# Patient Record
Sex: MALE | Race: WHITE | NOT HISPANIC OR LATINO | Employment: OTHER | URBAN - METROPOLITAN AREA
[De-identification: names, ages, dates, MRNs, and addresses within clinical notes are randomized per-mention and may not be internally consistent; named-entity substitution may affect disease eponyms.]

---

## 2020-08-20 ENCOUNTER — LAB VISIT (OUTPATIENT)
Dept: LAB | Facility: HOSPITAL | Age: 75
End: 2020-08-20
Attending: FAMILY MEDICINE
Payer: MEDICARE

## 2020-08-20 DIAGNOSIS — Z12.5 SCREENING FOR PROSTATE CANCER: ICD-10-CM

## 2020-08-20 DIAGNOSIS — Z79.899 ENCOUNTER FOR LONG-TERM (CURRENT) USE OF OTHER MEDICATIONS: ICD-10-CM

## 2020-08-20 DIAGNOSIS — I10 HYPERTENSION, ESSENTIAL: ICD-10-CM

## 2020-08-20 DIAGNOSIS — Z72.89 OTHER PROBLEMS RELATED TO LIFESTYLE: ICD-10-CM

## 2020-08-20 DIAGNOSIS — E11.9 DIABETES MELLITUS WITHOUT COMPLICATION: ICD-10-CM

## 2020-08-20 LAB
ALBUMIN SERPL BCP-MCNC: 4.1 G/DL (ref 3.5–5.2)
ALP SERPL-CCNC: 33 U/L (ref 55–135)
ALT SERPL W/O P-5'-P-CCNC: 18 U/L (ref 10–44)
ANION GAP SERPL CALC-SCNC: 11 MMOL/L (ref 8–16)
AST SERPL-CCNC: 18 U/L (ref 10–40)
BILIRUB SERPL-MCNC: 0.6 MG/DL (ref 0.1–1)
BUN SERPL-MCNC: 24 MG/DL (ref 8–23)
CALCIUM SERPL-MCNC: 9.6 MG/DL (ref 8.7–10.5)
CHLORIDE SERPL-SCNC: 102 MMOL/L (ref 95–110)
CHOLEST SERPL-MCNC: 127 MG/DL (ref 120–199)
CHOLEST/HDLC SERPL: 3.4 {RATIO} (ref 2–5)
CO2 SERPL-SCNC: 28 MMOL/L (ref 23–29)
COMPLEXED PSA SERPL-MCNC: 0.91 NG/ML (ref 0–4)
CREAT SERPL-MCNC: 0.7 MG/DL (ref 0.5–1.4)
EST. GFR  (AFRICAN AMERICAN): >60 ML/MIN/1.73 M^2
EST. GFR  (NON AFRICAN AMERICAN): >60 ML/MIN/1.73 M^2
GLUCOSE SERPL-MCNC: 161 MG/DL (ref 70–110)
HDLC SERPL-MCNC: 37 MG/DL (ref 40–75)
HDLC SERPL: 29.1 % (ref 20–50)
LDLC SERPL CALC-MCNC: 64.4 MG/DL (ref 63–159)
NONHDLC SERPL-MCNC: 90 MG/DL
POTASSIUM SERPL-SCNC: 3.9 MMOL/L (ref 3.5–5.1)
PROT SERPL-MCNC: 7 G/DL (ref 6–8.4)
SODIUM SERPL-SCNC: 141 MMOL/L (ref 136–145)
TRIGL SERPL-MCNC: 128 MG/DL (ref 30–150)

## 2020-08-20 PROCEDURE — 80061 LIPID PANEL: CPT

## 2020-08-20 PROCEDURE — 36415 COLL VENOUS BLD VENIPUNCTURE: CPT

## 2020-08-20 PROCEDURE — 86803 HEPATITIS C AB TEST: CPT

## 2020-08-20 PROCEDURE — 84153 ASSAY OF PSA TOTAL: CPT

## 2020-08-20 PROCEDURE — 83036 HEMOGLOBIN GLYCOSYLATED A1C: CPT

## 2020-08-20 PROCEDURE — 80053 COMPREHEN METABOLIC PANEL: CPT

## 2020-08-21 LAB
ESTIMATED AVG GLUCOSE: 134 MG/DL (ref 68–131)
HBA1C MFR BLD HPLC: 6.3 % (ref 4.5–6.2)

## 2020-08-22 LAB — HCV AB S/CO SERPL IA: 0.1 S/CO RATIO (ref 0–0.9)

## 2020-08-31 PROBLEM — E78.5 HYPERLIPIDEMIA: Status: ACTIVE | Noted: 2020-08-31

## 2020-08-31 PROBLEM — I48.91 ATRIAL FIBRILLATION: Status: ACTIVE | Noted: 2020-08-31

## 2020-08-31 PROBLEM — E66.01 MORBID OBESITY: Status: ACTIVE | Noted: 2020-08-31

## 2020-08-31 PROBLEM — Z79.82 ENCOUNTER FOR LONG-TERM (CURRENT) USE OF ASPIRIN: Status: ACTIVE | Noted: 2020-08-31

## 2020-08-31 PROBLEM — I10 HYPERTENSION, ESSENTIAL: Status: ACTIVE | Noted: 2020-08-31

## 2020-08-31 PROBLEM — Z79.01 ANTICOAGULANT LONG-TERM USE: Status: ACTIVE | Noted: 2020-08-31

## 2020-08-31 PROBLEM — E11.21 TYPE 2 DIABETES MELLITUS WITH DIABETIC NEPHROPATHY, WITHOUT LONG-TERM CURRENT USE OF INSULIN: Status: ACTIVE | Noted: 2020-08-31

## 2020-11-30 ENCOUNTER — TELEPHONE (OUTPATIENT)
Dept: FAMILY MEDICINE | Facility: CLINIC | Age: 75
End: 2020-11-30

## 2020-11-30 NOTE — TELEPHONE ENCOUNTER
----- Message from Rafaela Schultz, Patient Care Assistant sent at 11/30/2020  1:14 PM CST -----  Regarding: refill  Contact: pt  Type:  RX Refill Request    Who Called:  pt   Refill or New Rx:  refill   RX Name and Strength:  metoprolol tartrate (LOPRESSOR) 25 MG tablet  How is the patient currently taking it? 2Xday   Is this a 30 day or 90 day RX:  90  Preferred Pharmacy with phone number:    [facility]/pharmacy #[id number] - [place]  [address]  [address] [address]  Phone: [phone] Fax: [phone]  Local or Mail Order:  local   Ordering Provider:  Dr. Kina Mann Call Back Number:  [phone] (home)   Additional Information:  please call pt to advise. Thanks!        #60 BID NEED APT

## 2020-12-16 ENCOUNTER — TELEPHONE (OUTPATIENT)
Dept: FAMILY MEDICINE | Facility: CLINIC | Age: 75
End: 2020-12-16

## 2020-12-16 NOTE — TELEPHONE ENCOUNTER
----- Message from Matias Nathan sent at 12/16/2020  9:07 AM CST -----  Contact: Self  Type: Needs Medical Advice  Who Called:  Patient  Best Call Back Number: [phone]      Additional Information: Patient states pharmacy cannot fill rosuvastatin (CRESTOR) 20 MG tablet due to no dosage instructions on the prescription. States he would like pharmacy called and issue corrected. Patient states he has two days left.         [facility] #[id number] - [place] - [address]  [address] [place] [address]  Phone: [phone] Fax: [phone]    QD DONE

## 2021-01-06 ENCOUNTER — LAB VISIT (OUTPATIENT)
Dept: LAB | Facility: HOSPITAL | Age: 76
End: 2021-01-06
Attending: FAMILY MEDICINE
Payer: MEDICARE

## 2021-01-06 ENCOUNTER — OFFICE VISIT (OUTPATIENT)
Dept: FAMILY MEDICINE | Facility: CLINIC | Age: 76
End: 2021-01-06
Payer: MEDICARE

## 2021-01-06 VITALS
HEIGHT: 70 IN | BODY MASS INDEX: 45.1 KG/M2 | DIASTOLIC BLOOD PRESSURE: 86 MMHG | HEART RATE: 70 BPM | OXYGEN SATURATION: 95 % | WEIGHT: 315 LBS | TEMPERATURE: 97 F | SYSTOLIC BLOOD PRESSURE: 138 MMHG

## 2021-01-06 DIAGNOSIS — E78.5 HYPERLIPIDEMIA, UNSPECIFIED HYPERLIPIDEMIA TYPE: ICD-10-CM

## 2021-01-06 DIAGNOSIS — I48.91 ATRIAL FIBRILLATION, UNSPECIFIED TYPE: ICD-10-CM

## 2021-01-06 DIAGNOSIS — I10 HYPERTENSION, ESSENTIAL: ICD-10-CM

## 2021-01-06 DIAGNOSIS — E11.42 TYPE 2 DIABETES MELLITUS WITH DIABETIC POLYNEUROPATHY, WITHOUT LONG-TERM CURRENT USE OF INSULIN: ICD-10-CM

## 2021-01-06 DIAGNOSIS — E66.01 MORBID OBESITY: ICD-10-CM

## 2021-01-06 DIAGNOSIS — E11.21 TYPE 2 DIABETES MELLITUS WITH DIABETIC NEPHROPATHY, WITHOUT LONG-TERM CURRENT USE OF INSULIN: ICD-10-CM

## 2021-01-06 DIAGNOSIS — I10 HYPERTENSION, ESSENTIAL: Primary | ICD-10-CM

## 2021-01-06 DIAGNOSIS — Z79.01 ANTICOAGULANT LONG-TERM USE: ICD-10-CM

## 2021-01-06 DIAGNOSIS — G25.0 BENIGN FAMILIAL TREMOR: ICD-10-CM

## 2021-01-06 LAB
ALBUMIN SERPL BCP-MCNC: 4 G/DL (ref 3.5–5.2)
ALP SERPL-CCNC: 45 U/L (ref 55–135)
ALT SERPL W/O P-5'-P-CCNC: 19 U/L (ref 10–44)
ANION GAP SERPL CALC-SCNC: 13 MMOL/L (ref 8–16)
AST SERPL-CCNC: 21 U/L (ref 10–40)
BILIRUB SERPL-MCNC: 0.8 MG/DL (ref 0.1–1)
BUN SERPL-MCNC: 19 MG/DL (ref 8–23)
CALCIUM SERPL-MCNC: 9.3 MG/DL (ref 8.7–10.5)
CHLORIDE SERPL-SCNC: 96 MMOL/L (ref 95–110)
CHOLEST SERPL-MCNC: 111 MG/DL (ref 120–199)
CHOLEST/HDLC SERPL: 3 {RATIO} (ref 2–5)
CO2 SERPL-SCNC: 29 MMOL/L (ref 23–29)
CREAT SERPL-MCNC: 0.7 MG/DL (ref 0.5–1.4)
EST. GFR  (AFRICAN AMERICAN): >60 ML/MIN/1.73 M^2
EST. GFR  (NON AFRICAN AMERICAN): >60 ML/MIN/1.73 M^2
ESTIMATED AVG GLUCOSE: 206 MG/DL (ref 68–131)
GLUCOSE SERPL-MCNC: 184 MG/DL (ref 70–110)
HBA1C MFR BLD HPLC: 8.8 % (ref 4.5–6.2)
HDLC SERPL-MCNC: 37 MG/DL (ref 40–75)
HDLC SERPL: 33.3 % (ref 20–50)
LDLC SERPL CALC-MCNC: 48.4 MG/DL (ref 63–159)
NONHDLC SERPL-MCNC: 74 MG/DL
POTASSIUM SERPL-SCNC: 3.8 MMOL/L (ref 3.5–5.1)
PROT SERPL-MCNC: 7.4 G/DL (ref 6–8.4)
SODIUM SERPL-SCNC: 138 MMOL/L (ref 136–145)
TRIGL SERPL-MCNC: 128 MG/DL (ref 30–150)

## 2021-01-06 PROCEDURE — 80053 COMPREHEN METABOLIC PANEL: CPT

## 2021-01-06 PROCEDURE — 36415 COLL VENOUS BLD VENIPUNCTURE: CPT

## 2021-01-06 PROCEDURE — 99214 PR OFFICE/OUTPT VISIT, EST, LEVL IV, 30-39 MIN: ICD-10-PCS | Mod: S$GLB,,, | Performed by: FAMILY MEDICINE

## 2021-01-06 PROCEDURE — 80061 LIPID PANEL: CPT

## 2021-01-06 PROCEDURE — 83036 HEMOGLOBIN GLYCOSYLATED A1C: CPT

## 2021-01-06 PROCEDURE — 99214 OFFICE O/P EST MOD 30 MIN: CPT | Mod: S$GLB,,, | Performed by: FAMILY MEDICINE

## 2021-01-06 RX ORDER — MAGNESIUM 250 MG
250 TABLET ORAL 2 TIMES DAILY
COMMUNITY
End: 2023-03-16

## 2021-01-06 RX ORDER — OXAPROZIN 600 MG/1
600 TABLET, FILM COATED ORAL 2 TIMES DAILY PRN
Qty: 60 TABLET | Refills: 2 | Status: SHIPPED | OUTPATIENT
Start: 2021-01-06 | End: 2021-10-12 | Stop reason: SDUPTHER

## 2021-01-06 RX ORDER — LOSARTAN POTASSIUM 50 MG/1
50 TABLET ORAL DAILY
COMMUNITY
End: 2021-01-06 | Stop reason: SDUPTHER

## 2021-01-06 RX ORDER — CYCLOBENZAPRINE HCL 5 MG
TABLET ORAL
Qty: 60 TABLET | Refills: 2 | Status: SHIPPED | OUTPATIENT
Start: 2021-01-06 | End: 2021-10-12 | Stop reason: SDUPTHER

## 2021-01-06 RX ORDER — PROPRANOLOL HYDROCHLORIDE 40 MG/1
40 TABLET ORAL 2 TIMES DAILY
Qty: 180 TABLET | Refills: 1 | Status: SHIPPED | OUTPATIENT
Start: 2021-01-06 | End: 2021-04-07

## 2021-01-06 RX ORDER — CHOLECALCIFEROL (VITAMIN D3) 50 MCG
2 TABLET ORAL DAILY
COMMUNITY

## 2021-01-06 RX ORDER — METFORMIN HYDROCHLORIDE 500 MG/1
500 TABLET, EXTENDED RELEASE ORAL DAILY
Qty: 90 TABLET | Refills: 1 | Status: SHIPPED | OUTPATIENT
Start: 2021-01-06 | End: 2021-08-26

## 2021-01-06 RX ORDER — LISINOPRIL AND HYDROCHLOROTHIAZIDE 12.5; 2 MG/1; MG/1
1 TABLET ORAL DAILY
Qty: 90 TABLET | Refills: 1 | Status: SHIPPED | OUTPATIENT
Start: 2021-01-06 | End: 2021-03-04

## 2021-01-06 RX ORDER — MULTIVITAMIN
1 TABLET ORAL DAILY
COMMUNITY
End: 2022-10-11

## 2021-01-06 RX ORDER — TOPIRAMATE 50 MG/1
TABLET, FILM COATED ORAL
COMMUNITY
Start: 2021-01-04 | End: 2021-10-12 | Stop reason: SDUPTHER

## 2021-01-06 RX ORDER — MAGNESIUM 200 MG
1000 TABLET ORAL 2 TIMES DAILY
COMMUNITY

## 2021-01-06 RX ORDER — ROSUVASTATIN CALCIUM 20 MG/1
TABLET, COATED ORAL
Qty: 90 TABLET | Refills: 1 | Status: SHIPPED | OUTPATIENT
Start: 2021-01-06 | End: 2021-09-07

## 2021-01-06 RX ORDER — METOPROLOL TARTRATE 25 MG/1
25 TABLET, FILM COATED ORAL 2 TIMES DAILY
Qty: 180 TABLET | Refills: 1 | Status: SHIPPED | OUTPATIENT
Start: 2021-01-06 | End: 2021-03-04

## 2021-01-06 RX ORDER — LOSARTAN POTASSIUM 50 MG/1
50 TABLET ORAL DAILY
Qty: 90 TABLET | Refills: 1 | Status: SHIPPED | OUTPATIENT
Start: 2021-01-06 | End: 2021-03-04

## 2021-01-06 RX ORDER — TOLTERODINE 4 MG/1
4 CAPSULE, EXTENDED RELEASE ORAL DAILY
Qty: 90 CAPSULE | Refills: 1 | Status: SHIPPED | OUTPATIENT
Start: 2021-01-06 | End: 2021-07-25

## 2021-01-08 ENCOUNTER — PATIENT OUTREACH (OUTPATIENT)
Dept: ADMINISTRATIVE | Facility: HOSPITAL | Age: 76
End: 2021-01-08

## 2021-01-08 LAB
LEFT EYE DM RETINOPATHY: NEGATIVE
RIGHT EYE DM RETINOPATHY: NEGATIVE

## 2021-01-13 ENCOUNTER — PATIENT OUTREACH (OUTPATIENT)
Dept: ADMINISTRATIVE | Facility: HOSPITAL | Age: 76
End: 2021-01-13

## 2021-01-14 ENCOUNTER — PATIENT OUTREACH (OUTPATIENT)
Dept: ADMINISTRATIVE | Facility: HOSPITAL | Age: 76
End: 2021-01-14

## 2021-01-21 DIAGNOSIS — Z79.01 ANTICOAGULANT LONG-TERM USE: ICD-10-CM

## 2021-01-21 DIAGNOSIS — R80.9 MICROALBUMINURIA: ICD-10-CM

## 2021-01-21 DIAGNOSIS — I48.91 ATRIAL FIBRILLATION, UNSPECIFIED TYPE: ICD-10-CM

## 2021-01-21 DIAGNOSIS — E78.5 HYPERLIPIDEMIA, UNSPECIFIED HYPERLIPIDEMIA TYPE: ICD-10-CM

## 2021-01-21 DIAGNOSIS — Z79.82 ASPIRIN LONG-TERM USE: ICD-10-CM

## 2021-01-21 DIAGNOSIS — I10 HYPERTENSION, ESSENTIAL: Primary | ICD-10-CM

## 2021-01-21 DIAGNOSIS — E11.9 DIABETES MELLITUS WITHOUT COMPLICATION: ICD-10-CM

## 2021-02-09 ENCOUNTER — IMMUNIZATION (OUTPATIENT)
Dept: PRIMARY CARE CLINIC | Facility: CLINIC | Age: 76
End: 2021-02-09
Payer: MEDICARE

## 2021-02-09 DIAGNOSIS — Z23 NEED FOR VACCINATION: Primary | ICD-10-CM

## 2021-02-09 PROCEDURE — 0001A COVID-19, MRNA, LNP-S, PF, 30 MCG/0.3 ML DOSE VACCINE: CPT | Mod: S$GLB,,, | Performed by: FAMILY MEDICINE

## 2021-02-09 PROCEDURE — 91300 COVID-19, MRNA, LNP-S, PF, 30 MCG/0.3 ML DOSE VACCINE: CPT | Mod: S$GLB,,, | Performed by: FAMILY MEDICINE

## 2021-02-09 PROCEDURE — 0001A COVID-19, MRNA, LNP-S, PF, 30 MCG/0.3 ML DOSE VACCINE: ICD-10-PCS | Mod: S$GLB,,, | Performed by: FAMILY MEDICINE

## 2021-02-09 PROCEDURE — 91300 COVID-19, MRNA, LNP-S, PF, 30 MCG/0.3 ML DOSE VACCINE: ICD-10-PCS | Mod: S$GLB,,, | Performed by: FAMILY MEDICINE

## 2021-03-03 ENCOUNTER — IMMUNIZATION (OUTPATIENT)
Dept: PRIMARY CARE CLINIC | Facility: CLINIC | Age: 76
End: 2021-03-03
Payer: MEDICARE

## 2021-03-03 DIAGNOSIS — Z23 NEED FOR VACCINATION: Primary | ICD-10-CM

## 2021-03-03 PROCEDURE — 0002A COVID-19, MRNA, LNP-S, PF, 30 MCG/0.3 ML DOSE VACCINE: ICD-10-PCS | Mod: CV19,S$GLB,, | Performed by: FAMILY MEDICINE

## 2021-03-03 PROCEDURE — 0002A COVID-19, MRNA, LNP-S, PF, 30 MCG/0.3 ML DOSE VACCINE: CPT | Mod: CV19,S$GLB,, | Performed by: FAMILY MEDICINE

## 2021-03-03 PROCEDURE — 91300 COVID-19, MRNA, LNP-S, PF, 30 MCG/0.3 ML DOSE VACCINE: ICD-10-PCS | Mod: S$GLB,,, | Performed by: FAMILY MEDICINE

## 2021-03-03 PROCEDURE — 91300 COVID-19, MRNA, LNP-S, PF, 30 MCG/0.3 ML DOSE VACCINE: CPT | Mod: S$GLB,,, | Performed by: FAMILY MEDICINE

## 2021-03-04 ENCOUNTER — OFFICE VISIT (OUTPATIENT)
Dept: CARDIOLOGY | Facility: CLINIC | Age: 76
End: 2021-03-04
Payer: MEDICARE

## 2021-03-04 VITALS
HEART RATE: 95 BPM | RESPIRATION RATE: 16 BRPM | BODY MASS INDEX: 44.38 KG/M2 | SYSTOLIC BLOOD PRESSURE: 140 MMHG | HEIGHT: 70 IN | DIASTOLIC BLOOD PRESSURE: 74 MMHG | WEIGHT: 310 LBS | OXYGEN SATURATION: 92 %

## 2021-03-04 DIAGNOSIS — I10 HYPERTENSION, UNSPECIFIED TYPE: ICD-10-CM

## 2021-03-04 DIAGNOSIS — R00.0 TACHYCARDIA: ICD-10-CM

## 2021-03-04 DIAGNOSIS — R06.01 ORTHOPNEA: Primary | ICD-10-CM

## 2021-03-04 DIAGNOSIS — E87.70 HYPERVOLEMIA, UNSPECIFIED HYPERVOLEMIA TYPE: ICD-10-CM

## 2021-03-04 DIAGNOSIS — Z79.899 POLYPHARMACY: ICD-10-CM

## 2021-03-04 DIAGNOSIS — I48.19 PERSISTENT ATRIAL FIBRILLATION: ICD-10-CM

## 2021-03-04 DIAGNOSIS — R40.0 DAYTIME SLEEPINESS: ICD-10-CM

## 2021-03-04 DIAGNOSIS — I51.89 DIASTOLIC DYSFUNCTION: ICD-10-CM

## 2021-03-04 DIAGNOSIS — R94.39 ABNORMAL NUCLEAR STRESS TEST: ICD-10-CM

## 2021-03-04 DIAGNOSIS — E66.01 MORBID OBESITY: ICD-10-CM

## 2021-03-04 PROCEDURE — 99214 PR OFFICE/OUTPT VISIT, EST, LEVL IV, 30-39 MIN: ICD-10-PCS | Mod: S$GLB,,, | Performed by: NURSE PRACTITIONER

## 2021-03-04 PROCEDURE — 99214 OFFICE O/P EST MOD 30 MIN: CPT | Mod: S$GLB,,, | Performed by: NURSE PRACTITIONER

## 2021-03-04 RX ORDER — HYDROCHLOROTHIAZIDE 25 MG/1
25 TABLET ORAL DAILY
Qty: 90 TABLET | Refills: 3
Start: 2021-03-04 | End: 2021-10-12 | Stop reason: SDUPTHER

## 2021-03-04 RX ORDER — LISINOPRIL 20 MG/1
20 TABLET ORAL 2 TIMES DAILY
Qty: 60 TABLET | Refills: 11 | Status: SHIPPED | OUTPATIENT
Start: 2021-03-04 | End: 2021-10-12 | Stop reason: SDUPTHER

## 2021-03-11 ENCOUNTER — HOSPITAL ENCOUNTER (OUTPATIENT)
Dept: CARDIOLOGY | Facility: CLINIC | Age: 76
Discharge: HOME | End: 2021-03-11
Attending: NURSE PRACTITIONER
Payer: MEDICARE

## 2021-03-11 DIAGNOSIS — R00.0 TACHYCARDIA: ICD-10-CM

## 2021-03-11 PROCEDURE — 93224 XTRNL ECG REC UP TO 48 HRS: CPT | Mod: S$GLB,,, | Performed by: INTERNAL MEDICINE

## 2021-03-11 PROCEDURE — 93224 HOLTER MONITOR - 24 HOUR (CUPID ONLY): ICD-10-PCS | Mod: S$GLB,,, | Performed by: INTERNAL MEDICINE

## 2021-03-24 ENCOUNTER — TELEPHONE (OUTPATIENT)
Dept: FAMILY MEDICINE | Facility: CLINIC | Age: 76
End: 2021-03-24

## 2021-03-24 ENCOUNTER — PATIENT MESSAGE (OUTPATIENT)
Dept: CARDIOLOGY | Facility: CLINIC | Age: 76
End: 2021-03-24

## 2021-04-01 ENCOUNTER — LAB VISIT (OUTPATIENT)
Dept: LAB | Facility: HOSPITAL | Age: 76
End: 2021-04-01
Attending: FAMILY MEDICINE
Payer: MEDICARE

## 2021-04-01 ENCOUNTER — PATIENT MESSAGE (OUTPATIENT)
Dept: FAMILY MEDICINE | Facility: CLINIC | Age: 76
End: 2021-04-01

## 2021-04-01 DIAGNOSIS — Z79.01 ANTICOAGULANT LONG-TERM USE: ICD-10-CM

## 2021-04-01 DIAGNOSIS — I10 HYPERTENSION, ESSENTIAL: ICD-10-CM

## 2021-04-01 DIAGNOSIS — R80.9 PROTEINURIA: Primary | ICD-10-CM

## 2021-04-01 DIAGNOSIS — Z79.82 ASPIRIN LONG-TERM USE: ICD-10-CM

## 2021-04-01 DIAGNOSIS — E11.9 DIABETES MELLITUS WITHOUT COMPLICATION: ICD-10-CM

## 2021-04-01 LAB
ALBUMIN SERPL BCP-MCNC: 3.9 G/DL (ref 3.5–5.2)
ALP SERPL-CCNC: 46 U/L (ref 55–135)
ALT SERPL W/O P-5'-P-CCNC: 16 U/L (ref 10–44)
ANION GAP SERPL CALC-SCNC: 11 MMOL/L (ref 8–16)
AST SERPL-CCNC: 17 U/L (ref 10–40)
BASOPHILS # BLD AUTO: 0.03 K/UL (ref 0–0.2)
BASOPHILS NFR BLD: 0.4 % (ref 0–1.9)
BILIRUB SERPL-MCNC: 0.7 MG/DL (ref 0.1–1)
BUN SERPL-MCNC: 13 MG/DL (ref 8–23)
CALCIUM SERPL-MCNC: 9 MG/DL (ref 8.7–10.5)
CHLORIDE SERPL-SCNC: 100 MMOL/L (ref 95–110)
CO2 SERPL-SCNC: 32 MMOL/L (ref 23–29)
CREAT SERPL-MCNC: 0.6 MG/DL (ref 0.5–1.4)
DIFFERENTIAL METHOD: ABNORMAL
EOSINOPHIL # BLD AUTO: 0.1 K/UL (ref 0–0.5)
EOSINOPHIL NFR BLD: 0.8 % (ref 0–8)
ERYTHROCYTE [DISTWIDTH] IN BLOOD BY AUTOMATED COUNT: 13.3 % (ref 11.5–14.5)
EST. GFR  (AFRICAN AMERICAN): >60 ML/MIN/1.73 M^2
EST. GFR  (NON AFRICAN AMERICAN): >60 ML/MIN/1.73 M^2
GLUCOSE SERPL-MCNC: 224 MG/DL (ref 70–110)
HCT VFR BLD AUTO: 42.9 % (ref 40–54)
HGB BLD-MCNC: 13.1 G/DL (ref 14–18)
IMM GRANULOCYTES # BLD AUTO: 0.02 K/UL (ref 0–0.04)
IMM GRANULOCYTES NFR BLD AUTO: 0.3 % (ref 0–0.5)
LYMPHOCYTES # BLD AUTO: 2 K/UL (ref 1–4.8)
LYMPHOCYTES NFR BLD: 26.9 % (ref 18–48)
MCH RBC QN AUTO: 29 PG (ref 27–31)
MCHC RBC AUTO-ENTMCNC: 30.5 G/DL (ref 32–36)
MCV RBC AUTO: 95 FL (ref 82–98)
MONOCYTES # BLD AUTO: 0.7 K/UL (ref 0.3–1)
MONOCYTES NFR BLD: 9.1 % (ref 4–15)
NEUTROPHILS # BLD AUTO: 4.6 K/UL (ref 1.8–7.7)
NEUTROPHILS NFR BLD: 62.5 % (ref 38–73)
NRBC BLD-RTO: 0 /100 WBC
PLATELET # BLD AUTO: 241 K/UL (ref 150–450)
PMV BLD AUTO: 10.7 FL (ref 9.2–12.9)
POTASSIUM SERPL-SCNC: 4.1 MMOL/L (ref 3.5–5.1)
PROT SERPL-MCNC: 7.1 G/DL (ref 6–8.4)
RBC # BLD AUTO: 4.52 M/UL (ref 4.6–6.2)
SODIUM SERPL-SCNC: 143 MMOL/L (ref 136–145)
WBC # BLD AUTO: 7.29 K/UL (ref 3.9–12.7)

## 2021-04-01 PROCEDURE — 80053 COMPREHEN METABOLIC PANEL: CPT | Performed by: FAMILY MEDICINE

## 2021-04-01 PROCEDURE — 83036 HEMOGLOBIN GLYCOSYLATED A1C: CPT | Performed by: FAMILY MEDICINE

## 2021-04-01 PROCEDURE — 85025 COMPLETE CBC W/AUTO DIFF WBC: CPT | Performed by: FAMILY MEDICINE

## 2021-04-01 PROCEDURE — 36415 COLL VENOUS BLD VENIPUNCTURE: CPT | Performed by: FAMILY MEDICINE

## 2021-04-02 ENCOUNTER — LAB VISIT (OUTPATIENT)
Dept: LAB | Facility: HOSPITAL | Age: 76
End: 2021-04-02
Attending: FAMILY MEDICINE
Payer: MEDICARE

## 2021-04-02 DIAGNOSIS — R80.9 PROTEINURIA: Primary | ICD-10-CM

## 2021-04-02 LAB
ALBUMIN/CREAT UR: 322.2 UG/MG (ref 0–30)
CREAT UR-MCNC: 82 MG/DL (ref 23–375)
ESTIMATED AVG GLUCOSE: 197 MG/DL (ref 68–131)
HBA1C MFR BLD: 8.5 % (ref 4.5–6.2)
MICROALBUMIN UR DL<=1MG/L-MCNC: 264.2 UG/ML

## 2021-04-02 PROCEDURE — 82043 UR ALBUMIN QUANTITATIVE: CPT | Performed by: FAMILY MEDICINE

## 2021-04-02 PROCEDURE — 82570 ASSAY OF URINE CREATININE: CPT | Performed by: FAMILY MEDICINE

## 2021-04-08 ENCOUNTER — LAB VISIT (OUTPATIENT)
Dept: LAB | Facility: HOSPITAL | Age: 76
End: 2021-04-08
Attending: FAMILY MEDICINE
Payer: MEDICARE

## 2021-04-08 ENCOUNTER — OFFICE VISIT (OUTPATIENT)
Dept: FAMILY MEDICINE | Facility: CLINIC | Age: 76
End: 2021-04-08
Payer: MEDICARE

## 2021-04-08 VITALS
HEIGHT: 70 IN | BODY MASS INDEX: 45.1 KG/M2 | HEART RATE: 84 BPM | OXYGEN SATURATION: 95 % | WEIGHT: 315 LBS | SYSTOLIC BLOOD PRESSURE: 132 MMHG | TEMPERATURE: 98 F | DIASTOLIC BLOOD PRESSURE: 88 MMHG

## 2021-04-08 DIAGNOSIS — E66.01 MORBID OBESITY: ICD-10-CM

## 2021-04-08 DIAGNOSIS — I48.91 ATRIAL FIBRILLATION, UNSPECIFIED TYPE: ICD-10-CM

## 2021-04-08 DIAGNOSIS — R80.9 POSITIVE FOR MACROALBUMINURIA: ICD-10-CM

## 2021-04-08 DIAGNOSIS — G25.0 BENIGN FAMILIAL TREMOR: ICD-10-CM

## 2021-04-08 DIAGNOSIS — R60.9 EDEMA, UNSPECIFIED TYPE: ICD-10-CM

## 2021-04-08 DIAGNOSIS — E11.42 TYPE 2 DIABETES MELLITUS WITH DIABETIC POLYNEUROPATHY, WITHOUT LONG-TERM CURRENT USE OF INSULIN: ICD-10-CM

## 2021-04-08 DIAGNOSIS — E11.21 TYPE 2 DIABETES MELLITUS WITH DIABETIC NEPHROPATHY, WITHOUT LONG-TERM CURRENT USE OF INSULIN: Primary | ICD-10-CM

## 2021-04-08 DIAGNOSIS — Z79.01 ANTICOAGULANT LONG-TERM USE: ICD-10-CM

## 2021-04-08 DIAGNOSIS — E78.5 HYPERLIPIDEMIA, UNSPECIFIED HYPERLIPIDEMIA TYPE: ICD-10-CM

## 2021-04-08 LAB — BNP SERPL-MCNC: 152 PG/ML (ref 0–99)

## 2021-04-08 PROCEDURE — 83880 ASSAY OF NATRIURETIC PEPTIDE: CPT | Performed by: FAMILY MEDICINE

## 2021-04-08 PROCEDURE — 99214 PR OFFICE/OUTPT VISIT, EST, LEVL IV, 30-39 MIN: ICD-10-PCS | Mod: S$GLB,,, | Performed by: FAMILY MEDICINE

## 2021-04-08 PROCEDURE — 36415 COLL VENOUS BLD VENIPUNCTURE: CPT | Performed by: FAMILY MEDICINE

## 2021-04-08 PROCEDURE — 99214 OFFICE O/P EST MOD 30 MIN: CPT | Mod: S$GLB,,, | Performed by: FAMILY MEDICINE

## 2021-04-09 ENCOUNTER — TELEPHONE (OUTPATIENT)
Dept: CARDIOLOGY | Facility: CLINIC | Age: 76
End: 2021-04-09

## 2021-04-10 RX ORDER — SEMAGLUTIDE 1.34 MG/ML
INJECTION, SOLUTION SUBCUTANEOUS
Qty: 1 SYRINGE | Refills: 0
Start: 2021-04-10 | End: 2021-07-12 | Stop reason: SDUPTHER

## 2021-04-18 ENCOUNTER — PATIENT MESSAGE (OUTPATIENT)
Dept: FAMILY MEDICINE | Facility: CLINIC | Age: 76
End: 2021-04-18

## 2021-04-19 ENCOUNTER — TELEPHONE (OUTPATIENT)
Dept: FAMILY MEDICINE | Facility: CLINIC | Age: 76
End: 2021-04-19

## 2021-04-21 ENCOUNTER — OFFICE VISIT (OUTPATIENT)
Dept: CARDIOLOGY | Facility: CLINIC | Age: 76
End: 2021-04-21
Payer: MEDICARE

## 2021-04-21 VITALS
SYSTOLIC BLOOD PRESSURE: 126 MMHG | DIASTOLIC BLOOD PRESSURE: 68 MMHG | RESPIRATION RATE: 16 BRPM | WEIGHT: 303 LBS | BODY MASS INDEX: 43.38 KG/M2 | OXYGEN SATURATION: 93 % | HEART RATE: 97 BPM | HEIGHT: 70 IN

## 2021-04-21 DIAGNOSIS — I48.91 ATRIAL FIBRILLATION, UNSPECIFIED TYPE: Primary | ICD-10-CM

## 2021-04-21 DIAGNOSIS — E78.5 HYPERLIPIDEMIA, UNSPECIFIED HYPERLIPIDEMIA TYPE: ICD-10-CM

## 2021-04-21 DIAGNOSIS — I50.32 CHRONIC HEART FAILURE WITH PRESERVED EJECTION FRACTION: ICD-10-CM

## 2021-04-21 DIAGNOSIS — Z79.01 ANTICOAGULANT LONG-TERM USE: ICD-10-CM

## 2021-04-21 DIAGNOSIS — I10 HYPERTENSION, ESSENTIAL: ICD-10-CM

## 2021-04-21 DIAGNOSIS — E11.21 TYPE 2 DIABETES MELLITUS WITH DIABETIC NEPHROPATHY, WITHOUT LONG-TERM CURRENT USE OF INSULIN: ICD-10-CM

## 2021-04-21 DIAGNOSIS — E66.01 MORBID OBESITY: ICD-10-CM

## 2021-04-21 PROCEDURE — 99214 PR OFFICE/OUTPT VISIT, EST, LEVL IV, 30-39 MIN: ICD-10-PCS | Mod: S$GLB,,, | Performed by: INTERNAL MEDICINE

## 2021-04-21 PROCEDURE — 99214 OFFICE O/P EST MOD 30 MIN: CPT | Mod: S$GLB,,, | Performed by: INTERNAL MEDICINE

## 2021-05-10 ENCOUNTER — OFFICE VISIT (OUTPATIENT)
Dept: FAMILY MEDICINE | Facility: CLINIC | Age: 76
End: 2021-05-10
Payer: MEDICARE

## 2021-05-10 VITALS
SYSTOLIC BLOOD PRESSURE: 132 MMHG | BODY MASS INDEX: 42.37 KG/M2 | OXYGEN SATURATION: 95 % | TEMPERATURE: 98 F | DIASTOLIC BLOOD PRESSURE: 79 MMHG | HEART RATE: 82 BPM | HEIGHT: 70 IN | WEIGHT: 296 LBS

## 2021-05-10 DIAGNOSIS — E11.42 TYPE 2 DIABETES MELLITUS WITH DIABETIC POLYNEUROPATHY, WITHOUT LONG-TERM CURRENT USE OF INSULIN: ICD-10-CM

## 2021-05-10 DIAGNOSIS — E11.21 TYPE 2 DIABETES MELLITUS WITH DIABETIC NEPHROPATHY, WITHOUT LONG-TERM CURRENT USE OF INSULIN: Primary | ICD-10-CM

## 2021-05-10 DIAGNOSIS — E66.01 MORBID OBESITY: ICD-10-CM

## 2021-05-10 PROCEDURE — 99213 PR OFFICE/OUTPT VISIT, EST, LEVL III, 20-29 MIN: ICD-10-PCS | Mod: S$GLB,,, | Performed by: FAMILY MEDICINE

## 2021-05-10 PROCEDURE — 99213 OFFICE O/P EST LOW 20 MIN: CPT | Mod: S$GLB,,, | Performed by: FAMILY MEDICINE

## 2021-05-11 LAB
OHS CV EVENT MONITOR DAY: 1
OHS CV HOLTER LENGTH DECIMAL HOURS: 48
OHS CV HOLTER LENGTH HOURS: 24
OHS CV HOLTER LENGTH MINUTES: 0

## 2021-05-18 ENCOUNTER — TELEPHONE (OUTPATIENT)
Dept: CARDIOLOGY | Facility: CLINIC | Age: 76
End: 2021-05-18

## 2021-05-20 ENCOUNTER — TELEPHONE (OUTPATIENT)
Dept: FAMILY MEDICINE | Facility: CLINIC | Age: 76
End: 2021-05-20

## 2021-07-01 ENCOUNTER — PATIENT MESSAGE (OUTPATIENT)
Dept: FAMILY MEDICINE | Facility: CLINIC | Age: 76
End: 2021-07-01

## 2021-07-06 ENCOUNTER — LAB VISIT (OUTPATIENT)
Dept: LAB | Facility: HOSPITAL | Age: 76
End: 2021-07-06
Attending: FAMILY MEDICINE
Payer: MEDICARE

## 2021-07-06 DIAGNOSIS — E66.01 MORBID OBESITY: ICD-10-CM

## 2021-07-06 DIAGNOSIS — E11.21 TYPE 2 DIABETES MELLITUS WITH DIABETIC NEPHROPATHY, WITHOUT LONG-TERM CURRENT USE OF INSULIN: ICD-10-CM

## 2021-07-06 DIAGNOSIS — E11.42 TYPE 2 DIABETES MELLITUS WITH DIABETIC POLYNEUROPATHY, WITHOUT LONG-TERM CURRENT USE OF INSULIN: ICD-10-CM

## 2021-07-06 LAB
ALBUMIN SERPL BCP-MCNC: 4 G/DL (ref 3.5–5.2)
ALP SERPL-CCNC: 44 U/L (ref 55–135)
ALT SERPL W/O P-5'-P-CCNC: 12 U/L (ref 10–44)
ANION GAP SERPL CALC-SCNC: 15 MMOL/L (ref 8–16)
AST SERPL-CCNC: 14 U/L (ref 10–40)
BILIRUB SERPL-MCNC: 1 MG/DL (ref 0.1–1)
BUN SERPL-MCNC: 14 MG/DL (ref 8–23)
CALCIUM SERPL-MCNC: 9.2 MG/DL (ref 8.7–10.5)
CHLORIDE SERPL-SCNC: 98 MMOL/L (ref 95–110)
CHOLEST SERPL-MCNC: 123 MG/DL (ref 120–199)
CHOLEST/HDLC SERPL: 3.8 {RATIO} (ref 2–5)
CO2 SERPL-SCNC: 28 MMOL/L (ref 23–29)
CREAT SERPL-MCNC: 0.7 MG/DL (ref 0.5–1.4)
EST. GFR  (AFRICAN AMERICAN): >60 ML/MIN/1.73 M^2
EST. GFR  (NON AFRICAN AMERICAN): >60 ML/MIN/1.73 M^2
GLUCOSE SERPL-MCNC: 190 MG/DL (ref 70–110)
HDLC SERPL-MCNC: 32 MG/DL (ref 40–75)
HDLC SERPL: 26 % (ref 20–50)
LDLC SERPL CALC-MCNC: 60.6 MG/DL (ref 63–159)
NONHDLC SERPL-MCNC: 91 MG/DL
POTASSIUM SERPL-SCNC: 4.1 MMOL/L (ref 3.5–5.1)
PROT SERPL-MCNC: 7.6 G/DL (ref 6–8.4)
SODIUM SERPL-SCNC: 141 MMOL/L (ref 136–145)
TRIGL SERPL-MCNC: 152 MG/DL (ref 30–150)

## 2021-07-06 PROCEDURE — 83036 HEMOGLOBIN GLYCOSYLATED A1C: CPT | Performed by: FAMILY MEDICINE

## 2021-07-06 PROCEDURE — 80061 LIPID PANEL: CPT | Performed by: FAMILY MEDICINE

## 2021-07-06 PROCEDURE — 80053 COMPREHEN METABOLIC PANEL: CPT | Performed by: FAMILY MEDICINE

## 2021-07-06 PROCEDURE — 36415 COLL VENOUS BLD VENIPUNCTURE: CPT | Performed by: FAMILY MEDICINE

## 2021-07-07 LAB
ESTIMATED AVG GLUCOSE: 189 MG/DL (ref 68–131)
HBA1C MFR BLD: 8.2 % (ref 4.5–6.2)

## 2021-07-12 ENCOUNTER — OFFICE VISIT (OUTPATIENT)
Dept: FAMILY MEDICINE | Facility: CLINIC | Age: 76
End: 2021-07-12
Payer: MEDICARE

## 2021-07-12 VITALS
HEART RATE: 74 BPM | WEIGHT: 299 LBS | BODY MASS INDEX: 42.8 KG/M2 | HEIGHT: 70 IN | OXYGEN SATURATION: 95 % | TEMPERATURE: 97 F

## 2021-07-12 DIAGNOSIS — E11.42 TYPE 2 DIABETES MELLITUS WITH DIABETIC POLYNEUROPATHY, WITHOUT LONG-TERM CURRENT USE OF INSULIN: ICD-10-CM

## 2021-07-12 DIAGNOSIS — Z79.01 ANTICOAGULANT LONG-TERM USE: ICD-10-CM

## 2021-07-12 DIAGNOSIS — E78.5 HYPERLIPIDEMIA, UNSPECIFIED HYPERLIPIDEMIA TYPE: ICD-10-CM

## 2021-07-12 DIAGNOSIS — G47.00 INSOMNIA, UNSPECIFIED TYPE: ICD-10-CM

## 2021-07-12 DIAGNOSIS — E11.21 TYPE 2 DIABETES MELLITUS WITH DIABETIC NEPHROPATHY, WITHOUT LONG-TERM CURRENT USE OF INSULIN: Primary | ICD-10-CM

## 2021-07-12 DIAGNOSIS — Z79.82 ASPIRIN LONG-TERM USE: ICD-10-CM

## 2021-07-12 DIAGNOSIS — I10 HYPERTENSION, ESSENTIAL: ICD-10-CM

## 2021-07-12 DIAGNOSIS — I48.91 ATRIAL FIBRILLATION, UNSPECIFIED TYPE: ICD-10-CM

## 2021-07-12 DIAGNOSIS — I50.30 HEART FAILURE WITH PRESERVED EJECTION FRACTION, UNSPECIFIED HF CHRONICITY: ICD-10-CM

## 2021-07-12 DIAGNOSIS — E66.01 MORBID OBESITY: ICD-10-CM

## 2021-07-12 DIAGNOSIS — G25.0 BENIGN ESSENTIAL TREMOR: ICD-10-CM

## 2021-07-12 DIAGNOSIS — H91.90 HEARING LOSS, UNSPECIFIED HEARING LOSS TYPE, UNSPECIFIED LATERALITY: ICD-10-CM

## 2021-07-12 PROCEDURE — 99214 PR OFFICE/OUTPT VISIT, EST, LEVL IV, 30-39 MIN: ICD-10-PCS | Mod: S$GLB,,, | Performed by: FAMILY MEDICINE

## 2021-07-12 PROCEDURE — 99214 OFFICE O/P EST MOD 30 MIN: CPT | Mod: S$GLB,,, | Performed by: FAMILY MEDICINE

## 2021-07-12 RX ORDER — SEMAGLUTIDE 1.34 MG/ML
INJECTION, SOLUTION SUBCUTANEOUS
Qty: 1 PEN | Refills: 5 | Status: SHIPPED | OUTPATIENT
Start: 2021-07-12 | End: 2021-10-12 | Stop reason: DRUGHIGH

## 2021-07-12 RX ORDER — LISINOPRIL AND HYDROCHLOROTHIAZIDE 12.5; 2 MG/1; MG/1
1 TABLET ORAL DAILY
COMMUNITY
Start: 2021-06-06 | End: 2021-07-12

## 2021-07-12 RX ORDER — TRAZODONE HYDROCHLORIDE 50 MG/1
50 TABLET ORAL NIGHTLY
Qty: 30 TABLET | Refills: 2 | Status: SHIPPED | OUTPATIENT
Start: 2021-07-12 | End: 2021-10-12 | Stop reason: SDUPTHER

## 2021-07-12 RX ORDER — SEMAGLUTIDE 1.34 MG/ML
INJECTION, SOLUTION SUBCUTANEOUS
Qty: 1 PEN | Refills: 5 | Status: SHIPPED | OUTPATIENT
Start: 2021-07-12 | End: 2021-07-12 | Stop reason: SDUPTHER

## 2021-07-12 RX ORDER — HYDROCODONE BITARTRATE AND ACETAMINOPHEN 10; 325 MG/1; MG/1
TABLET ORAL
COMMUNITY
End: 2021-07-12

## 2021-07-12 RX ORDER — DULOXETIN HYDROCHLORIDE 30 MG/1
30 CAPSULE, DELAYED RELEASE ORAL DAILY
COMMUNITY
Start: 2021-05-13 | End: 2021-10-12 | Stop reason: SDUPTHER

## 2021-07-13 ENCOUNTER — PATIENT MESSAGE (OUTPATIENT)
Dept: FAMILY MEDICINE | Facility: CLINIC | Age: 76
End: 2021-07-13

## 2021-07-18 PROBLEM — G47.00 INSOMNIA: Status: ACTIVE | Noted: 2021-07-18

## 2021-07-18 PROBLEM — H91.90 HEARING LOSS: Status: ACTIVE | Noted: 2021-07-18

## 2021-10-06 ENCOUNTER — LAB VISIT (OUTPATIENT)
Dept: LAB | Facility: HOSPITAL | Age: 76
End: 2021-10-06
Attending: FAMILY MEDICINE
Payer: MEDICARE

## 2021-10-06 DIAGNOSIS — I10 HYPERTENSION, ESSENTIAL: ICD-10-CM

## 2021-10-06 DIAGNOSIS — E78.5 HYPERLIPIDEMIA, UNSPECIFIED HYPERLIPIDEMIA TYPE: ICD-10-CM

## 2021-10-06 DIAGNOSIS — E11.21 TYPE 2 DIABETES MELLITUS WITH DIABETIC NEPHROPATHY, WITHOUT LONG-TERM CURRENT USE OF INSULIN: ICD-10-CM

## 2021-10-06 LAB
ALBUMIN SERPL BCP-MCNC: 3.7 G/DL (ref 3.5–5.2)
ALP SERPL-CCNC: 45 U/L (ref 55–135)
ALT SERPL W/O P-5'-P-CCNC: 19 U/L (ref 10–44)
ANION GAP SERPL CALC-SCNC: 10 MMOL/L (ref 8–16)
AST SERPL-CCNC: 16 U/L (ref 10–40)
BASOPHILS # BLD AUTO: 0.03 K/UL (ref 0–0.2)
BASOPHILS NFR BLD: 0.3 % (ref 0–1.9)
BILIRUB SERPL-MCNC: 0.9 MG/DL (ref 0.1–1)
BUN SERPL-MCNC: 15 MG/DL (ref 8–23)
CALCIUM SERPL-MCNC: 8.7 MG/DL (ref 8.7–10.5)
CHLORIDE SERPL-SCNC: 99 MMOL/L (ref 95–110)
CHOLEST SERPL-MCNC: 106 MG/DL (ref 120–199)
CHOLEST/HDLC SERPL: 3.3 {RATIO} (ref 2–5)
CO2 SERPL-SCNC: 28 MMOL/L (ref 23–29)
CREAT SERPL-MCNC: 0.7 MG/DL (ref 0.5–1.4)
DIFFERENTIAL METHOD: NORMAL
EOSINOPHIL # BLD AUTO: 0.2 K/UL (ref 0–0.5)
EOSINOPHIL NFR BLD: 1.8 % (ref 0–8)
ERYTHROCYTE [DISTWIDTH] IN BLOOD BY AUTOMATED COUNT: 12.7 % (ref 11.5–14.5)
EST. GFR  (AFRICAN AMERICAN): >60 ML/MIN/1.73 M^2
EST. GFR  (NON AFRICAN AMERICAN): >60 ML/MIN/1.73 M^2
ESTIMATED AVG GLUCOSE: 177 MG/DL (ref 68–131)
GLUCOSE SERPL-MCNC: 149 MG/DL (ref 70–110)
HBA1C MFR BLD: 7.8 % (ref 4.5–6.2)
HCT VFR BLD AUTO: 45.5 % (ref 40–54)
HDLC SERPL-MCNC: 32 MG/DL (ref 40–75)
HDLC SERPL: 30.2 % (ref 20–50)
HGB BLD-MCNC: 14.6 G/DL (ref 14–18)
IMM GRANULOCYTES # BLD AUTO: 0.03 K/UL (ref 0–0.04)
IMM GRANULOCYTES NFR BLD AUTO: 0.3 % (ref 0–0.5)
LDLC SERPL CALC-MCNC: 47 MG/DL (ref 63–159)
LYMPHOCYTES # BLD AUTO: 2.1 K/UL (ref 1–4.8)
LYMPHOCYTES NFR BLD: 21.2 % (ref 18–48)
MCH RBC QN AUTO: 30.7 PG (ref 27–31)
MCHC RBC AUTO-ENTMCNC: 32.1 G/DL (ref 32–36)
MCV RBC AUTO: 96 FL (ref 82–98)
MONOCYTES # BLD AUTO: 0.8 K/UL (ref 0.3–1)
MONOCYTES NFR BLD: 7.8 % (ref 4–15)
NEUTROPHILS # BLD AUTO: 6.7 K/UL (ref 1.8–7.7)
NEUTROPHILS NFR BLD: 68.6 % (ref 38–73)
NONHDLC SERPL-MCNC: 74 MG/DL
NRBC BLD-RTO: 0 /100 WBC
PLATELET # BLD AUTO: 287 K/UL (ref 150–450)
PMV BLD AUTO: 9.6 FL (ref 9.2–12.9)
POTASSIUM SERPL-SCNC: 4.1 MMOL/L (ref 3.5–5.1)
PROT SERPL-MCNC: 7.2 G/DL (ref 6–8.4)
RBC # BLD AUTO: 4.75 M/UL (ref 4.6–6.2)
SODIUM SERPL-SCNC: 137 MMOL/L (ref 136–145)
TRIGL SERPL-MCNC: 135 MG/DL (ref 30–150)
WBC # BLD AUTO: 9.73 K/UL (ref 3.9–12.7)

## 2021-10-06 PROCEDURE — 83036 HEMOGLOBIN GLYCOSYLATED A1C: CPT | Performed by: FAMILY MEDICINE

## 2021-10-06 PROCEDURE — 80061 LIPID PANEL: CPT | Performed by: FAMILY MEDICINE

## 2021-10-06 PROCEDURE — 80053 COMPREHEN METABOLIC PANEL: CPT | Performed by: FAMILY MEDICINE

## 2021-10-06 PROCEDURE — 85025 COMPLETE CBC W/AUTO DIFF WBC: CPT | Performed by: FAMILY MEDICINE

## 2021-10-06 PROCEDURE — 36415 COLL VENOUS BLD VENIPUNCTURE: CPT | Performed by: FAMILY MEDICINE

## 2021-10-12 ENCOUNTER — OFFICE VISIT (OUTPATIENT)
Dept: FAMILY MEDICINE | Facility: CLINIC | Age: 76
End: 2021-10-12
Payer: MEDICARE

## 2021-10-12 VITALS
BODY MASS INDEX: 41.66 KG/M2 | TEMPERATURE: 98 F | WEIGHT: 291 LBS | HEIGHT: 70 IN | SYSTOLIC BLOOD PRESSURE: 130 MMHG | HEART RATE: 81 BPM | OXYGEN SATURATION: 97 % | DIASTOLIC BLOOD PRESSURE: 76 MMHG

## 2021-10-12 DIAGNOSIS — I48.91 ATRIAL FIBRILLATION, UNSPECIFIED TYPE: ICD-10-CM

## 2021-10-12 DIAGNOSIS — Z12.5 SCREENING PSA (PROSTATE SPECIFIC ANTIGEN): ICD-10-CM

## 2021-10-12 DIAGNOSIS — E11.42 TYPE 2 DIABETES MELLITUS WITH DIABETIC POLYNEUROPATHY, WITHOUT LONG-TERM CURRENT USE OF INSULIN: ICD-10-CM

## 2021-10-12 DIAGNOSIS — I50.32 CHRONIC HEART FAILURE WITH PRESERVED EJECTION FRACTION: ICD-10-CM

## 2021-10-12 DIAGNOSIS — G47.00 INSOMNIA, UNSPECIFIED TYPE: ICD-10-CM

## 2021-10-12 DIAGNOSIS — Z79.82 ENCOUNTER FOR LONG-TERM (CURRENT) USE OF ASPIRIN: ICD-10-CM

## 2021-10-12 DIAGNOSIS — E66.01 MORBID OBESITY: ICD-10-CM

## 2021-10-12 DIAGNOSIS — E11.21 TYPE 2 DIABETES MELLITUS WITH DIABETIC NEPHROPATHY, WITHOUT LONG-TERM CURRENT USE OF INSULIN: Primary | ICD-10-CM

## 2021-10-12 DIAGNOSIS — Z79.01 ANTICOAGULANT LONG-TERM USE: ICD-10-CM

## 2021-10-12 DIAGNOSIS — I10 HYPERTENSION, ESSENTIAL: ICD-10-CM

## 2021-10-12 DIAGNOSIS — E78.5 HYPERLIPIDEMIA, UNSPECIFIED HYPERLIPIDEMIA TYPE: ICD-10-CM

## 2021-10-12 DIAGNOSIS — G25.0 BENIGN ESSENTIAL TREMOR: ICD-10-CM

## 2021-10-12 PROCEDURE — 99214 OFFICE O/P EST MOD 30 MIN: CPT | Mod: S$GLB,,, | Performed by: FAMILY MEDICINE

## 2021-10-12 PROCEDURE — 99214 PR OFFICE/OUTPT VISIT, EST, LEVL IV, 30-39 MIN: ICD-10-PCS | Mod: S$GLB,,, | Performed by: FAMILY MEDICINE

## 2021-10-12 RX ORDER — OXAPROZIN 600 MG/1
600 TABLET, FILM COATED ORAL 2 TIMES DAILY
Qty: 60 TABLET | Refills: 2 | Status: SHIPPED | OUTPATIENT
Start: 2021-10-12 | End: 2021-12-12

## 2021-10-12 RX ORDER — CYCLOBENZAPRINE HCL 5 MG
TABLET ORAL
Qty: 60 TABLET | Refills: 2 | Status: SHIPPED | OUTPATIENT
Start: 2021-10-12 | End: 2022-01-12 | Stop reason: SDUPTHER

## 2021-10-12 RX ORDER — SEMAGLUTIDE 1.34 MG/ML
INJECTION, SOLUTION SUBCUTANEOUS
Qty: 1 PEN | Refills: 5 | Status: CANCELLED | OUTPATIENT
Start: 2021-10-12

## 2021-10-12 RX ORDER — ROSUVASTATIN CALCIUM 20 MG/1
TABLET, COATED ORAL
Qty: 90 TABLET | Refills: 1 | Status: SHIPPED | OUTPATIENT
Start: 2021-10-12 | End: 2022-01-12 | Stop reason: SDUPTHER

## 2021-10-12 RX ORDER — LOSARTAN POTASSIUM 50 MG/1
50 TABLET ORAL DAILY
Qty: 90 TABLET | Refills: 1 | Status: SHIPPED | OUTPATIENT
Start: 2021-10-12 | End: 2022-01-12 | Stop reason: SDUPTHER

## 2021-10-12 RX ORDER — METFORMIN HYDROCHLORIDE 500 MG/1
500 TABLET, EXTENDED RELEASE ORAL DAILY
Qty: 90 TABLET | Refills: 1 | Status: SHIPPED | OUTPATIENT
Start: 2021-10-12 | End: 2022-04-12

## 2021-10-12 RX ORDER — TRAZODONE HYDROCHLORIDE 50 MG/1
50 TABLET ORAL NIGHTLY
Qty: 90 TABLET | Refills: 1 | Status: SHIPPED | OUTPATIENT
Start: 2021-10-12 | End: 2022-01-12 | Stop reason: SDUPTHER

## 2021-10-12 RX ORDER — TOPIRAMATE 50 MG/1
50 TABLET, FILM COATED ORAL DAILY
Qty: 90 TABLET | Refills: 1 | Status: SHIPPED | OUTPATIENT
Start: 2021-10-12 | End: 2022-01-12 | Stop reason: SDUPTHER

## 2021-10-12 RX ORDER — DULOXETIN HYDROCHLORIDE 30 MG/1
30 CAPSULE, DELAYED RELEASE ORAL DAILY
Qty: 90 CAPSULE | Refills: 1 | Status: SHIPPED | OUTPATIENT
Start: 2021-10-12 | End: 2022-01-12 | Stop reason: SDUPTHER

## 2021-10-12 RX ORDER — METOPROLOL TARTRATE 25 MG/1
25 TABLET, FILM COATED ORAL 2 TIMES DAILY
Qty: 180 TABLET | Refills: 1 | Status: SHIPPED | OUTPATIENT
Start: 2021-10-12 | End: 2022-01-12 | Stop reason: SDUPTHER

## 2021-10-12 RX ORDER — LISINOPRIL 20 MG/1
20 TABLET ORAL 2 TIMES DAILY
Qty: 90 TABLET | Refills: 1 | Status: SHIPPED | OUTPATIENT
Start: 2021-10-12 | End: 2022-01-12 | Stop reason: SDUPTHER

## 2021-10-12 RX ORDER — PROPRANOLOL HYDROCHLORIDE 40 MG/1
40 TABLET ORAL 2 TIMES DAILY
Qty: 180 TABLET | Refills: 1 | Status: SHIPPED | OUTPATIENT
Start: 2021-10-12 | End: 2022-04-12 | Stop reason: SDUPTHER

## 2021-10-12 RX ORDER — HYDROCHLOROTHIAZIDE 25 MG/1
25 TABLET ORAL DAILY
Qty: 90 TABLET | Refills: 1 | Status: SHIPPED | OUTPATIENT
Start: 2021-10-12 | End: 2022-01-12 | Stop reason: SDUPTHER

## 2021-10-12 RX ORDER — TOLTERODINE 4 MG/1
4 CAPSULE, EXTENDED RELEASE ORAL DAILY
Qty: 90 CAPSULE | Refills: 1 | Status: SHIPPED | OUTPATIENT
Start: 2021-10-12 | End: 2022-01-12 | Stop reason: SDUPTHER

## 2021-10-26 ENCOUNTER — LAB VISIT (OUTPATIENT)
Dept: LAB | Facility: HOSPITAL | Age: 76
End: 2021-10-26
Attending: FAMILY MEDICINE
Payer: MEDICARE

## 2021-10-26 DIAGNOSIS — Z12.5 SCREENING PSA (PROSTATE SPECIFIC ANTIGEN): ICD-10-CM

## 2021-10-26 LAB — COMPLEXED PSA SERPL-MCNC: 0.85 NG/ML (ref 0–4)

## 2021-10-26 PROCEDURE — 84153 ASSAY OF PSA TOTAL: CPT | Performed by: FAMILY MEDICINE

## 2021-10-26 PROCEDURE — 36415 COLL VENOUS BLD VENIPUNCTURE: CPT | Performed by: FAMILY MEDICINE

## 2021-11-19 ENCOUNTER — PATIENT MESSAGE (OUTPATIENT)
Dept: FAMILY MEDICINE | Facility: CLINIC | Age: 76
End: 2021-11-19
Payer: MEDICARE

## 2021-11-22 ENCOUNTER — TELEPHONE (OUTPATIENT)
Dept: FAMILY MEDICINE | Facility: CLINIC | Age: 76
End: 2021-11-22
Payer: MEDICARE

## 2021-11-22 RX ORDER — SEMAGLUTIDE 1.34 MG/ML
INJECTION, SOLUTION SUBCUTANEOUS
Qty: 1 PEN | Refills: 5 | Status: SHIPPED | OUTPATIENT
Start: 2021-11-22 | End: 2021-11-22 | Stop reason: DRUGHIGH

## 2021-12-29 DIAGNOSIS — H90.A32 MIXED CONDUCTIVE AND SENSORINEURAL HEARING LOSS OF LEFT EAR WITH RESTRICTED HEARING OF RIGHT EAR: Primary | ICD-10-CM

## 2022-01-06 ENCOUNTER — PATIENT MESSAGE (OUTPATIENT)
Dept: FAMILY MEDICINE | Facility: CLINIC | Age: 77
End: 2022-01-06
Payer: MEDICARE

## 2022-01-06 ENCOUNTER — LAB VISIT (OUTPATIENT)
Dept: LAB | Facility: HOSPITAL | Age: 77
End: 2022-01-06
Attending: FAMILY MEDICINE
Payer: MEDICARE

## 2022-01-06 DIAGNOSIS — I10 HYPERTENSION, ESSENTIAL: ICD-10-CM

## 2022-01-06 DIAGNOSIS — E78.5 HYPERLIPIDEMIA, UNSPECIFIED HYPERLIPIDEMIA TYPE: ICD-10-CM

## 2022-01-06 DIAGNOSIS — E11.42 TYPE 2 DIABETES MELLITUS WITH DIABETIC POLYNEUROPATHY, WITHOUT LONG-TERM CURRENT USE OF INSULIN: ICD-10-CM

## 2022-01-06 DIAGNOSIS — Z79.82 ENCOUNTER FOR LONG-TERM (CURRENT) USE OF ASPIRIN: ICD-10-CM

## 2022-01-06 DIAGNOSIS — I10 HYPERTENSION, ESSENTIAL: Primary | ICD-10-CM

## 2022-01-06 LAB
ALBUMIN SERPL BCP-MCNC: 4.2 G/DL (ref 3.5–5.2)
ALP SERPL-CCNC: 46 U/L (ref 55–135)
ALT SERPL W/O P-5'-P-CCNC: 22 U/L (ref 10–44)
ANION GAP SERPL CALC-SCNC: 12 MMOL/L (ref 8–16)
AST SERPL-CCNC: 19 U/L (ref 10–40)
BASOPHILS # BLD AUTO: 0.03 K/UL (ref 0–0.2)
BASOPHILS NFR BLD: 0.3 % (ref 0–1.9)
BILIRUB SERPL-MCNC: 1 MG/DL (ref 0.1–1)
BUN SERPL-MCNC: 22 MG/DL (ref 8–23)
CALCIUM SERPL-MCNC: 9.2 MG/DL (ref 8.7–10.5)
CHLORIDE SERPL-SCNC: 97 MMOL/L (ref 95–110)
CHOLEST SERPL-MCNC: 121 MG/DL (ref 120–199)
CHOLEST/HDLC SERPL: 3.8 {RATIO} (ref 2–5)
CO2 SERPL-SCNC: 32 MMOL/L (ref 23–29)
CREAT SERPL-MCNC: 0.8 MG/DL (ref 0.5–1.4)
DIFFERENTIAL METHOD: NORMAL
EOSINOPHIL # BLD AUTO: 0.1 K/UL (ref 0–0.5)
EOSINOPHIL NFR BLD: 1.2 % (ref 0–8)
ERYTHROCYTE [DISTWIDTH] IN BLOOD BY AUTOMATED COUNT: 13.2 % (ref 11.5–14.5)
EST. GFR  (AFRICAN AMERICAN): >60 ML/MIN/1.73 M^2
EST. GFR  (NON AFRICAN AMERICAN): >60 ML/MIN/1.73 M^2
GLUCOSE SERPL-MCNC: 165 MG/DL (ref 70–110)
HCT VFR BLD AUTO: 48.4 % (ref 40–54)
HDLC SERPL-MCNC: 32 MG/DL (ref 40–75)
HDLC SERPL: 26.4 % (ref 20–50)
HGB BLD-MCNC: 15.9 G/DL (ref 14–18)
IMM GRANULOCYTES # BLD AUTO: 0.02 K/UL (ref 0–0.04)
IMM GRANULOCYTES NFR BLD AUTO: 0.2 % (ref 0–0.5)
LDLC SERPL CALC-MCNC: 61.8 MG/DL (ref 63–159)
LYMPHOCYTES # BLD AUTO: 2.6 K/UL (ref 1–4.8)
LYMPHOCYTES NFR BLD: 28.9 % (ref 18–48)
MCH RBC QN AUTO: 29.7 PG (ref 27–31)
MCHC RBC AUTO-ENTMCNC: 32.9 G/DL (ref 32–36)
MCV RBC AUTO: 91 FL (ref 82–98)
MONOCYTES # BLD AUTO: 0.6 K/UL (ref 0.3–1)
MONOCYTES NFR BLD: 6.2 % (ref 4–15)
NEUTROPHILS # BLD AUTO: 5.7 K/UL (ref 1.8–7.7)
NEUTROPHILS NFR BLD: 63.2 % (ref 38–73)
NONHDLC SERPL-MCNC: 89 MG/DL
NRBC BLD-RTO: 0 /100 WBC
PLATELET # BLD AUTO: 331 K/UL (ref 150–450)
PMV BLD AUTO: 9.3 FL (ref 9.2–12.9)
POTASSIUM SERPL-SCNC: 3.8 MMOL/L (ref 3.5–5.1)
PROT SERPL-MCNC: 7.9 G/DL (ref 6–8.4)
RBC # BLD AUTO: 5.35 M/UL (ref 4.6–6.2)
SODIUM SERPL-SCNC: 141 MMOL/L (ref 136–145)
TRIGL SERPL-MCNC: 136 MG/DL (ref 30–150)
WBC # BLD AUTO: 9.09 K/UL (ref 3.9–12.7)

## 2022-01-06 PROCEDURE — 85025 COMPLETE CBC W/AUTO DIFF WBC: CPT | Performed by: FAMILY MEDICINE

## 2022-01-06 PROCEDURE — 80053 COMPREHEN METABOLIC PANEL: CPT | Performed by: FAMILY MEDICINE

## 2022-01-06 PROCEDURE — 83036 HEMOGLOBIN GLYCOSYLATED A1C: CPT | Performed by: FAMILY MEDICINE

## 2022-01-06 PROCEDURE — 36415 COLL VENOUS BLD VENIPUNCTURE: CPT | Performed by: FAMILY MEDICINE

## 2022-01-06 PROCEDURE — 80061 LIPID PANEL: CPT | Performed by: FAMILY MEDICINE

## 2022-01-07 LAB
ESTIMATED AVG GLUCOSE: 166 MG/DL (ref 68–131)
HBA1C MFR BLD: 7.4 % (ref 4.5–6.2)

## 2022-01-12 ENCOUNTER — OFFICE VISIT (OUTPATIENT)
Dept: FAMILY MEDICINE | Facility: CLINIC | Age: 77
End: 2022-01-12
Payer: MEDICARE

## 2022-01-12 VITALS
WEIGHT: 270 LBS | HEART RATE: 80 BPM | DIASTOLIC BLOOD PRESSURE: 72 MMHG | HEIGHT: 70 IN | OXYGEN SATURATION: 98 % | TEMPERATURE: 98 F | SYSTOLIC BLOOD PRESSURE: 136 MMHG | BODY MASS INDEX: 38.65 KG/M2

## 2022-01-12 DIAGNOSIS — I10 HYPERTENSION, ESSENTIAL: ICD-10-CM

## 2022-01-12 DIAGNOSIS — E11.21 TYPE 2 DIABETES MELLITUS WITH DIABETIC NEPHROPATHY, WITHOUT LONG-TERM CURRENT USE OF INSULIN: ICD-10-CM

## 2022-01-12 DIAGNOSIS — I48.91 ATRIAL FIBRILLATION, UNSPECIFIED TYPE: Primary | ICD-10-CM

## 2022-01-12 DIAGNOSIS — Z79.82 ASPIRIN LONG-TERM USE: ICD-10-CM

## 2022-01-12 DIAGNOSIS — E78.5 HYPERLIPIDEMIA, UNSPECIFIED HYPERLIPIDEMIA TYPE: ICD-10-CM

## 2022-01-12 DIAGNOSIS — E11.42 TYPE 2 DIABETES MELLITUS WITH DIABETIC POLYNEUROPATHY, WITHOUT LONG-TERM CURRENT USE OF INSULIN: ICD-10-CM

## 2022-01-12 DIAGNOSIS — I50.32 CHRONIC HEART FAILURE WITH PRESERVED EJECTION FRACTION: ICD-10-CM

## 2022-01-12 DIAGNOSIS — G25.0 BENIGN ESSENTIAL TREMOR: ICD-10-CM

## 2022-01-12 DIAGNOSIS — Z79.01 ANTICOAGULANT LONG-TERM USE: ICD-10-CM

## 2022-01-12 PROCEDURE — 3051F PR MOST RECENT HEMOGLOBIN A1C LEVEL 7.0 - < 8.0%: ICD-10-PCS | Mod: CPTII,S$GLB,, | Performed by: FAMILY MEDICINE

## 2022-01-12 PROCEDURE — 1126F PR PAIN SEVERITY QUANTIFIED, NO PAIN PRESENT: ICD-10-PCS | Mod: CPTII,S$GLB,, | Performed by: FAMILY MEDICINE

## 2022-01-12 PROCEDURE — 1159F MED LIST DOCD IN RCRD: CPT | Mod: CPTII,S$GLB,, | Performed by: FAMILY MEDICINE

## 2022-01-12 PROCEDURE — 99214 PR OFFICE/OUTPT VISIT, EST, LEVL IV, 30-39 MIN: ICD-10-PCS | Mod: S$GLB,,, | Performed by: FAMILY MEDICINE

## 2022-01-12 PROCEDURE — 3051F HG A1C>EQUAL 7.0%<8.0%: CPT | Mod: CPTII,S$GLB,, | Performed by: FAMILY MEDICINE

## 2022-01-12 PROCEDURE — 3288F FALL RISK ASSESSMENT DOCD: CPT | Mod: CPTII,S$GLB,, | Performed by: FAMILY MEDICINE

## 2022-01-12 PROCEDURE — 1126F AMNT PAIN NOTED NONE PRSNT: CPT | Mod: CPTII,S$GLB,, | Performed by: FAMILY MEDICINE

## 2022-01-12 PROCEDURE — 3075F SYST BP GE 130 - 139MM HG: CPT | Mod: CPTII,S$GLB,, | Performed by: FAMILY MEDICINE

## 2022-01-12 PROCEDURE — 1101F PT FALLS ASSESS-DOCD LE1/YR: CPT | Mod: CPTII,S$GLB,, | Performed by: FAMILY MEDICINE

## 2022-01-12 PROCEDURE — 99214 OFFICE O/P EST MOD 30 MIN: CPT | Mod: S$GLB,,, | Performed by: FAMILY MEDICINE

## 2022-01-12 PROCEDURE — 3078F PR MOST RECENT DIASTOLIC BLOOD PRESSURE < 80 MM HG: ICD-10-PCS | Mod: CPTII,S$GLB,, | Performed by: FAMILY MEDICINE

## 2022-01-12 PROCEDURE — 3078F DIAST BP <80 MM HG: CPT | Mod: CPTII,S$GLB,, | Performed by: FAMILY MEDICINE

## 2022-01-12 PROCEDURE — 1159F PR MEDICATION LIST DOCUMENTED IN MEDICAL RECORD: ICD-10-PCS | Mod: CPTII,S$GLB,, | Performed by: FAMILY MEDICINE

## 2022-01-12 PROCEDURE — 3288F PR FALLS RISK ASSESSMENT DOCUMENTED: ICD-10-PCS | Mod: CPTII,S$GLB,, | Performed by: FAMILY MEDICINE

## 2022-01-12 PROCEDURE — 1101F PR PT FALLS ASSESS DOC 0-1 FALLS W/OUT INJ PAST YR: ICD-10-PCS | Mod: CPTII,S$GLB,, | Performed by: FAMILY MEDICINE

## 2022-01-12 PROCEDURE — 3075F PR MOST RECENT SYSTOLIC BLOOD PRESS GE 130-139MM HG: ICD-10-PCS | Mod: CPTII,S$GLB,, | Performed by: FAMILY MEDICINE

## 2022-01-12 RX ORDER — OMEGA-3-ACID ETHYL ESTERS 1 G/1
2 CAPSULE, LIQUID FILLED ORAL 2 TIMES DAILY
Qty: 360 CAPSULE | Refills: 1 | Status: SHIPPED | OUTPATIENT
Start: 2022-01-12 | End: 2022-04-12 | Stop reason: SDUPTHER

## 2022-01-12 RX ORDER — ROSUVASTATIN CALCIUM 20 MG/1
TABLET, COATED ORAL
Qty: 90 TABLET | Refills: 1 | Status: SHIPPED | OUTPATIENT
Start: 2022-01-12 | End: 2022-04-12 | Stop reason: SDUPTHER

## 2022-01-12 RX ORDER — LISINOPRIL 20 MG/1
20 TABLET ORAL 2 TIMES DAILY
Qty: 90 TABLET | Refills: 1 | Status: SHIPPED | OUTPATIENT
Start: 2022-01-12 | End: 2022-04-12 | Stop reason: SDUPTHER

## 2022-01-12 RX ORDER — TRAZODONE HYDROCHLORIDE 50 MG/1
50 TABLET ORAL NIGHTLY
Qty: 90 TABLET | Refills: 1 | Status: SHIPPED | OUTPATIENT
Start: 2022-01-12 | End: 2022-04-12 | Stop reason: SDUPTHER

## 2022-01-12 RX ORDER — METOPROLOL TARTRATE 25 MG/1
25 TABLET, FILM COATED ORAL 2 TIMES DAILY
Qty: 180 TABLET | Refills: 1 | Status: SHIPPED | OUTPATIENT
Start: 2022-01-12 | End: 2022-04-12 | Stop reason: SDUPTHER

## 2022-01-12 RX ORDER — CYCLOBENZAPRINE HCL 5 MG
TABLET ORAL
Qty: 60 TABLET | Refills: 2 | Status: SHIPPED | OUTPATIENT
Start: 2022-01-12 | End: 2022-04-06

## 2022-01-12 RX ORDER — TOPIRAMATE 50 MG/1
50 TABLET, FILM COATED ORAL DAILY
Qty: 90 TABLET | Refills: 1 | Status: SHIPPED | OUTPATIENT
Start: 2022-01-12 | End: 2022-04-12 | Stop reason: SDUPTHER

## 2022-01-12 RX ORDER — METFORMIN HYDROCHLORIDE 500 MG/1
500 TABLET, EXTENDED RELEASE ORAL DAILY
Qty: 90 TABLET | Refills: 1 | Status: SHIPPED | OUTPATIENT
Start: 2022-01-12 | End: 2022-04-12 | Stop reason: SDUPTHER

## 2022-01-12 RX ORDER — DULOXETIN HYDROCHLORIDE 30 MG/1
30 CAPSULE, DELAYED RELEASE ORAL DAILY
Qty: 90 CAPSULE | Refills: 1 | Status: SHIPPED | OUTPATIENT
Start: 2022-01-12 | End: 2022-04-12 | Stop reason: SDUPTHER

## 2022-01-12 RX ORDER — PROPRANOLOL HYDROCHLORIDE 40 MG/1
40 TABLET ORAL 2 TIMES DAILY
Qty: 180 TABLET | Refills: 1 | Status: SHIPPED | OUTPATIENT
Start: 2022-01-12 | End: 2022-04-12

## 2022-01-12 RX ORDER — HYDROCHLOROTHIAZIDE 25 MG/1
25 TABLET ORAL DAILY
Qty: 90 TABLET | Refills: 1 | Status: SHIPPED | OUTPATIENT
Start: 2022-01-12 | End: 2022-04-12 | Stop reason: SDUPTHER

## 2022-01-12 RX ORDER — TOLTERODINE 4 MG/1
4 CAPSULE, EXTENDED RELEASE ORAL DAILY
Qty: 90 CAPSULE | Refills: 1 | Status: SHIPPED | OUTPATIENT
Start: 2022-01-12 | End: 2022-04-12 | Stop reason: SDUPTHER

## 2022-01-12 RX ORDER — LOSARTAN POTASSIUM 50 MG/1
50 TABLET ORAL DAILY
Qty: 90 TABLET | Refills: 1 | Status: SHIPPED | OUTPATIENT
Start: 2022-01-12 | End: 2022-04-12 | Stop reason: SDUPTHER

## 2022-01-12 RX ORDER — OXAPROZIN 600 MG/1
600 TABLET, FILM COATED ORAL 2 TIMES DAILY
Qty: 180 TABLET | Refills: 1 | Status: SHIPPED | OUTPATIENT
Start: 2022-01-12 | End: 2022-04-12 | Stop reason: SDUPTHER

## 2022-01-13 ENCOUNTER — HOSPITAL ENCOUNTER (OUTPATIENT)
Dept: RADIOLOGY | Facility: HOSPITAL | Age: 77
Discharge: HOME | End: 2022-01-13
Attending: OTOLARYNGOLOGY
Payer: MEDICARE

## 2022-01-13 ENCOUNTER — PATIENT MESSAGE (OUTPATIENT)
Dept: FAMILY MEDICINE | Facility: CLINIC | Age: 77
End: 2022-01-13
Payer: MEDICARE

## 2022-01-13 DIAGNOSIS — H90.A32 MIXED CONDUCTIVE AND SENSORINEURAL HEARING LOSS OF LEFT EAR WITH RESTRICTED HEARING OF RIGHT EAR: ICD-10-CM

## 2022-01-13 PROCEDURE — 25500020 PHARM REV CODE 255: Mod: PO | Performed by: OTOLARYNGOLOGY

## 2022-01-13 PROCEDURE — A9585 GADOBUTROL INJECTION: HCPCS | Mod: PO | Performed by: OTOLARYNGOLOGY

## 2022-01-13 PROCEDURE — 70553 MRI BRAIN STEM W/O & W/DYE: CPT | Mod: TC,PO

## 2022-01-13 RX ORDER — GADOBUTROL 604.72 MG/ML
12.5 INJECTION INTRAVENOUS
Status: COMPLETED | OUTPATIENT
Start: 2022-01-13 | End: 2022-01-13

## 2022-01-13 RX ADMIN — GADOBUTROL 12.5 ML: 604.72 INJECTION INTRAVENOUS at 10:01

## 2022-01-14 NOTE — PROGRESS NOTES
Follow-up of atrial fibrillation.  Doing well.  Hypertension controlled.  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  He is anticoagulated.  Also using aspirin.  Chronic heart failure preserved ejection fraction no PND orthopnea.  Benign tremor is doing well.  BMI is at 39.  He has now lost 51 lb total.  Cardiovascular no chest pain or palpitations.  Had 3 doses of COVID vaccine.  A1c is now 7.4 fasting sugar 165 CBC normal cholesterol 121 HDL 32 and LDL of 62 all improved.    Physical examination vital signs noted.  Neck without bruit.  Chest clear.  Heart irregularly irregular rhythm.  Abdomen bowel sounds are positive soft nontender.  Extremities positive pedal pulses.  No edema.  Vibratory and position sense intact.  Light touch decreased present only 3 of 5 spots tested each foot.  No calluses skin breakdown or ulcerations.    Impression.  Atrial fibrillation.  Anticoagulation.  Hypertension controlled.  Diabetes mellitus type 2 with polyneuropathy.  Diabetes mellitus type 2 with nephropathy.  Aspirin use.  Chronic heart failure preserved ejection fraction.  Benign tremor stable.  BMI of 39.    Plan continue diet weight reduction.  Refill all of his medications 90 day supplies with a refill.  Needs to go for diabetic eye exam.  Had his pneumococcal vaccines in [place] when he not have records of these.  Follow-up here in 3 months.

## 2022-03-07 ENCOUNTER — TELEPHONE (OUTPATIENT)
Dept: FAMILY MEDICINE | Facility: CLINIC | Age: 77
End: 2022-03-07
Payer: MEDICARE

## 2022-03-07 ENCOUNTER — PATIENT MESSAGE (OUTPATIENT)
Dept: FAMILY MEDICINE | Facility: CLINIC | Age: 77
End: 2022-03-07
Payer: MEDICARE

## 2022-03-13 ENCOUNTER — PATIENT MESSAGE (OUTPATIENT)
Dept: FAMILY MEDICINE | Facility: CLINIC | Age: 77
End: 2022-03-13
Payer: MEDICARE

## 2022-04-04 ENCOUNTER — PATIENT MESSAGE (OUTPATIENT)
Dept: FAMILY MEDICINE | Facility: CLINIC | Age: 77
End: 2022-04-04
Payer: MEDICARE

## 2022-04-05 ENCOUNTER — IMMUNIZATION (OUTPATIENT)
Dept: PRIMARY CARE CLINIC | Facility: CLINIC | Age: 77
End: 2022-04-05
Payer: MEDICARE

## 2022-04-05 DIAGNOSIS — E11.42 TYPE 2 DIABETES MELLITUS WITH DIABETIC POLYNEUROPATHY, WITHOUT LONG-TERM CURRENT USE OF INSULIN: Primary | ICD-10-CM

## 2022-04-05 DIAGNOSIS — Z23 NEED FOR VACCINATION: Primary | ICD-10-CM

## 2022-04-05 PROCEDURE — 0054A COVID-19, MRNA, LNP-S, PF, 30 MCG/0.3 ML DOSE VACCINE (PFIZER): ICD-10-PCS | Mod: S$GLB,,, | Performed by: FAMILY MEDICINE

## 2022-04-05 PROCEDURE — 91305 COVID-19, MRNA, LNP-S, PF, 30 MCG/0.3 ML DOSE VACCINE (PFIZER): CPT | Mod: S$GLB,,, | Performed by: FAMILY MEDICINE

## 2022-04-05 PROCEDURE — 91305 COVID-19, MRNA, LNP-S, PF, 30 MCG/0.3 ML DOSE VACCINE (PFIZER): ICD-10-PCS | Mod: S$GLB,,, | Performed by: FAMILY MEDICINE

## 2022-04-05 PROCEDURE — 0054A COVID-19, MRNA, LNP-S, PF, 30 MCG/0.3 ML DOSE VACCINE (PFIZER): CPT | Mod: S$GLB,,, | Performed by: FAMILY MEDICINE

## 2022-04-06 RX ORDER — CYCLOBENZAPRINE HCL 5 MG
TABLET ORAL
Qty: 60 TABLET | Refills: 2 | Status: SHIPPED | OUTPATIENT
Start: 2022-04-06 | End: 2022-07-03

## 2022-04-08 ENCOUNTER — LAB VISIT (OUTPATIENT)
Dept: LAB | Facility: HOSPITAL | Age: 77
End: 2022-04-08
Attending: FAMILY MEDICINE
Payer: MEDICARE

## 2022-04-08 DIAGNOSIS — E78.5 HYPERLIPIDEMIA, UNSPECIFIED HYPERLIPIDEMIA TYPE: ICD-10-CM

## 2022-04-08 DIAGNOSIS — E11.21 TYPE 2 DIABETES MELLITUS WITH DIABETIC NEPHROPATHY, WITHOUT LONG-TERM CURRENT USE OF INSULIN: ICD-10-CM

## 2022-04-08 DIAGNOSIS — E11.42 TYPE 2 DIABETES MELLITUS WITH DIABETIC POLYNEUROPATHY, WITHOUT LONG-TERM CURRENT USE OF INSULIN: ICD-10-CM

## 2022-04-08 DIAGNOSIS — Z79.82 ASPIRIN LONG-TERM USE: ICD-10-CM

## 2022-04-08 DIAGNOSIS — I10 HYPERTENSION, ESSENTIAL: ICD-10-CM

## 2022-04-08 LAB
ALBUMIN SERPL BCP-MCNC: 3.5 G/DL (ref 3.5–5.2)
ALBUMIN/CREAT UR: 26.6 UG/MG (ref 0–30)
ALP SERPL-CCNC: 41 U/L (ref 55–135)
ALT SERPL W/O P-5'-P-CCNC: 19 U/L (ref 10–44)
ANION GAP SERPL CALC-SCNC: 14 MMOL/L (ref 8–16)
AST SERPL-CCNC: 19 U/L (ref 10–40)
BASOPHILS # BLD AUTO: 0.03 K/UL (ref 0–0.2)
BASOPHILS NFR BLD: 0.3 % (ref 0–1.9)
BILIRUB SERPL-MCNC: 0.5 MG/DL (ref 0.1–1)
BUN SERPL-MCNC: 17 MG/DL (ref 8–23)
CALCIUM SERPL-MCNC: 8.7 MG/DL (ref 8.7–10.5)
CHLORIDE SERPL-SCNC: 95 MMOL/L (ref 95–110)
CHOLEST SERPL-MCNC: 115 MG/DL (ref 120–199)
CHOLEST/HDLC SERPL: 3 {RATIO} (ref 2–5)
CO2 SERPL-SCNC: 29 MMOL/L (ref 23–29)
CREAT SERPL-MCNC: 0.8 MG/DL (ref 0.5–1.4)
CREAT UR-MCNC: 146 MG/DL (ref 23–375)
DIFFERENTIAL METHOD: NORMAL
EOSINOPHIL # BLD AUTO: 0.1 K/UL (ref 0–0.5)
EOSINOPHIL NFR BLD: 1.3 % (ref 0–8)
ERYTHROCYTE [DISTWIDTH] IN BLOOD BY AUTOMATED COUNT: 12.9 % (ref 11.5–14.5)
EST. GFR  (AFRICAN AMERICAN): >60 ML/MIN/1.73 M^2
EST. GFR  (NON AFRICAN AMERICAN): >60 ML/MIN/1.73 M^2
ESTIMATED AVG GLUCOSE: 151 MG/DL (ref 68–131)
GLUCOSE SERPL-MCNC: 179 MG/DL (ref 70–110)
HBA1C MFR BLD: 6.9 % (ref 4.5–6.2)
HCT VFR BLD AUTO: 41.4 % (ref 40–54)
HDLC SERPL-MCNC: 38 MG/DL (ref 40–75)
HDLC SERPL: 33 % (ref 20–50)
HGB BLD-MCNC: 14.1 G/DL (ref 14–18)
IMM GRANULOCYTES # BLD AUTO: 0.04 K/UL (ref 0–0.04)
IMM GRANULOCYTES NFR BLD AUTO: 0.4 % (ref 0–0.5)
LDLC SERPL CALC-MCNC: 53.4 MG/DL (ref 63–159)
LYMPHOCYTES # BLD AUTO: 2 K/UL (ref 1–4.8)
LYMPHOCYTES NFR BLD: 21 % (ref 18–48)
MCH RBC QN AUTO: 30.6 PG (ref 27–31)
MCHC RBC AUTO-ENTMCNC: 34.1 G/DL (ref 32–36)
MCV RBC AUTO: 90 FL (ref 82–98)
MICROALBUMIN UR DL<=1MG/L-MCNC: 38.9 UG/ML
MONOCYTES # BLD AUTO: 0.7 K/UL (ref 0.3–1)
MONOCYTES NFR BLD: 7.2 % (ref 4–15)
NEUTROPHILS # BLD AUTO: 6.7 K/UL (ref 1.8–7.7)
NEUTROPHILS NFR BLD: 69.8 % (ref 38–73)
NONHDLC SERPL-MCNC: 77 MG/DL
NRBC BLD-RTO: 0 /100 WBC
PLATELET # BLD AUTO: 249 K/UL (ref 150–450)
PMV BLD AUTO: 9.3 FL (ref 9.2–12.9)
POTASSIUM SERPL-SCNC: 3.7 MMOL/L (ref 3.5–5.1)
PROT SERPL-MCNC: 6.6 G/DL (ref 6–8.4)
RBC # BLD AUTO: 4.61 M/UL (ref 4.6–6.2)
SODIUM SERPL-SCNC: 138 MMOL/L (ref 136–145)
TRIGL SERPL-MCNC: 118 MG/DL (ref 30–150)
WBC # BLD AUTO: 9.66 K/UL (ref 3.9–12.7)

## 2022-04-08 PROCEDURE — 85025 COMPLETE CBC W/AUTO DIFF WBC: CPT | Performed by: FAMILY MEDICINE

## 2022-04-08 PROCEDURE — 36415 COLL VENOUS BLD VENIPUNCTURE: CPT | Performed by: FAMILY MEDICINE

## 2022-04-08 PROCEDURE — 82043 UR ALBUMIN QUANTITATIVE: CPT | Performed by: FAMILY MEDICINE

## 2022-04-08 PROCEDURE — 80053 COMPREHEN METABOLIC PANEL: CPT | Performed by: FAMILY MEDICINE

## 2022-04-08 PROCEDURE — 83036 HEMOGLOBIN GLYCOSYLATED A1C: CPT | Performed by: FAMILY MEDICINE

## 2022-04-08 PROCEDURE — 80061 LIPID PANEL: CPT | Performed by: FAMILY MEDICINE

## 2022-04-08 PROCEDURE — 82570 ASSAY OF URINE CREATININE: CPT | Performed by: FAMILY MEDICINE

## 2022-04-12 ENCOUNTER — OFFICE VISIT (OUTPATIENT)
Dept: FAMILY MEDICINE | Facility: CLINIC | Age: 77
End: 2022-04-12
Payer: MEDICARE

## 2022-04-12 ENCOUNTER — TELEPHONE (OUTPATIENT)
Dept: DERMATOLOGY | Facility: CLINIC | Age: 77
End: 2022-04-12
Payer: MEDICARE

## 2022-04-12 VITALS
BODY MASS INDEX: 40.8 KG/M2 | HEIGHT: 70 IN | DIASTOLIC BLOOD PRESSURE: 88 MMHG | TEMPERATURE: 99 F | WEIGHT: 285 LBS | SYSTOLIC BLOOD PRESSURE: 138 MMHG | OXYGEN SATURATION: 96 % | HEART RATE: 66 BPM

## 2022-04-12 DIAGNOSIS — H93.8X1 EAR MASS, RIGHT: ICD-10-CM

## 2022-04-12 DIAGNOSIS — I48.91 ATRIAL FIBRILLATION, UNSPECIFIED TYPE: Primary | ICD-10-CM

## 2022-04-12 DIAGNOSIS — Z79.82 ASPIRIN LONG-TERM USE: ICD-10-CM

## 2022-04-12 DIAGNOSIS — E78.5 HYPERLIPIDEMIA, UNSPECIFIED HYPERLIPIDEMIA TYPE: ICD-10-CM

## 2022-04-12 DIAGNOSIS — E11.42 TYPE 2 DIABETES MELLITUS WITH DIABETIC POLYNEUROPATHY, WITHOUT LONG-TERM CURRENT USE OF INSULIN: ICD-10-CM

## 2022-04-12 DIAGNOSIS — I50.32 CHRONIC HEART FAILURE WITH PRESERVED EJECTION FRACTION: ICD-10-CM

## 2022-04-12 DIAGNOSIS — E11.21 TYPE 2 DIABETES MELLITUS WITH DIABETIC NEPHROPATHY, WITHOUT LONG-TERM CURRENT USE OF INSULIN: ICD-10-CM

## 2022-04-12 DIAGNOSIS — I10 HYPERTENSION, ESSENTIAL: ICD-10-CM

## 2022-04-12 DIAGNOSIS — N32.81 OAB (OVERACTIVE BLADDER): ICD-10-CM

## 2022-04-12 DIAGNOSIS — Z79.01 ANTICOAGULANT LONG-TERM USE: ICD-10-CM

## 2022-04-12 PROCEDURE — 99214 OFFICE O/P EST MOD 30 MIN: CPT | Mod: S$GLB,,, | Performed by: FAMILY MEDICINE

## 2022-04-12 PROCEDURE — 3079F PR MOST RECENT DIASTOLIC BLOOD PRESSURE 80-89 MM HG: ICD-10-PCS | Mod: CPTII,S$GLB,, | Performed by: FAMILY MEDICINE

## 2022-04-12 PROCEDURE — 1160F RVW MEDS BY RX/DR IN RCRD: CPT | Mod: CPTII,S$GLB,, | Performed by: FAMILY MEDICINE

## 2022-04-12 PROCEDURE — 99214 PR OFFICE/OUTPT VISIT, EST, LEVL IV, 30-39 MIN: ICD-10-PCS | Mod: S$GLB,,, | Performed by: FAMILY MEDICINE

## 2022-04-12 PROCEDURE — 3075F SYST BP GE 130 - 139MM HG: CPT | Mod: CPTII,S$GLB,, | Performed by: FAMILY MEDICINE

## 2022-04-12 PROCEDURE — 1159F MED LIST DOCD IN RCRD: CPT | Mod: CPTII,S$GLB,, | Performed by: FAMILY MEDICINE

## 2022-04-12 PROCEDURE — 3079F DIAST BP 80-89 MM HG: CPT | Mod: CPTII,S$GLB,, | Performed by: FAMILY MEDICINE

## 2022-04-12 PROCEDURE — 1101F PT FALLS ASSESS-DOCD LE1/YR: CPT | Mod: CPTII,S$GLB,, | Performed by: FAMILY MEDICINE

## 2022-04-12 PROCEDURE — 1126F AMNT PAIN NOTED NONE PRSNT: CPT | Mod: CPTII,S$GLB,, | Performed by: FAMILY MEDICINE

## 2022-04-12 PROCEDURE — 3288F FALL RISK ASSESSMENT DOCD: CPT | Mod: CPTII,S$GLB,, | Performed by: FAMILY MEDICINE

## 2022-04-12 PROCEDURE — 1101F PR PT FALLS ASSESS DOC 0-1 FALLS W/OUT INJ PAST YR: ICD-10-PCS | Mod: CPTII,S$GLB,, | Performed by: FAMILY MEDICINE

## 2022-04-12 PROCEDURE — 1126F PR PAIN SEVERITY QUANTIFIED, NO PAIN PRESENT: ICD-10-PCS | Mod: CPTII,S$GLB,, | Performed by: FAMILY MEDICINE

## 2022-04-12 PROCEDURE — 1159F PR MEDICATION LIST DOCUMENTED IN MEDICAL RECORD: ICD-10-PCS | Mod: CPTII,S$GLB,, | Performed by: FAMILY MEDICINE

## 2022-04-12 PROCEDURE — 3288F PR FALLS RISK ASSESSMENT DOCUMENTED: ICD-10-PCS | Mod: CPTII,S$GLB,, | Performed by: FAMILY MEDICINE

## 2022-04-12 PROCEDURE — 3075F PR MOST RECENT SYSTOLIC BLOOD PRESS GE 130-139MM HG: ICD-10-PCS | Mod: CPTII,S$GLB,, | Performed by: FAMILY MEDICINE

## 2022-04-12 PROCEDURE — 1160F PR REVIEW ALL MEDS BY PRESCRIBER/CLIN PHARMACIST DOCUMENTED: ICD-10-PCS | Mod: CPTII,S$GLB,, | Performed by: FAMILY MEDICINE

## 2022-04-12 RX ORDER — TOLTERODINE 4 MG/1
4 CAPSULE, EXTENDED RELEASE ORAL DAILY
Qty: 90 CAPSULE | Refills: 1 | Status: ON HOLD | OUTPATIENT
Start: 2022-04-12 | End: 2023-03-18 | Stop reason: HOSPADM

## 2022-04-12 RX ORDER — OMEGA-3-ACID ETHYL ESTERS 1 G/1
2 CAPSULE, LIQUID FILLED ORAL 2 TIMES DAILY
Qty: 360 CAPSULE | Refills: 1 | Status: ON HOLD | OUTPATIENT
Start: 2022-04-12 | End: 2023-01-05

## 2022-04-12 RX ORDER — METFORMIN HYDROCHLORIDE 500 MG/1
500 TABLET, EXTENDED RELEASE ORAL DAILY
Qty: 90 TABLET | Refills: 1 | Status: SHIPPED | OUTPATIENT
Start: 2022-04-12 | End: 2022-11-27

## 2022-04-12 RX ORDER — OXAPROZIN 600 MG/1
600 TABLET, FILM COATED ORAL 2 TIMES DAILY
Qty: 180 TABLET | Refills: 1 | Status: ON HOLD | OUTPATIENT
Start: 2022-04-12 | End: 2022-11-18 | Stop reason: HOSPADM

## 2022-04-12 RX ORDER — LISINOPRIL 20 MG/1
20 TABLET ORAL 2 TIMES DAILY
Qty: 90 TABLET | Refills: 1 | Status: SHIPPED | OUTPATIENT
Start: 2022-04-12 | End: 2022-09-27

## 2022-04-12 RX ORDER — PROPRANOLOL HYDROCHLORIDE 40 MG/1
40 TABLET ORAL 2 TIMES DAILY
Qty: 180 TABLET | Refills: 1 | Status: ON HOLD | OUTPATIENT
Start: 2022-04-12 | End: 2022-11-18 | Stop reason: SDUPTHER

## 2022-04-12 RX ORDER — HYDROCHLOROTHIAZIDE 25 MG/1
25 TABLET ORAL DAILY
Qty: 90 TABLET | Refills: 1 | Status: ON HOLD | OUTPATIENT
Start: 2022-04-12 | End: 2022-11-18 | Stop reason: HOSPADM

## 2022-04-12 RX ORDER — METOPROLOL TARTRATE 25 MG/1
25 TABLET, FILM COATED ORAL 2 TIMES DAILY
Qty: 180 TABLET | Refills: 1 | Status: SHIPPED | OUTPATIENT
Start: 2022-04-12 | End: 2023-06-02 | Stop reason: SDUPTHER

## 2022-04-12 RX ORDER — TOPIRAMATE 50 MG/1
50 TABLET, FILM COATED ORAL DAILY
Qty: 90 TABLET | Refills: 1 | Status: SHIPPED | OUTPATIENT
Start: 2022-04-12 | End: 2022-12-29

## 2022-04-12 RX ORDER — ROSUVASTATIN CALCIUM 20 MG/1
TABLET, COATED ORAL
Qty: 90 TABLET | Refills: 1 | Status: SHIPPED | OUTPATIENT
Start: 2022-04-12 | End: 2022-11-27

## 2022-04-12 RX ORDER — LOSARTAN POTASSIUM 50 MG/1
50 TABLET ORAL DAILY
Qty: 90 TABLET | Refills: 1 | Status: ON HOLD | OUTPATIENT
Start: 2022-04-12 | End: 2023-01-09 | Stop reason: HOSPADM

## 2022-04-12 RX ORDER — DULOXETIN HYDROCHLORIDE 30 MG/1
30 CAPSULE, DELAYED RELEASE ORAL DAILY
Qty: 90 CAPSULE | Refills: 1 | Status: ON HOLD | OUTPATIENT
Start: 2022-04-12 | End: 2023-01-09 | Stop reason: SDUPTHER

## 2022-04-12 RX ORDER — TRAZODONE HYDROCHLORIDE 50 MG/1
50 TABLET ORAL NIGHTLY
Qty: 90 TABLET | Refills: 1 | Status: SHIPPED | OUTPATIENT
Start: 2022-04-12 | End: 2022-12-15

## 2022-04-13 ENCOUNTER — PATIENT MESSAGE (OUTPATIENT)
Dept: FAMILY MEDICINE | Facility: CLINIC | Age: 77
End: 2022-04-13
Payer: MEDICARE

## 2022-04-15 NOTE — PROGRESS NOTES
Subjective:       Patient ID: Hiro Rodríguez is a 77 y.o. male.    Chief Complaint: Annual Exam    BMI is at 40.9 now.  Has gained 15 lb.  Has been eating ice cream every night.  Atrial fibrillation doing okay.  He is anticoagulated.  Hypertension is controlled.  Diabetes mellitus type 2 with polyneuropathy.  Also nephropathy.  Using his aspirin.  Has chronic heart failure preserved ejection fraction no PND orthopnea.  Overactive bladder needs refill of medication for this also.  Some right ear pain and a growth.  Has come up over about 4 weeks.  Also has hyperlipidemia cholesterol 115 HDL 38 and LDL of 53 down from 62. A1c is 6.9 was 7.4 fasting sugar 179 CMP otherwise negative.  CBC is normal.  In microalbumin is negative.    Physical examination vital signs noted.  Right ear that is a 1 cm mass at the top of the pinna.  Ulceration in the center of it.  It is slightly tender.  It is not fluctuant does not appear to have any purulent material within it.  Appears to be solid.  Neck without bruit.  Chest clear.  Heart regular rate rhythm.  Abdomen bowel sounds are positive soft and nontender.  Extremities without edema positive pulses.    Review of Systems    Objective:      Physical Exam    Assessment:       1. Atrial fibrillation, unspecified type    2. BMI 40.0-44.9, adult    3. Anticoagulant long-term use    4. Hypertension, essential    5. Type 2 diabetes mellitus with diabetic polyneuropathy, without long-term current use of insulin    6. Type 2 diabetes mellitus with diabetic nephropathy, without long-term current use of insulin    7. Aspirin long-term use    8. Chronic heart failure with preserved ejection fraction    9. OAB (overactive bladder)    10. Ear mass, right    11. Hyperlipidemia, unspecified hyperlipidemia type        Plan:       Atrial fibrillation, unspecified type    BMI 40.0-44.9, adult    Anticoagulant long-term use    Hypertension, essential  -     CBC Auto Differential; Future; Expected  date: 04/12/2022  -     Comprehensive Metabolic Panel; Future; Expected date: 04/12/2022    Type 2 diabetes mellitus with diabetic polyneuropathy, without long-term current use of insulin    Type 2 diabetes mellitus with diabetic nephropathy, without long-term current use of insulin  -     Hemoglobin A1C; Future; Expected date: 04/12/2022    Aspirin long-term use    Chronic heart failure with preserved ejection fraction    OAB (overactive bladder)    Ear mass, right  -     Ambulatory referral/consult to Dermatology; Future; Expected date: 04/19/2022    Hyperlipidemia, unspecified hyperlipidemia type  -     Lipid Panel; Future; Expected date: 04/12/2022    Other orders  -     DULoxetine (CYMBALTA) 30 MG capsule; Take 1 capsule (30 mg total) by mouth once daily.  Dispense: 90 capsule; Refill: 1  -     hydroCHLOROthiazide (HYDRODIURIL) 25 MG tablet; Take 1 tablet (25 mg total) by mouth once daily.  Dispense: 90 tablet; Refill: 1  -     lisinopriL (PRINIVIL,ZESTRIL) 20 MG tablet; Take 1 tablet (20 mg total) by mouth 2 (two) times a day.  Dispense: 90 tablet; Refill: 1  -     losartan (COZAAR) 50 MG tablet; Take 1 tablet (50 mg total) by mouth once daily.  Dispense: 90 tablet; Refill: 1  -     metFORMIN (GLUCOPHAGE-XR) 500 MG ER 24hr tablet; Take 1 tablet (500 mg total) by mouth once daily.  Dispense: 90 tablet; Refill: 1  -     metoprolol tartrate (LOPRESSOR) 25 MG tablet; Take 1 tablet (25 mg total) by mouth 2 (two) times daily.  Dispense: 180 tablet; Refill: 1  -     propranoloL (INDERAL) 40 MG tablet; Take 1 tablet (40 mg total) by mouth 2 (two) times daily.  Dispense: 180 tablet; Refill: 1  -     rosuvastatin (CRESTOR) 20 MG tablet; TAKE 1 TABLET BY MOUTH EVERY DAY  Dispense: 90 tablet; Refill: 1  -     semaglutide (OZEMPIC) 1 mg/dose (4 mg/3 mL); Inject 1 mg into the skin every 7 days.  Dispense: 4 pen; Refill: 5  -     tolterodine (DETROL LA) 4 MG 24 hr capsule; Take 1 capsule (4 mg total) by mouth once daily.   Dispense: 90 capsule; Refill: 1  -     traZODone (DESYREL) 50 MG tablet; Take 1 tablet (50 mg total) by mouth every evening.  Dispense: 90 tablet; Refill: 1  -     topiramate (TOPAMAX) 50 MG tablet; Take 1 tablet (50 mg total) by mouth once daily.  Dispense: 90 tablet; Refill: 1  -     oxaprozin (DAYPRO) 600 mg tablet; Take 1 tablet (600 mg total) by mouth 2 (two) times daily.  Dispense: 180 tablet; Refill: 1  -     omega-3 acid ethyl esters (LOVAZA) 1 gram capsule; Take 2 capsules (2 g total) by mouth 2 (two) times daily.  Dispense: 360 capsule; Refill: 1  -     apixaban (ELIQUIS) 5 mg Tab; Take 1 tablet (5 mg total) by mouth 2 (two) times daily.  Dispense: 180 tablet; Refill: 1      Go for diabetic eye exam.  Shingles vaccine recommended.  Refilled all of his medications.  Diet weight reduction.  Says stop eating me ice cream every day.  To dermatology as soon as possible regarding the right ear mass.  Follow-up with me 3 months with labs.

## 2022-06-02 DIAGNOSIS — C44.222 SQUAMOUS CELL CARCINOMA OF SKIN OF RIGHT EAR AND EXTERNAL AURICULAR CANAL: Primary | ICD-10-CM

## 2022-07-06 ENCOUNTER — PATIENT MESSAGE (OUTPATIENT)
Dept: FAMILY MEDICINE | Facility: CLINIC | Age: 77
End: 2022-07-06
Payer: MEDICARE

## 2022-07-07 ENCOUNTER — LAB VISIT (OUTPATIENT)
Dept: LAB | Facility: HOSPITAL | Age: 77
End: 2022-07-07
Attending: FAMILY MEDICINE
Payer: MEDICARE

## 2022-07-07 DIAGNOSIS — E11.21 TYPE 2 DIABETES MELLITUS WITH DIABETIC NEPHROPATHY, WITHOUT LONG-TERM CURRENT USE OF INSULIN: ICD-10-CM

## 2022-07-07 DIAGNOSIS — I10 HYPERTENSION, ESSENTIAL: ICD-10-CM

## 2022-07-07 DIAGNOSIS — E78.5 HYPERLIPIDEMIA, UNSPECIFIED HYPERLIPIDEMIA TYPE: ICD-10-CM

## 2022-07-07 LAB
ALBUMIN SERPL BCP-MCNC: 3.8 G/DL (ref 3.5–5.2)
ALP SERPL-CCNC: 37 U/L (ref 55–135)
ALT SERPL W/O P-5'-P-CCNC: 17 U/L (ref 10–44)
ANION GAP SERPL CALC-SCNC: 8 MMOL/L (ref 8–16)
AST SERPL-CCNC: 18 U/L (ref 10–40)
BASOPHILS # BLD AUTO: 0.04 K/UL (ref 0–0.2)
BASOPHILS NFR BLD: 0.4 % (ref 0–1.9)
BILIRUB SERPL-MCNC: 1 MG/DL (ref 0.1–1)
BUN SERPL-MCNC: 29 MG/DL (ref 8–23)
CALCIUM SERPL-MCNC: 9 MG/DL (ref 8.7–10.5)
CHLORIDE SERPL-SCNC: 100 MMOL/L (ref 95–110)
CHOLEST SERPL-MCNC: 176 MG/DL (ref 120–199)
CHOLEST/HDLC SERPL: 4.9 {RATIO} (ref 2–5)
CO2 SERPL-SCNC: 30 MMOL/L (ref 23–29)
CREAT SERPL-MCNC: 0.9 MG/DL (ref 0.5–1.4)
DIFFERENTIAL METHOD: NORMAL
EOSINOPHIL # BLD AUTO: 0.1 K/UL (ref 0–0.5)
EOSINOPHIL NFR BLD: 0.9 % (ref 0–8)
ERYTHROCYTE [DISTWIDTH] IN BLOOD BY AUTOMATED COUNT: 12.6 % (ref 11.5–14.5)
EST. GFR  (AFRICAN AMERICAN): >60 ML/MIN/1.73 M^2
EST. GFR  (NON AFRICAN AMERICAN): >60 ML/MIN/1.73 M^2
ESTIMATED AVG GLUCOSE: 137 MG/DL (ref 68–131)
GLUCOSE SERPL-MCNC: 164 MG/DL (ref 70–110)
HBA1C MFR BLD: 6.4 % (ref 4.5–6.2)
HCT VFR BLD AUTO: 46.8 % (ref 40–54)
HDLC SERPL-MCNC: 36 MG/DL (ref 40–75)
HDLC SERPL: 20.5 % (ref 20–50)
HGB BLD-MCNC: 15.3 G/DL (ref 14–18)
IMM GRANULOCYTES # BLD AUTO: 0.04 K/UL (ref 0–0.04)
IMM GRANULOCYTES NFR BLD AUTO: 0.4 % (ref 0–0.5)
LDLC SERPL CALC-MCNC: 100 MG/DL (ref 63–159)
LYMPHOCYTES # BLD AUTO: 2.8 K/UL (ref 1–4.8)
LYMPHOCYTES NFR BLD: 24.8 % (ref 18–48)
MCH RBC QN AUTO: 30.6 PG (ref 27–31)
MCHC RBC AUTO-ENTMCNC: 32.7 G/DL (ref 32–36)
MCV RBC AUTO: 94 FL (ref 82–98)
MONOCYTES # BLD AUTO: 0.7 K/UL (ref 0.3–1)
MONOCYTES NFR BLD: 5.9 % (ref 4–15)
NEUTROPHILS # BLD AUTO: 7.5 K/UL (ref 1.8–7.7)
NEUTROPHILS NFR BLD: 67.6 % (ref 38–73)
NONHDLC SERPL-MCNC: 140 MG/DL
NRBC BLD-RTO: 0 /100 WBC
PLATELET # BLD AUTO: 320 K/UL (ref 150–450)
PMV BLD AUTO: 9.5 FL (ref 9.2–12.9)
POTASSIUM SERPL-SCNC: 4.2 MMOL/L (ref 3.5–5.1)
PROT SERPL-MCNC: 7.4 G/DL (ref 6–8.4)
RBC # BLD AUTO: 5 M/UL (ref 4.6–6.2)
SODIUM SERPL-SCNC: 138 MMOL/L (ref 136–145)
TRIGL SERPL-MCNC: 200 MG/DL (ref 30–150)
WBC # BLD AUTO: 11.12 K/UL (ref 3.9–12.7)

## 2022-07-07 PROCEDURE — 85025 COMPLETE CBC W/AUTO DIFF WBC: CPT | Performed by: FAMILY MEDICINE

## 2022-07-07 PROCEDURE — 80061 LIPID PANEL: CPT | Performed by: FAMILY MEDICINE

## 2022-07-07 PROCEDURE — 83036 HEMOGLOBIN GLYCOSYLATED A1C: CPT | Performed by: FAMILY MEDICINE

## 2022-07-07 PROCEDURE — 36415 COLL VENOUS BLD VENIPUNCTURE: CPT | Performed by: FAMILY MEDICINE

## 2022-07-07 PROCEDURE — 80053 COMPREHEN METABOLIC PANEL: CPT | Performed by: FAMILY MEDICINE

## 2022-07-11 ENCOUNTER — HOSPITAL ENCOUNTER (OUTPATIENT)
Dept: RADIOLOGY | Facility: HOSPITAL | Age: 77
Discharge: HOME | End: 2022-07-11
Payer: MEDICARE

## 2022-07-11 DIAGNOSIS — C44.222 SQUAMOUS CELL CARCINOMA OF SKIN OF RIGHT EAR AND EXTERNAL AURICULAR CANAL: ICD-10-CM

## 2022-07-11 PROCEDURE — 70492 CT SFT TSUE NCK W/O & W/DYE: CPT | Mod: TC,PO

## 2022-07-11 PROCEDURE — 71260 CT THORAX DX C+: CPT | Mod: TC,PO

## 2022-07-11 PROCEDURE — 70470 CT HEAD/BRAIN W/O & W/DYE: CPT | Mod: TC,PO

## 2022-07-11 PROCEDURE — 25500020 PHARM REV CODE 255: Mod: PO

## 2022-07-11 RX ADMIN — IOHEXOL 100 ML: 350 INJECTION, SOLUTION INTRAVENOUS at 02:07

## 2022-07-12 ENCOUNTER — OFFICE VISIT (OUTPATIENT)
Dept: FAMILY MEDICINE | Facility: CLINIC | Age: 77
End: 2022-07-12
Payer: MEDICARE

## 2022-07-12 VITALS
SYSTOLIC BLOOD PRESSURE: 120 MMHG | WEIGHT: 268.19 LBS | BODY MASS INDEX: 38.39 KG/M2 | OXYGEN SATURATION: 96 % | HEIGHT: 70 IN | HEART RATE: 91 BPM | TEMPERATURE: 98 F | DIASTOLIC BLOOD PRESSURE: 80 MMHG

## 2022-07-12 DIAGNOSIS — Z79.01 ANTICOAGULANT LONG-TERM USE: ICD-10-CM

## 2022-07-12 DIAGNOSIS — E11.42 TYPE 2 DIABETES MELLITUS WITH DIABETIC POLYNEUROPATHY, WITHOUT LONG-TERM CURRENT USE OF INSULIN: ICD-10-CM

## 2022-07-12 DIAGNOSIS — I10 HYPERTENSION, ESSENTIAL: ICD-10-CM

## 2022-07-12 DIAGNOSIS — G47.00 INSOMNIA, UNSPECIFIED TYPE: ICD-10-CM

## 2022-07-12 DIAGNOSIS — E78.5 HYPERLIPIDEMIA, UNSPECIFIED HYPERLIPIDEMIA TYPE: Primary | ICD-10-CM

## 2022-07-12 DIAGNOSIS — E11.21 TYPE 2 DIABETES MELLITUS WITH DIABETIC NEPHROPATHY, WITHOUT LONG-TERM CURRENT USE OF INSULIN: ICD-10-CM

## 2022-07-12 DIAGNOSIS — I50.32 CHRONIC HEART FAILURE WITH PRESERVED EJECTION FRACTION: ICD-10-CM

## 2022-07-12 DIAGNOSIS — I48.91 ATRIAL FIBRILLATION, UNSPECIFIED TYPE: ICD-10-CM

## 2022-07-12 DIAGNOSIS — C44.90 SKIN CANCER: ICD-10-CM

## 2022-07-12 DIAGNOSIS — Z96.659 STATUS POST KNEE REPLACEMENT, UNSPECIFIED LATERALITY: ICD-10-CM

## 2022-07-12 PROCEDURE — 1160F PR REVIEW ALL MEDS BY PRESCRIBER/CLIN PHARMACIST DOCUMENTED: ICD-10-PCS | Mod: CPTII,S$GLB,, | Performed by: FAMILY MEDICINE

## 2022-07-12 PROCEDURE — G0009 ADMIN PNEUMOCOCCAL VACCINE: HCPCS | Mod: S$GLB,,, | Performed by: FAMILY MEDICINE

## 2022-07-12 PROCEDURE — 3074F SYST BP LT 130 MM HG: CPT | Mod: CPTII,S$GLB,, | Performed by: FAMILY MEDICINE

## 2022-07-12 PROCEDURE — 1159F PR MEDICATION LIST DOCUMENTED IN MEDICAL RECORD: ICD-10-PCS | Mod: CPTII,S$GLB,, | Performed by: FAMILY MEDICINE

## 2022-07-12 PROCEDURE — 1126F PR PAIN SEVERITY QUANTIFIED, NO PAIN PRESENT: ICD-10-PCS | Mod: CPTII,S$GLB,, | Performed by: FAMILY MEDICINE

## 2022-07-12 PROCEDURE — 90677 PNEUMOCOCCAL CONJUGATE VACCINE 20-VALENT: ICD-10-PCS | Mod: S$GLB,,, | Performed by: FAMILY MEDICINE

## 2022-07-12 PROCEDURE — 3288F PR FALLS RISK ASSESSMENT DOCUMENTED: ICD-10-PCS | Mod: CPTII,S$GLB,, | Performed by: FAMILY MEDICINE

## 2022-07-12 PROCEDURE — 3079F PR MOST RECENT DIASTOLIC BLOOD PRESSURE 80-89 MM HG: ICD-10-PCS | Mod: CPTII,S$GLB,, | Performed by: FAMILY MEDICINE

## 2022-07-12 PROCEDURE — 99214 OFFICE O/P EST MOD 30 MIN: CPT | Mod: S$GLB,,, | Performed by: FAMILY MEDICINE

## 2022-07-12 PROCEDURE — 1126F AMNT PAIN NOTED NONE PRSNT: CPT | Mod: CPTII,S$GLB,, | Performed by: FAMILY MEDICINE

## 2022-07-12 PROCEDURE — 99214 PR OFFICE/OUTPT VISIT, EST, LEVL IV, 30-39 MIN: ICD-10-PCS | Mod: S$GLB,,, | Performed by: FAMILY MEDICINE

## 2022-07-12 PROCEDURE — 3288F FALL RISK ASSESSMENT DOCD: CPT | Mod: CPTII,S$GLB,, | Performed by: FAMILY MEDICINE

## 2022-07-12 PROCEDURE — 90677 PCV20 VACCINE IM: CPT | Mod: S$GLB,,, | Performed by: FAMILY MEDICINE

## 2022-07-12 PROCEDURE — 1101F PT FALLS ASSESS-DOCD LE1/YR: CPT | Mod: CPTII,S$GLB,, | Performed by: FAMILY MEDICINE

## 2022-07-12 PROCEDURE — 1101F PR PT FALLS ASSESS DOC 0-1 FALLS W/OUT INJ PAST YR: ICD-10-PCS | Mod: CPTII,S$GLB,, | Performed by: FAMILY MEDICINE

## 2022-07-12 PROCEDURE — 3074F PR MOST RECENT SYSTOLIC BLOOD PRESSURE < 130 MM HG: ICD-10-PCS | Mod: CPTII,S$GLB,, | Performed by: FAMILY MEDICINE

## 2022-07-12 PROCEDURE — 1160F RVW MEDS BY RX/DR IN RCRD: CPT | Mod: CPTII,S$GLB,, | Performed by: FAMILY MEDICINE

## 2022-07-12 PROCEDURE — 3079F DIAST BP 80-89 MM HG: CPT | Mod: CPTII,S$GLB,, | Performed by: FAMILY MEDICINE

## 2022-07-12 PROCEDURE — G0009 PNEUMOCOCCAL CONJUGATE VACCINE 20-VALENT: ICD-10-PCS | Mod: S$GLB,,, | Performed by: FAMILY MEDICINE

## 2022-07-12 PROCEDURE — 1159F MED LIST DOCD IN RCRD: CPT | Mod: CPTII,S$GLB,, | Performed by: FAMILY MEDICINE

## 2022-07-12 RX ORDER — CEPHALEXIN 500 MG/1
500 TABLET ORAL 3 TIMES DAILY
Status: ON HOLD | COMMUNITY
Start: 2022-05-25 | End: 2022-11-18 | Stop reason: HOSPADM

## 2022-07-12 RX ORDER — AMITRIPTYLINE HYDROCHLORIDE 10 MG/1
TABLET, FILM COATED ORAL
COMMUNITY
Start: 2022-05-25 | End: 2022-07-12

## 2022-07-17 ENCOUNTER — PATIENT MESSAGE (OUTPATIENT)
Dept: FAMILY MEDICINE | Facility: CLINIC | Age: 77
End: 2022-07-17
Payer: MEDICARE

## 2022-07-17 NOTE — PROGRESS NOTES
Weight down 17 lb.  Had started at 320 lb total.  Now down to 268. BMI of 38.5.  Feeling well in general.  Fasting sugar 164 A1c 6.4 down from 6.9.  Chronic heart failure preserved ejection fraction.  Doing well no PND orthopnea pedal edema.  Hyperlipidemia cholesterol 176 triglycerides 200 HDL 36 LDL is 100. Has atrial fibrillation.  He is anticoagulated.  CBC is normal.  Diabetes mellitus type 2 with nephropathy.  Microalbumin is negative.  Also has peripheral neuropathy.  Insomnia using over-the-counter medicine working for him.  Status post knee replacements doing better.    Physical examination vital signs noted.  No acute distress.  Neck without bruit.  Chest clear.  Heart regular rate rhythm.  Abdomen bowel sounds are positive soft and nontender no guarding no rebound.  Extremities without edema.  Subjective:       Patient ID: Hiro Rodríguez is a 77 y.o. male.    Chief Complaint: Follow-up    HPI    Objective:        Assessment:       1. Hyperlipidemia, unspecified hyperlipidemia type    2. Hypertension, essential    3. Chronic heart failure with preserved ejection fraction    4. BMI 38.0-38.9,adult    5. Skin cancer    6. Atrial fibrillation, unspecified type    7. Anticoagulant long-term use    8. Type 2 diabetes mellitus with diabetic polyneuropathy, without long-term current use of insulin    9. Type 2 diabetes mellitus with diabetic nephropathy, without long-term current use of insulin    10. Insomnia, unspecified type    11. Status post knee replacement, unspecified laterality        Plan:       Hyperlipidemia, unspecified hyperlipidemia type    Hypertension, essential    Chronic heart failure with preserved ejection fraction    BMI 38.0-38.9,adult    Skin cancer    Atrial fibrillation, unspecified type    Anticoagulant long-term use    Type 2 diabetes mellitus with diabetic polyneuropathy, without long-term current use of insulin    Type 2 diabetes mellitus with diabetic nephropathy, without  long-term current use of insulin    Insomnia, unspecified type    Status post knee replacement, unspecified laterality    Other orders  -     Pneumococcal Conjugate Vaccine (20 Valent) (IM)        Prevnar 20 administered.  Second shingles vaccine recommended.  Needs to go for diabetic eye exam also.  Follow-up in 3 months.  Continue diet and weight reduction.  Continue current medications.  Including the G LP 1 injection.

## 2022-10-04 ENCOUNTER — PATIENT MESSAGE (OUTPATIENT)
Dept: FAMILY MEDICINE | Facility: CLINIC | Age: 77
End: 2022-10-04
Payer: MEDICARE

## 2022-10-05 DIAGNOSIS — E11.42 TYPE 2 DIABETES MELLITUS WITH DIABETIC POLYNEUROPATHY, WITHOUT LONG-TERM CURRENT USE OF INSULIN: ICD-10-CM

## 2022-10-05 DIAGNOSIS — I10 HYPERTENSION, ESSENTIAL: Primary | ICD-10-CM

## 2022-10-05 DIAGNOSIS — E78.5 HYPERLIPIDEMIA, UNSPECIFIED HYPERLIPIDEMIA TYPE: Primary | ICD-10-CM

## 2022-10-05 DIAGNOSIS — I10 HYPERTENSION, ESSENTIAL: ICD-10-CM

## 2022-10-06 ENCOUNTER — LAB VISIT (OUTPATIENT)
Dept: LAB | Facility: HOSPITAL | Age: 77
End: 2022-10-06
Attending: FAMILY MEDICINE
Payer: MEDICARE

## 2022-10-06 DIAGNOSIS — I10 HYPERTENSION, ESSENTIAL: ICD-10-CM

## 2022-10-06 DIAGNOSIS — E11.42 TYPE 2 DIABETES MELLITUS WITH DIABETIC POLYNEUROPATHY, WITHOUT LONG-TERM CURRENT USE OF INSULIN: ICD-10-CM

## 2022-10-06 LAB
ALBUMIN SERPL BCP-MCNC: 4 G/DL (ref 3.5–5.2)
ALP SERPL-CCNC: 41 U/L (ref 55–135)
ALT SERPL W/O P-5'-P-CCNC: 18 U/L (ref 10–44)
ANION GAP SERPL CALC-SCNC: 9 MMOL/L (ref 8–16)
AST SERPL-CCNC: 18 U/L (ref 10–40)
BASOPHILS # BLD AUTO: 0.05 K/UL (ref 0–0.2)
BASOPHILS NFR BLD: 0.5 % (ref 0–1.9)
BILIRUB SERPL-MCNC: 1 MG/DL (ref 0.1–1)
BUN SERPL-MCNC: 28 MG/DL (ref 8–23)
CALCIUM SERPL-MCNC: 9.2 MG/DL (ref 8.7–10.5)
CHLORIDE SERPL-SCNC: 99 MMOL/L (ref 95–110)
CO2 SERPL-SCNC: 30 MMOL/L (ref 23–29)
CREAT SERPL-MCNC: 0.8 MG/DL (ref 0.5–1.4)
DIFFERENTIAL METHOD: NORMAL
EOSINOPHIL # BLD AUTO: 0.2 K/UL (ref 0–0.5)
EOSINOPHIL NFR BLD: 1.7 % (ref 0–8)
ERYTHROCYTE [DISTWIDTH] IN BLOOD BY AUTOMATED COUNT: 12.3 % (ref 11.5–14.5)
EST. GFR  (NO RACE VARIABLE): >60 ML/MIN/1.73 M^2
ESTIMATED AVG GLUCOSE: 146 MG/DL (ref 68–131)
GLUCOSE SERPL-MCNC: 132 MG/DL (ref 70–110)
HBA1C MFR BLD: 6.7 % (ref 4.5–6.2)
HCT VFR BLD AUTO: 45.9 % (ref 40–54)
HGB BLD-MCNC: 15.1 G/DL (ref 14–18)
IMM GRANULOCYTES # BLD AUTO: 0.02 K/UL (ref 0–0.04)
IMM GRANULOCYTES NFR BLD AUTO: 0.2 % (ref 0–0.5)
LYMPHOCYTES # BLD AUTO: 3.5 K/UL (ref 1–4.8)
LYMPHOCYTES NFR BLD: 35.6 % (ref 18–48)
MCH RBC QN AUTO: 30.9 PG (ref 27–31)
MCHC RBC AUTO-ENTMCNC: 32.9 G/DL (ref 32–36)
MCV RBC AUTO: 94 FL (ref 82–98)
MONOCYTES # BLD AUTO: 0.7 K/UL (ref 0.3–1)
MONOCYTES NFR BLD: 7.6 % (ref 4–15)
NEUTROPHILS # BLD AUTO: 5.3 K/UL (ref 1.8–7.7)
NEUTROPHILS NFR BLD: 54.4 % (ref 38–73)
NRBC BLD-RTO: 0 /100 WBC
PLATELET # BLD AUTO: 283 K/UL (ref 150–450)
PMV BLD AUTO: 9.5 FL (ref 9.2–12.9)
POTASSIUM SERPL-SCNC: 3.8 MMOL/L (ref 3.5–5.1)
PROT SERPL-MCNC: 7.3 G/DL (ref 6–8.4)
RBC # BLD AUTO: 4.88 M/UL (ref 4.6–6.2)
SODIUM SERPL-SCNC: 138 MMOL/L (ref 136–145)
WBC # BLD AUTO: 9.73 K/UL (ref 3.9–12.7)

## 2022-10-06 PROCEDURE — 83036 HEMOGLOBIN GLYCOSYLATED A1C: CPT | Performed by: FAMILY MEDICINE

## 2022-10-06 PROCEDURE — 80053 COMPREHEN METABOLIC PANEL: CPT | Performed by: FAMILY MEDICINE

## 2022-10-06 PROCEDURE — 36415 COLL VENOUS BLD VENIPUNCTURE: CPT | Performed by: FAMILY MEDICINE

## 2022-10-06 PROCEDURE — 85025 COMPLETE CBC W/AUTO DIFF WBC: CPT | Performed by: FAMILY MEDICINE

## 2022-10-11 ENCOUNTER — OFFICE VISIT (OUTPATIENT)
Dept: FAMILY MEDICINE | Facility: CLINIC | Age: 77
End: 2022-10-11
Payer: MEDICARE

## 2022-10-11 VITALS
HEART RATE: 60 BPM | WEIGHT: 264 LBS | SYSTOLIC BLOOD PRESSURE: 120 MMHG | HEIGHT: 70 IN | DIASTOLIC BLOOD PRESSURE: 76 MMHG | OXYGEN SATURATION: 97 % | BODY MASS INDEX: 37.8 KG/M2

## 2022-10-11 DIAGNOSIS — E11.42 TYPE 2 DIABETES MELLITUS WITH DIABETIC POLYNEUROPATHY, WITHOUT LONG-TERM CURRENT USE OF INSULIN: ICD-10-CM

## 2022-10-11 DIAGNOSIS — I48.91 ATRIAL FIBRILLATION, UNSPECIFIED TYPE: ICD-10-CM

## 2022-10-11 DIAGNOSIS — G25.0 BENIGN ESSENTIAL TREMOR: ICD-10-CM

## 2022-10-11 DIAGNOSIS — Z79.82 ASPIRIN LONG-TERM USE: ICD-10-CM

## 2022-10-11 DIAGNOSIS — Z79.01 ANTICOAGULANT LONG-TERM USE: ICD-10-CM

## 2022-10-11 DIAGNOSIS — Z12.5 PROSTATE CANCER SCREENING: ICD-10-CM

## 2022-10-11 DIAGNOSIS — G47.00 INSOMNIA, UNSPECIFIED TYPE: ICD-10-CM

## 2022-10-11 DIAGNOSIS — E78.5 HYPERLIPIDEMIA, UNSPECIFIED HYPERLIPIDEMIA TYPE: ICD-10-CM

## 2022-10-11 DIAGNOSIS — I10 HYPERTENSION, ESSENTIAL: Primary | ICD-10-CM

## 2022-10-11 DIAGNOSIS — E11.21 TYPE 2 DIABETES MELLITUS WITH DIABETIC NEPHROPATHY, WITHOUT LONG-TERM CURRENT USE OF INSULIN: ICD-10-CM

## 2022-10-11 PROCEDURE — 3288F FALL RISK ASSESSMENT DOCD: CPT | Mod: CPTII,S$GLB,, | Performed by: FAMILY MEDICINE

## 2022-10-11 PROCEDURE — 1101F PT FALLS ASSESS-DOCD LE1/YR: CPT | Mod: CPTII,S$GLB,, | Performed by: FAMILY MEDICINE

## 2022-10-11 PROCEDURE — 3078F PR MOST RECENT DIASTOLIC BLOOD PRESSURE < 80 MM HG: ICD-10-PCS | Mod: CPTII,S$GLB,, | Performed by: FAMILY MEDICINE

## 2022-10-11 PROCEDURE — 1126F PR PAIN SEVERITY QUANTIFIED, NO PAIN PRESENT: ICD-10-PCS | Mod: CPTII,S$GLB,, | Performed by: FAMILY MEDICINE

## 2022-10-11 PROCEDURE — 1159F PR MEDICATION LIST DOCUMENTED IN MEDICAL RECORD: ICD-10-PCS | Mod: CPTII,S$GLB,, | Performed by: FAMILY MEDICINE

## 2022-10-11 PROCEDURE — 99214 PR OFFICE/OUTPT VISIT, EST, LEVL IV, 30-39 MIN: ICD-10-PCS | Mod: 25,S$GLB,, | Performed by: FAMILY MEDICINE

## 2022-10-11 PROCEDURE — 3074F SYST BP LT 130 MM HG: CPT | Mod: CPTII,S$GLB,, | Performed by: FAMILY MEDICINE

## 2022-10-11 PROCEDURE — 90694 VACC AIIV4 NO PRSRV 0.5ML IM: CPT | Mod: S$GLB,,, | Performed by: FAMILY MEDICINE

## 2022-10-11 PROCEDURE — 1101F PR PT FALLS ASSESS DOC 0-1 FALLS W/OUT INJ PAST YR: ICD-10-PCS | Mod: CPTII,S$GLB,, | Performed by: FAMILY MEDICINE

## 2022-10-11 PROCEDURE — 3078F DIAST BP <80 MM HG: CPT | Mod: CPTII,S$GLB,, | Performed by: FAMILY MEDICINE

## 2022-10-11 PROCEDURE — 1126F AMNT PAIN NOTED NONE PRSNT: CPT | Mod: CPTII,S$GLB,, | Performed by: FAMILY MEDICINE

## 2022-10-11 PROCEDURE — 1160F PR REVIEW ALL MEDS BY PRESCRIBER/CLIN PHARMACIST DOCUMENTED: ICD-10-PCS | Mod: CPTII,S$GLB,, | Performed by: FAMILY MEDICINE

## 2022-10-11 PROCEDURE — 3288F PR FALLS RISK ASSESSMENT DOCUMENTED: ICD-10-PCS | Mod: CPTII,S$GLB,, | Performed by: FAMILY MEDICINE

## 2022-10-11 PROCEDURE — G0008 FLU VACCINE - QUADRIVALENT - ADJUVANTED: ICD-10-PCS | Mod: S$GLB,,, | Performed by: FAMILY MEDICINE

## 2022-10-11 PROCEDURE — 3074F PR MOST RECENT SYSTOLIC BLOOD PRESSURE < 130 MM HG: ICD-10-PCS | Mod: CPTII,S$GLB,, | Performed by: FAMILY MEDICINE

## 2022-10-11 PROCEDURE — 1160F RVW MEDS BY RX/DR IN RCRD: CPT | Mod: CPTII,S$GLB,, | Performed by: FAMILY MEDICINE

## 2022-10-11 PROCEDURE — 90694 FLU VACCINE - QUADRIVALENT - ADJUVANTED: ICD-10-PCS | Mod: S$GLB,,, | Performed by: FAMILY MEDICINE

## 2022-10-11 PROCEDURE — 1159F MED LIST DOCD IN RCRD: CPT | Mod: CPTII,S$GLB,, | Performed by: FAMILY MEDICINE

## 2022-10-11 PROCEDURE — 99214 OFFICE O/P EST MOD 30 MIN: CPT | Mod: 25,S$GLB,, | Performed by: FAMILY MEDICINE

## 2022-10-11 PROCEDURE — G0008 ADMIN INFLUENZA VIRUS VAC: HCPCS | Mod: S$GLB,,, | Performed by: FAMILY MEDICINE

## 2022-10-11 RX ORDER — AMITRIPTYLINE HYDROCHLORIDE 10 MG/1
10-20 TABLET, FILM COATED ORAL NIGHTLY
Status: ON HOLD | COMMUNITY
Start: 2022-08-12 | End: 2022-11-18 | Stop reason: HOSPADM

## 2022-10-11 NOTE — PROGRESS NOTES
Subjective:       Patient ID: Hiro Rodríguez is a 77 y.o. male.    Chief Complaint: Follow-up    Patient presents to clinic for follow up. Hypertension stable. A fib non bothersome. Anticoagulated on Eliquis. HFpEF. No PND. Breathing ok. Cardiovascular ok. Denies chest pain or palpitations. Aspirin use. Hyperlipidemia. Previous . Type 2 DM with complications. A1C increased to 6.7 . Diet has been poor. Discussed in detail low fat low calorie diet. Weight down 4 lbs. BMI 37. Due for eye exam. Appointment is scheduled but he cannot remember what office. CMP, CBC normal. No anemia. Tremor is stable. Due for shingles, Covid booster, and influenza vaccination. Recommended Bivalent vaccination in place of booster.   Review of Systems   Respiratory: Negative.     Cardiovascular: Negative.  Negative for chest pain and palpitations.   Hematological: Negative.    Psychiatric/Behavioral: Negative.         Objective:      Physical Exam  Constitutional:       Appearance: Normal appearance.   Neck:      Vascular: No carotid bruit.   Cardiovascular:      Rate and Rhythm: Normal rate and regular rhythm.      Pulses: Normal pulses.      Heart sounds: Normal heart sounds.   Pulmonary:      Effort: Pulmonary effort is normal.      Breath sounds: Normal breath sounds.   Lymphadenopathy:      Cervical: No cervical adenopathy.   Skin:     General: Skin is warm and dry.   Neurological:      Mental Status: He is alert.   Psychiatric:         Mood and Affect: Mood normal.         Behavior: Behavior normal.       Assessment/Plan:     Hypertension, essential    Atrial fibrillation, unspecified type    Anticoagulant long-term use    Type 2 diabetes mellitus with diabetic polyneuropathy, without long-term current use of insulin    Type 2 diabetes mellitus with diabetic nephropathy, without long-term current use of insulin    Benign essential tremor    Aspirin long-term use    BMI 37.0-37.9, adult    Insomnia, unspecified  type    Prostate cancer screening  -     PSA, Screening; Future; Expected date: 11/11/2022    Hyperlipidemia, unspecified hyperlipidemia type    Other orders  -     Influenza - Quadrivalent (Adjuvanted)     Reinforce diet.  Flu shot high-dose.  PSA in November.  Get the new COVID vaccine.  Needs to go for his eye exam as scheduled.  Follow-up in 3 months.  Continue his same medications.  Continue same medication for tremor also.  Continue his aspirin.

## 2022-10-24 ENCOUNTER — IMMUNIZATION (OUTPATIENT)
Dept: PRIMARY CARE CLINIC | Facility: CLINIC | Age: 77
End: 2022-10-24
Payer: MEDICARE

## 2022-10-24 DIAGNOSIS — Z23 NEED FOR VACCINATION: Primary | ICD-10-CM

## 2022-10-24 PROCEDURE — 91312 COVID-19, MRNA, LNP-S, BIVALENT BOOSTER, PF, 30 MCG/0.3 ML DOSE: CPT | Mod: S$GLB,,, | Performed by: FAMILY MEDICINE

## 2022-10-24 PROCEDURE — 0124A COVID-19, MRNA, LNP-S, BIVALENT BOOSTER, PF, 30 MCG/0.3 ML DOSE: CPT | Mod: S$GLB,,, | Performed by: FAMILY MEDICINE

## 2022-10-24 PROCEDURE — 0124A COVID-19, MRNA, LNP-S, BIVALENT BOOSTER, PF, 30 MCG/0.3 ML DOSE: ICD-10-PCS | Mod: S$GLB,,, | Performed by: FAMILY MEDICINE

## 2022-10-24 PROCEDURE — 91312 COVID-19, MRNA, LNP-S, BIVALENT BOOSTER, PF, 30 MCG/0.3 ML DOSE: ICD-10-PCS | Mod: S$GLB,,, | Performed by: FAMILY MEDICINE

## 2022-10-31 ENCOUNTER — PES CALL (OUTPATIENT)
Dept: ADMINISTRATIVE | Facility: CLINIC | Age: 77
End: 2022-10-31
Payer: MEDICARE

## 2022-11-08 ENCOUNTER — HOSPITAL ENCOUNTER (INPATIENT)
Facility: HOSPITAL | Age: 77
LOS: 9 days | Discharge: NURSING, REHAB OR LONG-TERM CARE | DRG: 956 | End: 2022-11-18
Attending: EMERGENCY MEDICINE | Admitting: INTERNAL MEDICINE
Payer: MEDICARE

## 2022-11-08 DIAGNOSIS — R07.9 CHEST PAIN: ICD-10-CM

## 2022-11-08 DIAGNOSIS — S06.5XAA: Primary | ICD-10-CM

## 2022-11-08 DIAGNOSIS — K92.2 ACUTE UPPER GASTROINTESTINAL BLEEDING: ICD-10-CM
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
## 2022-11-08 DIAGNOSIS — S72.142A CLOSED DISPLACED INTERTROCHANTERIC FRACTURE OF LEFT FEMUR, INITIAL ENCOUNTER: ICD-10-CM

## 2022-11-08 DIAGNOSIS — I48.91 ATRIAL FIBRILLATION WITH RVR: ICD-10-CM

## 2022-11-08 DIAGNOSIS — S06.5XAA: ICD-10-CM

## 2022-11-08 DIAGNOSIS — S06.5X0A: ICD-10-CM

## 2022-11-08 LAB
ALBUMIN SERPL BCP-MCNC: 3.8 G/DL (ref 3.5–5.2)
ALP SERPL-CCNC: 50 U/L (ref 55–135)
ALT SERPL W/O P-5'-P-CCNC: 21 U/L (ref 10–44)
ANION GAP SERPL CALC-SCNC: 11 MMOL/L (ref 8–16)
AST SERPL-CCNC: 19 U/L (ref 10–40)
BASOPHILS # BLD AUTO: 0.06 K/UL (ref 0–0.2)
BASOPHILS NFR BLD: 0.3 % (ref 0–1.9)
BILIRUB SERPL-MCNC: 1.3 MG/DL (ref 0.1–1)
BNP SERPL-MCNC: 65 PG/ML (ref 0–99)
BUN SERPL-MCNC: 26 MG/DL (ref 8–23)
CALCIUM SERPL-MCNC: 8.8 MG/DL (ref 8.7–10.5)
CHLORIDE SERPL-SCNC: 96 MMOL/L (ref 95–110)
CO2 SERPL-SCNC: 27 MMOL/L (ref 23–29)
CREAT SERPL-MCNC: 1.2 MG/DL (ref 0.5–1.4)
DIFFERENTIAL METHOD: ABNORMAL
EOSINOPHIL # BLD AUTO: 0.1 K/UL (ref 0–0.5)
EOSINOPHIL NFR BLD: 0.3 % (ref 0–8)
ERYTHROCYTE [DISTWIDTH] IN BLOOD BY AUTOMATED COUNT: 12.1 % (ref 11.5–14.5)
EST. GFR  (NO RACE VARIABLE): >60 ML/MIN/1.73 M^2
GLUCOSE SERPL-MCNC: 214 MG/DL (ref 70–110)
HCT VFR BLD AUTO: 41.1 % (ref 40–54)
HGB BLD-MCNC: 13.7 G/DL (ref 14–18)
IMM GRANULOCYTES # BLD AUTO: 0.19 K/UL (ref 0–0.04)
IMM GRANULOCYTES NFR BLD AUTO: 0.8 % (ref 0–0.5)
INR PPP: 1.2
LYMPHOCYTES # BLD AUTO: 2.3 K/UL (ref 1–4.8)
LYMPHOCYTES NFR BLD: 10.1 % (ref 18–48)
MCH RBC QN AUTO: 31.1 PG (ref 27–31)
MCHC RBC AUTO-ENTMCNC: 33.3 G/DL (ref 32–36)
MCV RBC AUTO: 93 FL (ref 82–98)
MONOCYTES # BLD AUTO: 1.7 K/UL (ref 0.3–1)
MONOCYTES NFR BLD: 7.2 % (ref 4–15)
NEUTROPHILS # BLD AUTO: 18.6 K/UL (ref 1.8–7.7)
NEUTROPHILS NFR BLD: 81.3 % (ref 38–73)
NRBC BLD-RTO: 0 /100 WBC
PLATELET # BLD AUTO: 373 K/UL (ref 150–450)
PMV BLD AUTO: 10 FL (ref 9.2–12.9)
POTASSIUM SERPL-SCNC: 3.5 MMOL/L (ref 3.5–5.1)
PROT SERPL-MCNC: 7.2 G/DL (ref 6–8.4)
PROTHROMBIN TIME: 14.2 SEC (ref 11.4–13.7)
RBC # BLD AUTO: 4.41 M/UL (ref 4.6–6.2)
SODIUM SERPL-SCNC: 134 MMOL/L (ref 136–145)
TROPONIN I SERPL DL<=0.01 NG/ML-MCNC: <0.03 NG/ML
WBC # BLD AUTO: 22.85 K/UL (ref 3.9–12.7)

## 2022-11-08 PROCEDURE — 96375 TX/PRO/DX INJ NEW DRUG ADDON: CPT

## 2022-11-08 PROCEDURE — 99285 EMERGENCY DEPT VISIT HI MDM: CPT | Mod: 25

## 2022-11-08 PROCEDURE — 85025 COMPLETE CBC W/AUTO DIFF WBC: CPT | Performed by: EMERGENCY MEDICINE

## 2022-11-08 PROCEDURE — 93010 ELECTROCARDIOGRAM REPORT: CPT | Mod: ,,, | Performed by: INTERNAL MEDICINE

## 2022-11-08 PROCEDURE — 80053 COMPREHEN METABOLIC PANEL: CPT | Performed by: EMERGENCY MEDICINE

## 2022-11-08 PROCEDURE — 85610 PROTHROMBIN TIME: CPT | Performed by: EMERGENCY MEDICINE

## 2022-11-08 PROCEDURE — 93010 EKG 12-LEAD: ICD-10-PCS | Mod: ,,, | Performed by: INTERNAL MEDICINE

## 2022-11-08 PROCEDURE — 83880 ASSAY OF NATRIURETIC PEPTIDE: CPT | Performed by: EMERGENCY MEDICINE

## 2022-11-08 PROCEDURE — 93005 ELECTROCARDIOGRAM TRACING: CPT | Performed by: INTERNAL MEDICINE

## 2022-11-08 PROCEDURE — 63600175 PHARM REV CODE 636 W HCPCS: Performed by: EMERGENCY MEDICINE

## 2022-11-08 PROCEDURE — 96361 HYDRATE IV INFUSION ADD-ON: CPT

## 2022-11-08 PROCEDURE — 84484 ASSAY OF TROPONIN QUANT: CPT | Performed by: EMERGENCY MEDICINE

## 2022-11-08 PROCEDURE — 96376 TX/PRO/DX INJ SAME DRUG ADON: CPT

## 2022-11-08 RX ORDER — MORPHINE SULFATE 4 MG/ML
4 INJECTION, SOLUTION INTRAMUSCULAR; INTRAVENOUS
Status: COMPLETED | OUTPATIENT
Start: 2022-11-08 | End: 2022-11-08

## 2022-11-08 RX ORDER — ONDANSETRON 2 MG/ML
4 INJECTION INTRAMUSCULAR; INTRAVENOUS
Status: COMPLETED | OUTPATIENT
Start: 2022-11-08 | End: 2022-11-08

## 2022-11-08 RX ADMIN — MORPHINE SULFATE 4 MG: 4 INJECTION, SOLUTION INTRAMUSCULAR; INTRAVENOUS at 11:11

## 2022-11-08 RX ADMIN — ONDANSETRON 4 MG: 2 INJECTION INTRAMUSCULAR; INTRAVENOUS at 10:11

## 2022-11-08 RX ADMIN — MORPHINE SULFATE 4 MG: 4 INJECTION, SOLUTION INTRAMUSCULAR; INTRAVENOUS at 10:11

## 2022-11-08 NOTE — Clinical Note
Diagnosis: Subdural hemorrhage following injury without open intracranial wound [137892]   Admitting Provider:: BEAU MUNROE [[id number]]   Future Attending Provider: BEAU MUNROE [[id number]]   Reason for IP Medical Treatment  (Clinical interventions that can only be accomplished in the IP setting? ) :: iv therapy   Estimated Length of Stay:: 3-4 midnights   I certify that Inpatient services for greater than or equal to 2 midnights are medically necessary:: Yes   Plans for Post-Acute care--if anticipated (pick the single best option):: F. IP Rehabilitation Unit Placement   Special Needs:: No Special Needs [1]

## 2022-11-09 PROBLEM — S06.5X0A: Status: ACTIVE | Noted: 2022-11-09

## 2022-11-09 PROBLEM — S72.001A CLOSED FRACTURE OF RIGHT HIP: Status: ACTIVE | Noted: 2022-11-09

## 2022-11-09 PROBLEM — I60.9 SUBARACHNOID BLEED: Status: ACTIVE | Noted: 2022-11-09

## 2022-11-09 PROBLEM — S72.142A CLOSED DISPLACED INTERTROCHANTERIC FRACTURE OF LEFT FEMUR: Status: ACTIVE | Noted: 2022-11-09

## 2022-11-09 PROBLEM — S06.5XAA SUBDURAL HEMATOMA CAUSED BY CONCUSSION: Status: ACTIVE | Noted: 2022-11-09

## 2022-11-09 PROBLEM — R79.89 PRERENAL AZOTEMIA: Status: ACTIVE | Noted: 2022-11-09

## 2022-11-09 LAB
ABO + RH BLD: NORMAL
ALBUMIN SERPL BCP-MCNC: 3.6 G/DL (ref 3.5–5.2)
ALP SERPL-CCNC: 48 U/L (ref 55–135)
ALT SERPL W/O P-5'-P-CCNC: 19 U/L (ref 10–44)
ANION GAP SERPL CALC-SCNC: 11 MMOL/L (ref 8–16)
AST SERPL-CCNC: 16 U/L (ref 10–40)
BASOPHILS # BLD AUTO: 0.04 K/UL (ref 0–0.2)
BASOPHILS NFR BLD: 0.2 % (ref 0–1.9)
BILIRUB SERPL-MCNC: 1.3 MG/DL (ref 0.1–1)
BLD GP AB SCN CELLS X3 SERPL QL: NORMAL
BUN SERPL-MCNC: 28 MG/DL (ref 8–23)
CALCIUM SERPL-MCNC: 8.5 MG/DL (ref 8.7–10.5)
CHLORIDE SERPL-SCNC: 98 MMOL/L (ref 95–110)
CO2 SERPL-SCNC: 27 MMOL/L (ref 23–29)
CREAT SERPL-MCNC: 1.1 MG/DL (ref 0.5–1.4)
DIFFERENTIAL METHOD: ABNORMAL
EOSINOPHIL # BLD AUTO: 0.1 K/UL (ref 0–0.5)
EOSINOPHIL NFR BLD: 0.3 % (ref 0–8)
ERYTHROCYTE [DISTWIDTH] IN BLOOD BY AUTOMATED COUNT: 12.3 % (ref 11.5–14.5)
EST. GFR  (NO RACE VARIABLE): >60 ML/MIN/1.73 M^2
ESTIMATED AVG GLUCOSE: 154 MG/DL (ref 68–131)
GLUCOSE SERPL-MCNC: 148 MG/DL (ref 70–110)
GLUCOSE SERPL-MCNC: 151 MG/DL (ref 70–110)
GLUCOSE SERPL-MCNC: 152 MG/DL (ref 70–110)
GLUCOSE SERPL-MCNC: 194 MG/DL (ref 70–110)
HBA1C MFR BLD: 7 % (ref 4.5–6.2)
HCT VFR BLD AUTO: 37.5 % (ref 40–54)
HGB BLD-MCNC: 12.8 G/DL (ref 14–18)
IMM GRANULOCYTES # BLD AUTO: 0.12 K/UL (ref 0–0.04)
IMM GRANULOCYTES NFR BLD AUTO: 0.7 % (ref 0–0.5)
INR PPP: 1.1
LACTATE SERPL-SCNC: 1.7 MMOL/L (ref 0.5–1.9)
LYMPHOCYTES # BLD AUTO: 2.9 K/UL (ref 1–4.8)
LYMPHOCYTES NFR BLD: 16.9 % (ref 18–48)
MAGNESIUM SERPL-MCNC: 2.3 MG/DL (ref 1.6–2.6)
MCH RBC QN AUTO: 31.1 PG (ref 27–31)
MCHC RBC AUTO-ENTMCNC: 34.1 G/DL (ref 32–36)
MCV RBC AUTO: 91 FL (ref 82–98)
MONOCYTES # BLD AUTO: 1.6 K/UL (ref 0.3–1)
MONOCYTES NFR BLD: 9.2 % (ref 4–15)
NEUTROPHILS # BLD AUTO: 12.6 K/UL (ref 1.8–7.7)
NEUTROPHILS NFR BLD: 72.7 % (ref 38–73)
NRBC BLD-RTO: 0 /100 WBC
PHOSPHATE SERPL-MCNC: 3.7 MG/DL (ref 2.7–4.5)
PLATELET # BLD AUTO: 341 K/UL (ref 150–450)
PMV BLD AUTO: 9.9 FL (ref 9.2–12.9)
POTASSIUM SERPL-SCNC: 3.1 MMOL/L (ref 3.5–5.1)
POTASSIUM SERPL-SCNC: 3.7 MMOL/L (ref 3.5–5.1)
POTASSIUM SERPL-SCNC: 4.1 MMOL/L (ref 3.5–5.1)
PROT SERPL-MCNC: 6.7 G/DL (ref 6–8.4)
PROTHROMBIN TIME: 13.2 SEC (ref 11.4–13.7)
RBC # BLD AUTO: 4.11 M/UL (ref 4.6–6.2)
SODIUM SERPL-SCNC: 136 MMOL/L (ref 136–145)
WBC # BLD AUTO: 17.31 K/UL (ref 3.9–12.7)

## 2022-11-09 PROCEDURE — 20000000 HC ICU ROOM

## 2022-11-09 PROCEDURE — 85025 COMPLETE CBC W/AUTO DIFF WBC: CPT | Performed by: INTERNAL MEDICINE

## 2022-11-09 PROCEDURE — 84100 ASSAY OF PHOSPHORUS: CPT | Performed by: INTERNAL MEDICINE

## 2022-11-09 PROCEDURE — 63600175 PHARM REV CODE 636 W HCPCS: Performed by: INTERNAL MEDICINE

## 2022-11-09 PROCEDURE — 84132 ASSAY OF SERUM POTASSIUM: CPT | Mod: 91 | Performed by: INTERNAL MEDICINE

## 2022-11-09 PROCEDURE — 99223 1ST HOSP IP/OBS HIGH 75: CPT | Mod: 57,ICN,, | Performed by: ORTHOPAEDIC SURGERY

## 2022-11-09 PROCEDURE — 94799 UNLISTED PULMONARY SVC/PX: CPT

## 2022-11-09 PROCEDURE — 96365 THER/PROPH/DIAG IV INF INIT: CPT

## 2022-11-09 PROCEDURE — 84132 ASSAY OF SERUM POTASSIUM: CPT | Performed by: INTERNAL MEDICINE

## 2022-11-09 PROCEDURE — 86850 RBC ANTIBODY SCREEN: CPT | Performed by: INTERNAL MEDICINE

## 2022-11-09 PROCEDURE — 82962 GLUCOSE BLOOD TEST: CPT

## 2022-11-09 PROCEDURE — 99233 PR SUBSEQUENT HOSPITAL CARE,LEVL III: ICD-10-PCS | Mod: ,,, | Performed by: NEUROLOGICAL SURGERY

## 2022-11-09 PROCEDURE — 51702 INSERT TEMP BLADDER CATH: CPT

## 2022-11-09 PROCEDURE — 99223 PR INITIAL HOSPITAL CARE,LEVL III: ICD-10-PCS | Mod: 57,ICN,, | Performed by: ORTHOPAEDIC SURGERY

## 2022-11-09 PROCEDURE — 36415 COLL VENOUS BLD VENIPUNCTURE: CPT | Performed by: INTERNAL MEDICINE

## 2022-11-09 PROCEDURE — 25000003 PHARM REV CODE 250: Performed by: INTERNAL MEDICINE

## 2022-11-09 PROCEDURE — 83735 ASSAY OF MAGNESIUM: CPT | Performed by: INTERNAL MEDICINE

## 2022-11-09 PROCEDURE — 99900035 HC TECH TIME PER 15 MIN (STAT)

## 2022-11-09 PROCEDURE — 94761 N-INVAS EAR/PLS OXIMETRY MLT: CPT

## 2022-11-09 PROCEDURE — 80053 COMPREHEN METABOLIC PANEL: CPT | Performed by: INTERNAL MEDICINE

## 2022-11-09 PROCEDURE — 83036 HEMOGLOBIN GLYCOSYLATED A1C: CPT | Performed by: INTERNAL MEDICINE

## 2022-11-09 PROCEDURE — 99233 SBSQ HOSP IP/OBS HIGH 50: CPT | Mod: ,,, | Performed by: NEUROLOGICAL SURGERY

## 2022-11-09 PROCEDURE — 83605 ASSAY OF LACTIC ACID: CPT | Performed by: INTERNAL MEDICINE

## 2022-11-09 PROCEDURE — 85610 PROTHROMBIN TIME: CPT | Performed by: INTERNAL MEDICINE

## 2022-11-09 PROCEDURE — 63600175 PHARM REV CODE 636 W HCPCS: Performed by: EMERGENCY MEDICINE

## 2022-11-09 PROCEDURE — 99900031 HC PATIENT EDUCATION (STAT)

## 2022-11-09 PROCEDURE — 25000003 PHARM REV CODE 250

## 2022-11-09 PROCEDURE — 96367 TX/PROPH/DG ADDL SEQ IV INF: CPT

## 2022-11-09 RX ORDER — IBUPROFEN 200 MG
24 TABLET ORAL
Status: DISCONTINUED | OUTPATIENT
Start: 2022-11-09 | End: 2022-11-18 | Stop reason: HOSPADM

## 2022-11-09 RX ORDER — HYDROCODONE BITARTRATE AND ACETAMINOPHEN 10; 325 MG/1; MG/1
1 TABLET ORAL EVERY 4 HOURS PRN
Status: DISCONTINUED | OUTPATIENT
Start: 2022-11-09 | End: 2022-11-10

## 2022-11-09 RX ORDER — DULOXETIN HYDROCHLORIDE 30 MG/1
30 CAPSULE, DELAYED RELEASE ORAL DAILY
Status: DISCONTINUED | OUTPATIENT
Start: 2022-11-09 | End: 2022-11-18 | Stop reason: HOSPADM

## 2022-11-09 RX ORDER — TRAZODONE HYDROCHLORIDE 50 MG/1
50 TABLET ORAL NIGHTLY
Status: DISCONTINUED | OUTPATIENT
Start: 2022-11-09 | End: 2022-11-09

## 2022-11-09 RX ORDER — SODIUM,POTASSIUM PHOSPHATES 280-250MG
2 POWDER IN PACKET (EA) ORAL
Status: DISCONTINUED | OUTPATIENT
Start: 2022-11-09 | End: 2022-11-18 | Stop reason: HOSPADM

## 2022-11-09 RX ORDER — TALC
6 POWDER (GRAM) TOPICAL NIGHTLY PRN
Status: DISCONTINUED | OUTPATIENT
Start: 2022-11-09 | End: 2022-11-18 | Stop reason: HOSPADM

## 2022-11-09 RX ORDER — ACETAMINOPHEN 325 MG/1
650 TABLET ORAL EVERY 8 HOURS PRN
Status: DISCONTINUED | OUTPATIENT
Start: 2022-11-09 | End: 2022-11-18 | Stop reason: HOSPADM

## 2022-11-09 RX ORDER — OXYBUTYNIN CHLORIDE 10 MG/1
10 TABLET, EXTENDED RELEASE ORAL DAILY
Status: DISCONTINUED | OUTPATIENT
Start: 2022-11-09 | End: 2022-11-18 | Stop reason: HOSPADM

## 2022-11-09 RX ORDER — HYDROMORPHONE HYDROCHLORIDE 1 MG/ML
0.5 INJECTION, SOLUTION INTRAMUSCULAR; INTRAVENOUS; SUBCUTANEOUS EVERY 4 HOURS PRN
Status: DISCONTINUED | OUTPATIENT
Start: 2022-11-09 | End: 2022-11-09

## 2022-11-09 RX ORDER — TOPIRAMATE 25 MG/1
50 TABLET ORAL DAILY
Status: DISCONTINUED | OUTPATIENT
Start: 2022-11-09 | End: 2022-11-18 | Stop reason: HOSPADM

## 2022-11-09 RX ORDER — CHLORHEXIDINE GLUCONATE ORAL RINSE 1.2 MG/ML
15 SOLUTION DENTAL 2 TIMES DAILY
Status: DISPENSED | OUTPATIENT
Start: 2022-11-09 | End: 2022-11-14

## 2022-11-09 RX ORDER — SODIUM CHLORIDE, SODIUM LACTATE, POTASSIUM CHLORIDE, CALCIUM CHLORIDE 600; 310; 30; 20 MG/100ML; MG/100ML; MG/100ML; MG/100ML
INJECTION, SOLUTION INTRAVENOUS CONTINUOUS
Status: DISCONTINUED | OUTPATIENT
Start: 2022-11-09 | End: 2022-11-10

## 2022-11-09 RX ORDER — LANOLIN ALCOHOL/MO/W.PET/CERES
800 CREAM (GRAM) TOPICAL
Status: DISCONTINUED | OUTPATIENT
Start: 2022-11-09 | End: 2022-11-18 | Stop reason: HOSPADM

## 2022-11-09 RX ORDER — MUPIROCIN 20 MG/G
OINTMENT TOPICAL 2 TIMES DAILY
Status: DISPENSED | OUTPATIENT
Start: 2022-11-09 | End: 2022-11-14

## 2022-11-09 RX ORDER — HYDROCHLOROTHIAZIDE 25 MG/1
25 TABLET ORAL DAILY
Status: DISCONTINUED | OUTPATIENT
Start: 2022-11-09 | End: 2022-11-09

## 2022-11-09 RX ORDER — LEVETIRACETAM 10 MG/ML
1000 INJECTION INTRAVASCULAR
Status: COMPLETED | OUTPATIENT
Start: 2022-11-09 | End: 2022-11-09

## 2022-11-09 RX ORDER — HYDROMORPHONE HYDROCHLORIDE 1 MG/ML
1 INJECTION, SOLUTION INTRAMUSCULAR; INTRAVENOUS; SUBCUTANEOUS EVERY 4 HOURS PRN
Status: DISCONTINUED | OUTPATIENT
Start: 2022-11-09 | End: 2022-11-10

## 2022-11-09 RX ORDER — HYDROCODONE BITARTRATE AND ACETAMINOPHEN 10; 325 MG/1; MG/1
1 TABLET ORAL EVERY 6 HOURS PRN
Status: DISCONTINUED | OUTPATIENT
Start: 2022-11-09 | End: 2022-11-09

## 2022-11-09 RX ORDER — LANOLIN ALCOHOL/MO/W.PET/CERES
2000 CREAM (GRAM) TOPICAL DAILY
Status: DISCONTINUED | OUTPATIENT
Start: 2022-11-09 | End: 2022-11-18 | Stop reason: HOSPADM

## 2022-11-09 RX ORDER — IBUPROFEN 200 MG
16 TABLET ORAL
Status: DISCONTINUED | OUTPATIENT
Start: 2022-11-09 | End: 2022-11-18 | Stop reason: HOSPADM

## 2022-11-09 RX ORDER — AMITRIPTYLINE HYDROCHLORIDE 10 MG/1
10 TABLET, FILM COATED ORAL NIGHTLY
Status: DISCONTINUED | OUTPATIENT
Start: 2022-11-09 | End: 2022-11-09

## 2022-11-09 RX ORDER — ATORVASTATIN CALCIUM 40 MG/1
40 TABLET, FILM COATED ORAL DAILY
Status: DISCONTINUED | OUTPATIENT
Start: 2022-11-09 | End: 2022-11-18 | Stop reason: HOSPADM

## 2022-11-09 RX ORDER — CYCLOBENZAPRINE HCL 10 MG
10 TABLET ORAL NIGHTLY
Status: DISCONTINUED | OUTPATIENT
Start: 2022-11-09 | End: 2022-11-09

## 2022-11-09 RX ORDER — LOSARTAN POTASSIUM 50 MG/1
50 TABLET ORAL DAILY
Status: DISCONTINUED | OUTPATIENT
Start: 2022-11-09 | End: 2022-11-09

## 2022-11-09 RX ORDER — INSULIN ASPART 100 [IU]/ML
0-5 INJECTION, SOLUTION INTRAVENOUS; SUBCUTANEOUS
Status: DISCONTINUED | OUTPATIENT
Start: 2022-11-09 | End: 2022-11-18 | Stop reason: HOSPADM

## 2022-11-09 RX ORDER — ONDANSETRON 2 MG/ML
4 INJECTION INTRAMUSCULAR; INTRAVENOUS EVERY 8 HOURS PRN
Status: DISCONTINUED | OUTPATIENT
Start: 2022-11-09 | End: 2022-11-10

## 2022-11-09 RX ORDER — GLUCAGON 1 MG
1 KIT INJECTION
Status: DISCONTINUED | OUTPATIENT
Start: 2022-11-09 | End: 2022-11-18 | Stop reason: HOSPADM

## 2022-11-09 RX ORDER — LEVETIRACETAM 500 MG/1
500 TABLET ORAL 2 TIMES DAILY
Status: DISCONTINUED | OUTPATIENT
Start: 2022-11-09 | End: 2022-11-18 | Stop reason: HOSPADM

## 2022-11-09 RX ORDER — METOPROLOL TARTRATE 25 MG/1
25 TABLET, FILM COATED ORAL 2 TIMES DAILY
Status: DISCONTINUED | OUTPATIENT
Start: 2022-11-09 | End: 2022-11-09

## 2022-11-09 RX ORDER — HYDROMORPHONE HYDROCHLORIDE 1 MG/ML
1 INJECTION, SOLUTION INTRAMUSCULAR; INTRAVENOUS; SUBCUTANEOUS EVERY 4 HOURS PRN
Status: DISCONTINUED | OUTPATIENT
Start: 2022-11-09 | End: 2022-11-09

## 2022-11-09 RX ADMIN — LEVETIRACETAM 1000 MG: 10 INJECTION INTRAVENOUS at 12:11

## 2022-11-09 RX ADMIN — TOPIRAMATE 50 MG: 25 TABLET, FILM COATED ORAL at 09:11

## 2022-11-09 RX ADMIN — HYDROMORPHONE HYDROCHLORIDE 1 MG: 1 INJECTION, SOLUTION INTRAMUSCULAR; INTRAVENOUS; SUBCUTANEOUS at 10:11

## 2022-11-09 RX ADMIN — ATORVASTATIN CALCIUM 40 MG: 40 TABLET, FILM COATED ORAL at 09:11

## 2022-11-09 RX ADMIN — OXYBUTYNIN CHLORIDE 10 MG: 5 TABLET, EXTENDED RELEASE ORAL at 09:11

## 2022-11-09 RX ADMIN — DULOXETINE HYDROCHLORIDE 30 MG: 30 CAPSULE, DELAYED RELEASE ORAL at 09:11

## 2022-11-09 RX ADMIN — CHLORHEXIDINE GLUCONATE 15 ML: 1.2 RINSE ORAL at 09:11

## 2022-11-09 RX ADMIN — HYDROCODONE BITARTRATE AND ACETAMINOPHEN 1 TABLET: 10; 325 TABLET ORAL at 02:11

## 2022-11-09 RX ADMIN — HYDROMORPHONE HYDROCHLORIDE 0.5 MG: 1 INJECTION, SOLUTION INTRAMUSCULAR; INTRAVENOUS; SUBCUTANEOUS at 06:11

## 2022-11-09 RX ADMIN — POTASSIUM BICARBONATE 50 MEQ: 977.5 TABLET, EFFERVESCENT ORAL at 02:11

## 2022-11-09 RX ADMIN — MUPIROCIN 1 G: 20 OINTMENT TOPICAL at 09:11

## 2022-11-09 RX ADMIN — HYDROMORPHONE HYDROCHLORIDE 1 MG: 1 INJECTION, SOLUTION INTRAMUSCULAR; INTRAVENOUS; SUBCUTANEOUS at 02:11

## 2022-11-09 RX ADMIN — CYANOCOBALAMIN TAB 1000 MCG 2000 MCG: 1000 TAB at 09:11

## 2022-11-09 RX ADMIN — HYDROCODONE BITARTRATE AND ACETAMINOPHEN 1 TABLET: 10; 325 TABLET ORAL at 09:11

## 2022-11-09 RX ADMIN — LEVETIRACETAM 500 MG: 500 TABLET, FILM COATED ORAL at 09:11

## 2022-11-09 RX ADMIN — SODIUM CHLORIDE, SODIUM LACTATE, POTASSIUM CHLORIDE, AND CALCIUM CHLORIDE: .6; .31; .03; .02 INJECTION, SOLUTION INTRAVENOUS at 02:11

## 2022-11-09 RX ADMIN — PROTHROMBIN, COAGULATION FACTOR VII HUMAN, COAGULATION FACTOR IX HUMAN, COAGULATION FACTOR X HUMAN, PROTEIN C, PROTEIN S HUMAN, AND WATER 5000 UNITS: KIT at 12:11

## 2022-11-09 RX ADMIN — POTASSIUM BICARBONATE 60 MEQ: 391 TABLET, EFFERVESCENT ORAL at 05:11

## 2022-11-09 RX ADMIN — SODIUM CHLORIDE, SODIUM LACTATE, POTASSIUM CHLORIDE, AND CALCIUM CHLORIDE 1000 ML: .6; .31; .03; .02 INJECTION, SOLUTION INTRAVENOUS at 09:11

## 2022-11-09 RX ADMIN — HYDROMORPHONE HYDROCHLORIDE 1 MG: 1 INJECTION, SOLUTION INTRAMUSCULAR; INTRAVENOUS; SUBCUTANEOUS at 04:11

## 2022-11-09 RX ADMIN — Medication 6 MG: at 10:11

## 2022-11-09 NOTE — HPI
"77 year old male with history of PAF, HTN, DM 2, Morbid Obesity presented to ED complaining of right leg pain after falling and striking his right hip and head. During the week to ten days leading up to the above fall, the patient had experienced gastroenteritis symptoms with loose stool and N/V. Stool: brown, watery, no blood or black. Vomitus: foodstuffs turning bilious; no blood or coffee grounds both with generalized abdominal discomfort. The actual GI symptoms lasted 3-4 days, after which they resolved and the patient just felt weak and tired. "I got dizzy when I stood--on 3 or 4 occasions". Today, he felt well enough to run some errands  and was bending over to put his socks on, when he became "Real dizzy" and toppled over. He remembered going down and striking first his "Belly" on the way down, then striking his right hip and finally the floor with his head. He did not lose consciousness. He had hip pain almost immediately, followed by a head ache. He was on ASA (81 mg) and apixaban for PAF. He is also on lisinopril and losartan as well as both propanolol and metoprolol. He takes all four of these medications together. In ED his systolic BP: 100's.    In ED: CT Head revealed: sub dural and subarachnoid hemorrhages with 3 mm shift. His hip is fractured. Labs reviewed and noted below: leukocytosis with normal hematocrit; trivial hyponatremia with normal renal function and prerenal azotemia; mild non-fasting hyperglycemia; minimally elevated bilirubin with normal transaminases; cardiac markers are normal; CXR reviewed: NAPD. EKG reviewed: rate controlled a fib without acute segments.    Discussed with ED MD: Inpatient admission to ICU for q4h Neuro checks, he discussed with Neurosurgery: hold ASA and apixaban, prothrombin complex already initiated and levetiracetam bolused in ED with levetiracetam 500 mg bid ordered starting in AM; repeat CT Head in 6 hours; Ortho consulted with [facility] ordered as patient may " need to wait for ORIF femur. Discussed that patient may be overmedicated and will hold lisinopril and propanolol. Continue to monitor BP wit current regimen. NPO currently,just in case with sliding insulin and electrolyte scales; LR for hydration

## 2022-11-09 NOTE — ASSESSMENT & PLAN NOTE
Inpatient admission to ICU for q4h Neuro checks, he discussed with Neurosurgery: hold ASA and apixaban, prothrombin complex already initiated and levetiracetam bolused in ED with levetiracetam 500 mg bid ordered starting in AM; repeat CT Head in 6 hours; Ortho consulted with [facility] ordered as patient may need to wait for ORIF femur. Discussed that patient may be overmedicated and will hold lisinopril and propanolol. Continue to monitor BP wit current regimen. NPO currently,just in case with sliding insulin and electrolyte scales; LR for hydration

## 2022-11-09 NOTE — CONSULTS
[facility]  Consult Note  11/9/2022      History reviewed. No pertinent past medical history.    History reviewed. No pertinent surgical history.      Current Facility-Administered Medications:     acetaminophen tablet 650 mg, 650 mg, Oral, Q8H PRN, Shyam Belle MD    atorvastatin tablet 40 mg, 40 mg, Oral, Daily, Shyam Belle MD    chlorhexidine 0.12 % solution 15 mL, 15 mL, Mouth/Throat, BID, Florina Sánchez, PharmD, 15 mL at 11/09/22 0916    cyanocobalamin tablet 2,000 mcg, 2,000 mcg, Oral, Daily, Shyam Belle MD, 2,000 mcg at 11/09/22 0916    dextrose 10% bolus 125 mL, 12.5 g, Intravenous, PRN, Shyam Belle MD    dextrose 10% bolus 250 mL, 25 g, Intravenous, PRN, Shyam Belle MD    DULoxetine DR capsule 30 mg, 30 mg, Oral, Daily, Shyam Belle MD, 30 mg at 11/09/22 0916    glucagon (human recombinant) injection 1 mg, 1 mg, Intramuscular, PRN, Shyam Belle MD    glucose chewable tablet 16 g, 16 g, Oral, PRN, Shyam Belle MD    glucose chewable tablet 24 g, 24 g, Oral, PRN, Shyam Belle MD    HYDROcodone-acetaminophen  mg per tablet 1 tablet, 1 tablet, Oral, Q4H PRN, Orlando Gonzalez MD    HYDROmorphone injection 1 mg, 1 mg, Intravenous, Q4H PRN, Orlando Gonzalez MD, 1 mg at 11/09/22 1030    insulin aspart U-100 pen 0-5 Units, 0-5 Units, Subcutaneous, QID (AC + HS) PRN, Shyam Belle MD    lactated ringers infusion, , Intravenous, Continuous, Richard Phoenix MD, Last Rate: 75 mL/hr at 11/09/22 0245, New Bag at 11/09/22 0245    levETIRAcetam tablet 500 mg, 500 mg, Oral, BID, Shyam Belle MD, 500 mg at 11/09/22 0916    magnesium oxide tablet 800 mg, 800 mg, Oral, PRN, Shyam Belle MD    magnesium oxide tablet 800 mg, 800 mg, Oral, PRN, Shyam Belle MD    melatonin tablet 6 mg, 6 mg, Oral, Nightly PRN, Shyam Belle MD    mupirocin 2 % ointment, , Nasal, BID, Florina Sánchez, PharmD, 1 g at 11/09/22 0916    ondansetron  injection 4 mg, 4 mg, Intravenous, Q8H PRN, Shyam Belle MD    oxybutynin 24 hr tablet 10 mg, 10 mg, Oral, Daily, Shyam Belle MD, 10 mg at 11/09/22 0916    potassium bicarbonate disintegrating tablet 35 mEq, 35 mEq, Oral, PRN, Shyam Belle MD    potassium bicarbonate disintegrating tablet 50 mEq, 50 mEq, Oral, PRN, Shyam Belle MD    potassium bicarbonate disintegrating tablet 60 mEq, 60 mEq, Oral, PRN, Shyam Belle MD, 60 mEq at 11/09/22 0558    potassium, sodium phosphates 280-160-250 mg packet 2 packet, 2 packet, Oral, PRN, Shyam Belle MD    potassium, sodium phosphates 280-160-250 mg packet 2 packet, 2 packet, Oral, PRN, Shyam Belle MD    potassium, sodium phosphates 280-160-250 mg packet 2 packet, 2 packet, Oral, PRN, Shyam Belle MD    topiramate tablet 50 mg, 50 mg, Oral, Daily, Shyam Belle MD, 50 mg at 11/09/22 0916    Allergies as of 11/08/2022    (No Known Allergies)       History reviewed. No pertinent family history.    Social History     Socioeconomic History    Marital status:    Occupational History    Occupation: RETIRED   Tobacco Use    Smoking status: Never    Smokeless tobacco: Never   Substance and Sexual Activity    Alcohol use: Yes    Drug use: Not Currently    Sexual activity: Yes     Partners: Female         HPI:  Patient is a 77-year-old male has a history of hypertension patient apparently yesterday was feeling dizzy and fell at home injuring his left hip patient was seen emergency room with complaint of left hip pain      Review of Systems   Constitution: Negative for chills and fever.   HENT: Negative for headaches and blurry vision.   Cardiovascular: Negative for chest pain, irregular heartbeat, leg swelling and palpitations.   Respiratory: Negative for cough and shortness of breath.   Endocrine: Negative for polyuria.   Hematologic/Lymphatic: Negative for bleeding problem. Does not bruise/bleed easily.   Skin: Negative for poor  wound healing and rash.   Musculoskeletal: Negative for joint pain. Negative for arthritis, joint swelling, muscle weakness and myalgias.   Gastrointestinal: Negative for abdominal pain, heartburn, melena, nausea and vomiting.   Genitourinary: Negative for bladder incontinence and dysuria.   Neurological: Negative for numbness.   Psychiatric/Behavioral: Negative for depression. The patient is not nervous/anxious.     PE:  Temp:  [97 °F (36.1 °C)-97.7 °F (36.5 °C)] 97.6 °F (36.4 °C)  Pulse:  [] 105  Resp:  [11-31] 11  SpO2:  [90 %-100 %] 91 %  BP: ()/(50-93) 93/51    A&Ox3, NAD  Neck-supple neurovascularly intact in the upper extremities  RUE-no swelling or deformity LUE-no swelling or deformity  Pelvis-patient complains of left hip no pelvic pain  Back-pain no pelvic pain no back pain  RLE-no swelling deformity  LLE-the left leg is in Jason's traction he is neurovascularly intact complains of pain on attempted range of motion of the left hip    Xrays:  X-ray of the left hip shows a comminuted left intertrochanteric hip fracture  Imaging Results              X-Ray Femur Ap/Lat Left (Final result)  Result time 11/09/22 08:26:37      Final result by Viet Cardenas Jr., MD (11/09/22 08:26:37)                   Narrative:    CLINICAL HISTORY:  77 years (1945) Male Leg Pain (Pt leaned over and fell this AM @ approx 1100. Unable to bare weight on LLE since. Pain to lt thigh. Hit head but - LOC. +blood thinners.); Dizziness    TECHNIQUE:  XR FEMUR 2 VIEWS. 2 images obtained.    COMPARISON:  No previous comparison study    FINDINGS: Appropriate osseous mineralization. AP inspection demonstrates evidence of a linear fracture line traversing the intertrochanteric region without evidence of any significant separation about the fracture site. The shaft of the the femur appears well-maintained. Patient has undergone previous left knee arthroplasty with evidence of metallic device showing evidence of optimal  incorporation within the distal femur and proximal tibia.    IMPRESSION:  1. Intertrochanteric fracture of the left femur without evidence of any significant separation.  2. Postoperative changes of previous left knee arthroplasty.    Electronically signed by:  Viet Cardenas MD  11/9/2022 8:26 AM CST Workstation: [id number]                                     X-Ray Hip 2 or 3 views Left (with Pelvis when performed) (Final result)  Result time 11/09/22 08:21:19      Final result by Viet Cardenas Jr., MD (11/09/22 08:21:19)                   Narrative:    HISTORY: hip pain    COMPARISON:None available.    FINDINGS:Evaluation of the lower lumbar spine demonstrates evidence of chronic degenerative spondylosis changes involving the lower lumbar spine in particular the L4-5 and L5-S1 disc space level with evidence of midline laminectomy gradient at the L5 level. Bony pelvic ring appears intact without evidence of bony destructive changes nor significant diastasis about the bilateral SI joints. Femoral head appears well-seated within the articular cartilage space. This evidence of an intertrochanteric fracture on the AP inspection one in a vertical direction. No significant separation of the fracture site.    IMPRESSION: Intra-articular fracture involving the left hip with evidence of the primary fracture plane running in a vertical direction.    Electronically signed by:  Viet Cardenas MD  11/9/2022 8:21 AM CST Workstation: [id number]                                     X-Ray Chest AP Portable (Final result)  Result time 11/09/22 08:17:40      Final result by Viet Cardenas Jr., MD (11/09/22 08:17:40)                   Narrative:    HISTORY: dizziness    COMPARISON:No previous comparison study    FINDINGS:Single AP view the chest stenosis evidence of gross distortion of the overall chest and abdomen as the patient slightly obliquity towards the left causing some degree of anatomical distortion.    The heart is  prominent in size magnified by the poor inspiratory effort. Tortuosity of the thoracic aorta becoming midline at diaphragmatic hiatus. Minimal arch atherosclerosis. Tracheal lumen is without shift. Chronic degenerative changes about the thoracic spine are noted. Mild left-sided glenohumeral joint osteoarthritis.    IMPRESSION:  1. No evidence of acute cardiopulmonary findings.  2. Mild distortion of the chest given the left obliquity on the AP inspection.    Electronically signed by:  Viet Cardenas MD  11/9/2022 8:17 AM CST Workstation: [id number]                                     CT Cervical Spine Without Contrast (Final result)  Result time 11/08/22 23:25:21      Final result by Laura Galvin MD (11/08/22 23:25:21)                   Narrative:    EXAM:  CT Cervical Spine Without Intravenous Contrast    CLINICAL HISTORY:  fall    TECHNIQUE:  Axial computed tomography images of the cervical spine without intravenous contrast.  Sagittal and coronal reformatted images were created and reviewed.  This CT exam was performed using one or more of the following dose reduction techniques:  automated exposure control, adjustment of the mA and/or kV according to patient size, and/or use of iterative reconstruction technique.    COMPARISON:  No relevant prior studies available.    FINDINGS:  Vertebrae:  No acute fracture.  Normal alignment.  Discs/spinal canal/neural foramina:  No acute findings.  No significant spinal canal stenosis.  Soft tissues:  Unremarkable.    IMPRESSION: No acute findings.    Electronically signed by:  Laura Kearney MD  11/8/2022 11:25 PM CST Workstation: [id number]                                     CT Head Without Contrast (Edited Result - FINAL)  Result time 11/08/22 23:34:59      Edited Result - FINAL by Ridge Agustin MD (11/08/22 23:34:59)                   Narrative:         ADDENDUM #1       Findings were discussed with Dr. Jessica Villalobos @ 11:30 PM CST 11/8/2022.    Electronically signed  by:  Ridge Agustin  11/8/2022 11:34 PM CST Workstation: [id number]     ORIGINAL REPORT       CT HEAD WITHOUT IV CONTRAST    INDICATION: 77 years Male; Head trauma, coagulopathy (Age 19-64y)    TECHNIQUE:  CT scan of the head was performed without IV contrast.  This exam was performed according to our departmental dose-optimization program, which includes automated exposure control, adjustment of the mA and/or kV according to patient size and/or use of iterative reconstruction technique.    Comparison: 7/11/2022.    FINDINGS:  Brain: Acute on chronic subdural hematoma the right frontal and parietal and temporal convexity with a chronic component measuring 9 mm in maximum width in the acute component measuring 3 mm in width. Intraparenchymal hematoma in the right temporal lobe with surrounding vasogenic edema and surrounding subarachnoid hemorrhage measuring 2.2 x 1.7 cm. No infarct. No mass. Right to left midline shift measures 3 mm. No hydrocephalus. Generalized atrophy. Low-density in the bilateral periventricular white matter is nonspecific but probably represents chronic small vessel ischemic disease.  Sinuses: Unremarkable.  Mastoids: Right mastoid effusion.  Orbits: Unremarkable.  Bones: Unremarkable.  Soft tissues: Left posterior scalp swelling/hematoma.  Incidental findings: None.    IMPRESSION:  1.  Acute on chronic subdural hematoma the right frontal and parietal and temporal convexity with a chronic component measuring 9 mm in maximum width in the acute component measuring 3 mm in width. Right to left midline shift measures 3 mm.  2.  Intraparenchymal hematoma in the right temporal lobe with surrounding vasogenic edema and surrounding subarachnoid hemorrhage measuring 2.2 x 1.7 cm.  3.  Left posterior scalp swelling/hematoma.    Electronically signed by:  Ridge Agustin  11/8/2022 11:28 PM CST Workstation: [id number]                      Final result by Ridge Agustin MD (11/08/22 23:28:50)                   Narrative:     CT HEAD WITHOUT IV CONTRAST    INDICATION: 77 years Male; Head trauma, coagulopathy (Age 19-64y)    TECHNIQUE:  CT scan of the head was performed without IV contrast.  This exam was performed according to our departmental dose-optimization program, which includes automated exposure control, adjustment of the mA and/or kV according to patient size and/or use of iterative reconstruction technique.    Comparison: 7/11/2022.    FINDINGS:  Brain: Acute on chronic subdural hematoma the right frontal and parietal and temporal convexity with a chronic component measuring 9 mm in maximum width in the acute component measuring 3 mm in width. Intraparenchymal hematoma in the right temporal lobe with surrounding vasogenic edema and surrounding subarachnoid hemorrhage measuring 2.2 x 1.7 cm. No infarct. No mass. Right to left midline shift measures 3 mm. No hydrocephalus. Generalized atrophy. Low-density in the bilateral periventricular white matter is nonspecific but probably represents chronic small vessel ischemic disease.  Sinuses: Unremarkable.  Mastoids: Right mastoid effusion.  Orbits: Unremarkable.  Bones: Unremarkable.  Soft tissues: Left posterior scalp swelling/hematoma.  Incidental findings: None.    IMPRESSION:  1.  Acute on chronic subdural hematoma the right frontal and parietal and temporal convexity with a chronic component measuring 9 mm in maximum width in the acute component measuring 3 mm in width. Right to left midline shift measures 3 mm.  2.  Intraparenchymal hematoma in the right temporal lobe with surrounding vasogenic edema and surrounding subarachnoid hemorrhage measuring 2.2 x 1.7 cm.  3.  Left posterior scalp swelling/hematoma.    Electronically signed by:  Ridge Agustin  11/8/2022 11:28 PM CST Workstation: [id number]                                    Labs:    Recent Results (from the past 24 hour(s))   CBC auto differential    Collection Time: 11/08/22 10:07 PM   Result Value Ref Range    WBC 22.85  (H) 3.90 - 12.70 K/uL    RBC 4.41 (L) 4.60 - 6.20 M/uL    Hemoglobin 13.7 (L) 14.0 - 18.0 g/dL    Hematocrit 41.1 40.0 - 54.0 %    MCV 93 82 - 98 fL    MCH 31.1 (H) 27.0 - 31.0 pg    MCHC 33.3 32.0 - 36.0 g/dL    RDW 12.1 11.5 - 14.5 %    Platelets 373 150 - 450 K/uL    MPV 10.0 9.2 - 12.9 fL    Immature Granulocytes 0.8 (H) 0.0 - 0.5 %    Gran # (ANC) 18.6 (H) 1.8 - 7.7 K/uL    Immature Grans (Abs) 0.19 (H) 0.00 - 0.04 K/uL    Lymph # 2.3 1.0 - 4.8 K/uL    Mono # 1.7 (H) 0.3 - 1.0 K/uL    Eos # 0.1 0.0 - 0.5 K/uL    Baso # 0.06 0.00 - 0.20 K/uL    nRBC 0 0 /100 WBC    Gran % 81.3 (H) 38.0 - 73.0 %    Lymph % 10.1 (L) 18.0 - 48.0 %    Mono % 7.2 4.0 - 15.0 %    Eosinophil % 0.3 0.0 - 8.0 %    Basophil % 0.3 0.0 - 1.9 %    Differential Method Automated    Comprehensive metabolic panel    Collection Time: 11/08/22 10:07 PM   Result Value Ref Range    Sodium 134 (L) 136 - 145 mmol/L    Potassium 3.5 3.5 - 5.1 mmol/L    Chloride 96 95 - 110 mmol/L    CO2 27 23 - 29 mmol/L    Glucose 214 (H) 70 - 110 mg/dL    BUN 26 (H) 8 - 23 mg/dL    Creatinine 1.2 0.5 - 1.4 mg/dL    Calcium 8.8 8.7 - 10.5 mg/dL    Total Protein 7.2 6.0 - 8.4 g/dL    Albumin 3.8 3.5 - 5.2 g/dL    Total Bilirubin 1.3 (H) 0.1 - 1.0 mg/dL    Alkaline Phosphatase 50 (L) 55 - 135 U/L    AST 19 10 - 40 U/L    ALT 21 10 - 44 U/L    Anion Gap 11 8 - 16 mmol/L    eGFR >60.0 >60 mL/min/1.73 m^2   Brain natriuretic peptide    Collection Time: 11/08/22 10:07 PM   Result Value Ref Range    BNP 65 0 - 99 pg/mL   Troponin I    Collection Time: 11/08/22 10:07 PM   Result Value Ref Range    Troponin I <0.030 <=0.040 ng/mL   Protime-INR    Collection Time: 11/08/22 10:07 PM   Result Value Ref Range    PT 14.2 (H) 11.4 - 13.7 sec    INR 1.2    POCT glucose    Collection Time: 11/09/22  2:50 AM   Result Value Ref Range    POC Glucose 148 (H) 70 - 110   Hemoglobin A1c    Collection Time: 11/09/22  3:13 AM   Result Value Ref Range    Hemoglobin A1C 7.0 (H) 4.5 - 6.2 %     Estimated Avg Glucose 154 (H) 68 - 131 mg/dL   Comprehensive metabolic panel    Collection Time: 11/09/22  3:13 AM   Result Value Ref Range    Sodium 136 136 - 145 mmol/L    Potassium 3.1 (L) 3.5 - 5.1 mmol/L    Chloride 98 95 - 110 mmol/L    CO2 27 23 - 29 mmol/L    Glucose 152 (H) 70 - 110 mg/dL    BUN 28 (H) 8 - 23 mg/dL    Creatinine 1.1 0.5 - 1.4 mg/dL    Calcium 8.5 (L) 8.7 - 10.5 mg/dL    Total Protein 6.7 6.0 - 8.4 g/dL    Albumin 3.6 3.5 - 5.2 g/dL    Total Bilirubin 1.3 (H) 0.1 - 1.0 mg/dL    Alkaline Phosphatase 48 (L) 55 - 135 U/L    AST 16 10 - 40 U/L    ALT 19 10 - 44 U/L    Anion Gap 11 8 - 16 mmol/L    eGFR >60.0 >60 mL/min/1.73 m^2   CBC auto differential    Collection Time: 11/09/22  3:13 AM   Result Value Ref Range    WBC 17.31 (H) 3.90 - 12.70 K/uL    RBC 4.11 (L) 4.60 - 6.20 M/uL    Hemoglobin 12.8 (L) 14.0 - 18.0 g/dL    Hematocrit 37.5 (L) 40.0 - 54.0 %    MCV 91 82 - 98 fL    MCH 31.1 (H) 27.0 - 31.0 pg    MCHC 34.1 32.0 - 36.0 g/dL    RDW 12.3 11.5 - 14.5 %    Platelets 341 150 - 450 K/uL    MPV 9.9 9.2 - 12.9 fL    Immature Granulocytes 0.7 (H) 0.0 - 0.5 %    Gran # (ANC) 12.6 (H) 1.8 - 7.7 K/uL    Immature Grans (Abs) 0.12 (H) 0.00 - 0.04 K/uL    Lymph # 2.9 1.0 - 4.8 K/uL    Mono # 1.6 (H) 0.3 - 1.0 K/uL    Eos # 0.1 0.0 - 0.5 K/uL    Baso # 0.04 0.00 - 0.20 K/uL    nRBC 0 0 /100 WBC    Gran % 72.7 38.0 - 73.0 %    Lymph % 16.9 (L) 18.0 - 48.0 %    Mono % 9.2 4.0 - 15.0 %    Eosinophil % 0.3 0.0 - 8.0 %    Basophil % 0.2 0.0 - 1.9 %    Differential Method Automated    Protime-INR    Collection Time: 11/09/22  3:13 AM   Result Value Ref Range    PT 13.2 11.4 - 13.7 sec    INR 1.1    Magnesium    Collection Time: 11/09/22  3:13 AM   Result Value Ref Range    Magnesium 2.3 1.6 - 2.6 mg/dL   Phosphorus    Collection Time: 11/09/22  3:13 AM   Result Value Ref Range    Phosphorus 3.7 2.7 - 4.5 mg/dL   Type & Screen    Collection Time: 11/09/22  3:14 AM   Result Value Ref Range    Group & Rh A  POS     Indirect Immanuel NEG    Potassium    Collection Time: 11/09/22 11:33 AM   Result Value Ref Range    Potassium 3.7 3.5 - 5.1 mmol/L   POCT glucose    Collection Time: 11/09/22 12:09 PM   Result Value Ref Range    POC Glucose 194 (H) 70 - 110       Assessment/Plan:  Left comminuted intertrochanteric hip fracture.  Recommend IM nailing and rodding of the left intertroch fracture.  Patient has been off of his Eliquis now for about 24 hours will most likely have to wait until Friday while he is off of his Eliquis before proceeding with surgery.  We will wait of medicine evaluation and clearance

## 2022-11-09 NOTE — NURSING
Pt unable to void since arrival to the ED. Order placed for indwelling catheter. First attempt was unsuccessful. Coude catheter was placed successfully by Eron MARTIN on second attempt. 550 cc returned.

## 2022-11-09 NOTE — PROGRESS NOTES
Social work intern went bedside to speak to pt however he was asleep. Emergency contact Galina Rodríguez [phone] was unavailable- number not in service.       11:47 am  Social work intern made 2 attempts to complete assessment. Attempts unsuccessful. Attempt made to contact relative Galina Rodríguez and the contact number was not in service.

## 2022-11-09 NOTE — ASSESSMENT & PLAN NOTE
Patient with Persistent (7 days or more) atrial fibrillation which is controlled currently with Beta Blocker. Patient is currently in atrial fibrillation.[id number] Score: 3. HASBLED Score: . Anticoagulation not indicated due to intracranial bleed     Inpatient admission to ICU for q4h Neuro checks, he discussed with Neurosurgery: hold ASA and apixaban, prothrombin complex already initiated and levetiracetam bolused in ED with levetiracetam 500 mg bid ordered starting in AM; repeat CT Head in 6 hours; Ortho consulted with Jason's ordered as patient may need to wait for ORIF femur. Discussed that patient may be overmedicated and will hold lisinopril and propanolol. Continue to monitor BP wit current regimen. NPO currently,just in case with sliding insulin and electrolyte scales; LR for hydration.

## 2022-11-09 NOTE — NURSING
5 lb traction applied to pt's left leg. Pt still very uncomfortable despite repositioning and pain medication. Dr. Gonzalez is aware, currently at bedside. Adjustments made to medications. Will continue to monitor.

## 2022-11-09 NOTE — CARE UPDATE
11/09/22 1152   Patient Assessment/Suction   Level of Consciousness (AVPU) alert   Respiratory Effort Normal;Unlabored   Expansion/Accessory Muscles/Retractions no use of accessory muscles   Rhythm/Pattern, Respiratory unlabored   PRE-TX-O2   O2 Device (Oxygen Therapy) room air   SpO2 (!) 91 %   Pulse Oximetry Type Continuous   $ Pulse Oximetry - Multiple Charge Pulse Oximetry - Multiple   Pulse 105   Resp 11   Education   $ Education Other (see comment);15 min  (sats)   Respiratory Evaluation   $ Care Plan Tech Time 15 min   $ Eval/Re-eval Charges Re-evaluation

## 2022-11-09 NOTE — H&P
"[facility] - Emergency Dept  Hospital Medicine  History & Physical    Patient Name: Hiro Rodríguez  MRN: [id number]  Patient Class: IP- Inpatient  Admission Date: 11/8/2022  Attending Physician: Shyam Belle MD  Primary Care Provider: Gautam Castanon III, MD         Patient information was obtained from past medical records, ER records and ED MD.     Subjective:     Principal Problem:Subdural hematoma caused by concussion    Chief Complaint:   Chief Complaint   Patient presents with    Leg Pain     Pt leaned over and fell this AM @ approx 1100. Unable to bare weight on LLE since. Pain to lt thigh. Hit head but - LOC. +blood thinners.    Dizziness        HPI: 77 year old male with history of PAF, HTN, DM 2, Morbid Obesity presented to ED complaining of right leg pain after falling and striking his right hip and head. During the week to ten days leading up to the above fall, the patient had experienced gastroenteritis symptoms with loose stool and N/V. Stool: brown, watery, no blood or black. Vomitus: foodstuffs turning bilious; no blood or coffee grounds both with generalized abdominal discomfort. The actual GI symptoms lasted 3-4 days, after which they resolved and the patient just felt weak and tired. "I got dizzy when I stood--on 3 or 4 occasions". Today, he felt well enough to run some errands  and was bending over to put his socks on, when he became "Real dizzy" and toppled over. He remembered going down and striking first his "Belly" on the way down, then striking his right hip and finally the floor with his head. He did not lose consciousness. He had hip pain almost immediately, followed by a head ache. He was on ASA (81 mg) and apixaban for PAF. He is also on lisinopril and losartan as well as both propanolol and metoprolol. He takes all four of these medications together. In ED his systolic BP: 100's.    In ED: CT Head revealed: sub dural and subarachnoid hemorrhages with 3 mm shift. " His hip is fractured. Labs reviewed and noted below: leukocytosis with normal hematocrit; trivial hyponatremia with normal renal function and prerenal azotemia; mild non-fasting hyperglycemia; minimally elevated bilirubin with normal transaminases; cardiac markers are normal; CXR reviewed: NAPD. EKG reviewed: rate controlled a fib without acute segments.    Discussed with ED MD: Inpatient admission to ICU for q4h Neuro checks, he discussed with Neurosurgery: hold ASA and apixaban, prothrombin complex already initiated and levetiracetam bolused in ED with levetiracetam 500 mg bid ordered starting in AM; repeat CT Head in 6 hours; Ortho consulted with [facility] ordered as patient may need to wait for ORIF femur. Discussed that patient may be overmedicated and will hold lisinopril and propanolol. Continue to monitor BP wit current regimen. NPO currently,just in case with sliding insulin and electrolyte scales; LR for hydration      History reviewed. No pertinent past medical history.    History reviewed. No pertinent surgical history.    Review of patient's allergies indicates:  No Known Allergies    No current facility-administered medications on file prior to encounter.     Current Outpatient Medications on File Prior to Encounter   Medication Sig    amitriptyline (ELAVIL) 10 MG tablet Take 10-20 mg by mouth every evening.    apixaban (ELIQUIS) 5 mg Tab Take 1 tablet (5 mg total) by mouth 2 (two) times daily.    aspirin (ECOTRIN) 81 MG EC tablet Take 81 mg by mouth once daily.    calcium polycarbophil (FIBER-CAPS, CA POLYCARBOPHIL, ORAL) Take by mouth.    cephalexin (KEFTAB) 500 mg tablet Take 500 mg by mouth 3 (three) times daily.    cholecalciferol, vitamin D3, (VITAMIN D3) 50 mcg (2,000 unit) Tab Take by mouth once daily.    cyanocobalamin, vitamin B-12, 1,000 mcg Subl Place 1,000 mcg under the tongue 2 (two) times a day.    cyclobenzaprine (FLEXERIL) 5 MG tablet TAKE 1 TO 2 TABLETS BY MOUTH AT BEDTIME AS  NEEDED FOR MUSCLE SPASMS    DULoxetine (CYMBALTA) 30 MG capsule Take 1 capsule (30 mg total) by mouth once daily.    ferrous sulfate 27 mg iron Tab Take by mouth.    hydroCHLOROthiazide (HYDRODIURIL) 25 MG tablet Take 1 tablet (25 mg total) by mouth once daily.    lisinopriL (PRINIVIL,ZESTRIL) 20 MG tablet Take 1 tablet (20 mg total) by mouth 2 (two) times daily.    losartan (COZAAR) 50 MG tablet Take 1 tablet (50 mg total) by mouth once daily.    magnesium 250 mg Tab Take 250 mg by mouth 2 (two) times a day.    metFORMIN (GLUCOPHAGE-XR) 500 MG ER 24hr tablet Take 1 tablet (500 mg total) by mouth once daily.    metoprolol tartrate (LOPRESSOR) 25 MG tablet Take 1 tablet (25 mg total) by mouth 2 (two) times daily.    omega-3 acid ethyl esters (LOVAZA) 1 gram capsule Take 2 capsules (2 g total) by mouth 2 (two) times daily.    oxaprozin (DAYPRO) 600 mg tablet Take 1 tablet (600 mg total) by mouth 2 (two) times daily.    propranoloL (INDERAL) 40 MG tablet Take 1 tablet (40 mg total) by mouth 2 (two) times daily.    rosuvastatin (CRESTOR) 20 MG tablet TAKE 1 TABLET BY MOUTH EVERY DAY    semaglutide (OZEMPIC) 1 mg/dose (4 mg/3 mL) Inject 1 mg into the skin every 7 days.    tolterodine (DETROL LA) 4 MG 24 hr capsule Take 1 capsule (4 mg total) by mouth once daily.    topiramate (TOPAMAX) 50 MG tablet Take 1 tablet (50 mg total) by mouth once daily.    traZODone (DESYREL) 50 MG tablet Take 1 tablet (50 mg total) by mouth every evening.     Family History    None       Tobacco Use    Smoking status: Never    Smokeless tobacco: Never   Substance and Sexual Activity    Alcohol use: Yes    Drug use: Not Currently    Sexual activity: Yes     Partners: Female     Review of Systems   Constitutional:  Positive for fatigue.   HENT: Negative.     Eyes: Negative.    Respiratory: Negative.     Cardiovascular: Negative.    Gastrointestinal: Negative.    Endocrine: Negative.    Genitourinary: Negative.     Musculoskeletal: Negative.         Right hip pain   Skin: Negative.    Allergic/Immunologic: Negative.    Neurological:  Positive for dizziness and light-headedness.   Hematological: Negative.    All other systems reviewed and are negative.  Objective:     Vital Signs (Most Recent):  Temp: 97.4 °F (36.3 °C) (11/08/22 2142)  Pulse: (!) 112 (11/09/22 0030)  Resp: 20 (11/09/22 0030)  BP: 137/79 (11/09/22 0030)  SpO2: (!) 93 % (11/09/22 0030)   Vital Signs (24h Range):  Temp:  [97.4 °F (36.3 °C)] 97.4 °F (36.3 °C)  Pulse:  [] 112  Resp:  [17-20] 20  SpO2:  [93 %-98 %] 93 %  BP: (116-137)/(56-93) 137/79     Weight: 115.7 kg (255 lb)  Body mass index is 36.59 kg/m².    Physical Exam  Vitals and nursing note reviewed.   Constitutional:       Appearance: He is well-developed.   HENT:      Head: Normocephalic and atraumatic.      Right Ear: External ear normal.      Left Ear: External ear normal.      Nose: Nose normal.   Eyes:      Conjunctiva/sclera: Conjunctivae normal.      Pupils: Pupils are equal, round, and reactive to light.   Cardiovascular:      Rate and Rhythm: Normal rate and regular rhythm.      Heart sounds: Normal heart sounds.   Pulmonary:      Effort: Pulmonary effort is normal.      Breath sounds: Normal breath sounds.   Abdominal:      General: Bowel sounds are normal.      Palpations: Abdomen is soft.   Musculoskeletal:         General: Deformity present. Normal range of motion.      Cervical back: Normal range of motion and neck supple.      Comments: Right leg canted laterally   Skin:     General: Skin is warm and dry.      Capillary Refill: Capillary refill takes less than 2 seconds.   Neurological:      Mental Status: He is alert and oriented to person, place, and time.   Psychiatric:         Behavior: Behavior normal.         Thought Content: Thought content normal.         Judgment: Judgment normal.         CRANIAL NERVES     CN III, IV, VI   Pupils are equal, round, and reactive to light.      Significant Labs: All pertinent labs within the past 24 hours have been reviewed.  CBC:   Recent Labs   Lab 11/08/22 2207   WBC 22.85*   HGB 13.7*   HCT 41.1        CMP:   Recent Labs   Lab 11/08/22 2207   *   K 3.5   CL 96   CO2 27   *   BUN 26*   CREATININE 1.2   CALCIUM 8.8   PROT 7.2   ALBUMIN 3.8   BILITOT 1.3*   ALKPHOS 50*   AST 19   ALT 21   ANIONGAP 11     Lactic Acid: No results for input(s): LACTATE in the last 48 hours.  Troponin:   Recent Labs   Lab 11/08/22 2207   TROPONINI <0.030       Significant Imaging: I have reviewed all pertinent imaging results/findings within the past 24 hours.    Assessment/Plan:     * Subdural hematoma caused by concussion  Inpatient admission to ICU for q4h Neuro checks, he discussed with Neurosurgery: hold ASA and apixaban, prothrombin complex already initiated and levetiracetam bolused in ED with levetiracetam 500 mg bid ordered starting in AM; repeat CT Head in 6 hours; Ortho consulted with [facility] ordered as patient may need to wait for ORIF femur. Discussed that patient may be overmedicated and will hold lisinopril and propanolol. Continue to monitor BP wit current regimen. NPO currently,just in case with sliding insulin and electrolyte scales; LR for hydration      Prerenal azotemia  Inpatient admission to ICU for q4h Neuro checks, he discussed with Neurosurgery: hold ASA and apixaban, prothrombin complex already initiated and levetiracetam bolused in ED with levetiracetam 500 mg bid ordered starting in AM; repeat CT Head in 6 hours; Ortho consulted with [facility] ordered as patient may need to wait for ORIF femur. Discussed that patient may be overmedicated and will hold lisinopril and propanolol. Continue to monitor BP wit current regimen. NPO currently,just in case with sliding insulin and electrolyte scales; LR for hydration      Closed fracture of right hip  Inpatient admission to ICU for q4h Neuro checks, he discussed with Neurosurgery: hold  ASA and apixaban, prothrombin complex already initiated and levetiracetam bolused in ED with levetiracetam 500 mg bid ordered starting in AM; repeat CT Head in 6 hours; Ortho consulted with [facility] ordered as patient may need to wait for ORIF femur. Discussed that patient may be overmedicated and will hold lisinopril and propanolol. Continue to monitor BP wit current regimen. NPO currently,just in case with sliding insulin and electrolyte scales; LR for hydration    Subarachnoid bleed  Inpatient admission to ICU for q4h Neuro checks, he discussed with Neurosurgery: hold ASA and apixaban, prothrombin complex already initiated and levetiracetam bolused in ED with levetiracetam 500 mg bid ordered starting in AM; repeat CT Head in 6 hours; Ortho consulted with [facility] ordered as patient may need to wait for ORIF femur. Discussed that patient may be overmedicated and will hold lisinopril and propanolol. Continue to monitor BP wit current regimen. NPO currently,just in case with sliding insulin and electrolyte scales; LR for hydration      Aspirin long-term use  Inpatient admission to ICU for q4h Neuro checks, he discussed with Neurosurgery: hold ASA and apixaban, prothrombin complex already initiated and levetiracetam bolused in ED with levetiracetam 500 mg bid ordered starting in AM; repeat CT Head in 6 hours; Ortho consulted with [facility] ordered as patient may need to wait for ORIF femur. Discussed that patient may be overmedicated and will hold lisinopril and propanolol. Continue to monitor BP wit current regimen. NPO currently,just in case with sliding insulin and electrolyte scales; LR for hydration      Atrial fibrillation  Patient with Persistent (7 days or more) atrial fibrillation which is controlled currently with Beta Blocker. Patient is currently in atrial fibrillation.[id number] Score: 3. HASBLED Score: . Anticoagulation not indicated due to intracranial bleed     Inpatient admission to ICU for q4h Neuro  checks, he discussed with Neurosurgery: hold ASA and apixaban, prothrombin complex already initiated and levetiracetam bolused in ED with levetiracetam 500 mg bid ordered starting in AM; repeat CT Head in 6 hours; Ortho consulted with [facility] ordered as patient may need to wait for ORIF femur. Discussed that patient may be overmedicated and will hold lisinopril and propanolol. Continue to monitor BP wit current regimen. NPO currently,just in case with sliding insulin and electrolyte scales; LR for hydration.        Anticoagulant long-term use  Inpatient admission to ICU for q4h Neuro checks, he discussed with Neurosurgery: hold ASA and apixaban, prothrombin complex already initiated and levetiracetam bolused in ED with levetiracetam 500 mg bid ordered starting in AM; repeat CT Head in 6 hours; Ortho consulted with [facility] ordered as patient may need to wait for ORIF femur. Discussed that patient may be overmedicated and will hold lisinopril and propanolol. Continue to monitor BP wit current regimen. NPO currently,just in case with sliding insulin and electrolyte scales; LR for hydration        VTE Risk Mitigation (From admission, onward)    None             Shyam Belle MD  Department of Hospital Medicine   [facility] - Emergency Dept

## 2022-11-09 NOTE — CONSULTS
[facility]  Neurosurgery  Consult Note    Inpatient consult to Neurosurgery  Consult performed by: Jess Diaz MD  Consult ordered by: Shyam Belle MD  Reason for consult: Right SDH and R temporal contusion      Inpatient consult to Neurosurgery  Consult performed by: Jess Diaz MD  Consult ordered by: Shyam Belle MD      Subjective:     Chief Complaint/Reason for Admission: R SDH and R temporal contusion.    History of Present Illness: Mr. Rodríguez is a 77-year-old male with past medical history significant for atrial fibrillation on Eliquis and aspirin, hypertension, and hyperlipidemia. He sustained a fall at home at approximately 11am yesterday with head trauma. He presents to the ED with left hip pain and a left hip fracture. Due to the head trauma, a CT head was done showing a chronic right SDH and acute right temporal contusion. Neurosurgery was consulted for management. Currently, the patient denies headache, nausea, vomiting, or weakness.     PTA Medications   Medication Sig    amitriptyline (ELAVIL) 10 MG tablet Take 10-20 mg by mouth every evening.    apixaban (ELIQUIS) 5 mg Tab Take 1 tablet (5 mg total) by mouth 2 (two) times daily.    aspirin (ECOTRIN) 81 MG EC tablet Take 81 mg by mouth once daily.    calcium polycarbophil (FIBER-CAPS, CA POLYCARBOPHIL, ORAL) Take by mouth.    cephalexin (KEFTAB) 500 mg tablet Take 500 mg by mouth 3 (three) times daily.    cholecalciferol, vitamin D3, (VITAMIN D3) 50 mcg (2,000 unit) Tab Take by mouth once daily.    cyanocobalamin, vitamin B-12, 1,000 mcg Subl Place 1,000 mcg under the tongue 2 (two) times a day.    cyclobenzaprine (FLEXERIL) 5 MG tablet TAKE 1 TO 2 TABLETS BY MOUTH AT BEDTIME AS NEEDED FOR MUSCLE SPASMS    DULoxetine (CYMBALTA) 30 MG capsule Take 1 capsule (30 mg total) by mouth once daily.    ferrous sulfate 27 mg iron Tab Take by mouth.    hydroCHLOROthiazide (HYDRODIURIL) 25 MG tablet Take 1 tablet (25 mg total) by  mouth once daily.    lisinopriL (PRINIVIL,ZESTRIL) 20 MG tablet Take 1 tablet (20 mg total) by mouth 2 (two) times daily.    losartan (COZAAR) 50 MG tablet Take 1 tablet (50 mg total) by mouth once daily.    magnesium 250 mg Tab Take 250 mg by mouth 2 (two) times a day.    metFORMIN (GLUCOPHAGE-XR) 500 MG ER 24hr tablet Take 1 tablet (500 mg total) by mouth once daily.    metoprolol tartrate (LOPRESSOR) 25 MG tablet Take 1 tablet (25 mg total) by mouth 2 (two) times daily.    omega-3 acid ethyl esters (LOVAZA) 1 gram capsule Take 2 capsules (2 g total) by mouth 2 (two) times daily.    oxaprozin (DAYPRO) 600 mg tablet Take 1 tablet (600 mg total) by mouth 2 (two) times daily.    propranoloL (INDERAL) 40 MG tablet Take 1 tablet (40 mg total) by mouth 2 (two) times daily.    rosuvastatin (CRESTOR) 20 MG tablet TAKE 1 TABLET BY MOUTH EVERY DAY    semaglutide (OZEMPIC) 1 mg/dose (4 mg/3 mL) Inject 1 mg into the skin every 7 days.    tolterodine (DETROL LA) 4 MG 24 hr capsule Take 1 capsule (4 mg total) by mouth once daily.    topiramate (TOPAMAX) 50 MG tablet Take 1 tablet (50 mg total) by mouth once daily.    traZODone (DESYREL) 50 MG tablet Take 1 tablet (50 mg total) by mouth every evening.       Review of patient's allergies indicates:  No Known Allergies    History reviewed. No pertinent past medical history.  History reviewed. No pertinent surgical history.  Family History    None       Tobacco Use    Smoking status: Never    Smokeless tobacco: Never   Substance and Sexual Activity    Alcohol use: Yes    Drug use: Not Currently    Sexual activity: Yes     Partners: Female     Review of Systems   Constitutional:  Negative for activity change, diaphoresis, fatigue, fever and unexpected weight change.   HENT:  Negative for nosebleeds, rhinorrhea, tinnitus and trouble swallowing.    Eyes:  Negative for visual disturbance.   Respiratory:  Negative for cough, shortness of breath and wheezing.    Cardiovascular:  Negative  for chest pain and palpitations.   Gastrointestinal:  Negative for abdominal distention, abdominal pain, blood in stool, constipation, diarrhea, nausea and vomiting.   Endocrine: Negative for cold intolerance and heat intolerance.   Genitourinary:  Negative for difficulty urinating, enuresis, frequency and urgency.   Musculoskeletal:  Positive for gait problem. Negative for back pain, joint swelling, myalgias, neck pain and neck stiffness.   Skin:  Negative for color change, rash and wound.   Allergic/Immunologic: Negative for environmental allergies and food allergies.   Neurological:  Negative for dizziness, seizures, facial asymmetry, speech difficulty, weakness, light-headedness, numbness and headaches.   Hematological:  Does not bruise/bleed easily.   Psychiatric/Behavioral:  Negative for agitation, behavioral problems, dysphoric mood and hallucinations. The patient is not nervous/anxious.    Objective:     Weight: 114.7 kg (252 lb 13.9 oz)  Body mass index is 36.28 kg/m².  Vital Signs (Most Recent):  Temp: 97.6 °F (36.4 °C) (11/09/22 1115)  Pulse: 105 (11/09/22 1152)  Resp: 11 (11/09/22 1152)  BP: (!) 93/51 (11/09/22 1130)  SpO2: (!) 91 % (11/09/22 1152)   Vital Signs (24h Range):  Temp:  [97 °F (36.1 °C)-97.7 °F (36.5 °C)] 97.6 °F (36.4 °C)  Pulse:  [] 105  Resp:  [11-31] 11  SpO2:  [90 %-100 %] 91 %  BP: ()/(50-93) 93/51                          Urethral Catheter 11/09/22 1226 Coude;Latex 16 Fr. (Active)       Physical Exam:  Vitals reviewed.    Constitutional: He appears well-developed and well-nourished. No distress.     Eyes: Pupils are equal, round, and reactive to light. Conjunctivae and EOM are normal.     Cardiovascular: Normal rate, regular rhythm, normal pulses and no edema.     Abdominal: Soft. Bowel sounds are normal.     Skin: Skin displays no rash on trunk and no rash on extremities. Skin displays no lesions on trunk and no lesions on extremities.     Psych/Behavior: He is alert. He  is oriented to person, place, and time. He has a normal mood and affect.     Musculoskeletal: Gait is normal.        Neck: Range of motion is full. There is no tenderness. Muscle strength is 5/5. Tone is normal.        Back: Range of motion is full. There is no tenderness. Muscle strength is 5/5. Tone is normal.        Right Upper Extremities: Range of motion is full. There is no tenderness. Muscle strength is 5/5. Tone is normal.        Left Upper Extremities: Range of motion is full. There is no tenderness. Muscle strength is 5/5. Tone is normal.       Right Lower Extremities: Range of motion is full. There is no tenderness. Muscle strength is 5/5. Tone is normal.        Left Lower Extremities: Range of motion is full. There is no tenderness. Muscle strength is 5/5. Tone is normal.     Neurological:        Coordination: He has a normal Romberg Test, normal finger to nose coordination and normal tandem walking coordination.        Sensory: There is no sensory deficit in the trunk. There is no sensory deficit in the extremities.        DTRs: DTRs are DTRS NORMAL AND SYMMETRICnormal and symmetric. He displays no Babinski's sign on the right side. He displays no Babinski's sign on the left side.        Cranial nerves: Cranial nerve(s) II, III, IV, V, VI, VII, VIII, IX, X, XI and XII are intact.     Left lower extremity examination limited by external immobilization of left leg.    Significant Labs:  Recent Labs   Lab 11/08/22 2207 11/09/22 0313 11/09/22  1133   * 152*  --    * 136  --    K 3.5 3.1* 3.7   CL 96 98  --    CO2 27 27  --    BUN 26* 28*  --    CREATININE 1.2 1.1  --    CALCIUM 8.8 8.5*  --    MG  --  2.3  --      Recent Labs   Lab 11/08/22 2207 11/09/22 0313   WBC 22.85* 17.31*   HGB 13.7* 12.8*   HCT 41.1 37.5*    341     Recent Labs   Lab 11/08/22 2207 11/09/22 0313   LABPT 14.2* 13.2   INR 1.2 1.1     Microbiology Results (last 7 days)       ** No results found for the last 168  hours. **          All pertinent labs from the last 24 hours have been reviewed.    Significant Diagnostics:  He has a CT head available for review which I personally reviewed. This shows a chronic right SDH without significant mass effect or midline shift. There is also a right acute temporal contusion. He has a repeat CT head which I personally reviewed; when compared with prior, there has been no detrimental change in either the SDH or contusions.    Assessment/Plan:     Active Diagnoses:    Diagnosis Date Noted POA    PRINCIPAL PROBLEM:  Subdural hematoma caused by concussion [S06.5XAA] 11/09/2022 Yes    Subarachnoid bleed [I60.9] 11/09/2022 Yes    Closed displaced intertrochanteric fracture of left femur [S72.142A] 11/09/2022 Yes    Prerenal azotemia [R79.89] 11/09/2022 Yes    Atrial fibrillation [I48.91] 08/31/2020 Yes    Aspirin long-term use [Z79.82] 08/31/2020 Not Applicable    Anticoagulant long-term use [Z79.01] 08/31/2020 Not Applicable      Problems Resolved During this Admission:     Mr. Rodríguez is as 77-year-old male s/p fall with left hip fracture. He also has a chronic SDH and acute temporal contusion as described above. He received K centra in the ED for Eliquis. Repeat CT head is stable. Patient also received 1g keppra. He should continue 500 BID x1week total. He is cleared from a neurosurgery standpoint for orthopedic fixation of left hip fracture. Please repeat CT head post-op.    Thank you for your consult. I will follow-up with patient. Please contact us if you have any additional questions.    Jess Diaz MD  Neurosurgery  [facility]  [phone]

## 2022-11-09 NOTE — ED PROVIDER NOTES
Encounter Date: 11/8/2022       History     Chief Complaint   Patient presents with    Leg Pain     Pt leaned over and fell this AM @ approx 1100. Unable to bare weight on LLE since. Pain to lt thigh. Hit head but - LOC. +blood thinners.    Dizziness     Chief complaint is left hip fracture.  This nice patient fell at 11 this morning.  Decided at that time not to come to the hospital.  He is on blood thinners atrial fibrillation and finally came to the hospital tonight and has an obvious left hip fracture clinically speaking his left leg is shortened and externally rotated.  Has pain to the left hip area.  He has no complaints otherwise.  He did fall today because of dizziness and has fallen twice in the last week hitting his head as well.  Today he did not hit his head or have loss of consciousness.      Review of patient's allergies indicates:  No Known Allergies  History reviewed. No pertinent past medical history.  History reviewed. No pertinent surgical history.  History reviewed. No pertinent family history.  Social History     Tobacco Use    Smoking status: Never    Smokeless tobacco: Never   Substance Use Topics    Alcohol use: Yes    Drug use: Not Currently     Review of Systems   Constitutional:  Negative for chills and fever.   HENT:  Negative for ear pain, rhinorrhea and sore throat.    Eyes:  Negative for pain and visual disturbance.   Respiratory:  Negative for cough and shortness of breath.    Cardiovascular:  Negative for chest pain and palpitations.   Gastrointestinal:  Negative for abdominal pain, constipation, diarrhea, nausea and vomiting.   Genitourinary:  Negative for dysuria, frequency, hematuria and urgency.   Musculoskeletal:  Negative for back pain, joint swelling and myalgias.        Left hip pain   Skin:  Negative for rash.   Neurological:  Negative for dizziness, seizures, weakness and headaches.   Psychiatric/Behavioral:  Negative for dysphoric mood. The patient is not nervous/anxious.       Physical Exam     Initial Vitals [11/08/22 2142]   BP Pulse Resp Temp SpO2   (!) 116/56 100 17 97.4 °F (36.3 °C) 98 %      MAP       --         Physical Exam    Nursing note and vitals reviewed.  Constitutional: He appears well-developed and well-nourished.   HENT:   Head: Normocephalic and atraumatic.   Eyes: Conjunctivae, EOM and lids are normal. Pupils are equal, round, and reactive to light.   Neck: Trachea normal. Neck supple. No thyroid mass present.   Cardiovascular:  Normal rate, regular rhythm and normal heart sounds.           Pulmonary/Chest: Breath sounds normal. No respiratory distress.   Abdominal: Abdomen is soft. Bowel sounds are normal. There is no abdominal tenderness.   Musculoskeletal:         General: Normal range of motion.      Cervical back: Neck supple.      Comments: The patient has pain to the left hip.  On palpation the patient has pain to the left hip the left leg is shortened externally rotated.  Left-sided scalp hematoma.  No cervical spine tenderness     Neurological: He is alert and oriented to person, place, and time. He has normal strength and normal reflexes. No cranial nerve deficit or sensory deficit.   Skin: Skin is warm and dry.   Psychiatric: He has a normal mood and affect. His speech is normal and behavior is normal. Judgment and thought content normal.       ED Course   Procedures  Labs Reviewed   CBC W/ AUTO DIFFERENTIAL - Abnormal; Notable for the following components:       Result Value    WBC 22.85 (*)     RBC 4.41 (*)     Hemoglobin 13.7 (*)     MCH 31.1 (*)     Immature Granulocytes 0.8 (*)     Gran # (ANC) 18.6 (*)     Immature Grans (Abs) 0.19 (*)     Mono # 1.7 (*)     Gran % 81.3 (*)     Lymph % 10.1 (*)     All other components within normal limits   COMPREHENSIVE METABOLIC PANEL - Abnormal; Notable for the following components:    Sodium 134 (*)     Glucose 214 (*)     BUN 26 (*)     Total Bilirubin 1.3 (*)     Alkaline Phosphatase 50 (*)     All other  components within normal limits   PROTIME-INR - Abnormal; Notable for the following components:    PT 14.2 (*)     All other components within normal limits   B-TYPE NATRIURETIC PEPTIDE   TROPONIN I     EKG Readings: (Independently Interpreted)   EKG reveals atrial fibrillation heart rate 92 no ST elevation     Imaging Results              X-Ray Femur Ap/Lat Left (In process)                      X-Ray Hip 2 or 3 views Left (with Pelvis when performed) (In process)                      X-Ray Chest AP Portable (In process)                      CT Cervical Spine Without Contrast (Final result)  Result time 11/08/22 23:25:21      Final result by Laura Galvin MD (11/08/22 23:25:21)                   Narrative:    EXAM:  CT Cervical Spine Without Intravenous Contrast    CLINICAL HISTORY:  fall    TECHNIQUE:  Axial computed tomography images of the cervical spine without intravenous contrast.  Sagittal and coronal reformatted images were created and reviewed.  This CT exam was performed using one or more of the following dose reduction techniques:  automated exposure control, adjustment of the mA and/or kV according to patient size, and/or use of iterative reconstruction technique.    COMPARISON:  No relevant prior studies available.    FINDINGS:  Vertebrae:  No acute fracture.  Normal alignment.  Discs/spinal canal/neural foramina:  No acute findings.  No significant spinal canal stenosis.  Soft tissues:  Unremarkable.    IMPRESSION: No acute findings.    Electronically signed by:  Laura Kearney MD  11/8/2022 11:25 PM [facility] Workstation: [id number]                                     CT Head Without Contrast (Edited Result - FINAL)  Result time 11/08/22 23:34:59      Edited Result - FINAL by Ridge Agustin MD (11/08/22 23:34:59)                   Narrative:         ADDENDUM #1       Findings were discussed with Dr. Jessica Villalobos @ 11:30 PM CST 11/8/2022.    Electronically signed by:  Ridge Agustin  11/8/2022 11:34 PM CST  Workstation: [id number]     ORIGINAL REPORT       CT HEAD WITHOUT IV CONTRAST    INDICATION: 77 years Male; Head trauma, coagulopathy (Age 19-64y)    TECHNIQUE:  CT scan of the head was performed without IV contrast.  This exam was performed according to our departmental dose-optimization program, which includes automated exposure control, adjustment of the mA and/or kV according to patient size and/or use of iterative reconstruction technique.    Comparison: 7/11/2022.    FINDINGS:  Brain: Acute on chronic subdural hematoma the right frontal and parietal and temporal convexity with a chronic component measuring 9 mm in maximum width in the acute component measuring 3 mm in width. Intraparenchymal hematoma in the right temporal lobe with surrounding vasogenic edema and surrounding subarachnoid hemorrhage measuring 2.2 x 1.7 cm. No infarct. No mass. Right to left midline shift measures 3 mm. No hydrocephalus. Generalized atrophy. Low-density in the bilateral periventricular white matter is nonspecific but probably represents chronic small vessel ischemic disease.  Sinuses: Unremarkable.  Mastoids: Right mastoid effusion.  Orbits: Unremarkable.  Bones: Unremarkable.  Soft tissues: Left posterior scalp swelling/hematoma.  Incidental findings: None.    IMPRESSION:  1.  Acute on chronic subdural hematoma the right frontal and parietal and temporal convexity with a chronic component measuring 9 mm in maximum width in the acute component measuring 3 mm in width. Right to left midline shift measures 3 mm.  2.  Intraparenchymal hematoma in the right temporal lobe with surrounding vasogenic edema and surrounding subarachnoid hemorrhage measuring 2.2 x 1.7 cm.  3.  Left posterior scalp swelling/hematoma.    Electronically signed by:  Ridge Agustin  11/8/2022 11:28 PM CST Workstation: [id number]                      Final result by Ridge Agustin MD (11/08/22 23:28:50)                   Narrative:    CT HEAD WITHOUT IV  CONTRAST    INDICATION: 77 years Male; Head trauma, coagulopathy (Age 19-64y)    TECHNIQUE:  CT scan of the head was performed without IV contrast.  This exam was performed according to our departmental dose-optimization program, which includes automated exposure control, adjustment of the mA and/or kV according to patient size and/or use of iterative reconstruction technique.    Comparison: 7/11/2022.    FINDINGS:  Brain: Acute on chronic subdural hematoma the right frontal and parietal and temporal convexity with a chronic component measuring 9 mm in maximum width in the acute component measuring 3 mm in width. Intraparenchymal hematoma in the right temporal lobe with surrounding vasogenic edema and surrounding subarachnoid hemorrhage measuring 2.2 x 1.7 cm. No infarct. No mass. Right to left midline shift measures 3 mm. No hydrocephalus. Generalized atrophy. Low-density in the bilateral periventricular white matter is nonspecific but probably represents chronic small vessel ischemic disease.  Sinuses: Unremarkable.  Mastoids: Right mastoid effusion.  Orbits: Unremarkable.  Bones: Unremarkable.  Soft tissues: Left posterior scalp swelling/hematoma.  Incidental findings: None.    IMPRESSION:  1.  Acute on chronic subdural hematoma the right frontal and parietal and temporal convexity with a chronic component measuring 9 mm in maximum width in the acute component measuring 3 mm in width. Right to left midline shift measures 3 mm.  2.  Intraparenchymal hematoma in the right temporal lobe with surrounding vasogenic edema and surrounding subarachnoid hemorrhage measuring 2.2 x 1.7 cm.  3.  Left posterior scalp swelling/hematoma.    Electronically signed by:  Ridge Agustin  11/8/2022 11:28 PM [facility] Workstation: [id number]                                     Medications   morphine injection 4 mg (4 mg Intravenous Given 11/8/22 2210)   ondansetron injection 4 mg (4 mg Intravenous Given 11/8/22 2210)   morphine injection 4 mg (4  mg Intravenous Given 11/8/22 4124)   human prothrombin complex (PCC) (KCENTRA) 5,000 Units IVPB (5,000 Units Intravenous New Bag 11/9/22 0050)   levETIRAcetam in NaCl (iso-os) IVPB 1,000 mg (0 mg Intravenous Stopped 11/9/22 0053)     Medical Decision Making:   ED Management:  CT report as per the Radiology read shows acute on chronic right subdural in the frontoparietal region as well as a right temporal lobe hemorrhage with edema with a 3 mm midline shift.  I called the neurosurgeon .  We discussed the case.  She recommends giving Kcentra which we will do in the ER.  I just got off the phone with pharmacy and they are mixing it right now.Jessica Villalobos MD  11:50 PM 11/08/2022    Case discussed with the hospitalist and the patient was admitted.                               Clinical Impression:   Final diagnoses:  [R07.9] Chest pain  [W19.XXXA] Fall  [S06.5XAA] Subdural hemorrhage following injury without open intracranial wound      ED Disposition Condition    Admit                 Jessica Villalobos MD  11/09/22 0205

## 2022-11-09 NOTE — ED NOTES
PT VOICED HAVING DIZZINESS SINCE Wednesday AND HAS FALLEN 3X. TWO OF THE THREE TIMES HE HIT HIS HEAD. PT DENIES ANY OTHER NEURO SX THAN HEADACHE AND DIZZINESS. L LEG PAIN AND LATERALLY ROTATED, SHORTENED.

## 2022-11-09 NOTE — PROGRESS NOTES
77-year-old  male with multiple medical comorbidities including but not limited to paroxysmal atrial fibrillation, essential hypertension and type 2 diabetes mellitus admitted after presenting with fall complicated by intracranial bleed (acute on chronic right SDH and acute right temporal lobe contusion) and left hip fracture.    Admitted earlier today by overnight provider.  Admission H&P reviewed.  Also reviewed recommendations from Orthopedic surgery and Neurosurgery.  Repeat CT head from this morning with no significant change from last night.  Continue neuro checks per unit protocol.  Cleared for orthopedic intervention which is currently planned for 11/11.  Pain control.    Orlando Gonzalez MD  Internal Medicine

## 2022-11-09 NOTE — SUBJECTIVE & OBJECTIVE
History reviewed. No pertinent past medical history.    History reviewed. No pertinent surgical history.    Review of patient's allergies indicates:  No Known Allergies    No current facility-administered medications on file prior to encounter.     Current Outpatient Medications on File Prior to Encounter   Medication Sig    amitriptyline (ELAVIL) 10 MG tablet Take 10-20 mg by mouth every evening.    apixaban (ELIQUIS) 5 mg Tab Take 1 tablet (5 mg total) by mouth 2 (two) times daily.    aspirin (ECOTRIN) 81 MG EC tablet Take 81 mg by mouth once daily.    calcium polycarbophil (FIBER-CAPS, CA POLYCARBOPHIL, ORAL) Take by mouth.    cephalexin (KEFTAB) 500 mg tablet Take 500 mg by mouth 3 (three) times daily.    cholecalciferol, vitamin D3, (VITAMIN D3) 50 mcg (2,000 unit) Tab Take by mouth once daily.    cyanocobalamin, vitamin B-12, 1,000 mcg Subl Place 1,000 mcg under the tongue 2 (two) times a day.    cyclobenzaprine (FLEXERIL) 5 MG tablet TAKE 1 TO 2 TABLETS BY MOUTH AT BEDTIME AS NEEDED FOR MUSCLE SPASMS    DULoxetine (CYMBALTA) 30 MG capsule Take 1 capsule (30 mg total) by mouth once daily.    ferrous sulfate 27 mg iron Tab Take by mouth.    hydroCHLOROthiazide (HYDRODIURIL) 25 MG tablet Take 1 tablet (25 mg total) by mouth once daily.    lisinopriL (PRINIVIL,ZESTRIL) 20 MG tablet Take 1 tablet (20 mg total) by mouth 2 (two) times daily.    losartan (COZAAR) 50 MG tablet Take 1 tablet (50 mg total) by mouth once daily.    magnesium 250 mg Tab Take 250 mg by mouth 2 (two) times a day.    metFORMIN (GLUCOPHAGE-XR) 500 MG ER 24hr tablet Take 1 tablet (500 mg total) by mouth once daily.    metoprolol tartrate (LOPRESSOR) 25 MG tablet Take 1 tablet (25 mg total) by mouth 2 (two) times daily.    omega-3 acid ethyl esters (LOVAZA) 1 gram capsule Take 2 capsules (2 g total) by mouth 2 (two) times daily.    oxaprozin (DAYPRO) 600 mg tablet Take 1 tablet (600 mg total) by mouth 2 (two) times daily.    propranoloL  (INDERAL) 40 MG tablet Take 1 tablet (40 mg total) by mouth 2 (two) times daily.    rosuvastatin (CRESTOR) 20 MG tablet TAKE 1 TABLET BY MOUTH EVERY DAY    semaglutide (OZEMPIC) 1 mg/dose (4 mg/3 mL) Inject 1 mg into the skin every 7 days.    tolterodine (DETROL LA) 4 MG 24 hr capsule Take 1 capsule (4 mg total) by mouth once daily.    topiramate (TOPAMAX) 50 MG tablet Take 1 tablet (50 mg total) by mouth once daily.    traZODone (DESYREL) 50 MG tablet Take 1 tablet (50 mg total) by mouth every evening.     Family History    None       Tobacco Use    Smoking status: Never    Smokeless tobacco: Never   Substance and Sexual Activity    Alcohol use: Yes    Drug use: Not Currently    Sexual activity: Yes     Partners: Female     Review of Systems   Constitutional:  Positive for fatigue.   HENT: Negative.     Eyes: Negative.    Respiratory: Negative.     Cardiovascular: Negative.    Gastrointestinal: Negative.    Endocrine: Negative.    Genitourinary: Negative.    Musculoskeletal: Negative.         Right hip pain   Skin: Negative.    Allergic/Immunologic: Negative.    Neurological:  Positive for dizziness and light-headedness.   Hematological: Negative.    All other systems reviewed and are negative.  Objective:     Vital Signs (Most Recent):  Temp: 97.4 °F (36.3 °C) (11/08/22 2142)  Pulse: (!) 112 (11/09/22 0030)  Resp: 20 (11/09/22 0030)  BP: 137/79 (11/09/22 0030)  SpO2: (!) 93 % (11/09/22 0030)   Vital Signs (24h Range):  Temp:  [97.4 °F (36.3 °C)] 97.4 °F (36.3 °C)  Pulse:  [] 112  Resp:  [17-20] 20  SpO2:  [93 %-98 %] 93 %  BP: (116-137)/(56-93) 137/79     Weight: 115.7 kg (255 lb)  Body mass index is 36.59 kg/m².    Physical Exam  Vitals and nursing note reviewed.   Constitutional:       Appearance: He is well-developed.   HENT:      Head: Normocephalic and atraumatic.      Right Ear: External ear normal.      Left Ear: External ear normal.      Nose: Nose normal.   Eyes:      Conjunctiva/sclera:  Conjunctivae normal.      Pupils: Pupils are equal, round, and reactive to light.   Cardiovascular:      Rate and Rhythm: Normal rate and regular rhythm.      Heart sounds: Normal heart sounds.   Pulmonary:      Effort: Pulmonary effort is normal.      Breath sounds: Normal breath sounds.   Abdominal:      General: Bowel sounds are normal.      Palpations: Abdomen is soft.   Musculoskeletal:         General: Deformity present. Normal range of motion.      Cervical back: Normal range of motion and neck supple.      Comments: Right leg canted laterally   Skin:     General: Skin is warm and dry.      Capillary Refill: Capillary refill takes less than 2 seconds.   Neurological:      Mental Status: He is alert and oriented to person, place, and time.   Psychiatric:         Behavior: Behavior normal.         Thought Content: Thought content normal.         Judgment: Judgment normal.         CRANIAL NERVES     CN III, IV, VI   Pupils are equal, round, and reactive to light.     Significant Labs: All pertinent labs within the past 24 hours have been reviewed.  CBC:   Recent Labs   Lab 11/08/22 2207   WBC 22.85*   HGB 13.7*   HCT 41.1        CMP:   Recent Labs   Lab 11/08/22 2207   *   K 3.5   CL 96   CO2 27   *   BUN 26*   CREATININE 1.2   CALCIUM 8.8   PROT 7.2   ALBUMIN 3.8   BILITOT 1.3*   ALKPHOS 50*   AST 19   ALT 21   ANIONGAP 11     Lactic Acid: No results for input(s): LACTATE in the last 48 hours.  Troponin:   Recent Labs   Lab 11/08/22 2207   TROPONINI <0.030       Significant Imaging: I have reviewed all pertinent imaging results/findings within the past 24 hours.

## 2022-11-09 NOTE — PLAN OF CARE
Met with patient at bedside to complete initial assessment.    [facility]  Initial Discharge Assessment       Primary Care Provider: Gautam Castanon III, MD    Admission Diagnosis: Subdural hemorrhage following injury without open intracranial wound [S06.5XAA]    Admission Date: 11/8/2022  Expected Discharge Date: 11/13/2022    Discharge Barriers Identified: (P) None    Payor: [employer] [facility] MCARE / Plan: [employer] [facility] MEDICARE ADVANTAGE / Product Type: Medicare Advantage /     Extended Emergency Contact Information  Primary Emergency Contact: Galina Rodríguez  Mobile Phone: [phone]  Relation: Relative  Preferred language: English   needed? No    Discharge Plan A: (P) Rehab  Discharge Plan B: (P) Skilled Nursing Facility      [id number] - [place] - [address]  [address]  [facility] [address]  Phone: [phone] Fax: [phone]    [facility] - [address]  [address] [address]  Phone: [phone] Fax: [phone]    [facility]  [address]  [facility] [address]  Phone: [phone] Fax: [phone]      Initial Assessment (most recent)       Adult Discharge Assessment - 11/09/22 1151          Discharge Assessment    Assessment Type Discharge Planning Assessment (P)      Confirmed/corrected address, phone number and insurance Yes (P)      Confirmed Demographics Correct on Facesheet (P)      Source of Information patient (P)      Communicated BRENNAN with patient/caregiver No (P)      Reason For Admission subdural hematomoa, left hip fracture (P)      Lives With spouse (P)      Facility Arrived From: home (P)      Do you expect to return to your current living situation? Yes (P)      Do you have help at home or someone to help you manage your care at home? Yes (P)      Who are your caregiver(s) and their phone number(s)?  Liz Rodrígueznda (Relative)   [phone] (Mobile) (P)      Prior to hospitilization cognitive status: Unable to Assess (P)      Current cognitive status: Alert/Oriented (P)      Walking or Climbing Stairs Difficulty none (P)      Dressing/Bathing Difficulty none (P)      Equipment Currently Used at Home none (P)      Readmission within 30 days? No (P)      Patient currently being followed by outpatient case management? No (P)      Do you currently have service(s) that help you manage your care at home? No (P)      Do you take prescription medications? Yes (P)      Do you have prescription coverage? Yes (P)      Coverage [employer] managed medicare (P)      Do you have any problems affording any of your prescribed medications? No (P)      Who is going to help you get home at discharge? TonamekenyonLizGalina (Relative)   [phone] (Mobile) (P)      How do you get to doctors appointments? family or friend will provide;car, drives self (P)      Are you on dialysis? No (P)      Do you take coumadin? No (P)      Discharge Plan A Rehab (P)      Discharge Plan B Skilled Nursing Facility (P)      DME Needed Upon Discharge  none (P)      Discharge Plan discussed with: Patient (P)      Discharge Barriers Identified None (P)         Relationship/Environment    Name(s) of Who Lives With Patient Galina Rodríguez (Relative)   [phone] (Mobile) (P)

## 2022-11-10 ENCOUNTER — ANESTHESIA EVENT (OUTPATIENT)
Dept: SURGERY | Facility: HOSPITAL | Age: 77
DRG: 956 | End: 2022-11-10
Payer: MEDICARE

## 2022-11-10 ENCOUNTER — ANESTHESIA (OUTPATIENT)
Dept: SURGERY | Facility: HOSPITAL | Age: 77
DRG: 956 | End: 2022-11-10
Payer: MEDICARE

## 2022-11-10 PROBLEM — N17.9 AKI (ACUTE KIDNEY INJURY): Status: ACTIVE | Noted: 2022-11-10

## 2022-11-10 LAB
ALBUMIN SERPL BCP-MCNC: 2.9 G/DL (ref 3.5–5.2)
ALBUMIN SERPL BCP-MCNC: 2.9 G/DL (ref 3.5–5.2)
ALP SERPL-CCNC: 42 U/L (ref 55–135)
ALP SERPL-CCNC: 44 U/L (ref 55–135)
ALT SERPL W/O P-5'-P-CCNC: 13 U/L (ref 10–44)
ALT SERPL W/O P-5'-P-CCNC: 14 U/L (ref 10–44)
ANION GAP SERPL CALC-SCNC: 4 MMOL/L (ref 8–16)
ANION GAP SERPL CALC-SCNC: 9 MMOL/L (ref 8–16)
AST SERPL-CCNC: 15 U/L (ref 10–40)
AST SERPL-CCNC: 18 U/L (ref 10–40)
BASOPHILS # BLD AUTO: 0.04 K/UL (ref 0–0.2)
BASOPHILS NFR BLD: 0.2 % (ref 0–1.9)
BILIRUB SERPL-MCNC: 0.9 MG/DL (ref 0.1–1)
BILIRUB SERPL-MCNC: 1.2 MG/DL (ref 0.1–1)
BUN SERPL-MCNC: 38 MG/DL (ref 8–23)
BUN SERPL-MCNC: 38 MG/DL (ref 8–23)
CALCIUM SERPL-MCNC: 7.4 MG/DL (ref 8.7–10.5)
CALCIUM SERPL-MCNC: 7.9 MG/DL (ref 8.7–10.5)
CHLORIDE SERPL-SCNC: 100 MMOL/L (ref 95–110)
CHLORIDE SERPL-SCNC: 98 MMOL/L (ref 95–110)
CO2 SERPL-SCNC: 24 MMOL/L (ref 23–29)
CO2 SERPL-SCNC: 28 MMOL/L (ref 23–29)
CREAT SERPL-MCNC: 1.4 MG/DL (ref 0.5–1.4)
CREAT SERPL-MCNC: 1.9 MG/DL (ref 0.5–1.4)
DIFFERENTIAL METHOD: ABNORMAL
EOSINOPHIL # BLD AUTO: 0.1 K/UL (ref 0–0.5)
EOSINOPHIL NFR BLD: 0.7 % (ref 0–8)
ERYTHROCYTE [DISTWIDTH] IN BLOOD BY AUTOMATED COUNT: 12.3 % (ref 11.5–14.5)
ERYTHROCYTE [DISTWIDTH] IN BLOOD BY AUTOMATED COUNT: 12.4 % (ref 11.5–14.5)
EST. GFR  (NO RACE VARIABLE): 35.9 ML/MIN/1.73 M^2
EST. GFR  (NO RACE VARIABLE): 51.8 ML/MIN/1.73 M^2
GLUCOSE SERPL-MCNC: 148 MG/DL (ref 70–110)
GLUCOSE SERPL-MCNC: 154 MG/DL (ref 70–110)
GLUCOSE SERPL-MCNC: 159 MG/DL (ref 70–110)
HCT VFR BLD AUTO: 31.5 % (ref 40–54)
HCT VFR BLD AUTO: 32 % (ref 40–54)
HGB BLD-MCNC: 10.6 G/DL (ref 14–18)
HGB BLD-MCNC: 10.6 G/DL (ref 14–18)
IMM GRANULOCYTES # BLD AUTO: 0.12 K/UL (ref 0–0.04)
IMM GRANULOCYTES NFR BLD AUTO: 0.7 % (ref 0–0.5)
INR PPP: 1.2
LYMPHOCYTES # BLD AUTO: 2.2 K/UL (ref 1–4.8)
LYMPHOCYTES NFR BLD: 13 % (ref 18–48)
MAGNESIUM SERPL-MCNC: 2.4 MG/DL (ref 1.6–2.6)
MCH RBC QN AUTO: 31.5 PG (ref 27–31)
MCH RBC QN AUTO: 31.5 PG (ref 27–31)
MCHC RBC AUTO-ENTMCNC: 33.1 G/DL (ref 32–36)
MCHC RBC AUTO-ENTMCNC: 33.7 G/DL (ref 32–36)
MCV RBC AUTO: 94 FL (ref 82–98)
MCV RBC AUTO: 95 FL (ref 82–98)
MONOCYTES # BLD AUTO: 1.3 K/UL (ref 0.3–1)
MONOCYTES NFR BLD: 7.7 % (ref 4–15)
NEUTROPHILS # BLD AUTO: 13 K/UL (ref 1.8–7.7)
NEUTROPHILS NFR BLD: 77.7 % (ref 38–73)
NRBC BLD-RTO: 0 /100 WBC
PHOSPHATE SERPL-MCNC: 4.7 MG/DL (ref 2.7–4.5)
PLATELET # BLD AUTO: 231 K/UL (ref 150–450)
PLATELET # BLD AUTO: 248 K/UL (ref 150–450)
PMV BLD AUTO: 10 FL (ref 9.2–12.9)
PMV BLD AUTO: 9.9 FL (ref 9.2–12.9)
POTASSIUM SERPL-SCNC: 3.7 MMOL/L (ref 3.5–5.1)
POTASSIUM SERPL-SCNC: 4 MMOL/L (ref 3.5–5.1)
PROT SERPL-MCNC: 5.7 G/DL (ref 6–8.4)
PROT SERPL-MCNC: 5.8 G/DL (ref 6–8.4)
PROTHROMBIN TIME: 14.2 SEC (ref 11.4–13.7)
RBC # BLD AUTO: 3.36 M/UL (ref 4.6–6.2)
RBC # BLD AUTO: 3.37 M/UL (ref 4.6–6.2)
SODIUM SERPL-SCNC: 130 MMOL/L (ref 136–145)
SODIUM SERPL-SCNC: 133 MMOL/L (ref 136–145)
WBC # BLD AUTO: 15.94 K/UL (ref 3.9–12.7)
WBC # BLD AUTO: 16.73 K/UL (ref 3.9–12.7)

## 2022-11-10 PROCEDURE — 25000003 PHARM REV CODE 250: Performed by: ORTHOPAEDIC SURGERY

## 2022-11-10 PROCEDURE — 63600175 PHARM REV CODE 636 W HCPCS: Performed by: INTERNAL MEDICINE

## 2022-11-10 PROCEDURE — A4216 STERILE WATER/SALINE, 10 ML: HCPCS | Performed by: ORTHOPAEDIC SURGERY

## 2022-11-10 PROCEDURE — C1713 ANCHOR/SCREW BN/BN,TIS/BN: HCPCS | Performed by: ORTHOPAEDIC SURGERY

## 2022-11-10 PROCEDURE — 83735 ASSAY OF MAGNESIUM: CPT | Performed by: INTERNAL MEDICINE

## 2022-11-10 PROCEDURE — 27201423 OPTIME MED/SURG SUP & DEVICES STERILE SUPPLY: Performed by: ORTHOPAEDIC SURGERY

## 2022-11-10 PROCEDURE — 85025 COMPLETE CBC W/AUTO DIFF WBC: CPT | Performed by: INTERNAL MEDICINE

## 2022-11-10 PROCEDURE — 36000711: Performed by: ORTHOPAEDIC SURGERY

## 2022-11-10 PROCEDURE — 80053 COMPREHEN METABOLIC PANEL: CPT | Mod: 91 | Performed by: ORTHOPAEDIC SURGERY

## 2022-11-10 PROCEDURE — 94761 N-INVAS EAR/PLS OXIMETRY MLT: CPT

## 2022-11-10 PROCEDURE — 25000003 PHARM REV CODE 250: Performed by: INTERNAL MEDICINE

## 2022-11-10 PROCEDURE — 25000003 PHARM REV CODE 250: Performed by: NURSE ANESTHETIST, CERTIFIED REGISTERED

## 2022-11-10 PROCEDURE — 36410 VNPNXR 3YR/> PHY/QHP DX/THER: CPT

## 2022-11-10 PROCEDURE — C1769 GUIDE WIRE: HCPCS | Performed by: ORTHOPAEDIC SURGERY

## 2022-11-10 PROCEDURE — 37000008 HC ANESTHESIA 1ST 15 MINUTES: Performed by: ORTHOPAEDIC SURGERY

## 2022-11-10 PROCEDURE — 85610 PROTHROMBIN TIME: CPT | Performed by: INTERNAL MEDICINE

## 2022-11-10 PROCEDURE — 87040 BLOOD CULTURE FOR BACTERIA: CPT | Performed by: INTERNAL MEDICINE

## 2022-11-10 PROCEDURE — 94799 UNLISTED PULMONARY SVC/PX: CPT

## 2022-11-10 PROCEDURE — 80053 COMPREHEN METABOLIC PANEL: CPT | Performed by: INTERNAL MEDICINE

## 2022-11-10 PROCEDURE — 63600175 PHARM REV CODE 636 W HCPCS: Performed by: NURSE ANESTHETIST, CERTIFIED REGISTERED

## 2022-11-10 PROCEDURE — 63600175 PHARM REV CODE 636 W HCPCS: Performed by: ORTHOPAEDIC SURGERY

## 2022-11-10 PROCEDURE — 85027 COMPLETE CBC AUTOMATED: CPT | Performed by: INTERNAL MEDICINE

## 2022-11-10 PROCEDURE — 36000710: Performed by: ORTHOPAEDIC SURGERY

## 2022-11-10 PROCEDURE — 27245 PR OPEN FIX INTER/SUBTROCH FX,IMPLNT: ICD-10-PCS | Mod: LT,,, | Performed by: ORTHOPAEDIC SURGERY

## 2022-11-10 PROCEDURE — 20000000 HC ICU ROOM

## 2022-11-10 PROCEDURE — 99900035 HC TECH TIME PER 15 MIN (STAT)

## 2022-11-10 PROCEDURE — 27245 TREAT THIGH FRACTURE: CPT | Mod: LT,,, | Performed by: ORTHOPAEDIC SURGERY

## 2022-11-10 PROCEDURE — 37000009 HC ANESTHESIA EA ADD 15 MINS: Performed by: ORTHOPAEDIC SURGERY

## 2022-11-10 PROCEDURE — C1751 CATH, INF, PER/CENT/MIDLINE: HCPCS

## 2022-11-10 PROCEDURE — 84100 ASSAY OF PHOSPHORUS: CPT | Performed by: INTERNAL MEDICINE

## 2022-11-10 PROCEDURE — 99900031 HC PATIENT EDUCATION (STAT)

## 2022-11-10 RX ORDER — ESMOLOL HYDROCHLORIDE 10 MG/ML
INJECTION INTRAVENOUS
Status: DISCONTINUED | OUTPATIENT
Start: 2022-11-10 | End: 2022-11-10

## 2022-11-10 RX ORDER — HYDROMORPHONE HYDROCHLORIDE 1 MG/ML
0.5 INJECTION, SOLUTION INTRAMUSCULAR; INTRAVENOUS; SUBCUTANEOUS EVERY 4 HOURS PRN
Status: DISCONTINUED | OUTPATIENT
Start: 2022-11-10 | End: 2022-11-11

## 2022-11-10 RX ORDER — ROCURONIUM BROMIDE 10 MG/ML
INJECTION, SOLUTION INTRAVENOUS
Status: DISCONTINUED | OUTPATIENT
Start: 2022-11-10 | End: 2022-11-10

## 2022-11-10 RX ORDER — CEFAZOLIN SODIUM 1 G/3ML
INJECTION, POWDER, FOR SOLUTION INTRAMUSCULAR; INTRAVENOUS
Status: DISCONTINUED | OUTPATIENT
Start: 2022-11-10 | End: 2022-11-10

## 2022-11-10 RX ORDER — ETOMIDATE 2 MG/ML
INJECTION INTRAVENOUS
Status: DISCONTINUED | OUTPATIENT
Start: 2022-11-10 | End: 2022-11-10

## 2022-11-10 RX ORDER — CEFAZOLIN SODIUM 2 G/50ML
2 SOLUTION INTRAVENOUS
Status: COMPLETED | OUTPATIENT
Start: 2022-11-10 | End: 2022-11-11

## 2022-11-10 RX ORDER — FENTANYL CITRATE 50 UG/ML
INJECTION, SOLUTION INTRAMUSCULAR; INTRAVENOUS
Status: DISCONTINUED | OUTPATIENT
Start: 2022-11-10 | End: 2022-11-10

## 2022-11-10 RX ORDER — NEOSTIGMINE METHYLSULFATE 1 MG/ML
INJECTION, SOLUTION INTRAVENOUS
Status: DISCONTINUED | OUTPATIENT
Start: 2022-11-10 | End: 2022-11-10

## 2022-11-10 RX ORDER — LIDOCAINE HYDROCHLORIDE 20 MG/ML
INJECTION, SOLUTION EPIDURAL; INFILTRATION; INTRACAUDAL; PERINEURAL
Status: DISCONTINUED | OUTPATIENT
Start: 2022-11-10 | End: 2022-11-10

## 2022-11-10 RX ORDER — DIGOXIN 0.25 MG/ML
125 INJECTION INTRAMUSCULAR; INTRAVENOUS ONCE
Status: COMPLETED | OUTPATIENT
Start: 2022-11-10 | End: 2022-11-10

## 2022-11-10 RX ORDER — KETAMINE HYDROCHLORIDE 50 MG/ML
INJECTION, SOLUTION INTRAMUSCULAR; INTRAVENOUS
Status: DISCONTINUED | OUTPATIENT
Start: 2022-11-10 | End: 2022-11-10

## 2022-11-10 RX ORDER — TRAZODONE HYDROCHLORIDE 50 MG/1
50 TABLET ORAL NIGHTLY PRN
Status: DISCONTINUED | OUTPATIENT
Start: 2022-11-10 | End: 2022-11-11

## 2022-11-10 RX ORDER — SUCCINYLCHOLINE CHLORIDE 20 MG/ML
INJECTION INTRAMUSCULAR; INTRAVENOUS
Status: DISCONTINUED | OUTPATIENT
Start: 2022-11-10 | End: 2022-11-10

## 2022-11-10 RX ORDER — METOPROLOL TARTRATE 1 MG/ML
5 INJECTION, SOLUTION INTRAVENOUS ONCE
Status: COMPLETED | OUTPATIENT
Start: 2022-11-10 | End: 2022-11-10

## 2022-11-10 RX ORDER — SODIUM CHLORIDE 0.9 % (FLUSH) 0.9 %
2 SYRINGE (ML) INJECTION EVERY 6 HOURS
Status: DISCONTINUED | OUTPATIENT
Start: 2022-11-10 | End: 2022-11-13

## 2022-11-10 RX ORDER — LOPERAMIDE HYDROCHLORIDE 2 MG/1
2 CAPSULE ORAL CONTINUOUS PRN
Status: DISCONTINUED | OUTPATIENT
Start: 2022-11-10 | End: 2022-11-18 | Stop reason: HOSPADM

## 2022-11-10 RX ORDER — HYDROCODONE BITARTRATE AND ACETAMINOPHEN 5; 325 MG/1; MG/1
1 TABLET ORAL EVERY 4 HOURS PRN
Status: DISCONTINUED | OUTPATIENT
Start: 2022-11-10 | End: 2022-11-10

## 2022-11-10 RX ORDER — AMOXICILLIN 250 MG
1 CAPSULE ORAL 2 TIMES DAILY
Status: DISCONTINUED | OUTPATIENT
Start: 2022-11-10 | End: 2022-11-18 | Stop reason: HOSPADM

## 2022-11-10 RX ORDER — PHENYLEPHRINE HYDROCHLORIDE 10 MG/ML
INJECTION INTRAVENOUS
Status: DISCONTINUED | OUTPATIENT
Start: 2022-11-10 | End: 2022-11-10

## 2022-11-10 RX ORDER — ONDANSETRON 2 MG/ML
4 INJECTION INTRAMUSCULAR; INTRAVENOUS EVERY 12 HOURS PRN
Status: DISCONTINUED | OUTPATIENT
Start: 2022-11-10 | End: 2022-11-18 | Stop reason: HOSPADM

## 2022-11-10 RX ORDER — HYDROCODONE BITARTRATE AND ACETAMINOPHEN 5; 325 MG/1; MG/1
2 TABLET ORAL EVERY 4 HOURS PRN
Status: DISCONTINUED | OUTPATIENT
Start: 2022-11-10 | End: 2022-11-11

## 2022-11-10 RX ORDER — SODIUM CHLORIDE 0.9 G/100ML
IRRIGANT IRRIGATION
Status: DISCONTINUED | OUTPATIENT
Start: 2022-11-10 | End: 2022-11-10 | Stop reason: HOSPADM

## 2022-11-10 RX ORDER — FAMOTIDINE 10 MG/ML
INJECTION INTRAVENOUS
Status: DISCONTINUED | OUTPATIENT
Start: 2022-11-10 | End: 2022-11-10

## 2022-11-10 RX ADMIN — KETAMINE HYDROCHLORIDE 25 MG: 50 INJECTION INTRAMUSCULAR; INTRAVENOUS at 09:11

## 2022-11-10 RX ADMIN — HYDROMORPHONE HYDROCHLORIDE 1 MG: 1 INJECTION, SOLUTION INTRAMUSCULAR; INTRAVENOUS; SUBCUTANEOUS at 11:11

## 2022-11-10 RX ADMIN — NEOSTIGMINE 4 MG: 1 INJECTION INTRAVENOUS at 10:11

## 2022-11-10 RX ADMIN — HYDROCODONE BITARTRATE AND ACETAMINOPHEN 2 TABLET: 5; 325 TABLET ORAL at 05:11

## 2022-11-10 RX ADMIN — GLYCOPYRROLATE 0.6 MG: 0.2 INJECTION, SOLUTION INTRAMUSCULAR; INTRAVITREAL at 10:11

## 2022-11-10 RX ADMIN — DIGOXIN 125 MCG: 0.25 INJECTION INTRAMUSCULAR; INTRAVENOUS at 02:11

## 2022-11-10 RX ADMIN — DEXTROSE 2 G: 50 INJECTION, SOLUTION INTRAVENOUS at 04:11

## 2022-11-10 RX ADMIN — HYDROMORPHONE HYDROCHLORIDE 0.5 MG: 1 INJECTION, SOLUTION INTRAMUSCULAR; INTRAVENOUS; SUBCUTANEOUS at 10:11

## 2022-11-10 RX ADMIN — HYDROCODONE BITARTRATE AND ACETAMINOPHEN 1 TABLET: 5; 325 TABLET ORAL at 02:11

## 2022-11-10 RX ADMIN — SODIUM CHLORIDE, PRESERVATIVE FREE 2 ML: 5 INJECTION INTRAVENOUS at 12:11

## 2022-11-10 RX ADMIN — TOPIRAMATE 50 MG: 25 TABLET, FILM COATED ORAL at 12:11

## 2022-11-10 RX ADMIN — LIDOCAINE HYDROCHLORIDE 60 MG: 20 INJECTION, SOLUTION EPIDURAL; INFILTRATION; INTRACAUDAL; PERINEURAL at 09:11

## 2022-11-10 RX ADMIN — SODIUM CHLORIDE, PRESERVATIVE FREE 2 ML: 5 INJECTION INTRAVENOUS at 06:11

## 2022-11-10 RX ADMIN — LEVETIRACETAM 500 MG: 500 TABLET, FILM COATED ORAL at 09:11

## 2022-11-10 RX ADMIN — FENTANYL CITRATE 25 MCG: 0.05 INJECTION, SOLUTION INTRAMUSCULAR; INTRAVENOUS at 09:11

## 2022-11-10 RX ADMIN — POTASSIUM BICARBONATE 35 MEQ: 391 TABLET, EFFERVESCENT ORAL at 03:11

## 2022-11-10 RX ADMIN — TRAZODONE HYDROCHLORIDE 50 MG: 50 TABLET ORAL at 09:11

## 2022-11-10 RX ADMIN — PHENYLEPHRINE HYDROCHLORIDE 50 MCG: 10 INJECTION INTRAVENOUS at 09:11

## 2022-11-10 RX ADMIN — OXYBUTYNIN CHLORIDE 10 MG: 5 TABLET, EXTENDED RELEASE ORAL at 12:11

## 2022-11-10 RX ADMIN — HYDROMORPHONE HYDROCHLORIDE 1 MG: 1 INJECTION, SOLUTION INTRAMUSCULAR; INTRAVENOUS; SUBCUTANEOUS at 02:11

## 2022-11-10 RX ADMIN — Medication 160 MG: at 09:11

## 2022-11-10 RX ADMIN — PIPERACILLIN SODIUM AND TAZOBACTAM SODIUM 3.38 G: 3; .375 INJECTION, POWDER, LYOPHILIZED, FOR SOLUTION INTRAVENOUS at 11:11

## 2022-11-10 RX ADMIN — CEFAZOLIN 2 G: 330 INJECTION, POWDER, FOR SOLUTION INTRAMUSCULAR; INTRAVENOUS at 09:11

## 2022-11-10 RX ADMIN — ATORVASTATIN CALCIUM 40 MG: 40 TABLET, FILM COATED ORAL at 09:11

## 2022-11-10 RX ADMIN — PIPERACILLIN SODIUM AND TAZOBACTAM SODIUM 3.38 G: 3; .375 INJECTION, POWDER, LYOPHILIZED, FOR SOLUTION INTRAVENOUS at 08:11

## 2022-11-10 RX ADMIN — HYDROCODONE BITARTRATE AND ACETAMINOPHEN 1 TABLET: 10; 325 TABLET ORAL at 07:11

## 2022-11-10 RX ADMIN — PIPERACILLIN SODIUM AND TAZOBACTAM SODIUM 3.38 G: 3; .375 INJECTION, POWDER, LYOPHILIZED, FOR SOLUTION INTRAVENOUS at 03:11

## 2022-11-10 RX ADMIN — MUPIROCIN 1 G: 20 OINTMENT TOPICAL at 09:11

## 2022-11-10 RX ADMIN — AMIODARONE HYDROCHLORIDE 0.5 MG/MIN: 1.8 INJECTION, SOLUTION INTRAVENOUS at 10:11

## 2022-11-10 RX ADMIN — AMIODARONE HYDROCHLORIDE 1 MG/MIN: 1.8 INJECTION, SOLUTION INTRAVENOUS at 09:11

## 2022-11-10 RX ADMIN — FAMOTIDINE 20 MG: 10 INJECTION, SOLUTION INTRAVENOUS at 09:11

## 2022-11-10 RX ADMIN — ROCURONIUM BROMIDE 5 MG: 10 INJECTION, SOLUTION INTRAVENOUS at 09:11

## 2022-11-10 RX ADMIN — SODIUM CHLORIDE, SODIUM LACTATE, POTASSIUM CHLORIDE, AND CALCIUM CHLORIDE: .6; .31; .03; .02 INJECTION, SOLUTION INTRAVENOUS at 09:11

## 2022-11-10 RX ADMIN — SENNOSIDES AND DOCUSATE SODIUM 1 TABLET: 50; 8.6 TABLET ORAL at 09:11

## 2022-11-10 RX ADMIN — ACETAMINOPHEN 650 MG: 325 TABLET ORAL at 07:11

## 2022-11-10 RX ADMIN — SENNOSIDES AND DOCUSATE SODIUM 1 TABLET: 50; 8.6 TABLET ORAL at 12:11

## 2022-11-10 RX ADMIN — PHENYLEPHRINE HYDROCHLORIDE 50 MCG: 10 INJECTION INTRAVENOUS at 10:11

## 2022-11-10 RX ADMIN — HYDROCODONE BITARTRATE AND ACETAMINOPHEN 1 TABLET: 10; 325 TABLET ORAL at 03:11

## 2022-11-10 RX ADMIN — FENTANYL CITRATE 25 MCG: 0.05 INJECTION, SOLUTION INTRAMUSCULAR; INTRAVENOUS at 10:11

## 2022-11-10 RX ADMIN — CHLORHEXIDINE GLUCONATE 15 ML: 1.2 RINSE ORAL at 09:11

## 2022-11-10 RX ADMIN — LEVETIRACETAM 500 MG: 500 TABLET, FILM COATED ORAL at 12:11

## 2022-11-10 RX ADMIN — HYDROMORPHONE HYDROCHLORIDE 1 MG: 1 INJECTION, SOLUTION INTRAMUSCULAR; INTRAVENOUS; SUBCUTANEOUS at 06:11

## 2022-11-10 RX ADMIN — SODIUM CHLORIDE, SODIUM LACTATE, POTASSIUM CHLORIDE, AND CALCIUM CHLORIDE 1000 ML: .6; .31; .03; .02 INJECTION, SOLUTION INTRAVENOUS at 12:11

## 2022-11-10 RX ADMIN — ETOMIDATE 20 MG: 2 INJECTION, SOLUTION INTRAVENOUS at 09:11

## 2022-11-10 RX ADMIN — DULOXETINE HYDROCHLORIDE 30 MG: 30 CAPSULE, DELAYED RELEASE ORAL at 12:11

## 2022-11-10 RX ADMIN — METOPROLOL TARTRATE 5 MG: 1 INJECTION, SOLUTION INTRAVENOUS at 11:11

## 2022-11-10 RX ADMIN — Medication 6 MG: at 10:11

## 2022-11-10 RX ADMIN — HYDROCODONE BITARTRATE AND ACETAMINOPHEN 2 TABLET: 5; 325 TABLET ORAL at 09:11

## 2022-11-10 RX ADMIN — AMIODARONE HYDROCHLORIDE 1 MG/MIN: 1.8 INJECTION, SOLUTION INTRAVENOUS at 04:11

## 2022-11-10 RX ADMIN — ROCURONIUM BROMIDE 30 MG: 10 INJECTION, SOLUTION INTRAVENOUS at 09:11

## 2022-11-10 RX ADMIN — AMIODARONE HYDROCHLORIDE 150 MG: 1.5 INJECTION, SOLUTION INTRAVENOUS at 04:11

## 2022-11-10 RX ADMIN — ESMOLOL HYDROCHLORIDE 30 MG: 10 INJECTION, SOLUTION INTRAVENOUS at 10:11

## 2022-11-10 RX ADMIN — CYANOCOBALAMIN TAB 1000 MCG 2000 MCG: 1000 TAB at 12:11

## 2022-11-10 NOTE — ANESTHESIA PROCEDURE NOTES
Arterial line    Diagnosis: Hip fracture, pulmonary edema    Patient location during procedure: done in OR  Procedure start time: 11/10/2022 9:05 AM  Timeout: 11/10/2022 9:05 AM  Procedure end time: 11/10/2022 9:10 AM    Staffing  Authorizing Provider: Pepe Mckeon MD  Performing Provider: Pepe Mckeon MD    Anesthesiologist was present at the time of the procedure.    Preanesthetic Checklist  Completed: patient identified, IV checked, site marked, risks and benefits discussed, surgical consent, monitors and equipment checked, pre-op evaluation, timeout performed and anesthesia consent givenArterial line  Skin Prep: chlorhexidine gluconate  Local Infiltration: lidocaine  Orientation: right  Location: radial    Catheter Size: 20 G  Catheter placement by Ultrasound guidance. Heme positive aspiration all ports.   Vessel Caliber: medium, patent, compressibility normal  Vascular Doppler:  not done  Needle advanced into vessel with real time Ultrasound guidance.  Sterile sheath used.Insertion Attempts: 1  Assessment  Dressing: sutured in place and taped and tegaderm  Patient: Tolerated well

## 2022-11-10 NOTE — NURSING
"0749:  Pt's given PRN tylenol and PRN Norco per MAR for a pain rating of 7/10.  This was done d/t the pt receiving PRN Dilaudid (per MAR) at 0600 being too soon to give again per MAR restrictions and his pain still remaining quite high at a 7/10.  Also his BP has been trending low consistently in the 90's/sys, despite fluid challenges by nightshift.  His continued elevated pain scales have been addressed as well with Dr Lewis over night and d/t his BP limitations have not been altered.  This was reviewed with pt as well on the previous shift.  Pt was noticeable upset but very understanding when I assessed this am at 0701, stated "I understand why, but it still hurts."        Addendum:  0849:  Pt now denying any c/o pain.  Pt now appears calmer, he is more easily reassured and expresses gratitude to staff for the multiple efforts and different attempts at addressing his pain.  "

## 2022-11-10 NOTE — OP NOTE
[facility]  Orthopedic Surgery   Operative Note    SUMMARY     Date of Procedure: 11/10/2022     Procedure: Procedure(s) (LRB):  TFN nailing of left hip with Synthes (Left)       Surgeon(s) and Role:     * Richard Phoenix MD - Primary    Assistant:  Na Marcus    Pre-Operative Diagnosis: Closed displaced intertrochanteric fracture of left femur, initial encounter [S72.142A]    Post-Operative Diagnosis: Post-Op Diagnosis Codes:     * Closed displaced intertrochanteric fracture of left femur, initial encounter [S72.142A]    Anesthesia: General      Estimated Blood Loss (EBL): 1 mL           Implants:   Implant Name Type Inv. Item Serial No.  Lot No. LRB No. Used Action   LOG [id number] - SYNTHES TROCHANTERIC NAIL TFN - 1           LOG [id number] - SYNTHES TROCHANTERIC NAIL TFN - 2           LOG [id number] - SYNTHES TIBIAL SCREW RACK - 1           TFN HELICAL BLADE 115MM    SYNTHES [id number] Left 1 Implanted   TFN NAIL 11MM 170MM     [id number] Left 1 Implanted   SCREW, 5.0X40MM      Left 1 Implanted       Specimens:   Specimen (24h ago, onward)      None             Complications:  None           Description of the Procedure:  The patient was given a general anesthetic and placed in a supine position on a well-padded fracture table the left leg was placed in distal traction right leg was placed in a well-padded stirrup distal traction with internal rotation some adduction was performed and a near anatomical reduction was performed of the left hip.  The left hip was then prepped with Hibiclens and ChloraPrep and draped in a sterile manner a lateral incision was made just proximal to the greater trochanter dissection was made down through the gluteal fascia a guide pin was placed on the tip of the greater trochanter into the medullary canal of the femur the area was reamed and then a 11. TFN nail was placed into the medullary canal an incision was made laterally and a guide pin placed into the center  of the head and neck the area was reamed and measured to a 115 nail length a nail was then placed into the TFNrod and then the nail placed into the center of the head and neck it was all it was then compressed the nail was locked proximally and an incision was made laterally and a locking screw placed into the distal part of the kelsey.  X-rays under fluoroscopy on the AP and lateral plane showed anatomical reduction of the fracture and good placement of the hardware.  The wounds were irrigated with normal saline solution they were closed with interrupted 0 and 2-0 Vicryl stitch and a 3-0 Monocryl stitch with Dermabond was placed in the skin.  A soft dressing was applied and the patient was stable to recovery

## 2022-11-10 NOTE — PLAN OF CARE
Problem: Skin Injury Risk Increased  Goal: Skin Health and Integrity  Intervention: Promote and Optimize Oral Intake  Flowsheets (Taken 11/10/2022 1610)  Oral Nutrition Promotion:   calorie-dense foods provided   calorie-dense liquids provided   other (see comments)     Problem: Oral Intake Inadequate  Goal: Improved Oral Intake  Outcome: Ongoing, Progressing  Intervention: Promote and Optimize Oral Intake  Flowsheets (Taken 11/10/2022 1610)  Oral Nutrition Promotion:   calorie-dense foods provided   calorie-dense liquids provided   other (see comments)     Recommendations  1.) Will adjust diet to diabetic to control BG levels.   2.) Will add Glucerna BID.   3.) Suggest MVI for general health.   4.) Suggest lactobacillus acidophilus while receiving antibiotics.     Goals:   1.) Pt to consume/tolerate >75% of meals and ONS.   2.) BG levels to normalize.  Nutrition Goal Status: new

## 2022-11-10 NOTE — NURSING
Pt's hearing aid and top dentures secured in labeled denture cups.  These along with his cell phone are handed over to his wife- who arrives at bedside as OR team is prepping to leave with pt.

## 2022-11-10 NOTE — ANESTHESIA PROCEDURE NOTES
Intubation    Date/Time: 11/10/2022 9:18 AM  Performed by: Kane Ross CRNA  Authorized by: Pepe Mckeon MD     Intubation:     Induction:  Intravenous    Intubated:  Postinduction    Mask Ventilation:  Easy mask    Attempts:  1    Attempted By:  CRNA    Method of Intubation:  Video laryngoscopy    Blade:  Dwyer 3    Laryngeal View Grade: Grade I - full view of cords      Difficult Airway Encountered?: No      Complications:  None    Airway Device:  Oral endotracheal tube    Airway Device Size:  8.0    Style/Cuff Inflation:  Cuffed    Inflation Amount (mL):  6    Placement Verified By:  Capnometry    Complicating Factors:  None    Findings Post-Intubation:  BS equal bilateral

## 2022-11-10 NOTE — ANESTHESIA PREPROCEDURE EVALUATION
11/10/2022  Hiro Rodríguez is a 77 y.o., male.      Pre-op Assessment    I have reviewed the Patient Summary Reports.     I have reviewed the Nursing Notes. I have reviewed the NPO Status.   I have reviewed the Medications.     Review of Systems  Anesthesia Hx:  Denies Family Hx of Anesthesia complications.   Denies Personal Hx of Anesthesia complications.   Social:  Non-Smoker, No Alcohol Use    Hematology/Oncology:        Hematology Comments: Eliquis was reversed with Kcentra --  Cancer in past history (Skin):    EENT/Dental:   Full upper dentures   Cardiovascular:   Exercise tolerance: good Hypertension Dysrhythmias atrial fibrillation CHF (Patient denies any history of CHF and goes to the gym. Chest x-ray this morning shows mild pulmonary edema. )  Denies Orthopnea. hyperlipidemia  Denies QUACH.    Pulmonary:  Pulmonary Normal  Denies Shortness of breath.    Renal/:   Chronic Renal Disease ( current prerenal azotemia), CKD    Hepatic/GI:  Hepatic/GI Normal    Musculoskeletal:  Musculoskeletal Normal    Neurological:  Neurology Normal Right temporal lobe contusion status post fall, with acute on chronic subdural hematoma and minimal adjacent subarachnoid hemorrhage.  Patient is asymptomatic and cleared by Neurosurgery for Orthopedic surgery.   Endocrine:   Diabetes, type 2  Obesity / BMI > 30  Psych:  Psychiatric Normal           Physical Exam  General: Well nourished, Cooperative, Alert and Oriented    Airway:  Mallampati: III   Mouth Opening: Normal  TM Distance: > 6 cm  Tongue: Normal  Neck ROM: Normal ROM    Dental:  Intact    Chest/Lungs:  Clear to auscultation, Normal Respiratory Rate    Heart:  Rate: Normal, Tachycardia  Rhythm: Irregularly Irregular  Sounds: Normal        Anesthesia Plan  Type of Anesthesia, risks & benefits discussed:    Anesthesia Type: Gen ETT  Intra-op Monitoring Plan:  Standard ASA Monitors and Art Line  Post Op Pain Control Plan: multimodal analgesia  Induction:  IV  Airway Plan: , Post-Induction  Informed Consent: Informed consent signed with the Patient and all parties understand the risks and agree with anesthesia plan.  All questions answered.   ASA Score: 4  Anesthesia Plan Notes: GETA, arterial line before induction, etomidate  Limit IV fluids (patient has mild pulmonary edema currently)  No Versed, minimal fentanyl  No acetaminophen (patient already received some this morning).  Multimodal analgesia:  Ketamine 25 mg   Antiemetics: Zofran, Pepcid    Ready For Surgery From Anesthesia Perspective.     .

## 2022-11-10 NOTE — CARE UPDATE
11/10/22 0810   Patient Assessment/Suction   Level of Consciousness (AVPU) alert   Respiratory Effort Normal;Unlabored   Expansion/Accessory Muscles/Retractions no use of accessory muscles   Rhythm/Pattern, Respiratory unlabored   PRE-TX-O2   O2 Device (Oxygen Therapy) room air   SpO2 95 %   Pulse Oximetry Type Continuous   $ Pulse Oximetry - Multiple Charge Pulse Oximetry - Multiple   Pulse (!) 117   Resp 18   Education   $ Education Other (see comment);15 min  (sats)   Respiratory Evaluation   $ Care Plan Tech Time 15 min   $ Eval/Re-eval Charges Re-evaluation

## 2022-11-10 NOTE — PT/OT/SLP PROGRESS
Physical Therapy      Patient Name:  Hiro Rodríguez   MRN:  [id number]    Patient not seen today secondary to  (POD#0). Will follow-up 11/11/2022.

## 2022-11-10 NOTE — PROGRESS NOTES
"[facility]  Adult Nutrition   Progress Note (Initial Assessment)     SUMMARY     Recommendations  1.) Will adjust diet to diabetic to control BG levels.   2.) Will add Glucerna BID.   3.) Suggest MVI for general health.   4.) Suggest lactobacillus acidophilus while receiving antibiotics.    Goals:   1.) Pt to consume/tolerate >75% of meals and ONS.   2.) BG levels to normalize.  Nutrition Goal Status: new    Dietitian Rounds Brief   Pt presents to [facility] complaining of right leg pain after falling and striking his right hip and head. S/p TFN nailing of left hip with Synthes today. Diet advanced after surgery, however pt still drowsy from surgery. PTA, fair appetite per family. No chew/swallowing issues. No GI distress. LBM 11/8. Skin: surgical incision. Wt stable. NFPE completed with no s/s of wasting. No malnutrition.    Diet order:   Current Diet Order: regular      Evaluation of Received Nutrient/Fluid Intake  Energy Calories Required: not meeting needs  Protein Required: not meeting needs  Fluid Required: meeting needs  Tolerance: tolerating     % Intake of Estimated Energy Needs: 0 - 25 %  % Meal Intake: 0 - 25 %      Intake/Output Summary (Last 24 hours) at 11/10/2022 1609  Last data filed at 11/10/2022 1448  Gross per 24 hour   Intake 4860 ml   Output 701 ml   Net 4159 ml        Anthropometrics  Temp: 97.6 °F (36.4 °C)  Height Method: Stated  Height: 5' 10" (177.8 cm)  Height (inches): 70 in  Weight Method: Bed Scale  Weight: 114.7 kg (252 lb 13.9 oz)  Weight (lb): 252.87 lb  Ideal Body Weight (IBW), Male: 166 lb  % Ideal Body Weight, Male (lb): 152.33 %  BMI (Calculated): 36.3  BMI Grade: 35 - 39.9 - obesity - grade II       Estimated/Assessed Needs  Weight Used For Calorie Calculations: 114.7 kg (252 lb 13.9 oz)  Energy Calorie Requirements (kcal): 0883-7576  Energy Need Method: Kcal/kg (20-25)  Protein Requirements: 113-150 (1.5-2.0)  Weight Used For Protein Calculations: 75 kg (165 lb 5.5 oz) " (IBW)  Fluid Requirements (mL): 2210-1003     RDA Method (mL): 2294       Reason for Assessment  Reason For Assessment: identified at risk by screening criteria (ICU status)  Diagnosis: trauma  Relevant Medical History: PAF, HTN, DM 2, Morbid Obesity    Nutrition/Diet History  Spiritual, Cultural Beliefs, Voodoo Practices, Values that Affect Care: no  Food Allergies: NKFA  Factors Affecting Nutritional Intake: None identified at this time    Nutrition Risk Screen  Nutrition Risk Screen: no indicators present     MST Score: 0  Have you recently lost weight without trying?: No  Weight loss score: 0  Have you been eating poorly because of a decreased appetite?: No  Appetite score: 0       Weight History:  Wt Readings from Last 5 Encounters:   11/10/22 114.7 kg (252 lb 13.9 oz)   10/11/22 119.7 kg (264 lb)   07/12/22 121.7 kg (268 lb 3.2 oz)   04/12/22 129.3 kg (285 lb)   01/12/22 122.5 kg (270 lb)        Lab/Procedures/Meds: Pertinent Labs/Meds Reviewed    Medications:Pertinent Medications Reviewed  Scheduled Meds:   amiodarone in dextrose  150 mg Intravenous Once    atorvastatin  40 mg Oral Daily    ceFAZolin (ANCEF) IVPB  2 g Intravenous Q8H    chlorhexidine  15 mL Mouth/Throat BID    cyanocobalamin  2,000 mcg Oral Daily    DULoxetine  30 mg Oral Daily    levETIRAcetam  500 mg Oral BID    mupirocin   Nasal BID    oxybutynin  10 mg Oral Daily    piperacillin-tazobactam (ZOSYN) IVPB  3.375 g Intravenous Q8H    senna-docusate 8.6-50 mg  1 tablet Oral BID    sodium chloride 0.9%  2 mL Intravenous Q6H    topiramate  50 mg Oral Daily     Continuous Infusions:   amiodarone in dextrose 5%      amiodarone in dextrose 5%      loperamide       PRN Meds:.acetaminophen, dextrose 10%, dextrose 10%, glucagon (human recombinant), glucose, glucose, HYDROcodone-acetaminophen, HYDROmorphone, insulin aspart U-100, loperamide, magnesium oxide, magnesium oxide, melatonin, ondansetron, potassium bicarbonate, potassium bicarbonate,  potassium bicarbonate, potassium, sodium phosphates, potassium, sodium phosphates, potassium, sodium phosphates    Labs: Pertinent Labs Reviewed  Clinical Chemistry:  Recent Labs   Lab 11/09/22  0313 11/09/22  1133 11/10/22  0226 11/10/22  1122     --  130* 133*   K 3.1*   < > 3.7 4.0   CL 98  --  98 100   CO2 27  --  28 24   *  --  148* 159*   BUN 28*  --  38* 38*   CREATININE 1.1  --  1.9* 1.4   CALCIUM 8.5*  --  7.4* 7.9*   PROT 6.7  --  5.7* 5.8*   ALBUMIN 3.6  --  2.9* 2.9*   BILITOT 1.3*  --  0.9 1.2*   ALKPHOS 48*  --  42* 44*   AST 16  --  15 18   ALT 19  --  14 13   ANIONGAP 11  --  4* 9   MG 2.3  --  2.4  --    PHOS 3.7  --  4.7*  --     < > = values in this interval not displayed.     CBC:   Recent Labs   Lab 11/10/22  1122   WBC 15.94*   RBC 3.36*   HGB 10.6*   HCT 31.5*      MCV 94   MCH 31.5*   MCHC 33.7     Cardiac Profile:  Recent Labs   Lab 11/08/22  2207   BNP 65   TROPONINI <0.030     Diabetes:  Recent Labs   Lab 11/09/22 0313   HGBA1C 7.0*       Monitor and Evaluation  Food and Nutrient Intake: energy intake, food and beverage intake  Food and Nutrient Adminstration: diet order  Knowledge/Beliefs/Attitudes: food and nutrition knowledge/skill  Physical Activity and Function: nutrition-related ADLs and IADLs  Anthropometric Measurements: weight, weight change  Biochemical Data, Medical Tests and Procedures: electrolyte and renal panel, gastrointestinal profile, glucose/endocrine profile  Nutrition-Focused Physical Findings: overall appearance     Nutrition Risk  Level of Risk/Frequency of Follow-up: high     Nutrition Follow-Up  RD Follow-up?: Yes      Ijeoma Anna, CASI 11/10/2022 4:09 PM

## 2022-11-10 NOTE — ANESTHESIA POSTPROCEDURE EVALUATION
Anesthesia Post Evaluation    Patient: Hiro Rodríguez    Procedure(s) Performed: Procedure(s) (LRB):  INSERTION, ITRAMEDULLARY SANTI, FEMUR, TROCHANTER (Left)    Final Anesthesia Type: general      Patient location during evaluation: ICU  Patient participation: Yes- Able to Participate  Level of consciousness: awake and alert  Post-procedure vital signs: reviewed and stable  Pain management: adequate  Airway patency: patent    PONV status at discharge: No PONV  Anesthetic complications: no      Cardiovascular status: stable  Respiratory status: unassisted and spontaneous ventilation  Hydration status: euvolemic  Follow-up not needed.          Vitals Value Taken Time   /55 11/10/22 1445   Temp 36.4 °C (97.6 °F) 11/10/22 1136   Pulse 119 11/10/22 1451   Resp 20 11/10/22 1451   SpO2 93 % 11/10/22 1451   Vitals shown include unvalidated device data.      No case tracking events are documented in the log.      Pain/Hossein Score: Pain Rating Prior to Med Admin: 6 (11/10/2022  2:19 PM)  Pain Rating Post Med Admin: 0 (11/10/2022  8:49 AM)

## 2022-11-10 NOTE — TRANSFER OF CARE
"Anesthesia Transfer of Care Note    Patient: Hiro Rodríguez    Procedure(s) Performed: Procedure(s) (LRB):  TFN nailing of left hip with Synthes (Left)    Patient location: ICU    Transport from OR: Transported from OR on 2-3 L/min O2 by NC with adequate spontaneous ventilation. Continuous ECG monitoring in transport. Continuous SpO2 monitoring in transport    Post pain: adequate analgesia    Post assessment: no apparent anesthetic complications    Post vital signs: stable    Nausea/Vomiting: no nausea/vomiting    Complications: none    Transfer of care protocol was followedComments: Report to RN, questions answered      Last vitals:   Visit Vitals  BP (!) 110/51 (Patient Position: Lying)   Pulse (!) 118   Temp 37.1 °C (98.8 °F) (Axillary)   Resp (!) 21   Ht 5' 10" (1.778 m)   Wt 114.7 kg (252 lb 13.9 oz)   SpO2 95%   BMI 36.28 kg/m²     "

## 2022-11-10 NOTE — PLAN OF CARE
Pt resting comfortably. Pain managed with PRN medications. Pt made aware surgery will most likely be tomorrow afternoon. Pt is to remain NPO after midnight. POC reviewed with pt and spouse. Verbalized understanding. VSS. Will continue to monitor.     Problem: Adult Inpatient Plan of Care  Goal: Plan of Care Review  11/9/2022 1845 by Chace Philippe RN  Outcome: Ongoing, Progressing  11/9/2022 1819 by Chace Philippe RN  Outcome: Ongoing, Progressing  Goal: Patient-Specific Goal (Individualized)  11/9/2022 1845 by Chace Philippe RN  Outcome: Ongoing, Progressing  11/9/2022 1819 by Chace Philippe RN  Outcome: Ongoing, Progressing  Goal: Absence of Hospital-Acquired Illness or Injury  11/9/2022 1845 by Chace Philippe RN  Outcome: Ongoing, Progressing  11/9/2022 1819 by Chace Philippe RN  Outcome: Ongoing, Progressing  Goal: Optimal Comfort and Wellbeing  11/9/2022 1845 by Chace Philippe RN  Outcome: Ongoing, Progressing  11/9/2022 1819 by Chace Philippe RN  Outcome: Ongoing, Progressing  Goal: Readiness for Transition of Care  11/9/2022 1845 by Chace Philippe RN  Outcome: Ongoing, Progressing  11/9/2022 1819 by Chace Philippe RN  Outcome: Ongoing, Progressing

## 2022-11-11 ENCOUNTER — CLINICAL SUPPORT (OUTPATIENT)
Dept: CARDIOLOGY | Facility: HOSPITAL | Age: 77
DRG: 956 | End: 2022-11-11
Attending: INTERNAL MEDICINE
Payer: MEDICARE

## 2022-11-11 VITALS — BODY MASS INDEX: 36.08 KG/M2 | WEIGHT: 252 LBS | HEIGHT: 70 IN

## 2022-11-11 LAB
ANION GAP SERPL CALC-SCNC: 6 MMOL/L (ref 8–16)
AV INDEX (PROSTH): 0.84
AV MEAN GRADIENT: 4 MMHG
AV PEAK GRADIENT: 6 MMHG
AV VALVE AREA: 4.68 CM2
AV VELOCITY RATIO: 0.79
BASOPHILS # BLD AUTO: 0.02 K/UL (ref 0–0.2)
BASOPHILS # BLD AUTO: 0.02 K/UL (ref 0–0.2)
BASOPHILS NFR BLD: 0.1 % (ref 0–1.9)
BASOPHILS NFR BLD: 0.1 % (ref 0–1.9)
BSA FOR ECHO PROCEDURE: 2.38 M2
BUN SERPL-MCNC: 30 MG/DL (ref 8–23)
CALCIUM SERPL-MCNC: 7.5 MG/DL (ref 8.7–10.5)
CHLORIDE SERPL-SCNC: 100 MMOL/L (ref 95–110)
CO2 SERPL-SCNC: 25 MMOL/L (ref 23–29)
CREAT SERPL-MCNC: 1.1 MG/DL (ref 0.5–1.4)
CV ECHO LV RWT: 0.64 CM
DIFFERENTIAL METHOD: ABNORMAL
DIFFERENTIAL METHOD: ABNORMAL
DOP CALC AO PEAK VEL: 1.24 M/S
DOP CALC AO VTI: 17.7 CM
DOP CALC LVOT AREA: 5.6 CM2
DOP CALC LVOT DIAMETER: 2.67 CM
DOP CALC LVOT PEAK VEL: 0.98 M/S
DOP CALC LVOT STROKE VOLUME: 82.82 CM3
DOP CALCLVOT PEAK VEL VTI: 14.8 CM
ECHO LV POSTERIOR WALL: 1.24 CM (ref 0.6–1.1)
EJECTION FRACTION: 70 %
EOSINOPHIL # BLD AUTO: 0.1 K/UL (ref 0–0.5)
EOSINOPHIL # BLD AUTO: 0.1 K/UL (ref 0–0.5)
EOSINOPHIL NFR BLD: 0.5 % (ref 0–8)
EOSINOPHIL NFR BLD: 0.5 % (ref 0–8)
ERYTHROCYTE [DISTWIDTH] IN BLOOD BY AUTOMATED COUNT: 12.5 % (ref 11.5–14.5)
ERYTHROCYTE [DISTWIDTH] IN BLOOD BY AUTOMATED COUNT: 12.5 % (ref 11.5–14.5)
EST. GFR  (NO RACE VARIABLE): >60 ML/MIN/1.73 M^2
FRACTIONAL SHORTENING: 50 % (ref 28–44)
GLUCOSE SERPL-MCNC: 145 MG/DL (ref 70–110)
GLUCOSE SERPL-MCNC: 167 MG/DL (ref 70–110)
GLUCOSE SERPL-MCNC: 168 MG/DL (ref 70–110)
GLUCOSE SERPL-MCNC: 236 MG/DL (ref 70–110)
HCT VFR BLD AUTO: 28.5 % (ref 40–54)
HCT VFR BLD AUTO: 28.5 % (ref 40–54)
HGB BLD-MCNC: 9.8 G/DL (ref 14–18)
HGB BLD-MCNC: 9.8 G/DL (ref 14–18)
IMM GRANULOCYTES # BLD AUTO: 0.09 K/UL (ref 0–0.04)
IMM GRANULOCYTES # BLD AUTO: 0.09 K/UL (ref 0–0.04)
IMM GRANULOCYTES NFR BLD AUTO: 0.5 % (ref 0–0.5)
IMM GRANULOCYTES NFR BLD AUTO: 0.5 % (ref 0–0.5)
INTERVENTRICULAR SEPTUM: 1.61 CM (ref 0.6–1.1)
LEFT ATRIUM SIZE: 3.47 CM
LEFT ATRIUM VOLUME INDEX MOD: 32.6 ML/M2
LEFT ATRIUM VOLUME MOD: 74.94 CM3
LEFT INTERNAL DIMENSION IN SYSTOLE: 1.94 CM (ref 2.1–4)
LEFT VENTRICLE DIASTOLIC VOLUME INDEX: 28.92 ML/M2
LEFT VENTRICLE DIASTOLIC VOLUME: 66.52 ML
LEFT VENTRICLE MASS INDEX: 90 G/M2
LEFT VENTRICLE SYSTOLIC VOLUME INDEX: 8.8 ML/M2
LEFT VENTRICLE SYSTOLIC VOLUME: 20.27 ML
LEFT VENTRICULAR INTERNAL DIMENSION IN DIASTOLE: 3.9 CM (ref 3.5–6)
LEFT VENTRICULAR MASS: 207.13 G
LVOT MG: 1.99 MMHG
LVOT MV: 0.65 CM/S
LYMPHOCYTES # BLD AUTO: 1.9 K/UL (ref 1–4.8)
LYMPHOCYTES # BLD AUTO: 1.9 K/UL (ref 1–4.8)
LYMPHOCYTES NFR BLD: 11.6 % (ref 18–48)
LYMPHOCYTES NFR BLD: 11.6 % (ref 18–48)
MAGNESIUM SERPL-MCNC: 2.2 MG/DL (ref 1.6–2.6)
MCH RBC QN AUTO: 31.8 PG (ref 27–31)
MCH RBC QN AUTO: 31.8 PG (ref 27–31)
MCHC RBC AUTO-ENTMCNC: 34.4 G/DL (ref 32–36)
MCHC RBC AUTO-ENTMCNC: 34.4 G/DL (ref 32–36)
MCV RBC AUTO: 93 FL (ref 82–98)
MCV RBC AUTO: 93 FL (ref 82–98)
MONOCYTES # BLD AUTO: 1.1 K/UL (ref 0.3–1)
MONOCYTES # BLD AUTO: 1.1 K/UL (ref 0.3–1)
MONOCYTES NFR BLD: 6.8 % (ref 4–15)
MONOCYTES NFR BLD: 6.8 % (ref 4–15)
NEUTROPHILS # BLD AUTO: 13.5 K/UL (ref 1.8–7.7)
NEUTROPHILS # BLD AUTO: 13.5 K/UL (ref 1.8–7.7)
NEUTROPHILS NFR BLD: 80.5 % (ref 38–73)
NEUTROPHILS NFR BLD: 80.5 % (ref 38–73)
NRBC BLD-RTO: 0 /100 WBC
NRBC BLD-RTO: 0 /100 WBC
PISA TR MAX VEL: 3.8 M/S
PLATELET # BLD AUTO: 247 K/UL (ref 150–450)
PLATELET # BLD AUTO: 247 K/UL (ref 150–450)
PMV BLD AUTO: 10.3 FL (ref 9.2–12.9)
PMV BLD AUTO: 10.3 FL (ref 9.2–12.9)
POTASSIUM SERPL-SCNC: 4.2 MMOL/L (ref 3.5–5.1)
PROX AORTA: 4.22 CM
PV MV: 0.61 M/S
PV PEAK VELOCITY: 0.93 CM/S
RA PRESSURE: 3 MMHG
RA VOL SYS: 60.28 ML
RBC # BLD AUTO: 3.08 M/UL (ref 4.6–6.2)
RBC # BLD AUTO: 3.08 M/UL (ref 4.6–6.2)
RV TISSUE DOPPLER FREE WALL SYSTOLIC VELOCITY 1 (APICAL 4 CHAMBER VIEW): 0.02 CM/S
SINUS: 4.28 CM
SODIUM SERPL-SCNC: 131 MMOL/L (ref 136–145)
TR MAX PG: 58 MMHG
TRICUSPID ANNULAR PLANE SYSTOLIC EXCURSION: 1.78 CM
TV REST PULMONARY ARTERY PRESSURE: 61 MMHG
WBC # BLD AUTO: 16.77 K/UL (ref 3.9–12.7)
WBC # BLD AUTO: 16.77 K/UL (ref 3.9–12.7)

## 2022-11-11 PROCEDURE — 25000003 PHARM REV CODE 250: Performed by: INTERNAL MEDICINE

## 2022-11-11 PROCEDURE — 85025 COMPLETE CBC W/AUTO DIFF WBC: CPT | Performed by: ORTHOPAEDIC SURGERY

## 2022-11-11 PROCEDURE — 63600175 PHARM REV CODE 636 W HCPCS: Performed by: ORTHOPAEDIC SURGERY

## 2022-11-11 PROCEDURE — 25000003 PHARM REV CODE 250: Performed by: ORTHOPAEDIC SURGERY

## 2022-11-11 PROCEDURE — 97530 THERAPEUTIC ACTIVITIES: CPT

## 2022-11-11 PROCEDURE — 93306 TTE W/DOPPLER COMPLETE: CPT | Mod: 26,,, | Performed by: INTERNAL MEDICINE

## 2022-11-11 PROCEDURE — 93306 TTE W/DOPPLER COMPLETE: CPT

## 2022-11-11 PROCEDURE — 97162 PT EVAL MOD COMPLEX 30 MIN: CPT

## 2022-11-11 PROCEDURE — 94761 N-INVAS EAR/PLS OXIMETRY MLT: CPT

## 2022-11-11 PROCEDURE — 80048 BASIC METABOLIC PNL TOTAL CA: CPT | Performed by: ORTHOPAEDIC SURGERY

## 2022-11-11 PROCEDURE — 63600175 PHARM REV CODE 636 W HCPCS: Performed by: INTERNAL MEDICINE

## 2022-11-11 PROCEDURE — 93306 ECHO (CUPID ONLY): ICD-10-PCS | Mod: 26,,, | Performed by: INTERNAL MEDICINE

## 2022-11-11 PROCEDURE — 99900031 HC PATIENT EDUCATION (STAT)

## 2022-11-11 PROCEDURE — 94799 UNLISTED PULMONARY SVC/PX: CPT

## 2022-11-11 PROCEDURE — A4216 STERILE WATER/SALINE, 10 ML: HCPCS | Performed by: ORTHOPAEDIC SURGERY

## 2022-11-11 PROCEDURE — 97166 OT EVAL MOD COMPLEX 45 MIN: CPT

## 2022-11-11 PROCEDURE — 83735 ASSAY OF MAGNESIUM: CPT | Performed by: INTERNAL MEDICINE

## 2022-11-11 PROCEDURE — 20000000 HC ICU ROOM

## 2022-11-11 PROCEDURE — 99900035 HC TECH TIME PER 15 MIN (STAT)

## 2022-11-11 RX ORDER — MORPHINE SULFATE 2 MG/ML
2 INJECTION, SOLUTION INTRAMUSCULAR; INTRAVENOUS EVERY 4 HOURS PRN
Status: DISCONTINUED | OUTPATIENT
Start: 2022-11-11 | End: 2022-11-18 | Stop reason: HOSPADM

## 2022-11-11 RX ORDER — TRAZODONE HYDROCHLORIDE 50 MG/1
50 TABLET ORAL ONCE
Status: COMPLETED | OUTPATIENT
Start: 2022-11-11 | End: 2022-11-11

## 2022-11-11 RX ORDER — PROPRANOLOL HYDROCHLORIDE 20 MG/1
20 TABLET ORAL 2 TIMES DAILY
Status: DISCONTINUED | OUTPATIENT
Start: 2022-11-11 | End: 2022-11-18 | Stop reason: HOSPADM

## 2022-11-11 RX ORDER — AMIODARONE HYDROCHLORIDE 200 MG/1
200 TABLET ORAL 3 TIMES DAILY
Status: DISCONTINUED | OUTPATIENT
Start: 2022-11-11 | End: 2022-11-18 | Stop reason: HOSPADM

## 2022-11-11 RX ORDER — TRAZODONE HYDROCHLORIDE 50 MG/1
100 TABLET ORAL NIGHTLY PRN
Status: DISCONTINUED | OUTPATIENT
Start: 2022-11-11 | End: 2022-11-18 | Stop reason: HOSPADM

## 2022-11-11 RX ORDER — OXYCODONE AND ACETAMINOPHEN 10; 325 MG/1; MG/1
1 TABLET ORAL EVERY 4 HOURS PRN
Status: DISCONTINUED | OUTPATIENT
Start: 2022-11-11 | End: 2022-11-18 | Stop reason: HOSPADM

## 2022-11-11 RX ADMIN — CHLORHEXIDINE GLUCONATE 15 ML: 1.2 RINSE ORAL at 09:11

## 2022-11-11 RX ADMIN — TRAZODONE HYDROCHLORIDE 50 MG: 50 TABLET ORAL at 02:11

## 2022-11-11 RX ADMIN — OXYCODONE HYDROCHLORIDE AND ACETAMINOPHEN 1 TABLET: 10; 325 TABLET ORAL at 11:11

## 2022-11-11 RX ADMIN — INSULIN ASPART 2 UNITS: 100 INJECTION, SOLUTION INTRAVENOUS; SUBCUTANEOUS at 12:11

## 2022-11-11 RX ADMIN — HYDROCODONE BITARTRATE AND ACETAMINOPHEN 2 TABLET: 5; 325 TABLET ORAL at 09:11

## 2022-11-11 RX ADMIN — MUPIROCIN 1 G: 20 OINTMENT TOPICAL at 09:11

## 2022-11-11 RX ADMIN — HYDROCODONE BITARTRATE AND ACETAMINOPHEN 2 TABLET: 5; 325 TABLET ORAL at 12:11

## 2022-11-11 RX ADMIN — CYANOCOBALAMIN TAB 1000 MCG 2000 MCG: 1000 TAB at 09:11

## 2022-11-11 RX ADMIN — HYDROCODONE BITARTRATE AND ACETAMINOPHEN 2 TABLET: 5; 325 TABLET ORAL at 04:11

## 2022-11-11 RX ADMIN — OXYBUTYNIN CHLORIDE 10 MG: 5 TABLET, EXTENDED RELEASE ORAL at 09:11

## 2022-11-11 RX ADMIN — SODIUM CHLORIDE, PRESERVATIVE FREE 2 ML: 5 INJECTION INTRAVENOUS at 12:11

## 2022-11-11 RX ADMIN — SENNOSIDES AND DOCUSATE SODIUM 1 TABLET: 50; 8.6 TABLET ORAL at 09:11

## 2022-11-11 RX ADMIN — OXYCODONE HYDROCHLORIDE AND ACETAMINOPHEN 1 TABLET: 10; 325 TABLET ORAL at 03:11

## 2022-11-11 RX ADMIN — PIPERACILLIN SODIUM AND TAZOBACTAM SODIUM 3.38 G: 3; .375 INJECTION, POWDER, LYOPHILIZED, FOR SOLUTION INTRAVENOUS at 03:11

## 2022-11-11 RX ADMIN — LEVETIRACETAM 500 MG: 500 TABLET, FILM COATED ORAL at 09:11

## 2022-11-11 RX ADMIN — PROPRANOLOL HYDROCHLORIDE 20 MG: 20 TABLET ORAL at 03:11

## 2022-11-11 RX ADMIN — DEXTROSE 2 G: 50 INJECTION, SOLUTION INTRAVENOUS at 12:11

## 2022-11-11 RX ADMIN — AMIODARONE HYDROCHLORIDE 200 MG: 200 TABLET ORAL at 09:11

## 2022-11-11 RX ADMIN — TOPIRAMATE 50 MG: 25 TABLET, FILM COATED ORAL at 09:11

## 2022-11-11 RX ADMIN — DULOXETINE HYDROCHLORIDE 30 MG: 30 CAPSULE, DELAYED RELEASE ORAL at 09:11

## 2022-11-11 RX ADMIN — ATORVASTATIN CALCIUM 40 MG: 40 TABLET, FILM COATED ORAL at 09:11

## 2022-11-11 RX ADMIN — OXYCODONE HYDROCHLORIDE AND ACETAMINOPHEN 1 TABLET: 10; 325 TABLET ORAL at 07:11

## 2022-11-11 RX ADMIN — PROPRANOLOL HYDROCHLORIDE 20 MG: 20 TABLET ORAL at 09:11

## 2022-11-11 RX ADMIN — AMIODARONE HYDROCHLORIDE 0.5 MG/MIN: 1.8 INJECTION, SOLUTION INTRAVENOUS at 11:11

## 2022-11-11 RX ADMIN — CEFTRIAXONE SODIUM 1 G: 1 INJECTION, POWDER, FOR SOLUTION INTRAMUSCULAR; INTRAVENOUS at 11:11

## 2022-11-11 NOTE — PROGRESS NOTES
[facility]  Progress Note    Patient name: Hiro Rodríguez  MRN: [id number]  Admit Date: 11/8/2022   LOS: 1 day     SUBJECTIVE:     Principal problem: Subdural hematoma caused by concussion    Interval History:  Overnight patient developed RVR in the setting of known paroxysmal atrial fibrillation.  Patient continues to experience moderate to severe intensity left hip pain which is worse with any sort movement.  Current pain regimen is helping with his symptoms.  Denies headache, nausea/vomiting or acute vision problems.    Scheduled Meds:   atorvastatin  40 mg Oral Daily    ceFAZolin (ANCEF) IVPB  2 g Intravenous Q8H    chlorhexidine  15 mL Mouth/Throat BID    cyanocobalamin  2,000 mcg Oral Daily    DULoxetine  30 mg Oral Daily    levETIRAcetam  500 mg Oral BID    mupirocin   Nasal BID    oxybutynin  10 mg Oral Daily    piperacillin-tazobactam (ZOSYN) IVPB  3.375 g Intravenous Q8H    senna-docusate 8.6-50 mg  1 tablet Oral BID    sodium chloride 0.9%  2 mL Intravenous Q6H    topiramate  50 mg Oral Daily     Continuous Infusions:   amiodarone in dextrose 5% 1 mg/min (11/10/22 1626)    amiodarone in dextrose 5%      loperamide       PRN Meds:acetaminophen, dextrose 10%, dextrose 10%, glucagon (human recombinant), glucose, glucose, HYDROcodone-acetaminophen, HYDROmorphone, insulin aspart U-100, loperamide, magnesium oxide, magnesium oxide, melatonin, ondansetron, potassium bicarbonate, potassium bicarbonate, potassium bicarbonate, potassium, sodium phosphates, potassium, sodium phosphates, potassium, sodium phosphates    Review of patient's allergies indicates:  No Known Allergies    Review of Systems: As per interval history    OBJECTIVE:     Vital Signs (Most Recent)  Temp: 97.6 °F (36.4 °C) (11/10/22 1136)  Pulse: (!) 113 (11/10/22 1701)  Resp: 20 (11/10/22 1715)  BP: (!) 100/56 (11/10/22 1701)  SpO2: (!) 92 % (11/10/22 1701)    Vital Signs Range (Last 24H):  Temp:  [97.6 °F (36.4  °C)-99.3 °F (37.4 °C)]   Pulse:  []   Resp:  [10-40]   BP: ()/(42-72)   SpO2:  [89 %-100 %]   Arterial Line BP: (168)/(48)     I & O (Last 24H):  Intake/Output Summary (Last 24 hours) at 11/10/2022 1825  Last data filed at 11/10/2022 1822  Gross per 24 hour   Intake 5993.38 ml   Output 876 ml   Net 5117.38 ml       Physical Exam:  General: Patient resting comfortably in no acute distress. Appears as stated age. Calm  Eyes: No conjunctival injection. No scleral icterus.  ENT: Hearing grossly intact. No discharge from ears. No nasal discharge.   Neck: Supple, trachea midline. No JVD  CVS:  Irregularly irregular.  Tachycardic.  No LE edema BL  Lungs:  No tachypnea or accessory muscle use.  Clear to auscultation bilaterally  Abdomen:  Soft, nontender and nondistended.  No organomegaly  Neuro: AOx3. Moves all extremities. Follows commands. Responds appropriately   Skin:  No rash or erythema noted  MSK:  Left lower extremity in traction   : Sotelo catheter with clear yellow urine    Laboratory:  I have reviewed all pertinent lab results within the past 24 hours.  CBC:   Recent Labs   Lab 11/10/22  1122   WBC 15.94*   RBC 3.36*   HGB 10.6*   HCT 31.5*      MCV 94   MCH 31.5*   MCHC 33.7     CMP:   Recent Labs   Lab 11/10/22  1122   *   CALCIUM 7.9*   ALBUMIN 2.9*   PROT 5.8*   *   K 4.0   CO2 24      BUN 38*   CREATININE 1.4   ALKPHOS 44*   ALT 13   AST 18   BILITOT 1.2*       Diagnostic Results:  Labs: Reviewed    ASSESSMENT/PLAN:         Active Hospital Problems    Diagnosis  POA    *Subdural hematoma caused by concussion [S06.5XAA]  Yes    KAM (acute kidney injury) [N17.9]  Yes    Subarachnoid bleed [I60.9]  Yes    Closed displaced intertrochanteric fracture of left femur [S72.142A]  Yes    Prerenal azotemia [R79.89]  Yes    Subdural hemorrhage following injury without open intracranial wound and with no loss of consciousness [S06.5X0A]  Yes    Type 2 diabetes mellitus with  diabetic polyneuropathy, without long-term current use of insulin [E11.42]  Yes    Atrial fibrillation [I48.91]  Yes    Aspirin long-term use [Z79.82]  Not Applicable    Anticoagulant long-term use [Z79.01]  Not Applicable      Resolved Hospital Problems   No resolved problems to display.       Plan:   Acute on chronic right-sided subdural hematoma and acute right temporal lobe contusion after suffering a mechanical fall   Status post prothrombin concentrate complex on admission  Seen by Neurosurgery.  Non surgical management   Continue neuro checks per protocol   Hold antiplatelet and antithrombotic agents   Repeat CT head subsequent day of admission S stable   Will repeat CT head post orthopedic surgery as per recommendations from neurosurgery  Continue levetiracetam for seizure prophylaxis for 1 week     Closed displaced intertrochanteric fracture of the left femur   Status post orthopedic intervention by Dr. Phoenix  Pain control with parenteral agents   Judicious analgesia    Atrial fibrillation with RVR   Complicated by soft blood pressure   Limited response to intravenous digoxin   Started on amiodarone per protocol   Can not anticoagulate given recent intracranial bleed  Obtain echocardiogram    Leukocytosis of unclear etiology   Continue empiric antibiotics however suspicion for infectious etiology is low   Most likely reactive   Monitor with daily labs    KAM noted which could be from multifactorial etiology   Had urinary retention yesterday necessitating Sotelo catheter placement   Subsequently had improvement in creatinine  Monitor with daily labs  Avoid nephrotoxic agents    Essential hypertension   Holding all antihypertensive to soft blood pressure    VTE Risk Mitigation (From admission, onward)           Ordered     IP VTE HIGH RISK PATIENT  Once         11/09/22 0237     Place sequential compression device  Until discontinued         11/09/22 0237     Reason for No Pharmacological VTE Prophylaxis   Once        Question:  Reasons:  Answer:  Active Bleeding    11/09/22 0237                        Department Hospital Medicine  [facility]  Orlando Gonzalez MD  Date of service: 11/10/2022

## 2022-11-11 NOTE — PT/OT/SLP EVAL
Physical Therapy Evaluation    Patient Name:  Hiro Rodríguez   MRN:  [id number]    Recommendations:     Discharge Recommendations:  rehabilitation facility   Discharge Equipment Recommendations:  (TBD)   Barriers to discharge: Increased caregiver burden of care    Assessment:     Hiro Rodríguez is a 77 y.o. male admitted with a medical diagnosis of Subdural hematoma caused by concussion.  He presents with the following impairments/functional limitations:  weakness, impaired endurance, impaired self care skills, impaired functional mobility, gait instability, impaired balance, decreased lower extremity function, decreased safety awareness, pain, decreased ROM, impaired cardiopulmonary response to activity, orthopedic precautions.    Pt presented in supine and required encouragement  for t/f OOB as pt reported fear of increased pain.  Pt required frequent instructions  from therapist for pt to engage. He was pre-medicated prior to PT eval. Pt required Max A x2 for bed mobility  and t/f sit to stand requiring  verbal and tactile cueing for TTWB on L LE. Pt took a few steps with RW and Mod A x2 to t/f to W/C.    Rehab Prognosis: Good; patient would benefit from acute skilled PT services to address these deficits and reach maximum level of function.    Recent Surgery: Procedure(s) (LRB):  INSERTION, ITRAMEDULLARY SANTI, FEMUR, TROCHANTER (Left) 1 Day Post-Op    Plan:     During this hospitalization, patient to be seen daily to address the identified rehab impairments via gait training, therapeutic activities, therapeutic exercises and progress toward the following goals:    Plan of Care Expires:  12/11/22    Subjective     Chief Complaint: pain of L hip   Patient/Family Comments/goals: PLOF. Pt reported going to the gym 5 days/week  Pain/Comfort:  Pain Rating 1: 7/10  Location - Side 1: Left  Location 1: hip  Pain Addressed 1: Pre-medicate for activity    Patients cultural, spiritual, Adventism conflicts given the  current situation:      Living Environment:  Pt lives  with his wife in a [facility] w/o entry steps  Prior to admission, patients level of function was independent .  Equipment used at home: none.  DME owned (not currently used): none.  Upon discharge, patient will have assistance from facility/wife.    Objective:     Communicated with nurse prior to session.  Patient found supine with bed alarm, blood pressure cuff, peripheral IV, messina catheter  upon PT entry to room.    General Precautions: Standard, fall (impaired hearing)   Orthopedic Precautions:LLE toe touch weight bearing   Braces:    Respiratory Status: Nasal cannula, flow 2 L/min    Exams:  Cognitive Exam:  Patient is oriented to Person, Place, Time, and Situation  RLE ROM: WFL  RLE Strength: WFL  LLE ROM: WFL except hip ROM  LLE Strength: WFL except L hip weakness s/p ORIF    Functional Mobility:  Bed Mobility:     Rolling Left:  maximal assistance and of 2 persons  Rolling Right: maximal assistance and of 2 persons  Scooting: maximal assistance  Supine to Sit: maximal assistance and of 2 persons  Transfers:     Sit to Stand:  maximal assistance and of 2 persons with rolling walker  T/f to chair using RW   step turns Mod A x2  Balance: unsupported sitting balance, max A for standing balance      AM-PAC 6 CLICK MOBILITY  Total Score:11       Treatment & Education:  Pt and his wife were educated on the role of PT  for assessment, treatment with emphasis on safety and increased mobility and D/C  recommendations.     Patient left up in chair with all lines intact, call button in reach, and his wife  present.    GOALS:   Multidisciplinary Problems       Physical Therapy Goals          Problem: Physical Therapy    Goal Priority Disciplines Outcome Goal Variances Interventions   Physical Therapy Goal     PT, PT/OT      Description: Goals to be met by: 2022    Patient will increase functional independence with mobility by performin. Supine to sit with  Moderate Assistance  2. Sit to stand transfer with Moderate Assistance  3. Bed to chair transfer with Moderate Assistance using Slideboard  4. Gait  x 10  feet with Moderate Assistance and maintaining weight-bearing precaution(s) using Rolling Walker.                          History:     History reviewed. No pertinent past medical history.    Past Surgical History:   Procedure Laterality Date    INTRAMEDULLARY RODDING OF TROCHANTER OF FEMUR Left 11/10/2022    Procedure: INSERTION, ITRAMEDULLARY SANTI, FEMUR, TROCHANTER;  Surgeon: Richard Phoenix MD;  Location: [facility];  Service: Orthopedics;  Laterality: Left;       Time Tracking:     PT Received On: 11/11/22  PT Start Time: 0935     PT Stop Time: 1012  PT Total Time (min): 37 min     Billable Minutes: Evaluation 10 minutes  and Therapeutic Activity 27 minutes      11/11/2022   79.2

## 2022-11-11 NOTE — PLAN OF CARE
Problem: Oral Intake Inadequate  Goal: Improved Oral Intake  Outcome: Ongoing, Not Progressing  Intervention: Promote and Optimize Oral Intake  Flowsheets (Taken 11/11/2022 0930)  Oral Nutrition Promotion: calorie-dense liquids provided--Continue Glucerna BID     Problem: Skin Injury Risk Increased  Goal: Skin Health and Integrity  Intervention: Promote and Optimize Oral Intake  Flowsheets (Taken 11/11/2022 0930)  Oral Nutrition Promotion: calorie-dense liquids provided

## 2022-11-11 NOTE — PLAN OF CARE
Postop repeat CT head reviewed.    Both the chronic right subdural hematoma as well as acute right temporal contusions are stable in appearance when compared with prior.    Would recommend holding anticoagulation for 1 week total from a neurosurgery standpoint.    We will see patient into 4 weeks as an outpatient with repeat CT head at that time.    We will sign off at this time.  Please call with any further questions or concerns.    Jess Diaz MD  [phone]

## 2022-11-11 NOTE — PROGRESS NOTES
[facility] Medicine  Progress Note    Patient name: Hiro Rodríguez  MRN: [id number]  Admit Date: 11/8/2022   LOS: 2 days     SUBJECTIVE:     Principal problem: Subdural hematoma caused by concussion    Interval History:  No acute overnight events reported.  Endorses ongoing moderate to severe intensity left hip pain which is not adequately controlled with current pain regimen.  Also endorses insomnia.  RVR with improvement after starting amiodarone.  KAM is improving.  He denies headache or nausea/vomiting.      Scheduled Meds:   atorvastatin  40 mg Oral Daily    cefTRIAXone (ROCEPHIN) IVPB  1 g Intravenous Q24H    chlorhexidine  15 mL Mouth/Throat BID    cyanocobalamin  2,000 mcg Oral Daily    DULoxetine  30 mg Oral Daily    levETIRAcetam  500 mg Oral BID    mupirocin   Nasal BID    oxybutynin  10 mg Oral Daily    senna-docusate 8.6-50 mg  1 tablet Oral BID    sodium chloride 0.9%  2 mL Intravenous Q6H    topiramate  50 mg Oral Daily     Continuous Infusions:   amiodarone in dextrose 5% 0.5 mg/min (11/10/22 2230)    loperamide       PRN Meds:acetaminophen, dextrose 10%, dextrose 10%, glucagon (human recombinant), glucose, glucose, insulin aspart U-100, loperamide, magnesium oxide, magnesium oxide, melatonin, morphine, ondansetron, oxyCODONE-acetaminophen, potassium bicarbonate, potassium bicarbonate, potassium bicarbonate, potassium, sodium phosphates, potassium, sodium phosphates, potassium, sodium phosphates, traZODone    Review of patient's allergies indicates:  No Known Allergies    Review of Systems: As per interval history    OBJECTIVE:     Vital Signs (Most Recent)  Temp: 98.5 °F (36.9 °C) (11/11/22 0701)  Pulse: (!) 117 (11/11/22 0900)  Resp: 18 (11/11/22 0931)  BP: (!) 140/50 (11/11/22 0400)  SpO2: (!) 90 % (11/11/22 0900)    Vital Signs Range (Last 24H):  Temp:  [97.6 °F (36.4 °C)-98.7 °F (37.1 °C)]   Pulse:  []   Resp:  [0-112]   BP: ()/(48-72)   SpO2:  [87 %-98 %]    Arterial Line BP: ()/(44-59)     I & O (Last 24H):  Intake/Output Summary (Last 24 hours) at 11/11/2022 1006  Last data filed at 11/10/2022 1822  Gross per 24 hour   Intake 2851.38 ml   Output 625 ml   Net 2226.38 ml         Physical Exam:  General: Patient resting comfortably in no acute distress. Appears as stated age. Calm  Eyes: No conjunctival injection. No scleral icterus.  ENT: Hearing grossly intact. No discharge from ears. No nasal discharge.   Neck: Supple, trachea midline. No JVD  CVS:  Irregularly irregular.  Tachycardic.  No LE edema BL  Lungs:  No tachypnea or accessory muscle use.  Clear to auscultation bilaterally  Abdomen:  Soft, nontender and nondistended.  No organomegaly  Neuro: AOx3. Moves all extremities. Follows commands. Responds appropriately   Skin:  No rash or erythema noted  MSK:  Left thigh swelling noted.  : Sotelo catheter with clear yellow urine    Laboratory:  I have reviewed all pertinent lab results within the past 24 hours.  CBC:   Recent Labs   Lab 11/11/22  0321   WBC 16.77*  16.77*   RBC 3.08*  3.08*   HGB 9.8*  9.8*   HCT 28.5*  28.5*     247   MCV 93  93   MCH 31.8*  31.8*   MCHC 34.4  34.4       CMP:   Recent Labs   Lab 11/10/22  1122 11/11/22  0321   * 145*   CALCIUM 7.9* 7.5*   ALBUMIN 2.9*  --    PROT 5.8*  --    * 131*   K 4.0 4.2   CO2 24 25    100   BUN 38* 30*   CREATININE 1.4 1.1   ALKPHOS 44*  --    ALT 13  --    AST 18  --    BILITOT 1.2*  --          Diagnostic Results:  Labs: Reviewed    ASSESSMENT/PLAN:         Active Hospital Problems    Diagnosis  POA    *Subdural hematoma caused by concussion [S06.5XAA]  Yes    KAM (acute kidney injury) [N17.9]  Yes    Subarachnoid bleed [I60.9]  Yes    Closed displaced intertrochanteric fracture of left femur [S72.142A]  Yes    Prerenal azotemia [R79.89]  Yes    Subdural hemorrhage following injury without open intracranial wound and with no loss of consciousness [S06.5X0A]  Yes     Type 2 diabetes mellitus with diabetic polyneuropathy, without long-term current use of insulin [E11.42]  Yes    Atrial fibrillation [I48.91]  Yes    Aspirin long-term use [Z79.82]  Not Applicable    Anticoagulant long-term use [Z79.01]  Not Applicable      Resolved Hospital Problems   No resolved problems to display.       Plan:   Acute on chronic right-sided subdural hematoma and acute right temporal lobe contusion after suffering a mechanical fall   Status post prothrombin concentrate complex on admission  Seen by Neurosurgery.  Non surgical management   Continue neuro checks per protocol   Hold antiplatelet and antithrombotic agents   Repeat CT head post surgery with improvement   Continue levetiracetam for seizure prophylaxis for 1 week - stop date 11/16     Closed displaced intertrochanteric fracture of the left femur   Status post orthopedic intervention by Dr. Phoenix on 11/10  Pain control with parenteral agents   Judicious analgesia    Atrial fibrillation with RVR   Complicated by soft blood pressure   Limited response to intravenous digoxin   Continue amiodarone per protocol   Can not anticoagulate given recent intracranial bleed  Obtain echocardiogram    Leukocytosis of unclear etiology   Continue empiric antibiotics however suspicion for infectious etiology is low   Most likely reactive   Switched to ceftriaxone from pip-tazo  Monitor with daily labs    KAM noted which could be from multifactorial etiology - improving   Had urinary retention yesterday necessitating Sotelo catheter placement   Subsequently had improvement in creatinine  Monitor with daily labs  Avoid nephrotoxic agents    Essential hypertension   Holding all antihypertensive to soft blood pressure  Restart as able     VTE Risk Mitigation (From admission, onward)           Ordered     IP VTE HIGH RISK PATIENT  Once         11/09/22 0237     Place sequential compression device  Until discontinued         11/09/22 0237     Reason for No  Pharmacological VTE Prophylaxis  Once        Question:  Reasons:  Answer:  Active Bleeding    11/09/22 0237                        Department Hospital Medicine  [facility]  Orlando Gonzalez MD  Date of service: 11/11/2022

## 2022-11-11 NOTE — CARE UPDATE
Noted recommendation from therapy for inpatient rehab. Referral sent to [facility] via [facility] per request of Swati at [facility].

## 2022-11-11 NOTE — PT/OT/SLP PROGRESS
Physical Therapy Treatment    Patient Name:  Hiro Rodríguez   MRN:  [id number]    Recommendations:     Discharge Recommendations:  rehabilitation facility   Discharge Equipment Recommendations:  (TBD)   Barriers to discharge:  Increased caregiver burden of ca    Assessment:     Hiro Rodríguez is a 77 y.o. male admitted with a medical diagnosis of Subdural hematoma caused by concussion.  He presents with the following impairments/functional limitations:  weakness, impaired endurance, impaired self care skills, impaired functional mobility, gait instability, impaired balance, decreased lower extremity function, decreased safety awareness, pain, decreased ROM, impaired cardiopulmonary response to activity, orthopedic precautions     Pt presented in W/C requesting to return to bed. Pt required Max A x4 and use of slide board to return to bed as pt was fearful and did not follow instructions. Earlier this morning pt t/fed to W/C using RW and Mod A x2 as pt took a few teresa steps.    Rehab Prognosis: Good; patient would benefit from acute skilled PT services to address these deficits and reach maximum level of function.    Recent Surgery: Procedure(s) (LRB):  INSERTION, ITRAMEDULLARY SANTI, FEMUR, TROCHANTER (Left) 1 Day Post-Op    Plan:     During this hospitalization, patient to be seen daily to address the identified rehab impairments via gait training, therapeutic activities, therapeutic exercises and progress toward the following goals:    Plan of Care Expires:  12/11/22    Subjective     Chief Complaint: pain  Patient/Family Comments/goals: PLO  Pain/Comfort:  Pain Rating 1: 7/10  Location - Side 1: Left  Location 1: hip  Pain Addressed 1: Pre-medicate for activity      Objective:     Communicated with nurse prior to session.  Patient found up in chair with bed alarm, blood pressure cuff, peripheral IV, messina catheter upon PT entry to room.     General Precautions: Standard, fall (impaired hearing)   Orthopedic  Precautions:LLE toe touch weight bearing   Braces:    Respiratory Status: Nasal cannula, flow 2 L/min     Functional Mobility:  Transfers:     W/C to bed  with  maximal assistance and of 4 persons with  slide board        AM-PAC 6 CLICK MOBILITY  Turning over in bed (including adjusting bedclothes, sheets and blankets)?: 2  Sitting down on and standing up from a chair with arms (e.g., wheelchair, bedside commode, etc.): 2  Moving from lying on back to sitting on the side of the bed?: 2  Moving to and from a bed to a chair (including a wheelchair)?: 2  Need to walk in hospital room?: 2  Climbing 3-5 steps with a railing?: 1  Basic Mobility Total Score: 11       Treatment & Education:  Educated on  safety, POC , goals of pain management    Patient left supine with all lines intact, call button in reach, bed alarm on, and his wife present..    GOALS:   Multidisciplinary Problems       Physical Therapy Goals          Problem: Physical Therapy    Goal Priority Disciplines Outcome Goal Variances Interventions   Physical Therapy Goal     PT, PT/OT      Description: Goals to be met by: 2022    Patient will increase functional independence with mobility by performin. Supine to sit with Moderate Assistance  2. Sit to stand transfer with Moderate Assistance  3. Bed to chair transfer with Moderate Assistance using Slideboard  4. Gait  x 10  feet with Moderate Assistance and maintaining weight-bearing precaution(s) using Rolling Walker.                          Time Tracking:     PT Received On: 22  PT Start Time: 1055     PT Stop Time: 1122  PT Total Time (min): 27 min     Billable Minutes: Therapeutic Activity 27 minutes    Treatment Type: Evaluation  PT/PTA: PT           2022

## 2022-11-11 NOTE — PLAN OF CARE
11/10/22 2100   Patient Assessment/Suction   Level of Consciousness (AVPU) alert   Respiratory Effort Unlabored   Expansion/Accessory Muscles/Retractions expansion symmetric   All Lung Fields Breath Sounds clear   PRE-TX-O2   O2 Device (Oxygen Therapy) room air   SpO2 95 %   Pulse Oximetry Type Continuous   $ Pulse Oximetry - Multiple Charge Pulse Oximetry - Multiple   Education   $ Education DME Oxygen;15 min

## 2022-11-11 NOTE — PT/OT/SLP EVAL
Occupational Therapy   Evaluation    Name: Hiro Rodríguez  MRN: [id number]  Admitting Diagnosis:  Subdural hematoma caused by concussion  Recent Surgery: Procedure(s) (LRB):  INSERTION, ITRAMEDULLARY SANTI, FEMUR, TROCHANTER (Left) 1 Day Post-Op    Recommendations:     Discharge Recommendations: rehabilitation facility  Discharge Equipment Recommendations:  other (see comments) (TBD)  Barriers to discharge:  None    Assessment:     Hiro Rodríguez is a 77 y.o. male with a medical diagnosis of Subdural hematoma caused by concussion.  Performance deficits affecting function: weakness, impaired endurance, impaired self care skills, impaired functional mobility, gait instability, impaired balance, impaired cardiopulmonary response to activity, decreased lower extremity function, decreased safety awareness, pain, orthopedic precautions.  Patient was frustrated physical therapy got him out of bed and it is unlikely he will stay up in the chair for lunch.    Rehab Prognosis: Good; patient would benefit from acute skilled OT services to address these deficits and reach maximum level of function.       Plan:     Patient to be seen 6 x/week to address the above listed problems via self-care/home management, therapeutic activities, therapeutic exercises  Plan of Care Expires: 12/11/22  Plan of Care Reviewed with: patient    Subjective     Chief Complaint: patient reports left hip pain  Patient/Family Comments/goals: wants to go home    Occupational Profile:  Living Environment: lives with spouse in single story home without entry steps  Previous level of function: independent with self care  Equipment Used at Home:  none  Assistance upon Discharge: spouse will be available for assistance    Pain/Comfort:  Pain Rating 1: 7/10  Location - Side 1: Left  Location 1: hip  Pain Addressed 1: Pre-medicate for activity  Pain Rating Post-Intervention 1: 7/10    Patients cultural, spiritual, Orthodox conflicts given the current  situation: no    Objective:     Communicated with: nurse prior to session.  Patient found up in chair with messina catheter, peripheral IV upon OT entry to room.    General Precautions: Standard, fall   Orthopedic Precautions:LLE toe touch weight bearing   Braces: N/A  Respiratory Status: Room air    Occupational Performance:    Activities of Daily Living:  Feeding:  stand by assistance/set-up for eating fruit cup while seated in chair  Grooming: stand by assistance/set-up for washing face and hands    Cognitive/Visual Perceptual:  Cognitive/Psychosocial Skills:     -       Oriented to: Person, Place, Time, and Situation   -       Follows Commands/attention:Follows multistep  commands  -       Communication: clear/fluent  -       Memory: No Deficits noted  -       Safety awareness/insight to disability: impaired   -       Mood/Affect/Coping skills/emotional control: Appropriate to situation and Agitated    Physical Exam:  Upper Extremity Range of Motion:     -       Right Upper Extremity: WNL  -       Left Upper Extremity: WNL  Upper Extremity Strength:    -       Right Upper Extremity: WNL  -       Left Upper Extremity: WNL   Strength:    -       Right Upper Extremity: WNL  -       Left Upper Extremity: WNL  Fine Motor Coordination:    -       Intact    AMPAC 6 Click ADL:  AMPAC Total Score: 16     Treatment & Education:  Patient was educated on role of OT/POC, importance of getting out of bed and mobility, reviewed WB precautions and safety during transfers, patient demonstrated use of call bell.     Patient left up in chair with all lines intact and call button in reach    GOALS:     Goals to be met by: 12/11/2022     Patient will increase functional independence with ADLs by performing:    UE Dressing with Supervision.  LE Dressing with Minimal Assistance.  Grooming while seated with Supervision.  Toileting from toilet with Minimal Assistance for hygiene and clothing management.   Toilet transfer to toilet  with Minimal Assistance.      History:     History reviewed. No pertinent past medical history.      Past Surgical History:   Procedure Laterality Date    INTRAMEDULLARY RODDING OF TROCHANTER OF FEMUR Left 11/10/2022    Procedure: INSERTION, ITRAMEDULLARY SANTI, FEMUR, TROCHANTER;  Surgeon: Richard Phoenix MD;  Location: [facility];  Service: Orthopedics;  Laterality: Left;       Time Tracking:     OT Date of Treatment: 11/11/22  OT Start Time: 1013  OT Stop Time: 1025  OT Total Time (min): 12 min    Billable Minutes:Evaluation 12    11/11/2022

## 2022-11-12 LAB — GLUCOSE SERPL-MCNC: 129 MG/DL (ref 70–110)

## 2022-11-12 PROCEDURE — 94761 N-INVAS EAR/PLS OXIMETRY MLT: CPT

## 2022-11-12 PROCEDURE — 20000000 HC ICU ROOM

## 2022-11-12 PROCEDURE — 25000003 PHARM REV CODE 250: Performed by: INTERNAL MEDICINE

## 2022-11-12 PROCEDURE — 94799 UNLISTED PULMONARY SVC/PX: CPT

## 2022-11-12 PROCEDURE — 99900031 HC PATIENT EDUCATION (STAT)

## 2022-11-12 PROCEDURE — 63600175 PHARM REV CODE 636 W HCPCS: Performed by: INTERNAL MEDICINE

## 2022-11-12 PROCEDURE — A4216 STERILE WATER/SALINE, 10 ML: HCPCS | Performed by: ORTHOPAEDIC SURGERY

## 2022-11-12 PROCEDURE — 25000003 PHARM REV CODE 250: Performed by: ORTHOPAEDIC SURGERY

## 2022-11-12 PROCEDURE — 99900035 HC TECH TIME PER 15 MIN (STAT)

## 2022-11-12 RX ORDER — METOPROLOL TARTRATE 25 MG/1
25 TABLET, FILM COATED ORAL 2 TIMES DAILY
Status: DISCONTINUED | OUTPATIENT
Start: 2022-11-12 | End: 2022-11-18 | Stop reason: HOSPADM

## 2022-11-12 RX ORDER — LORAZEPAM 0.5 MG/1
0.5 TABLET ORAL EVERY 6 HOURS PRN
Status: DISCONTINUED | OUTPATIENT
Start: 2022-11-12 | End: 2022-11-18 | Stop reason: HOSPADM

## 2022-11-12 RX ADMIN — LEVETIRACETAM 500 MG: 500 TABLET, FILM COATED ORAL at 10:11

## 2022-11-12 RX ADMIN — DULOXETINE HYDROCHLORIDE 30 MG: 30 CAPSULE, DELAYED RELEASE ORAL at 08:11

## 2022-11-12 RX ADMIN — OXYCODONE HYDROCHLORIDE AND ACETAMINOPHEN 1 TABLET: 10; 325 TABLET ORAL at 12:11

## 2022-11-12 RX ADMIN — CHLORHEXIDINE GLUCONATE 15 ML: 1.2 RINSE ORAL at 08:11

## 2022-11-12 RX ADMIN — MUPIROCIN 1 G: 20 OINTMENT TOPICAL at 10:11

## 2022-11-12 RX ADMIN — AMIODARONE HYDROCHLORIDE 200 MG: 200 TABLET ORAL at 08:11

## 2022-11-12 RX ADMIN — SODIUM CHLORIDE, PRESERVATIVE FREE 2 ML: 5 INJECTION INTRAVENOUS at 06:11

## 2022-11-12 RX ADMIN — OXYCODONE HYDROCHLORIDE AND ACETAMINOPHEN 1 TABLET: 10; 325 TABLET ORAL at 04:11

## 2022-11-12 RX ADMIN — APIXABAN 5 MG: 5 TABLET, FILM COATED ORAL at 10:11

## 2022-11-12 RX ADMIN — AMIODARONE HYDROCHLORIDE 200 MG: 200 TABLET ORAL at 10:11

## 2022-11-12 RX ADMIN — METOPROLOL TARTRATE 25 MG: 25 TABLET, FILM COATED ORAL at 10:11

## 2022-11-12 RX ADMIN — Medication 6 MG: at 10:11

## 2022-11-12 RX ADMIN — CHLORHEXIDINE GLUCONATE 15 ML: 1.2 RINSE ORAL at 10:11

## 2022-11-12 RX ADMIN — LORAZEPAM 0.5 MG: 0.5 TABLET ORAL at 09:11

## 2022-11-12 RX ADMIN — SODIUM CHLORIDE, PRESERVATIVE FREE 2 ML: 5 INJECTION INTRAVENOUS at 12:11

## 2022-11-12 RX ADMIN — TRAZODONE HYDROCHLORIDE 100 MG: 50 TABLET ORAL at 12:11

## 2022-11-12 RX ADMIN — CYANOCOBALAMIN TAB 1000 MCG 2000 MCG: 1000 TAB at 08:11

## 2022-11-12 RX ADMIN — MUPIROCIN 1 G: 20 OINTMENT TOPICAL at 08:11

## 2022-11-12 RX ADMIN — OXYCODONE HYDROCHLORIDE AND ACETAMINOPHEN 1 TABLET: 10; 325 TABLET ORAL at 08:11

## 2022-11-12 RX ADMIN — SENNOSIDES AND DOCUSATE SODIUM 1 TABLET: 50; 8.6 TABLET ORAL at 10:11

## 2022-11-12 RX ADMIN — SENNOSIDES AND DOCUSATE SODIUM 1 TABLET: 50; 8.6 TABLET ORAL at 08:11

## 2022-11-12 RX ADMIN — PROPRANOLOL HYDROCHLORIDE 20 MG: 20 TABLET ORAL at 08:11

## 2022-11-12 RX ADMIN — TOPIRAMATE 50 MG: 25 TABLET, FILM COATED ORAL at 08:11

## 2022-11-12 RX ADMIN — PROPRANOLOL HYDROCHLORIDE 20 MG: 20 TABLET ORAL at 10:11

## 2022-11-12 RX ADMIN — OXYCODONE HYDROCHLORIDE AND ACETAMINOPHEN 1 TABLET: 10; 325 TABLET ORAL at 10:11

## 2022-11-12 RX ADMIN — LEVETIRACETAM 500 MG: 500 TABLET, FILM COATED ORAL at 08:11

## 2022-11-12 RX ADMIN — CEFTRIAXONE SODIUM 1 G: 1 INJECTION, POWDER, FOR SOLUTION INTRAMUSCULAR; INTRAVENOUS at 11:11

## 2022-11-12 RX ADMIN — ATORVASTATIN CALCIUM 40 MG: 40 TABLET, FILM COATED ORAL at 10:11

## 2022-11-12 RX ADMIN — OXYBUTYNIN CHLORIDE 10 MG: 5 TABLET, EXTENDED RELEASE ORAL at 08:11

## 2022-11-12 RX ADMIN — AMIODARONE HYDROCHLORIDE 200 MG: 200 TABLET ORAL at 03:11

## 2022-11-12 NOTE — PROGRESS NOTES
[facility] Medicine  Progress Note    Patient name: Hiro Rodríguez  MRN: [id number]  Admit Date: 11/8/2022   LOS: 3 days     SUBJECTIVE:     Principal problem: Subdural hematoma caused by concussion    Interval History:  No acute overnight events reported.  Blood pressure trending high since last night.  Left hip pain is worse today and seeks a break from PT today. Heart rate is improved.       Scheduled Meds:   amiodarone  200 mg Oral TID    atorvastatin  40 mg Oral Daily    cefTRIAXone (ROCEPHIN) IVPB  1 g Intravenous Q24H    chlorhexidine  15 mL Mouth/Throat BID    cyanocobalamin  2,000 mcg Oral Daily    DULoxetine  30 mg Oral Daily    levETIRAcetam  500 mg Oral BID    mupirocin   Nasal BID    oxybutynin  10 mg Oral Daily    propranoloL  20 mg Oral BID    senna-docusate 8.6-50 mg  1 tablet Oral BID    sodium chloride 0.9%  2 mL Intravenous Q6H    topiramate  50 mg Oral Daily     Continuous Infusions:   loperamide       PRN Meds:acetaminophen, dextrose 10%, dextrose 10%, glucagon (human recombinant), glucose, glucose, insulin aspart U-100, loperamide, LORazepam, magnesium oxide, magnesium oxide, melatonin, morphine, ondansetron, oxyCODONE-acetaminophen, potassium bicarbonate, potassium bicarbonate, potassium bicarbonate, potassium, sodium phosphates, potassium, sodium phosphates, potassium, sodium phosphates, traZODone    Review of patient's allergies indicates:  No Known Allergies    Review of Systems: As per interval history    OBJECTIVE:     Vital Signs (Most Recent)  Temp: 98.5 °F (36.9 °C) (11/12/22 0730)  Pulse: 90 (11/12/22 0730)  Resp: 20 (11/12/22 0840)  BP: 120/66 (11/12/22 0101)  SpO2: 95 % (11/12/22 0730)    Vital Signs Range (Last 24H):  Temp:  [97.9 °F (36.6 °C)-98.5 °F (36.9 °C)]   Pulse:  []   Resp:  [16-24]   BP: (119-192)/()   SpO2:  [87 %-97 %]     I & O (Last 24H):  Intake/Output Summary (Last 24 hours) at 11/12/2022 1005  Last data filed at 11/12/2022  0629  Gross per 24 hour   Intake --   Output 500 ml   Net -500 ml         Physical Exam:  General: Patient resting comfortably in no acute distress. Appears as stated age. Calm  Eyes: No conjunctival injection. No scleral icterus.  ENT: Hearing grossly intact. No discharge from ears. No nasal discharge.   CVS:  RRR. No LE edema BL  Lungs:  No tachypnea or accessory muscle use.  Clear to auscultation bilaterally  Abdomen:  Soft, nontender and nondistended.  No organomegaly  Neuro: AOx3. Moves all extremities. Follows commands. Responds appropriately   Skin:  No rash or erythema noted  MSK:  Left thigh swelling noted.  : Sotelo catheter with clear yellow urine    Laboratory:  I have reviewed all pertinent lab results within the past 24 hours.  CBC:   Recent Labs   Lab 11/11/22  0321   WBC 16.77*  16.77*   RBC 3.08*  3.08*   HGB 9.8*  9.8*   HCT 28.5*  28.5*     247   MCV 93  93   MCH 31.8*  31.8*   MCHC 34.4  34.4       CMP:   Recent Labs   Lab 11/10/22  1122 11/11/22  0321   * 145*   CALCIUM 7.9* 7.5*   ALBUMIN 2.9*  --    PROT 5.8*  --    * 131*   K 4.0 4.2   CO2 24 25    100   BUN 38* 30*   CREATININE 1.4 1.1   ALKPHOS 44*  --    ALT 13  --    AST 18  --    BILITOT 1.2*  --          Diagnostic Results:  Labs: Reviewed    ASSESSMENT/PLAN:         Active Hospital Problems    Diagnosis  POA    *Subdural hematoma caused by concussion [S06.5XAA]  Yes    KAM (acute kidney injury) [N17.9]  Yes    Subarachnoid bleed [I60.9]  Yes    Closed displaced intertrochanteric fracture of left femur [S72.142A]  Yes    Prerenal azotemia [R79.89]  Yes    Subdural hemorrhage following injury without open intracranial wound and with no loss of consciousness [S06.5X0A]  Yes    Type 2 diabetes mellitus with diabetic polyneuropathy, without long-term current use of insulin [E11.42]  Yes    Atrial fibrillation [I48.91]  Yes    Aspirin long-term use [Z79.82]  Not Applicable    Anticoagulant long-term use  [Z79.01]  Not Applicable      Resolved Hospital Problems   No resolved problems to display.       Plan:   Acute on chronic right-sided subdural hematoma and acute right temporal lobe contusion after suffering a mechanical fall   Status post prothrombin concentrate complex on admission  Seen by Neurosurgery.  Non surgical management   Continue neuro checks per protocol   Hold antiplatelet and antithrombotic agents   Repeat CT head post surgery with improvement   Continue levetiracetam for seizure prophylaxis for 1 week - stop date 11/16     Closed displaced intertrochanteric fracture of the left femur   Status post orthopedic intervention by Dr. Phoenix on 11/10  Pain control with parenteral agents   Judicious analgesia    Atrial fibrillation with RVR   Complicated by soft blood pressure   Limited response to intravenous digoxin   Continue amiodarone - change to p.o.  Can not anticoagulate given recent intracranial bleed  Echo with EF of 70%    Leukocytosis of unclear etiology   Continue empiric antibiotics however suspicion for infectious etiology is low   Most likely reactive   Switched to ceftriaxone from pip-tazo  Monitor with daily labs    KAM noted which could be from multifactorial etiology - improving   Had urinary retention on admission necessitating Sotelo catheter placement   Subsequently had improvement in creatinine  Monitor with daily labs  Avoid nephrotoxic agents    Essential hypertension   Restart metoprolol tartrate   Hold ACE inhibitor due to KAM    VTE Risk Mitigation (From admission, onward)           Ordered     IP VTE HIGH RISK PATIENT  Once         11/09/22 0237     Place sequential compression device  Until discontinued         11/09/22 0237     Reason for No Pharmacological VTE Prophylaxis  Once        Question:  Reasons:  Answer:  Active Bleeding    11/09/22 0237                        Department Hospital Medicine  Count includes the [facility]  Orlando Gonzalez MD  Date of service: 11/12/2022

## 2022-11-12 NOTE — PT/OT/SLP PROGRESS
Physical Therapy      Patient Name:  Hiro Rodríguez   MRN:  [id number]    Patient not seen today secondary to Nurse/ JODY hold. Will follow-up 11/13/22.

## 2022-11-12 NOTE — PLAN OF CARE
11/11/22 6894   Patient Assessment/Suction   Level of Consciousness (AVPU) alert   Respiratory Effort Unlabored   Expansion/Accessory Muscles/Retractions expansion symmetric   All Lung Fields Breath Sounds clear   PRE-TX-O2   O2 Device (Oxygen Therapy) room air   SpO2 96 %   Pulse Oximetry Type Continuous   $ Pulse Oximetry - Multiple Charge Pulse Oximetry - Multiple   Education   $ Education DME Oxygen;15 min

## 2022-11-12 NOTE — PLAN OF CARE
"    ICU care plan note    Dx: Subdural hematoma caused by concussion    Shift Events: No acute distress noted. Repeated requests for pain medication and ice packs. Unable to draw or flush midline this am to collect morning labs, phlebotomy to come draw labs.     While attempting to obtain labs, pt endorses HA and requested ice pack for his head.     Goals of Care: pain control; PT    Neuro: AAO x4    Vital Signs: /66   Pulse 86   Temp 97.9 °F (36.6 °C)   Resp 19   Ht 5' 10" (1.778 m)   Wt 114.7 kg (252 lb 13.9 oz)   SpO2 96%   BMI 36.28 kg/m²     Respiratory: Room Air    Diet: Diabetic Diet    Gtts:     Urine Output: Urinary Catheter 600  cc/shift    Drains:      Labs/Accuchecks: pending collection.    Skin: drsg to L hip site C/D/I. No new skin breakdown noted.   Pt refused to remove icepacks from left hip to left knee.         Plan of care reviewed with patient, Hiro Rodríguez , verbalized understanding. Patient progressing toward goals of care. NAEON. Safety measures in place and maintained throughout shift.    "

## 2022-11-12 NOTE — PT/OT/SLP PROGRESS
Occupational Therapy      Patient Name:  Hiro Rodríguez   MRN:  [id number]    Patient not seen today secondary to Nurse/ JODY hold.     11/12/2022

## 2022-11-13 LAB
ALBUMIN SERPL BCP-MCNC: 2.6 G/DL (ref 3.5–5.2)
ALP SERPL-CCNC: 59 U/L (ref 55–135)
ALT SERPL W/O P-5'-P-CCNC: 13 U/L (ref 10–44)
ANION GAP SERPL CALC-SCNC: 7 MMOL/L (ref 8–16)
AST SERPL-CCNC: 15 U/L (ref 10–40)
BASOPHILS # BLD AUTO: 0.01 K/UL (ref 0–0.2)
BASOPHILS # BLD AUTO: 0.05 K/UL (ref 0–0.2)
BASOPHILS NFR BLD: 0.1 % (ref 0–1.9)
BASOPHILS NFR BLD: 0.3 % (ref 0–1.9)
BILIRUB SERPL-MCNC: 1 MG/DL (ref 0.1–1)
BUN SERPL-MCNC: 22 MG/DL (ref 8–23)
CALCIUM SERPL-MCNC: 8.1 MG/DL (ref 8.7–10.5)
CHLORIDE SERPL-SCNC: 97 MMOL/L (ref 95–110)
CO2 SERPL-SCNC: 26 MMOL/L (ref 23–29)
CREAT SERPL-MCNC: 0.7 MG/DL (ref 0.5–1.4)
DIFFERENTIAL METHOD: ABNORMAL
DIFFERENTIAL METHOD: ABNORMAL
EOSINOPHIL # BLD AUTO: 0 K/UL (ref 0–0.5)
EOSINOPHIL # BLD AUTO: 0.3 K/UL (ref 0–0.5)
EOSINOPHIL NFR BLD: 0.1 % (ref 0–8)
EOSINOPHIL NFR BLD: 1.9 % (ref 0–8)
ERYTHROCYTE [DISTWIDTH] IN BLOOD BY AUTOMATED COUNT: 12.6 % (ref 11.5–14.5)
ERYTHROCYTE [DISTWIDTH] IN BLOOD BY AUTOMATED COUNT: 12.7 % (ref 11.5–14.5)
EST. GFR  (NO RACE VARIABLE): >60 ML/MIN/1.73 M^2
GLUCOSE SERPL-MCNC: 124 MG/DL (ref 70–110)
GLUCOSE SERPL-MCNC: 153 MG/DL (ref 70–110)
GLUCOSE SERPL-MCNC: 167 MG/DL (ref 70–110)
HCT VFR BLD AUTO: 31.3 % (ref 40–54)
HCT VFR BLD AUTO: 34.4 % (ref 40–54)
HGB BLD-MCNC: 10.1 G/DL (ref 14–18)
HGB BLD-MCNC: 11.3 G/DL (ref 14–18)
IMM GRANULOCYTES # BLD AUTO: 0.13 K/UL (ref 0–0.04)
IMM GRANULOCYTES # BLD AUTO: 0.14 K/UL (ref 0–0.04)
IMM GRANULOCYTES NFR BLD AUTO: 0.9 % (ref 0–0.5)
IMM GRANULOCYTES NFR BLD AUTO: 0.9 % (ref 0–0.5)
LYMPHOCYTES # BLD AUTO: 1.3 K/UL (ref 1–4.8)
LYMPHOCYTES # BLD AUTO: 2 K/UL (ref 1–4.8)
LYMPHOCYTES NFR BLD: 13.5 % (ref 18–48)
LYMPHOCYTES NFR BLD: 8.2 % (ref 18–48)
MCH RBC QN AUTO: 31 PG (ref 27–31)
MCH RBC QN AUTO: 31.2 PG (ref 27–31)
MCHC RBC AUTO-ENTMCNC: 32.3 G/DL (ref 32–36)
MCHC RBC AUTO-ENTMCNC: 32.8 G/DL (ref 32–36)
MCV RBC AUTO: 94 FL (ref 82–98)
MCV RBC AUTO: 97 FL (ref 82–98)
MONOCYTES # BLD AUTO: 0.8 K/UL (ref 0.3–1)
MONOCYTES # BLD AUTO: 1.2 K/UL (ref 0.3–1)
MONOCYTES NFR BLD: 4.7 % (ref 4–15)
MONOCYTES NFR BLD: 8.6 % (ref 4–15)
NEUTROPHILS # BLD AUTO: 10.8 K/UL (ref 1.8–7.7)
NEUTROPHILS # BLD AUTO: 13.7 K/UL (ref 1.8–7.7)
NEUTROPHILS NFR BLD: 74.8 % (ref 38–73)
NEUTROPHILS NFR BLD: 86 % (ref 38–73)
NRBC BLD-RTO: 0 /100 WBC
NRBC BLD-RTO: 0 /100 WBC
PLATELET # BLD AUTO: 259 K/UL (ref 150–450)
PLATELET # BLD AUTO: 328 K/UL (ref 150–450)
PMV BLD AUTO: 10.3 FL (ref 9.2–12.9)
PMV BLD AUTO: 9.8 FL (ref 9.2–12.9)
POTASSIUM SERPL-SCNC: 4 MMOL/L (ref 3.5–5.1)
PROT SERPL-MCNC: 5.6 G/DL (ref 6–8.4)
RBC # BLD AUTO: 3.24 M/UL (ref 4.6–6.2)
RBC # BLD AUTO: 3.65 M/UL (ref 4.6–6.2)
SODIUM SERPL-SCNC: 130 MMOL/L (ref 136–145)
WBC # BLD AUTO: 14.44 K/UL (ref 3.9–12.7)
WBC # BLD AUTO: 15.87 K/UL (ref 3.9–12.7)

## 2022-11-13 PROCEDURE — 36415 COLL VENOUS BLD VENIPUNCTURE: CPT | Performed by: INTERNAL MEDICINE

## 2022-11-13 PROCEDURE — 25000003 PHARM REV CODE 250: Performed by: ORTHOPAEDIC SURGERY

## 2022-11-13 PROCEDURE — A4216 STERILE WATER/SALINE, 10 ML: HCPCS | Performed by: ORTHOPAEDIC SURGERY

## 2022-11-13 PROCEDURE — 85025 COMPLETE CBC W/AUTO DIFF WBC: CPT | Performed by: ORTHOPAEDIC SURGERY

## 2022-11-13 PROCEDURE — C9113 INJ PANTOPRAZOLE SODIUM, VIA: HCPCS | Performed by: INTERNAL MEDICINE

## 2022-11-13 PROCEDURE — 25000003 PHARM REV CODE 250: Performed by: INTERNAL MEDICINE

## 2022-11-13 PROCEDURE — 63600175 PHARM REV CODE 636 W HCPCS: Performed by: ORTHOPAEDIC SURGERY

## 2022-11-13 PROCEDURE — 85025 COMPLETE CBC W/AUTO DIFF WBC: CPT | Mod: 91 | Performed by: INTERNAL MEDICINE

## 2022-11-13 PROCEDURE — 80053 COMPREHEN METABOLIC PANEL: CPT | Performed by: INTERNAL MEDICINE

## 2022-11-13 PROCEDURE — 94761 N-INVAS EAR/PLS OXIMETRY MLT: CPT

## 2022-11-13 PROCEDURE — 36415 COLL VENOUS BLD VENIPUNCTURE: CPT | Performed by: ORTHOPAEDIC SURGERY

## 2022-11-13 PROCEDURE — 21400001 HC TELEMETRY ROOM

## 2022-11-13 PROCEDURE — 63600175 PHARM REV CODE 636 W HCPCS: Performed by: INTERNAL MEDICINE

## 2022-11-13 RX ORDER — PANTOPRAZOLE SODIUM 40 MG/10ML
40 INJECTION, POWDER, LYOPHILIZED, FOR SOLUTION INTRAVENOUS DAILY
Status: DISCONTINUED | OUTPATIENT
Start: 2022-11-13 | End: 2022-11-13

## 2022-11-13 RX ORDER — PANTOPRAZOLE SODIUM 40 MG/10ML
40 INJECTION, POWDER, LYOPHILIZED, FOR SOLUTION INTRAVENOUS 2 TIMES DAILY
Status: DISCONTINUED | OUTPATIENT
Start: 2022-11-14 | End: 2022-11-15

## 2022-11-13 RX ADMIN — SODIUM CHLORIDE, PRESERVATIVE FREE 2 ML: 5 INJECTION INTRAVENOUS at 12:11

## 2022-11-13 RX ADMIN — OXYCODONE HYDROCHLORIDE AND ACETAMINOPHEN 1 TABLET: 10; 325 TABLET ORAL at 03:11

## 2022-11-13 RX ADMIN — OXYCODONE HYDROCHLORIDE AND ACETAMINOPHEN 1 TABLET: 10; 325 TABLET ORAL at 01:11

## 2022-11-13 RX ADMIN — DULOXETINE HYDROCHLORIDE 30 MG: 30 CAPSULE, DELAYED RELEASE ORAL at 07:11

## 2022-11-13 RX ADMIN — LEVETIRACETAM 500 MG: 500 TABLET, FILM COATED ORAL at 07:11

## 2022-11-13 RX ADMIN — APIXABAN 5 MG: 5 TABLET, FILM COATED ORAL at 07:11

## 2022-11-13 RX ADMIN — CHLORHEXIDINE GLUCONATE 15 ML: 1.2 RINSE ORAL at 07:11

## 2022-11-13 RX ADMIN — MUPIROCIN 1 G: 20 OINTMENT TOPICAL at 07:11

## 2022-11-13 RX ADMIN — OXYCODONE HYDROCHLORIDE AND ACETAMINOPHEN 1 TABLET: 10; 325 TABLET ORAL at 07:11

## 2022-11-13 RX ADMIN — TOPIRAMATE 50 MG: 25 TABLET, FILM COATED ORAL at 07:11

## 2022-11-13 RX ADMIN — OXYBUTYNIN CHLORIDE 10 MG: 5 TABLET, EXTENDED RELEASE ORAL at 07:11

## 2022-11-13 RX ADMIN — PANTOPRAZOLE SODIUM 40 MG: 40 INJECTION, POWDER, FOR SOLUTION INTRAVENOUS at 06:11

## 2022-11-13 RX ADMIN — MORPHINE SULFATE 2 MG: 2 INJECTION, SOLUTION INTRAMUSCULAR; INTRAVENOUS at 11:11

## 2022-11-13 RX ADMIN — ONDANSETRON 4 MG: 2 INJECTION INTRAMUSCULAR; INTRAVENOUS at 11:11

## 2022-11-13 RX ADMIN — METOPROLOL TARTRATE 25 MG: 25 TABLET, FILM COATED ORAL at 07:11

## 2022-11-13 RX ADMIN — CEFTRIAXONE SODIUM 1 G: 1 INJECTION, POWDER, FOR SOLUTION INTRAMUSCULAR; INTRAVENOUS at 11:11

## 2022-11-13 RX ADMIN — LORAZEPAM 0.5 MG: 0.5 TABLET ORAL at 07:11

## 2022-11-13 RX ADMIN — PROPRANOLOL HYDROCHLORIDE 20 MG: 20 TABLET ORAL at 07:11

## 2022-11-13 RX ADMIN — AMIODARONE HYDROCHLORIDE 200 MG: 200 TABLET ORAL at 05:11

## 2022-11-13 RX ADMIN — OXYCODONE HYDROCHLORIDE AND ACETAMINOPHEN 1 TABLET: 10; 325 TABLET ORAL at 05:11

## 2022-11-13 RX ADMIN — SENNOSIDES AND DOCUSATE SODIUM 1 TABLET: 50; 8.6 TABLET ORAL at 07:11

## 2022-11-13 RX ADMIN — CYANOCOBALAMIN TAB 1000 MCG 2000 MCG: 1000 TAB at 07:11

## 2022-11-13 RX ADMIN — AMIODARONE HYDROCHLORIDE 200 MG: 200 TABLET ORAL at 07:11

## 2022-11-13 NOTE — NURSING
Spoke with Dr. Phoenix, updated on pt status. Informed that pt is  seeming confused this evening, VSS. Refusing to take ice packs off of skin, education provided from different nursing saff including charge nurse. New orders to start back home dose of Eliquis 5 mg BID PO, to start tonight.

## 2022-11-13 NOTE — NURSING
Pt is refusing to have labs drawn this am. X2 phlebotomist and nurse tried. After first stick, pt became angry and would not allow anyone to draw blood from him this am.

## 2022-11-13 NOTE — PROGRESS NOTES
[facility] Medicine  Progress Note    Patient name: Hiro Rodríguez  MRN: [id number]  Admit Date: 11/8/2022   LOS: 4 days     SUBJECTIVE:     Principal problem: Subdural hematoma caused by concussion    Interval History:  No acute overnight events reported.  No further RVR.  Blood pressure is now within normal range.  Endorsing ongoing left hip pain.  Refused working with PT yesterday and today citing pain.    Hospital course:   77-year-old  male with paroxysmal atrial fibrillation, essential hypertension, type 2 diabetes mellitus admitted after presenting with a mechanical fall and dizziness.  He was found to have chronic right-sided subdural hematoma with acute contusion involving the right temporal lobe.  He received prothrombin concentrate complex.  Subsequent CT of the head stable.  He also received levetiracetam for seizure prophylaxis.  Upon admission he was also found to fracture involving the left hip; seen by Orthopedic surgery underwent internal fixation.  CT head post surgery demonstrates improvement in hemorrhage.  Hospital stay complicated by AFib with RVR necessitating initiation of amiodarone.    Fall likely secondary to polypharmacy.  Found to be hypotensive on admission and is on multiple antihypertensives including lisinopril and losartan as well as propranolol and metoprolol.  Also has chronic back pain is on pain management.  Other notable medications include amitriptyline, cyclobenzaprine and trazodone.    Scheduled Meds:   amiodarone  200 mg Oral TID    atorvastatin  40 mg Oral Daily    cefTRIAXone (ROCEPHIN) IVPB  1 g Intravenous Q24H    chlorhexidine  15 mL Mouth/Throat BID    cyanocobalamin  2,000 mcg Oral Daily    DULoxetine  30 mg Oral Daily    levETIRAcetam  500 mg Oral BID    metoprolol tartrate  25 mg Oral BID    mupirocin   Nasal BID    oxybutynin  10 mg Oral Daily    propranoloL  20 mg Oral BID    senna-docusate 8.6-50 mg  1 tablet Oral BID     topiramate  50 mg Oral Daily     Continuous Infusions:   loperamide       PRN Meds:acetaminophen, dextrose 10%, dextrose 10%, glucagon (human recombinant), glucose, glucose, insulin aspart U-100, loperamide, LORazepam, magnesium oxide, magnesium oxide, melatonin, morphine, ondansetron, oxyCODONE-acetaminophen, potassium bicarbonate, potassium bicarbonate, potassium bicarbonate, potassium, sodium phosphates, potassium, sodium phosphates, potassium, sodium phosphates, traZODone    Review of patient's allergies indicates:  No Known Allergies    Review of Systems: As per interval history    OBJECTIVE:     Vital Signs (Most Recent)  Temp: 98.1 °F (36.7 °C) (11/13/22 0801)  Pulse: 91 (11/13/22 0931)  Resp: 15 (11/13/22 0747)  BP: (!) 156/86 (11/13/22 0901)  SpO2: 95 % (11/13/22 0931)    Vital Signs Range (Last 24H):  Temp:  [98.1 °F (36.7 °C)-98.3 °F (36.8 °C)]   Pulse:  [70-93]   Resp:  [15-20]   BP: ()/(53-87)   SpO2:  [74 %-99 %]     I & O (Last 24H):  Intake/Output Summary (Last 24 hours) at 11/13/2022 1128  Last data filed at 11/13/2022 0801  Gross per 24 hour   Intake 1600 ml   Output 1050 ml   Net 550 ml         Physical Exam:  General: Patient resting comfortably in no acute distress. Appears as stated age. Calm  Eyes: No conjunctival injection. No scleral icterus.  ENT: Hearing grossly intact. No discharge from ears. No nasal discharge.   CVS:  RRR. No LE edema BL  Lungs:  No tachypnea or accessory muscle use.  Clear to auscultation bilaterally  Abdomen:  Soft, nontender and nondistended.  No organomegaly  Neuro: AOx3. Moves all extremities. Follows commands. Responds appropriately   Skin:  No rash or erythema noted  MSK:  Left thigh swelling noted.  : Sotelo catheter with clear yellow urine    Laboratory:  I have reviewed all pertinent lab results within the past 24 hours.  CBC:   Recent Labs   Lab 11/13/22  0646   WBC 14.44*   RBC 3.24*   HGB 10.1*   HCT 31.3*      MCV 97   MCH 31.2*   MCHC 32.3        CMP:   Recent Labs   Lab 11/13/22  0644   *   CALCIUM 8.1*   ALBUMIN 2.6*   PROT 5.6*   *   K 4.0   CO2 26   CL 97   BUN 22   CREATININE 0.7   ALKPHOS 59   ALT 13   AST 15   BILITOT 1.0         Diagnostic Results:  Labs: Reviewed    ASSESSMENT/PLAN:         Active Hospital Problems    Diagnosis  POA    *Subdural hematoma caused by concussion [S06.5XAA]  Yes    KAM (acute kidney injury) [N17.9]  Yes    Subarachnoid bleed [I60.9]  Yes    Closed displaced intertrochanteric fracture of left femur [S72.142A]  Yes    Prerenal azotemia [R79.89]  Yes    Subdural hemorrhage following injury without open intracranial wound and with no loss of consciousness [S06.5X0A]  Yes    Type 2 diabetes mellitus with diabetic polyneuropathy, without long-term current use of insulin [E11.42]  Yes    Atrial fibrillation [I48.91]  Yes    Aspirin long-term use [Z79.82]  Not Applicable    Anticoagulant long-term use [Z79.01]  Not Applicable      Resolved Hospital Problems   No resolved problems to display.       Plan:   Acute on chronic right-sided subdural hematoma and acute right temporal lobe contusion after suffering a mechanical fall   Status post prothrombin concentrate complex on admission  Seen by Neurosurgery.  Non surgical management   Continue neuro checks per protocol   Hold antiplatelet and antithrombotic agents - at least 1 week since admission  Repeat CT head post surgery with improvement   Continue levetiracetam for seizure prophylaxis for 1 week - stop date 11/16     Closed displaced intertrochanteric fracture of the left femur   Status post orthopedic intervention by Dr. Phoenix on 11/10  Pain control with parenteral agents   Judicious analgesia    Atrial fibrillation with RVR   Limited response to intravenous digoxin   Continue amiodarone - change to p.o.  Can not anticoagulate given recent intracranial bleed  Echo with EF of 70%    Leukocytosis of unclear etiology - improving  Continue empiric antibiotics  however suspicion for infectious etiology is low   Most likely reactive   Switched to ceftriaxone from pip-tazo  Monitor with daily labs    KAM noted which could be from multifactorial etiology - resolved  Had urinary retention on admission necessitating Sotelo catheter placement   Subsequently had improvement in creatinine  Monitor with daily labs  Avoid nephrotoxic agents    Essential hypertension   Restart metoprolol tartrate   Hold ACE inhibitor.  Restart when able    Fall and delirium precautions   Stable for transfer out of ICU   Discussed plan with spouse yesterday    VTE Risk Mitigation (From admission, onward)           Ordered     IP VTE HIGH RISK PATIENT  Once         11/09/22 0237     Place sequential compression device  Until discontinued         11/09/22 0237     Reason for No Pharmacological VTE Prophylaxis  Once        Question:  Reasons:  Answer:  Active Bleeding    11/09/22 0237                        Department Hospital Medicine  [facility]  Orlando Gonzalez MD  Date of service: 11/13/2022

## 2022-11-13 NOTE — NURSING
During medication administration patient was noted to have brown colored emesis in basin and gown. /98 HR 84. Dr. Gonzalez notified and verbal orders with read back to get STAT CBC and start IV Protonix 40mg daily.

## 2022-11-13 NOTE — PLAN OF CARE
"    ICU care plan note    Dx: Subdural hematoma caused by concussion    Shift Events: Pt is argumentative and manipulative at times. Bribed this writer with a chocolate covered strawberry for "extra" pain meds. Seemed confused at beginning of shift (I came in at 1700) but was able to sleep most of the night and was clearer at later times throughout the shift. This morning, pt is wanting to argue over having his blood drawn. I explained that a dayshift phleb would come to draw his labs at a later time but pt found this insufficient and that as far as an ICU, we should be able to accomodate his demands. I educated pt that he was not the only pt on this unit nor hospital and we are unable to meet his precise demands (especially when pt says "do it now") at his whim. PT stated that he "makes the rules".     Goals of Care: PT. Pain control.     Neuro: AAO x4, Follows Commands, and Moves All Extremities    Vital Signs: BP (!) 102/59   Pulse 73   Temp 98.3 °F (36.8 °C) (Oral)   Resp 19   Ht 5' 10" (1.778 m)   Wt 114.7 kg (252 lb 13.9 oz)   SpO2 98%   BMI 36.28 kg/m²     Respiratory: Room Air    Diet: Diabetic Diet    Gtts:     Urine Output: Urinary Catheter 550 cc/shift    Drains:      Labs/Accuchecks: pending lab collection.    Skin: L hip drsg C/D/I. No new skin breakdown noted.         Plan of care reviewed with patient, Hiro Rodríguez , verbalized understanding. Patient progressing toward goals of care. NAEON. Safety measures in place and maintained throughout shift.     "

## 2022-11-13 NOTE — PT/OT/SLP PROGRESS
Physical Therapy      Patient Name:  Hiro Rodríguez   MRN:  [id number]    Patient not seen today secondary to Patient unwilling to participate (Refused therapy this weekend. Wants to start tomorrow.). Will follow-up 11/14/22..

## 2022-11-13 NOTE — PROGRESS NOTES
The patient is stable his wound is dry hematocrit is 30.  Patient can resume his anticoagulant medications.  Patient is continue toe-touch weight-bearing and transfers.  Awaiting rehab placement or skilled facility placement.

## 2022-11-13 NOTE — CARE UPDATE
11/12/22 2001   Patient Assessment/Suction   Level of Consciousness (AVPU) alert   Respiratory Effort Normal;Unlabored   Expansion/Accessory Muscles/Retractions no use of accessory muscles   All Lung Fields Breath Sounds clear   Rhythm/Pattern, Respiratory unlabored;pattern regular   PRE-TX-O2   O2 Device (Oxygen Therapy) room air   SpO2 95 %   Pulse Oximetry Type Continuous   $ Pulse Oximetry - Multiple Charge Pulse Oximetry - Multiple   Pulse 83   /73   Education   $ Education DME Oxygen;15 min   Respiratory Evaluation   $ Care Plan Tech Time 15 min   $ Eval/Re-eval Charges Evaluation

## 2022-11-13 NOTE — NURSING
Patient transferred to Cardio B via bed by 2 Rns. Portable telebox applied to patient. All belongings gathered and taken with patient. Report given to Allie on Cardio B.    Number Of Stages: 2

## 2022-11-14 PROBLEM — K92.2 ACUTE UPPER GASTROINTESTINAL BLEEDING: Status: ACTIVE | Noted: 2022-11-14

## 2022-11-14 LAB
ALBUMIN SERPL BCP-MCNC: 2.6 G/DL (ref 3.5–5.2)
ALP SERPL-CCNC: 68 U/L (ref 55–135)
ALT SERPL W/O P-5'-P-CCNC: 17 U/L (ref 10–44)
ANION GAP SERPL CALC-SCNC: 11 MMOL/L (ref 8–16)
AST SERPL-CCNC: 16 U/L (ref 10–40)
BASOPHILS # BLD AUTO: 0.04 K/UL (ref 0–0.2)
BASOPHILS NFR BLD: 0.2 % (ref 0–1.9)
BILIRUB SERPL-MCNC: 0.9 MG/DL (ref 0.1–1)
BUN SERPL-MCNC: 28 MG/DL (ref 8–23)
CALCIUM SERPL-MCNC: 8.4 MG/DL (ref 8.7–10.5)
CHLORIDE SERPL-SCNC: 99 MMOL/L (ref 95–110)
CO2 SERPL-SCNC: 25 MMOL/L (ref 23–29)
CREAT SERPL-MCNC: 0.5 MG/DL (ref 0.5–1.4)
DIFFERENTIAL METHOD: ABNORMAL
EOSINOPHIL # BLD AUTO: 0 K/UL (ref 0–0.5)
EOSINOPHIL NFR BLD: 0 % (ref 0–8)
ERYTHROCYTE [DISTWIDTH] IN BLOOD BY AUTOMATED COUNT: 12.6 % (ref 11.5–14.5)
EST. GFR  (NO RACE VARIABLE): >60 ML/MIN/1.73 M^2
GLUCOSE SERPL-MCNC: 133 MG/DL (ref 70–110)
GLUCOSE SERPL-MCNC: 144 MG/DL (ref 70–110)
GLUCOSE SERPL-MCNC: 156 MG/DL (ref 70–110)
GLUCOSE SERPL-MCNC: 159 MG/DL (ref 70–110)
HCT VFR BLD AUTO: 35 % (ref 40–54)
HGB BLD-MCNC: 11.1 G/DL (ref 14–18)
IMM GRANULOCYTES # BLD AUTO: 0.29 K/UL (ref 0–0.04)
IMM GRANULOCYTES NFR BLD AUTO: 1.4 % (ref 0–0.5)
LYMPHOCYTES # BLD AUTO: 2.1 K/UL (ref 1–4.8)
LYMPHOCYTES NFR BLD: 10.5 % (ref 18–48)
MCH RBC QN AUTO: 31 PG (ref 27–31)
MCHC RBC AUTO-ENTMCNC: 31.7 G/DL (ref 32–36)
MCV RBC AUTO: 98 FL (ref 82–98)
MONOCYTES # BLD AUTO: 1.2 K/UL (ref 0.3–1)
MONOCYTES NFR BLD: 6.1 % (ref 4–15)
NEUTROPHILS # BLD AUTO: 16.5 K/UL (ref 1.8–7.7)
NEUTROPHILS NFR BLD: 81.8 % (ref 38–73)
NRBC BLD-RTO: 0 /100 WBC
PLATELET # BLD AUTO: 354 K/UL (ref 150–450)
PMV BLD AUTO: 10 FL (ref 9.2–12.9)
POTASSIUM SERPL-SCNC: 4.1 MMOL/L (ref 3.5–5.1)
PROT SERPL-MCNC: 5.9 G/DL (ref 6–8.4)
RBC # BLD AUTO: 3.58 M/UL (ref 4.6–6.2)
SODIUM SERPL-SCNC: 135 MMOL/L (ref 136–145)
WBC # BLD AUTO: 20.17 K/UL (ref 3.9–12.7)

## 2022-11-14 PROCEDURE — 85025 COMPLETE CBC W/AUTO DIFF WBC: CPT | Performed by: ORTHOPAEDIC SURGERY

## 2022-11-14 PROCEDURE — 25000003 PHARM REV CODE 250: Performed by: ORTHOPAEDIC SURGERY

## 2022-11-14 PROCEDURE — 63600175 PHARM REV CODE 636 W HCPCS: Performed by: ORTHOPAEDIC SURGERY

## 2022-11-14 PROCEDURE — C9113 INJ PANTOPRAZOLE SODIUM, VIA: HCPCS | Performed by: INTERNAL MEDICINE

## 2022-11-14 PROCEDURE — 97110 THERAPEUTIC EXERCISES: CPT

## 2022-11-14 PROCEDURE — 25000003 PHARM REV CODE 250: Performed by: INTERNAL MEDICINE

## 2022-11-14 PROCEDURE — 36415 COLL VENOUS BLD VENIPUNCTURE: CPT | Performed by: INTERNAL MEDICINE

## 2022-11-14 PROCEDURE — 21400001 HC TELEMETRY ROOM

## 2022-11-14 PROCEDURE — 63600175 PHARM REV CODE 636 W HCPCS: Performed by: INTERNAL MEDICINE

## 2022-11-14 PROCEDURE — 97530 THERAPEUTIC ACTIVITIES: CPT

## 2022-11-14 PROCEDURE — 80053 COMPREHEN METABOLIC PANEL: CPT | Performed by: INTERNAL MEDICINE

## 2022-11-14 RX ORDER — SODIUM CHLORIDE, SODIUM LACTATE, POTASSIUM CHLORIDE, CALCIUM CHLORIDE 600; 310; 30; 20 MG/100ML; MG/100ML; MG/100ML; MG/100ML
INJECTION, SOLUTION INTRAVENOUS CONTINUOUS
Status: DISCONTINUED | OUTPATIENT
Start: 2022-11-14 | End: 2022-11-18 | Stop reason: HOSPADM

## 2022-11-14 RX ADMIN — AMIODARONE HYDROCHLORIDE 200 MG: 200 TABLET ORAL at 08:11

## 2022-11-14 RX ADMIN — METOPROLOL TARTRATE 25 MG: 25 TABLET, FILM COATED ORAL at 08:11

## 2022-11-14 RX ADMIN — OXYBUTYNIN CHLORIDE 10 MG: 5 TABLET, EXTENDED RELEASE ORAL at 08:11

## 2022-11-14 RX ADMIN — LEVETIRACETAM 500 MG: 500 TABLET, FILM COATED ORAL at 08:11

## 2022-11-14 RX ADMIN — LORAZEPAM 0.5 MG: 0.5 TABLET ORAL at 08:11

## 2022-11-14 RX ADMIN — PROPRANOLOL HYDROCHLORIDE 20 MG: 20 TABLET ORAL at 08:11

## 2022-11-14 RX ADMIN — MORPHINE SULFATE 2 MG: 2 INJECTION, SOLUTION INTRAMUSCULAR; INTRAVENOUS at 07:11

## 2022-11-14 RX ADMIN — DULOXETINE HYDROCHLORIDE 30 MG: 30 CAPSULE, DELAYED RELEASE ORAL at 08:11

## 2022-11-14 RX ADMIN — LEVETIRACETAM 500 MG: 500 TABLET, FILM COATED ORAL at 09:11

## 2022-11-14 RX ADMIN — CEFTRIAXONE SODIUM 1 G: 1 INJECTION, POWDER, FOR SOLUTION INTRAMUSCULAR; INTRAVENOUS at 12:11

## 2022-11-14 RX ADMIN — OXYCODONE HYDROCHLORIDE AND ACETAMINOPHEN 1 TABLET: 10; 325 TABLET ORAL at 02:11

## 2022-11-14 RX ADMIN — PANTOPRAZOLE SODIUM 40 MG: 40 INJECTION, POWDER, FOR SOLUTION INTRAVENOUS at 08:11

## 2022-11-14 RX ADMIN — ATORVASTATIN CALCIUM 40 MG: 40 TABLET, FILM COATED ORAL at 08:11

## 2022-11-14 RX ADMIN — OXYCODONE HYDROCHLORIDE AND ACETAMINOPHEN 1 TABLET: 10; 325 TABLET ORAL at 08:11

## 2022-11-14 RX ADMIN — ONDANSETRON 4 MG: 2 INJECTION INTRAMUSCULAR; INTRAVENOUS at 07:11

## 2022-11-14 RX ADMIN — AMIODARONE HYDROCHLORIDE 200 MG: 200 TABLET ORAL at 02:11

## 2022-11-14 RX ADMIN — MORPHINE SULFATE 2 MG: 2 INJECTION, SOLUTION INTRAMUSCULAR; INTRAVENOUS at 04:11

## 2022-11-14 RX ADMIN — SODIUM CHLORIDE, SODIUM LACTATE, POTASSIUM CHLORIDE, AND CALCIUM CHLORIDE: .6; .31; .03; .02 INJECTION, SOLUTION INTRAVENOUS at 09:11

## 2022-11-14 RX ADMIN — TOPIRAMATE 50 MG: 25 TABLET, FILM COATED ORAL at 08:11

## 2022-11-14 NOTE — PT/OT/SLP PROGRESS
Occupational Therapy      Patient Name:  Hiro Rodríguze   MRN:  [id number]    Patient not seen today secondary to Other (Comment) (Refused due to fatigue and pain; stated that he already did enough activity today). Will follow-up next service date.    11/14/2022

## 2022-11-14 NOTE — PLAN OF CARE
Problem: Adult Inpatient Plan of Care  Goal: Plan of Care Review  11/14/2022 1745 by Allie Monaco RN  Outcome: Ongoing, Progressing  11/14/2022 1745 by Allie Monaco RN  Outcome: Ongoing, Progressing  Goal: Patient-Specific Goal (Individualized)  11/14/2022 1745 by Allie Monaco RN  Outcome: Ongoing, Progressing  11/14/2022 1745 by Allie Monaco RN  Outcome: Ongoing, Progressing  Goal: Absence of Hospital-Acquired Illness or Injury  11/14/2022 1745 by Allie Monaco RN  Outcome: Ongoing, Progressing  11/14/2022 1745 by Allie Monaco RN  Outcome: Ongoing, Progressing  Goal: Optimal Comfort and Wellbeing  11/14/2022 1745 by Allie Monaco RN  Outcome: Ongoing, Progressing  11/14/2022 1745 by Allie Monaco RN  Outcome: Ongoing, Progressing  Goal: Readiness for Transition of Care  11/14/2022 1745 by Allie Monaco RN  Outcome: Ongoing, Progressing  11/14/2022 1745 by Allie Monaco RN  Outcome: Ongoing, Progressing     Problem: Diabetes Comorbidity  Goal: Blood Glucose Level Within Targeted Range  11/14/2022 1745 by Allie Monaco RN  Outcome: Ongoing, Progressing  11/14/2022 1745 by Allie Monaco RN  Outcome: Ongoing, Progressing     Problem: Fall Injury Risk  Goal: Absence of Fall and Fall-Related Injury  11/14/2022 1745 by Allie Monaco RN  Outcome: Ongoing, Progressing  11/14/2022 1745 by Allie Monaco RN  Outcome: Ongoing, Progressing     Problem: Skin Injury Risk Increased  Goal: Skin Health and Integrity  11/14/2022 1745 by Allie Monaco RN  Outcome: Ongoing, Progressing  11/14/2022 1745 by Allie Monaco RN  Outcome: Ongoing, Progressing     Problem: Infection  Goal: Absence of Infection Signs and Symptoms  11/14/2022 1745 by Allie Monaco RN  Outcome: Ongoing, Progressing  11/14/2022 1745 by Allie Monaco RN  Outcome: Ongoing, Progressing     Problem: Oral Intake Inadequate  Goal: Improved Oral Intake  11/14/2022 1745 by Allie Monaco RN  Outcome: Ongoing,  Progressing  11/14/2022 1745 by Allie Monaco RN  Outcome: Ongoing, Progressing     Problem: Fluid and Electrolyte Imbalance (Acute Kidney Injury/Impairment)  Goal: Fluid and Electrolyte Balance  11/14/2022 1745 by Allie Monaco RN  Outcome: Ongoing, Progressing  11/14/2022 1745 by Allie Monaco RN  Outcome: Ongoing, Progressing     Problem: Oral Intake Inadequate (Acute Kidney Injury/Impairment)  Goal: Optimal Nutrition Intake  11/14/2022 1745 by Allie Monaco RN  Outcome: Ongoing, Progressing  11/14/2022 1745 by Allie Monaco RN  Outcome: Ongoing, Progressing     Problem: Renal Function Impairment (Acute Kidney Injury/Impairment)  Goal: Effective Renal Function  11/14/2022 1745 by Allie Monaco RN  Outcome: Ongoing, Progressing  11/14/2022 1745 by Allie Monaco RN  Outcome: Ongoing, Progressing

## 2022-11-14 NOTE — PLAN OF CARE
AAOx4, understands plan of care, flat affect but VSS .   Patient somewhat nauseated. Zofran avail as PRN . No emesis tonight as of 2030 .   Monitor for GI bleed.   H&H stable.

## 2022-11-14 NOTE — PT/OT/SLP PROGRESS
"Physical Therapy Treatment    Patient Name:  Hiro Rodríguez   MRN:  [id number]    Recommendations:     Discharge Recommendations:  rehabilitation facility   Discharge Equipment Recommendations:  (TBD)   Barriers to discharge:  increase assist with mobility    Assessment:     Hiro Rodríguez is a 77 y.o. male admitted with a medical diagnosis of Subdural hematoma caused by concussion.  He presents with the following impairments/functional limitations:  weakness, impaired endurance, impaired self care skills, impaired functional mobility, gait instability, impaired balance, decreased lower extremity function, decreased safety awareness, pain, orthopedic precautions.    Pt found supine in bed. Declined first attempt of PT due to report of increased pain and fatigue following earlier bedding change. With max encouragement, pt agreeable to working with PT once pain was more tolerable. With today's session, the pt performed supine to sit with mod A x 2 to assist with LLE movement. The pt was then led through seated TE while at EOB. An attempt to perform sit to stand transfer was made but unable to achieve with max A x 2. Therefore, pt performed scooting along the EOB with mod A x 2. The pt tolerated today's session well.    Rehab Prognosis: Fair; patient would benefit from acute skilled PT services to address these deficits and reach maximum level of function.    Recent Surgery: Procedure(s) (LRB):  INSERTION, ITRAMEDULLARY SANTI, FEMUR, TROCHANTER (Left) 4 Days Post-Op    Plan:     During this hospitalization, patient to be seen daily to address the identified rehab impairments via gait training, therapeutic activities, therapeutic exercises, neuromuscular re-education and progress toward the following goals:    Plan of Care Expires:  12/11/22    Subjective     Chief Complaint: "want morphine"  Patient/Family Comments/goals: pain management  Pain/Comfort:  Pain Rating 1: 7/10  Location - Side 1: Left  Location 1: " hip  Pain Addressed 1: Pre-medicate for activity, Reposition, Distraction  Pain Rating Post-Intervention 1: 7/10      Objective:     Communicated with RN prior to session.  Patient found supine with telemetry upon PT entry to room.     General Precautions: Standard, fall   Orthopedic Precautions:LLE toe touch weight bearing   Braces: N/A  Respiratory Status: Room air     Functional Mobility:  Bed Mobility:     Scooting: moderate assistance and of 2 persons  Supine to Sit: moderate assistance and of 2 persons  Sit to Supine: moderate assistance and of 2 persons  Sit to stand transfer attempt: unable to achieve with max A x 2 & RW      AM-PAC 6 CLICK MOBILITY          Treatment & Education:  Pt tolerated seated TE including hip flexion, knee ext, and ankle df 1 set of 10 each with min vi for proper form and pacing. Active assist with L knee extension due to L quad weakness.  Pt educated on POC, discharge recommendation, importance of participation with PT, weight bearing precautions, need for assist with mobility, use of call bell to seek assistance as needed and fall prevention.    Patient left HOB elevated with all lines intact, call button in reach, and bed alarm on..    GOALS:   Multidisciplinary Problems       Physical Therapy Goals          Problem: Physical Therapy    Goal Priority Disciplines Outcome Goal Variances Interventions   Physical Therapy Goal     PT, PT/OT      Description: Goals to be met by: 2022    Patient will increase functional independence with mobility by performin. Supine to sit with Moderate Assistance  2. Sit to stand transfer with Moderate Assistance  3. Bed to chair transfer with Moderate Assistance using Slideboard  4. Gait  x 10  feet with Moderate Assistance and maintaining weight-bearing precaution(s) using Rolling Walker.                          Time Tracking:     PT Received On: 22  PT Start Time: 1000     PT Stop Time: 1031  PT Total Time (min): 31 min      Billable Minutes: Therapeutic Activity 17 and Therapeutic Exercise 14    Treatment Type: Treatment  PT/PTA: PT     PTA Visit Number: 0     11/14/2022

## 2022-11-14 NOTE — PROGRESS NOTES
[facility]  Progress Note    Patient name: Hiro Rodríguez  MRN: [id number]  Admit Date: 11/8/2022   LOS: 5 days     SUBJECTIVE:     Principal problem: Subdural hematoma caused by concussion    Interval History:  Patient found to have coffee-ground emesis and melanotic stool last night and another episode this morning.  Hemoglobin however remains stable.  He denies nausea/vomiting or abdominal pain.  Denies similar symptoms in the past.  Left hip pain is improving.      Hospital course:   77-year-old  male with paroxysmal atrial fibrillation, essential hypertension, type 2 diabetes mellitus admitted after presenting with a mechanical fall and dizziness.  He was found to have chronic right-sided subdural hematoma with acute contusion involving the right temporal lobe.  He received prothrombin concentrate complex.  Subsequent CT of the head stable.  He also received levetiracetam for seizure prophylaxis.  Upon admission he was also found to fracture involving the left hip; seen by Orthopedic surgery underwent internal fixation.  CT head post surgery demonstrates improvement in hemorrhage.  Hospital stay complicated by AFib with RVR necessitating initiation of amiodarone.    Fall likely secondary to polypharmacy.  Found to be hypotensive on admission and is on multiple antihypertensives including lisinopril and losartan as well as propranolol and metoprolol.  Also has chronic back pain is on pain management.  Other notable medications include amitriptyline, cyclobenzaprine and trazodone.    Hospital stay complicated by acute upper GI bleed.  GI consulted for the same.    Scheduled Meds:   amiodarone  200 mg Oral TID    atorvastatin  40 mg Oral Daily    cefTRIAXone (ROCEPHIN) IVPB  1 g Intravenous Q24H    cyanocobalamin  2,000 mcg Oral Daily    DULoxetine  30 mg Oral Daily    levETIRAcetam  500 mg Oral BID    metoprolol tartrate  25 mg Oral BID    oxybutynin  10 mg Oral Daily     pantoprazole  40 mg Intravenous BID    propranoloL  20 mg Oral BID    senna-docusate 8.6-50 mg  1 tablet Oral BID    topiramate  50 mg Oral Daily     Continuous Infusions:   loperamide       PRN Meds:acetaminophen, dextrose 10%, dextrose 10%, glucagon (human recombinant), glucose, glucose, insulin aspart U-100, loperamide, LORazepam, magnesium oxide, magnesium oxide, melatonin, morphine, ondansetron, oxyCODONE-acetaminophen, potassium bicarbonate, potassium bicarbonate, potassium bicarbonate, potassium, sodium phosphates, potassium, sodium phosphates, potassium, sodium phosphates, traZODone    Review of patient's allergies indicates:  No Known Allergies    Review of Systems: As per interval history    OBJECTIVE:     Vital Signs (Most Recent)  Temp: 98 °F (36.7 °C) (11/14/22 0729)  Pulse: 83 (11/14/22 0729)  Resp: 19 (11/14/22 0832)  BP: (!) 153/87 (11/14/22 0729)  SpO2: (!) 94 % (11/14/22 0729)    Vital Signs Range (Last 24H):  Temp:  [97.4 °F (36.3 °C)-98 °F (36.7 °C)]   Pulse:  [80-95]   Resp:  [18-20]   BP: (119-167)/(73-98)   SpO2:  [94 %-96 %]     I & O (Last 24H):  Intake/Output Summary (Last 24 hours) at 11/14/2022 1136  Last data filed at 11/14/2022 0841  Gross per 24 hour   Intake 0 ml   Output 450 ml   Net -450 ml         Physical Exam:  General: Patient resting comfortably in no acute distress. Appears as stated age. Calm  Eyes: No conjunctival injection. No scleral icterus.  ENT: Hearing grossly intact. No discharge from ears. No nasal discharge.   CVS:  RRR. No LE edema BL  Lungs:  No tachypnea or accessory muscle use.  Clear to auscultation bilaterally  Abdomen:  Soft, nontender and nondistended.  No organomegaly  Neuro: AOx3. Moves all extremities. Follows commands. Responds appropriately   Skin:  No rash or erythema noted  MSK:  Left thigh swelling noted.  : Sotelo catheter with clear yellow urine    Laboratory:  I have reviewed all pertinent lab results within the past 24 hours.  CBC:   Recent  Labs   Lab 11/14/22  0349   WBC 20.17*   RBC 3.58*   HGB 11.1*   HCT 35.0*      MCV 98   MCH 31.0   MCHC 31.7*       CMP:   Recent Labs   Lab 11/14/22  0349   *   CALCIUM 8.4*   ALBUMIN 2.6*   PROT 5.9*   *   K 4.1   CO2 25   CL 99   BUN 28*   CREATININE 0.5   ALKPHOS 68   ALT 17   AST 16   BILITOT 0.9         Diagnostic Results:  Labs: Reviewed    ASSESSMENT/PLAN:         Active Hospital Problems    Diagnosis  POA    *Subdural hematoma caused by concussion [S06.5XAA]  Yes    KAM (acute kidney injury) [N17.9]  Yes    Subarachnoid bleed [I60.9]  Yes    Closed displaced intertrochanteric fracture of left femur [S72.142A]  Yes    Prerenal azotemia [R79.89]  Yes    Subdural hemorrhage following injury without open intracranial wound and with no loss of consciousness [S06.5X0A]  Yes    Type 2 diabetes mellitus with diabetic polyneuropathy, without long-term current use of insulin [E11.42]  Yes    Atrial fibrillation [I48.91]  Yes    Aspirin long-term use [Z79.82]  Not Applicable    Anticoagulant long-term use [Z79.01]  Not Applicable      Resolved Hospital Problems   No resolved problems to display.       Plan:   Acute on chronic right-sided subdural hematoma and acute right temporal lobe contusion after suffering a mechanical fall   Status post prothrombin concentrate complex on admission  Seen by Neurosurgery.  Non surgical management   Continue neuro checks per protocol   Hold antiplatelet and antithrombotic agents - at least 1 week since admission  Repeat CT head post surgery with improvement   Continue levetiracetam for seizure prophylaxis for 1 week - stop date 11/16     Closed displaced intertrochanteric fracture of the left femur   Status post orthopedic intervention by Dr. Phoenix on 11/10  Pain control with parenteral agents   Judicious analgesia    Acute upper GI bleed   Hemoglobin stable   Isolated azotemia noted on labs  NPO  IV pantoprazole b.i.d.  Gastroenterology consultation  Holding  antiplatelets and antithrombotics as above    Atrial fibrillation with RVR   Limited response to intravenous digoxin   Continue amiodarone - change to p.o.  Can not anticoagulate given recent intracranial bleed  Echo with EF of 70%    Leukocytosis of unclear etiology  Continue empiric antibiotics however suspicion for infectious etiology is low   Most likely reactive   Switched to ceftriaxone from pip-tazo  Monitor with daily labs    KAM noted which could be from multifactorial etiology - resolved    Essential hypertension   Restart metoprolol tartrate   Hold ACE inhibitor.  Restart when able    Fall and delirium precautions     VTE Risk Mitigation (From admission, onward)           Ordered     IP VTE HIGH RISK PATIENT  Once         11/09/22 0237     Place sequential compression device  Until discontinued         11/09/22 0237     Reason for No Pharmacological VTE Prophylaxis  Once        Question:  Reasons:  Answer:  Active Bleeding    11/09/22 0237                        Department Hospital Medicine  [facility]  Orlando Gonzalez MD  Date of service: 11/14/2022

## 2022-11-15 LAB
ALBUMIN SERPL BCP-MCNC: 2.5 G/DL (ref 3.5–5.2)
ALP SERPL-CCNC: 68 U/L (ref 55–135)
ALT SERPL W/O P-5'-P-CCNC: 13 U/L (ref 10–44)
ANION GAP SERPL CALC-SCNC: 11 MMOL/L (ref 8–16)
AST SERPL-CCNC: 21 U/L (ref 10–40)
BACTERIA BLD CULT: NORMAL
BASOPHILS # BLD AUTO: 0.04 K/UL (ref 0–0.2)
BASOPHILS NFR BLD: 0.3 % (ref 0–1.9)
BILIRUB SERPL-MCNC: 1 MG/DL (ref 0.1–1)
BUN SERPL-MCNC: 32 MG/DL (ref 8–23)
CALCIUM SERPL-MCNC: 7.7 MG/DL (ref 8.7–10.5)
CHLORIDE SERPL-SCNC: 97 MMOL/L (ref 95–110)
CO2 SERPL-SCNC: 24 MMOL/L (ref 23–29)
CREAT SERPL-MCNC: 0.7 MG/DL (ref 0.5–1.4)
DIFFERENTIAL METHOD: ABNORMAL
EOSINOPHIL # BLD AUTO: 0.1 K/UL (ref 0–0.5)
EOSINOPHIL NFR BLD: 0.7 % (ref 0–8)
ERYTHROCYTE [DISTWIDTH] IN BLOOD BY AUTOMATED COUNT: 12.9 % (ref 11.5–14.5)
EST. GFR  (NO RACE VARIABLE): >60 ML/MIN/1.73 M^2
GLUCOSE SERPL-MCNC: 101 MG/DL (ref 70–110)
GLUCOSE SERPL-MCNC: 105 MG/DL (ref 70–110)
GLUCOSE SERPL-MCNC: 107 MG/DL (ref 70–110)
GLUCOSE SERPL-MCNC: 109 MG/DL (ref 70–110)
GLUCOSE SERPL-MCNC: 119 MG/DL (ref 70–110)
HCT VFR BLD AUTO: 34.1 % (ref 40–54)
HGB BLD-MCNC: 10.5 G/DL (ref 14–18)
IMM GRANULOCYTES # BLD AUTO: 0.15 K/UL (ref 0–0.04)
IMM GRANULOCYTES NFR BLD AUTO: 1 % (ref 0–0.5)
LYMPHOCYTES # BLD AUTO: 2.1 K/UL (ref 1–4.8)
LYMPHOCYTES NFR BLD: 13.9 % (ref 18–48)
MCH RBC QN AUTO: 30.8 PG (ref 27–31)
MCHC RBC AUTO-ENTMCNC: 30.8 G/DL (ref 32–36)
MCV RBC AUTO: 100 FL (ref 82–98)
MONOCYTES # BLD AUTO: 1.4 K/UL (ref 0.3–1)
MONOCYTES NFR BLD: 9 % (ref 4–15)
NEUTROPHILS # BLD AUTO: 11.4 K/UL (ref 1.8–7.7)
NEUTROPHILS NFR BLD: 75.1 % (ref 38–73)
NRBC BLD-RTO: 0 /100 WBC
PLATELET # BLD AUTO: 287 K/UL (ref 150–450)
PMV BLD AUTO: 9.9 FL (ref 9.2–12.9)
POTASSIUM SERPL-SCNC: 4 MMOL/L (ref 3.5–5.1)
PROT SERPL-MCNC: 5.7 G/DL (ref 6–8.4)
RBC # BLD AUTO: 3.41 M/UL (ref 4.6–6.2)
SODIUM SERPL-SCNC: 132 MMOL/L (ref 136–145)
WBC # BLD AUTO: 15.19 K/UL (ref 3.9–12.7)

## 2022-11-15 PROCEDURE — 63600175 PHARM REV CODE 636 W HCPCS: Performed by: INTERNAL MEDICINE

## 2022-11-15 PROCEDURE — 25000003 PHARM REV CODE 250: Performed by: ORTHOPAEDIC SURGERY

## 2022-11-15 PROCEDURE — 85025 COMPLETE CBC W/AUTO DIFF WBC: CPT | Performed by: ORTHOPAEDIC SURGERY

## 2022-11-15 PROCEDURE — 97530 THERAPEUTIC ACTIVITIES: CPT

## 2022-11-15 PROCEDURE — 36415 COLL VENOUS BLD VENIPUNCTURE: CPT | Performed by: ORTHOPAEDIC SURGERY

## 2022-11-15 PROCEDURE — C9113 INJ PANTOPRAZOLE SODIUM, VIA: HCPCS | Performed by: INTERNAL MEDICINE

## 2022-11-15 PROCEDURE — 80053 COMPREHEN METABOLIC PANEL: CPT | Performed by: INTERNAL MEDICINE

## 2022-11-15 PROCEDURE — 21400001 HC TELEMETRY ROOM

## 2022-11-15 PROCEDURE — 25000003 PHARM REV CODE 250: Performed by: INTERNAL MEDICINE

## 2022-11-15 RX ORDER — PANTOPRAZOLE SODIUM 40 MG/1
40 TABLET, DELAYED RELEASE ORAL 2 TIMES DAILY
Status: DISCONTINUED | OUTPATIENT
Start: 2022-11-15 | End: 2022-11-18 | Stop reason: HOSPADM

## 2022-11-15 RX ADMIN — OXYCODONE HYDROCHLORIDE AND ACETAMINOPHEN 1 TABLET: 10; 325 TABLET ORAL at 09:11

## 2022-11-15 RX ADMIN — METOPROLOL TARTRATE 25 MG: 25 TABLET, FILM COATED ORAL at 08:11

## 2022-11-15 RX ADMIN — PANTOPRAZOLE SODIUM 40 MG: 40 TABLET, DELAYED RELEASE ORAL at 05:11

## 2022-11-15 RX ADMIN — TOPIRAMATE 50 MG: 25 TABLET, FILM COATED ORAL at 09:11

## 2022-11-15 RX ADMIN — DULOXETINE HYDROCHLORIDE 30 MG: 30 CAPSULE, DELAYED RELEASE ORAL at 09:11

## 2022-11-15 RX ADMIN — LEVETIRACETAM 500 MG: 500 TABLET, FILM COATED ORAL at 09:11

## 2022-11-15 RX ADMIN — AMIODARONE HYDROCHLORIDE 200 MG: 200 TABLET ORAL at 08:11

## 2022-11-15 RX ADMIN — PROPRANOLOL HYDROCHLORIDE 20 MG: 20 TABLET ORAL at 09:11

## 2022-11-15 RX ADMIN — TRAZODONE HYDROCHLORIDE 100 MG: 50 TABLET ORAL at 09:11

## 2022-11-15 RX ADMIN — OXYCODONE HYDROCHLORIDE AND ACETAMINOPHEN 1 TABLET: 10; 325 TABLET ORAL at 03:11

## 2022-11-15 RX ADMIN — CYANOCOBALAMIN TAB 1000 MCG 2000 MCG: 1000 TAB at 09:11

## 2022-11-15 RX ADMIN — OXYCODONE HYDROCHLORIDE AND ACETAMINOPHEN 1 TABLET: 10; 325 TABLET ORAL at 08:11

## 2022-11-15 RX ADMIN — PANTOPRAZOLE SODIUM 40 MG: 40 INJECTION, POWDER, FOR SOLUTION INTRAVENOUS at 09:11

## 2022-11-15 RX ADMIN — OXYBUTYNIN CHLORIDE 10 MG: 5 TABLET, EXTENDED RELEASE ORAL at 09:11

## 2022-11-15 RX ADMIN — AMIODARONE HYDROCHLORIDE 200 MG: 200 TABLET ORAL at 09:11

## 2022-11-15 RX ADMIN — PROPRANOLOL HYDROCHLORIDE 20 MG: 20 TABLET ORAL at 08:11

## 2022-11-15 RX ADMIN — LEVETIRACETAM 500 MG: 500 TABLET, FILM COATED ORAL at 08:11

## 2022-11-15 RX ADMIN — MORPHINE SULFATE 2 MG: 2 INJECTION, SOLUTION INTRAMUSCULAR; INTRAVENOUS at 12:11

## 2022-11-15 RX ADMIN — ATORVASTATIN CALCIUM 40 MG: 40 TABLET, FILM COATED ORAL at 08:11

## 2022-11-15 RX ADMIN — SODIUM CHLORIDE, SODIUM LACTATE, POTASSIUM CHLORIDE, AND CALCIUM CHLORIDE: .6; .31; .03; .02 INJECTION, SOLUTION INTRAVENOUS at 08:11

## 2022-11-15 RX ADMIN — OXYCODONE HYDROCHLORIDE AND ACETAMINOPHEN 1 TABLET: 10; 325 TABLET ORAL at 04:11

## 2022-11-15 RX ADMIN — SENNOSIDES AND DOCUSATE SODIUM 1 TABLET: 50; 8.6 TABLET ORAL at 08:11

## 2022-11-15 RX ADMIN — SENNOSIDES AND DOCUSATE SODIUM 1 TABLET: 50; 8.6 TABLET ORAL at 09:11

## 2022-11-15 RX ADMIN — AMIODARONE HYDROCHLORIDE 200 MG: 200 TABLET ORAL at 03:11

## 2022-11-15 RX ADMIN — CEFTRIAXONE SODIUM 1 G: 1 INJECTION, POWDER, FOR SOLUTION INTRAMUSCULAR; INTRAVENOUS at 11:11

## 2022-11-15 NOTE — CARE UPDATE
KRISTA called in to [facility], spoke with Hoang. NICOLE faxed to [facility] via [employer] to [phone].  Awaiting 142    SNF referrals sent, as back up in case inpatient rehab is denied by insurance,  to [facility], [facility], [facility], and General acute hospital via [employer] per patient's choice. Signed choice form scanned into media manager.

## 2022-11-15 NOTE — NURSING
Patient AAOx4, clean .Instructed to please call for assistance .  Per  orders fluids started for hydration.  Pain meds administered, nausea meds. Plan of care explained to patient and wife.Concerns addressed.   Will continue to monitor for safety. Patient sitting up at 45degrees to avoid aspiration if he vomits .   Ativan given for anxiety, patient seems relieved. Will continue to monitor for safety.     VERONICA BLOCK

## 2022-11-15 NOTE — PLAN OF CARE
Problem: Adult Inpatient Plan of Care  Goal: Plan of Care Review  Outcome: Ongoing, Not Progressing  Goal: Patient-Specific Goal (Individualized)  Outcome: Ongoing, Not Progressing  Goal: Absence of Hospital-Acquired Illness or Injury  Outcome: Ongoing, Not Progressing  Goal: Optimal Comfort and Wellbeing  Outcome: Ongoing, Not Progressing  Goal: Readiness for Transition of Care  Outcome: Ongoing, Not Progressing     Problem: Diabetes Comorbidity  Goal: Blood Glucose Level Within Targeted Range  Outcome: Ongoing, Not Progressing     Problem: Fall Injury Risk  Goal: Absence of Fall and Fall-Related Injury  Outcome: Ongoing, Not Progressing     Problem: Skin Injury Risk Increased  Goal: Skin Health and Integrity  Outcome: Ongoing, Not Progressing     Problem: Infection  Goal: Absence of Infection Signs and Symptoms  Outcome: Ongoing, Not Progressing     Problem: Oral Intake Inadequate  Goal: Improved Oral Intake  Outcome: Ongoing, Not Progressing     Problem: Fluid and Electrolyte Imbalance (Acute Kidney Injury/Impairment)  Goal: Fluid and Electrolyte Balance  Outcome: Ongoing, Not Progressing     Problem: Oral Intake Inadequate (Acute Kidney Injury/Impairment)  Goal: Optimal Nutrition Intake  Outcome: Ongoing, Not Progressing     Problem: Renal Function Impairment (Acute Kidney Injury/Impairment)  Goal: Effective Renal Function  Outcome: Ongoing, Not Progressing

## 2022-11-15 NOTE — PLAN OF CARE
Problem: Physical Therapy  Goal: Physical Therapy Goal  Description: Goals to be met by: 2022    Patient will increase functional independence with mobility by performin. Supine to sit with Moderate Assistance  2. Sit to stand transfer with Moderate Assistance  3. Bed to chair transfer with Moderate Assistance using Slideboard  4. Gait  x 10  feet with Moderate Assistance and maintaining weight-bearing precaution(s) using Rolling Walker.     Outcome: Ongoing, Progressing

## 2022-11-15 NOTE — PT/OT/SLP PROGRESS
Occupational Therapy      Patient Name:  Hiro Rodríguez   MRN:  [id number]    Patient not seen today secondary to Other (Comment) (Unable to see x 3 attempts (RN care, B/B accident, RN placing IV).). Will follow-up next service date.    11/15/2022

## 2022-11-15 NOTE — CARE UPDATE
Met with patient and wife at bedside to discuss disposition. Both are in agreement to pursue rehab. Expressed to patient the great necessity for him to continue to participate in therapy sessions. He expressed understanding of above.     Message sent to Swati at [facility] requesting update on authorization for IP rehab. Awaiting information about authorization.

## 2022-11-15 NOTE — NURSING
"On rounds patient reported pain. PRN oxycodone administered. Output has not been charted so I asked patient if he has urinated . He stated " a little bit" I asked him if he has had an accident or using urinal. Also asked if he needs to be cleaned up. He stated that he has had an accident and needs to be changed.  I reminded him to please call when he needs assistance voiding or vomits.   Extender notified for assistance in providing patient hygiene and changing linens.  No nausea at this time. Cardiac monitor readjusted.     Will continue to monitor. Patient no longer wants ice packs .Refuses to be turned to side with pillows for support. RooM  temperature adjusted to warmer per patient request. Extra blanket provided. Will continue to monitor.     VERONICA BLOCK    "

## 2022-11-15 NOTE — PROGRESS NOTES
[facility]  Adult Nutrition   Progress Note (Follow-Up)    SUMMARY      Recommendations:   1. Continue current cardiac diet as tolerated and encourage intake.  2.  to obtain meal preferences daily.     Goals:   Goals: 1.) Pt to consume/tolerate >75% of meals and ONS. 2.) BG levels to normalize.    Dietitian Rounds Brief  F/U Nutrition Note: Pts diet has just been reinstated starting for lunch today. Will follow up to see how he does. Pts LBM was 11/13/2022, but has been NPO since 11/13/2022 at 1813.     Diet order: Cardiac    % Intake of Estimated Energy Needs: Unknown  % Meal Intake: Other: Unknown    Estimated/Assessed Needs  Weight Used For Calorie Calculations: 114.7 kg (252 lb 13.9 oz)  Energy Calorie Requirements (kcal): 3318-5351  Energy Need Method: Kcal/kg (20-25)  Protein Requirements: 113-150 (1.5-2.0)  Weight Used For Protein Calculations: 75 kg (165 lb 5.5 oz) (IBW)  Fluid Requirements (mL): 9532-7738     RDA Method (mL): 2294       Weight History:  Wt Readings from Last 5 Encounters:   11/15/22 119.2 kg (262 lb 12.6 oz)   11/11/22 114.3 kg (252 lb)   10/11/22 119.7 kg (264 lb)   07/12/22 121.7 kg (268 lb 3.2 oz)   04/12/22 129.3 kg (285 lb)        Reason for Assessment  Reason For Assessment: identified at risk by screening criteria (ICU status)  Diagnosis: trauma  Relevant Medical History: PAF, HTN, DM 2, Morbid Obesity  General Information Comments: Pt presents to [facility] complaining of right leg pain after falling and striking his right hip and head. S/p TFN nailing of left hip with Synthes today. Diet advanced after surgery, however pt still drowsy from surgery. PTA, fair appetite per family. No chew/swallowing issues. No GI distress. LBM 11/8. Skin: surgical incision. Wt stable. NFPE completed with no s/s of wasting. No malnutrition.    Medications:Pertinent Medications Reviewed  Scheduled Meds:   amiodarone  200 mg Oral TID    atorvastatin  40 mg Oral Daily    cefTRIAXone  (ROCEPHIN) IVPB  1 g Intravenous Q24H    cyanocobalamin  2,000 mcg Oral Daily    DULoxetine  30 mg Oral Daily    levETIRAcetam  500 mg Oral BID    metoprolol tartrate  25 mg Oral BID    oxybutynin  10 mg Oral Daily    pantoprazole  40 mg Oral BID    propranoloL  20 mg Oral BID    senna-docusate 8.6-50 mg  1 tablet Oral BID    topiramate  50 mg Oral Daily     Continuous Infusions:   lactated ringers 50 mL/hr at 11/14/22 2146    loperamide       PRN Meds:.acetaminophen, dextrose 10%, dextrose 10%, glucagon (human recombinant), glucose, glucose, insulin aspart U-100, loperamide, LORazepam, magnesium oxide, magnesium oxide, melatonin, morphine, ondansetron, oxyCODONE-acetaminophen, potassium bicarbonate, potassium bicarbonate, potassium bicarbonate, potassium, sodium phosphates, potassium, sodium phosphates, potassium, sodium phosphates, traZODone    Labs: Pertinent Labs Reviewed  Clinical Chemistry:  Recent Labs   Lab 11/09/22  0313 11/09/22  1133 11/10/22  0226 11/10/22  1122 11/11/22  0321 11/13/22  0644 11/15/22  0404     --  130*   < > 131*   < > 132*   K 3.1*   < > 3.7   < > 4.2   < > 4.0   CL 98  --  98   < > 100   < > 97   CO2 27  --  28   < > 25   < > 24   *  --  148*   < > 145*   < > 107   BUN 28*  --  38*   < > 30*   < > 32*   CREATININE 1.1  --  1.9*   < > 1.1   < > 0.7   CALCIUM 8.5*  --  7.4*   < > 7.5*   < > 7.7*   PROT 6.7  --  5.7*   < >  --    < > 5.7*   ALBUMIN 3.6  --  2.9*   < >  --    < > 2.5*   BILITOT 1.3*  --  0.9   < >  --    < > 1.0   ALKPHOS 48*  --  42*   < >  --    < > 68   AST 16  --  15   < >  --    < > 21   ALT 19  --  14   < >  --    < > 13   ANIONGAP 11  --  4*   < > 6*   < > 11   MG 2.3  --  2.4  --  2.2  --   --    PHOS 3.7  --  4.7*  --   --   --   --     < > = values in this interval not displayed.     CBC:   Recent Labs   Lab 11/15/22  0405   WBC 15.19*   RBC 3.41*   HGB 10.5*   HCT 34.1*      *   MCH 30.8   MCHC 30.8*     Cardiac Profile:  Recent Labs    Lab 11/08/22  2207   BNP 65   TROPONINI <0.030     Diabetes:  Recent Labs   Lab 11/09/22  0313   HGBA1C 7.0*     Monitor and Evaluation  Food and Nutrient Intake: energy intake, food and beverage intake  Food and Nutrient Adminstration: diet order  Knowledge/Beliefs/Attitudes: food and nutrition knowledge/skill  Physical Activity and Function: nutrition-related ADLs and IADLs  Anthropometric Measurements: weight, weight change  Biochemical Data, Medical Tests and Procedures: electrolyte and renal panel, gastrointestinal profile, glucose/endocrine profile  Nutrition-Focused Physical Findings: overall appearance     Nutrition Risk  Level of Risk/Frequency of Follow-up: high     Nutrition Follow-Up  RD Follow-up?: Yes    Rhianna Marlow RD 11/15/2022 11:15 AM

## 2022-11-15 NOTE — CONSULTS
GASTROENTEROLOGY INPATIENT CONSULT NOTE  Patient Name: Hiro Rodríguez  Patient MRN: [id number]  Patient : 1945    Admit Date: 2022  Service date: 11/15/2022    Reason for Consult: coffee ground emesis    PCP: Gautam Castanon III, MD    Chief Complaint   Patient presents with    Leg Pain     Pt leaned over and fell this AM @ approx 1100. Unable to bare weight on LLE since. Pain to lt thigh. Hit head but - LOC. +blood thinners.    Dizziness       HPI: Patient is a 77 y.o. male with PMHx  nausea,vomiting and diarrhea followed by fall and subdural hematoma.  Pt reports possible coffee ground emesis and dark stool.  Hgb stable at 10.  Had santi placement 5 days ago.  NO heavy nsaids.  No h/o peptic ulcer disease.     Past Medical History:  History reviewed. No pertinent past medical history.     Past Surgical History:  Past Surgical History:   Procedure Laterality Date    INTRAMEDULLARY RODDING OF TROCHANTER OF FEMUR Left 11/10/2022    Procedure: INSERTION, ITRAMEDULLARY SANTI, FEMUR, TROCHANTER;  Surgeon: Richard Phoenix MD;  Location: [facility];  Service: Orthopedics;  Laterality: Left;        Home Medications:  Medications Prior to Admission   Medication Sig Dispense Refill Last Dose    amitriptyline (ELAVIL) 10 MG tablet Take 10-20 mg by mouth every evening.       apixaban (ELIQUIS) 5 mg Tab Take 1 tablet (5 mg total) by mouth 2 (two) times daily. 180 tablet 1     calcium polycarbophil (FIBER-CAPS, CA POLYCARBOPHIL, ORAL) Take by mouth.       cephalexin (KEFTAB) 500 mg tablet Take 500 mg by mouth 3 (three) times daily.       cholecalciferol, vitamin D3, (VITAMIN D3) 50 mcg (2,000 unit) Tab Take by mouth once daily.       cyanocobalamin, vitamin B-12, 1,000 mcg Subl Place 1,000 mcg under the tongue 2 (two) times a day.       cyclobenzaprine (FLEXERIL) 5 MG tablet TAKE 1 TO 2 TABLETS BY MOUTH AT BEDTIME AS NEEDED FOR MUSCLE SPASMS 60 tablet 2     DULoxetine (CYMBALTA) 30 MG capsule Take 1 capsule (30 mg  total) by mouth once daily. 90 capsule 1     ferrous sulfate 27 mg iron Tab Take by mouth.       hydroCHLOROthiazide (HYDRODIURIL) 25 MG tablet Take 1 tablet (25 mg total) by mouth once daily. 90 tablet 1     lisinopriL (PRINIVIL,ZESTRIL) 20 MG tablet Take 1 tablet (20 mg total) by mouth 2 (two) times daily. 180 tablet 3     losartan (COZAAR) 50 MG tablet Take 1 tablet (50 mg total) by mouth once daily. 90 tablet 1     magnesium 250 mg Tab Take 250 mg by mouth 2 (two) times a day.       metFORMIN (GLUCOPHAGE-XR) 500 MG ER 24hr tablet Take 1 tablet (500 mg total) by mouth once daily. 90 tablet 1     metoprolol tartrate (LOPRESSOR) 25 MG tablet Take 1 tablet (25 mg total) by mouth 2 (two) times daily. 180 tablet 1     omega-3 acid ethyl esters (LOVAZA) 1 gram capsule Take 2 capsules (2 g total) by mouth 2 (two) times daily. 360 capsule 1     oxaprozin (DAYPRO) 600 mg tablet Take 1 tablet (600 mg total) by mouth 2 (two) times daily. 180 tablet 1     propranoloL (INDERAL) 40 MG tablet Take 1 tablet (40 mg total) by mouth 2 (two) times daily. 180 tablet 1     rosuvastatin (CRESTOR) 20 MG tablet TAKE 1 TABLET BY MOUTH EVERY DAY 90 tablet 1     semaglutide (OZEMPIC) 1 mg/dose (4 mg/3 mL) Inject 1 mg into the skin every 7 days. 4 pen 5     tolterodine (DETROL LA) 4 MG 24 hr capsule Take 1 capsule (4 mg total) by mouth once daily. 90 capsule 1     topiramate (TOPAMAX) 50 MG tablet Take 1 tablet (50 mg total) by mouth once daily. 90 tablet 1     traZODone (DESYREL) 50 MG tablet Take 1 tablet (50 mg total) by mouth every evening. 90 tablet 1     [DISCONTINUED] aspirin (ECOTRIN) 81 MG EC tablet Take 81 mg by mouth once daily.          Inpatient Medications:   amiodarone  200 mg Oral TID    atorvastatin  40 mg Oral Daily    cefTRIAXone (ROCEPHIN) IVPB  1 g Intravenous Q24H    cyanocobalamin  2,000 mcg Oral Daily    DULoxetine  30 mg Oral Daily    levETIRAcetam  500 mg Oral BID    metoprolol tartrate  25 mg Oral BID    oxybutynin  " 10 mg Oral Daily    pantoprazole  40 mg Intravenous BID    propranoloL  20 mg Oral BID    senna-docusate 8.6-50 mg  1 tablet Oral BID    topiramate  50 mg Oral Daily     acetaminophen, dextrose 10%, dextrose 10%, glucagon (human recombinant), glucose, glucose, insulin aspart U-100, loperamide, LORazepam, magnesium oxide, magnesium oxide, melatonin, morphine, ondansetron, oxyCODONE-acetaminophen, potassium bicarbonate, potassium bicarbonate, potassium bicarbonate, potassium, sodium phosphates, potassium, sodium phosphates, potassium, sodium phosphates, traZODone    Review of patient's allergies indicates:  No Known Allergies    Social History:   Social History     Occupational History    Occupation: RETIRED   Tobacco Use    Smoking status: Never    Smokeless tobacco: Never   Substance and Sexual Activity    Alcohol use: Yes    Drug use: Not Currently    Sexual activity: Yes     Partners: Female       Family History:   History reviewed. No pertinent family history.    Review of Systems:  A 10 point review of systems was performed and was normal, except as mentioned in the HPI, including constitutional, HEENT, heme, lymph, cardiovascular, respiratory, gastrointestinal, genitourinary, neurologic, endocrine, psychiatric and musculoskeletal.      OBJECTIVE:    Physical Exam:  24 Hour Vital Sign Ranges: Temp:  [97.4 °F (36.3 °C)-98 °F (36.7 °C)] 97.7 °F (36.5 °C)  Pulse:  [77-91] 89  Resp:  [18-20] 18  SpO2:  [92 %-99 %] 97 %  BP: (108-126)/(59-75) 114/75  Most recent vitals: /75 (BP Location: Right arm, Patient Position: Lying)   Pulse 89   Temp 97.7 °F (36.5 °C) (Oral)   Resp 18   Ht 5' 10" (1.778 m)   Wt 119.2 kg (262 lb 12.6 oz)   SpO2 97%   BMI 37.71 kg/m²    GEN: well-developed, well-nourished, awake and alert, non-toxic appearing adult  HEENT: PERRL, sclera anicteric, oral mucosa pink and moist without lesion  NECK: trachea midline; Good ROM  CV: regular rate and rhythm, no murmurs or gallops  RESP: " clear to auscultation bilaterally, no wheezes, rhonci or rales  ABD: soft, non-tender, non-distended, normal bowel sounds  EXT: s/p left hip surgery, 2+ pulses distally  SKIN: no rashes or jaundice  PSYCH: normal affect    Labs:   Recent Labs     11/13/22  1754 11/14/22  0349 11/15/22  0405   WBC 15.87* 20.17* 15.19*   MCV 94 98 100*    354 287     Recent Labs     11/13/22  0644 11/14/22  0349 11/15/22  0404   * 135* 132*   K 4.0 4.1 4.0   CL 97 99 97   CO2 26 25 24   BUN 22 28* 32*   * 156* 107     No results for input(s): ALB in the last 72 hours.    Invalid input(s): ALKP, SGOT, SGPT, TBIL, DBIL, TPRO  No results for input(s): PT, INR, PTT in the last 72 hours.      Radiology Review:  CT Head Without Contrast   Final Result      FL Flouro Usage   Final Result      X-Ray Chest AP Portable   Final Result      CT Head Without Contrast   Final Result      X-Ray Femur Ap/Lat Left   Final Result      X-Ray Hip 2 or 3 views Left (with Pelvis when performed)   Final Result      X-Ray Chest AP Portable   Final Result      CT Cervical Spine Without Contrast   Final Result      CT Head Without Contrast   Final Result            IMPRESSION / RECOMMENDATIONS:  77 year old male with fresh subdural hematoma and trace intraventricular hemorrhage, hip fracture and repair with question of coffee ground emesis  -recommend ppi therapy bid x 6 weeks   -defer endoscopy given intracranial hemorrhage unless actively bleeding and dropping hgb  -notify our service for dropping hgb or overt bleeding  -recommend outpatient monitoring of hgb and monitoring for s/s of gi blood loss    Thank you for this consult.    Alber Bravo  11/15/2022  7:58 AM

## 2022-11-15 NOTE — NURSING
Patient checked, clean.Repositioned bed per patient request.   Patient denies pain, was asleep but easily woken up.   Denies nausea. Vitals stable, fluids infusing.   No emesis episodes as of now.     Continuous cardiac monitoring in place. Will continue to monitor for safety. Understands plan of care.     VERONICA BLOCK

## 2022-11-15 NOTE — PROGRESS NOTES
[facility]  Progress Note    Patient name: Hiro Rodríguez  MRN: [id number]  Admit Date: 11/8/2022   LOS: 6 days     SUBJECTIVE:     Principal problem: Subdural hematoma caused by concussion    Interval History:  No acute overnight events reported.  No further  hematemesis or melena.  Hemoglobin stable.  Denies abdominal pain, nausea or vomiting.      Hospital course:   77-year-old  male with paroxysmal atrial fibrillation, essential hypertension, type 2 diabetes mellitus admitted after presenting with a mechanical fall and dizziness.  He was found to have chronic right-sided subdural hematoma with acute contusion involving the right temporal lobe.  He received prothrombin concentrate complex.  Subsequent CT of the head stable.  He also received levetiracetam for seizure prophylaxis.  Upon admission he was also found to fracture involving the left hip; seen by Orthopedic surgery underwent internal fixation.  CT head post surgery demonstrates improvement in hemorrhage.  Hospital stay complicated by AFib with RVR necessitating initiation of amiodarone.    Fall likely secondary to polypharmacy.  Found to be hypotensive on admission and is on multiple antihypertensives including lisinopril and losartan as well as propranolol and metoprolol.  Also has chronic back pain is on pain management.  Other notable medications include amitriptyline, cyclobenzaprine and trazodone.    Hospital stay complicated by acute upper GI bleed.  GI consulted for the same.  Symptoms improved spontaneously.  Conservative management with PPI therapy and outpatient endoscopy recommended.    Scheduled Meds:   amiodarone  200 mg Oral TID    atorvastatin  40 mg Oral Daily    cefTRIAXone (ROCEPHIN) IVPB  1 g Intravenous Q24H    cyanocobalamin  2,000 mcg Oral Daily    DULoxetine  30 mg Oral Daily    levETIRAcetam  500 mg Oral BID    metoprolol tartrate  25 mg Oral BID    oxybutynin  10 mg Oral Daily     pantoprazole  40 mg Oral BID    propranoloL  20 mg Oral BID    senna-docusate 8.6-50 mg  1 tablet Oral BID    topiramate  50 mg Oral Daily     Continuous Infusions:   lactated ringers 50 mL/hr at 11/14/22 2146    loperamide       PRN Meds:acetaminophen, dextrose 10%, dextrose 10%, glucagon (human recombinant), glucose, glucose, insulin aspart U-100, loperamide, LORazepam, magnesium oxide, magnesium oxide, melatonin, morphine, ondansetron, oxyCODONE-acetaminophen, potassium bicarbonate, potassium bicarbonate, potassium bicarbonate, potassium, sodium phosphates, potassium, sodium phosphates, potassium, sodium phosphates, traZODone    Review of patient's allergies indicates:  No Known Allergies    Review of Systems: As per interval history    OBJECTIVE:     Vital Signs (Most Recent)  Temp: 98.8 °F (37.1 °C) (11/15/22 1135)  Pulse: 93 (11/15/22 1135)  Resp: 20 (11/15/22 1213)  BP: 116/71 (11/15/22 1135)  SpO2: 95 % (11/15/22 1135)    Vital Signs Range (Last 24H):  Temp:  [97.4 °F (36.3 °C)-98.8 °F (37.1 °C)]   Pulse:  [77-93]   Resp:  [18-20]   BP: (108-126)/(59-75)   SpO2:  [95 %-99 %]     I & O (Last 24H):  Intake/Output Summary (Last 24 hours) at 11/15/2022 1435  Last data filed at 11/15/2022 1146  Gross per 24 hour   Intake 150 ml   Output --   Net 150 ml         Physical Exam:  General: Patient resting comfortably in no acute distress. Appears as stated age. Calm  Eyes: No conjunctival injection. No scleral icterus.  ENT: Hearing grossly intact. No discharge from ears. No nasal discharge.   CVS:  RRR. No LE edema BL  Lungs:  No tachypnea or accessory muscle use.  Clear to auscultation bilaterally  Abdomen:  Soft, nontender and nondistended.  No organomegaly  Neuro: AOx3. Moves all extremities. Follows commands. Responds appropriately   Skin:  No rash or erythema noted  MSK:  Left thigh swelling noted.  : Sotelo catheter with clear yellow urine    Laboratory:  I have reviewed all pertinent lab results within the  past 24 hours.  CBC:   Recent Labs   Lab 11/15/22  0405   WBC 15.19*   RBC 3.41*   HGB 10.5*   HCT 34.1*      *   MCH 30.8   MCHC 30.8*       CMP:   Recent Labs   Lab 11/15/22  0404      CALCIUM 7.7*   ALBUMIN 2.5*   PROT 5.7*   *   K 4.0   CO2 24   CL 97   BUN 32*   CREATININE 0.7   ALKPHOS 68   ALT 13   AST 21   BILITOT 1.0         Diagnostic Results:  Labs: Reviewed    ASSESSMENT/PLAN:         Active Hospital Problems    Diagnosis  POA    *Subdural hematoma caused by concussion [S06.5XAA]  Yes    Acute upper gastrointestinal bleeding [K92.2]  No    KAM (acute kidney injury) [N17.9]  Yes    Subarachnoid bleed [I60.9]  Yes    Closed displaced intertrochanteric fracture of left femur [S72.142A]  Yes    Prerenal azotemia [R79.89]  Yes    Subdural hemorrhage following injury without open intracranial wound and with no loss of consciousness [S06.5X0A]  Yes    Type 2 diabetes mellitus with diabetic polyneuropathy, without long-term current use of insulin [E11.42]  Yes    Atrial fibrillation [I48.91]  Yes    Aspirin long-term use [Z79.82]  Not Applicable    Anticoagulant long-term use [Z79.01]  Not Applicable      Resolved Hospital Problems   No resolved problems to display.       Plan:   Acute on chronic right-sided subdural hematoma and acute right temporal lobe contusion after suffering a mechanical fall   Status post prothrombin concentrate complex on admission  Seen by Neurosurgery.  Non surgical management   Continue neuro checks per protocol   Hold antiplatelet and antithrombotic agents - at least 1 week since admission - will restart on 11/16  Repeat CT head post surgery with improvement   Continue levetiracetam for seizure prophylaxis for 1 week - stop date 11/16     Closed displaced intertrochanteric fracture of the left femur   Status post orthopedic intervention by Dr. Phoenix on 11/10  Pain control with parenteral agents   Judicious analgesia    Acute upper GI bleed   Hemoglobin  stable   Isolated azotemia noted on labs  Appreciate GI input   IV pantoprazole b.i.d.  Gastroenterology consultation - recommend conservative management  Holding antiplatelets and antithrombotics as above    Atrial fibrillation with RVR   Limited response to intravenous digoxin   Continue amiodarone - change to p.o.  Can not anticoagulate given recent intracranial bleed  Echo with EF of 70%    Leukocytosis of unclear etiology  Continue empiric antibiotics however suspicion for infectious etiology is low   Most likely reactive   Switched to ceftriaxone from pip-tazo  Monitor with daily labs    KAM noted which could be from multifactorial etiology - resolved    Essential hypertension   Restart metoprolol tartrate   Hold ACE inhibitor.  Restart when able    Fall and delirium precautions     VTE Risk Mitigation (From admission, onward)           Ordered     IP VTE HIGH RISK PATIENT  Once         11/09/22 0237     Place sequential compression device  Until discontinued         11/09/22 0237     Reason for No Pharmacological VTE Prophylaxis  Once        Question:  Reasons:  Answer:  Active Bleeding    11/09/22 0237                        Department Hospital Medicine  [facility]  Orlando Gonzalez MD  Date of service: 11/15/2022

## 2022-11-15 NOTE — PT/OT/SLP PROGRESS
Physical Therapy Treatment    Patient Name:  Hiro Rodríguez   MRN:  [id number]    Recommendations:     Discharge Recommendations:  rehabilitation facility   Discharge Equipment Recommendations:  (TBD)   Barriers to discharge:  increase assist with mobility    Assessment:     Hiro Rodríguez is a 77 y.o. male admitted with a medical diagnosis of Subdural hematoma caused by concussion.  He presents with the following impairments/functional limitations:  weakness, impaired endurance, impaired self care skills, impaired functional mobility, gait instability, impaired balance, decreased lower extremity function, decreased safety awareness, pain, orthopedic precautions.    Pt found supine in bed with extender in the process of washing the pt. PT stepped in to assist with rolling, which the pt performed with mod A. After extender finished cleaning pt and applying condom catheter, the pt was agreeable to PT session. Pt performed supine to sit with mod A x 2 to assist with upper & lower body movement. Once sitting up, pt led through seated TE. Pt able to recall knee extension and ankle dorsiflexion exercises from yesterday's session independently, but required min verbal cuing for hip flexion. Pt then scooted along the EOB with mod A x 2, and laid back down with mod A x 2. Pt tolerated today's session well.     Rehab Prognosis: Fair; patient would benefit from acute skilled PT services to address these deficits and reach maximum level of function.    Recent Surgery: Procedure(s) (LRB):  INSERTION, ITRAMEDULLARY SANTI, FEMUR, TROCHANTER (Left) 5 Days Post-Op    Plan:     During this hospitalization, patient to be seen daily to address the identified rehab impairments via gait training, therapeutic activities, therapeutic exercises, neuromuscular re-education and progress toward the following goals:    Plan of Care Expires:  12/11/22    Subjective     Chief Complaint: L hip pain  Patient/Family Comments/goals: pain  management  Pain/Comfort:  Pain Rating 1: 7/10  Location - Side 1: Left  Location 1: hip  Pain Addressed 1: Pre-medicate for activity, Reposition, Distraction  Pain Rating Post-Intervention 1: 5/10      Objective:     Communicated with RN prior to session.  Patient found supine with telemetry upon PT entry to room.     General Precautions: Standard, fall   Orthopedic Precautions:LLE toe touch weight bearing   Braces: N/A  Respiratory Status: Room air     Functional Mobility:  Bed Mobility:     Rolling Left:  moderate assistance  Rolling Right: moderate assistance  Scooting: moderate assistance and of 2 persons  Supine to Sit: moderate assistance and of 2 persons  Sit to Supine: moderate assistance and of 2 persons      AM-PAC 6 CLICK MOBILITY          Treatment & Education:  Pt tolerated seated TE including hip flexion, knee ext, ankle df, hip add, and hip abd 1 set of 10 each with min vi for proper form and pacing. Pt exhibited improved range with knee extension today.  Pt educated on POC, discharge recommendation, proper form for bed mobility, need for assist with mobility, use of call bell to seek assistance as needed and fall prevention.    Patient left HOB elevated with all lines intact, call button in reach, and RN present..    GOALS:   Multidisciplinary Problems       Physical Therapy Goals          Problem: Physical Therapy    Goal Priority Disciplines Outcome Goal Variances Interventions   Physical Therapy Goal     PT, PT/OT      Description: Goals to be met by: 2022    Patient will increase functional independence with mobility by performin. Supine to sit with Moderate Assistance  2. Sit to stand transfer with Moderate Assistance  3. Bed to chair transfer with Moderate Assistance using Slideboard  4. Gait  x 10  feet with Moderate Assistance and maintaining weight-bearing precaution(s) using Rolling Walker.                          Time Tracking:     PT Received On: 11/15/22  PT Start Time:  1100     PT Stop Time: 1120  PT Total Time (min): 20 min     Billable Minutes: Therapeutic Activity 20    Treatment Type: Treatment  PT/PTA: PT     PTA Visit Number: 0     11/15/2022

## 2022-11-16 LAB
ALBUMIN SERPL BCP-MCNC: 2.3 G/DL (ref 3.5–5.2)
ALP SERPL-CCNC: 63 U/L (ref 55–135)
ALT SERPL W/O P-5'-P-CCNC: 22 U/L (ref 10–44)
ANION GAP SERPL CALC-SCNC: 7 MMOL/L (ref 8–16)
AST SERPL-CCNC: 30 U/L (ref 10–40)
BASOPHILS # BLD AUTO: 0.03 K/UL (ref 0–0.2)
BASOPHILS NFR BLD: 0.2 % (ref 0–1.9)
BILIRUB SERPL-MCNC: 1 MG/DL (ref 0.1–1)
BUN SERPL-MCNC: 23 MG/DL (ref 8–23)
CALCIUM SERPL-MCNC: 7.5 MG/DL (ref 8.7–10.5)
CHLORIDE SERPL-SCNC: 99 MMOL/L (ref 95–110)
CO2 SERPL-SCNC: 25 MMOL/L (ref 23–29)
CREAT SERPL-MCNC: 0.7 MG/DL (ref 0.5–1.4)
DIFFERENTIAL METHOD: ABNORMAL
EOSINOPHIL # BLD AUTO: 0.1 K/UL (ref 0–0.5)
EOSINOPHIL NFR BLD: 1.1 % (ref 0–8)
ERYTHROCYTE [DISTWIDTH] IN BLOOD BY AUTOMATED COUNT: 12.6 % (ref 11.5–14.5)
EST. GFR  (NO RACE VARIABLE): >60 ML/MIN/1.73 M^2
GLUCOSE SERPL-MCNC: 102 MG/DL (ref 70–110)
GLUCOSE SERPL-MCNC: 104 MG/DL (ref 70–110)
GLUCOSE SERPL-MCNC: 107 MG/DL (ref 70–110)
GLUCOSE SERPL-MCNC: 107 MG/DL (ref 70–110)
GLUCOSE SERPL-MCNC: 113 MG/DL (ref 70–110)
HCT VFR BLD AUTO: 30.6 % (ref 40–54)
HGB BLD-MCNC: 9.6 G/DL (ref 14–18)
IMM GRANULOCYTES # BLD AUTO: 0.21 K/UL (ref 0–0.04)
IMM GRANULOCYTES NFR BLD AUTO: 1.7 % (ref 0–0.5)
LYMPHOCYTES # BLD AUTO: 2.2 K/UL (ref 1–4.8)
LYMPHOCYTES NFR BLD: 17.6 % (ref 18–48)
MCH RBC QN AUTO: 30.4 PG (ref 27–31)
MCHC RBC AUTO-ENTMCNC: 31.4 G/DL (ref 32–36)
MCV RBC AUTO: 97 FL (ref 82–98)
MONOCYTES # BLD AUTO: 1.2 K/UL (ref 0.3–1)
MONOCYTES NFR BLD: 9.5 % (ref 4–15)
NEUTROPHILS # BLD AUTO: 8.8 K/UL (ref 1.8–7.7)
NEUTROPHILS NFR BLD: 69.9 % (ref 38–73)
NRBC BLD-RTO: 0 /100 WBC
PLATELET # BLD AUTO: 302 K/UL (ref 150–450)
PMV BLD AUTO: 9.8 FL (ref 9.2–12.9)
POTASSIUM SERPL-SCNC: 4 MMOL/L (ref 3.5–5.1)
PROT SERPL-MCNC: 5.3 G/DL (ref 6–8.4)
RBC # BLD AUTO: 3.16 M/UL (ref 4.6–6.2)
SODIUM SERPL-SCNC: 131 MMOL/L (ref 136–145)
WBC # BLD AUTO: 12.53 K/UL (ref 3.9–12.7)

## 2022-11-16 PROCEDURE — 80053 COMPREHEN METABOLIC PANEL: CPT | Performed by: INTERNAL MEDICINE

## 2022-11-16 PROCEDURE — 21400001 HC TELEMETRY ROOM

## 2022-11-16 PROCEDURE — 63600175 PHARM REV CODE 636 W HCPCS: Performed by: INTERNAL MEDICINE

## 2022-11-16 PROCEDURE — 97530 THERAPEUTIC ACTIVITIES: CPT

## 2022-11-16 PROCEDURE — 97530 THERAPEUTIC ACTIVITIES: CPT | Mod: CO

## 2022-11-16 PROCEDURE — 25000003 PHARM REV CODE 250: Performed by: ORTHOPAEDIC SURGERY

## 2022-11-16 PROCEDURE — 85025 COMPLETE CBC W/AUTO DIFF WBC: CPT | Performed by: ORTHOPAEDIC SURGERY

## 2022-11-16 PROCEDURE — 36415 COLL VENOUS BLD VENIPUNCTURE: CPT | Performed by: INTERNAL MEDICINE

## 2022-11-16 PROCEDURE — 25000003 PHARM REV CODE 250: Performed by: INTERNAL MEDICINE

## 2022-11-16 RX ORDER — BACLOFEN 5 MG/1
5 TABLET ORAL 3 TIMES DAILY PRN
Status: DISCONTINUED | OUTPATIENT
Start: 2022-11-16 | End: 2022-11-18 | Stop reason: HOSPADM

## 2022-11-16 RX ORDER — SIMETHICONE 80 MG
1 TABLET,CHEWABLE ORAL 3 TIMES DAILY PRN
Status: DISCONTINUED | OUTPATIENT
Start: 2022-11-16 | End: 2022-11-18 | Stop reason: HOSPADM

## 2022-11-16 RX ORDER — ENOXAPARIN SODIUM 100 MG/ML
40 INJECTION SUBCUTANEOUS
Status: DISCONTINUED | OUTPATIENT
Start: 2022-11-16 | End: 2022-11-18

## 2022-11-16 RX ADMIN — LEVETIRACETAM 500 MG: 500 TABLET, FILM COATED ORAL at 09:11

## 2022-11-16 RX ADMIN — MORPHINE SULFATE 2 MG: 2 INJECTION, SOLUTION INTRAMUSCULAR; INTRAVENOUS at 04:11

## 2022-11-16 RX ADMIN — CYANOCOBALAMIN TAB 1000 MCG 2000 MCG: 1000 TAB at 09:11

## 2022-11-16 RX ADMIN — TRAZODONE HYDROCHLORIDE 100 MG: 50 TABLET ORAL at 08:11

## 2022-11-16 RX ADMIN — METOPROLOL TARTRATE 25 MG: 25 TABLET, FILM COATED ORAL at 09:11

## 2022-11-16 RX ADMIN — PANTOPRAZOLE SODIUM 40 MG: 40 TABLET, DELAYED RELEASE ORAL at 06:11

## 2022-11-16 RX ADMIN — AMIODARONE HYDROCHLORIDE 200 MG: 200 TABLET ORAL at 02:11

## 2022-11-16 RX ADMIN — OXYCODONE HYDROCHLORIDE AND ACETAMINOPHEN 1 TABLET: 10; 325 TABLET ORAL at 08:11

## 2022-11-16 RX ADMIN — AMIODARONE HYDROCHLORIDE 200 MG: 200 TABLET ORAL at 09:11

## 2022-11-16 RX ADMIN — SENNOSIDES AND DOCUSATE SODIUM 1 TABLET: 50; 8.6 TABLET ORAL at 08:11

## 2022-11-16 RX ADMIN — LEVETIRACETAM 500 MG: 500 TABLET, FILM COATED ORAL at 08:11

## 2022-11-16 RX ADMIN — CEFTRIAXONE SODIUM 1 G: 1 INJECTION, POWDER, FOR SOLUTION INTRAMUSCULAR; INTRAVENOUS at 12:11

## 2022-11-16 RX ADMIN — PANTOPRAZOLE SODIUM 40 MG: 40 TABLET, DELAYED RELEASE ORAL at 05:11

## 2022-11-16 RX ADMIN — BACLOFEN 5 MG: 5 TABLET ORAL at 03:11

## 2022-11-16 RX ADMIN — METOPROLOL TARTRATE 25 MG: 25 TABLET, FILM COATED ORAL at 08:11

## 2022-11-16 RX ADMIN — OXYBUTYNIN CHLORIDE 10 MG: 5 TABLET, EXTENDED RELEASE ORAL at 09:11

## 2022-11-16 RX ADMIN — PROPRANOLOL HYDROCHLORIDE 20 MG: 20 TABLET ORAL at 08:11

## 2022-11-16 RX ADMIN — AMIODARONE HYDROCHLORIDE 200 MG: 200 TABLET ORAL at 08:11

## 2022-11-16 RX ADMIN — TOPIRAMATE 50 MG: 25 TABLET, FILM COATED ORAL at 09:11

## 2022-11-16 RX ADMIN — DULOXETINE HYDROCHLORIDE 30 MG: 30 CAPSULE, DELAYED RELEASE ORAL at 09:11

## 2022-11-16 RX ADMIN — OXYCODONE HYDROCHLORIDE AND ACETAMINOPHEN 1 TABLET: 10; 325 TABLET ORAL at 09:11

## 2022-11-16 RX ADMIN — OXYCODONE HYDROCHLORIDE AND ACETAMINOPHEN 1 TABLET: 10; 325 TABLET ORAL at 03:11

## 2022-11-16 RX ADMIN — MORPHINE SULFATE 2 MG: 2 INJECTION, SOLUTION INTRAMUSCULAR; INTRAVENOUS at 11:11

## 2022-11-16 RX ADMIN — ENOXAPARIN SODIUM 40 MG: 100 INJECTION SUBCUTANEOUS at 02:11

## 2022-11-16 RX ADMIN — SENNOSIDES AND DOCUSATE SODIUM 1 TABLET: 50; 8.6 TABLET ORAL at 09:11

## 2022-11-16 RX ADMIN — ATORVASTATIN CALCIUM 40 MG: 40 TABLET, FILM COATED ORAL at 08:11

## 2022-11-16 RX ADMIN — SIMETHICONE 80 MG: 80 TABLET, CHEWABLE ORAL at 12:11

## 2022-11-16 NOTE — PLAN OF CARE
Per Swati with [facility] she has submitted to [employer] medicare for auth to Inpatient rehab and she will keep me updated.   Per Tracey at [facility]- the pt is also accepted at their facility for SNF as a backup plan. Please let her know if the pt does not go to [facility]. CM following.    11/16/22 0815   Post-Acute Status   Post-Acute Authorization Placement   Post-Acute Placement Status Pending payor review/awaiting authorization (if required)

## 2022-11-16 NOTE — PT/OT/SLP PROGRESS
Physical Therapy Treatment    Patient Name:  Hiro Rodríguez   MRN:  [id number]    Recommendations:     Discharge Recommendations:  rehabilitation facility   Discharge Equipment Recommendations:  (TBD)   Barriers to discharge:  increase assist with mobility    Assessment:     Hiro Rodríguez is a 77 y.o. male admitted with a medical diagnosis of Subdural hematoma caused by concussion.  He presents with the following impairments/functional limitations:  weakness, impaired endurance, impaired self care skills, gait instability, impaired functional mobility, impaired balance, decreased lower extremity function, decreased safety awareness, pain, orthopedic precautions.    Pt found supine in bed. Agreeable to PT session this AM. Performed bed mobility with mod A x 2 for upper and lower body movement. Once sitting at EOB pt able to recall knee extension & hip abduction exercises, but required cuing to recall seated marches, ankle pumps, and hip adduction. The pt then performed 2 sit <> stands with min A x 2 with RW. Pt with mod compliance to LLE TTWB, requiring mod verbal & tactile cuing for proper adherence. Pt then performed side scoots along EOB with min A x 2.     Rehab Prognosis: Fair; patient would benefit from acute skilled PT services to address these deficits and reach maximum level of function.    Recent Surgery: Procedure(s) (LRB):  INSERTION, ITRAMEDULLARY SANTI, FEMUR, TROCHANTER (Left) 6 Days Post-Op    Plan:     During this hospitalization, patient to be seen daily to address the identified rehab impairments via gait training, therapeutic activities, therapeutic exercises, neuromuscular re-education and progress toward the following goals:    Plan of Care Expires:  12/11/22    Subjective     Chief Complaint: L hip pain  Patient/Family Comments/goals: none reported  Pain/Comfort:  Pain Rating 1: 4/10  Location - Side 1: Left  Location 1: hip  Pain Addressed 1: Reposition, Pre-medicate for activity,  Distraction  Pain Rating Post-Intervention 1: 6/10      Objective:     Communicated with RN prior to session.  Patient found supine with telemetry upon PT entry to room.     General Precautions: Standard, fall   Orthopedic Precautions:LLE toe touch weight bearing   Braces: N/A  Respiratory Status: Room air     Functional Mobility:  Bed Mobility:     Scooting: minimum assistance and of 2 persons  Supine to Sit: moderate assistance and of 2 persons  Sit to Supine: moderate assistance and of 2 persons  Transfers:     Sit to Stand:  minimum assistance and of 2 persons with rolling walker      AM-PAC 6 CLICK MOBILITY          Treatment & Education:  Pt tolerated seated TE including hip flexion, knee ext, hip add, ankle df and hip abd 1 set of 10 each with min vi for proper form and pacing.  Pt educated on POC, discharge recommendation, WB precautions, safe form for transfers, need for assist with mobility, use of call bell to seek assistance as needed and fall prevention.     Patient left HOB elevated with all lines intact, call button in reach, bed alarm on, and RN notified..    GOALS:   Multidisciplinary Problems       Physical Therapy Goals          Problem: Physical Therapy    Goal Priority Disciplines Outcome Goal Variances Interventions   Physical Therapy Goal     PT, PT/OT Ongoing, Progressing     Description: Goals to be met by: 2022    Patient will increase functional independence with mobility by performin. Supine to sit with Moderate Assistance  2. Sit to stand transfer with Moderate Assistance  3. Bed to chair transfer with Moderate Assistance using Slideboard  4. Gait  x 10  feet with Moderate Assistance and maintaining weight-bearing precaution(s) using Rolling Walker.                          Time Tracking:     PT Received On: 22  PT Start Time: 1030     PT Stop Time: 1050  PT Total Time (min): 20 min     Billable Minutes: Therapeutic Activity 20    Treatment Type: Treatment  PT/PTA: PT      PTA Visit Number: 0     11/16/2022

## 2022-11-16 NOTE — PROGRESS NOTES
"[facility]  Adult Nutrition   Progress Note (Follow-Up)    SUMMARY     Recommendations  1.) Continue cardiac diet.   2.) Continue Glucerna BID.   3.) Monitor BG levels daily.    Goals:   1.) Pt to consume/tolerate >75% of meals and ONS.   2.) BG levels to normalize.  Nutrition Goal Status: progressing towards goal    Dietitian Rounds Brief  Follow up: diet advanced to cardiac 11/15. Pt with 50% intake of meals and consuming ~1 Glucerna daily. He denies chew/swallowing issues. Despite DM2, BG <120. Will not add DM restrictions at this time. LBM 11/14.    Diet order:   Current Diet Order: cardiac   Oral Nutrition Supplement: Glucerna BID      Evaluation of Received Nutrient/Fluid Intake  Energy Calories Required: not meeting needs  Protein Required: not meeting needs  Fluid Required: meeting needs  Tolerance: tolerating     % Intake of Estimated Energy Needs: 25 - 50 %  % Meal Intake: 25 - 50 %      Intake/Output Summary (Last 24 hours) at 11/16/2022 1635  Last data filed at 11/16/2022 1544  Gross per 24 hour   Intake 660 ml   Output 1100 ml   Net -440 ml        Anthropometrics  Temp: 98.3 °F (36.8 °C)  Height Method: Stated  Height: 5' 10" (177.8 cm)  Height (inches): 70 in  Weight Method: Bed Scale  Weight: 122.6 kg (270 lb 4.8 oz)  Weight (lb): 270.3 lb  Ideal Body Weight (IBW), Male: 166 lb  % Ideal Body Weight, Male (lb): 152.33 %  BMI (Calculated): 38.8  BMI Grade: 35 - 39.9 - obesity - grade II       Estimated/Assessed Needs  Weight Used For Calorie Calculations: 114.7 kg (252 lb 13.9 oz)  Energy Calorie Requirements (kcal): 9716-6147  Energy Need Method: Kcal/kg (20-25)  Protein Requirements: 113-150 (1.5-2.0)  Weight Used For Protein Calculations: 75 kg (165 lb 5.5 oz) (IBW)  Fluid Requirements (mL): 4008-6188     RDA Method (mL): 2294       Reason for Assessment  Reason For Assessment: identified at risk by screening criteria (ICU status)  Diagnosis: trauma  Relevant Medical History: PAF, HTN, " DM 2, Morbid Obesity    Nutrition/Diet History  Spiritual, Cultural Beliefs, Hindu Practices, Values that Affect Care: no  Food Allergies: NKFA  Factors Affecting Nutritional Intake: None identified at this time    Nutrition Risk Screen  Nutrition Risk Screen: no indicators present     MST Score: 0  Have you recently lost weight without trying?: No  Weight loss score: 0  Have you been eating poorly because of a decreased appetite?: No  Appetite score: 0       Weight History:  Wt Readings from Last 5 Encounters:   11/16/22 122.6 kg (270 lb 4.8 oz)   11/11/22 114.3 kg (252 lb)   10/11/22 119.7 kg (264 lb)   07/12/22 121.7 kg (268 lb 3.2 oz)   04/12/22 129.3 kg (285 lb)        Lab/Procedures/Meds: Pertinent Labs/Meds Reviewed    Medications:Pertinent Medications Reviewed  Scheduled Meds:   amiodarone  200 mg Oral TID    atorvastatin  40 mg Oral Daily    cefTRIAXone (ROCEPHIN) IVPB  1 g Intravenous Q24H    cyanocobalamin  2,000 mcg Oral Daily    DULoxetine  30 mg Oral Daily    enoxparin  40 mg Subcutaneous Q24H    levETIRAcetam  500 mg Oral BID    metoprolol tartrate  25 mg Oral BID    oxybutynin  10 mg Oral Daily    pantoprazole  40 mg Oral BID    propranoloL  20 mg Oral BID    senna-docusate 8.6-50 mg  1 tablet Oral BID    topiramate  50 mg Oral Daily     Continuous Infusions:   lactated ringers Stopped (11/16/22 0003)    loperamide       PRN Meds:.acetaminophen, baclofen, dextrose 10%, dextrose 10%, glucagon (human recombinant), glucose, glucose, insulin aspart U-100, loperamide, LORazepam, magnesium oxide, magnesium oxide, melatonin, morphine, ondansetron, oxyCODONE-acetaminophen, potassium bicarbonate, potassium bicarbonate, potassium bicarbonate, potassium, sodium phosphates, potassium, sodium phosphates, potassium, sodium phosphates, simethicone, traZODone    Labs: Pertinent Labs Reviewed  Clinical Chemistry:  Recent Labs   Lab 11/10/22  0226 11/10/22  1122 11/11/22  0321 11/13/22  0644 11/16/22  0349   NA  130*   < > 131*   < > 131*   K 3.7   < > 4.2   < > 4.0   CL 98   < > 100   < > 99   CO2 28   < > 25   < > 25   *   < > 145*   < > 104   BUN 38*   < > 30*   < > 23   CREATININE 1.9*   < > 1.1   < > 0.7   CALCIUM 7.4*   < > 7.5*   < > 7.5*   PROT 5.7*   < >  --    < > 5.3*   ALBUMIN 2.9*   < >  --    < > 2.3*   BILITOT 0.9   < >  --    < > 1.0   ALKPHOS 42*   < >  --    < > 63   AST 15   < >  --    < > 30   ALT 14   < >  --    < > 22   ANIONGAP 4*   < > 6*   < > 7*   MG 2.4  --  2.2  --   --    PHOS 4.7*  --   --   --   --     < > = values in this interval not displayed.     CBC:   Recent Labs   Lab 11/16/22  0349   WBC 12.53   RBC 3.16*   HGB 9.6*   HCT 30.6*      MCV 97   MCH 30.4   MCHC 31.4*       Monitor and Evaluation  Food and Nutrient Intake: energy intake, food and beverage intake  Food and Nutrient Adminstration: diet order  Knowledge/Beliefs/Attitudes: food and nutrition knowledge/skill  Physical Activity and Function: nutrition-related ADLs and IADLs  Anthropometric Measurements: weight, weight change  Biochemical Data, Medical Tests and Procedures: electrolyte and renal panel, gastrointestinal profile, glucose/endocrine profile  Nutrition-Focused Physical Findings: overall appearance     Nutrition Risk  Level of Risk/Frequency of Follow-up: moderate     Nutrition Follow-Up  RD Follow-up?: Yes      Ijeoma Anna, CASI 11/16/2022 4:35 PM

## 2022-11-16 NOTE — PROGRESS NOTES
[facility] Medicine  Progress Note    Patient name: Hiro Rodríguez  MRN: [id number]  Admit Date: 11/8/2022   LOS: 7 days     SUBJECTIVE:     Principal problem: Subdural hematoma caused by concussion    Interval History:  No acute overnight events reported.  No further  hematemesis or melena.  Hemoglobin stable.  Denies abdominal pain, nausea or vomiting.  Endorsing flatulence and indigestion.      Hospital course:   77-year-old  male with paroxysmal atrial fibrillation, essential hypertension, type 2 diabetes mellitus admitted after presenting with a mechanical fall and dizziness.  He was found to have chronic right-sided subdural hematoma with acute contusion involving the right temporal lobe.  He received prothrombin concentrate complex.  Subsequent CT of the head stable.  He also received levetiracetam for seizure prophylaxis.  Upon admission he was also found to fracture involving the left hip; seen by Orthopedic surgery underwent internal fixation.  CT head post surgery demonstrates improvement in hemorrhage.  Hospital stay complicated by AFib with RVR necessitating initiation of amiodarone.    Fall likely secondary to polypharmacy.  Found to be hypotensive on admission and is on multiple antihypertensives including lisinopril and losartan as well as propranolol and metoprolol.  Also has chronic back pain is on pain management.  Other notable medications include amitriptyline, cyclobenzaprine and trazodone.    Hospital stay complicated by acute upper GI bleed.  GI consulted for the same.  Symptoms improved spontaneously.  Conservative management with PPI therapy and outpatient endoscopy recommended.    Scheduled Meds:   amiodarone  200 mg Oral TID    atorvastatin  40 mg Oral Daily    cefTRIAXone (ROCEPHIN) IVPB  1 g Intravenous Q24H    cyanocobalamin  2,000 mcg Oral Daily    DULoxetine  30 mg Oral Daily    enoxparin  40 mg Subcutaneous Q24H    levETIRAcetam  500 mg Oral BID     metoprolol tartrate  25 mg Oral BID    oxybutynin  10 mg Oral Daily    pantoprazole  40 mg Oral BID    propranoloL  20 mg Oral BID    senna-docusate 8.6-50 mg  1 tablet Oral BID    topiramate  50 mg Oral Daily     Continuous Infusions:   lactated ringers Stopped (11/16/22 0003)    loperamide       PRN Meds:acetaminophen, baclofen, dextrose 10%, dextrose 10%, glucagon (human recombinant), glucose, glucose, insulin aspart U-100, loperamide, LORazepam, magnesium oxide, magnesium oxide, melatonin, morphine, ondansetron, oxyCODONE-acetaminophen, potassium bicarbonate, potassium bicarbonate, potassium bicarbonate, potassium, sodium phosphates, potassium, sodium phosphates, potassium, sodium phosphates, simethicone, traZODone    Review of patient's allergies indicates:  No Known Allergies    Review of Systems: As per interval history    OBJECTIVE:     Vital Signs (Most Recent)  Temp: 98.3 °F (36.8 °C) (11/16/22 1153)  Pulse: 80 (11/16/22 1153)  Resp: 20 (11/16/22 1153)  BP: 125/60 (11/16/22 1153)  SpO2: (!) 93 % (11/16/22 1153)    Vital Signs Range (Last 24H):  Temp:  [98.1 °F (36.7 °C)-98.6 °F (37 °C)]   Pulse:  [80-95]   Resp:  [16-20]   BP: (117-149)/(60-81)   SpO2:  [92 %-97 %]     I & O (Last 24H):  Intake/Output Summary (Last 24 hours) at 11/16/2022 1519  Last data filed at 11/16/2022 1218  Gross per 24 hour   Intake 420 ml   Output 1100 ml   Net -680 ml         Physical Exam:  General: Patient resting comfortably in no acute distress. Appears as stated age. Calm  Eyes: No conjunctival injection. No scleral icterus.  ENT: Hearing grossly intact. No discharge from ears. No nasal discharge.   CVS:  RRR. No LE edema BL  Lungs:  No tachypnea or accessory muscle use.  Clear to auscultation bilaterally  Abdomen:  Soft, nontender and nondistended.  No organomegaly  Neuro: AOx3. Moves all extremities. Follows commands. Responds appropriately   Skin:  No rash or erythema noted  MSK:  Left thigh swelling noted.  : Sotelo  catheter with clear yellow urine    Laboratory:  I have reviewed all pertinent lab results within the past 24 hours.  CBC:   Recent Labs   Lab 11/16/22  0349   WBC 12.53   RBC 3.16*   HGB 9.6*   HCT 30.6*      MCV 97   MCH 30.4   MCHC 31.4*       CMP:   Recent Labs   Lab 11/16/22  0349      CALCIUM 7.5*   ALBUMIN 2.3*   PROT 5.3*   *   K 4.0   CO2 25   CL 99   BUN 23   CREATININE 0.7   ALKPHOS 63   ALT 22   AST 30   BILITOT 1.0         Diagnostic Results:  Labs: Reviewed    ASSESSMENT/PLAN:         Active Hospital Problems    Diagnosis  POA    *Subdural hematoma caused by concussion [S06.5XAA]  Yes    Acute upper gastrointestinal bleeding [K92.2]  No    KAM (acute kidney injury) [N17.9]  Yes    Subarachnoid bleed [I60.9]  Yes    Closed displaced intertrochanteric fracture of left femur [S72.142A]  Yes    Prerenal azotemia [R79.89]  Yes    Subdural hemorrhage following injury without open intracranial wound and with no loss of consciousness [S06.5X0A]  Yes    Type 2 diabetes mellitus with diabetic polyneuropathy, without long-term current use of insulin [E11.42]  Yes    Atrial fibrillation [I48.91]  Yes    Aspirin long-term use [Z79.82]  Not Applicable    Anticoagulant long-term use [Z79.01]  Not Applicable      Resolved Hospital Problems   No resolved problems to display.       Plan:   Acute on chronic right-sided subdural hematoma and acute right temporal lobe contusion after suffering a mechanical fall   Status post prothrombin concentrate complex on admission  Seen by Neurosurgery.  Non surgical management   Continue neuro checks per protocol   Hold antiplatelet agents.  Started on enoxaparin for VTE prophylaxis for recent hip surgery - benefits outweigh risks  Repeat CT head post surgery with improvement   Continue levetiracetam for seizure prophylaxis for 1 week - stop date 11/16     Closed displaced intertrochanteric fracture of the left femur   Status post orthopedic intervention by   Lizett on 11/10  Pain control with parenteral agents   Judicious analgesia  Started on enoxaparin for VTE prophylaxis    Acute upper GI bleed   Hemoglobin stable   Isolated azotemia noted on labs  Continue pantoprazole b.i.d.  Gastroenterology consultation - recommend conservative management    Atrial fibrillation with RVR   Limited response to intravenous digoxin   Continue amiodarone - change to p.o.  Can not anticoagulate given recent intracranial bleed  Echo with EF of 70%    Leukocytosis of unclear etiology  Continue empiric antibiotics however suspicion for infectious etiology is low   Most likely reactive   Switched to ceftriaxone from pip-tazo  Monitor with daily labs    KAM noted which could be from multifactorial etiology - resolved    Essential hypertension   Restart metoprolol tartrate   Hold ACE inhibitor.  Restart when able    Fall and delirium precautions     VTE Risk Mitigation (From admission, onward)           Ordered     enoxaparin injection 40 mg  Every 24 hours (non-standard times)         11/16/22 1159     IP VTE HIGH RISK PATIENT  Once         11/09/22 0237     Place sequential compression device  Until discontinued         11/09/22 0237     Reason for No Pharmacological VTE Prophylaxis  Once        Question:  Reasons:  Answer:  Active Bleeding    11/09/22 0237                        Department Hospital Medicine  [facility]  Orlando Gonzalez MD  Date of service: 11/16/2022

## 2022-11-16 NOTE — PLAN OF CARE
Problem: Adult Inpatient Plan of Care  Goal: Plan of Care Review  Outcome: Ongoing, Progressing  Goal: Patient-Specific Goal (Individualized)  Outcome: Ongoing, Progressing  Goal: Absence of Hospital-Acquired Illness or Injury  Outcome: Ongoing, Progressing  Goal: Optimal Comfort and Wellbeing  Outcome: Ongoing, Progressing  Goal: Readiness for Transition of Care  Outcome: Ongoing, Progressing     Problem: Diabetes Comorbidity  Goal: Blood Glucose Level Within Targeted Range  Outcome: Ongoing, Progressing     Problem: Fall Injury Risk  Goal: Absence of Fall and Fall-Related Injury  Outcome: Ongoing, Progressing     Problem: Skin Injury Risk Increased  Goal: Skin Health and Integrity  Outcome: Ongoing, Progressing     Problem: Infection  Goal: Absence of Infection Signs and Symptoms  Outcome: Ongoing, Progressing     Problem: Oral Intake Inadequate  Goal: Improved Oral Intake  Outcome: Ongoing, Progressing     Problem: Fluid and Electrolyte Imbalance (Acute Kidney Injury/Impairment)  Goal: Fluid and Electrolyte Balance  Outcome: Ongoing, Progressing     Problem: Oral Intake Inadequate (Acute Kidney Injury/Impairment)  Goal: Optimal Nutrition Intake  Outcome: Ongoing, Progressing     Problem: Renal Function Impairment (Acute Kidney Injury/Impairment)  Goal: Effective Renal Function  Outcome: Ongoing, Progressing

## 2022-11-16 NOTE — PT/OT/SLP PROGRESS
Occupational Therapy   Treatment    Name: Hiro Rodríguez  MRN: [id number]  Admitting Diagnosis:  Subdural hematoma caused by concussion  6 Days Post-Op    Recommendations:     Discharge Recommendations: rehabilitation facility  Discharge Equipment Recommendations:  other (see comments) (TBD)  Barriers to discharge:  None    Assessment:     Hiro Rodríguez is a 77 y.o. male with a medical diagnosis of Subdural hematoma caused by concussion.  He presents with improved participation evidenced by the ability to participate and tolerate 2 therapy tx this date, give max effort at both, and recall techniques used from previous sessions. Performance deficits affecting function are weakness, impaired endurance, impaired self care skills, impaired functional mobility, gait instability, impaired balance, decreased lower extremity function, pain, decreased safety awareness, decreased ROM, impaired skin, edema, orthopedic precautions.     Rehab Prognosis:  Good; patient would benefit from acute skilled OT services to address these deficits and reach maximum level of function.       Plan:     Patient to be seen 6 x/week to address the above listed problems via self-care/home management, therapeutic activities, therapeutic exercises  Plan of Care Expires: 12/11/22  Plan of Care Reviewed with: patient    Subjective     Pain/Comfort:  Pain Rating 1: other (see comments) (no c/o or observed during tx)    Objective:     Communicated with: RNMarcus prior to session.  Patient found HOB elevated with telemetry, peripheral IV, messina catheter upon OT entry to room.    General Precautions: Standard, fall   Orthopedic Precautions:LLE toe touch weight bearing   Braces:    Respiratory Status: Room air     Occupational Performance:     Bed Mobility:    Patient completed Rolling/Turning to Left with  moderate assistance  Patient completed Rolling/Turning to Right with moderate assistance and with side rail  Patient completed  Scooting/Bridging with moderate assistance, 2 persons, and with side rail  Patient completed Supine to Sit with minimum assistance of one person stabilizing for pt to use own strength to pull forward; BOURNE attempting to educate and instruct pt on use of hand rail to do this but he insisted he only needed someone's hand and he could do it.   Patient completed Sit to Supine with moderate assistance and LLE lift; second person for CGA support at trunk, but pt has progressed from need physical assist at the trunk.     Functional Mobility/Transfers:  Not attempted; refer to PT notes  Functional Mobility: POOR    Activities of Daily Living:  Toileting: dependence with messina in place and pt stating that he is not continent but does know as soon as he has started to void.    [facility] 6 Click ADL: 18    Treatment & Education:  -While at EOB pt completed 5 x arc abduction, 5 x OH press, and 5 x scapular retraction to prepare his BUE for increased WB and mobility for EOB scooting until fatigue.  -pt completed 12 scoots at EOB in sets of 2 or 3 to each side (depending on how much disatnce was covered by each scoot), utilizing BOURNE demo, a count to three, forward rocking, and initial ModA x 2 increasing to Total A (x 2) at fatigue.     Patient left HOB elevated with all lines intact, call button in reach, and telemetry tech present    GOALS:   Multidisciplinary Problems       Occupational Therapy Goals          Problem: Occupational Therapy    Goal Priority Disciplines Outcome Interventions   Occupational Therapy Goal     OT, PT/OT     Description: Goals to be met by: 12/11/2022     Patient will increase functional independence with ADLs by performing:    UE Dressing with Supervision.  LE Dressing with Minimal Assistance.  Grooming while seated with Supervision.  Toileting from toilet with Minimal Assistance for hygiene and clothing management.   Toilet transfer to toilet with Minimal Assistance.                         Time  Tracking:     OT Date of Treatment: 11/16/22  OT Start Time: 1444  OT Stop Time: 1511  OT Total Time (min): 27 min    Billable Minutes:Therapeutic Activity 27 min    OT/PILO: PILO     PILO Visit Number: 1    11/16/2022

## 2022-11-17 LAB
GLUCOSE SERPL-MCNC: 134 MG/DL (ref 70–110)
GLUCOSE SERPL-MCNC: 138 MG/DL (ref 70–110)
GLUCOSE SERPL-MCNC: 153 MG/DL (ref 70–110)

## 2022-11-17 PROCEDURE — 97530 THERAPEUTIC ACTIVITIES: CPT

## 2022-11-17 PROCEDURE — 63600175 PHARM REV CODE 636 W HCPCS: Performed by: INTERNAL MEDICINE

## 2022-11-17 PROCEDURE — 97110 THERAPEUTIC EXERCISES: CPT

## 2022-11-17 PROCEDURE — 25000003 PHARM REV CODE 250: Performed by: INTERNAL MEDICINE

## 2022-11-17 PROCEDURE — 25000003 PHARM REV CODE 250: Performed by: ORTHOPAEDIC SURGERY

## 2022-11-17 PROCEDURE — 21400001 HC TELEMETRY ROOM

## 2022-11-17 RX ADMIN — METOPROLOL TARTRATE 25 MG: 25 TABLET, FILM COATED ORAL at 08:11

## 2022-11-17 RX ADMIN — OXYCODONE HYDROCHLORIDE AND ACETAMINOPHEN 1 TABLET: 10; 325 TABLET ORAL at 08:11

## 2022-11-17 RX ADMIN — METOPROLOL TARTRATE 25 MG: 25 TABLET, FILM COATED ORAL at 09:11

## 2022-11-17 RX ADMIN — PANTOPRAZOLE SODIUM 40 MG: 40 TABLET, DELAYED RELEASE ORAL at 06:11

## 2022-11-17 RX ADMIN — Medication 6 MG: at 12:11

## 2022-11-17 RX ADMIN — AMIODARONE HYDROCHLORIDE 200 MG: 200 TABLET ORAL at 09:11

## 2022-11-17 RX ADMIN — OXYBUTYNIN CHLORIDE 10 MG: 5 TABLET, EXTENDED RELEASE ORAL at 08:11

## 2022-11-17 RX ADMIN — Medication 6 MG: at 09:11

## 2022-11-17 RX ADMIN — CYANOCOBALAMIN TAB 1000 MCG 2000 MCG: 1000 TAB at 08:11

## 2022-11-17 RX ADMIN — OXYCODONE HYDROCHLORIDE AND ACETAMINOPHEN 1 TABLET: 10; 325 TABLET ORAL at 04:11

## 2022-11-17 RX ADMIN — LEVETIRACETAM 500 MG: 500 TABLET, FILM COATED ORAL at 08:11

## 2022-11-17 RX ADMIN — OXYCODONE HYDROCHLORIDE AND ACETAMINOPHEN 1 TABLET: 10; 325 TABLET ORAL at 12:11

## 2022-11-17 RX ADMIN — AMIODARONE HYDROCHLORIDE 200 MG: 200 TABLET ORAL at 04:11

## 2022-11-17 RX ADMIN — PROPRANOLOL HYDROCHLORIDE 20 MG: 20 TABLET ORAL at 08:11

## 2022-11-17 RX ADMIN — CEFTRIAXONE SODIUM 1 G: 1 INJECTION, POWDER, FOR SOLUTION INTRAMUSCULAR; INTRAVENOUS at 04:11

## 2022-11-17 RX ADMIN — SENNOSIDES AND DOCUSATE SODIUM 1 TABLET: 50; 8.6 TABLET ORAL at 08:11

## 2022-11-17 RX ADMIN — ATORVASTATIN CALCIUM 40 MG: 40 TABLET, FILM COATED ORAL at 09:11

## 2022-11-17 RX ADMIN — LORAZEPAM 0.5 MG: 0.5 TABLET ORAL at 09:11

## 2022-11-17 RX ADMIN — DULOXETINE HYDROCHLORIDE 30 MG: 30 CAPSULE, DELAYED RELEASE ORAL at 08:11

## 2022-11-17 RX ADMIN — TOPIRAMATE 50 MG: 25 TABLET, FILM COATED ORAL at 08:11

## 2022-11-17 RX ADMIN — AMIODARONE HYDROCHLORIDE 200 MG: 200 TABLET ORAL at 08:11

## 2022-11-17 RX ADMIN — OXYCODONE HYDROCHLORIDE AND ACETAMINOPHEN 1 TABLET: 10; 325 TABLET ORAL at 09:11

## 2022-11-17 RX ADMIN — PROPRANOLOL HYDROCHLORIDE 20 MG: 20 TABLET ORAL at 09:11

## 2022-11-17 RX ADMIN — SENNOSIDES AND DOCUSATE SODIUM 1 TABLET: 50; 8.6 TABLET ORAL at 09:11

## 2022-11-17 RX ADMIN — LEVETIRACETAM 500 MG: 500 TABLET, FILM COATED ORAL at 09:11

## 2022-11-17 RX ADMIN — ENOXAPARIN SODIUM 40 MG: 100 INJECTION SUBCUTANEOUS at 04:11

## 2022-11-17 NOTE — CARE UPDATE
142 received and scanned into .    Message sent to Swati at [facility] requesting update on [facility] authorization. Awaiting return message with update.    Received message from Desi at [facility] stating the authorization is still in review, per [facility], and she will update us when she gets definite information on approval.    Spoke with Mariella at [facility] who states they are willing to accept the patient for SNF services as a back up if Inpatient rehab is denied by [facility]. Mariella has spoken with patient's spouse and she accepts [facility] as a source for SNF services. Mariella will meet with the patient at bedside to complete paperwork for SNF admission and will submit to [facility] for SNF authorization.

## 2022-11-17 NOTE — PT/OT/SLP PROGRESS
Physical Therapy Treatment    Patient Name:  Hiro Rodríguez   MRN:  [id number]    Recommendations:     Discharge Recommendations:  rehabilitation facility   Discharge Equipment Recommendations:  (TBD)   Barriers to discharge:  increase assist with mobility    Assessment:     Hiro Rodríguez is a 77 y.o. male admitted with a medical diagnosis of Subdural hematoma caused by concussion.  He presents with the following impairments/functional limitations:  weakness, impaired endurance, impaired self care skills, impaired functional mobility, gait instability, impaired balance, decreased lower extremity function, decreased safety awareness, pain, orthopedic precautions.    Pt found supine in bed. Agreeable to PT session today. Required mod A x 2 for bed mobility. Performed two sit <> stands successfully with mod A x 2 & RW with mod compliance to TTWB LLE precautions. Continues to require mod verbal & tactile cuing for weightbearing precautions. Bed height was also raised to further assist with sit <> stand transfers.      Rehab Prognosis: Fair; patient would benefit from acute skilled PT services to address these deficits and reach maximum level of function.    Recent Surgery: Procedure(s) (LRB):  INSERTION, ITRAMEDULLARY SANTI, FEMUR, TROCHANTER (Left) 7 Days Post-Op    Plan:     During this hospitalization, patient to be seen daily to address the identified rehab impairments via gait training, therapeutic activities, therapeutic exercises, neuromuscular re-education and progress toward the following goals:    Plan of Care Expires:  12/11/22    Subjective     Chief Complaint: wants lotion put on his back  Patient/Family Comments/goals: go to [facility]  Pain/Comfort:  Pain Rating 1: 8/10  Location - Side 1: Left  Location 1: hip  Pain Addressed 1: Distraction, Reposition, Pre-medicate for activity      Objective:     Communicated with RN prior to session.  Patient found supine with telemetry, messina catheter, bed alarm upon  PT entry to room.     General Precautions: Standard, fall   Orthopedic Precautions:LLE toe touch weight bearing   Braces: N/A  Respiratory Status: Room air     Functional Mobility:  Bed Mobility:     Supine to Sit: moderate assistance and of 2 persons  Sit to Supine: moderate assistance and of 2 persons  Transfers:     Sit to Stand:  moderate assistance and of 2 persons with rolling walker      AM-PAC 6 CLICK MOBILITY          Treatment & Education:  Pt tolerated seated TE including hip flexion, knee ext, ankle df, hip add and hip abd 1 set of 10 each with min vi for proper form and pacing. Able to recall knee extension exercise, but required min cuing for recall of other exercises.   Pt educated on POC, discharge recommendation, weightbearing precautions, safe form for transfers, need for assist with mobility, use of call bell to seek assistance as needed and fall prevention.     Patient left supine with all lines intact, call button in reach, bed alarm on, and RN notified..    GOALS:   Multidisciplinary Problems       Physical Therapy Goals          Problem: Physical Therapy    Goal Priority Disciplines Outcome Goal Variances Interventions   Physical Therapy Goal     PT, PT/OT Ongoing, Progressing     Description: Goals to be met by: 2022    Patient will increase functional independence with mobility by performin. Supine to sit with Moderate Assistance  2. Sit to stand transfer with Moderate Assistance  3. Bed to chair transfer with Moderate Assistance using Slideboard  4. Gait  x 10  feet with Moderate Assistance and maintaining weight-bearing precaution(s) using Rolling Walker.                          Time Tracking:     PT Received On: 22  PT Start Time: 928     PT Stop Time: 945  PT Total Time (min): 17 min     Billable Minutes: Therapeutic Activity 17    Treatment Type: Treatment  PT/PTA: PT     PTA Visit Number: 0     2022

## 2022-11-17 NOTE — PT/OT/SLP PROGRESS
Occupational Therapy   Treatment    Name: Hiro Rodríguez  MRN: [id number]  Admitting Diagnosis:  Subdural hematoma caused by concussion  7 Days Post-Op    Recommendations:     Discharge Recommendations: rehabilitation facility  Discharge Equipment Recommendations:  other (see comments) (TBD)  Barriers to discharge:       Assessment:     Hiro Rodríguez is a 77 y.o. male with a medical diagnosis of Subdural hematoma caused by concussion.  Pt agreeable to OT therapy session this AM after max encouragement. Performance deficits affecting function are weakness, impaired endurance, impaired self care skills, impaired functional mobility, gait instability, impaired balance, decreased coordination, decreased upper extremity function, decreased lower extremity function, decreased safety awareness, pain, impaired cardiopulmonary response to activity, orthopedic precautions.     Rehab Prognosis:  Fair; patient would benefit from acute skilled OT services to address these deficits and reach maximum level of function.       Plan:     Patient to be seen 6 x/week to address the above listed problems via self-care/home management, therapeutic exercises, therapeutic activities  Plan of Care Expires: 12/11/22  Plan of Care Reviewed with: patient    Subjective     Pain/Comfort:  Pain Rating 1:  (not rated)  Location - Side 1: Left  Location 1: hip  Pain Addressed 1: Reposition, Distraction, Cessation of Activity    Objective:     Communicated with: rubina prior to session.  Patient found HOB elevated with telemetry, peripheral IV, messina catheter, bed alarm upon OT entry to room.    General Precautions: Standard, fall   Orthopedic Precautions:LLE toe touch weight bearing   Braces: N/A  Respiratory Status: Room air     Occupational Performance:     Bed Mobility:    Patient completed Supine to Sit with moderate assistance, maximal assistance, and 2 persons  Patient completed Sit to Supine with moderate assistance and 2 persons      Activities of Daily Living:  Declined ADLs    Therapeutic Exercise:  UE exercises seated EOB with tband 2 x 10 reps in all major planes with minimum assistance for correct technique and posture; pt tolerated well    Treatment & Education:  Pt educated on role of OT/POC, importance of OOB/EOB activity, use of call bell, UE exercises, and safety during ADLs, transfers, and functional mobility.    Patient left HOB elevated with all lines intact, call button in reach, bed alarm on, and RN notified    GOALS:   Multidisciplinary Problems       Occupational Therapy Goals          Problem: Occupational Therapy    Goal Priority Disciplines Outcome Interventions   Occupational Therapy Goal     OT, PT/OT Ongoing, Progressing    Description: Goals to be met by: 12/11/2022     Patient will increase functional independence with ADLs by performing:    UE Dressing with Supervision.  LE Dressing with Minimal Assistance.  Grooming while seated with Supervision.  Toileting from toilet with Minimal Assistance for hygiene and clothing management.   Toilet transfer to toilet with Minimal Assistance.                         Time Tracking:     OT Date of Treatment: 11/17/22  OT Start Time: 1038  OT Stop Time: 1053  OT Total Time (min): 15 min    Billable Minutes:Therapeutic Exercise 15    OT/PILO: OT       11/17/2022

## 2022-11-17 NOTE — PROGRESS NOTES
Critical access hospital Medicine  Progress Note    Patient name: Hiro Rodríguez  MRN: [id number]  Admit Date: 11/8/2022   LOS: 8 days     SUBJECTIVE:     Principal problem: Subdural hematoma caused by concussion    Interval History:  No acute overnight events reported.  Tolerating PT/OT better. Pain of left hip is gradually improving. No further hematemesis or melena.       Hospital course:   77-year-old  male with paroxysmal atrial fibrillation, essential hypertension, type 2 diabetes mellitus admitted after presenting with a mechanical fall and dizziness.  He was found to have chronic right-sided subdural hematoma with acute contusion involving the right temporal lobe.  He received prothrombin concentrate complex.  Subsequent CT of the head stable.  He also received levetiracetam for seizure prophylaxis.  Upon admission he was also found to fracture involving the left hip; seen by Orthopedic surgery underwent internal fixation.  CT head post surgery demonstrates improvement in hemorrhage.  Hospital stay complicated by AFib with RVR necessitating initiation of amiodarone.    Fall likely secondary to polypharmacy.  Found to be hypotensive on admission and is on multiple antihypertensives including lisinopril and losartan as well as propranolol and metoprolol.  Also has chronic back pain is on pain management.  Other notable medications include amitriptyline, cyclobenzaprine and trazodone.    Hospital stay complicated by acute upper GI bleed.  GI consulted for the same.  Symptoms improved spontaneously.  Conservative management with PPI therapy and outpatient endoscopy recommended.    Scheduled Meds:   amiodarone  200 mg Oral TID    atorvastatin  40 mg Oral Daily    cefTRIAXone (ROCEPHIN) IVPB  1 g Intravenous Q24H    cyanocobalamin  2,000 mcg Oral Daily    DULoxetine  30 mg Oral Daily    enoxparin  40 mg Subcutaneous Q24H    levETIRAcetam  500 mg Oral BID    metoprolol tartrate  25 mg Oral BID     oxybutynin  10 mg Oral Daily    pantoprazole  40 mg Oral BID    propranoloL  20 mg Oral BID    senna-docusate 8.6-50 mg  1 tablet Oral BID    topiramate  50 mg Oral Daily     Continuous Infusions:   lactated ringers Stopped (11/16/22 0003)    loperamide       PRN Meds:acetaminophen, baclofen, dextrose 10%, dextrose 10%, glucagon (human recombinant), glucose, glucose, insulin aspart U-100, loperamide, LORazepam, magnesium oxide, magnesium oxide, melatonin, morphine, ondansetron, oxyCODONE-acetaminophen, potassium bicarbonate, potassium bicarbonate, potassium bicarbonate, potassium, sodium phosphates, potassium, sodium phosphates, potassium, sodium phosphates, simethicone, traZODone    Review of patient's allergies indicates:  No Known Allergies    Review of Systems: As per interval history    OBJECTIVE:     Vital Signs (Most Recent)  Temp: 97.9 °F (36.6 °C) (11/17/22 1146)  Pulse: 78 (11/17/22 1146)  Resp: 18 (11/17/22 1224)  BP: 123/71 (11/17/22 1146)  SpO2: 95 % (11/17/22 1146)    Vital Signs Range (Last 24H):  Temp:  [97.8 °F (36.6 °C)-98.3 °F (36.8 °C)]   Pulse:  [77-85]   Resp:  [17-20]   BP: (110-135)/(56-71)   SpO2:  [92 %-96 %]     I & O (Last 24H):  Intake/Output Summary (Last 24 hours) at 11/17/2022 1523  Last data filed at 11/17/2022 0401  Gross per 24 hour   Intake 360 ml   Output 675 ml   Net -315 ml         Physical Exam:  General: Patient resting comfortably in no acute distress. Appears as stated age. Calm  Eyes: No conjunctival injection. No scleral icterus.  ENT: Hearing grossly intact. No discharge from ears. No nasal discharge.   CVS:  RRR. No LE edema BL  Lungs:  No tachypnea or accessory muscle use.  Clear to auscultation bilaterally  Abdomen:  Soft, nontender and nondistended.  No organomegaly  Neuro: AOx3. Moves all extremities. Follows commands. Responds appropriately   Skin:  No rash or erythema noted  MSK:  Left thigh swelling noted.  : Sotelo catheter with clear yellow  urine    Laboratory:  I have reviewed all pertinent lab results within the past 24 hours.  CBC:   Recent Labs   Lab 11/16/22  0349   WBC 12.53   RBC 3.16*   HGB 9.6*   HCT 30.6*      MCV 97   MCH 30.4   MCHC 31.4*       CMP:   Recent Labs   Lab 11/16/22  0349      CALCIUM 7.5*   ALBUMIN 2.3*   PROT 5.3*   *   K 4.0   CO2 25   CL 99   BUN 23   CREATININE 0.7   ALKPHOS 63   ALT 22   AST 30   BILITOT 1.0         Diagnostic Results:  Labs: Reviewed    ASSESSMENT/PLAN:         Active Hospital Problems    Diagnosis  POA    *Subdural hematoma caused by concussion [S06.5XAA]  Yes    Acute upper gastrointestinal bleeding [K92.2]  No    KAM (acute kidney injury) [N17.9]  Yes    Subarachnoid bleed [I60.9]  Yes    Closed displaced intertrochanteric fracture of left femur [S72.142A]  Yes    Prerenal azotemia [R79.89]  Yes    Subdural hemorrhage following injury without open intracranial wound and with no loss of consciousness [S06.5X0A]  Yes    Type 2 diabetes mellitus with diabetic polyneuropathy, without long-term current use of insulin [E11.42]  Yes    Atrial fibrillation [I48.91]  Yes    Aspirin long-term use [Z79.82]  Not Applicable    Anticoagulant long-term use [Z79.01]  Not Applicable      Resolved Hospital Problems   No resolved problems to display.       Plan:   Acute on chronic right-sided subdural hematoma and acute right temporal lobe contusion after suffering a mechanical fall   Status post prothrombin concentrate complex on admission  Seen by Neurosurgery.  Non surgical management   Continue neuro checks per protocol   Hold antiplatelet agents.  Started on enoxaparin for VTE prophylaxis for recent hip surgery - benefits outweigh risks  Repeat CT head post surgery with improvement   Continue levetiracetam for seizure prophylaxis for 1 week - stop date 11/16     Closed displaced intertrochanteric fracture of the left femur   Status post orthopedic intervention by Dr. Phoenix on 11/10  Pain control  with parenteral agents   Judicious analgesia  Started on enoxaparin for VTE prophylaxis    Acute upper GI bleed   Hemoglobin stable   Isolated azotemia noted on labs  Continue pantoprazole b.i.d.  Gastroenterology consultation - recommend conservative management    Atrial fibrillation with RVR   Limited response to intravenous digoxin   Continue amiodarone - change to p.o.  Can not anticoagulate given recent intracranial bleed  Echo with EF of 70%    Leukocytosis of unclear etiology  Continue empiric antibiotics however suspicion for infectious etiology is low   Most likely reactive   Switched to ceftriaxone from pip-tazo  Monitor with daily labs    KAM noted which could be from multifactorial etiology - resolved    Essential hypertension   Restart metoprolol tartrate   Hold ACE inhibitor.  Restart when able    Fall and delirium precautions     VTE Risk Mitigation (From admission, onward)           Ordered     enoxaparin injection 40 mg  Every 24 hours (non-standard times)         11/16/22 1159     IP VTE HIGH RISK PATIENT  Once         11/09/22 0237     Place sequential compression device  Until discontinued         11/09/22 0237     Reason for No Pharmacological VTE Prophylaxis  Once        Question:  Reasons:  Answer:  Active Bleeding    11/09/22 0237                        Department Hospital Medicine  [facility]  Orlando Gonzalez MD  Date of service: 11/17/2022

## 2022-11-17 NOTE — PLAN OF CARE
Problem: Occupational Therapy  Goal: Occupational Therapy Goal  Description: Goals to be met by: 12/11/2022     Patient will increase functional independence with ADLs by performing:    UE Dressing with Supervision.  LE Dressing with Minimal Assistance.  Grooming while seated with Supervision.  Toileting from toilet with Minimal Assistance for hygiene and clothing management.   Toilet transfer to toilet with Minimal Assistance.    Outcome: Ongoing, Progressing

## 2022-11-18 VITALS
TEMPERATURE: 98 F | RESPIRATION RATE: 20 BRPM | DIASTOLIC BLOOD PRESSURE: 60 MMHG | OXYGEN SATURATION: 94 % | BODY MASS INDEX: 39.77 KG/M2 | WEIGHT: 277.81 LBS | HEART RATE: 76 BPM | SYSTOLIC BLOOD PRESSURE: 119 MMHG | HEIGHT: 70 IN

## 2022-11-18 PROBLEM — N17.9 AKI (ACUTE KIDNEY INJURY): Status: RESOLVED | Noted: 2022-11-10 | Resolved: 2022-11-18

## 2022-11-18 LAB
GLUCOSE SERPL-MCNC: 107 MG/DL (ref 70–110)
GLUCOSE SERPL-MCNC: 120 MG/DL (ref 70–110)
GLUCOSE SERPL-MCNC: 124 MG/DL (ref 70–110)

## 2022-11-18 PROCEDURE — 97535 SELF CARE MNGMENT TRAINING: CPT

## 2022-11-18 PROCEDURE — 86580 TB INTRADERMAL TEST: CPT | Performed by: INTERNAL MEDICINE

## 2022-11-18 PROCEDURE — 63600175 PHARM REV CODE 636 W HCPCS: Performed by: INTERNAL MEDICINE

## 2022-11-18 PROCEDURE — 30200315 PPD INTRADERMAL TEST REV CODE 302: Performed by: INTERNAL MEDICINE

## 2022-11-18 PROCEDURE — 25000003 PHARM REV CODE 250: Performed by: ORTHOPAEDIC SURGERY

## 2022-11-18 PROCEDURE — 25000003 PHARM REV CODE 250: Performed by: INTERNAL MEDICINE

## 2022-11-18 PROCEDURE — 97530 THERAPEUTIC ACTIVITIES: CPT

## 2022-11-18 RX ORDER — AMIODARONE HYDROCHLORIDE 200 MG/1
TABLET ORAL
Qty: 73 TABLET | Refills: 0 | Status: SHIPPED | OUTPATIENT
Start: 2022-11-18 | End: 2022-12-29

## 2022-11-18 RX ORDER — PANTOPRAZOLE SODIUM 40 MG/1
40 TABLET, DELAYED RELEASE ORAL 2 TIMES DAILY
Qty: 60 TABLET | Refills: 11 | Status: SHIPPED | OUTPATIENT
Start: 2022-11-18 | End: 2022-12-29

## 2022-11-18 RX ORDER — PROPRANOLOL HYDROCHLORIDE 20 MG/1
20 TABLET ORAL 2 TIMES DAILY
Status: ON HOLD
Start: 2022-11-18 | End: 2023-01-09 | Stop reason: HOSPADM

## 2022-11-18 RX ADMIN — OXYCODONE HYDROCHLORIDE AND ACETAMINOPHEN 1 TABLET: 10; 325 TABLET ORAL at 10:11

## 2022-11-18 RX ADMIN — AMIODARONE HYDROCHLORIDE 200 MG: 200 TABLET ORAL at 04:11

## 2022-11-18 RX ADMIN — OXYCODONE HYDROCHLORIDE AND ACETAMINOPHEN 1 TABLET: 10; 325 TABLET ORAL at 06:11

## 2022-11-18 RX ADMIN — TUBERCULIN PURIFIED PROTEIN DERIVATIVE 5 UNITS: 5 INJECTION, SOLUTION INTRADERMAL at 04:11

## 2022-11-18 RX ADMIN — DULOXETINE HYDROCHLORIDE 30 MG: 30 CAPSULE, DELAYED RELEASE ORAL at 08:11

## 2022-11-18 RX ADMIN — PANTOPRAZOLE SODIUM 40 MG: 40 TABLET, DELAYED RELEASE ORAL at 05:11

## 2022-11-18 RX ADMIN — METOPROLOL TARTRATE 25 MG: 25 TABLET, FILM COATED ORAL at 08:11

## 2022-11-18 RX ADMIN — AMIODARONE HYDROCHLORIDE 200 MG: 200 TABLET ORAL at 08:11

## 2022-11-18 RX ADMIN — OXYCODONE HYDROCHLORIDE AND ACETAMINOPHEN 1 TABLET: 10; 325 TABLET ORAL at 02:11

## 2022-11-18 RX ADMIN — OXYCODONE HYDROCHLORIDE AND ACETAMINOPHEN 1 TABLET: 10; 325 TABLET ORAL at 04:11

## 2022-11-18 RX ADMIN — PANTOPRAZOLE SODIUM 40 MG: 40 TABLET, DELAYED RELEASE ORAL at 06:11

## 2022-11-18 RX ADMIN — LEVETIRACETAM 500 MG: 500 TABLET, FILM COATED ORAL at 08:11

## 2022-11-18 RX ADMIN — PROPRANOLOL HYDROCHLORIDE 20 MG: 20 TABLET ORAL at 08:11

## 2022-11-18 RX ADMIN — CYANOCOBALAMIN TAB 1000 MCG 2000 MCG: 1000 TAB at 08:11

## 2022-11-18 RX ADMIN — OXYBUTYNIN CHLORIDE 10 MG: 5 TABLET, EXTENDED RELEASE ORAL at 08:11

## 2022-11-18 RX ADMIN — TOPIRAMATE 50 MG: 25 TABLET, FILM COATED ORAL at 08:11

## 2022-11-18 RX ADMIN — SENNOSIDES AND DOCUSATE SODIUM 1 TABLET: 50; 8.6 TABLET ORAL at 08:11

## 2022-11-18 NOTE — DISCHARGE SUMMARY
[facility] Medicine  Discharge Summary      Patient Name: Hiro Rodríguez  MRN: [id number]  RADHA: [id number]  Patient Class: IP- Inpatient  Admission Date: 11/8/2022  Hospital Length of Stay: 9 days  Discharge Date and Time:  11/18/2022 5:21 PM  Attending Physician: Orlando Gonzalez MD   Discharging Provider: Orlando Gonzalez MD  Primary Care Provider: Gautam Castanon III, MD    Primary Care Team: Networked reference to record PCT       Procedure(s) (LRB):  INSERTION, ITRAMEDULLARY SANTI, FEMUR, TROCHANTER (Left)      Hospital Course:   77-year-old  male with paroxysmal atrial fibrillation, essential hypertension, type 2 diabetes mellitus admitted after presenting with a mechanical fall and dizziness.  He was found to have chronic right-sided subdural hematoma with acute contusion involving the right temporal lobe.  He received prothrombin concentrate complex.  Subsequent CT of the head stable.  He also received levetiracetam for seizure prophylaxis.  Upon admission he was also found to fracture involving the left hip; seen by Orthopedic surgery underwent internal fixation.  CT head post surgery demonstrates improvement in hemorrhage.  Hospital stay complicated by AFib with RVR necessitating initiation of amiodarone.     Fall likely secondary to polypharmacy.  Found to be hypotensive on admission and is on multiple antihypertensives including lisinopril and losartan as well as propranolol and metoprolol.  Also has chronic back pain is on pain management.  Other notable medications include amitriptyline, cyclobenzaprine and trazodone.     Hospital stay complicated by acute upper GI bleed.  GI consulted for the same.  Symptoms improved spontaneously.  Conservative management with PPI therapy and outpatient endoscopy recommended.  CT head on day of discharge revealed improving right temporal contusion along with stable chronic right subdural hemorrhage.  Restarted on apixaban as per  recommendation from Neurosurgery.  Discussed to follow-up with neurosurgery and Orthopedic surgery.  Return precautions discussed.    I have seen the patient on the day of discharge and reviewed the discharge instructions as outlined below. Patient verbalized understanding and is aware to contact primary care physician or return to ED if new or worsening symptoms.         Goals of Care Treatment Preferences:  Code Status: Full Code      Consults:   Consults (From admission, onward)        Status Ordering Provider     Inpatient consult to   Once        Provider:  (Not yet assigned)    Completed NGUYỄN SAENZ     Inpatient consult to Gastroenterology  Once        Provider:  Alber Bravo MD    Completed NGUYỄN SAENZ     Inpatient consult to   Once        Provider:  (Not yet assigned)    Acknowledged NGUYỄN SAENZ     IP consult case management/social work  Once        Provider:  (Not yet assigned)    Completed ROLANDO DOMINGO     Inpatient consult to Midline team  Once        Provider:  (Not yet assigned)    Acknowledged ROLANDO DOMINGO     Inpatient consult to Orthopedic Surgery  Once        Provider:  Vince Beaulieu MD    Acknowledged ROLANDO DOMINGO     Inpatient consult to Neurosurgery  Once        Provider:  Jess Diaz MD    Completed BEAU BELLE     Inpatient consult to Hospitalist  Once        Provider:  Beau Belle MD    Acknowledged ROLANDO DOMINGO     Inpatient consult to Neurosurgery  Once        Provider:  Jess Diaz MD    Completed BEAU BELLE          No new Assessment & Plan notes have been filed under this hospital service since the last note was generated.  Service: Hospital Medicine    Final Active Diagnoses:    Diagnosis Date Noted POA    PRINCIPAL PROBLEM:  Subdural hematoma caused by concussion [S06.5XAA] 11/09/2022 Yes    Acute upper gastrointestinal bleeding [K92.2] 11/14/2022 No    Subarachnoid  bleed [I60.9] 11/09/2022 Yes    Closed displaced intertrochanteric fracture of left femur [S72.142A] 11/09/2022 Yes    Prerenal azotemia [R79.89] 11/09/2022 Yes    Subdural hemorrhage following injury without open intracranial wound and with no loss of consciousness [S06.5X0A] 11/09/2022 Yes    Type 2 diabetes mellitus with diabetic polyneuropathy, without long-term current use of insulin [E11.42] 01/06/2021 Yes    Atrial fibrillation [I48.91] 08/31/2020 Yes    Aspirin long-term use [Z79.82] 08/31/2020 Not Applicable    Anticoagulant long-term use [Z79.01] 08/31/2020 Not Applicable      Problems Resolved During this Admission:    Diagnosis Date Noted Date Resolved POA    KAM (acute kidney injury) [N17.9] 11/10/2022 11/18/2022 Yes       Discharged Condition: stable    Disposition: Rehab Facility    Follow Up:   Follow-up Information     Gautam Castanon III, MD Follow up in 2 week(s).    Specialty: Family Medicine  Why: Hospital discharge follow-up  Contact information:  [address] [facility]  [address]  [place] [address]  [phone]             Richard Phoenix MD Follow up in 2 week(s).    Specialties: Orthopedic Surgery, Surgery  Why: Hip surgery follow-up  Contact information:  [address] [place] [address] [address] [place] [address]  [phone]             Jess Diaz MD Follow up in 2 week(s).    Specialty: Neurosurgery  Why: Subdural hemorrhage follow-up. Needs CT head 2 weeks  Contact information:  [address] [facility] [address]  [phone]                       Patient Instructions:      Diet Cardiac     Notify your health care provider if you experience any of the following:  persistent nausea and vomiting or diarrhea     Notify your health care provider if you experience any of the following:  severe uncontrolled pain     Notify your health care provider if you experience any of the following:  difficulty breathing or increased cough     Notify your health care provider if you experience any of the  following:  persistent dizziness, light-headedness, or visual disturbances     Notify your health care provider if you experience any of the following:  increased confusion or weakness     Notify your health care provider if you experience any of the following:   Order Comments: Worsening or recurrent symptoms     Activity as tolerated         Medications:  Reconciled Home Medications:      Medication List      START taking these medications    amiodarone 200 MG Tab  Commonly known as: PACERONE  Take 1 tablet (200 mg total) by mouth 2 (two) times daily for 14 days, THEN 0.5 tablets (100 mg total) once daily.  Start taking on: November 18, 2022     pantoprazole 40 MG tablet  Commonly known as: PROTONIX  Take 1 tablet (40 mg total) by mouth 2 (two) times daily.        CHANGE how you take these medications    propranoloL 20 MG tablet  Commonly known as: INDERAL  Take 1 tablet (20 mg total) by mouth 2 (two) times daily.  What changed:   · medication strength  · how much to take        CONTINUE taking these medications    apixaban 5 mg Tab  Commonly known as: ELIQUIS  Take 1 tablet (5 mg total) by mouth 2 (two) times daily.     cholecalciferol (vitamin D3) 50 mcg (2,000 unit) Tab  Commonly known as: VITAMIN D3  Take by mouth once daily.     cyanocobalamin (vitamin B-12) 1,000 mcg Subl  Place 1,000 mcg under the tongue 2 (two) times a day.     DULoxetine 30 MG capsule  Commonly known as: CYMBALTA  Take 1 capsule (30 mg total) by mouth once daily.     ferrous sulfate 27 mg iron Tab  Take by mouth.     FIBER-CAPS (CA POLYCARBOPHIL) ORAL  Take by mouth.     losartan 50 MG tablet  Commonly known as: COZAAR  Take 1 tablet (50 mg total) by mouth once daily.     magnesium 250 mg Tab  Take 250 mg by mouth 2 (two) times a day.     metFORMIN 500 MG ER 24hr tablet  Commonly known as: GLUCOPHAGE-XR  Take 1 tablet (500 mg total) by mouth once daily.     metoprolol tartrate 25 MG tablet  Commonly known as: LOPRESSOR  Take 1 tablet (25  mg total) by mouth 2 (two) times daily.     omega-3 acid ethyl esters 1 gram capsule  Commonly known as: LOVAZA  Take 2 capsules (2 g total) by mouth 2 (two) times daily.     OZEMPIC 1 mg/dose (4 mg/3 mL)  Generic drug: semaglutide  Inject 1 mg into the skin every 7 days.     rosuvastatin 20 MG tablet  Commonly known as: CRESTOR  TAKE 1 TABLET BY MOUTH EVERY DAY     tolterodine 4 MG 24 hr capsule  Commonly known as: DETROL LA  Take 1 capsule (4 mg total) by mouth once daily.     topiramate 50 MG tablet  Commonly known as: TOPAMAX  Take 1 tablet (50 mg total) by mouth once daily.     traZODone 50 MG tablet  Commonly known as: DESYREL  Take 1 tablet (50 mg total) by mouth every evening.        STOP taking these medications    amitriptyline 10 MG tablet  Commonly known as: ELAVIL     cephalexin 500 mg tablet  Commonly known as: KEFTAB     cyclobenzaprine 5 MG tablet  Commonly known as: FLEXERIL     hydroCHLOROthiazide 25 MG tablet  Commonly known as: HYDRODIURIL     lisinopriL 20 MG tablet  Commonly known as: PRINIVIL,ZESTRIL     oxaprozin 600 mg tablet  Commonly known as: DAYPRO              Time spent on the discharge of patient: 40 minutes         Orlando Gonzalez MD  Department of Hospital Medicine  Formerly [facility], [facility]–1983

## 2022-11-18 NOTE — CARE UPDATE
Received message from Mariella at [facility] that they have obtained authorization from [facility] for approval for SNF services for this patient and they are able to accept the patient this afternoon.  Above information relayed to Dr. Gonzalez via secure chat.

## 2022-11-18 NOTE — HOSPITAL COURSE
77-year-old  male with paroxysmal atrial fibrillation, essential hypertension, type 2 diabetes mellitus admitted after presenting with a mechanical fall and dizziness.  He was found to have chronic right-sided subdural hematoma with acute contusion involving the right temporal lobe.  He received prothrombin concentrate complex.  Subsequent CT of the head stable.  He also received levetiracetam for seizure prophylaxis.  Upon admission he was also found to fracture involving the left hip; seen by Orthopedic surgery underwent internal fixation.  CT head post surgery demonstrates improvement in hemorrhage.  Hospital stay complicated by AFib with RVR necessitating initiation of amiodarone.     Fall likely secondary to polypharmacy.  Found to be hypotensive on admission and is on multiple antihypertensives including lisinopril and losartan as well as propranolol and metoprolol.  Also has chronic back pain is on pain management.  Other notable medications include amitriptyline, cyclobenzaprine and trazodone.     Hospital stay complicated by acute upper GI bleed.  GI consulted for the same.  Symptoms improved spontaneously.  Conservative management with PPI therapy and outpatient endoscopy recommended.  CT head on day of discharge revealed improving right temporal contusion along with stable chronic right subdural hemorrhage.  Restarted on apixaban as per recommendation from Neurosurgery.  Discussed to follow-up with neurosurgery and Orthopedic surgery.  Return precautions discussed.    I have seen the patient on the day of discharge and reviewed the discharge instructions as outlined below. Patient verbalized understanding and is aware to contact primary care physician or return to ED if new or worsening symptoms.

## 2022-11-18 NOTE — PT/OT/SLP PROGRESS
Physical Therapy Treatment    Patient Name:  Hiro Rodríguez   MRN:  [id number]    Recommendations:     Discharge Recommendations:  nursing facility, skilled   Discharge Equipment Recommendations:  (TBD)   Barriers to discharge:  increase assist with mobility    Assessment:     Hiro Rodríguez is a 77 y.o. male admitted with a medical diagnosis of Subdural hematoma caused by concussion.  He presents with the following impairments/functional limitations:  weakness, impaired endurance, impaired self care skills, impaired functional mobility, gait instability, impaired balance, decreased lower extremity function, decreased safety awareness, pain, orthopedic precautions.    Pt found supine in bed and agreeable to treatment session today. Continues to require mod A x 2 for safe mobilization, but demonstrated slight improvement with compliance to weightbearing precautions  today with verbal cuing. Therefore, side steps attempted along EOB. Side steps were performed with mod A x 2 & RW and required ~3 seated rest breaks while progressing towards the head of the bed.     Rehab Prognosis: Fair; patient would benefit from acute skilled PT services to address these deficits and reach maximum level of function.    Recent Surgery: Procedure(s) (LRB):  INSERTION, ITRAMEDULLARY SANTI, FEMUR, TROCHANTER (Left) 8 Days Post-Op    Plan:     During this hospitalization, patient to be seen daily to address the identified rehab impairments via gait training, therapeutic activities, therapeutic exercises, neuromuscular re-education and progress toward the following goals:    Plan of Care Expires:  12/11/22    Subjective     Chief Complaint: L hip pain  Patient/Family Comments/goals: get out of the hospital  Pain/Comfort:  Pain Rating 1: 6/10  Location - Side 1: Left  Location 1: hip  Pain Addressed 1: Pre-medicate for activity, Reposition, Distraction      Objective:     Communicated with RN prior to session.  Patient found supine with  telemetry, messina catheter, bed alarm upon PT entry to room.     General Precautions: Standard, fall   Orthopedic Precautions:LLE toe touch weight bearing   Braces: N/A  Respiratory Status: Room air     Functional Mobility:  Bed Mobility:     Supine to Sit: moderate assistance and of 2 persons  Sit to Supine: moderate assistance and of 2 persons  Transfers:     Sit to Stand:  moderate assistance and of 2 persons with rolling walker  Gait: side steps along EOB with RW & mod A x 2. Required 3 seated rest breaks      AM-PAC 6 CLICK MOBILITY          Treatment & Education:  Pt tolerated seated TE including hip flexion, knee ext, ankle df, hip add, and hip abd 1 set of 10 each with min vi for proper form and pacing.  Pt educated on POC, discharge recommendation, safe form for transfers, WB precautions, need for assist with mobility, use of call bell to seek assistance as needed and fall prevention.    Patient left HOB elevated with all lines intact, call button in reach, and bed alarm on..    GOALS:   Multidisciplinary Problems       Physical Therapy Goals          Problem: Physical Therapy    Goal Priority Disciplines Outcome Goal Variances Interventions   Physical Therapy Goal     PT, PT/OT Ongoing, Progressing     Description: Goals to be met by: 2022    Patient will increase functional independence with mobility by performin. Supine to sit with Moderate Assistance  2. Sit to stand transfer with Moderate Assistance  3. Bed to chair transfer with Moderate Assistance using Slideboard  4. Gait  x 10  feet with Moderate Assistance and maintaining weight-bearing precaution(s) using Rolling Walker.                          Time Tracking:     PT Received On: 22  PT Start Time: 1031     PT Stop Time: 1050  PT Total Time (min): 19 min     Billable Minutes: Therapeutic Activity 19    Treatment Type: Treatment  PT/PTA: PT     PTA Visit Number: 0     2022

## 2022-11-18 NOTE — PT/OT/SLP PROGRESS
Occupational Therapy   Treatment    Name: Hiro Rodríguez  MRN: [id number]  Admitting Diagnosis:  Subdural hematoma caused by concussion  8 Days Post-Op    Recommendations:     Discharge Recommendations: nursing facility, skilled  Discharge Equipment Recommendations:  other (see comments) (TBD)  Barriers to discharge:       Assessment:     Hiro Rodríguez is a 77 y.o. male with a medical diagnosis of Subdural hematoma caused by concussion.  Pt agreeable to OT therapy session this AM. Performance deficits affecting function are weakness, impaired endurance, impaired self care skills, impaired functional mobility, gait instability, impaired balance, decreased lower extremity function, decreased safety awareness, impaired cardiopulmonary response to activity, orthopedic precautions, pain.     Rehab Prognosis:  Fair; patient would benefit from acute skilled OT services to address these deficits and reach maximum level of function.       Plan:     Patient to be seen 6 x/week to address the above listed problems via self-care/home management, therapeutic activities, therapeutic exercises  Plan of Care Expires: 12/11/22  Plan of Care Reviewed with: patient    Subjective     Pain/Comfort:  Pain Rating 1:  (not rated)  Location - Side 1: Left  Location 1: hip  Pain Addressed 1: Reposition, Distraction, Cessation of Activity    Objective:     Communicated with: nursing prior to session.  Patient found HOB elevated with telemetry, bed alarm, Condom Catheter upon OT entry to room.    General Precautions: Standard, fall   Orthopedic Precautions:LLE toe touch weight bearing   Braces: N/A  Respiratory Status: Room air     Occupational Performance:     Bed Mobility:    Patient completed Rolling/Turning to Left with  minimum assistance and with side rail  Patient completed Rolling/Turning to Right with moderate assistance and with side rail  Patient completed Supine to Sit with moderate assistance, maximal assistance, and 2  persons  Patient completed Sit to Supine with moderate assistance and 2 persons     Functional Mobility/Transfers:  Patient completed Sit <> Stand Transfer with moderate assistance and of 2 persons  with  rolling walker x 3 times to change bedding/hygiene. Pt required maximum verbal cues for LLE TTWB compliance with standing    Activities of Daily Living:  Grooming: stand by assistance seated EOB to wash hands  Upper Body Dressing: minimum assistance to doff soiled gown and don clean gown  Toileting: maximal assistance for clothing management; Maximum assistance in standing to wash perianal area while providing moderate assist x 2 for standing balance; SBA seated EOB for patient to wash periarea     Treatment & Education:  Pt educated on role of OT/POC, importance of OOB/EOB activity, use of call bell, and safety during ADLs, transfers, and functional mobility.     Patient left HOB elevated with all lines intact, call button in reach, bed alarm on, and RN notified of pt needing new condom catheter placed (Condom catheter was found off of patient upon entry).    GOALS:   Multidisciplinary Problems       Occupational Therapy Goals          Problem: Occupational Therapy    Goal Priority Disciplines Outcome Interventions   Occupational Therapy Goal     OT, PT/OT Ongoing, Progressing    Description: Goals to be met by: 12/11/2022     Patient will increase functional independence with ADLs by performing:    UE Dressing with Supervision.  LE Dressing with Minimal Assistance.  Grooming while seated with Supervision.  Toileting from toilet with Minimal Assistance for hygiene and clothing management.   Toilet transfer to toilet with Minimal Assistance.                         Time Tracking:     OT Date of Treatment: 11/18/22  OT Start Time: 0853  OT Stop Time: 0927  OT Total Time (min): 34 min    Billable Minutes:Self Care/Home Management 34    OT/PILO: OT       11/18/2022

## 2022-11-18 NOTE — PLAN OF CARE
Spoke with patient at bedside and spouse on speaker phone from bedside to discuss disposition of patient to inpatient rehab at [facility] and they both are in agreement with this plan. Patient to transfer to [facility]. Transportation via wheelchair van, ADT30 order placed for transport.     Discharge orders and chart reviewed with no further post-acute discharge needs identified at this time.  At this time, patient is cleared for discharge from Case Management standpoint.        11/18/22 1619   Final Note   Assessment Type Final Discharge Note   Anticipated Discharge Disposition Rehab   Post-Acute Status   Post-Acute Authorization Placement   Post-Acute Placement Status Set-up Complete/Auth obtained   Coverage [employer]   Discharge Delays None known at this time

## 2022-11-18 NOTE — CARE UPDATE
Notified by Swati at [facility] that the case for [facility] authorization for inpatient rehab has been sent to peer to peer review and not approved at this time. Will follow and update as appropriate.    Spoke with Mariella at [facility] [phone], informed her of inpatient rehab denial. She states they have requested authorization for SNF from [facility] and she is meeting with patient's wife today to get paperwork signed for his possible admission there.    @ [phone] - spoke with Mariella at [facility] who states paperwork is completed for patient's admission to them for SNF and still waiting on authorization from [facility].   PPD ordered. 142, CXR, and PASRR Rightfaxed to [facility] at [phone] per Mariella's request. PPD placement and d/c order to be faxed to them when available.

## 2022-11-29 NOTE — PT/OT/SLP DISCHARGE
Occupational Therapy Discharge Summary    Hiro Rodríguez  MRN: [id number]   Principal Problem: Subdural hematoma caused by concussion      Patient Discharged from acute Occupational Therapy on 11/18/22.  Please refer to prior OT note dated 11/18/22 for functional status.    Assessment:      Patient appropriate for care in another setting.    Objective:     GOALS:   Multidisciplinary Problems       Occupational Therapy Goals          Problem: Occupational Therapy    Goal Priority Disciplines Outcome Interventions   Occupational Therapy Goal     OT, PT/OT Ongoing, Progressing    Description: Goals to be met by: 12/11/2022     Patient will increase functional independence with ADLs by performing:    UE Dressing with Supervision.  LE Dressing with Minimal Assistance.  Grooming while seated with Supervision.  Toileting from toilet with Minimal Assistance for hygiene and clothing management.   Toilet transfer to toilet with Minimal Assistance.                         Reasons for Discontinuation of Therapy Services  Transfer to alternate level of care.      Plan:     Patient Discharged to: Inpatient Rehab    11/29/2022

## 2022-12-02 ENCOUNTER — TELEPHONE (OUTPATIENT)
Dept: NEUROSURGERY | Facility: CLINIC | Age: 77
End: 2022-12-02
Payer: MEDICARE

## 2022-12-02 ENCOUNTER — HOSPITAL ENCOUNTER (OUTPATIENT)
Dept: RADIOLOGY | Facility: HOSPITAL | Age: 77
Discharge: HOME | End: 2022-12-02
Attending: PHYSICAL MEDICINE & REHABILITATION
Payer: MEDICARE

## 2022-12-02 DIAGNOSIS — S06.5X9D SUBDURAL HEMATOMA DUE TO CONCUSSION, WITH LOSS OF CONSCIOUSNESS, SUBSEQUENT ENCOUNTER: Primary | ICD-10-CM

## 2022-12-02 PROCEDURE — 93971 EXTREMITY STUDY: CPT | Mod: 26,LT,, | Performed by: RADIOLOGY

## 2022-12-02 PROCEDURE — 93971 US LOWER EXTREMITY VEINS LEFT: ICD-10-PCS | Mod: 26,LT,, | Performed by: RADIOLOGY

## 2022-12-02 NOTE — TELEPHONE ENCOUNTER
Left message on vm of number provided stating that an appointment for pt was scheduled for 12/19, 2:15pm for his CTH [facility], followed by a 3:30pm appt with ANSON Bangura.

## 2022-12-02 NOTE — TELEPHONE ENCOUNTER
----- Message from Kelly Ward sent at 12/2/2022 11:50 AM CST -----  Regarding: appt: [facility]  Contact: John (female) @ [phone]  CallerJohn (female) with [facility] is calling to speak to someone to schedule pt for an appt. Caller states that pt needs to have a repeat Head CT done. Caller is asking if pt can be schedule, or does pt need to have CT done first. Please call to advise. Thanks.

## 2022-12-09 ENCOUNTER — TELEPHONE (OUTPATIENT)
Dept: ORTHOPEDICS | Facility: CLINIC | Age: 77
End: 2022-12-09
Payer: MEDICARE

## 2022-12-09 PROCEDURE — G0180 PR HOME HEALTH MD CERTIFICATION: ICD-10-PCS | Mod: ,,, | Performed by: FAMILY MEDICINE

## 2022-12-09 PROCEDURE — G0180 MD CERTIFICATION HHA PATIENT: HCPCS | Mod: ,,, | Performed by: FAMILY MEDICINE

## 2022-12-13 ENCOUNTER — TELEPHONE (OUTPATIENT)
Dept: ORTHOPEDICS | Facility: CLINIC | Age: 77
End: 2022-12-13
Payer: MEDICARE

## 2022-12-15 ENCOUNTER — OFFICE VISIT (OUTPATIENT)
Dept: FAMILY MEDICINE | Facility: CLINIC | Age: 77
End: 2022-12-15
Payer: MEDICARE

## 2022-12-15 VITALS
TEMPERATURE: 97 F | BODY MASS INDEX: 36.51 KG/M2 | RESPIRATION RATE: 17 BRPM | HEART RATE: 86 BPM | WEIGHT: 255 LBS | SYSTOLIC BLOOD PRESSURE: 128 MMHG | HEIGHT: 70 IN | DIASTOLIC BLOOD PRESSURE: 78 MMHG | OXYGEN SATURATION: 98 %

## 2022-12-15 DIAGNOSIS — Z79.01 ANTICOAGULATED: ICD-10-CM

## 2022-12-15 DIAGNOSIS — N40.0 BENIGN PROSTATIC HYPERPLASIA, UNSPECIFIED WHETHER LOWER URINARY TRACT SYMPTOMS PRESENT: ICD-10-CM

## 2022-12-15 DIAGNOSIS — K52.9 GASTROENTERITIS: ICD-10-CM

## 2022-12-15 DIAGNOSIS — I10 HYPERTENSION, ESSENTIAL: ICD-10-CM

## 2022-12-15 DIAGNOSIS — G25.0 BENIGN ESSENTIAL TREMOR: ICD-10-CM

## 2022-12-15 DIAGNOSIS — S72.002A CLOSED FRACTURE OF LEFT HIP, INITIAL ENCOUNTER: ICD-10-CM

## 2022-12-15 DIAGNOSIS — Z99.89 USES WALKER: ICD-10-CM

## 2022-12-15 DIAGNOSIS — I48.91 ATRIAL FIBRILLATION, UNSPECIFIED TYPE: ICD-10-CM

## 2022-12-15 DIAGNOSIS — S06.5X0S: Primary | ICD-10-CM

## 2022-12-15 DIAGNOSIS — S06.5X0S: ICD-10-CM

## 2022-12-15 DIAGNOSIS — Z12.5 SCREENING PSA (PROSTATE SPECIFIC ANTIGEN): ICD-10-CM

## 2022-12-15 DIAGNOSIS — E11.42 TYPE 2 DIABETES MELLITUS WITH DIABETIC POLYNEUROPATHY, WITHOUT LONG-TERM CURRENT USE OF INSULIN: ICD-10-CM

## 2022-12-15 PROCEDURE — 3288F PR FALLS RISK ASSESSMENT DOCUMENTED: ICD-10-PCS | Mod: CPTII,S$GLB,, | Performed by: FAMILY MEDICINE

## 2022-12-15 PROCEDURE — 1100F PR PT FALLS ASSESS DOC 2+ FALLS/FALL W/INJURY/YR: ICD-10-PCS | Mod: CPTII,S$GLB,, | Performed by: FAMILY MEDICINE

## 2022-12-15 PROCEDURE — 99214 OFFICE O/P EST MOD 30 MIN: CPT | Mod: S$GLB,,, | Performed by: FAMILY MEDICINE

## 2022-12-15 PROCEDURE — 99214 PR OFFICE/OUTPT VISIT, EST, LEVL IV, 30-39 MIN: ICD-10-PCS | Mod: S$GLB,,, | Performed by: FAMILY MEDICINE

## 2022-12-15 PROCEDURE — 3074F PR MOST RECENT SYSTOLIC BLOOD PRESSURE < 130 MM HG: ICD-10-PCS | Mod: CPTII,S$GLB,, | Performed by: FAMILY MEDICINE

## 2022-12-15 PROCEDURE — 3078F PR MOST RECENT DIASTOLIC BLOOD PRESSURE < 80 MM HG: ICD-10-PCS | Mod: CPTII,S$GLB,, | Performed by: FAMILY MEDICINE

## 2022-12-15 PROCEDURE — 3078F DIAST BP <80 MM HG: CPT | Mod: CPTII,S$GLB,, | Performed by: FAMILY MEDICINE

## 2022-12-15 PROCEDURE — 1125F AMNT PAIN NOTED PAIN PRSNT: CPT | Mod: CPTII,S$GLB,, | Performed by: FAMILY MEDICINE

## 2022-12-15 PROCEDURE — 3074F SYST BP LT 130 MM HG: CPT | Mod: CPTII,S$GLB,, | Performed by: FAMILY MEDICINE

## 2022-12-15 PROCEDURE — 1125F PR PAIN SEVERITY QUANTIFIED, PAIN PRESENT: ICD-10-PCS | Mod: CPTII,S$GLB,, | Performed by: FAMILY MEDICINE

## 2022-12-15 PROCEDURE — 3288F FALL RISK ASSESSMENT DOCD: CPT | Mod: CPTII,S$GLB,, | Performed by: FAMILY MEDICINE

## 2022-12-15 PROCEDURE — 1100F PTFALLS ASSESS-DOCD GE2>/YR: CPT | Mod: CPTII,S$GLB,, | Performed by: FAMILY MEDICINE

## 2022-12-15 RX ORDER — ACETAMINOPHEN 325 MG/1
650 TABLET ORAL EVERY 6 HOURS PRN
COMMUNITY
Start: 2022-12-07 | End: 2023-03-16

## 2022-12-15 RX ORDER — POLYETHYLENE GLYCOL 3350 17 G/17G
17 POWDER, FOR SOLUTION ORAL DAILY PRN
COMMUNITY
Start: 2022-12-07

## 2022-12-15 RX ORDER — CYCLOBENZAPRINE HCL 5 MG
5 TABLET ORAL 3 TIMES DAILY PRN
COMMUNITY
Start: 2022-12-07 | End: 2022-12-29

## 2022-12-15 RX ORDER — OXYCODONE AND ACETAMINOPHEN 10; 325 MG/1; MG/1
1 TABLET ORAL EVERY 4 HOURS PRN
COMMUNITY
Start: 2022-12-07 | End: 2022-12-21

## 2022-12-15 RX ORDER — OXYCODONE AND ACETAMINOPHEN 10; 325 MG/1; MG/1
TABLET ORAL
Status: ON HOLD | COMMUNITY
Start: 2022-12-07 | End: 2023-01-09 | Stop reason: HOSPADM

## 2022-12-15 RX ORDER — AMIODARONE HYDROCHLORIDE 100 MG/1
100 TABLET ORAL EVERY MORNING
COMMUNITY
Start: 2022-12-07 | End: 2023-05-25 | Stop reason: SDUPTHER

## 2022-12-15 RX ORDER — HYDROCODONE BITARTRATE AND ACETAMINOPHEN 10; 325 MG/1; MG/1
1 TABLET ORAL EVERY 6 HOURS PRN
Status: ON HOLD | COMMUNITY
Start: 2022-10-13 | End: 2023-01-25 | Stop reason: HOSPADM

## 2022-12-15 RX ORDER — CYCLOBENZAPRINE HCL 5 MG
5 TABLET ORAL NIGHTLY
Status: ON HOLD | COMMUNITY
Start: 2022-12-07 | End: 2023-01-25 | Stop reason: HOSPADM

## 2022-12-15 NOTE — PROGRESS NOTES
Subjective:       Patient ID: Hiro Rodríguez is a 77 y.o. male.    Chief Complaint: Hospital Follow Up    HPI [facility]- 11/8-11/18- [facility]  Patient presents to clinic for hospital follow up. Right subdural hematoma and hemorrhage. Fell and hit his head on the ground trying to put his socks on. Left femur fracture. Was discharged from [facility] to rehab facility. Released on Friday. Head feels ok. No headaches of significance. Walker use. Hip pain with certain movements. Recovering, but feels weak. Poor balance. Has home health for 3 weeks then will start outpatient therapy. Hypertension stable. A-fib non bothersome. Amiodarone 200 mg. Cardiovascular ok. Denies chest pain or palpitations. Anticoagulated. Tremor stable on Propanolol. Hyperlipidemia. BPH. Urinary incontinence and frequency. PSA is due. Anemia. Taking iron supplement. Type 2 DM. Checking glucose regularly. Due for eye exam. Due for second shingles vaccination.   Review of Systems   Respiratory: Negative.     Cardiovascular: Negative.  Negative for chest pain and palpitations.   Genitourinary:  Positive for bladder incontinence and frequency.   Neurological:  Positive for weakness, coordination difficulties and coordination difficulties. Negative for headaches.   Psychiatric/Behavioral: Negative.         Objective:      Physical Exam  Constitutional:       Appearance: Normal appearance.   Neck:      Vascular: No carotid bruit.   Cardiovascular:      Rate and Rhythm: Normal rate and regular rhythm.      Pulses: Normal pulses.      Heart sounds: Normal heart sounds.   Pulmonary:      Effort: Pulmonary effort is normal.      Breath sounds: Normal breath sounds.   Abdominal:      General: Abdomen is flat. Bowel sounds are normal.      Palpations: Abdomen is soft.   Lymphadenopathy:      Cervical: No cervical adenopathy.   Skin:     General: Skin is warm and dry.   Neurological:      General: No focal deficit present.      Mental Status: He is alert.    Psychiatric:         Mood and Affect: Mood normal.         Behavior: Behavior normal.       Assessment/Plan:     Traumatic subdural hemorrhage without open intracranial wound and without loss of consciousness, sequela    Subdural hematoma due to concussion, without loss of consciousness, sequela    Type 2 diabetes mellitus with diabetic polyneuropathy, without long-term current use of insulin    Closed fracture of left hip, initial encounter    Uses walker    BMI 36.0-36.9,adult    Atrial fibrillation, unspecified type    Anticoagulated    Benign essential tremor    Hypertension, essential  -     CBC Auto Differential; Future; Expected date: 12/15/2022  -     Basic Metabolic Panel; Future; Expected date: 12/29/2022    Benign prostatic hyperplasia, unspecified whether lower urinary tract symptoms present  -     US Bladder; Future; Expected date: 12/15/2022    Screening PSA (prostate specific antigen)  -     PSA, Screening; Future; Expected date: 12/29/2022    Gastroenteritis    Other orders  -     semaglutide (OZEMPIC) 1 mg/dose (4 mg/3 mL); Inject 1 mg into the skin every 7 days.  Dispense: 4 pen; Refill: 5     Urinalysis and urine culture and sensitivity.  Ultrasound for residual urine.  PSA ordered.  Continue daily iron.  CBC BMP by home health.  Go for diabetic eye exam when he can.  Follow-up here in 1 month.

## 2022-12-16 ENCOUNTER — HOSPITAL ENCOUNTER (OUTPATIENT)
Dept: RADIOLOGY | Facility: HOSPITAL | Age: 77
Discharge: HOME | End: 2022-12-16
Attending: ORTHOPAEDIC SURGERY
Payer: MEDICARE

## 2022-12-16 ENCOUNTER — OFFICE VISIT (OUTPATIENT)
Dept: ORTHOPEDICS | Facility: CLINIC | Age: 77
End: 2022-12-16
Payer: MEDICARE

## 2022-12-16 VITALS — WEIGHT: 255 LBS | HEIGHT: 70 IN | BODY MASS INDEX: 36.51 KG/M2

## 2022-12-16 DIAGNOSIS — S72.8X2A OTHER FRACTURE OF LEFT FEMUR, INITIAL ENCOUNTER FOR CLOSED FRACTURE: Primary | ICD-10-CM

## 2022-12-16 DIAGNOSIS — S72.142D CLOSED 2-PART INTERTROCHANTERIC FRACTURE OF LEFT FEMUR WITH ROUTINE HEALING, SUBSEQUENT ENCOUNTER: Primary | ICD-10-CM

## 2022-12-16 DIAGNOSIS — S72.8X2A OTHER FRACTURE OF LEFT FEMUR, INITIAL ENCOUNTER FOR CLOSED FRACTURE: ICD-10-CM

## 2022-12-16 PROCEDURE — 1125F AMNT PAIN NOTED PAIN PRSNT: CPT | Mod: CPTII,S$GLB,, | Performed by: ORTHOPAEDIC SURGERY

## 2022-12-16 PROCEDURE — 1159F PR MEDICATION LIST DOCUMENTED IN MEDICAL RECORD: ICD-10-PCS | Mod: CPTII,S$GLB,, | Performed by: ORTHOPAEDIC SURGERY

## 2022-12-16 PROCEDURE — 73552 XR FEMUR 2 VIEW LEFT: ICD-10-PCS | Mod: 26,LT,, | Performed by: RADIOLOGY

## 2022-12-16 PROCEDURE — 1101F PT FALLS ASSESS-DOCD LE1/YR: CPT | Mod: CPTII,S$GLB,, | Performed by: ORTHOPAEDIC SURGERY

## 2022-12-16 PROCEDURE — 3288F FALL RISK ASSESSMENT DOCD: CPT | Mod: CPTII,S$GLB,, | Performed by: ORTHOPAEDIC SURGERY

## 2022-12-16 PROCEDURE — 1101F PR PT FALLS ASSESS DOC 0-1 FALLS W/OUT INJ PAST YR: ICD-10-PCS | Mod: CPTII,S$GLB,, | Performed by: ORTHOPAEDIC SURGERY

## 2022-12-16 PROCEDURE — 1159F MED LIST DOCD IN RCRD: CPT | Mod: CPTII,S$GLB,, | Performed by: ORTHOPAEDIC SURGERY

## 2022-12-16 PROCEDURE — 99999 PR PBB SHADOW E&M-EST. PATIENT-LVL IV: CPT | Mod: PBBFAC,,, | Performed by: ORTHOPAEDIC SURGERY

## 2022-12-16 PROCEDURE — 99024 PR POST-OP FOLLOW-UP VISIT: ICD-10-PCS | Mod: S$GLB,,, | Performed by: ORTHOPAEDIC SURGERY

## 2022-12-16 PROCEDURE — 73552 X-RAY EXAM OF FEMUR 2/>: CPT | Mod: 26,LT,, | Performed by: RADIOLOGY

## 2022-12-16 PROCEDURE — 3288F PR FALLS RISK ASSESSMENT DOCUMENTED: ICD-10-PCS | Mod: CPTII,S$GLB,, | Performed by: ORTHOPAEDIC SURGERY

## 2022-12-16 PROCEDURE — 73552 X-RAY EXAM OF FEMUR 2/>: CPT | Mod: TC,PN,LT

## 2022-12-16 PROCEDURE — 99024 POSTOP FOLLOW-UP VISIT: CPT | Mod: S$GLB,,, | Performed by: ORTHOPAEDIC SURGERY

## 2022-12-16 PROCEDURE — 99999 PR PBB SHADOW E&M-EST. PATIENT-LVL IV: ICD-10-PCS | Mod: PBBFAC,,, | Performed by: ORTHOPAEDIC SURGERY

## 2022-12-16 PROCEDURE — 1125F PR PAIN SEVERITY QUANTIFIED, PAIN PRESENT: ICD-10-PCS | Mod: CPTII,S$GLB,, | Performed by: ORTHOPAEDIC SURGERY

## 2022-12-16 NOTE — PROGRESS NOTES
12/16/2022    History reviewed. No pertinent past medical history.    Past Surgical History:   Procedure Laterality Date    INTRAMEDULLARY RODDING OF TROCHANTER OF FEMUR Left 11/10/2022    Procedure: INSERTION, ITRAMEDULLARY SANTI, FEMUR, TROCHANTER;  Surgeon: Richard Phoenix MD;  Location: [facility];  Service: Orthopedics;  Laterality: Left;       Current Outpatient Medications   Medication Sig    acetaminophen (TYLENOL) 325 MG tablet Take 650 mg by mouth every 6 (six) hours as needed.    amiodarone (PACERONE) 100 MG Tab Take 100 mg by mouth every morning.    amiodarone (PACERONE) 200 MG Tab Take 1 tablet (200 mg total) by mouth 2 (two) times daily for 14 days, THEN 0.5 tablets (100 mg total) once daily.    apixaban (ELIQUIS) 5 mg Tab Take 1 tablet (5 mg total) by mouth 2 (two) times daily.    calcium polycarbophil (FIBER-CAPS, CA POLYCARBOPHIL, ORAL) Take by mouth.    cholecalciferol, vitamin D3, (VITAMIN D3) 50 mcg (2,000 unit) Tab Take by mouth once daily.    cyanocobalamin, vitamin B-12, 1,000 mcg Subl Place 1,000 mcg under the tongue 2 (two) times a day.    cyclobenzaprine (FLEXERIL) 5 MG tablet Take 5 mg by mouth.    cyclobenzaprine (FLEXERIL) 5 MG tablet Take 5 mg by mouth 3 (three) times daily as needed.    DULoxetine (CYMBALTA) 30 MG capsule Take 1 capsule (30 mg total) by mouth once daily.    ferrous sulfate 27 mg iron Tab Take by mouth.    HYDROcodone-acetaminophen (NORCO)  mg per tablet Take 1 tablet by mouth every 6 (six) hours as needed.    losartan (COZAAR) 50 MG tablet Take 1 tablet (50 mg total) by mouth once daily.    magnesium 250 mg Tab Take 250 mg by mouth 2 (two) times a day.    metFORMIN (GLUCOPHAGE-XR) 500 MG ER 24hr tablet TAKE 1 TABLET BY MOUTH EVERY DAY    metoprolol tartrate (LOPRESSOR) 25 MG tablet Take 1 tablet (25 mg total) by mouth 2 (two) times daily.    omega-3 acid ethyl esters (LOVAZA) 1 gram capsule Take 2 capsules (2 g total) by mouth 2 (two) times daily.     oxyCODONE-acetaminophen (PERCOCET)  mg per tablet PLEASE SEE ATTACHED FOR DETAILED DIRECTIONS    oxyCODONE-acetaminophen (PERCOCET)  mg per tablet Take 1 tablet by mouth every 4 (four) hours as needed.    pantoprazole (PROTONIX) 40 MG tablet Take 1 tablet (40 mg total) by mouth 2 (two) times daily.    polyethylene glycol (GLYCOLAX) 17 gram/dose powder Take 17 g by mouth daily as needed.    propranoloL (INDERAL) 20 MG tablet Take 1 tablet (20 mg total) by mouth 2 (two) times daily.    rosuvastatin (CRESTOR) 20 MG tablet TAKE 1 TABLET BY MOUTH EVERY DAY    semaglutide (OZEMPIC) 1 mg/dose (4 mg/3 mL) Inject 1 mg into the skin every 7 days.    tolterodine (DETROL LA) 4 MG 24 hr capsule Take 1 capsule (4 mg total) by mouth once daily.    topiramate (TOPAMAX) 50 MG tablet Take 1 tablet (50 mg total) by mouth once daily.    traZODone (DESYREL) 50 MG tablet TAKE 1 TABLET BY MOUTH EVERY EVENING.     No current facility-administered medications for this visit.       Review of patient's allergies indicates:  No Known Allergies    History reviewed. No pertinent family history.    Social History     Socioeconomic History    Marital status:    Occupational History    Occupation: RETIRED   Tobacco Use    Smoking status: Never    Smokeless tobacco: Never   Substance and Sexual Activity    Alcohol use: Yes    Drug use: Not Currently    Sexual activity: Yes     Partners: Female       Chief Complaint:   Chief Complaint   Patient presents with    Left Hip - Post-op Evaluation     DOS: 11/10/22 - 5w, 1d s/p IM Cristian to L Femur. Pt has been in Rehab post operatively d/c 12/8. Today is his first post op visit. Ambulating w walker. Reports constant 5-6/10 pain. States he takes Percocet q.4hrs for pain relief, which helps initially.     Hip Pain     Pt reports recently slipping out of his bed at home and falling this week, but states this was a smooth transition to the floor and he did not injure himself.          History  "of present illness:    This is a 77 y.o. year old male who complains of patient is now about 5 weeks status post IM rodding of a proximal intertrochanteric hip fracture patient is going to rehab ambulating with a walker complains of 5/10 pain    Review of Systems:    Constitution: Denies chills, fever, and sweats.  HENT: Denies headaches or blurry vision.  Cardiovascular: Denies chest pain or irregular heart beat.  Respiratory: Denies cough or shortness of breath.  Gastrointestinal: Denies abdominal pain, nausea, or vomiting.  Musculoskeletal:  Denies muscle cramps.  Neurological: Denies dizziness or focal weakness.  Psychiatric/Behavioral: Normal mental status.  Hematologic/Lymphatic: Denies bleeding problem or easy bruising/bleeding.  Skin: Denies rash or suspicious lesions.    Examination:    Vital Signs:    Vitals:    12/16/22 1011   Weight: 115.7 kg (255 lb)   Height: 5' 10" (1.778 m)   PainSc:   5   PainLoc: Hip       Body mass index is 36.59 kg/m².    This a well-developed, well nourished patient in no acute distress.    Alert and oriented x 3 and cooperative to examination.       Physical Exam:  Left hip-his incisions healing well patient is ambulating with a walker    Imaging:  X-ray of the left hip shows the fracture to be healing in good position and hardware in good position       Assessment: Closed 2-part intertrochanteric fracture of left femur with routine healing, subsequent encounter        Plan:  Patient can start full weight-bearing as tolerated on the left hip starting in 3 days.  We will see him back in 5 weeks and get an x-ray      DISCLAIMER: This note may have been dictated using voice recognition software and may contain grammatical errors.     NOTE: Consult report sent to referring provider via [employer] EMR.    "

## 2022-12-19 ENCOUNTER — OFFICE VISIT (OUTPATIENT)
Dept: NEUROSURGERY | Facility: CLINIC | Age: 77
End: 2022-12-19
Payer: MEDICARE

## 2022-12-19 ENCOUNTER — HOSPITAL ENCOUNTER (OUTPATIENT)
Dept: RADIOLOGY | Facility: HOSPITAL | Age: 77
Discharge: HOME | End: 2022-12-19
Attending: PHYSICIAN ASSISTANT
Payer: MEDICARE

## 2022-12-19 VITALS — HEART RATE: 74 BPM | TEMPERATURE: 97 F | DIASTOLIC BLOOD PRESSURE: 99 MMHG | SYSTOLIC BLOOD PRESSURE: 151 MMHG

## 2022-12-19 DIAGNOSIS — S06.5X9D SUBDURAL HEMATOMA DUE TO CONCUSSION, WITH LOSS OF CONSCIOUSNESS, SUBSEQUENT ENCOUNTER: Primary | ICD-10-CM

## 2022-12-19 DIAGNOSIS — S06.5X9D SUBDURAL HEMATOMA DUE TO CONCUSSION, WITH LOSS OF CONSCIOUSNESS, SUBSEQUENT ENCOUNTER: ICD-10-CM

## 2022-12-19 PROCEDURE — 1159F MED LIST DOCD IN RCRD: CPT | Mod: CPTII,S$GLB,,

## 2022-12-19 PROCEDURE — 70450 CT HEAD/BRAIN W/O DYE: CPT | Mod: 26,,, | Performed by: RADIOLOGY

## 2022-12-19 PROCEDURE — 99215 PR OFFICE/OUTPT VISIT, EST, LEVL V, 40-54 MIN: ICD-10-PCS | Mod: S$GLB,,,

## 2022-12-19 PROCEDURE — 1159F PR MEDICATION LIST DOCUMENTED IN MEDICAL RECORD: ICD-10-PCS | Mod: CPTII,S$GLB,,

## 2022-12-19 PROCEDURE — 3080F PR MOST RECENT DIASTOLIC BLOOD PRESSURE >= 90 MM HG: ICD-10-PCS | Mod: CPTII,S$GLB,,

## 2022-12-19 PROCEDURE — 1100F PTFALLS ASSESS-DOCD GE2>/YR: CPT | Mod: CPTII,S$GLB,,

## 2022-12-19 PROCEDURE — 1125F PR PAIN SEVERITY QUANTIFIED, PAIN PRESENT: ICD-10-PCS | Mod: CPTII,S$GLB,,

## 2022-12-19 PROCEDURE — 99215 OFFICE O/P EST HI 40 MIN: CPT | Mod: S$GLB,,,

## 2022-12-19 PROCEDURE — 1125F AMNT PAIN NOTED PAIN PRSNT: CPT | Mod: CPTII,S$GLB,,

## 2022-12-19 PROCEDURE — 70450 CT HEAD/BRAIN W/O DYE: CPT | Mod: TC

## 2022-12-19 PROCEDURE — 99999 PR PBB SHADOW E&M-EST. PATIENT-LVL II: CPT | Mod: PBBFAC,,,

## 2022-12-19 PROCEDURE — 1100F PR PT FALLS ASSESS DOC 2+ FALLS/FALL W/INJURY/YR: ICD-10-PCS | Mod: CPTII,S$GLB,,

## 2022-12-19 PROCEDURE — 3080F DIAST BP >= 90 MM HG: CPT | Mod: CPTII,S$GLB,,

## 2022-12-19 PROCEDURE — 3288F PR FALLS RISK ASSESSMENT DOCUMENTED: ICD-10-PCS | Mod: CPTII,S$GLB,,

## 2022-12-19 PROCEDURE — 3077F PR MOST RECENT SYSTOLIC BLOOD PRESSURE >= 140 MM HG: ICD-10-PCS | Mod: CPTII,S$GLB,,

## 2022-12-19 PROCEDURE — 3077F SYST BP >= 140 MM HG: CPT | Mod: CPTII,S$GLB,,

## 2022-12-19 PROCEDURE — 99999 PR PBB SHADOW E&M-EST. PATIENT-LVL II: ICD-10-PCS | Mod: PBBFAC,,,

## 2022-12-19 PROCEDURE — 3288F FALL RISK ASSESSMENT DOCD: CPT | Mod: CPTII,S$GLB,,

## 2022-12-19 PROCEDURE — 70450 CT HEAD WITHOUT CONTRAST: ICD-10-PCS | Mod: 26,,, | Performed by: RADIOLOGY

## 2022-12-28 NOTE — PROGRESS NOTES
[facility]  Neurosurgery    SUBJECTIVE:   History of Present Illness:  Hiro Rodríguez is a 77 y.o. male with PMHx of Afbi on Eliquis and Aspirin, HTN and HLD. He presents in follow up after an ED visit on 11/9/22 after a fall with head trauma. CT head in the ED showed a chronic right SDH and acute right temporal contusion. Of note, he also sustained a left intertrochanteric hip fracture s/p fixation on 11/10/22. He denies further head trauma or falls, headaches, vision changes, N/V, confusion, lethargy or seizure like activity. He held his AC/AP medications for 1 week per neurosurgical recommendations and has resumed them since. He also completed his 1 week course of Keppra 500 MG BID. He presents with new CT head.      Review of patient's allergies indicates:  No Known Allergies    Current Outpatient Medications:     acetaminophen (TYLENOL) 325 MG tablet, Take 650 mg by mouth every 6 (six) hours as needed., Disp: , Rfl:     amiodarone (PACERONE) 100 MG Tab, Take 100 mg by mouth every morning., Disp: , Rfl:     apixaban (ELIQUIS) 5 mg Tab, Take 1 tablet (5 mg total) by mouth 2 (two) times daily., Disp: 180 tablet, Rfl: 1    calcium polycarbophil (FIBER-CAPS, CA POLYCARBOPHIL, ORAL), Take by mouth., Disp: , Rfl:     cholecalciferol, vitamin D3, (VITAMIN D3) 50 mcg (2,000 unit) Tab, Take by mouth once daily., Disp: , Rfl:     cyanocobalamin, vitamin B-12, 1,000 mcg Subl, Place 1,000 mcg under the tongue 2 (two) times a day., Disp: , Rfl:     cyclobenzaprine (FLEXERIL) 5 MG tablet, Take 5 mg by mouth 3 (three) times daily as needed., Disp: , Rfl:     DULoxetine (CYMBALTA) 30 MG capsule, Take 1 capsule (30 mg total) by mouth once daily., Disp: 90 capsule, Rfl: 1    ferrous sulfate 27 mg iron Tab, Take by mouth., Disp: , Rfl:     HYDROcodone-acetaminophen (NORCO)  mg per tablet, Take 1 tablet by mouth every 6 (six) hours as needed., Disp: , Rfl:     losartan (COZAAR) 50 MG tablet, Take 1 tablet (50  mg total) by mouth once daily., Disp: 90 tablet, Rfl: 1    magnesium 250 mg Tab, Take 250 mg by mouth 2 (two) times a day., Disp: , Rfl:     metFORMIN (GLUCOPHAGE-XR) 500 MG ER 24hr tablet, TAKE 1 TABLET BY MOUTH EVERY DAY, Disp: 90 tablet, Rfl: 1    metoprolol tartrate (LOPRESSOR) 25 MG tablet, Take 1 tablet (25 mg total) by mouth 2 (two) times daily., Disp: 180 tablet, Rfl: 1    omega-3 acid ethyl esters (LOVAZA) 1 gram capsule, Take 2 capsules (2 g total) by mouth 2 (two) times daily., Disp: 360 capsule, Rfl: 1    propranoloL (INDERAL) 20 MG tablet, Take 1 tablet (20 mg total) by mouth 2 (two) times daily., Disp: , Rfl:     rosuvastatin (CRESTOR) 20 MG tablet, TAKE 1 TABLET BY MOUTH EVERY DAY, Disp: 90 tablet, Rfl: 1    semaglutide (OZEMPIC) 1 mg/dose (4 mg/3 mL), Inject 1 mg into the skin every 7 days., Disp: 4 pen, Rfl: 5    semaglutide (OZEMPIC) 1 mg/dose (4 mg/3 mL), Inject 1 mg into the skin every 7 days., Disp: 4 pen, Rfl: 5    tolterodine (DETROL LA) 4 MG 24 hr capsule, Take 1 capsule (4 mg total) by mouth once daily., Disp: 90 capsule, Rfl: 1    traZODone (DESYREL) 50 MG tablet, TAKE 1 TABLET BY MOUTH EVERY EVENING., Disp: 90 tablet, Rfl: 1    amiodarone (PACERONE) 200 MG Tab, Take 1 tablet (200 mg total) by mouth 2 (two) times daily for 14 days, THEN 0.5 tablets (100 mg total) once daily. (Patient not taking: Reported on 12/19/2022), Disp: 73 tablet, Rfl: 0    cyclobenzaprine (FLEXERIL) 5 MG tablet, Take 5 mg by mouth., Disp: , Rfl:     oxyCODONE-acetaminophen (PERCOCET)  mg per tablet, PLEASE SEE ATTACHED FOR DETAILED DIRECTIONS, Disp: , Rfl:     pantoprazole (PROTONIX) 40 MG tablet, Take 1 tablet (40 mg total) by mouth 2 (two) times daily. (Patient not taking: Reported on 12/19/2022), Disp: 60 tablet, Rfl: 11    polyethylene glycol (GLYCOLAX) 17 gram/dose powder, Take 17 g by mouth daily as needed., Disp: , Rfl:     topiramate (TOPAMAX) 50 MG tablet, Take 1 tablet (50 mg total) by mouth once  daily. (Patient not taking: Reported on 12/19/2022), Disp: 90 tablet, Rfl: 1     No past medical history on file.  Past Surgical History:   Procedure Laterality Date    INTRAMEDULLARY RODDING OF TROCHANTER OF FEMUR Left 11/10/2022    Procedure: INSERTION, ITRAMEDULLARY SANTI, FEMUR, TROCHANTER;  Surgeon: Richard Phoenix MD;  Location: [facility];  Service: Orthopedics;  Laterality: Left;     No family history on file.  Social History     Tobacco Use    Smoking status: Never    Smokeless tobacco: Never   Substance Use Topics    Alcohol use: Yes    Drug use: Not Currently       Review of Systems:  Constitutional: No fever or chills. No fatigue.  Eyes: No visual changes.  ENT: No nasal congestion or sore throat. No trouble swallowing.  Respiratory: No cough or shortness of breath.  Cardiovascular: No chest pain or palpitations.  Gastrointestinal: No nausea or vomiting, tolerating diet.  Genitourinary: No hematuria or dysuria.  Skin: No rash or pruritis.  Hematologic/Lymphatic: No easy bruising or lymphadenopathy.  Musculoskeletal: No arthralgias, myalgias or weakness.  Neurological: No seizures or tremors. No focal weakness. No dizziness.  Behavioral/Psych: No auditory or visual hallucinations. No SI/HI.  Endocrine: No heat or cold intolerance.    OBJECTIVE:     Vital Signs (Most Recent):  Temp: 96.9 °F (36.1 °C) (12/19/22 1511)  Pulse: 74 (12/19/22 1511)  BP: (!) 151/99 (12/19/22 1511)    Physical Exam:  General: Well developed, well nourished, no distress. In wheelchair.  Head: Normocephalic, atraumatic.  Neurologic: Alert and oriented. Thought content appropriate  GCS: Motor: 6/Verbal: 5/Eyes: 4 GCS Total: 15  Mental Status: Awake, Alert, Oriented x 4.  Language: No aphasia.  Speech: No dysarthria.  Cranial nerves: Face symmetric, tongue midline, CN II-XII grossly intact.   Eyes: Pupils equal, round, reactive to light with accommodation, EOMI.   Pulmonary: Normal respirations, not labored, no accessory muscles  used.  Abdomen: Soft, non-distended, not tender to palpation.  Sensory: Intact to light touch throughout.  Motor Strength: Moves all extremities spontaneously with good tone. Full strength upper and lower extremities. No abnormal movements seen.     Strength  Deltoids Triceps Biceps Wrist Extension Wrist Flexion Hand    Upper: R 5/5 5/5 5/5 5/5 5/5 5/5    L 5/5 5/5 5/5 5/5 5/5 5/5     Iliopsoas Quadriceps Knee  Flexion Tibialis  anterior Gastro- cnemius EHL   Lower: R 5/5 5/5 5/5 5/5 5/5 5/5    L 3/5 5/5 5/5 5/5 5/5 5/5   Left iliopsoas at least antigravity, strength resistance not tested due to recent surgery.    Pronator Drift: No drift noted.  Finger-to-nose: Intact bilaterally  Vascular: No LE edema or skin changes.  Skin: Warm, dry and intact, no rashes.    Laboratory:  I have reviewed all pertinent lab results within the past 24 hours.    Diagnostic Results:  CTH 12/19/22: resolution of right chronic subdural collection and right temporal contusion.    ASSESSMENT/PLAN:     Hiro Rodríguez is a 77 y.o. male who presents in follow up after right chronic subdural collection and right temporal contusion following a fall with head trauma. Patient is neurologically stable. Interval CT imaging shows resolution of both the right convexity subdural collection and right temporal contusion. Patient completed 1 week of Keppra. Patient has resumed his Eliquis and Aspirin. RTC as needed. Return to activities as tolerated and per ortho recs. Fall precautions.    All symptoms and signs that require emergent or urgent treatment were reviewed. The plan was discussed with the patient. All of the the patient's questions and concerns were answered and he voiced understanding. Please feel free to call with any further questions.     Time spent on this encounter: 30 minutes. This includes face-to-face time and non-face to face time preparing to see the patient (eg, review of tests), obtaining and/or reviewing separately  obtained history, documenting clinical information in the electronic or other health record, independently interpreting results and communicating results to the patient/family/caregiver, or care coordinator.    Tamera Flores PA-C  Neurosurgery [facility]

## 2022-12-29 ENCOUNTER — TELEPHONE (OUTPATIENT)
Dept: FAMILY MEDICINE | Facility: CLINIC | Age: 77
End: 2022-12-29
Payer: MEDICARE

## 2022-12-29 ENCOUNTER — OFFICE VISIT (OUTPATIENT)
Dept: URGENT CARE | Facility: CLINIC | Age: 77
End: 2022-12-29
Attending: NURSE PRACTITIONER
Payer: MEDICARE

## 2022-12-29 VITALS
HEART RATE: 63 BPM | DIASTOLIC BLOOD PRESSURE: 57 MMHG | OXYGEN SATURATION: 100 % | RESPIRATION RATE: 16 BRPM | HEIGHT: 70 IN | WEIGHT: 255 LBS | BODY MASS INDEX: 36.51 KG/M2 | SYSTOLIC BLOOD PRESSURE: 94 MMHG

## 2022-12-29 DIAGNOSIS — R30.0 DYSURIA: Primary | ICD-10-CM

## 2022-12-29 LAB
BILIRUB UR QL STRIP: NEGATIVE
GLUCOSE UR QL STRIP: NEGATIVE
KETONES UR QL STRIP: NEGATIVE
LEUKOCYTE ESTERASE UR QL STRIP: NEGATIVE
PH, POC UA: 5.5
POC BLOOD, URINE: POSITIVE
POC NITRATES, URINE: NEGATIVE
PROT UR QL STRIP: NEGATIVE
SP GR UR STRIP: 1.02 (ref 1–1.03)
UROBILINOGEN UR STRIP-ACNC: NORMAL (ref 0.3–2.2)

## 2022-12-29 PROCEDURE — 3074F SYST BP LT 130 MM HG: CPT | Mod: CPTII,S$GLB,, | Performed by: NURSE PRACTITIONER

## 2022-12-29 PROCEDURE — 1159F PR MEDICATION LIST DOCUMENTED IN MEDICAL RECORD: ICD-10-PCS | Mod: CPTII,S$GLB,, | Performed by: NURSE PRACTITIONER

## 2022-12-29 PROCEDURE — 1160F RVW MEDS BY RX/DR IN RCRD: CPT | Mod: CPTII,S$GLB,, | Performed by: NURSE PRACTITIONER

## 2022-12-29 PROCEDURE — 3078F PR MOST RECENT DIASTOLIC BLOOD PRESSURE < 80 MM HG: ICD-10-PCS | Mod: CPTII,S$GLB,, | Performed by: NURSE PRACTITIONER

## 2022-12-29 PROCEDURE — 81003 POCT URINALYSIS, DIPSTICK, AUTOMATED, W/O SCOPE: ICD-10-PCS | Mod: QW,S$GLB,, | Performed by: NURSE PRACTITIONER

## 2022-12-29 PROCEDURE — 1159F MED LIST DOCD IN RCRD: CPT | Mod: CPTII,S$GLB,, | Performed by: NURSE PRACTITIONER

## 2022-12-29 PROCEDURE — 1160F PR REVIEW ALL MEDS BY PRESCRIBER/CLIN PHARMACIST DOCUMENTED: ICD-10-PCS | Mod: CPTII,S$GLB,, | Performed by: NURSE PRACTITIONER

## 2022-12-29 PROCEDURE — 3074F PR MOST RECENT SYSTOLIC BLOOD PRESSURE < 130 MM HG: ICD-10-PCS | Mod: CPTII,S$GLB,, | Performed by: NURSE PRACTITIONER

## 2022-12-29 PROCEDURE — 81003 URINALYSIS AUTO W/O SCOPE: CPT | Mod: QW,S$GLB,, | Performed by: NURSE PRACTITIONER

## 2022-12-29 PROCEDURE — 3078F DIAST BP <80 MM HG: CPT | Mod: CPTII,S$GLB,, | Performed by: NURSE PRACTITIONER

## 2022-12-29 PROCEDURE — 99214 OFFICE O/P EST MOD 30 MIN: CPT | Mod: S$GLB,,, | Performed by: NURSE PRACTITIONER

## 2022-12-29 PROCEDURE — 99214 PR OFFICE/OUTPT VISIT, EST, LEVL IV, 30-39 MIN: ICD-10-PCS | Mod: S$GLB,,, | Performed by: NURSE PRACTITIONER

## 2022-12-29 RX ORDER — CIPROFLOXACIN 500 MG/1
500 TABLET ORAL 2 TIMES DAILY
Qty: 20 TABLET | Refills: 0 | Status: SHIPPED | OUTPATIENT
Start: 2022-12-29 | End: 2022-12-29 | Stop reason: ALTCHOICE

## 2022-12-29 RX ORDER — SULFAMETHOXAZOLE AND TRIMETHOPRIM 800; 160 MG/1; MG/1
1 TABLET ORAL 2 TIMES DAILY
Qty: 20 TABLET | Refills: 0 | Status: ON HOLD | OUTPATIENT
Start: 2022-12-29 | End: 2023-01-09 | Stop reason: HOSPADM

## 2022-12-29 RX ORDER — PHENAZOPYRIDINE HYDROCHLORIDE 100 MG/1
100 TABLET, FILM COATED ORAL 3 TIMES DAILY PRN
Qty: 12 TABLET | Refills: 0 | Status: SHIPPED | OUTPATIENT
Start: 2022-12-29 | End: 2023-03-16

## 2022-12-29 NOTE — TELEPHONE ENCOUNTER
Spoke with pt and explained we were over booked today and fri so rec go to [facility] to be seen today possible uti. Pt states he will do so and has fu dr rincon 1/11/23.

## 2022-12-30 ENCOUNTER — HOSPITAL ENCOUNTER (INPATIENT)
Facility: HOSPITAL | Age: 77
LOS: 9 days | Discharge: NURSING, REHAB OR LONG-TERM CARE | DRG: 683 | End: 2023-01-09
Attending: EMERGENCY MEDICINE | Admitting: FAMILY MEDICINE
Payer: MEDICARE

## 2022-12-30 DIAGNOSIS — E86.0 DEHYDRATION: ICD-10-CM

## 2022-12-30 DIAGNOSIS — N17.9 AKI (ACUTE KIDNEY INJURY): ICD-10-CM

## 2022-12-30 DIAGNOSIS — R07.9 CHEST PAIN: ICD-10-CM

## 2022-12-30 DIAGNOSIS — A41.9 SEPSIS, DUE TO UNSPECIFIED ORGANISM, UNSPECIFIED WHETHER ACUTE ORGAN DYSFUNCTION PRESENT: Primary | ICD-10-CM

## 2022-12-30 DIAGNOSIS — S00.83XA FACIAL CONTUSION, INITIAL ENCOUNTER: ICD-10-CM

## 2022-12-30 DIAGNOSIS — I48.91 ATRIAL FIBRILLATION, UNSPECIFIED TYPE: ICD-10-CM

## 2022-12-30 DIAGNOSIS — R42 DIZZINESS: ICD-10-CM
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
## 2022-12-30 DIAGNOSIS — N30.01 ACUTE CYSTITIS WITH HEMATURIA: ICD-10-CM

## 2022-12-30 DIAGNOSIS — S09.90XA CLOSED HEAD INJURY, INITIAL ENCOUNTER: ICD-10-CM

## 2022-12-30 DIAGNOSIS — E87.6 HYPOKALEMIA: ICD-10-CM

## 2022-12-30 PROCEDURE — 99291 CRITICAL CARE FIRST HOUR: CPT

## 2022-12-30 NOTE — PATIENT INSTRUCTIONS
Increase water intake  Avoid caffeine  Cipro 500mg twice daily with food x 10 days  Pyridum 100mg 3 times daily as needed for bladder spasms  A urine culture has been collected and sent to the lab. We will call you with the results  If your symptoms do not improve or worsen go to the ED for evaluation

## 2022-12-30 NOTE — PROGRESS NOTES
"Subjective:       Patient ID: Hiro Rodríguez is a 77 y.o. male.    Vitals:  height is 5' 10" (1.778 m) and weight is 115.7 kg (255 lb). His blood pressure is 94/57 (abnormal) and his pulse is 63. His respiration is 16 and oxygen saturation is 100%.     Chief Complaint: Urinary Tract Infection    Urinary Tract Infection   This is a new problem. The current episode started yesterday. The quality of the pain is described as burning. Associated symptoms include frequency and urgency.     Genitourinary:  Positive for frequency and urgency.     Objective:      Physical Exam   Constitutional: He is oriented to person, place, and time.   HENT:   Head: Normocephalic and atraumatic.   Nose: Nose normal.   Mouth/Throat: Mucous membranes are moist.   Eyes: Conjunctivae are normal. Extraocular movement intact   Neck: Neck supple.   Cardiovascular: Normal rate, regular rhythm, normal heart sounds and normal pulses.   Pulmonary/Chest: Effort normal and breath sounds normal.   Abdominal: Normal appearance. Soft.   Musculoskeletal: Normal range of motion.         General: Normal range of motion.   Neurological: He is alert and oriented to person, place, and time.   Skin: Skin is warm and dry. Capillary refill takes 2 to 3 seconds.   Psychiatric: His behavior is normal. Mood normal.   Nursing note and vitals reviewed.      Assessment:       1. Dysuria        UA: 50 RBC's  Urine culture: Pending  Plan:       Patient with c/o burning and frequency of urine x 1 day. VSS, afebrile, UA with 50 RBC's, culture collected. Will start Cipro and pyridium. Strict ED precautions reviewed with patient and wife. They verbalized understanding.  Dysuria  -     POCT Urinalysis, Dipstick, Automated, W/O Scope  -     CULTURE, URINE  -     Discontinue: ciprofloxacin HCl (CIPRO) 500 MG tablet; Take 1 tablet (500 mg total) by mouth 2 (two) times daily. for 10 days  Dispense: 20 tablet; Refill: 0  -     phenazopyridine (PYRIDIUM) 100 MG tablet; Take 1 " tablet (100 mg total) by mouth 3 (three) times daily as needed for Pain.  Dispense: 12 tablet; Refill: 0  -     sulfamethoxazole-trimethoprim 800-160mg (BACTRIM DS) 800-160 mg Tab; Take 1 tablet by mouth 2 (two) times daily. for 10 days  Dispense: 20 tablet; Refill: 0    Increase water intake  Avoid caffeine  Cipro 500mg twice daily with food x 10 days  Pyridum 100mg 3 times daily as needed for bladder spasms  A urine culture has been collected and sent to the lab. We will call you with the results  If your symptoms do not improve or worsen go to the ED for evaluation     Patient is taking amiodarone and there is a potential risk of prolonged QT interval. Antibiotic changed to Bactrim DS twice daily x 10 days.

## 2022-12-31 PROBLEM — A41.9 SEPSIS: Status: ACTIVE | Noted: 2022-12-31

## 2022-12-31 PROBLEM — R65.20 SEVERE SEPSIS: Status: ACTIVE | Noted: 2022-12-31

## 2022-12-31 LAB
ADENOVIRUS: NOT DETECTED
ALBUMIN SERPL BCP-MCNC: 2.6 G/DL (ref 3.5–5.2)
ALBUMIN SERPL BCP-MCNC: 2.6 G/DL (ref 3.5–5.2)
ALBUMIN SERPL BCP-MCNC: 2.9 G/DL (ref 3.5–5.2)
ALP SERPL-CCNC: 79 U/L (ref 55–135)
ALP SERPL-CCNC: 83 U/L (ref 55–135)
ALP SERPL-CCNC: 92 U/L (ref 55–135)
ALT SERPL W/O P-5'-P-CCNC: 11 U/L (ref 10–44)
ALT SERPL W/O P-5'-P-CCNC: 12 U/L (ref 10–44)
ALT SERPL W/O P-5'-P-CCNC: 12 U/L (ref 10–44)
AMMONIA PLAS-SCNC: 25 UMOL/L (ref 10–50)
AMPHET+METHAMPHET UR QL: NEGATIVE
ANION GAP SERPL CALC-SCNC: 10 MMOL/L (ref 8–16)
ANION GAP SERPL CALC-SCNC: 13 MMOL/L (ref 8–16)
ANION GAP SERPL CALC-SCNC: 7 MMOL/L (ref 8–16)
APTT BLDCRRT: 26.5 SEC (ref 21–32)
AST SERPL-CCNC: 12 U/L (ref 10–40)
AST SERPL-CCNC: 14 U/L (ref 10–40)
AST SERPL-CCNC: 17 U/L (ref 10–40)
BACTERIA #/AREA URNS HPF: NEGATIVE /HPF
BARBITURATES UR QL SCN>200 NG/ML: NEGATIVE
BASOPHILS # BLD AUTO: 0.06 K/UL (ref 0–0.2)
BASOPHILS NFR BLD: 0.3 % (ref 0–1.9)
BENZODIAZ UR QL SCN>200 NG/ML: ABNORMAL
BILIRUB SERPL-MCNC: 0.9 MG/DL (ref 0.1–1)
BILIRUB SERPL-MCNC: 0.9 MG/DL (ref 0.1–1)
BILIRUB SERPL-MCNC: 1 MG/DL (ref 0.1–1)
BILIRUB UR QL STRIP: NEGATIVE
BNP SERPL-MCNC: 52 PG/ML (ref 0–99)
BORDETELLA PARAPERTUSSIS (IS1001): NOT DETECTED
BORDETELLA PERTUSSIS (PTXP): NOT DETECTED
BUN SERPL-MCNC: 29 MG/DL (ref 8–23)
BUN SERPL-MCNC: 30 MG/DL (ref 8–23)
BUN SERPL-MCNC: 30 MG/DL (ref 8–23)
BZE UR QL SCN: NEGATIVE
CALCIUM SERPL-MCNC: 7.3 MG/DL (ref 8.7–10.5)
CALCIUM SERPL-MCNC: 7.3 MG/DL (ref 8.7–10.5)
CALCIUM SERPL-MCNC: 7.7 MG/DL (ref 8.7–10.5)
CANNABINOIDS UR QL SCN: NEGATIVE
CHLAMYDIA PNEUMONIAE: NOT DETECTED
CHLORIDE SERPL-SCNC: 96 MMOL/L (ref 95–110)
CHLORIDE SERPL-SCNC: 98 MMOL/L (ref 95–110)
CHLORIDE SERPL-SCNC: 98 MMOL/L (ref 95–110)
CLARITY UR: ABNORMAL
CO2 SERPL-SCNC: 25 MMOL/L (ref 23–29)
CO2 SERPL-SCNC: 25 MMOL/L (ref 23–29)
CO2 SERPL-SCNC: 29 MMOL/L (ref 23–29)
COLOR UR: YELLOW
CORONAVIRUS 229E, COMMON COLD VIRUS: NOT DETECTED
CORONAVIRUS HKU1, COMMON COLD VIRUS: NOT DETECTED
CORONAVIRUS NL63, COMMON COLD VIRUS: NOT DETECTED
CORONAVIRUS OC43, COMMON COLD VIRUS: NOT DETECTED
CORTIS SERPL-MCNC: 14 UG/DL
CREAT SERPL-MCNC: 1.7 MG/DL (ref 0.5–1.4)
CREAT SERPL-MCNC: 1.7 MG/DL (ref 0.5–1.4)
CREAT SERPL-MCNC: 2 MG/DL (ref 0.5–1.4)
CREAT SERPL-MCNC: 2.3 MG/DL (ref 0.5–1.4)
CREAT UR-MCNC: 130 MG/DL (ref 23–375)
CRP SERPL-MCNC: 1.31 MG/DL
DIFFERENTIAL METHOD: ABNORMAL
EOSINOPHIL # BLD AUTO: 0.1 K/UL (ref 0–0.5)
EOSINOPHIL NFR BLD: 0.4 % (ref 0–8)
ERYTHROCYTE [DISTWIDTH] IN BLOOD BY AUTOMATED COUNT: 14.1 % (ref 11.5–14.5)
ERYTHROCYTE [SEDIMENTATION RATE] IN BLOOD BY WESTERGREN METHOD: 13 MM/HR (ref 0–10)
EST. GFR  (NO RACE VARIABLE): 33.7 ML/MIN/1.73 M^2
EST. GFR  (NO RACE VARIABLE): 41 ML/MIN/1.73 M^2
EST. GFR  (NO RACE VARIABLE): 41 ML/MIN/1.73 M^2
FLUBV RNA NPH QL NAA+NON-PROBE: NOT DETECTED
GLUCOSE SERPL-MCNC: 102 MG/DL (ref 70–110)
GLUCOSE SERPL-MCNC: 105 MG/DL (ref 70–110)
GLUCOSE SERPL-MCNC: 110 MG/DL (ref 70–110)
GLUCOSE SERPL-MCNC: 117 MG/DL (ref 70–110)
GLUCOSE SERPL-MCNC: 123 MG/DL (ref 70–110)
GLUCOSE SERPL-MCNC: 85 MG/DL (ref 70–110)
GLUCOSE SERPL-MCNC: 91 MG/DL (ref 70–110)
GLUCOSE UR QL STRIP: NEGATIVE
HCT VFR BLD AUTO: 46.4 % (ref 40–54)
HGB BLD-MCNC: 15.2 G/DL (ref 14–18)
HGB UR QL STRIP: ABNORMAL
HPIV1 RNA NPH QL NAA+NON-PROBE: NOT DETECTED
HPIV2 RNA NPH QL NAA+NON-PROBE: NOT DETECTED
HPIV3 RNA NPH QL NAA+NON-PROBE: NOT DETECTED
HPIV4 RNA NPH QL NAA+NON-PROBE: NOT DETECTED
HUMAN METAPNEUMOVIRUS: NOT DETECTED
HYALINE CASTS #/AREA URNS LPF: 33 /LPF
IMM GRANULOCYTES # BLD AUTO: 0.1 K/UL (ref 0–0.04)
IMM GRANULOCYTES NFR BLD AUTO: 0.6 % (ref 0–0.5)
INFLUENZA A (SUBTYPES H1,H1-2009,H3): NOT DETECTED
INFLUENZA A, MOLECULAR: NEGATIVE
INFLUENZA B, MOLECULAR: NEGATIVE
INR PPP: 1.2 (ref 0.8–1.2)
KETONES UR QL STRIP: NEGATIVE
LACTATE SERPL-SCNC: 1.2 MMOL/L (ref 0.5–1.9)
LACTATE SERPL-SCNC: 2 MMOL/L (ref 0.5–1.9)
LEUKOCYTE ESTERASE UR QL STRIP: NEGATIVE
LYMPHOCYTES # BLD AUTO: 3.1 K/UL (ref 1–4.8)
LYMPHOCYTES NFR BLD: 17.4 % (ref 18–48)
MAGNESIUM SERPL-MCNC: 1.8 MG/DL (ref 1.6–2.6)
MAGNESIUM SERPL-MCNC: 1.8 MG/DL (ref 1.6–2.6)
MCH RBC QN AUTO: 30.5 PG (ref 27–31)
MCHC RBC AUTO-ENTMCNC: 32.8 G/DL (ref 32–36)
MCV RBC AUTO: 93 FL (ref 82–98)
MICROSCOPIC COMMENT: ABNORMAL
MONOCYTES # BLD AUTO: 1.2 K/UL (ref 0.3–1)
MONOCYTES NFR BLD: 6.5 % (ref 4–15)
MRSA SCREEN BY PCR: NEGATIVE
MYCOPLASMA PNEUMONIAE: NOT DETECTED
NEUTROPHILS # BLD AUTO: 13.6 K/UL (ref 1.8–7.7)
NEUTROPHILS NFR BLD: 74.8 % (ref 38–73)
NITRITE UR QL STRIP: NEGATIVE
NRBC BLD-RTO: 0 /100 WBC
OPIATES UR QL SCN: ABNORMAL
PCP UR QL SCN>25 NG/ML: NEGATIVE
PH UR STRIP: 6 [PH] (ref 5–8)
PLATELET # BLD AUTO: 420 K/UL (ref 150–450)
PMV BLD AUTO: 10 FL (ref 9.2–12.9)
POTASSIUM SERPL-SCNC: 2.8 MMOL/L (ref 3.5–5.1)
POTASSIUM SERPL-SCNC: 2.9 MMOL/L (ref 3.5–5.1)
POTASSIUM SERPL-SCNC: 3 MMOL/L (ref 3.5–5.1)
PROCALCITONIN SERPL IA-MCNC: 0.09 NG/ML (ref 0–0.5)
PROT SERPL-MCNC: 5 G/DL (ref 6–8.4)
PROT SERPL-MCNC: 5.3 G/DL (ref 6–8.4)
PROT SERPL-MCNC: 5.7 G/DL (ref 6–8.4)
PROT UR QL STRIP: ABNORMAL
PROTHROMBIN TIME: 12.1 SEC (ref 9–12.5)
RBC # BLD AUTO: 4.99 M/UL (ref 4.6–6.2)
RBC #/AREA URNS HPF: >100 /HPF (ref 0–4)
RESPIRATORY INFECTION PANEL SOURCE: NORMAL
RSV RNA NPH QL NAA+NON-PROBE: NOT DETECTED
RV+EV RNA NPH QL NAA+NON-PROBE: NOT DETECTED
SAMPLE: ABNORMAL
SARS-COV-2 RDRP RESP QL NAA+PROBE: NEGATIVE
SARS-COV-2 RNA RESP QL NAA+PROBE: NOT DETECTED
SODIUM SERPL-SCNC: 133 MMOL/L (ref 136–145)
SODIUM SERPL-SCNC: 134 MMOL/L (ref 136–145)
SODIUM SERPL-SCNC: 134 MMOL/L (ref 136–145)
SP GR UR STRIP: 1.02 (ref 1–1.03)
SPECIMEN SOURCE: NORMAL
SQUAMOUS #/AREA URNS HPF: 4 /HPF
TOXICOLOGY INFORMATION: ABNORMAL
TSH SERPL DL<=0.005 MIU/L-ACNC: 1.87 UIU/ML (ref 0.34–5.6)
URN SPEC COLLECT METH UR: ABNORMAL
UROBILINOGEN UR STRIP-ACNC: NEGATIVE EU/DL
WBC # BLD AUTO: 18.09 K/UL (ref 3.9–12.7)
WBC #/AREA URNS HPF: 11 /HPF (ref 0–5)

## 2022-12-31 PROCEDURE — 25000003 PHARM REV CODE 250

## 2022-12-31 PROCEDURE — 83605 ASSAY OF LACTIC ACID: CPT | Mod: 91 | Performed by: FAMILY MEDICINE

## 2022-12-31 PROCEDURE — 87502 INFLUENZA DNA AMP PROBE: CPT | Performed by: FAMILY MEDICINE

## 2022-12-31 PROCEDURE — 81001 URINALYSIS AUTO W/SCOPE: CPT | Performed by: EMERGENCY MEDICINE

## 2022-12-31 PROCEDURE — 99900031 HC PATIENT EDUCATION (STAT)

## 2022-12-31 PROCEDURE — 96366 THER/PROPH/DIAG IV INF ADDON: CPT

## 2022-12-31 PROCEDURE — 85651 RBC SED RATE NONAUTOMATED: CPT | Performed by: FAMILY MEDICINE

## 2022-12-31 PROCEDURE — 12000002 HC ACUTE/MED SURGE SEMI-PRIVATE ROOM

## 2022-12-31 PROCEDURE — 85730 THROMBOPLASTIN TIME PARTIAL: CPT | Performed by: EMERGENCY MEDICINE

## 2022-12-31 PROCEDURE — C1751 CATH, INF, PER/CENT/MIDLINE: HCPCS

## 2022-12-31 PROCEDURE — 94761 N-INVAS EAR/PLS OXIMETRY MLT: CPT

## 2022-12-31 PROCEDURE — 85025 COMPLETE CBC W/AUTO DIFF WBC: CPT | Performed by: EMERGENCY MEDICINE

## 2022-12-31 PROCEDURE — 96361 HYDRATE IV INFUSION ADD-ON: CPT

## 2022-12-31 PROCEDURE — 83605 ASSAY OF LACTIC ACID: CPT | Performed by: EMERGENCY MEDICINE

## 2022-12-31 PROCEDURE — 63600175 PHARM REV CODE 636 W HCPCS: Performed by: EMERGENCY MEDICINE

## 2022-12-31 PROCEDURE — 63600175 PHARM REV CODE 636 W HCPCS: Performed by: FAMILY MEDICINE

## 2022-12-31 PROCEDURE — 82140 ASSAY OF AMMONIA: CPT | Performed by: INTERNAL MEDICINE

## 2022-12-31 PROCEDURE — 63600175 PHARM REV CODE 636 W HCPCS: Performed by: INTERNAL MEDICINE

## 2022-12-31 PROCEDURE — 87040 BLOOD CULTURE FOR BACTERIA: CPT | Performed by: EMERGENCY MEDICINE

## 2022-12-31 PROCEDURE — 93005 ELECTROCARDIOGRAM TRACING: CPT | Performed by: SPECIALIST

## 2022-12-31 PROCEDURE — 84443 ASSAY THYROID STIM HORMONE: CPT | Performed by: EMERGENCY MEDICINE

## 2022-12-31 PROCEDURE — 80053 COMPREHEN METABOLIC PANEL: CPT | Performed by: EMERGENCY MEDICINE

## 2022-12-31 PROCEDURE — 99900035 HC TECH TIME PER 15 MIN (STAT)

## 2022-12-31 PROCEDURE — 20000000 HC ICU ROOM

## 2022-12-31 PROCEDURE — 36410 VNPNXR 3YR/> PHY/QHP DX/THER: CPT

## 2022-12-31 PROCEDURE — 25000003 PHARM REV CODE 250: Performed by: FAMILY MEDICINE

## 2022-12-31 PROCEDURE — 96367 TX/PROPH/DG ADDL SEQ IV INF: CPT

## 2022-12-31 PROCEDURE — 96375 TX/PRO/DX INJ NEW DRUG ADDON: CPT

## 2022-12-31 PROCEDURE — 80307 DRUG TEST PRSMV CHEM ANLYZR: CPT | Performed by: EMERGENCY MEDICINE

## 2022-12-31 PROCEDURE — U0002 COVID-19 LAB TEST NON-CDC: HCPCS | Performed by: FAMILY MEDICINE

## 2022-12-31 PROCEDURE — 83036 HEMOGLOBIN GLYCOSYLATED A1C: CPT | Performed by: FAMILY MEDICINE

## 2022-12-31 PROCEDURE — 96376 TX/PRO/DX INJ SAME DRUG ADON: CPT

## 2022-12-31 PROCEDURE — 82533 TOTAL CORTISOL: CPT | Performed by: INTERNAL MEDICINE

## 2022-12-31 PROCEDURE — 25000003 PHARM REV CODE 250: Performed by: INTERNAL MEDICINE

## 2022-12-31 PROCEDURE — 87641 MR-STAPH DNA AMP PROBE: CPT | Performed by: FAMILY MEDICINE

## 2022-12-31 PROCEDURE — 93010 EKG 12-LEAD: ICD-10-PCS | Mod: ,,, | Performed by: SPECIALIST

## 2022-12-31 PROCEDURE — 84145 PROCALCITONIN (PCT): CPT | Performed by: FAMILY MEDICINE

## 2022-12-31 PROCEDURE — 80053 COMPREHEN METABOLIC PANEL: CPT | Mod: 91 | Performed by: INTERNAL MEDICINE

## 2022-12-31 PROCEDURE — 25000003 PHARM REV CODE 250: Performed by: EMERGENCY MEDICINE

## 2022-12-31 PROCEDURE — 93010 ELECTROCARDIOGRAM REPORT: CPT | Mod: ,,, | Performed by: SPECIALIST

## 2022-12-31 PROCEDURE — 80053 COMPREHEN METABOLIC PANEL: CPT | Mod: 91 | Performed by: FAMILY MEDICINE

## 2022-12-31 PROCEDURE — 83735 ASSAY OF MAGNESIUM: CPT | Mod: 91 | Performed by: EMERGENCY MEDICINE

## 2022-12-31 PROCEDURE — 83880 ASSAY OF NATRIURETIC PEPTIDE: CPT | Performed by: EMERGENCY MEDICINE

## 2022-12-31 PROCEDURE — 86140 C-REACTIVE PROTEIN: CPT | Performed by: FAMILY MEDICINE

## 2022-12-31 PROCEDURE — 87086 URINE CULTURE/COLONY COUNT: CPT | Performed by: EMERGENCY MEDICINE

## 2022-12-31 PROCEDURE — 85610 PROTHROMBIN TIME: CPT | Performed by: EMERGENCY MEDICINE

## 2022-12-31 PROCEDURE — 36415 COLL VENOUS BLD VENIPUNCTURE: CPT | Performed by: INTERNAL MEDICINE

## 2022-12-31 PROCEDURE — 96365 THER/PROPH/DIAG IV INF INIT: CPT

## 2022-12-31 PROCEDURE — 87798 DETECT AGENT NOS DNA AMP: CPT | Performed by: FAMILY MEDICINE

## 2022-12-31 PROCEDURE — 83735 ASSAY OF MAGNESIUM: CPT | Performed by: EMERGENCY MEDICINE

## 2022-12-31 PROCEDURE — 76937 US GUIDE VASCULAR ACCESS: CPT

## 2022-12-31 RX ORDER — MUPIROCIN 20 MG/G
OINTMENT TOPICAL 2 TIMES DAILY
Status: COMPLETED | OUTPATIENT
Start: 2022-12-31 | End: 2023-01-04

## 2022-12-31 RX ORDER — ATORVASTATIN CALCIUM 40 MG/1
80 TABLET, FILM COATED ORAL DAILY
Status: DISCONTINUED | OUTPATIENT
Start: 2022-12-31 | End: 2023-01-09 | Stop reason: HOSPADM

## 2022-12-31 RX ORDER — IBUPROFEN 200 MG
24 TABLET ORAL
Status: DISCONTINUED | OUTPATIENT
Start: 2022-12-31 | End: 2023-01-09 | Stop reason: HOSPADM

## 2022-12-31 RX ORDER — POTASSIUM CHLORIDE 7.45 MG/ML
20 INJECTION INTRAVENOUS ONCE
Status: COMPLETED | OUTPATIENT
Start: 2022-12-31 | End: 2022-12-31

## 2022-12-31 RX ORDER — DULOXETIN HYDROCHLORIDE 30 MG/1
30 CAPSULE, DELAYED RELEASE ORAL DAILY
Status: DISCONTINUED | OUTPATIENT
Start: 2022-12-31 | End: 2023-01-03

## 2022-12-31 RX ORDER — TALC
6 POWDER (GRAM) TOPICAL NIGHTLY PRN
Status: DISCONTINUED | OUTPATIENT
Start: 2022-12-31 | End: 2022-12-31

## 2022-12-31 RX ORDER — MORPHINE SULFATE 4 MG/ML
4 INJECTION, SOLUTION INTRAMUSCULAR; INTRAVENOUS EVERY 4 HOURS PRN
Status: DISCONTINUED | OUTPATIENT
Start: 2022-12-31 | End: 2022-12-31

## 2022-12-31 RX ORDER — IBUPROFEN 200 MG
16 TABLET ORAL
Status: DISCONTINUED | OUTPATIENT
Start: 2022-12-31 | End: 2023-01-09 | Stop reason: HOSPADM

## 2022-12-31 RX ORDER — IPRATROPIUM BROMIDE AND ALBUTEROL SULFATE 2.5; .5 MG/3ML; MG/3ML
3 SOLUTION RESPIRATORY (INHALATION) EVERY 4 HOURS PRN
Status: DISCONTINUED | OUTPATIENT
Start: 2022-12-31 | End: 2023-01-09 | Stop reason: HOSPADM

## 2022-12-31 RX ORDER — SODIUM CHLORIDE 9 MG/ML
INJECTION, SOLUTION INTRAVENOUS CONTINUOUS
Status: DISCONTINUED | OUTPATIENT
Start: 2022-12-31 | End: 2023-01-07

## 2022-12-31 RX ORDER — ACETAMINOPHEN 10 MG/ML
1000 INJECTION, SOLUTION INTRAVENOUS ONCE
Status: COMPLETED | OUTPATIENT
Start: 2023-01-01 | End: 2023-01-01

## 2022-12-31 RX ORDER — ONDANSETRON 2 MG/ML
4 INJECTION INTRAMUSCULAR; INTRAVENOUS
Status: COMPLETED | OUTPATIENT
Start: 2022-12-31 | End: 2022-12-31

## 2022-12-31 RX ORDER — NOREPINEPHRINE BITARTRATE/D5W 4MG/250ML
0-3 PLASTIC BAG, INJECTION (ML) INTRAVENOUS CONTINUOUS
Status: DISCONTINUED | OUTPATIENT
Start: 2022-12-31 | End: 2023-01-01

## 2022-12-31 RX ORDER — GLUCAGON 1 MG
1 KIT INJECTION
Status: DISCONTINUED | OUTPATIENT
Start: 2022-12-31 | End: 2023-01-09 | Stop reason: HOSPADM

## 2022-12-31 RX ORDER — CHLORHEXIDINE GLUCONATE ORAL RINSE 1.2 MG/ML
15 SOLUTION DENTAL 2 TIMES DAILY
Status: COMPLETED | OUTPATIENT
Start: 2022-12-31 | End: 2023-01-04

## 2022-12-31 RX ORDER — SIMETHICONE 80 MG
1 TABLET,CHEWABLE ORAL 4 TIMES DAILY PRN
Status: DISCONTINUED | OUTPATIENT
Start: 2022-12-31 | End: 2023-01-09 | Stop reason: HOSPADM

## 2022-12-31 RX ORDER — POLYETHYLENE GLYCOL 3350 17 G/17G
17 POWDER, FOR SOLUTION ORAL 2 TIMES DAILY PRN
Status: DISCONTINUED | OUTPATIENT
Start: 2022-12-31 | End: 2023-01-09 | Stop reason: HOSPADM

## 2022-12-31 RX ORDER — NALOXONE HCL 0.4 MG/ML
0.02 VIAL (ML) INJECTION
Status: DISCONTINUED | OUTPATIENT
Start: 2022-12-31 | End: 2023-01-09 | Stop reason: HOSPADM

## 2022-12-31 RX ORDER — AMOXICILLIN 250 MG
1 CAPSULE ORAL 2 TIMES DAILY PRN
Status: DISCONTINUED | OUTPATIENT
Start: 2022-12-31 | End: 2023-01-09 | Stop reason: HOSPADM

## 2022-12-31 RX ORDER — CALCIUM GLUCONATE 20 MG/ML
3 INJECTION, SOLUTION INTRAVENOUS
Status: DISCONTINUED | OUTPATIENT
Start: 2022-12-31 | End: 2023-01-09 | Stop reason: HOSPADM

## 2022-12-31 RX ORDER — MORPHINE SULFATE 4 MG/ML
4 INJECTION, SOLUTION INTRAMUSCULAR; INTRAVENOUS
Status: COMPLETED | OUTPATIENT
Start: 2022-12-31 | End: 2022-12-31

## 2022-12-31 RX ORDER — POTASSIUM CHLORIDE 7.45 MG/ML
60 INJECTION INTRAVENOUS
Status: DISCONTINUED | OUTPATIENT
Start: 2022-12-31 | End: 2023-01-09 | Stop reason: HOSPADM

## 2022-12-31 RX ORDER — FLUMAZENIL 0.1 MG/ML
0.2 INJECTION INTRAVENOUS ONCE
Status: DISCONTINUED | OUTPATIENT
Start: 2022-12-31 | End: 2023-01-01

## 2022-12-31 RX ORDER — POTASSIUM CHLORIDE 7.45 MG/ML
40 INJECTION INTRAVENOUS
Status: DISCONTINUED | OUTPATIENT
Start: 2022-12-31 | End: 2023-01-09 | Stop reason: HOSPADM

## 2022-12-31 RX ORDER — POTASSIUM CHLORIDE 7.45 MG/ML
10 INJECTION INTRAVENOUS ONCE
Status: COMPLETED | OUTPATIENT
Start: 2022-12-31 | End: 2022-12-31

## 2022-12-31 RX ORDER — SODIUM CHLORIDE 0.9 % (FLUSH) 0.9 %
10 SYRINGE (ML) INJECTION
Status: DISCONTINUED | OUTPATIENT
Start: 2022-12-31 | End: 2023-01-09 | Stop reason: HOSPADM

## 2022-12-31 RX ORDER — POTASSIUM CHLORIDE 20 MEQ/1
20 TABLET, EXTENDED RELEASE ORAL ONCE
Status: DISCONTINUED | OUTPATIENT
Start: 2022-12-31 | End: 2022-12-31

## 2022-12-31 RX ORDER — NALOXONE HCL 0.4 MG/ML
0.4 VIAL (ML) INJECTION ONCE
Status: DISCONTINUED | OUTPATIENT
Start: 2022-12-31 | End: 2023-01-01

## 2022-12-31 RX ORDER — SODIUM CHLORIDE 9 MG/ML
INJECTION, SOLUTION INTRAVENOUS ONCE
Status: DISCONTINUED | OUTPATIENT
Start: 2022-12-31 | End: 2022-12-31

## 2022-12-31 RX ORDER — HYDROCODONE BITARTRATE AND ACETAMINOPHEN 5; 325 MG/1; MG/1
1 TABLET ORAL EVERY 4 HOURS PRN
Status: DISCONTINUED | OUTPATIENT
Start: 2022-12-31 | End: 2022-12-31

## 2022-12-31 RX ORDER — POTASSIUM CHLORIDE 7.45 MG/ML
80 INJECTION INTRAVENOUS
Status: DISCONTINUED | OUTPATIENT
Start: 2022-12-31 | End: 2023-01-09 | Stop reason: HOSPADM

## 2022-12-31 RX ORDER — TRAZODONE HYDROCHLORIDE 50 MG/1
50 TABLET ORAL NIGHTLY
Status: DISCONTINUED | OUTPATIENT
Start: 2022-12-31 | End: 2022-12-31

## 2022-12-31 RX ORDER — MAGNESIUM SULFATE HEPTAHYDRATE 40 MG/ML
4 INJECTION, SOLUTION INTRAVENOUS
Status: DISCONTINUED | OUTPATIENT
Start: 2022-12-31 | End: 2023-01-09 | Stop reason: HOSPADM

## 2022-12-31 RX ORDER — AMIODARONE HYDROCHLORIDE 100 MG/1
100 TABLET ORAL EVERY MORNING
Status: DISCONTINUED | OUTPATIENT
Start: 2022-12-31 | End: 2023-01-09 | Stop reason: HOSPADM

## 2022-12-31 RX ORDER — ACETAMINOPHEN 325 MG/1
650 TABLET ORAL EVERY 8 HOURS PRN
Status: DISCONTINUED | OUTPATIENT
Start: 2022-12-31 | End: 2023-01-09 | Stop reason: HOSPADM

## 2022-12-31 RX ORDER — OXYBUTYNIN CHLORIDE 10 MG/1
10 TABLET, EXTENDED RELEASE ORAL DAILY
Status: DISCONTINUED | OUTPATIENT
Start: 2022-12-31 | End: 2023-01-05

## 2022-12-31 RX ORDER — ONDANSETRON 2 MG/ML
4 INJECTION INTRAMUSCULAR; INTRAVENOUS EVERY 6 HOURS PRN
Status: DISCONTINUED | OUTPATIENT
Start: 2022-12-31 | End: 2023-01-01

## 2022-12-31 RX ORDER — INSULIN ASPART 100 [IU]/ML
1-10 INJECTION, SOLUTION INTRAVENOUS; SUBCUTANEOUS
Status: DISCONTINUED | OUTPATIENT
Start: 2022-12-31 | End: 2023-01-09 | Stop reason: HOSPADM

## 2022-12-31 RX ORDER — VANCOMYCIN HCL IN 5 % DEXTROSE 1G/250ML
1000 PLASTIC BAG, INJECTION (ML) INTRAVENOUS ONCE
Status: COMPLETED | OUTPATIENT
Start: 2022-12-31 | End: 2022-12-31

## 2022-12-31 RX ORDER — MAGNESIUM SULFATE HEPTAHYDRATE 40 MG/ML
2 INJECTION, SOLUTION INTRAVENOUS
Status: DISCONTINUED | OUTPATIENT
Start: 2022-12-31 | End: 2023-01-09 | Stop reason: HOSPADM

## 2022-12-31 RX ORDER — CALCIUM GLUCONATE 20 MG/ML
1 INJECTION, SOLUTION INTRAVENOUS
Status: DISCONTINUED | OUTPATIENT
Start: 2022-12-31 | End: 2023-01-09 | Stop reason: HOSPADM

## 2022-12-31 RX ORDER — CALCIUM GLUCONATE 20 MG/ML
2 INJECTION, SOLUTION INTRAVENOUS
Status: DISCONTINUED | OUTPATIENT
Start: 2022-12-31 | End: 2023-01-09 | Stop reason: HOSPADM

## 2022-12-31 RX ADMIN — ONDANSETRON 4 MG: 2 INJECTION INTRAMUSCULAR; INTRAVENOUS at 01:12

## 2022-12-31 RX ADMIN — MUPIROCIN 1 G: 20 OINTMENT TOPICAL at 08:12

## 2022-12-31 RX ADMIN — CHLORHEXIDINE GLUCONATE 15 ML: 1.2 RINSE ORAL at 08:12

## 2022-12-31 RX ADMIN — SENNOSIDES AND DOCUSATE SODIUM 1 TABLET: 50; 8.6 TABLET ORAL at 11:12

## 2022-12-31 RX ADMIN — DULOXETINE 30 MG: 30 CAPSULE, DELAYED RELEASE ORAL at 08:12

## 2022-12-31 RX ADMIN — NOREPINEPHRINE BITARTRATE 0.03 MCG/KG/MIN: 4 INJECTION, SOLUTION INTRAVENOUS at 02:12

## 2022-12-31 RX ADMIN — POTASSIUM CHLORIDE 80 MEQ: 7.46 INJECTION, SOLUTION INTRAVENOUS at 05:12

## 2022-12-31 RX ADMIN — PIPERACILLIN SODIUM AND TAZOBACTAM SODIUM 3.38 G: 3; .375 INJECTION, POWDER, LYOPHILIZED, FOR SOLUTION INTRAVENOUS at 08:12

## 2022-12-31 RX ADMIN — VANCOMYCIN HYDROCHLORIDE 1000 MG: 1 INJECTION, POWDER, LYOPHILIZED, FOR SOLUTION INTRAVENOUS at 05:12

## 2022-12-31 RX ADMIN — VANCOMYCIN HYDROCHLORIDE 500 MG: 500 INJECTION, POWDER, LYOPHILIZED, FOR SOLUTION INTRAVENOUS at 08:12

## 2022-12-31 RX ADMIN — PIPERACILLIN AND TAZOBACTAM 4.5 G: 4; .5 INJECTION, POWDER, LYOPHILIZED, FOR SOLUTION INTRAVENOUS; PARENTERAL at 04:12

## 2022-12-31 RX ADMIN — AMIODARONE HYDROCHLORIDE 100 MG: 100 TABLET ORAL at 07:12

## 2022-12-31 RX ADMIN — POTASSIUM BICARBONATE 50 MEQ: 977.5 TABLET, EFFERVESCENT ORAL at 04:12

## 2022-12-31 RX ADMIN — SODIUM CHLORIDE 1000 ML: 900 INJECTION, SOLUTION INTRAVENOUS at 03:12

## 2022-12-31 RX ADMIN — SODIUM CHLORIDE 1000 ML: 0.9 INJECTION, SOLUTION INTRAVENOUS at 01:12

## 2022-12-31 RX ADMIN — SODIUM CHLORIDE 500 ML: 0.9 INJECTION, SOLUTION INTRAVENOUS at 03:12

## 2022-12-31 RX ADMIN — HYDROCODONE BITARTRATE AND ACETAMINOPHEN 1 TABLET: 5; 325 TABLET ORAL at 07:12

## 2022-12-31 RX ADMIN — VANCOMYCIN HYDROCHLORIDE 500 MG: 500 INJECTION, POWDER, LYOPHILIZED, FOR SOLUTION INTRAVENOUS at 05:12

## 2022-12-31 RX ADMIN — POTASSIUM CHLORIDE 20 MEQ: 7.46 INJECTION, SOLUTION INTRAVENOUS at 11:12

## 2022-12-31 RX ADMIN — MORPHINE SULFATE 4 MG: 4 INJECTION, SOLUTION INTRAMUSCULAR; INTRAVENOUS at 01:12

## 2022-12-31 RX ADMIN — SODIUM CHLORIDE: 0.9 INJECTION, SOLUTION INTRAVENOUS at 05:12

## 2022-12-31 RX ADMIN — ATORVASTATIN CALCIUM 80 MG: 40 TABLET, FILM COATED ORAL at 08:12

## 2022-12-31 RX ADMIN — POTASSIUM CHLORIDE 10 MEQ: 7.46 INJECTION, SOLUTION INTRAVENOUS at 06:12

## 2022-12-31 RX ADMIN — PIPERACILLIN SODIUM AND TAZOBACTAM SODIUM 3.38 G: 3; .375 INJECTION, POWDER, LYOPHILIZED, FOR SOLUTION INTRAVENOUS at 11:12

## 2022-12-31 RX ADMIN — OXYBUTYNIN CHLORIDE 10 MG: 5 TABLET, EXTENDED RELEASE ORAL at 08:12

## 2022-12-31 RX ADMIN — SODIUM CHLORIDE: 0.9 INJECTION, SOLUTION INTRAVENOUS at 03:12

## 2022-12-31 NOTE — PROGRESS NOTES
VANCOMYCIN PHARMACOKINETIC NOTE:  Vancomycin Day # 1    Objective/Assessment:    Diagnosis/Indication for Vancomycin:Sepsis and Urinary Tract Infection      77 y.o., male; Actual Body Weight = 107.8 kg (237 lb 10.5 oz).    The patient has the following labs:  12/31/2022 Estimated Creatinine Clearance: 38 mL/min (A) (based on SCr of 2 mg/dL (H)). Lab Results   Component Value Date    BUN 29 (H) 12/31/2022     Lab Results   Component Value Date    WBC 18.09 (H) 12/31/2022          Plan:  Adjust vancomycin dose and/or frequency based on the patient's actual weight and renal function:  Initiate Vancomycin 1750 mg IV every 24 hours following 2000 mg loading dose.  Orders have been entered into patient's chart.        Vancomycin trough level has been ordered for 1/1 at 07:00.    Pharmacy will manage vancomycin therapy, monitor serum vancomycin levels, monitor renal function and adjust regimen as necessary.    Thank you for allowing us to participate in this patient's care.     Kim Hobbs 12/31/2022   Department of Pharmacy  Ext [phone]

## 2022-12-31 NOTE — NURSING
Rec'd from the Er via stretcher with nurse in attendance and on cardiac monitor. Patient offered some assistance getting from stretcher  to the  bed. IV sites by 2. Reports being continent of bowel and bladder. Skin intact.

## 2022-12-31 NOTE — ED PROVIDER NOTES
Encounter Date: 12/30/2022       History     Chief Complaint   Patient presents with    Fall     Pt arrived by ems after a pt fell after having a dizziness episode. Denies loc. Pt hit the side of his head on a marble table tonight. Pt denies any pain.  Pt was seen two weeks ago for a fall secondary to dizziness, pt was dx with a head bleed. Pt restarted his blood thinners once home.      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  He was discharged home on 11/18 and since then has been diagnosed with a UTI in the past week and is on Cipro and Pyridium.  Wife reports overall patient has been deconditioned been very weak and has not been getting up and ambulating much even with a walker.  She reports he is also not been eating and drinking well.  Tonight he tried to get out of bed felt dizzy the room spun around and he fell striking his right lateral head on a marble in table.  Wife also reports that he is on chronic opiates and several medications that can contribute to dizziness but she also feels he may be developing dehydration.       Review of patient's allergies indicates:  No Known Allergies  History reviewed. No pertinent past medical history.  Past Surgical History:   Procedure Laterality Date    INTRAMEDULLARY RODDING OF TROCHANTER OF FEMUR Left  11/10/2022    Procedure: INSERTION, ITRAMEDULLARY SANTI, FEMUR, TROCHANTER;  Surgeon: Richard Phoenix MD;  Location: [facility];  Service: Orthopedics;  Laterality: Left;     History reviewed. No pertinent family history.  Social History     Tobacco Use    Smoking status: Never    Smokeless tobacco: Never   Substance Use Topics    Alcohol use: Yes    Drug use: Not Currently     Review of Systems   Constitutional:  Positive for fatigue. Negative for activity change, appetite change, chills, diaphoresis and fever.   HENT:  Negative for congestion, postnasal drip, rhinorrhea and sore throat.    Respiratory:  Positive for cough. Negative for chest tightness, shortness of breath and wheezing.    Cardiovascular:  Negative for chest pain and palpitations.   Gastrointestinal:  Positive for abdominal pain. Negative for constipation, diarrhea, nausea and vomiting.   Genitourinary:  Positive for difficulty urinating. Negative for dysuria, frequency and urgency.   Musculoskeletal:  Positive for myalgias. Negative for neck pain and neck stiffness.   Skin:  Negative for color change, pallor, rash and wound.   Neurological:  Positive for dizziness, weakness, light-headedness and headaches. Negative for seizures, syncope, speech difficulty and numbness.   Psychiatric/Behavioral:  Positive for confusion.    All other systems reviewed and are negative.    Physical Exam     Initial Vitals [12/30/22 2322]   BP Pulse Resp Temp SpO2   118/85 80 18 97.7 °F (36.5 °C) 96 %      MAP       --         Physical Exam    Nursing note and vitals reviewed.  Constitutional: He appears well-developed and well-nourished. He is not diaphoretic. No distress.   Ill-appearing   HENT:   Head: Normocephalic. Head is without raccoon's eyes, without Redd's sign, without abrasion, without contusion and without laceration. Hair is normal.       Right Ear: External ear normal.   Left Ear: External ear normal.   Nose: Nose normal.   Dry mucous  membranes    Patient is hard of hearing.   Eyes: Conjunctivae and EOM are normal. Pupils are equal, round, and reactive to light.   Neck: Trachea normal and phonation normal. Neck supple. No tracheal deviation present.       Normal range of motion.  Cardiovascular:  Normal rate, regular rhythm, normal heart sounds and intact distal pulses.     Exam reveals no gallop and no friction rub.       No murmur heard.  SBP 98    Heart rate 102   Pulmonary/Chest: Breath sounds normal. No stridor. No respiratory distress. He has no wheezes. He has no rhonchi. He has no rales. He exhibits no tenderness.   Respirations 18 sats 96% on room air   Abdominal: Abdomen is soft. Bowel sounds are normal. He exhibits no distension and no mass. There is abdominal tenderness.   Tenderness in bilateral lower quadrants and epigastrium no rebound or guarding There is no rebound and no guarding.   Musculoskeletal:         General: No edema. Normal range of motion.      Cervical back: Normal range of motion and neck supple. No edema, erythema or rigidity. Muscular tenderness present. No spinous process tenderness. Normal range of motion.     Neurological: He is alert and oriented to person, place, and time. He has normal strength. No cranial nerve deficit or sensory deficit.   Mild confusion mildly slow to answer questions   Skin: Skin is warm and dry. No rash noted. No erythema. No pallor.   Poor skin turgor   Psychiatric: He has a normal mood and affect. His behavior is normal. Judgment and thought content normal.       ED Course   Procedures  Labs Reviewed   CBC W/ AUTO DIFFERENTIAL - Abnormal; Notable for the following components:       Result Value    WBC 18.09 (*)     Immature Granulocytes 0.6 (*)     Gran # (ANC) 13.6 (*)     Immature Grans (Abs) 0.10 (*)     Mono # 1.2 (*)     Gran % 74.8 (*)     Lymph % 17.4 (*)     All other components within normal limits   COMPREHENSIVE METABOLIC PANEL - Abnormal; Notable for the following  components:    Sodium 134 (*)     Potassium 2.9 (*)     BUN 29 (*)     Creatinine 2.0 (*)     Calcium 7.7 (*)     Total Protein 5.7 (*)     Albumin 2.9 (*)     eGFR 33.7 (*)     All other components within normal limits    Narrative:      k critical result(s) repeated. Called and verbal readback obtained   from yang garces rn er  by [facility] 12/31/2022 02:53  Recoll. [id number] by [facility] at 12/31/2022 01:46, reason: hemolyzed   DRUG SCREEN PANEL, URINE EMERGENCY - Abnormal; Notable for the following components:    Benzodiazepines Presumptive Positive (*)     Opiate Scrn, Ur Presumptive Positive (*)     All other components within normal limits    Narrative:     Specimen Source->Urine   URINALYSIS, REFLEX TO URINE CULTURE - Abnormal; Notable for the following components:    Appearance, UA Hazy (*)     Protein, UA 1+ (*)     Occult Blood UA 3+ (*)     All other components within normal limits    Narrative:     Specimen Source->Urine   LACTIC ACID, PLASMA - Abnormal; Notable for the following components:    Lactate (Lactic Acid) 2.0 (*)     All other components within normal limits    Narrative:      lactic acid critical result(s) repeated. Called and verbal readback   obtained from yang garces rn er  by [facility] 12/31/2022 02:02   URINALYSIS MICROSCOPIC - Abnormal; Notable for the following components:    RBC, UA >100 (*)     WBC, UA 11 (*)     Hyaline Casts, UA 33 (*)     All other components within normal limits    Narrative:     Specimen Source->Urine   POCT GLUCOSE - Abnormal; Notable for the following components:    POC Glucose 123 (*)     All other components within normal limits   ISTAT CREATININE - Abnormal; Notable for the following components:    POC Creatinine 2.3 (*)     All other components within normal limits   RESPIRATORY INFECTION PANEL (PCR), NASOPHARYNGEAL    Narrative:     Specimen Source->Nasopharyngeal Swab   CULTURE, BLOOD   CULTURE, BLOOD   CULTURE, URINE   MRSA SCREEN BY PCR   MAGNESIUM    Narrative:      Recoll. [id number] by [facility] at 12/31/2022 01:46, reason: hemolyzed   PROTIME-INR   APTT   B-TYPE NATRIURETIC PEPTIDE   TSH    Narrative:     Recoll. [id number] by [facility] at 12/31/2022 01:46, reason: hemolyzed   INFLUENZA A AND B ANTIGEN    Narrative:     Specimen Source->Nasopharyngeal Swab   SARS-COV-2 RNA AMPLIFICATION, QUAL   MAGNESIUM   PROCALCITONIN   SEDIMENTATION RATE   C-REACTIVE PROTEIN   LACTIC ACID, PLASMA   HEMOGLOBIN A1C   POCT GLUCOSE, HAND-HELD DEVICE   POCT GLUCOSE MONITORING CONTINUOUS   POCT CREATININE     EKG Readings: (Independently Interpreted)   Initial Reading: No STEMI. Rhythm: Atrial Fibrillation. Heart Rate: 81. Ectopy: PVCs. Conduction: Normal. Other Findings: Prolonged QT Interval.   Inverted T-waves in lead 2 and 3 flattened T-waves throughout     Imaging Results              CT Abdomen Pelvis  Without Contrast (Final result)  Result time 12/31/22 02:35:33      Final result by Brandt Carl DO (12/31/22 02:35:33)                   Narrative:    EXAM DESCRIPTION:  CT ABDOMEN PELVIS WITHOUT CONTRAST  RadLex: CT ABDOMEN PELVIS WITHOUT IV CONTRAST    CLINICAL HISTORY:  Abdominal abscess/infection suspected.    TECHNIQUE:  CT of the abdomen and pelvis without contrast.  All CT scans at this facility use dose modulation, iterative reconstruction, and/or weight based dosing when appropriate to reduce radiation dose to as low as reasonably achievable.    COMPARISON: None.    FINDINGS:    The visualized lower thoracic structures demonstrate coronary artery calcifications.    Unenhanced images of the liver, spleen, pancreas and adrenal glands appear grossly unremarkable. The gallbladder appears slightly distended. There are multiple bilateral renal calculi measuring up to 1 cm on the right. There is no hydronephrosis bilaterally. No retroperitoneal or mesenteric lymphadenopathy is identified. No abdominal aortic aneurysm is seen. No bowel obstruction is seen. There is no free intraperitoneal air. The  appendix appears unremarkable. There is colonic diverticulosis without definite CT evidence for acute diverticulitis. There is mild fecal loading throughout the colon. No free fluid is identified. Urinary bladder is decompressed. Small prostatic calcifications are present. Prostate gland measures approximately 5.8 cm TR by 3.5 cm AP by 4 cm CC. There are small bilateral inguinal hernias containing fat. There are postsurgical changes at the proximal left femur. No acute fracture is seen.    IMPRESSION:  1.  Slight gallbladder distention.  2.  Colonic diverticulosis without definite CT evidence for acute diverticulitis.  3.  Nonobstructive bilateral renal calculi.    Electronically signed by:  Brandt Carl DO  12/31/2022 2:35 AM CST Workstation: [id number]                                     X-Ray Chest AP Portable (In process)                      CT Cervical Spine Without Contrast (Final result)  Result time 12/31/22 00:52:56      Final result by Sharon Brownlee MD (12/31/22 00:52:56)                   Narrative:    EXAM DESCRIPTION:  CT CERVICAL SPINE WITHOUT IV CONTRAST    CLINICAL HISTORY:  Neck trauma (Age >= 65y). Patient fell after having a dizziness episode. Patient hit the side of his head on a marble table tonight. Patient was seen 2 weeks ago for a fall secondary to dizziness. Patient was diagnosed with a head bleed.    TECHNIQUE:  Multiple axial images were obtained through the cervical spine without intravenous contrast. Coronal and sagittal images were reconstructed.  All CT scans at this facility use dose modulation, iterative reconstruction, and/or weight based dosing when appropriate to reduce radiation dose to as low as reasonably achievable.    COMPARISON: Previous cervical spine CT dated 11/8/2022.    FINDINGS:  There is straightening of the normal cervical lordosis, possibly positional or secondary to muscle spasm, and in retrospect unchanged. The vertebral heights are preserved. Scattered disc  space narrowing, greatest at C5-C6, grossly unchanged. There is no acute fracture or subluxation.  No epidural hematoma is visible. The prevertebral soft tissues are grossly unremarkable. The visualized lung apices are clear. Vascular calcifications.  The visualized paranasal sinuses are clear. The mastoid air cells are clear.  Degenerative changes along the cervical spine. Secondary to broad-based posterior disc osteophyte complex, there is mild to moderate central canal stenosis at C5-C6 6. Multilevel neural foraminal narrowing secondary to hypertrophic changes, with moderate left neuroforaminal narrowing at C3-C4 and C4-C5 and moderate to severe on the right at C5-C6, and mild at other levels.    IMPRESSION:  1.  No evidence of acute fracture or subluxation of the cervical spine.  2.  Multilevel degenerative changes of the cervical spine, as above.    Electronically signed by:  Sharon Brownlee MD  12/31/2022 12:52 AM CST Workstation: [id number]                                     CT Head Without Contrast (Final result)  Result time 12/31/22 00:45:15      Final result by Sharon Brownlee MD (12/31/22 00:45:15)                   Narrative:    EXAM DESCRIPTION:  CT HEAD WITHOUT IV CONTRAST    CLINICAL HISTORY:  Head trauma, minor (Age >= 65y); head bleed 2 weeks ago and back on eliquis. Patient fell after having a dizziness episode. Patient hit the side of his head on a marble table tonight. Patient was seen 2 weeks ago for a fall secondary to dizziness. Patient was diagnosed with a head bleed.    TECHNIQUE:  Multiple axial images were obtained through the brain without intravenous contrast. Coronal and sagittal images were reconstructed.  All CT scans at this facility use dose modulation, iterative reconstruction, and/or weight based dosing when appropriate to reduce radiation dose to as low as reasonably achievable.    COMPARISON: Previous head CT images dated 12/19/2022. The prior CT report is not available at  this time. Reference is also made to previous head CT and report dated 11/18/2022.    FINDINGS:    There is no evidence of acute intracranial hemorrhage or acute major territorial infarction.  The ventricles, sulci and cisterns are grossly unchanged in size. There is no midline shift. There is a minimal chronic subdural hematoma or hygroma overlying the right frontal convexity measuring approximately 4 mm in thickness on the coronal images, grossly unchanged in extent from the previous CT, and improved from the prior dated 11/18/2022.  Subcortical and periventricular white matter hypodensities are seen, compatible with chronic microangiopathic disease, grossly unchanged. Vascular calcifications.    Mild mucosal thickening as well as an air-fluid level within the left maxillary sinus.  The mastoid air cells are clear bilaterally.  The calvarium appears grossly unremarkable.  Slight scarring along the left parietal scalp.    IMPRESSION:  1.  No acute intracranial abnormality.  2.  Minimal chronic subdural hematoma or hygroma overlying the right frontal convexity, grossly unchanged in extent from the previous CT, and improved from the prior dated 11/18/2022.  3.  White matter changes of chronic microangiopathic disease, grossly unchanged.  4.  Mild acute sinusitis involving the left maxillary sinus.    Electronically signed by:  Sharon Brownlee MD  12/31/2022 12:45 AM CST Workstation: [id number]                                  X-Rays:   Independently Interpreted Readings:   Chest X-Ray: No infiltrates.  No acute abnormalities. Cardiomegaly present.   Medications   vancomycin - pharmacy to dose (has no administration in time range)   potassium chloride 10 mEq in 100 mL IVPB (has no administration in time range)   vancomycin in dextrose 5 % 1 gram/250 mL IVPB 1,000 mg (1,000 mg Intravenous New Bag 12/31/22 0502)     And   vancomycin 500 mg in dextrose 5 % 100 mL IVPB (ready to mix system) (500 mg Intravenous New Bag  12/31/22 0502)   piperacillin-tazobactam 3.375 g in dextrose 5 % 50 mL IVPB (ready to mix system) (has no administration in time range)   0.9%  NaCl infusion ( Intravenous New Bag 12/31/22 0502)   traZODone tablet 50 mg (has no administration in time range)   oxybutynin 24 hr tablet 10 mg (has no administration in time range)   atorvastatin tablet 80 mg (has no administration in time range)   DULoxetine DR capsule 30 mg (has no administration in time range)   amiodarone tablet 100 mg (has no administration in time range)   sodium chloride 0.9% flush 10 mL (has no administration in time range)   albuterol-ipratropium 2.5 mg-0.5 mg/3 mL nebulizer solution 3 mL (has no administration in time range)   melatonin tablet 6 mg (has no administration in time range)   ondansetron injection 4 mg (has no administration in time range)   polyethylene glycol packet 17 g (has no administration in time range)   senna-docusate 8.6-50 mg per tablet 1 tablet (has no administration in time range)   acetaminophen tablet 650 mg (has no administration in time range)   simethicone chewable tablet 80 mg (has no administration in time range)   HYDROcodone-acetaminophen 5-325 mg per tablet 1 tablet (has no administration in time range)   morphine injection 4 mg (has no administration in time range)   naloxone 0.4 mg/mL injection 0.02 mg (has no administration in time range)   glucose chewable tablet 16 g (has no administration in time range)   glucose chewable tablet 24 g (has no administration in time range)   glucagon (human recombinant) injection 1 mg (has no administration in time range)   dextrose 10% bolus 125 mL 125 mL (has no administration in time range)   dextrose 10% bolus 250 mL 250 mL (has no administration in time range)   insulin aspart U-100 pen 1-10 Units (has no administration in time range)   morphine injection 4 mg (4 mg Intravenous Given 12/31/22 0112)   ondansetron injection 4 mg (4 mg Intravenous Given 12/31/22 0112)    sodium chloride 0.9% bolus 1,000 mL 1,000 mL (0 mLs Intravenous Stopped 12/31/22 0213)   sodium chloride 0.9% bolus 1,000 mL 1,000 mL (0 mLs Intravenous Stopped 12/31/22 0458)   piperacillin-tazobactam 4.5 g in dextrose 5 % 100 mL IVPB (ready to mix system) (0 g Intravenous Stopped 12/31/22 0432)   potassium bicarbonate disintegrating tablet 50 mEq (50 mEq Oral Given 12/31/22 0403)     Medical Decision Making:   History:   I obtained history from: someone other than patient.       <> Summary of History: Wife  Old Medical Records: I decided to obtain old medical records.  Old Records Summarized: records from previous admission(s).  Differential Diagnosis:   Intracranial bleed, concussion, skull fracture, cervical spine fracture, dehydration, acute kidney injury, UTI, sepsis, dysrhythmia, ACS  Independently Interpreted Test(s):   I have ordered and independently interpreted X-rays - see prior notes.  I have ordered and independently interpreted EKG Reading(s) - see prior notes  Clinical Tests:   Lab Tests: Ordered and Reviewed  The following lab test(s) were unremarkable: PT, PTT and BNP       <> Summary of Lab: COVID in flu negative  Sodium 134 potassium 2.9 BUN 29 creatinine 2 calcium 7.7 total protein 5.7  GFR 33.7 Mag 1.8 TSH 1.87  Tox positive for benzos and opiates  Cath urine is hazy 1+ protein 3+ blood nitrite negative more than 100 red blood cells 11 white blood cells no bacteria 4 squamous cells  Glucose 123  White count 18.09 ANC 13.6  Lactate is 2  Radiological Study: Ordered and Reviewed  Medical Tests: Reviewed and Ordered  ED Management:  Emergent evaluation of a 77-year-old male with history of paroxysmal atrial fibrillation on Eliquis and amiodarone restarted 1 week ago, essential hypertension, type 2 diabetes mellitus, who had a fall on November 8th and was found to have a 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  He was discharged home on 11/18 and since then has been diagnosed with a UTI in the past week and is on Cipro and Pyridium.  Wife reports overall patient has been deconditioned been very weak and has not been getting up and ambulating much even with a walker.  She reports he is also not been eating and drinking well.  Tonight he tried to get out of bed felt dizzy the room spun around and he fell striking his right lateral head on a marble in table.  Wife also reports that he is on chronic opiates and several medications that can contribute to dizziness but she also feels he may be developing dehydration.   On physical exam patient is very hard of hearing appears mildly slowed to be able to answer questions wife helps give history.  Blood pressure was 98 systolic patient was afebrile pulse in the 70s no respiratory distress normal saturations and respirations clear breath sounds bilaterally patient very tender abdomen bilateral lower quadrants and epigastrium which she reports he did not know if previously.  Poor skin turgor and very dry mucous membranes very generally weak required full assistance Kirill sit at 30° in bed for pulmonary exam reports he is still having pain in the left hip area  EKG showed no ischemic changes No STEMI. Rhythm: Atrial Fibrillation. Heart Rate: 81. Ectopy: PVCs. Conduction: Normal. Other Findings: Prolonged QT Interval.   Inverted T-waves in lead 2 and 3 flattened T-waves throughout   Chest x-ray revealed cardiomegaly no pulmonary infiltrates mildly improved compared to 1 month ago  Patient was given 1 L of IV fluids 4 of morphine and 4 of Zofran for  his head and face pain as well has his left hip pain.  CT scan of the abdomen pelvis was ordered as well as lab work including lactic acid and blood cultures based on his symptoms then presentation  Lab work returned and revealed hypokalemia with potassium of 2.9 magnesium was added.  Potassium was replaced with both IV and oral potassium.  Lab work revealed a elevated lactate 2 [place] [address] which is new for the patient patient also has acute kidney injury which is new.  He was receiving 1 L fluids.  COVID in flu were negative.  Patient's tox is positive for opiates and benzos which she was taking at home.  Cath urine was performed reveals ongoing signs of UTI with more than 100 red blood cells as well.  Patient was sleeping in bed waiting for CT abdomen pelvis results to become available and had a blood pressure that cycled at 58 systolic when I went in the room patient was sleeping he was woken up and upon be simulated had repeat blood pressure taken blood pressure again was 56 systolic.  Nursing staff was called to bedside 2nd IV was established and blood pressure cycled at 103/55 second IV was established she was given another L fluids vanc and Zosyn.  He will be admitted to Hospital Medicine for the below diagnoses.  Karla Velasco M.D.  5:10 AM 12/31/2022    Other:   I have discussed this case with another health care provider.       <> Summary of the Discussion: Dr. Nagy for admission for admission for acute cystitis with hematuria failing outpatient antibiotics sepsis with lactic acidosis leukocytosis weakness dizziness status post fall with closed head injury and right facial contusion, dehydration acute kidney injury hypokalemia          Attending Attestation:         Attending Critical Care:   Critical Care Times:   Direct Patient Care (initial evaluation, reassessments, and time considering the case)................................................................10 minutes.   Additional  History from reviewing old medical records or taking additional history from the family, EMS, PCP, etc.......................5 minutes.   Ordering, Reviewing, and Interpreting Diagnostic Studies...............................................................................................................5 minutes.   Documentation..................................................................................................................................................................................10 minutes.   Consultation with other Physicians. .................................................................................................................................................5 minutes.   Consultation with the patient's family directly relating to the patient's condition, care, and DNR status (when patient unable)......5 minutes.   ==============================================================  Total Critical Care Time - exclusive of procedural time: 40 minutes.  ==============================================================  Critical care was necessary to treat or prevent imminent or life-threatening deterioration of the following conditions: sepsis, hypotension and trauma.   Critical care was time spent personally by me on the following activities: obtaining history from patient or relative, examination of patient, review of old charts, ordering lab, x-rays, and/or EKG, development of treatment plan with patient or relative, ordering and performing treatments and interventions, evaluation of patient's response to treatment, discussion with consultants, interpretation of cardiac measurements and re-evaluation of patient's conition.   Critical Care Condition: life-threatening                      Clinical Impression:   Final diagnoses:  [R42] Dizziness  [W19.XXXA] Fall, initial encounter  [S00.83XA] Facial contusion, initial encounter  [S09.90XA] Closed head injury, initial encounter  [E86.0]  Dehydration  [N17.9] KAM (acute kidney injury)  [E87.6] Hypokalemia  [A41.9] Sepsis, due to unspecified organism, unspecified whether acute organ dysfunction present (Primary)  [N30.01] Acute cystitis with hematuria        ED Disposition Condition    Observation                 Karla Velasco MD  12/31/22 0511       Karla Velasco MD  12/31/22 0517

## 2022-12-31 NOTE — HOSPITAL COURSE
Patient was seen and examined along with the nurse  Tried to talk with the patient and he said let me wake up first   Try to open the eye son look at the pupil but he intentionally close the eyes despite asked to open  Denied having pain anywhere in the body  Pressure is low again and fibrous cc bolus given  Urine toxicology findings noted.  He did not answer the question whether he took extra pills are not    1/1/23  Patient is awake and alert and normal and talking normal   Mild abdominal pain . BP stable . But over day nausea and vomiting . Abdominal pain .  HIDA ordered since gall bladder distended     1/1/23  Hospital course  Patient was admitted with altered mental status and possible sepsis.  CT scan of the abdomen was done because of abdominal pain and no acute finding but gallbladder distention and HIDA scan was done and negative  Today patient wake up and acting normal and had normal conversation.  He was not doing well with repeated falls after he had subdural hematoma the last time.  Said he will go home if I can arrange the hospital bed .  But later he changed her mind and he wants to go to rehab or skilled facility  K very low and supplementing

## 2022-12-31 NOTE — PROGRESS NOTES
[facility] Medicine  Progress Note    Patient Name: Hiro Rodríguez  MRN: [id number]  Patient Class: IP- Inpatient   Admission Date: 12/30/2022  Length of Stay: 0 days  Attending Physician: Vipin Shepherd MD  Primary Care Provider: Gautam Castanon III, MD        Subjective:     Principal Problem:Severe sepsis        HPI:  No notes on file    Overview/Hospital Course:    Patient was seen and examined along with the nurse  Tried to talk with the patient and he said let me wake up first   Try to open the eye son look at the pupil but he intentionally close the eyes despite asked to open  Denied having pain anywhere in the body  Pressure is low again and fibrous cc bolus given  Urine toxicology findings noted.  He did not answer the question whether he took extra pills are not      Interval History:       Review of Systems  As per subjectives  Objective:     Vital Signs (Most Recent):  Temp: 97.1 °F (36.2 °C) (12/31/22 1215)  Pulse: 74 (12/31/22 0822)  Resp: 11 (12/31/22 0822)  BP: (!) 93/52 (12/31/22 0624)  SpO2: 99 % (12/31/22 0822)   Vital Signs (24h Range):  Temp:  [97.1 °F (36.2 °C)-97.9 °F (36.6 °C)] 97.1 °F (36.2 °C)  Pulse:  [72-87] 74  Resp:  [11-25] 11  SpO2:  [96 %-99 %] 99 %  BP: ()/(30-85) 93/52     Weight: 107.8 kg (237 lb 10.5 oz)  Body mass index is 34.1 kg/m².    Intake/Output Summary (Last 24 hours) at 12/31/2022 1257  Last data filed at 12/31/2022 0458  Gross per 24 hour   Intake 2098 ml   Output 1400 ml   Net 698 ml      Physical Exam  Constitutional:       General: He is not in acute distress.     Appearance: He is well-developed. He is obese.   HENT:      Head: Normocephalic.      Nose: Nose normal.   Eyes:      Conjunctiva/sclera: Conjunctivae normal.   Cardiovascular:      Rate and Rhythm: Normal rate and regular rhythm.   Pulmonary:      Effort: Pulmonary effort is normal. No respiratory distress.   Abdominal:      General: Abdomen is flat. There is no distension.       Tenderness: There is no abdominal tenderness.   Musculoskeletal:      Cervical back: Neck supple.   Skin:     Findings: No rash.   Neurological:      Mental Status: He is disoriented.       Significant Labs: All pertinent labs within the past 24 hours have been reviewed.  CBC:   Recent Labs   Lab 12/31/22  0102   WBC 18.09*   HGB 15.2   HCT 46.4        CMP:   Recent Labs   Lab 12/31/22  0232 12/31/22  0910   * 134*   K 2.9* 2.8*   CL 96 98   CO2 25 29    117*   BUN 29* 30*   CREATININE 2.0* 1.7*   CALCIUM 7.7* 7.3*   PROT 5.7* 5.0*   ALBUMIN 2.9* 2.6*   BILITOT 0.9 1.0   ALKPHOS 92 83   AST 17 14   ALT 12 12   ANIONGAP 13 7*     Cardiac Markers:   Recent Labs   Lab 12/31/22 0102   BNP 52       Significant Imaging: I have reviewed all pertinent imaging results/findings within the past 24 hours.      Assessment/Plan:       Active Hospital Problems    Diagnosis    *Severe sepsis qualify but most likely secondary to dehydration and polypharacy    Subdural hematoma caused by concussion    Chronic heart failure with preserved ejection fraction    Benign essential tremor    Type 2 diabetes mellitus with diabetic nephropathy, without long-term current use of insulin    Anticoagulated    Atrial fibrillation    Aspirin long-term use    Hypertension, essential       PLAN     RN gave oxycodone this am as ordered   Narcan and flumazenil trial   500 cc bolus and vasopressor if needed . Continue IVF   Concern for  sepsis but unlikely  Appear dehydration and  polypharmacy  Complicated by chronic subdural hematoma and possible complication from it .  KAM on CKD, Hypokalemia improved   -empiric vanc, zosyn  Keep holding eliquis for now   neuro checks  - potassium replacement  - SSI  Consult neurology     VTE Risk Mitigation (From admission, onward)         Ordered     IP VTE HIGH RISK PATIENT  Once         12/31/22 0423     Place sequential compression device  Until discontinued         12/31/22  0423                Discharge Planning   BRENNAN:      Code Status: Full Code   Is the patient medically ready for discharge?:     Reason for patient still in hospital (select all that apply): Treatment                     Vipin Shepherd MD  Department of Hospital Medicine   [facility]

## 2022-12-31 NOTE — CARE UPDATE
12/31/22 0822   Patient Assessment/Suction   Level of Consciousness (AVPU) alert   Respiratory Effort Normal;Unlabored   Expansion/Accessory Muscles/Retractions expansion symmetric   All Lung Fields Breath Sounds clear   Rhythm/Pattern, Respiratory unlabored   PRE-TX-O2   Device (Oxygen Therapy) room air   SpO2 99 %   Pulse Oximetry Type Continuous   $ Pulse Oximetry - Multiple Charge Pulse Oximetry - Multiple   Pulse 74   Resp 11   Education   $ Education 15 min   Respiratory Evaluation   $ Care Plan Tech Time 15 min

## 2022-12-31 NOTE — H&P
"[facility]   History & Physical     Patient Name: Hiro Rodríguez  MRN: [id number]  Admission Date: 12/30/2022 11:24 PM  Attending Physician: Nora Bates MD  Face-to-Face encounter date: 12/31/2022 4:10 AM    Patient information was obtained from patient, past medical records, ER physician, and ER records.     HISTORY OF PRESENT ILLNESS:     Hiro Rodríguez is a 77 y.o. White male   With PMH of chronic subdural hematoma (11/8/22),  pAF on eliquis, DM2, HTN, multiple falls 2/2 polypharmacy,  who presents with fall.    Onset this evening  Occurred after experiencing dizziness  No LOC  +head trauma on marble side table  Pt discharged from [facility] on 11/18/22  He was treated for subdural hematoma due to fall  Since discharge, pt has had poor po intake  Wife reports "he is just stubborn" and refusing to eat/drink  No n/v  No subjective fever   No chills  No abdominal pain  Previous dysuria, since resolved  No diarrhea  +generalized weakness  +fatigue    On 12/28:  new dysuria, urgency, frequency  Saw outpt PCP on 12/29:  started on cipro  BP at that visit was noted 94/57    Pt initially presented to ER today normotensive  While sleeping, noted BP 55/30  Did not recover with positional changes  Pt's BP now responding to fluid resuscitation    REVIEW OF SYSTEMS:     All systems reviewed and are negative except as noted per above.    PAST MEDICAL HISTORY:   As noted above    PAST SURGICAL HISTORY:     Past Surgical History:   Procedure Laterality Date    INTRAMEDULLARY RODDING OF TROCHANTER OF FEMUR Left 11/10/2022    Procedure: INSERTION, ITRAMEDULLARY SANTI, FEMUR, TROCHANTER;  Surgeon: Richard Phoenix MD;  Location: [facility] OR;  Service: Orthopedics;  Laterality: Left;       ALLERGIES:   Patient has no known allergies.    FAMILY HISTORY:   HTN    SOCIAL HISTORY:     Social History     Tobacco Use    Smoking status: Never    Smokeless tobacco: Never   Substance Use Topics    Alcohol " use: Yes        Social History     Substance and Sexual Activity   Sexual Activity Yes    Partners: Female        HOME MEDICATIONS:     Prior to Admission medications    Medication Sig Start Date End Date Taking? Authorizing Provider   acetaminophen (TYLENOL) 325 MG tablet Take 650 mg by mouth every 6 (six) hours as needed. 12/7/22   Historical Provider   amiodarone (PACERONE) 100 MG Tab Take 100 mg by mouth every morning. 12/7/22   Historical Provider   apixaban (ELIQUIS) 5 mg Tab Take 1 tablet (5 mg total) by mouth 2 (two) times daily. 4/12/22   Gautam Castanon III, MD   calcium polycarbophil (FIBER-CAPS, CA POLYCARBOPHIL, ORAL) Take by mouth.    Historical Provider   cholecalciferol, vitamin D3, (VITAMIN D3) 50 mcg (2,000 unit) Tab Take by mouth once daily.    Historical Provider   cyanocobalamin, vitamin B-12, 1,000 mcg Subl Place 1,000 mcg under the tongue 2 (two) times a day.    Historical Provider   cyclobenzaprine (FLEXERIL) 5 MG tablet Take 5 mg by mouth. 12/7/22   Historical Provider   DULoxetine (CYMBALTA) 30 MG capsule Take 1 capsule (30 mg total) by mouth once daily. 4/12/22   Gautam Castanon III, MD   ferrous sulfate 27 mg iron Tab Take by mouth.    Historical Provider   HYDROcodone-acetaminophen (NORCO)  mg per tablet Take 1 tablet by mouth every 6 (six) hours as needed. 10/13/22   Historical Provider   losartan (COZAAR) 50 MG tablet Take 1 tablet (50 mg total) by mouth once daily. 4/12/22   Gautam Castanon III, MD   magnesium 250 mg Tab Take 250 mg by mouth 2 (two) times a day.    Historical Provider   metFORMIN (GLUCOPHAGE-XR) 500 MG ER 24hr tablet TAKE 1 TABLET BY MOUTH EVERY DAY 11/27/22   Gautam Castanon III, MD   metoprolol tartrate (LOPRESSOR) 25 MG tablet Take 1 tablet (25 mg total) by mouth 2 (two) times daily. 4/12/22   Gautam Castanon III, MD   omega-3 acid ethyl esters (LOVAZA) 1 gram capsule Take 2 capsules (2 g total) by mouth 2 (two) times daily. 4/12/22   Gautam Castanon  "III, MD   oxyCODONE-acetaminophen (PERCOCET)  mg per tablet PLEASE SEE ATTACHED FOR DETAILED DIRECTIONS 12/7/22   Historical Provider   phenazopyridine (PYRIDIUM) 100 MG tablet Take 1 tablet (100 mg total) by mouth 3 (three) times daily as needed for Pain. 12/29/22   Mariia Hendrickson NP   polyethylene glycol (GLYCOLAX) 17 gram/dose powder Take 17 g by mouth daily as needed. 12/7/22   Historical Provider   propranoloL (INDERAL) 20 MG tablet Take 1 tablet (20 mg total) by mouth 2 (two) times daily. 11/18/22   Orlando Gonzalez MD   rosuvastatin (CRESTOR) 20 MG tablet TAKE 1 TABLET BY MOUTH EVERY DAY 11/27/22   Gautam HRichelle Castanon III, MD   semaglutide (OZEMPIC) 1 mg/dose (4 mg/3 mL) Inject 1 mg into the skin every 7 days. 4/12/22 4/12/23  Gautam HRichelle Castanon III, MD   semaglutide (OZEMPIC) 1 mg/dose (4 mg/3 mL) Inject 1 mg into the skin every 7 days. 12/15/22 12/15/23  Gautam HRichelle Castanon III, MD   sulfamethoxazole-trimethoprim 800-160mg (BACTRIM DS) 800-160 mg Tab Take 1 tablet by mouth 2 (two) times daily. for 10 days 12/29/22 1/8/23  Mariia Hendrickson NP   tolterodine (DETROL LA) 4 MG 24 hr capsule Take 1 capsule (4 mg total) by mouth once daily. 4/12/22   Gautam Castanon III, MD   traZODone (DESYREL) 50 MG tablet TAKE 1 TABLET BY MOUTH EVERY EVENING. 12/15/22   Gautam HRichelle Castanon III, MD       PHYSICAL EXAM:     BP (!) 103/55   Pulse 77   Temp 97.7 °F (36.5 °C) (Oral)   Resp 19   Ht 5' 10" (1.778 m)   Wt 70.3 kg (155 lb)   SpO2 99%   BMI 22.24 kg/m²     Gen: alert, responsive  HEENT:  Eyes - no pallor  External ears with no lesions  Nares patent  Mouth - lips chapped  CV: RRR   Lungs: CTA B/L, on RA  Abd: +BS, soft, +suprapubic TTP, ND  Ext: no atrophy or edema  Skin: warm, dry  Neuro: A&Ox4, responds appropriately, follows commands  Psych: pleasant     LABS AND IMAGING:     Labs Reviewed   CBC W/ AUTO DIFFERENTIAL - Abnormal; Notable for the following components:       Result Value    WBC 18.09 (*)     Immature " Granulocytes 0.6 (*)     Gran # (ANC) 13.6 (*)     Immature Grans (Abs) 0.10 (*)     Mono # 1.2 (*)     Gran % 74.8 (*)     Lymph % 17.4 (*)     All other components within normal limits   COMPREHENSIVE METABOLIC PANEL - Abnormal; Notable for the following components:    Sodium 134 (*)     Potassium 2.9 (*)     BUN 29 (*)     Creatinine 2.0 (*)     Calcium 7.7 (*)     Total Protein 5.7 (*)     Albumin 2.9 (*)     eGFR 33.7 (*)     All other components within normal limits    Narrative:      k critical result(s) repeated. Called and verbal readback obtained   from yang garces rn er  by [facility] 12/31/2022 02:53  Recoll. [id number] by [facility] at 12/31/2022 01:46, reason: hemolyzed   DRUG SCREEN PANEL, URINE EMERGENCY - Abnormal; Notable for the following components:    Benzodiazepines Presumptive Positive (*)     Opiate Scrn, Ur Presumptive Positive (*)     All other components within normal limits    Narrative:     Specimen Source->Urine   URINALYSIS, REFLEX TO URINE CULTURE - Abnormal; Notable for the following components:    Appearance, UA Hazy (*)     Protein, UA 1+ (*)     Occult Blood UA 3+ (*)     All other components within normal limits    Narrative:     Specimen Source->Urine   LACTIC ACID, PLASMA - Abnormal; Notable for the following components:    Lactate (Lactic Acid) 2.0 (*)     All other components within normal limits    Narrative:      lactic acid critical result(s) repeated. Called and verbal readback   obtained from yang garces rn er  by [facility] 12/31/2022 02:02   URINALYSIS MICROSCOPIC - Abnormal; Notable for the following components:    RBC, UA >100 (*)     WBC, UA 11 (*)     Hyaline Casts, UA 33 (*)     All other components within normal limits    Narrative:     Specimen Source->Urine   POCT GLUCOSE - Abnormal; Notable for the following components:    POC Glucose 123 (*)     All other components within normal limits   ISTAT CREATININE - Abnormal; Notable for the following components:    POC Creatinine 2.3 (*)      All other components within normal limits   CULTURE, BLOOD   CULTURE, BLOOD   CULTURE, URINE   MRSA SCREEN BY PCR   RESPIRATORY VIRAL PANEL PCR, PEDS UNDER 7 MTHS   MAGNESIUM    Narrative:     Recoll. [id number] by [facility] at 12/31/2022 01:46, reason: hemolyzed   PROTIME-INR   APTT   B-TYPE NATRIURETIC PEPTIDE   TSH    Narrative:     Recoll. [id number] by [facility] at 12/31/2022 01:46, reason: hemolyzed   MAGNESIUM   PROCALCITONIN   SEDIMENTATION RATE   C-REACTIVE PROTEIN   INFLUENZA A AND B ANTIGEN   SARS-COV-2 RNA AMPLIFICATION, QUAL   LACTIC ACID, PLASMA   POCT GLUCOSE MONITORING CONTINUOUS   POCT CREATININE       Imaging Results              CT Abdomen Pelvis  Without Contrast (Final result)  Result time 12/31/22 02:35:33      Final result by Brandt Carl DO (12/31/22 02:35:33)                   Narrative:    EXAM DESCRIPTION:  CT ABDOMEN PELVIS WITHOUT CONTRAST  RadLex: CT ABDOMEN PELVIS WITHOUT IV CONTRAST    CLINICAL HISTORY:  Abdominal abscess/infection suspected.    TECHNIQUE:  CT of the abdomen and pelvis without contrast.  All CT scans at this facility use dose modulation, iterative reconstruction, and/or weight based dosing when appropriate to reduce radiation dose to as low as reasonably achievable.    COMPARISON: None.    FINDINGS:    The visualized lower thoracic structures demonstrate coronary artery calcifications.    Unenhanced images of the liver, spleen, pancreas and adrenal glands appear grossly unremarkable. The gallbladder appears slightly distended. There are multiple bilateral renal calculi measuring up to 1 cm on the right. There is no hydronephrosis bilaterally. No retroperitoneal or mesenteric lymphadenopathy is identified. No abdominal aortic aneurysm is seen. No bowel obstruction is seen. There is no free intraperitoneal air. The appendix appears unremarkable. There is colonic diverticulosis without definite CT evidence for acute diverticulitis. There is mild fecal loading throughout the  colon. No free fluid is identified. Urinary bladder is decompressed. Small prostatic calcifications are present. Prostate gland measures approximately 5.8 cm TR by 3.5 cm AP by 4 cm CC. There are small bilateral inguinal hernias containing fat. There are postsurgical changes at the proximal left femur. No acute fracture is seen.    IMPRESSION:  1.  Slight gallbladder distention.  2.  Colonic diverticulosis without definite CT evidence for acute diverticulitis.  3.  Nonobstructive bilateral renal calculi.    Electronically signed by:  Brandt Carl DO  12/31/2022 2:35 AM [facility] Workstation: [id number]                                     X-Ray Chest AP Portable (In process)                      CT Cervical Spine Without Contrast (Final result)  Result time 12/31/22 00:52:56      Final result by Sharon Brownlee MD (12/31/22 00:52:56)                   Narrative:    EXAM DESCRIPTION:  CT CERVICAL SPINE WITHOUT IV CONTRAST    CLINICAL HISTORY:  Neck trauma (Age >= 65y). Patient fell after having a dizziness episode. Patient hit the side of his head on a marble table tonight. Patient was seen 2 weeks ago for a fall secondary to dizziness. Patient was diagnosed with a head bleed.    TECHNIQUE:  Multiple axial images were obtained through the cervical spine without intravenous contrast. Coronal and sagittal images were reconstructed.  All CT scans at this facility use dose modulation, iterative reconstruction, and/or weight based dosing when appropriate to reduce radiation dose to as low as reasonably achievable.    COMPARISON: Previous cervical spine CT dated 11/8/2022.    FINDINGS:  There is straightening of the normal cervical lordosis, possibly positional or secondary to muscle spasm, and in retrospect unchanged. The vertebral heights are preserved. Scattered disc space narrowing, greatest at C5-C6, grossly unchanged. There is no acute fracture or subluxation.  No epidural hematoma is visible. The prevertebral soft tissues  are grossly unremarkable. The visualized lung apices are clear. Vascular calcifications.  The visualized paranasal sinuses are clear. The mastoid air cells are clear.  Degenerative changes along the cervical spine. Secondary to broad-based posterior disc osteophyte complex, there is mild to moderate central canal stenosis at C5-C6 6. Multilevel neural foraminal narrowing secondary to hypertrophic changes, with moderate left neuroforaminal narrowing at C3-C4 and C4-C5 and moderate to severe on the right at C5-C6, and mild at other levels.    IMPRESSION:  1.  No evidence of acute fracture or subluxation of the cervical spine.  2.  Multilevel degenerative changes of the cervical spine, as above.    Electronically signed by:  Sharon Brownlee MD  12/31/2022 12:52 AM [facility] Workstation: [id number]                                     CT Head Without Contrast (Final result)  Result time 12/31/22 00:45:15      Final result by Sharon Brownlee MD (12/31/22 00:45:15)                   Narrative:    EXAM DESCRIPTION:  CT HEAD WITHOUT IV CONTRAST    CLINICAL HISTORY:  Head trauma, minor (Age >= 65y); head bleed 2 weeks ago and back on eliquis. Patient fell after having a dizziness episode. Patient hit the side of his head on a marble table tonight. Patient was seen 2 weeks ago for a fall secondary to dizziness. Patient was diagnosed with a head bleed.    TECHNIQUE:  Multiple axial images were obtained through the brain without intravenous contrast. Coronal and sagittal images were reconstructed.  All CT scans at this facility use dose modulation, iterative reconstruction, and/or weight based dosing when appropriate to reduce radiation dose to as low as reasonably achievable.    COMPARISON: Previous head CT images dated 12/19/2022. The prior CT report is not available at this time. Reference is also made to previous head CT and report dated 11/18/2022.    FINDINGS:    There is no evidence of acute intracranial hemorrhage or acute  major territorial infarction.  The ventricles, sulci and cisterns are grossly unchanged in size. There is no midline shift. There is a minimal chronic subdural hematoma or hygroma overlying the right frontal convexity measuring approximately 4 mm in thickness on the coronal images, grossly unchanged in extent from the previous CT, and improved from the prior dated 11/18/2022.  Subcortical and periventricular white matter hypodensities are seen, compatible with chronic microangiopathic disease, grossly unchanged. Vascular calcifications.    Mild mucosal thickening as well as an air-fluid level within the left maxillary sinus.  The mastoid air cells are clear bilaterally.  The calvarium appears grossly unremarkable.  Slight scarring along the left parietal scalp.    IMPRESSION:  1.  No acute intracranial abnormality.  2.  Minimal chronic subdural hematoma or hygroma overlying the right frontal convexity, grossly unchanged in extent from the previous CT, and improved from the prior dated 11/18/2022.  3.  White matter changes of chronic microangiopathic disease, grossly unchanged.  4.  Mild acute sinusitis involving the left maxillary sinus.    Electronically signed by:  Sharon Brownlee MD  12/31/2022 12:45 AM CST Workstation: [id number]                                    ASSESSMENT & PLAN:   Hiro Rodríguez is a 77 y.o. male admitted for    Active Hospital Problems    Diagnosis  POA    *Severe sepsis [A41.9, R65.20]  Yes    Subdural hematoma caused by concussion [S06.5XAA]  Yes    Chronic heart failure with preserved ejection fraction [I50.32]  Yes    Benign essential tremor [G25.0]  Yes    Type 2 diabetes mellitus with diabetic nephropathy, without long-term current use of insulin [E11.21]  Yes    Anticoagulated [Z79.01]  Not Applicable    Atrial fibrillation [I48.91]  Yes    Aspirin long-term use [Z79.82]  Not Applicable    Hypertension, essential [I10]  Yes      Resolved Hospital Problems   No resolved problems  to display.        Plan    Fall with head trauma  Hypotension  Concern for severe sepsis  Concern for polypharmacy  Complicated by chronic subdural hematoma  Suspected dehydration / volume depletion   KAM on CKD, Hypokalemia  - CT head as noted above  - continue IVFs  - monitor in progressive care  - infection workup in progress, Bcx  - empiric vanc, zosyn  - holding eliquis for now, trending CBC  - neuro checks  - potassium replacement    DM2  - SSI    Chronic conditions as noted above/below; home medications reviewed personally by me and restarted as appropriate  Electrolyte derangement:  Trending BMP; Mg; replacement prn  DVT ppx: SCDs    Nora Bates MD  [facility] Hospitalist  12/31/2022

## 2022-12-31 NOTE — SUBJECTIVE & OBJECTIVE
Interval History:       Review of Systems  As per subjectives  Objective:     Vital Signs (Most Recent):  Temp: 97.1 °F (36.2 °C) (12/31/22 1215)  Pulse: 74 (12/31/22 0822)  Resp: 11 (12/31/22 0822)  BP: (!) 93/52 (12/31/22 0624)  SpO2: 99 % (12/31/22 0822)   Vital Signs (24h Range):  Temp:  [97.1 °F (36.2 °C)-97.9 °F (36.6 °C)] 97.1 °F (36.2 °C)  Pulse:  [72-87] 74  Resp:  [11-25] 11  SpO2:  [96 %-99 %] 99 %  BP: ()/(30-85) 93/52     Weight: 107.8 kg (237 lb 10.5 oz)  Body mass index is 34.1 kg/m².    Intake/Output Summary (Last 24 hours) at 12/31/2022 1257  Last data filed at 12/31/2022 0458  Gross per 24 hour   Intake 2098 ml   Output 1400 ml   Net 698 ml      Physical Exam  Constitutional:       General: He is not in acute distress.     Appearance: He is well-developed. He is obese.   HENT:      Head: Normocephalic.      Nose: Nose normal.   Eyes:      Conjunctiva/sclera: Conjunctivae normal.   Cardiovascular:      Rate and Rhythm: Normal rate and regular rhythm.   Pulmonary:      Effort: Pulmonary effort is normal. No respiratory distress.   Abdominal:      General: Abdomen is flat. There is no distension.      Tenderness: There is no abdominal tenderness.   Musculoskeletal:      Cervical back: Neck supple.   Skin:     Findings: No rash.   Neurological:      Mental Status: He is disoriented.       Significant Labs: All pertinent labs within the past 24 hours have been reviewed.  CBC:   Recent Labs   Lab 12/31/22  0102   WBC 18.09*   HGB 15.2   HCT 46.4        CMP:   Recent Labs   Lab 12/31/22  0232 12/31/22  0910   * 134*   K 2.9* 2.8*   CL 96 98   CO2 25 29    117*   BUN 29* 30*   CREATININE 2.0* 1.7*   CALCIUM 7.7* 7.3*   PROT 5.7* 5.0*   ALBUMIN 2.9* 2.6*   BILITOT 0.9 1.0   ALKPHOS 92 83   AST 17 14   ALT 12 12   ANIONGAP 13 7*     Cardiac Markers:   Recent Labs   Lab 12/31/22  0102   BNP 52       Significant Imaging: I have reviewed all pertinent imaging results/findings within  the past 24 hours.   No

## 2022-12-31 NOTE — NURSING
Earlier in shift Dr. Shepherd notified due to critical K, orders received to give 20meq K IV once.  See MAR.  Notified Dr. Shepherd of pt's lethargic state and hypotension with best MAP of 55.  Orders received to give 500ml bolus of NS. See MAR. Will continue to monitor.

## 2023-01-01 LAB
ANION GAP SERPL CALC-SCNC: 10 MMOL/L (ref 8–16)
BACTERIA UR CULT: NORMAL
BACTERIA UR CULT: NORMAL
BASOPHILS # BLD AUTO: 0.02 K/UL (ref 0–0.2)
BASOPHILS NFR BLD: 0.1 % (ref 0–1.9)
BUN SERPL-MCNC: 24 MG/DL (ref 8–23)
CALCIUM SERPL-MCNC: 7.9 MG/DL (ref 8.7–10.5)
CHLORIDE SERPL-SCNC: 99 MMOL/L (ref 95–110)
CO2 SERPL-SCNC: 26 MMOL/L (ref 23–29)
CREAT SERPL-MCNC: 1.2 MG/DL (ref 0.5–1.4)
DIFFERENTIAL METHOD: ABNORMAL
EOSINOPHIL # BLD AUTO: 0.1 K/UL (ref 0–0.5)
EOSINOPHIL NFR BLD: 0.4 % (ref 0–8)
ERYTHROCYTE [DISTWIDTH] IN BLOOD BY AUTOMATED COUNT: 13.9 % (ref 11.5–14.5)
EST. GFR  (NO RACE VARIABLE): >60 ML/MIN/1.73 M^2
ESTIMATED AVG GLUCOSE: 120 MG/DL (ref 68–131)
GLUCOSE SERPL-MCNC: 126 MG/DL (ref 70–110)
GLUCOSE SERPL-MCNC: 134 MG/DL (ref 70–110)
HBA1C MFR BLD: 5.8 % (ref 4.5–6.2)
HCT VFR BLD AUTO: 40.9 % (ref 40–54)
HGB BLD-MCNC: 13.4 G/DL (ref 14–18)
IMM GRANULOCYTES # BLD AUTO: 0.07 K/UL (ref 0–0.04)
IMM GRANULOCYTES NFR BLD AUTO: 0.5 % (ref 0–0.5)
LYMPHOCYTES # BLD AUTO: 1.8 K/UL (ref 1–4.8)
LYMPHOCYTES NFR BLD: 13.5 % (ref 18–48)
MAGNESIUM SERPL-MCNC: 1.8 MG/DL (ref 1.6–2.6)
MCH RBC QN AUTO: 30.7 PG (ref 27–31)
MCHC RBC AUTO-ENTMCNC: 32.8 G/DL (ref 32–36)
MCV RBC AUTO: 94 FL (ref 82–98)
MONOCYTES # BLD AUTO: 0.9 K/UL (ref 0.3–1)
MONOCYTES NFR BLD: 6.5 % (ref 4–15)
NEUTROPHILS # BLD AUTO: 10.7 K/UL (ref 1.8–7.7)
NEUTROPHILS NFR BLD: 79 % (ref 38–73)
NRBC BLD-RTO: 0 /100 WBC
PLATELET # BLD AUTO: 318 K/UL (ref 150–450)
PMV BLD AUTO: 9.7 FL (ref 9.2–12.9)
POTASSIUM SERPL-SCNC: 4 MMOL/L (ref 3.5–5.1)
RBC # BLD AUTO: 4.37 M/UL (ref 4.6–6.2)
SODIUM SERPL-SCNC: 135 MMOL/L (ref 136–145)
WBC # BLD AUTO: 13.57 K/UL (ref 3.9–12.7)

## 2023-01-01 PROCEDURE — 25000003 PHARM REV CODE 250: Performed by: INTERNAL MEDICINE

## 2023-01-01 PROCEDURE — 63600175 PHARM REV CODE 636 W HCPCS: Performed by: FAMILY MEDICINE

## 2023-01-01 PROCEDURE — 85025 COMPLETE CBC W/AUTO DIFF WBC: CPT | Performed by: FAMILY MEDICINE

## 2023-01-01 PROCEDURE — 80048 BASIC METABOLIC PNL TOTAL CA: CPT | Performed by: FAMILY MEDICINE

## 2023-01-01 PROCEDURE — 25000003 PHARM REV CODE 250: Performed by: FAMILY MEDICINE

## 2023-01-01 PROCEDURE — 25000003 PHARM REV CODE 250

## 2023-01-01 PROCEDURE — 63600175 PHARM REV CODE 636 W HCPCS: Performed by: INTERNAL MEDICINE

## 2023-01-01 PROCEDURE — 36415 COLL VENOUS BLD VENIPUNCTURE: CPT | Performed by: FAMILY MEDICINE

## 2023-01-01 PROCEDURE — 20000000 HC ICU ROOM

## 2023-01-01 PROCEDURE — 12000002 HC ACUTE/MED SURGE SEMI-PRIVATE ROOM

## 2023-01-01 PROCEDURE — 83735 ASSAY OF MAGNESIUM: CPT | Performed by: FAMILY MEDICINE

## 2023-01-01 RX ORDER — MORPHINE SULFATE 2 MG/ML
2 INJECTION, SOLUTION INTRAMUSCULAR; INTRAVENOUS EVERY 6 HOURS PRN
Status: DISCONTINUED | OUTPATIENT
Start: 2023-01-01 | End: 2023-01-04

## 2023-01-01 RX ORDER — METOCLOPRAMIDE HYDROCHLORIDE 5 MG/ML
5 INJECTION INTRAMUSCULAR; INTRAVENOUS EVERY 6 HOURS PRN
Status: DISCONTINUED | OUTPATIENT
Start: 2023-01-01 | End: 2023-01-09 | Stop reason: HOSPADM

## 2023-01-01 RX ORDER — PROPRANOLOL HYDROCHLORIDE 20 MG/1
20 TABLET ORAL 2 TIMES DAILY
Status: DISCONTINUED | OUTPATIENT
Start: 2023-01-01 | End: 2023-01-05

## 2023-01-01 RX ORDER — HYDROCODONE BITARTRATE AND ACETAMINOPHEN 5; 325 MG/1; MG/1
1 TABLET ORAL EVERY 4 HOURS PRN
Status: DISCONTINUED | OUTPATIENT
Start: 2023-01-01 | End: 2023-01-07

## 2023-01-01 RX ADMIN — PIPERACILLIN SODIUM AND TAZOBACTAM SODIUM 3.38 G: 3; .375 INJECTION, POWDER, LYOPHILIZED, FOR SOLUTION INTRAVENOUS at 08:01

## 2023-01-01 RX ADMIN — CHLORHEXIDINE GLUCONATE 15 ML: 1.2 RINSE ORAL at 08:01

## 2023-01-01 RX ADMIN — HYDROCODONE BITARTRATE AND ACETAMINOPHEN 1 TABLET: 5; 325 TABLET ORAL at 08:01

## 2023-01-01 RX ADMIN — PROMETHAZINE HYDROCHLORIDE 6.25 MG: 25 INJECTION INTRAMUSCULAR; INTRAVENOUS at 10:01

## 2023-01-01 RX ADMIN — MORPHINE SULFATE 2 MG: 2 INJECTION, SOLUTION INTRAMUSCULAR; INTRAVENOUS at 06:01

## 2023-01-01 RX ADMIN — PROMETHAZINE HYDROCHLORIDE 6.25 MG: 25 INJECTION INTRAMUSCULAR; INTRAVENOUS at 03:01

## 2023-01-01 RX ADMIN — DULOXETINE 30 MG: 30 CAPSULE, DELAYED RELEASE ORAL at 08:01

## 2023-01-01 RX ADMIN — OXYBUTYNIN CHLORIDE 10 MG: 5 TABLET, EXTENDED RELEASE ORAL at 08:01

## 2023-01-01 RX ADMIN — ONDANSETRON 4 MG: 2 INJECTION INTRAMUSCULAR; INTRAVENOUS at 11:01

## 2023-01-01 RX ADMIN — AMIODARONE HYDROCHLORIDE 100 MG: 100 TABLET ORAL at 07:01

## 2023-01-01 RX ADMIN — METOCLOPRAMIDE 5 MG: 5 INJECTION, SOLUTION INTRAMUSCULAR; INTRAVENOUS at 06:01

## 2023-01-01 RX ADMIN — ACETAMINOPHEN 1000 MG: 10 INJECTION INTRAVENOUS at 12:01

## 2023-01-01 RX ADMIN — PIPERACILLIN SODIUM AND TAZOBACTAM SODIUM 3.38 G: 3; .375 INJECTION, POWDER, LYOPHILIZED, FOR SOLUTION INTRAVENOUS at 04:01

## 2023-01-01 RX ADMIN — PIPERACILLIN SODIUM AND TAZOBACTAM SODIUM 3.38 G: 3; .375 INJECTION, POWDER, LYOPHILIZED, FOR SOLUTION INTRAVENOUS at 11:01

## 2023-01-01 RX ADMIN — MUPIROCIN 1 G: 20 OINTMENT TOPICAL at 08:01

## 2023-01-01 RX ADMIN — VANCOMYCIN HYDROCHLORIDE 1750 MG: 1 INJECTION, POWDER, LYOPHILIZED, FOR SOLUTION INTRAVENOUS at 07:01

## 2023-01-01 NOTE — PLAN OF CARE
Pt is currently off levophed and is hypertensive.  Pt is having frequent hard bowel movements after being disimpacted last night   Pt is currently refusing food due to abd discomfort from bowels

## 2023-01-01 NOTE — NURSING
Bladder scanned pt due to no urination on my shift with highest reading of 527ml, per Dr. Shepherd not to straight cath pt until readings of 600ml.  Will continue to monitor.

## 2023-01-01 NOTE — PLAN OF CARE
[facility]  Initial Discharge Assessment       Primary Care Provider: Gautam Castanon III, MD    Admission Diagnosis: Sepsis, due to unspecified organism, unspecified whether acute organ dysfunction present [A41.9]    Admission Date: 12/30/2022  Expected Discharge Date:     Discharge Barriers Identified: None    Payor: [facility] MCARE / Plan: [employer] MEDICARE ADVANTAGE / Product Type: Medicare Advantage /     Extended Emergency Contact Information  Primary Emergency Contact: Galina Rodríguez  Mobile Phone: [phone]  Relation: Relative  Preferred language: English   needed? No    Discharge Plan A: Home Health, Home with family  Discharge Plan B: Home Health, Home with family      [id number] - [place], [place] - [address]  [address]  [facility] [address]  Phone: [phone] Fax: [phone]    [facility] - [place] [id number] [facility]  [address] [address]  Phone: [phone] Fax: [phone]    [facility]  [address]  [facility] [address]  Phone: [phone] Fax: [phone]      Initial Assessment (most recent)       Adult Discharge Assessment - 01/01/23 1203          Discharge Assessment    Assessment Type Discharge Planning Assessment     Confirmed/corrected address, phone number and insurance Yes     Confirmed Demographics Correct on Facesheet     Source of Information family;health record     Reason For Admission Severe sepsis     People in Home spouse;other relative(s)     Facility Arrived From: Home     Do you expect to return to your current living situation? Yes     Do you have help at home or someone to help you manage your care at home? Yes     Prior to hospitilization cognitive status: Unable to Assess     Current cognitive status: Unable to Assess     Walking or Climbing Stairs ambulation difficulty, requires equipment      Mobility Management Rolling Walker, Wheelchair     Dressing/Bathing bathing difficulty, requires equipment     Dressing/Bathing Management bedside commode     Equipment Currently Used at Home bedside commode;walker, rolling;wheelchair;grab bar     Readmission within 30 days? No     Patient currently being followed by outpatient case management? No     Do you currently have service(s) that help you manage your care at home? Yes     Name and Contact number of agency [facility]     Is the pt/caregiver preference to resume services with current agency Yes     Do you take prescription medications? Yes     Do you have prescription coverage? Yes     How do you get to doctors appointments? family or friend will provide     Are you on dialysis? No     Do you take coumadin? No     Discharge Plan A Home Health;Home with family     Discharge Plan B Home Health;Home with family     DME Needed Upon Discharge  none     Discharge Barriers Identified None        Physical Activity    On average, how many days per week do you engage in moderate to strenuous exercise (like a brisk walk)? Patient refused     On average, how many minutes do you engage in exercise at this level? Patient refused        Financial Resource Strain    How hard is it for you to pay for the very basics like food, housing, medical care, and heating? Patient refused        Housing Stability    In the last 12 months, was there a time when you were not able to pay the mortgage or rent on time? Patient refused     In the last 12 months, was there a time when you did not have a steady place to sleep or slept in a shelter (including now)? Patient refused        Transportation Needs    In the past 12 months, has lack of transportation kept you from medical appointments or from getting medications? Patient refused     In the past 12 months, has lack of transportation kept you from meetings, work, or from getting things needed for daily living? Patient refused         Food Insecurity    Within the past 12 months, you worried that your food would run out before you got the money to buy more. Patient refused     Within the past 12 months, the food you bought just didn't last and you didn't have money to get more. Patient refused        Stress    Do you feel stress - tense, restless, nervous, or anxious, or unable to sleep at night because your mind is troubled all the time - these days? Patient refused        Social Connections    In a typical week, how many times do you talk on the phone with family, friends, or neighbors? Patient refused     How often do you get together with friends or relatives? Patient refused     How often do you attend Spiritism or Islam services? Patient refused     Do you belong to any clubs or organizations such as Spiritism groups, unions, fraternal or athletic groups, or school groups? Patient refused     How often do you attend meetings of the clubs or organizations you belong to? Patient refused     Are you , , , , never , or living with a partner? Patient refused        Alcohol Use    Q1: How often do you have a drink containing alcohol? Patient refused     Q2: How many drinks containing alcohol do you have on a typical day when you are drinking? Patient refused     Q3: How often do you have six or more drinks on one occasion? Patient refused

## 2023-01-02 LAB
ANION GAP SERPL CALC-SCNC: 12 MMOL/L (ref 8–16)
BACTERIA UR CULT: NO GROWTH
BASOPHILS # BLD AUTO: 0.01 K/UL (ref 0–0.2)
BASOPHILS NFR BLD: 0.1 % (ref 0–1.9)
BUN SERPL-MCNC: 13 MG/DL (ref 8–23)
CALCIUM SERPL-MCNC: 8.2 MG/DL (ref 8.7–10.5)
CHLORIDE SERPL-SCNC: 97 MMOL/L (ref 95–110)
CO2 SERPL-SCNC: 25 MMOL/L (ref 23–29)
CREAT SERPL-MCNC: 0.8 MG/DL (ref 0.5–1.4)
DIFFERENTIAL METHOD: ABNORMAL
EOSINOPHIL # BLD AUTO: 0 K/UL (ref 0–0.5)
EOSINOPHIL NFR BLD: 0 % (ref 0–8)
ERYTHROCYTE [DISTWIDTH] IN BLOOD BY AUTOMATED COUNT: 13.6 % (ref 11.5–14.5)
EST. GFR  (NO RACE VARIABLE): >60 ML/MIN/1.73 M^2
GLUCOSE SERPL-MCNC: 101 MG/DL (ref 70–110)
GLUCOSE SERPL-MCNC: 107 MG/DL (ref 70–110)
GLUCOSE SERPL-MCNC: 113 MG/DL (ref 70–110)
HCT VFR BLD AUTO: 38.8 % (ref 40–54)
HGB BLD-MCNC: 13 G/DL (ref 14–18)
IMM GRANULOCYTES # BLD AUTO: 0.08 K/UL (ref 0–0.04)
IMM GRANULOCYTES NFR BLD AUTO: 0.6 % (ref 0–0.5)
LYMPHOCYTES # BLD AUTO: 2.4 K/UL (ref 1–4.8)
LYMPHOCYTES NFR BLD: 17 % (ref 18–48)
MAGNESIUM SERPL-MCNC: 1.7 MG/DL (ref 1.6–2.6)
MCH RBC QN AUTO: 30.7 PG (ref 27–31)
MCHC RBC AUTO-ENTMCNC: 33.5 G/DL (ref 32–36)
MCV RBC AUTO: 92 FL (ref 82–98)
MONOCYTES # BLD AUTO: 1.3 K/UL (ref 0.3–1)
MONOCYTES NFR BLD: 9 % (ref 4–15)
NEUTROPHILS # BLD AUTO: 10.4 K/UL (ref 1.8–7.7)
NEUTROPHILS NFR BLD: 73.3 % (ref 38–73)
NRBC BLD-RTO: 0 /100 WBC
PLATELET # BLD AUTO: 346 K/UL (ref 150–450)
PMV BLD AUTO: 9.9 FL (ref 9.2–12.9)
POTASSIUM SERPL-SCNC: 2.7 MMOL/L (ref 3.5–5.1)
RBC # BLD AUTO: 4.23 M/UL (ref 4.6–6.2)
SODIUM SERPL-SCNC: 134 MMOL/L (ref 136–145)
WBC # BLD AUTO: 14.18 K/UL (ref 3.9–12.7)

## 2023-01-02 PROCEDURE — 36415 COLL VENOUS BLD VENIPUNCTURE: CPT | Performed by: FAMILY MEDICINE

## 2023-01-02 PROCEDURE — 25000003 PHARM REV CODE 250

## 2023-01-02 PROCEDURE — 63600175 PHARM REV CODE 636 W HCPCS: Performed by: FAMILY MEDICINE

## 2023-01-02 PROCEDURE — 12000002 HC ACUTE/MED SURGE SEMI-PRIVATE ROOM

## 2023-01-02 PROCEDURE — 83735 ASSAY OF MAGNESIUM: CPT | Performed by: FAMILY MEDICINE

## 2023-01-02 PROCEDURE — 25000003 PHARM REV CODE 250: Performed by: FAMILY MEDICINE

## 2023-01-02 PROCEDURE — 85025 COMPLETE CBC W/AUTO DIFF WBC: CPT | Performed by: FAMILY MEDICINE

## 2023-01-02 PROCEDURE — 25000003 PHARM REV CODE 250: Performed by: INTERNAL MEDICINE

## 2023-01-02 PROCEDURE — 63600175 PHARM REV CODE 636 W HCPCS: Performed by: INTERNAL MEDICINE

## 2023-01-02 PROCEDURE — 80048 BASIC METABOLIC PNL TOTAL CA: CPT | Performed by: FAMILY MEDICINE

## 2023-01-02 PROCEDURE — 94761 N-INVAS EAR/PLS OXIMETRY MLT: CPT

## 2023-01-02 RX ORDER — POTASSIUM CHLORIDE 7.45 MG/ML
10 INJECTION INTRAVENOUS
Status: COMPLETED | OUTPATIENT
Start: 2023-01-02 | End: 2023-01-02

## 2023-01-02 RX ADMIN — CHLORHEXIDINE GLUCONATE 15 ML: 1.2 RINSE ORAL at 08:01

## 2023-01-02 RX ADMIN — MUPIROCIN 1 G: 20 OINTMENT TOPICAL at 08:01

## 2023-01-02 RX ADMIN — POTASSIUM CHLORIDE 10 MEQ: 7.46 INJECTION, SOLUTION INTRAVENOUS at 08:01

## 2023-01-02 RX ADMIN — POTASSIUM CHLORIDE 80 MEQ: 7.46 INJECTION, SOLUTION INTRAVENOUS at 04:01

## 2023-01-02 RX ADMIN — PIPERACILLIN SODIUM AND TAZOBACTAM SODIUM 3.38 G: 3; .375 INJECTION, POWDER, LYOPHILIZED, FOR SOLUTION INTRAVENOUS at 04:01

## 2023-01-02 RX ADMIN — PROPRANOLOL HYDROCHLORIDE 20 MG: 10 TABLET ORAL at 08:01

## 2023-01-02 RX ADMIN — PIPERACILLIN SODIUM AND TAZOBACTAM SODIUM 3.38 G: 3; .375 INJECTION, POWDER, LYOPHILIZED, FOR SOLUTION INTRAVENOUS at 08:01

## 2023-01-02 RX ADMIN — MORPHINE SULFATE 2 MG: 2 INJECTION, SOLUTION INTRAMUSCULAR; INTRAVENOUS at 01:01

## 2023-01-02 RX ADMIN — HYDROCODONE BITARTRATE AND ACETAMINOPHEN 1 TABLET: 5; 325 TABLET ORAL at 08:01

## 2023-01-02 RX ADMIN — POTASSIUM CHLORIDE 10 MEQ: 7.46 INJECTION, SOLUTION INTRAVENOUS at 09:01

## 2023-01-02 RX ADMIN — HYDROCODONE BITARTRATE AND ACETAMINOPHEN 1 TABLET: 5; 325 TABLET ORAL at 01:01

## 2023-01-02 RX ADMIN — ATORVASTATIN CALCIUM 80 MG: 40 TABLET, FILM COATED ORAL at 08:01

## 2023-01-02 RX ADMIN — DULOXETINE 30 MG: 30 CAPSULE, DELAYED RELEASE ORAL at 08:01

## 2023-01-02 RX ADMIN — OXYBUTYNIN CHLORIDE 10 MG: 5 TABLET, EXTENDED RELEASE ORAL at 08:01

## 2023-01-02 RX ADMIN — AMIODARONE HYDROCHLORIDE 100 MG: 100 TABLET ORAL at 07:01

## 2023-01-02 RX ADMIN — APIXABAN 5 MG: 5 TABLET, FILM COATED ORAL at 08:01

## 2023-01-02 RX ADMIN — MORPHINE SULFATE 2 MG: 2 INJECTION, SOLUTION INTRAMUSCULAR; INTRAVENOUS at 07:01

## 2023-01-02 RX ADMIN — PIPERACILLIN SODIUM AND TAZOBACTAM SODIUM 3.38 G: 3; .375 INJECTION, POWDER, LYOPHILIZED, FOR SOLUTION INTRAVENOUS at 12:01

## 2023-01-02 NOTE — PROGRESS NOTES
[facility] Medicine  Progress Note    Patient Name: Hiro Rodríguez  MRN: [id number]  Patient Class: IP- Inpatient   Admission Date: 12/30/2022  Length of Stay: 2 days  Attending Physician: Vipin Shepherd MD  Primary Care Provider: Gautam Castanon III, MD        Subjective:     Principal Problem:Severe sepsis        HPI:  No notes on file    Overview/Hospital Course:    Patient was seen and examined along with the nurse  Tried to talk with the patient and he said let me wake up first   Try to open the eye son look at the pupil but he intentionally close the eyes despite asked to open  Denied having pain anywhere in the body  Pressure is low again and fibrous cc bolus given  Urine toxicology findings noted.  He did not answer the question whether he took extra pills are not    1/1/23  Patient is awake and alert and normal and talking normal   Mild abdominal pain . BP stable . But over day nausea and vomiting . Abdominal pain .  HIDA ordered since gall bladder distended     1/1/23  Hospital course  Patient was admitted with altered mental status and possible sepsis.  CT scan of the abdomen was done because of abdominal pain and no acute finding but gallbladder distention and HIDA scan was done and negative  Today patient wake up and acting normal and had normal conversation.  He was not doing well with repeated falls after he had subdural hematoma the last time.  Said he will go home if I can arrange the hospital bed .  But later he changed her mind and he wants to go to rehab or skilled facility  K very low and supplementing       Interval History:     Review of Systems  Objective:     Vital Signs (Most Recent):  Temp: 98.1 °F (36.7 °C) (01/02/23 1501)  Pulse: (!) 175 (01/02/23 1606)  Resp: (!) 27 (01/02/23 1606)  BP: (!) 165/96 (01/02/23 1606)  SpO2: (!) 94 % (01/02/23 0901)   Vital Signs (24h Range):  Temp:  [97.6 °F (36.4 °C)-98.7 °F (37.1 °C)] 98.1 °F (36.7 °C)  Pulse:  []  175  Resp:  [13-48] 27  SpO2:  [79 %-100 %] 94 %  BP: (130-174)/() 165/96     Weight: 107.7 kg (237 lb 7 oz)  Body mass index is 34.07 kg/m².    Intake/Output Summary (Last 24 hours) at 1/2/2023 1700  Last data filed at 1/2/2023 1301  Gross per 24 hour   Intake 780 ml   Output 200 ml   Net 580 ml      Physical Exam  Constitutional:       General: He is not in acute distress.     Appearance: He is well-developed.   HENT:      Head: Normocephalic.   Eyes:      Pupils: Pupils are equal, round, and reactive to light.   Cardiovascular:      Rate and Rhythm: Normal rate and regular rhythm.   Pulmonary:      Effort: Pulmonary effort is normal. No respiratory distress.   Abdominal:      General: Abdomen is flat. There is no distension.      Tenderness: There is no abdominal tenderness.   Musculoskeletal:         General: Normal range of motion.      Cervical back: Neck supple.   Skin:     Findings: No rash.   Neurological:      Mental Status: He is alert and oriented to person, place, and time.   Psychiatric:         Mood and Affect: Mood normal.       Significant Labs: All pertinent labs within the past 24 hours have been reviewed.  CMP:   Recent Labs   Lab 01/01/23  0251 01/02/23  0338   * 134*   K 4.0 2.7*   CL 99 97   CO2 26 25   * 113*   BUN 24* 13   CREATININE 1.2 0.8   CALCIUM 7.9* 8.2*   ANIONGAP 10 12     Cardiac Markers: No results for input(s): CKMB, MYOGLOBIN, BNP, TROPISTAT in the last 48 hours.  Coagulation: No results for input(s): PT, INR, APTT in the last 48 hours.    Significant Imaging: I have reviewed all pertinent imaging results/findings within the past 24 hours.      Assessment/Plan:      Active Hospital Problems    Diagnosis    *Severe sepsis unsure etiology     Subdural hematoma caused by concussion recently and repeated falls     Chronic heart failure with preserved ejection fraction    Benign essential tremor    Type 2 diabetes mellitus with diabetic nephropathy, without  long-term current use of insulin    Anticoagulated    Atrial fibrillation    Aspirin long-term use    Hypertension, essential  KAM   Hypokalemia           PLAN   Admitted with possible sepsis but all cultures are negative and respiratory PCR panel negative  Deescalate antibiotics  Altered mental status most likely from polypharmacy and currently resolved and possible from chronic subdural hematoma   KAM on CKD resolved   zosyn , DC vanco  Resume home eliquis   PT and placement consulted   Repeat K tomorrow   - potassium replacement  - SSI  Downgrade           VTE Risk Mitigation (From admission, onward)         Ordered     IP VTE HIGH RISK PATIENT  Once         12/31/22 0423     Place sequential compression device  Until discontinued         12/31/22 0423                Discharge Planning   BRENNAN:      Code Status: Full Code   Is the patient medically ready for discharge?:     Reason for patient still in hospital (select all that apply): Treatment  Discharge Plan A: Home Health, Home with family                  Vipin Shepherd MD  Department of Hospital Medicine   [facility]

## 2023-01-02 NOTE — SUBJECTIVE & OBJECTIVE
Interval History:     Review of Systems  As per subjective   Objective:     Vital Signs (Most Recent):  Temp: 98.7 °F (37.1 °C) (01/01/23 1201)  Pulse: 107 (01/01/23 1801)  Resp: (!) 32 (01/01/23 1835)  BP: (!) 162/79 (01/01/23 1801)  SpO2: 97 % (01/01/23 1801)   Vital Signs (24h Range):  Temp:  [97.9 °F (36.6 °C)-98.9 °F (37.2 °C)] 98.7 °F (37.1 °C)  Pulse:  [] 107  Resp:  [17-45] 32  SpO2:  [85 %-100 %] 97 %  BP: (120-198)/() 162/79     Weight: 108.1 kg (238 lb 5.1 oz)  Body mass index is 34.2 kg/m².    Intake/Output Summary (Last 24 hours) at 1/1/2023 1946  Last data filed at 1/1/2023 1738  Gross per 24 hour   Intake 2211.19 ml   Output 1025 ml   Net 1186.19 ml      Physical Exam  Constitutional:       General: He is not in acute distress.     Appearance: He is well-developed.   HENT:      Head: Normocephalic.      Mouth/Throat:      Mouth: Mucous membranes are moist.   Eyes:      Pupils: Pupils are equal, round, and reactive to light.   Cardiovascular:      Rate and Rhythm: Normal rate and regular rhythm.   Pulmonary:      Effort: Pulmonary effort is normal. No respiratory distress.   Abdominal:      General: Abdomen is flat. There is no distension.      Tenderness: There is no abdominal tenderness.   Musculoskeletal:         General: Normal range of motion.      Cervical back: Neck supple.   Skin:     Findings: No rash.   Neurological:      Mental Status: He is alert and oriented to person, place, and time.       Significant Labs: All pertinent labs within the past 24 hours have been reviewed.  CBC:   Recent Labs   Lab 12/31/22  0102 01/01/23  0251   WBC 18.09* 13.57*   HGB 15.2 13.4*   HCT 46.4 40.9    318     CMP:   Recent Labs   Lab 12/31/22  0232 12/31/22  0910 12/31/22  1444 01/01/23  0251   * 134* 133* 135*   K 2.9* 2.8* 3.0* 4.0   CL 96 98 98 99   CO2 25 29 25 26    117* 91 126*   BUN 29* 30* 30* 24*   CREATININE 2.0* 1.7* 1.7* 1.2   CALCIUM 7.7* 7.3* 7.3* 7.9*   PROT 5.7*  5.0* 5.3*  --    ALBUMIN 2.9* 2.6* 2.6*  --    BILITOT 0.9 1.0 0.9  --    ALKPHOS 92 83 79  --    AST 17 14 12  --    ALT 12 12 11  --    ANIONGAP 13 7* 10 10     Cardiac Markers:   Recent Labs   Lab 12/31/22  0102   BNP 52       Significant Imaging: I have reviewed all pertinent imaging results/findings within the past 24 hours.

## 2023-01-02 NOTE — SUBJECTIVE & OBJECTIVE
Interval History:     Review of Systems  Objective:     Vital Signs (Most Recent):  Temp: 98.1 °F (36.7 °C) (01/02/23 1501)  Pulse: (!) 175 (01/02/23 1606)  Resp: (!) 27 (01/02/23 1606)  BP: (!) 165/96 (01/02/23 1606)  SpO2: (!) 94 % (01/02/23 0901)   Vital Signs (24h Range):  Temp:  [97.6 °F (36.4 °C)-98.7 °F (37.1 °C)] 98.1 °F (36.7 °C)  Pulse:  [] 175  Resp:  [13-48] 27  SpO2:  [79 %-100 %] 94 %  BP: (130-174)/() 165/96     Weight: 107.7 kg (237 lb 7 oz)  Body mass index is 34.07 kg/m².    Intake/Output Summary (Last 24 hours) at 1/2/2023 1700  Last data filed at 1/2/2023 1301  Gross per 24 hour   Intake 780 ml   Output 200 ml   Net 580 ml      Physical Exam  Constitutional:       General: He is not in acute distress.     Appearance: He is well-developed.   HENT:      Head: Normocephalic.   Eyes:      Pupils: Pupils are equal, round, and reactive to light.   Cardiovascular:      Rate and Rhythm: Normal rate and regular rhythm.   Pulmonary:      Effort: Pulmonary effort is normal. No respiratory distress.   Abdominal:      General: Abdomen is flat. There is no distension.      Tenderness: There is no abdominal tenderness.   Musculoskeletal:         General: Normal range of motion.      Cervical back: Neck supple.   Skin:     Findings: No rash.   Neurological:      Mental Status: He is alert and oriented to person, place, and time.   Psychiatric:         Mood and Affect: Mood normal.       Significant Labs: All pertinent labs within the past 24 hours have been reviewed.  CMP:   Recent Labs   Lab 01/01/23  0251 01/02/23  0338   * 134*   K 4.0 2.7*   CL 99 97   CO2 26 25   * 113*   BUN 24* 13   CREATININE 1.2 0.8   CALCIUM 7.9* 8.2*   ANIONGAP 10 12     Cardiac Markers: No results for input(s): CKMB, MYOGLOBIN, BNP, TROPISTAT in the last 48 hours.  Coagulation: No results for input(s): PT, INR, APTT in the last 48 hours.    Significant Imaging: I have reviewed all pertinent imaging  results/findings within the past 24 hours.

## 2023-01-02 NOTE — CONSULTS
Imaging has been reviewed for this patient.  HIDA scan was ordered for gallbladder distension.  It has since returned that the gallbladder is visualized with normal transit radiotracer into the small bowel.  This confirms that the patient does not have cholecystitis.    No plans for surgical intervention.  Please re-consult if needed.

## 2023-01-02 NOTE — PROGRESS NOTES
[facility] Medicine  Progress Note    Patient Name: Hiro Rodríguez  MRN: [id number]  Patient Class: IP- Inpatient   Admission Date: 12/30/2022  Length of Stay: 1 days  Attending Physician: Vipin Shepherd MD  Primary Care Provider: Gautam Castanon III, MD        Subjective:     Principal Problem:Severe sepsis        HPI:  No notes on file    Overview/Hospital Course:    Patient was seen and examined along with the nurse  Tried to talk with the patient and he said let me wake up first   Try to open the eye son look at the pupil but he intentionally close the eyes despite asked to open  Denied having pain anywhere in the body  Pressure is low again and fibrous cc bolus given  Urine toxicology findings noted.  He did not answer the question whether he took extra pills are not    1/1/23  Patient is awake and alert and normal and talking normal   Mild abdominal pain . BP stable . But over day nausea and vomiting . Abdominal pain .  HIDA ordered since gall bladder distended           Interval History:     Review of Systems  As per subjective   Objective:     Vital Signs (Most Recent):  Temp: 98.7 °F (37.1 °C) (01/01/23 1201)  Pulse: 107 (01/01/23 1801)  Resp: (!) 32 (01/01/23 1835)  BP: (!) 162/79 (01/01/23 1801)  SpO2: 97 % (01/01/23 1801)   Vital Signs (24h Range):  Temp:  [97.9 °F (36.6 °C)-98.9 °F (37.2 °C)] 98.7 °F (37.1 °C)  Pulse:  [] 107  Resp:  [17-45] 32  SpO2:  [85 %-100 %] 97 %  BP: (120-198)/() 162/79     Weight: 108.1 kg (238 lb 5.1 oz)  Body mass index is 34.2 kg/m².    Intake/Output Summary (Last 24 hours) at 1/1/2023 1946  Last data filed at 1/1/2023 1738  Gross per 24 hour   Intake 2211.19 ml   Output 1025 ml   Net 1186.19 ml      Physical Exam  Constitutional:       General: He is not in acute distress.     Appearance: He is well-developed.   HENT:      Head: Normocephalic.      Mouth/Throat:      Mouth: Mucous membranes are moist.   Eyes:      Pupils: Pupils are  equal, round, and reactive to light.   Cardiovascular:      Rate and Rhythm: Normal rate and regular rhythm.   Pulmonary:      Effort: Pulmonary effort is normal. No respiratory distress.   Abdominal:      General: Abdomen is flat. There is no distension.      Tenderness: There is no abdominal tenderness.   Musculoskeletal:         General: Normal range of motion.      Cervical back: Neck supple.   Skin:     Findings: No rash.   Neurological:      Mental Status: He is alert and oriented to person, place, and time.       Significant Labs: All pertinent labs within the past 24 hours have been reviewed.  CBC:   Recent Labs   Lab 12/31/22  0102 01/01/23  0251   WBC 18.09* 13.57*   HGB 15.2 13.4*   HCT 46.4 40.9    318     CMP:   Recent Labs   Lab 12/31/22  0232 12/31/22  0910 12/31/22  1444 01/01/23  0251   * 134* 133* 135*   K 2.9* 2.8* 3.0* 4.0   CL 96 98 98 99   CO2 25 29 25 26    117* 91 126*   BUN 29* 30* 30* 24*   CREATININE 2.0* 1.7* 1.7* 1.2   CALCIUM 7.7* 7.3* 7.3* 7.9*   PROT 5.7* 5.0* 5.3*  --    ALBUMIN 2.9* 2.6* 2.6*  --    BILITOT 0.9 1.0 0.9  --    ALKPHOS 92 83 79  --    AST 17 14 12  --    ALT 12 12 11  --    ANIONGAP 13 7* 10 10     Cardiac Markers:   Recent Labs   Lab 12/31/22  0102   BNP 52       Significant Imaging: I have reviewed all pertinent imaging results/findings within the past 24 hours.      Assessment/Plan:       Active Hospital Problems    Diagnosis    *Severe sepsis    Subdural hematoma caused by concussion    Chronic heart failure with preserved ejection fraction    Benign essential tremor    Type 2 diabetes mellitus with diabetic nephropathy, without long-term current use of insulin    Anticoagulated    Atrial fibrillation    Aspirin long-term use    Hypertension, essential     PLAN      Continue IVF . Vasopressor never needed   Concern for  sepsis but unlikely  Appear dehydration and  polypharmacy  Complicated by chronic subdural hematoma and possible  complication from it .  KAM on CKD, Hypokalemia improved   -empiric \zosyn , DC vanco  Keep holding eliquis for now   neuro checks  - potassium replacement  - SSI  HIDA   Consult surgeon     VTE Risk Mitigation (From admission, onward)         Ordered     IP VTE HIGH RISK PATIENT  Once         12/31/22 0423     Place sequential compression device  Until discontinued         12/31/22 0423                Discharge Planning   BRENNAN:      Code Status: Full Code   Is the patient medically ready for discharge?:     Reason for patient still in hospital (select all that apply): Treatment  Discharge Plan A: Home Health, Home with family                  Vipin Shepherd MD  Department of Hospital Medicine   [facility]

## 2023-01-02 NOTE — PROGRESS NOTES
"[facility]  Adult Nutrition   Progress Note (Initial Assessment)     SUMMARY     Recommendations  1) RD has added Glucerna BID to assist in meeting pts energy and protein needs.   2) Continue current 2000 kcal ADA/Cardiac diet as tolerated and encourage intake.    Goals:   Pt to meet 75 tpo 100% of his EEN/EPN.    Nutrition Goal Status: goal not met    Communication of RD Recs: other (comment)    Dietitian Rounds Brief  LOS: Pt had gone for a CT scan but his nurse said he was starting to feel a little better and she thought his intake would improve but will follow up to see in a day or so. His LBM was yesterday.    Principal Problem:Severe sepsis     HPI:  No notes on file     Overview/Hospital Course:     Patient was seen and examined along with the nurse  Tried to talk with the patient and he said let me wake up first   Try to open the eye son look at the pupil but he intentionally close the eyes despite asked to open  Denied having pain anywhere in the body  Pressure is low again and fibrous cc bolus given  Urine toxicology findings noted.  He did not answer the question whether he took extra pills are not     1/1/23  Patient is awake and alert and normal and talking normal   Mild abdominal pain . BP stable . But over day nausea and vomiting . Abdominal pain .  HIDA ordered since gall bladder distended              Diet order:   Current Diet Order: 2000 kcal ADA/Cardiac      Evaluation of Received Nutrient/Fluid Intake  Energy Calories Required: not meeting needs  Protein Required: not meeting needs  Fluid Required: meeting needs  Tolerance: not tolerating     % Intake of Estimated Energy Needs: 0 - 25 %  % Meal Intake: 0 - 25 %      Intake/Output Summary (Last 24 hours) at 1/2/2023 1031  Last data filed at 1/2/2023 0803  Gross per 24 hour   Intake 460 ml   Output 600 ml   Net -140 ml        Anthropometrics  Temp: 97.8 °F (36.6 °C)  Height Method: Stated  Height: 5' 10" (177.8 cm)  Height (inches): " 70 in  Weight Method: Bed Scale  Weight: 107.7 kg (237 lb 7 oz)  Weight (lb): 237.44 lb  Ideal Body Weight (IBW), Male: 166 lb  % Ideal Body Weight, Male (lb): 143.17 %  BMI (Calculated): 34.1  BMI Grade: 30 - 34.9- obesity - grade I       Estimated/Assessed Needs  Weight Used For Calorie Calculations: 107.7 kg (237 lb 7 oz)  Energy Calorie Requirements (kcal): 3421-7572 kcals/day  Energy Need Method: Kcal/kg  Protein Requirements: 150-188 g/day  Weight Used For Protein Calculations: 75 kg (165 lb 5.5 oz)     Estimated Fluid Requirement Method: RDA Method  RDA Method (mL): 1185       Reason for Assessment  Reason For Assessment: length of stay  Diagnosis: infection/sepsis  Relevant Medical History: Type 2 diabetes mellitus with diabetic nephropathy, without long-term current use of insulin, Anticoagulated, Atrial fibrillation, Aspirin long-term use, Hypertension, essential, Benign essential tremor, Chronic heart failure with preserved ejection fraction, Subdural hematoma caused by concussion    Nutrition/Diet History  Spiritual, Cultural Beliefs, Congregational Practices, Values that Affect Care: no  Food Allergies: NKFA  Factors Affecting Nutritional Intake: nausea/vomiting    Nutrition Risk Screen  Nutrition Risk Screen: no indicators present     MST Score: 0  Have you recently lost weight without trying?: No  Weight loss score: 0  Have you been eating poorly because of a decreased appetite?: No  Appetite score: 0       Weight History:  Wt Readings from Last 5 Encounters:   01/02/23 107.7 kg (237 lb 7 oz)   12/29/22 115.7 kg (255 lb)   12/16/22 115.7 kg (255 lb)   12/15/22 115.7 kg (255 lb)   11/18/22 126 kg (277 lb 12.8 oz)        Lab/Procedures/Meds: Pertinent Labs/Meds Reviewed    Medications:Pertinent Medications Reviewed  Scheduled Meds:   amiodarone  100 mg Oral QAM    atorvastatin  80 mg Oral Daily    chlorhexidine  15 mL Mouth/Throat BID    DULoxetine  30 mg Oral Daily    mupirocin   Nasal BID    oxybutynin  10  mg Oral Daily    piperacillin-tazobactam (ZOSYN) IVPB  3.375 g Intravenous Q8H    propranoloL  20 mg Oral BID    sodium chloride 0.9%  500 mL Intravenous Once     Continuous Infusions:   sodium chloride 0.9% Stopped (01/01/23 0315)     PRN Meds:.acetaminophen, albuterol-ipratropium, calcium gluconate IVPB, calcium gluconate IVPB, calcium gluconate IVPB, dextrose 10%, dextrose 10%, glucagon (human recombinant), glucose, glucose, HYDROcodone-acetaminophen, insulin aspart U-100, magnesium sulfate IVPB, magnesium sulfate IVPB, metoclopramide HCl, morphine, naloxone, polyethylene glycol, potassium chloride **AND** potassium chloride **AND** potassium chloride, promethazine (PHENERGAN) IVPB, senna-docusate 8.6-50 mg, simethicone, sodium chloride 0.9%, sodium phosphate IVPB, sodium phosphate IVPB, sodium phosphate IVPB    Labs: Pertinent Labs Reviewed  Clinical Chemistry:  Recent Labs   Lab 12/31/22  1444 01/01/23  0251 01/02/23  0338   *   < > 134*   K 3.0*   < > 2.7*   CL 98   < > 97   CO2 25   < > 25   GLU 91   < > 113*   BUN 30*   < > 13   CREATININE 1.7*   < > 0.8   CALCIUM 7.3*   < > 8.2*   PROT 5.3*  --   --    ALBUMIN 2.6*  --   --    BILITOT 0.9  --   --    ALKPHOS 79  --   --    AST 12  --   --    ALT 11  --   --    ANIONGAP 10   < > 12   MG  --    < > 1.7    < > = values in this interval not displayed.     CBC:   Recent Labs   Lab 01/02/23  0338   WBC 14.18*   RBC 4.23*   HGB 13.0*   HCT 38.8*      MCV 92   MCH 30.7   MCHC 33.5     Cardiac Profile:  Recent Labs   Lab 12/31/22  0102   BNP 52     Inflammatory Labs:  Recent Labs   Lab 12/31/22  0531   CRP 1.31*     Diabetes:  Recent Labs   Lab 12/31/22  1415   HGBA1C 5.8     Thyroid & Parathyroid:  Recent Labs   Lab 12/31/22  0232   TSH 1.870       Monitor and Evaluation  Food and Nutrient Intake: food and beverage intake, energy intake  Food and Nutrient Adminstration: diet order  Knowledge/Beliefs/Attitudes: food and nutrition knowledge/skill,  beliefs and attitudes  Physical Activity and Function: nutrition-related ADLs and IADLs, factors affecting access to physical activity  Anthropometric Measurements: weight, weight change, body mass index  Biochemical Data, Medical Tests and Procedures: lipid profile, inflammatory profile, glucose/endocrine profile, gastrointestinal profile, electrolyte and renal panel  Nutrition-Focused Physical Findings: overall appearance     Nutrition Risk  Level of Risk/Frequency of Follow-up: high     Nutrition Follow-Up  RD Follow-up?: Yes      Rhianna Marlow RD 01/02/2023 10:31 AM

## 2023-01-02 NOTE — PROGRESS NOTES
Pharmacy Consult Discontinuation: Vancomycin    Hiro Rodríguez [id number] is a 77 y.o. male was consulted for vancomycin pharmacotherapy management by pharmacy.    Pharmacy consult for vancomycin dosing is no longer required.  Vancomycin was discontinued 01/01/2023.    Thank you for allowing us to participate in this patient's care. Should you have any questions or concerns please feel free to contact the pharmacy department at [phone].    Kim Hobbs, KleverD    yes

## 2023-01-03 LAB
GLUCOSE SERPL-MCNC: 101 MG/DL (ref 70–110)
GLUCOSE SERPL-MCNC: 73 MG/DL (ref 70–110)
GLUCOSE SERPL-MCNC: 83 MG/DL (ref 70–110)
GLUCOSE SERPL-MCNC: 89 MG/DL (ref 70–110)
POTASSIUM SERPL-SCNC: 3.2 MMOL/L (ref 3.5–5.1)

## 2023-01-03 PROCEDURE — 97535 SELF CARE MNGMENT TRAINING: CPT

## 2023-01-03 PROCEDURE — 99900035 HC TECH TIME PER 15 MIN (STAT)

## 2023-01-03 PROCEDURE — 25000003 PHARM REV CODE 250

## 2023-01-03 PROCEDURE — 97530 THERAPEUTIC ACTIVITIES: CPT

## 2023-01-03 PROCEDURE — 25000003 PHARM REV CODE 250: Performed by: FAMILY MEDICINE

## 2023-01-03 PROCEDURE — 84132 ASSAY OF SERUM POTASSIUM: CPT | Performed by: STUDENT IN AN ORGANIZED HEALTH CARE EDUCATION/TRAINING PROGRAM

## 2023-01-03 PROCEDURE — 25000003 PHARM REV CODE 250: Performed by: INTERNAL MEDICINE

## 2023-01-03 PROCEDURE — 99900031 HC PATIENT EDUCATION (STAT)

## 2023-01-03 PROCEDURE — 63600175 PHARM REV CODE 636 W HCPCS: Performed by: FAMILY MEDICINE

## 2023-01-03 PROCEDURE — 97161 PT EVAL LOW COMPLEX 20 MIN: CPT

## 2023-01-03 PROCEDURE — 94761 N-INVAS EAR/PLS OXIMETRY MLT: CPT

## 2023-01-03 PROCEDURE — 12000002 HC ACUTE/MED SURGE SEMI-PRIVATE ROOM

## 2023-01-03 PROCEDURE — 36415 COLL VENOUS BLD VENIPUNCTURE: CPT | Performed by: STUDENT IN AN ORGANIZED HEALTH CARE EDUCATION/TRAINING PROGRAM

## 2023-01-03 PROCEDURE — 63600175 PHARM REV CODE 636 W HCPCS: Performed by: INTERNAL MEDICINE

## 2023-01-03 PROCEDURE — 97165 OT EVAL LOW COMPLEX 30 MIN: CPT

## 2023-01-03 RX ORDER — DULOXETIN HYDROCHLORIDE 20 MG/1
40 CAPSULE, DELAYED RELEASE ORAL DAILY
Status: DISCONTINUED | OUTPATIENT
Start: 2023-01-04 | End: 2023-01-04

## 2023-01-03 RX ADMIN — HYDROCODONE BITARTRATE AND ACETAMINOPHEN 1 TABLET: 5; 325 TABLET ORAL at 12:01

## 2023-01-03 RX ADMIN — MUPIROCIN 1 G: 20 OINTMENT TOPICAL at 09:01

## 2023-01-03 RX ADMIN — HYDROCODONE BITARTRATE AND ACETAMINOPHEN 1 TABLET: 5; 325 TABLET ORAL at 02:01

## 2023-01-03 RX ADMIN — OXYBUTYNIN CHLORIDE 10 MG: 5 TABLET, EXTENDED RELEASE ORAL at 09:01

## 2023-01-03 RX ADMIN — MUPIROCIN 1 G: 20 OINTMENT TOPICAL at 08:01

## 2023-01-03 RX ADMIN — HYDROCODONE BITARTRATE AND ACETAMINOPHEN 1 TABLET: 5; 325 TABLET ORAL at 09:01

## 2023-01-03 RX ADMIN — PROPRANOLOL HYDROCHLORIDE 20 MG: 10 TABLET ORAL at 08:01

## 2023-01-03 RX ADMIN — AMIODARONE HYDROCHLORIDE 100 MG: 100 TABLET ORAL at 09:01

## 2023-01-03 RX ADMIN — DULOXETINE 30 MG: 30 CAPSULE, DELAYED RELEASE ORAL at 09:01

## 2023-01-03 RX ADMIN — PIPERACILLIN SODIUM AND TAZOBACTAM SODIUM 3.38 G: 3; .375 INJECTION, POWDER, LYOPHILIZED, FOR SOLUTION INTRAVENOUS at 08:01

## 2023-01-03 RX ADMIN — HYDROCODONE BITARTRATE AND ACETAMINOPHEN 1 TABLET: 5; 325 TABLET ORAL at 08:01

## 2023-01-03 RX ADMIN — APIXABAN 5 MG: 5 TABLET, FILM COATED ORAL at 09:01

## 2023-01-03 RX ADMIN — PIPERACILLIN SODIUM AND TAZOBACTAM SODIUM 3.38 G: 3; .375 INJECTION, POWDER, LYOPHILIZED, FOR SOLUTION INTRAVENOUS at 02:01

## 2023-01-03 RX ADMIN — ATORVASTATIN CALCIUM 80 MG: 40 TABLET, FILM COATED ORAL at 08:01

## 2023-01-03 RX ADMIN — PROPRANOLOL HYDROCHLORIDE 20 MG: 10 TABLET ORAL at 09:01

## 2023-01-03 RX ADMIN — CHLORHEXIDINE GLUCONATE 15 ML: 1.2 RINSE ORAL at 08:01

## 2023-01-03 RX ADMIN — PIPERACILLIN SODIUM AND TAZOBACTAM SODIUM 3.38 G: 3; .375 INJECTION, POWDER, LYOPHILIZED, FOR SOLUTION INTRAVENOUS at 04:01

## 2023-01-03 RX ADMIN — APIXABAN 5 MG: 5 TABLET, FILM COATED ORAL at 08:01

## 2023-01-03 RX ADMIN — HYDROCODONE BITARTRATE AND ACETAMINOPHEN 1 TABLET: 5; 325 TABLET ORAL at 04:01

## 2023-01-03 RX ADMIN — POTASSIUM CHLORIDE 60 MEQ: 7.46 INJECTION, SOLUTION INTRAVENOUS at 05:01

## 2023-01-03 RX ADMIN — CHLORHEXIDINE GLUCONATE 15 ML: 1.2 RINSE ORAL at 09:01

## 2023-01-03 NOTE — PLAN OF CARE
Problem: Adult Inpatient Plan of Care  Goal: Plan of Care Review  Outcome: Ongoing, Progressing  Goal: Patient-Specific Goal (Individualized)  Outcome: Ongoing, Progressing  Goal: Absence of Hospital-Acquired Illness or Injury  Outcome: Ongoing, Progressing  Goal: Optimal Comfort and Wellbeing  Outcome: Ongoing, Progressing  Goal: Readiness for Transition of Care  Outcome: Ongoing, Progressing     Problem: Diabetes Comorbidity  Goal: Blood Glucose Level Within Targeted Range  Outcome: Ongoing, Progressing     Problem: Adjustment to Illness (Sepsis/Septic Shock)  Goal: Optimal Coping  Outcome: Ongoing, Progressing     Problem: Bleeding (Sepsis/Septic Shock)  Goal: Absence of Bleeding  Outcome: Ongoing, Progressing     Problem: Glycemic Control Impaired (Sepsis/Septic Shock)  Goal: Blood Glucose Level Within Desired Range  Outcome: Ongoing, Progressing     Problem: Infection Progression (Sepsis/Septic Shock)  Goal: Absence of Infection Signs and Symptoms  Outcome: Ongoing, Progressing     Problem: Nutrition Impaired (Sepsis/Septic Shock)  Goal: Optimal Nutrition Intake  Outcome: Ongoing, Progressing     Problem: Infection  Goal: Absence of Infection Signs and Symptoms  Outcome: Ongoing, Progressing     Problem: Skin Injury Risk Increased  Goal: Skin Health and Integrity  Outcome: Ongoing, Progressing

## 2023-01-03 NOTE — PROGRESS NOTES
"[facility]    Progress Note    Patient Name: Hiro Rodríguez  MRN: [id number]  Patient Class: IP- Inpatient   Admission Date: 12/30/2022 11:24 PM  Length of Stay: 3  Attending Physician: Jony López MD  Primary Care Provider: Gautam Castanon III, MD  Face-to-Face encounter date: 01/03/2023  Code status:  Chief Complaint: Fall (Pt arrived by ems after a pt fell after having a dizziness episode. Denies loc. Pt hit the side of his head on a marble table tonight. Pt denies any pain./Pt was seen two weeks ago for a fall secondary to dizziness, pt was dx with a head bleed. Pt restarted his blood thinners once home. )        Subjective:    HPI:Hiro Rodríguez is a 77 y.o. White male   With PMH of chronic subdural hematoma (11/8/22),  pAF on eliquis, DM2, HTN, multiple falls 2/2 polypharmacy,  who presents with fall.     Onset this evening  Occurred after experiencing dizziness  No LOC  +head trauma on marble side table  Pt discharged from [facility] on 11/18/22  He was treated for subdural hematoma due to fall  Since discharge, pt has had poor po intake  Wife reports "he is just stubborn" and refusing to eat/drink  No n/v  No subjective fever   No chills  No abdominal pain  Previous dysuria, since resolved  No diarrhea  +generalized weakness  +fatigue     On 12/28:  new dysuria, urgency, frequency  Saw outpt PCP on 12/29:  started on cipro  BP at that visit was noted 94/57     Pt initially presented to ER today normotensive  While sleeping, noted BP 55/30  Did not recover with positional changes  Pt's BP now responding to fluid resuscitation    Interval History:   12/31  Patient was seen and examined along with the nurse  Tried to talk with the patient and he said let me wake up first   Try to open the eye son look at the pupil but he intentionally close the eyes despite asked to open  Denied having pain anywhere in the body  Pressure is low again and fibrous cc bolus given  Urine " toxicology findings noted.  He did not answer the question whether he took extra pills are not     1/1/23  Patient is awake and alert and normal and talking normal   Mild abdominal pain . BP stable . But over day nausea and vomiting . Abdominal pain .  HIDA ordered since gall bladder distended      1/2/23  Hospital course  Patient was admitted with altered mental status and possible sepsis.  CT scan of the abdomen was done because of abdominal pain and no acute finding but gallbladder distention and HIDA scan was done and negative  Today patient wake up and acting normal and had normal conversation.  He was not doing well with repeated falls after he had subdural hematoma the last time.  Said he will go home if I can arrange the hospital bed .  But later he changed her mind and he wants to go to rehab or skilled facility  K very low and supplementing     1/3:  Patient is AO x3, and states that he would like to go to a facility to get his strength back.  Labs currently pending.  Surgery evaluated patient and stated that patient does not have cholecystitis hence no plans for surgical intervention.  No concerns/issues overnight reported by the patient or the nursing staff.    Review of Systems All other Review of Systems were found to be negative expect for that mentioned already in HPI.     Objective:     Vitals:    01/03/23 0920 01/03/23 1200 01/03/23 1406 01/03/23 1435   BP: 125/77 129/71     Pulse: 84 75 80    Resp: 18 18 18 20   Temp:  97.8 °F (36.6 °C)     TempSrc:  Oral     SpO2:  96% 98%    Weight:       Height:            Vitals reviewed.  Constitutional: No distress.   HENT: NC  Head: Atraumatic.   Cardiovascular: Normal rate, regular rhythm and normal heart sounds.   Pulmonary/Chest: Effort normal. No wheezes.   Abdominal: Soft. Bowel sounds are normal. No distension and no mass. No tenderness  Neurological: Alert.   Skin: Skin is warm and dry.   Psych: Appropriate mood and affect    Following labs were  Reviewed   CBC:  No results for input(s): WBC, HGB, HCT, PLT in the last 24 hours.  CMP:  No results for input(s): GLUCOSE, CALCIUM, ALBUMIN, PROT, NA, K, CO2, CL, BUN, CREATININE, ALKPHOS, ALT, AST, BILITOT in the last 24 hours.    Micro Results  Microbiology Results (last 7 days)       Procedure Component Value Units Date/Time    Blood culture #2 **CANNOT BE ORDERED STAT** [[id number]] Collected: 12/31/22 0040    Order Status: Completed Specimen: Blood from Peripheral, Forearm, Right Updated: 01/03/23 0232     Blood Culture, Routine No Growth to date      No Growth to date      No Growth to date      No Growth to date    Blood culture #1 **CANNOT BE ORDERED STAT** [[id number]] Collected: 12/31/22 0100    Order Status: Completed Specimen: Blood from Peripheral, Upper Arm, Right Updated: 01/03/23 0232     Blood Culture, Routine No Growth to date      No Growth to date      No Growth to date      No Growth to date    Urine culture [[id number]] Collected: 12/31/22 0141    Order Status: Completed Specimen: Urine Updated: 01/02/23 0659     Urine Culture, Routine No growth    Narrative:      Specimen Source->Urine    Respiratory Infection Panel (PCR), Nasopharyngeal [[id number]] Collected: 12/31/22 0415    Order Status: Completed Updated: 12/31/22 1841     Respiratory Infection Panel Source NP swab     Adenovirus Not Detected     Coronavirus 229E, Common Cold Virus Not Detected     Coronavirus HKU1, Common Cold Virus Not Detected     Coronavirus NL63, Common Cold Virus Not Detected     Coronavirus OC43, Common Cold Virus Not Detected     Comment: The Coronavirus strains detected in this test cause the common cold.  These strains are not the COVID-19 (novel Coronavirus)strain   associated with the respiratory disease outbreak.          SARS-CoV2 (COVID-19) Qualitative PCR Not Detected     Human Metapneumovirus Not Detected     Human Rhinovirus/Enterovirus Not Detected     Influenza A (subtypes H1, H1-2009,H3) Not Detected      Influenza B Not Detected     Parainfluenza Virus 1 Not Detected     Parainfluenza Virus 2 Not Detected     Parainfluenza Virus 3 Not Detected     Parainfluenza Virus 4 Not Detected     Respiratory Syncytial Virus Not Detected     Bordetella Parapertussis (UY8139) Not Detected     Bordetella pertussis (ptxP) Not Detected     Chlamydia pneumoniae Not Detected     Mycoplasma pneumoniae Not Detected     Comment: Respiratory Infection Panel testing performed by Multiplex PCR.       Narrative:      Specimen Source->Nasopharyngeal Swab    MRSA Screen by PCR [[id number]] Collected: 12/31/22 0415    Order Status: Completed Specimen: Nasopharyngeal Swab from Nasal Updated: 12/31/22 0553     MRSA SCREEN BY PCR Negative    Resp Viral Panel PCR, Peds Under 7 Months Nasopharyngeal Swab [[id number]] Collected: 12/31/22 0415    Order Status: Canceled              Radiology Reports  X-Ray Femur 2 View Left    Result Date: 12/16/2022  EXAMINATION: XR FEMUR 2 VIEW LEFT CLINICAL HISTORY: Other fracture of left femur, initial encounter for closed fracture TECHNIQUE: AP and lateral views of the left femur were performed. COMPARISON: 11/08/2022 FINDINGS: There is internal fixation spanning a femoral neck fracture.  There is also knee arthroplasty hardware.  No abnormal lucency surrounding hardware.  No detrimental change in alignment.  Surgical clips medial left thigh.  Small knee joint effusion. Electronically signed by: Yuval Felton Date:    12/16/2022 Time:    11:51    CT Head Without Contrast    Result Date: 12/31/2022  EXAM DESCRIPTION: CT HEAD WITHOUT IV CONTRAST CLINICAL HISTORY: Head trauma, minor (Age >= 65y); head bleed 2 weeks ago and back on eliquis. Patient fell after having a dizziness episode. Patient hit the side of his head on a marble table tonight. Patient was seen 2 weeks ago for a fall secondary to dizziness. Patient was diagnosed with a head bleed. TECHNIQUE: Multiple axial images were obtained through the brain  without intravenous contrast. Coronal and sagittal images were reconstructed. All CT scans at this facility use dose modulation, iterative reconstruction, and/or weight based dosing when appropriate to reduce radiation dose to as low as reasonably achievable. COMPARISON: Previous head CT images dated 12/19/2022. The prior CT report is not available at this time. Reference is also made to previous head CT and report dated 11/18/2022. FINDINGS: There is no evidence of acute intracranial hemorrhage or acute major territorial infarction. The ventricles, sulci and cisterns are grossly unchanged in size. There is no midline shift. There is a minimal chronic subdural hematoma or hygroma overlying the right frontal convexity measuring approximately 4 mm in thickness on the coronal images, grossly unchanged in extent from the previous CT, and improved from the prior dated 11/18/2022. Subcortical and periventricular white matter hypodensities are seen, compatible with chronic microangiopathic disease, grossly unchanged. Vascular calcifications. Mild mucosal thickening as well as an air-fluid level within the left maxillary sinus. The mastoid air cells are clear bilaterally. The calvarium appears grossly unremarkable. Slight scarring along the left parietal scalp. IMPRESSION: 1.  No acute intracranial abnormality. 2.  Minimal chronic subdural hematoma or hygroma overlying the right frontal convexity, grossly unchanged in extent from the previous CT, and improved from the prior dated 11/18/2022. 3.  White matter changes of chronic microangiopathic disease, grossly unchanged. 4.  Mild acute sinusitis involving the left maxillary sinus. Electronically signed by:  Sharon Brownlee MD  12/31/2022 12:45 AM CST Workstation: [id number]    CT Head Without Contrast    Result Date: 12/19/2022  EXAMINATION: CT HEAD WITHOUT CONTRAST CLINICAL HISTORY: contusion follow up; Traumatic subdural hemorrhage with loss of consciousness of  unspecified duration, subsequent encounter TECHNIQUE: Low dose axial images were obtained through the head.  Coronal and sagittal reformations were also performed. Contrast was not administered. COMPARISON: 11/18/2022 FINDINGS: The examination demonstrates resolution of the previous identified right-sided convexity subdural collection.  There is also evolution of the previous right inferolateral temporal parenchymal contusion, now showing only a small area of hypoattenuation in the temporal lobe laterally.  No new hemorrhage is seen.  There is no midline shift.  Ventricles and sulci demonstrate mild involutional changes which are appropriate for the patient's age. Bone windows demonstrate trace fluid or membrane thickening in the right mastoid air cells.  There is no bone destruction or fracture.     Resolution of the right convexity subdural collection and evolution of the right temporal hematoma.  No new abnormalities. Electronically signed by: Shyam Hoffman MD Date:    12/19/2022 Time:    14:35    CT Cervical Spine Without Contrast    Result Date: 12/31/2022  EXAM DESCRIPTION: CT CERVICAL SPINE WITHOUT IV CONTRAST CLINICAL HISTORY: Neck trauma (Age >= 65y). Patient fell after having a dizziness episode. Patient hit the side of his head on a marble table tonight. Patient was seen 2 weeks ago for a fall secondary to dizziness. Patient was diagnosed with a head bleed. TECHNIQUE: Multiple axial images were obtained through the cervical spine without intravenous contrast. Coronal and sagittal images were reconstructed. All CT scans at this facility use dose modulation, iterative reconstruction, and/or weight based dosing when appropriate to reduce radiation dose to as low as reasonably achievable. COMPARISON: Previous cervical spine CT dated 11/8/2022. FINDINGS: There is straightening of the normal cervical lordosis, possibly positional or secondary to muscle spasm, and in retrospect unchanged. The vertebral heights  are preserved. Scattered disc space narrowing, greatest at C5-C6, grossly unchanged. There is no acute fracture or subluxation. No epidural hematoma is visible. The prevertebral soft tissues are grossly unremarkable. The visualized lung apices are clear. Vascular calcifications. The visualized paranasal sinuses are clear. The mastoid air cells are clear. Degenerative changes along the cervical spine. Secondary to broad-based posterior disc osteophyte complex, there is mild to moderate central canal stenosis at C5-C6 6. Multilevel neural foraminal narrowing secondary to hypertrophic changes, with moderate left neuroforaminal narrowing at C3-C4 and C4-C5 and moderate to severe on the right at C5-C6, and mild at other levels. IMPRESSION: 1.  No evidence of acute fracture or subluxation of the cervical spine. 2.  Multilevel degenerative changes of the cervical spine, as above. Electronically signed by:  Sharon Brownlee MD  12/31/2022 12:52 AM CST Workstation: [id number]    NM Hepatobiliary Scan (HIDA)    Result Date: 1/2/2023  Reason: Cholecystitis (Ped 0-18y); nausea , vomiting  and distended gall bladder and sepsis ? Radiopharmaceutical: 7 mCi Technetium 99m Choletec. COMPARISON: CT 12/31/2022 FINDINGS: Tomographic images show uniform hepatic uptake and clearance of radiopharmaceutical. Gallbladder filling and biliary to bowel transit occur within 60 minutes. Imaging was terminated at 33 minutes, due to patient's inability to tolerate supine imaging without moving. IMPRESSION: Normal hepatobiliary scan. Electronically signed by:  Esteban Flores MD  1/2/2023 12:21 PM CST Workstation: [id number]    X-Ray Chest AP Portable    Result Date: 12/31/2022  EXAMINATION: XR CHEST AP PORTABLE CLINICAL INDICATION: Male, 77 years old. Dizziness COMPARISON: November 10, 2022 and November 8, 2022 x-ray chest, and July 11, 2022 CT chest. FINDINGS: Support Devices: None. Heart: Unchanged Cardiomegaly. Mediastinum: Mediastinal contours  are normal. Lungs: Pulmonary vasculature is prominent. Lungs are well aerated and clear. Slightly increased diffuse opacification of the right lung in comparison with the left is felt to be positional and anatomical. Pleura: No pleural effusion is identified. No pneumothorax is present. Osseous Structures: Visualized skeleton is normal. IMPRESSION: 1. Stable cardiomegaly and prominent pulmonary vasculature, compatible with central vascular fullness without pulmonary edema. Electronically signed by:  Eron Bolivar MD  12/31/2022 6:47 AM CST Workstation: [id number]    CT Abdomen Pelvis  Without Contrast    Result Date: 12/31/2022  EXAM DESCRIPTION: CT ABDOMEN PELVIS WITHOUT CONTRAST RadLex: CT ABDOMEN PELVIS WITHOUT IV CONTRAST CLINICAL HISTORY: Abdominal abscess/infection suspected. TECHNIQUE: CT of the abdomen and pelvis without contrast. All CT scans at this facility use dose modulation, iterative reconstruction, and/or weight based dosing when appropriate to reduce radiation dose to as low as reasonably achievable. COMPARISON: None. FINDINGS: The visualized lower thoracic structures demonstrate coronary artery calcifications. Unenhanced images of the liver, spleen, pancreas and adrenal glands appear grossly unremarkable. The gallbladder appears slightly distended. There are multiple bilateral renal calculi measuring up to 1 cm on the right. There is no hydronephrosis bilaterally. No retroperitoneal or mesenteric lymphadenopathy is identified. No abdominal aortic aneurysm is seen. No bowel obstruction is seen. There is no free intraperitoneal air. The appendix appears unremarkable. There is colonic diverticulosis without definite CT evidence for acute diverticulitis. There is mild fecal loading throughout the colon. No free fluid is identified. Urinary bladder is decompressed. Small prostatic calcifications are present. Prostate gland measures approximately 5.8 cm TR by 3.5 cm AP by 4 cm CC. There are small  bilateral inguinal hernias containing fat. There are postsurgical changes at the proximal left femur. No acute fracture is seen. IMPRESSION: 1.  Slight gallbladder distention. 2.  Colonic diverticulosis without definite CT evidence for acute diverticulitis. 3.  Nonobstructive bilateral renal calculi. Electronically signed by:  Brandt Carl DO  12/31/2022 2:35 AM CST Workstation: [id number]    X-Ray KUB    Result Date: 1/1/2023  KUB Clinical history is vomiting COMPARISON: CT abdomen and pelvis dated 12/31/2022 There is a normal bowel gas pattern. No abnormal soft tissue mass or organomegaly. There are bilateral renal calculi. There are degenerative changes of the spine. IMPRESSION: Unremarkable bowel gas pattern Bilateral renal calculi Electronically signed by:  Madeleine Griffin MD  1/1/2023 3:35 PM CST Workstation: [id number]       Meds  Scheduled Meds:   amiodarone  100 mg Oral QAM    apixaban  5 mg Oral BID    atorvastatin  80 mg Oral Daily    chlorhexidine  15 mL Mouth/Throat BID    DULoxetine  30 mg Oral Daily    mupirocin   Nasal BID    oxybutynin  10 mg Oral Daily    piperacillin-tazobactam (ZOSYN) IVPB  3.375 g Intravenous Q8H    propranoloL  20 mg Oral BID    sodium chloride 0.9%  500 mL Intravenous Once     Continuous Infusions:   sodium chloride 0.9% Stopped (01/01/23 0315)     PRN Meds:.acetaminophen, albuterol-ipratropium, calcium gluconate IVPB, calcium gluconate IVPB, calcium gluconate IVPB, dextrose 10%, dextrose 10%, glucagon (human recombinant), glucose, glucose, HYDROcodone-acetaminophen, insulin aspart U-100, magnesium sulfate IVPB, magnesium sulfate IVPB, metoclopramide HCl, morphine, naloxone, polyethylene glycol, potassium chloride **AND** potassium chloride **AND** potassium chloride, promethazine (PHENERGAN) IVPB, senna-docusate 8.6-50 mg, simethicone, sodium chloride 0.9%, sodium phosphate IVPB, sodium phosphate IVPB, sodium phosphate IVPB.    Active PT: Yes  Active OT: Yes  Active SLP:  No  Assessment & Plan:     Active Hospital Problems    Diagnosis    *Severe sepsis    Subdural hematoma caused by concussion    Chronic heart failure with preserved ejection fraction    Benign essential tremor    Type 2 diabetes mellitus with diabetic nephropathy, without long-term current use of insulin    Anticoagulated    Atrial fibrillation    Aspirin long-term use    Hypertension, essential   PLAN   Altered mental status most likely from polypharmacy and currently resolved and possible from chronic subdural hematoma   KAM on CKD resolved   zosyn , HELENA vanco  Resume home eliquis   PT and placement consulted            Discharge Planning:   Is the patient medically ready for discharge?: no    Reason for patient still in hospital (select all that apply): Patient trending condition and Treatment    Above encounter included review of the medical records, interviewing and examining the patient face-to-face, discussion with family and other health care providers, ordering and interpreting lab/test results and formulating a plan of care.     Medical Decision Making:      [_] Low Complexity  [_] Moderate Complexity  [x] High Complexity      Jony López MD  Department of Hospital Medicine   [facility]

## 2023-01-03 NOTE — PT/OT/SLP EVAL
Occupational Therapy   Evaluation    Name: Hrio Rodríguez  MRN: [id number]  Admitting Diagnosis: Severe sepsis  Recent Surgery: * No surgery found *      Recommendations:     Discharge Recommendations: nursing facility, skilled  Discharge Equipment Recommendations:   (TBD next level of care)  Barriers to discharge:  Decreased caregiver support    Assessment:     Hiro Rodríguez is a 77 y.o. male with a medical diagnosis of Severe sepsis. Performance deficits affecting function: weakness, impaired endurance, impaired self care skills, impaired functional mobility, gait instability, impaired balance, decreased safety awareness, pain, impaired cardiopulmonary response to activity.  Pt agreeable to OT evaluation this AM. Pt very pleasant and participatory in therapy session. Pt required increased time during session for self expression throughout session.     Rehab Prognosis: Good; patient would benefit from acute skilled OT services to address these deficits and reach maximum level of function.       Plan:     Patient to be seen 5 x/week to address the above listed problems via self-care/home management, therapeutic activities, therapeutic exercises  Plan of Care Expires: 02/03/23  Plan of Care Reviewed with: patient    Subjective     Chief Complaint: none stated  Patient/Family Comments/goals: to go to SNF    Occupational Profile:  Living Environment: Pt lives with wife in a 1 story home with no steps to enter. Pt has a walk-in shower with a bench, grab bars present, and hand-held shower head.  Previous level of function: Pt recently discharged from SNF facility at beginning of December. Pt reports 3 falls at home since being discharged from SNF. At home, pt was mod I with ambulation (limited due to pain/dizziness), required assist with dressing, supervision with bathing; Mod I all other ADLs. Pt reports increased difficulty getting in/out of bed due to height. Pt reports he was receiving HHOT/HHPT at home just  prior to this admission.  Roles and Routines:   Equipment Used at Home: bedside commode, walker, rolling, wheelchair, grab bar  Assistance upon Discharge: yes, from facility    Pain/Comfort:  Pain Rating 1: 0/10    Patients cultural, spiritual, [employer] conflicts given the current situation:      Objective:     Communicated with: nursing prior to session.  Patient found HOB elevated with bed alarm, peripheral IV, telemetry upon OT entry to room.    General Precautions: Standard, fall  Orthopedic Precautions: N/A  Braces: N/A  Respiratory Status: Room air    Occupational Performance:    Activities of Daily Living:  Feeding:  independence    Grooming: setup assistance bed level       Cognitive/Visual Perceptual:  Cognitive/Psychosocial Skills:     -       Oriented to: Person, Place, Time, and Situation   -       Follows Commands/attention:Follows two-step commands  -       Communication: clear/fluent  -       Memory: No Deficits noted  -       Safety awareness/insight to disability: impaired   -       Mood/Affect/Coping skills/emotional control: Appropriate to situation, Cooperative, and Pleasant    Physical Exam:  Upper Extremity Range of Motion:     -       Right Upper Extremity: WFL  -       Left Upper Extremity: WFL  Upper Extremity Strength:    -       Right Upper Extremity: WFL  -       Left Upper Extremity: WFL   Strength:    -       Right Upper Extremity: WFL  -       Left Upper Extremity: WFL  Fine Motor Coordination: -       Intact  Gross motor coordination:   WFL  Mild tremors noted in BUE's    AMPAC 6 Click ADL:  AMPAC Total Score: 17    Treatment & Education:  Pt educated on role of OT/POC, importance of OOB/EOB activity, use of call bell, and safety during ADLs, transfers, and functional mobility.    Patient left HOB elevated with all lines intact, call button in reach, and bed alarm on    GOALS:   Multidisciplinary Problems       Occupational Therapy Goals          Problem: Occupational  Therapy    Goal Priority Disciplines Outcome Interventions   Occupational Therapy Goal     OT, PT/OT     Description: Goals to be met by: 2/3/23    Patient will increase functional independence with ADLs by performing:    UE Dressing with Supervision.  LE Dressing with Supervision.  Grooming while standing at sink with Supervision.  Toileting from toilet with Supervision for hygiene and clothing management.   Toilet transfer to toilet with Supervision.                         History:     Past Medical History:   Diagnosis Date    CHF (congestive heart failure)     Hypertension          Past Surgical History:   Procedure Laterality Date    FRACTURE SURGERY      INTRAMEDULLARY RODDING OF TROCHANTER OF FEMUR Left 11/10/2022    Procedure: INSERTION, ITRAMEDULLARY SANTI, FEMUR, TROCHANTER;  Surgeon: Richard Phoenix MD;  Location: [facility];  Service: Orthopedics;  Laterality: Left;       Time Tracking:     OT Date of Treatment: 01/03/23  OT Start Time: 0928  OT Stop Time: 0951  OT Total Time (min): 23 min    Billable Minutes:Evaluation 15  Self Care/Home Management 8    1/3/2023

## 2023-01-03 NOTE — PLAN OF CARE
01/03/23 1406   Patient Assessment/Suction   Level of Consciousness (AVPU) alert   Respiratory Effort Normal;Unlabored   All Lung Fields Breath Sounds clear   Rhythm/Pattern, Respiratory no shortness of breath reported   PRE-TX-O2   Device (Oxygen Therapy) room air   Flow (L/min)   (room air)   SpO2 98 %   Pulse Oximetry Type Intermittent   $ Pulse Oximetry - Multiple Charge Pulse Oximetry - Multiple   Pulse 80   Resp 18   Aerosol Therapy   $ Aerosol Therapy Charges PRN treatment not required   Respiratory Treatment Status (SVN) PRN treatment not required   Education   $ Education Bronchodilator;15 min   Respiratory Evaluation   $ Care Plan Tech Time 15 min

## 2023-01-03 NOTE — PT/OT/SLP EVAL
"Physical Therapy Evaluation    Patient Name:  Hiro Rodríguez   MRN:  [id number]    Recommendations:     Discharge Recommendations: nursing facility, skilled   Discharge Equipment Recommendations:  (TBD at next level. PT recommending pt/wife modify their bed at home to lower the height for increased safety.)   Barriers to discharge: Decreased caregiver support    Assessment:     Hiro Rodríguez is a 77 y.o. male admitted with a medical diagnosis of Severe sepsis.  He presents with the following impairments/functional limitations: weakness, impaired endurance, impaired self care skills, impaired functional mobility, gait instability, impaired balance, decreased safety awareness, decreased lower extremity function, decreased upper extremity function, pain, impaired fine motor.    Pt found HOB elevated with extender present and pt agreeable to working with PT. Pt A & O x  4 and has the following co-morbidities: DM, AFib, HTN, SDH, L hip fx w/ ORIF WBAT, recurring falls.  Pt tolerated session fairly due to low BS and required moderate to minimal A for safe mobilization during session today. Pt reported he has had 3 falls in the period of time he's been home from previous SNF stay and they "all seem to be related to the bed/bedroom. The pt described all three falls (hitting his head at least twice) which are happening due to elevated bed height and pt not strong enough/pt's wife unable to provide enough assistance to negotiate in and out without slipping to the floor. In the third fall pt reported he became dizzy when her turned and the room started spinning causing him to fall. Pt would benefit from acute PT during hospitalization to increase strength, endurance and safety with mobility and would benefit from SNF prior to discharge home.      Rehab Prognosis: Fair; patient would benefit from acute skilled PT services to address these deficits and reach maximum level of function.    Recent Surgery: * No surgery found " *      Plan:     During this hospitalization, patient to be seen 6 x/week to address the identified rehab impairments via gait training, therapeutic activities, therapeutic exercises, neuromuscular re-education and progress toward the following goals:    Plan of Care Expires:  02/03/23    Subjective     Chief Complaint: Pt's BS is low and pt not feeling very well, but willing to participate. Pt would like to return to same SNF facility and has very specific goals he would like to address.  Patient/Family Comments/goals: SNF  Pain/Comfort:  Pain Rating 1:  (not rated)  Location - Side 1: Left  Location 1: hip  Pain Addressed 1: Reposition, Distraction, Cessation of Activity  Pain Rating 2:  (not rated)  Location - Orientation 2: generalized  Pain Addressed 2: Reposition, Distraction, Cessation of Activity    Patients cultural, spiritual, Rastafarian conflicts given the current situation:      Living Environment:  Pt lives with his wife in a single story house with no steps   Prior to admission, patients level of function was close supervision with rolling walker, but with 3 falls in 1 week period.  Equipment used at home: bedside commode, walker, rolling, wheelchair, grab bar.  DME owned (not currently used): none.  Upon discharge, patient will have assistance from his wife.    Objective:     Communicated with RN prior to session.  Patient found HOB elevated with bed alarm, peripheral IV, telemetry  upon PT entry to room.    General Precautions: Standard, fall, diabetic, hearing impaired  Orthopedic Precautions:N/A   Braces: N/A  Respiratory Status: Room air    Exams:  Cognitive Exam:  Patient is oriented to Person, Place, Time, and Situation  RLE ROM: WFL  RLE Strength: grossly 3+/5  LLE ROM: WFL  LLE Strength: 4-/5    Functional Mobility:  Bed Mobility:     Scooting: moderate assistance  Supine to Sit: moderate assistance  Sit to Supine: minimum assistance and moderate assistance  Transfers:     Sit to Stand:   minimum assistance with rolling walker and vc  Gait: x 4 side steps with rolling walker with min a to steady and vc for technique.      AM-PAC 6 CLICK MOBILITY  Total Score:15       Treatment & Education:  Pt was educated on the following: call light use, importance of OOB activity and functional mobility to negate the negative effects of prolonged bed rest during this hospitalization, safe transfers/ambulation and discharge planning recommendations/options. PT spent extra time educating pt on need to modify his bed to lower height for increased safety(pt admitted he has to step up on a stool with RW and wife assisting. Pt stated HH therapist assessed his ability to perform and did not suggest lowering the bed so he can sit EOB with feet on the floor). PT also discussed typical technique for transfer up from the floor after a fall per pt's questioning/request. PT suggested pt make his goals for SNF admit clear with staff of first session.      Patient left HOB elevated with all lines intact, call button in reach, bed alarm on, and RN notified.    GOALS:   Multidisciplinary Problems       Physical Therapy Goals          Problem: Physical Therapy    Goal Priority Disciplines Outcome Goal Variances Interventions   Physical Therapy Goal     PT, PT/OT      Description: Goals to be met by: 2/3/23     Patient will increase functional independence with mobility by performin. Supine to sit with Supervision  2. Sit to stand transfer with Supervision  3. Bed to chair transfer with Supervision using Rolling Walker  4. Gait  x 150 feet with Supervision using Rolling Walker.                              History:     Past Medical History:   Diagnosis Date    CHF (congestive heart failure)     Hypertension        Past Surgical History:   Procedure Laterality Date    FRACTURE SURGERY      INTRAMEDULLARY RODDING OF TROCHANTER OF FEMUR Left 11/10/2022    Procedure: INSERTION, ITRAMEDULLARY SANTI, FEMUR, TROCHANTER;  Surgeon:  Richard Phoenix MD;  Location: [facility];  Service: Orthopedics;  Laterality: Left;       Time Tracking:     PT Received On: 01/03/23  PT Start Time: 1151     PT Stop Time: 1214  PT Total Time (min): 23 min     Billable Minutes: Evaluation 10 and Therapeutic Activity 13      01/03/2023

## 2023-01-03 NOTE — PLAN OF CARE
Goals to be met by: 2/3/23     Patient will increase functional independence with mobility by performin. Supine to sit with Supervision  2. Sit to stand transfer with Supervision  3. Bed to chair transfer with Supervision using Rolling Walker  4. Gait  x 150 feet with Supervision using Rolling Walker.

## 2023-01-03 NOTE — PLAN OF CARE
CM s/w patient at bedside regarding discharge plan. CM informed patient that PT is recommending SNF. Patient is willing to go to SNF but only wants to go to [facility]. CM explained that if facility is full will need a second choice. Patient stated unwilling to go anywhere but [facility]. Referral sent via [employer] and [facility] notified via phone.       01/03/23 6331   Discharge Reassessment   Assessment Type Discharge Planning Reassessment   Did the patient's condition or plan change since previous assessment? Yes   Discharge Plan discussed with: Patient   Discharge Plan A Skilled Nursing Facility   Discharge Plan B Skilled Nursing Facility   DME Needed Upon Discharge  other (see comments)  (TBD)   Why the patient remains in the hospital Requires continued medical care   Post-Acute Status   Post-Acute Authorization Placement   Post-Acute Placement Status Referrals Sent   Patient choice form signed by patient/caregiver List with quality metrics by geographic area provided

## 2023-01-04 LAB
ANION GAP SERPL CALC-SCNC: 9 MMOL/L (ref 8–16)
BASOPHILS # BLD AUTO: 0.03 K/UL (ref 0–0.2)
BASOPHILS NFR BLD: 0.3 % (ref 0–1.9)
BUN SERPL-MCNC: 14 MG/DL (ref 8–23)
CALCIUM SERPL-MCNC: 8.2 MG/DL (ref 8.7–10.5)
CHLORIDE SERPL-SCNC: 104 MMOL/L (ref 95–110)
CO2 SERPL-SCNC: 24 MMOL/L (ref 23–29)
CREAT SERPL-MCNC: 0.7 MG/DL (ref 0.5–1.4)
DIFFERENTIAL METHOD: ABNORMAL
EOSINOPHIL # BLD AUTO: 0.2 K/UL (ref 0–0.5)
EOSINOPHIL NFR BLD: 2.2 % (ref 0–8)
ERYTHROCYTE [DISTWIDTH] IN BLOOD BY AUTOMATED COUNT: 14.1 % (ref 11.5–14.5)
EST. GFR  (NO RACE VARIABLE): >60 ML/MIN/1.73 M^2
GLUCOSE SERPL-MCNC: 107 MG/DL (ref 70–110)
GLUCOSE SERPL-MCNC: 121 MG/DL (ref 70–110)
GLUCOSE SERPL-MCNC: 73 MG/DL (ref 70–110)
GLUCOSE SERPL-MCNC: 89 MG/DL (ref 70–110)
GLUCOSE SERPL-MCNC: 95 MG/DL (ref 70–110)
HCT VFR BLD AUTO: 39.5 % (ref 40–54)
HGB BLD-MCNC: 12.6 G/DL (ref 14–18)
IMM GRANULOCYTES # BLD AUTO: 0.04 K/UL (ref 0–0.04)
IMM GRANULOCYTES NFR BLD AUTO: 0.4 % (ref 0–0.5)
LYMPHOCYTES # BLD AUTO: 2.1 K/UL (ref 1–4.8)
LYMPHOCYTES NFR BLD: 20.8 % (ref 18–48)
MAGNESIUM SERPL-MCNC: 1.7 MG/DL (ref 1.6–2.6)
MCH RBC QN AUTO: 30.1 PG (ref 27–31)
MCHC RBC AUTO-ENTMCNC: 31.9 G/DL (ref 32–36)
MCV RBC AUTO: 95 FL (ref 82–98)
MONOCYTES # BLD AUTO: 0.8 K/UL (ref 0.3–1)
MONOCYTES NFR BLD: 8.3 % (ref 4–15)
NEUTROPHILS # BLD AUTO: 6.7 K/UL (ref 1.8–7.7)
NEUTROPHILS NFR BLD: 68 % (ref 38–73)
NRBC BLD-RTO: 0 /100 WBC
PLATELET # BLD AUTO: 244 K/UL (ref 150–450)
PMV BLD AUTO: 9.4 FL (ref 9.2–12.9)
POTASSIUM SERPL-SCNC: 3.6 MMOL/L (ref 3.5–5.1)
RBC # BLD AUTO: 4.18 M/UL (ref 4.6–6.2)
SODIUM SERPL-SCNC: 137 MMOL/L (ref 136–145)
WBC # BLD AUTO: 9.86 K/UL (ref 3.9–12.7)

## 2023-01-04 PROCEDURE — 36415 COLL VENOUS BLD VENIPUNCTURE: CPT | Performed by: FAMILY MEDICINE

## 2023-01-04 PROCEDURE — 25000003 PHARM REV CODE 250: Performed by: STUDENT IN AN ORGANIZED HEALTH CARE EDUCATION/TRAINING PROGRAM

## 2023-01-04 PROCEDURE — 97535 SELF CARE MNGMENT TRAINING: CPT

## 2023-01-04 PROCEDURE — 51798 US URINE CAPACITY MEASURE: CPT

## 2023-01-04 PROCEDURE — 63600175 PHARM REV CODE 636 W HCPCS: Performed by: STUDENT IN AN ORGANIZED HEALTH CARE EDUCATION/TRAINING PROGRAM

## 2023-01-04 PROCEDURE — 80048 BASIC METABOLIC PNL TOTAL CA: CPT | Performed by: FAMILY MEDICINE

## 2023-01-04 PROCEDURE — 25000003 PHARM REV CODE 250: Performed by: FAMILY MEDICINE

## 2023-01-04 PROCEDURE — 25000003 PHARM REV CODE 250

## 2023-01-04 PROCEDURE — 85025 COMPLETE CBC W/AUTO DIFF WBC: CPT | Performed by: FAMILY MEDICINE

## 2023-01-04 PROCEDURE — 12000002 HC ACUTE/MED SURGE SEMI-PRIVATE ROOM

## 2023-01-04 PROCEDURE — 63600175 PHARM REV CODE 636 W HCPCS: Performed by: FAMILY MEDICINE

## 2023-01-04 PROCEDURE — 97530 THERAPEUTIC ACTIVITIES: CPT | Mod: CQ

## 2023-01-04 PROCEDURE — 83735 ASSAY OF MAGNESIUM: CPT | Performed by: FAMILY MEDICINE

## 2023-01-04 PROCEDURE — 25000003 PHARM REV CODE 250: Performed by: INTERNAL MEDICINE

## 2023-01-04 RX ORDER — DULOXETIN HYDROCHLORIDE 30 MG/1
30 CAPSULE, DELAYED RELEASE ORAL 2 TIMES DAILY
Status: DISCONTINUED | OUTPATIENT
Start: 2023-01-04 | End: 2023-01-09 | Stop reason: HOSPADM

## 2023-01-04 RX ORDER — LOSARTAN POTASSIUM 50 MG/1
50 TABLET ORAL DAILY
Status: DISCONTINUED | OUTPATIENT
Start: 2023-01-04 | End: 2023-01-05

## 2023-01-04 RX ORDER — HYDRALAZINE HYDROCHLORIDE 20 MG/ML
10 INJECTION INTRAMUSCULAR; INTRAVENOUS EVERY 6 HOURS PRN
Status: DISCONTINUED | OUTPATIENT
Start: 2023-01-04 | End: 2023-01-09 | Stop reason: HOSPADM

## 2023-01-04 RX ORDER — PHENAZOPYRIDINE HYDROCHLORIDE 100 MG/1
100 TABLET, FILM COATED ORAL
Status: DISCONTINUED | OUTPATIENT
Start: 2023-01-04 | End: 2023-01-05

## 2023-01-04 RX ORDER — TAMSULOSIN HYDROCHLORIDE 0.4 MG/1
0.4 CAPSULE ORAL DAILY
Status: DISCONTINUED | OUTPATIENT
Start: 2023-01-04 | End: 2023-01-07

## 2023-01-04 RX ADMIN — HYDROCODONE BITARTRATE AND ACETAMINOPHEN 1 TABLET: 5; 325 TABLET ORAL at 09:01

## 2023-01-04 RX ADMIN — AMIODARONE HYDROCHLORIDE 100 MG: 100 TABLET ORAL at 09:01

## 2023-01-04 RX ADMIN — PIPERACILLIN SODIUM AND TAZOBACTAM SODIUM 3.38 G: 3; .375 INJECTION, POWDER, LYOPHILIZED, FOR SOLUTION INTRAVENOUS at 02:01

## 2023-01-04 RX ADMIN — MUPIROCIN 1 G: 20 OINTMENT TOPICAL at 08:01

## 2023-01-04 RX ADMIN — ATORVASTATIN CALCIUM 80 MG: 40 TABLET, FILM COATED ORAL at 08:01

## 2023-01-04 RX ADMIN — OXYBUTYNIN CHLORIDE 10 MG: 5 TABLET, EXTENDED RELEASE ORAL at 09:01

## 2023-01-04 RX ADMIN — PIPERACILLIN SODIUM AND TAZOBACTAM SODIUM 3.38 G: 3; .375 INJECTION, POWDER, LYOPHILIZED, FOR SOLUTION INTRAVENOUS at 08:01

## 2023-01-04 RX ADMIN — PROPRANOLOL HYDROCHLORIDE 20 MG: 10 TABLET ORAL at 08:01

## 2023-01-04 RX ADMIN — TAMSULOSIN HYDROCHLORIDE 0.4 MG: 0.4 CAPSULE ORAL at 03:01

## 2023-01-04 RX ADMIN — HYDROCODONE BITARTRATE AND ACETAMINOPHEN 1 TABLET: 5; 325 TABLET ORAL at 10:01

## 2023-01-04 RX ADMIN — PIPERACILLIN SODIUM AND TAZOBACTAM SODIUM 3.38 G: 3; .375 INJECTION, POWDER, LYOPHILIZED, FOR SOLUTION INTRAVENOUS at 04:01

## 2023-01-04 RX ADMIN — MUPIROCIN 1 G: 20 OINTMENT TOPICAL at 09:01

## 2023-01-04 RX ADMIN — APIXABAN 5 MG: 5 TABLET, FILM COATED ORAL at 08:01

## 2023-01-04 RX ADMIN — APIXABAN 5 MG: 5 TABLET, FILM COATED ORAL at 09:01

## 2023-01-04 RX ADMIN — PROPRANOLOL HYDROCHLORIDE 20 MG: 10 TABLET ORAL at 09:01

## 2023-01-04 RX ADMIN — LOSARTAN POTASSIUM 50 MG: 50 TABLET, FILM COATED ORAL at 02:01

## 2023-01-04 RX ADMIN — DULOXETINE HYDROCHLORIDE 40 MG: 20 CAPSULE, DELAYED RELEASE ORAL at 09:01

## 2023-01-04 RX ADMIN — CHLORHEXIDINE GLUCONATE 15 ML: 1.2 RINSE ORAL at 08:01

## 2023-01-04 RX ADMIN — CHLORHEXIDINE GLUCONATE 15 ML: 1.2 RINSE ORAL at 09:01

## 2023-01-04 RX ADMIN — DULOXETINE 30 MG: 30 CAPSULE, DELAYED RELEASE ORAL at 08:01

## 2023-01-04 RX ADMIN — HYDROCODONE BITARTRATE AND ACETAMINOPHEN 1 TABLET: 5; 325 TABLET ORAL at 03:01

## 2023-01-04 RX ADMIN — HYDROCODONE BITARTRATE AND ACETAMINOPHEN 1 TABLET: 5; 325 TABLET ORAL at 04:01

## 2023-01-04 RX ADMIN — PHENAZOPYRIDINE 100 MG: 100 TABLET ORAL at 06:01

## 2023-01-04 NOTE — PROGRESS NOTES
"[facility] Medicine    Progress Note    Patient Name: Hiro Rodríguez  MRN: [id number]  Patient Class: IP- Inpatient   Admission Date: 12/30/2022 11:24 PM  Length of Stay: 4  Attending Physician: Jony López MD  Primary Care Provider: Gautam Castanon III, MD  Face-to-Face encounter date: 01/04/2023  Code status:  Chief Complaint: Fall (Pt arrived by ems after a pt fell after having a dizziness episode. Denies loc. Pt hit the side of his head on a marble table tonight. Pt denies any pain./Pt was seen two weeks ago for a fall secondary to dizziness, pt was dx with a head bleed. Pt restarted his blood thinners once home. )        Subjective:    HPI:Hiro Rodríguez is a 77 y.o. White male   With PMH of chronic subdural hematoma (11/8/22),  pAF on eliquis, DM2, HTN, multiple falls 2/2 polypharmacy,  who presents with fall.     Onset this evening  Occurred after experiencing dizziness  No LOC  +head trauma on marble side table  Pt discharged from [facility] on 11/18/22  He was treated for subdural hematoma due to fall  Since discharge, pt has had poor po intake  Wife reports "he is just stubborn" and refusing to eat/drink  No n/v  No subjective fever   No chills  No abdominal pain  Previous dysuria, since resolved  No diarrhea  +generalized weakness  +fatigue     On 12/28:  new dysuria, urgency, frequency  Saw outpt PCP on 12/29:  started on cipro  BP at that visit was noted 94/57     Pt initially presented to ER today normotensive  While sleeping, noted BP 55/30  Did not recover with positional changes  Pt's BP now responding to fluid resuscitation    Interval History:   12/31  Patient was seen and examined along with the nurse  Tried to talk with the patient and he said let me wake up first   Try to open the eye son look at the pupil but he intentionally close the eyes despite asked to open  Denied having pain anywhere in the body  Pressure is low again and fibrous cc bolus given  Urine " toxicology findings noted.  He did not answer the question whether he took extra pills are not     1/1/23  Patient is awake and alert and normal and talking normal   Mild abdominal pain . BP stable . But over day nausea and vomiting . Abdominal pain .  HIDA ordered since gall bladder distended      1/2/23  Hospital course  Patient was admitted with altered mental status and possible sepsis.  CT scan of the abdomen was done because of abdominal pain and no acute finding but gallbladder distention and HIDA scan was done and negative  Today patient wake up and acting normal and had normal conversation.  He was not doing well with repeated falls after he had subdural hematoma the last time.  Said he will go home if I can arrange the hospital bed .  But later he changed her mind and he wants to go to rehab or skilled facility  K very low and supplementing     1/3:  Patient is AO x3, and states that he would like to go to a facility to get his strength back.  Labs currently pending.  Surgery evaluated patient and stated that patient does not have cholecystitis hence no plans for surgical intervention.  No concerns/issues overnight reported by the patient or the nursing staff.    1/4:  Patient complaining of burning on urination, will start patient on Pyridium, patient has completed 5 days of IV antibiotics.  Awaiting placement.  Spoke to wife yesterday and she stated that patient's depression has worsened, would increase his duloxetine to 30 mg b.i.d. and titrate upwards as tolerated.  Blood pressure is uncontrolled resume patient's losartan 50 mg daily and titrate upwards as tolerated.    Review of Systems All other Review of Systems were found to be negative expect for that mentioned already in HPI.     Objective:     Vitals:    01/04/23 0419 01/04/23 0800 01/04/23 0908 01/04/23 1210   BP:  (!) 155/99 (!) 155/99 (!) 168/100   BP Location:       Pulse:  74  89   Resp: 18 18 18 18   Temp:  97.6 °F (36.4 °C)  98.2 °F (36.8  °C)   TempSrc:  Oral  Oral   SpO2:  98%  99%   Weight:       Height:            Vitals reviewed.  Constitutional: No distress.   HENT: NC  Head: Atraumatic.   Cardiovascular: Normal rate, regular rhythm and normal heart sounds.   Pulmonary/Chest: Effort normal. No wheezes.   Abdominal: Soft. Bowel sounds are normal. No distension and no mass. No tenderness  Neurological: Alert.   Skin: Skin is warm and dry.   Psych: Appropriate mood and affect    Following labs were Reviewed   CBC:  Recent Labs   Lab 01/04/23 0557   WBC 9.86   HGB 12.6*   HCT 39.5*        CMP:  Recent Labs   Lab 01/04/23 0557   CALCIUM 8.2*      K 3.6   CO2 24      BUN 14   CREATININE 0.7       Micro Results  Microbiology Results (last 7 days)       Procedure Component Value Units Date/Time    Blood culture #2 **CANNOT BE ORDERED STAT** [[id number]] Collected: 12/31/22 0040    Order Status: Completed Specimen: Blood from Peripheral, Forearm, Right Updated: 01/04/23 0232     Blood Culture, Routine No Growth to date      No Growth to date      No Growth to date      No Growth to date      No Growth to date    Blood culture #1 **CANNOT BE ORDERED STAT** [[id number]] Collected: 12/31/22 0100    Order Status: Completed Specimen: Blood from Peripheral, Upper Arm, Right Updated: 01/04/23 0232     Blood Culture, Routine No Growth to date      No Growth to date      No Growth to date      No Growth to date      No Growth to date    Urine culture [[id number]] Collected: 12/31/22 0141    Order Status: Completed Specimen: Urine Updated: 01/02/23 0659     Urine Culture, Routine No growth    Narrative:      Specimen Source->Urine    Respiratory Infection Panel (PCR), Nasopharyngeal [[id number]] Collected: 12/31/22 0415    Order Status: Completed Updated: 12/31/22 1841     Respiratory Infection Panel Source NP swab     Adenovirus Not Detected     Coronavirus 229E, Common Cold Virus Not Detected     Coronavirus HKU1, Common Cold Virus Not Detected      Coronavirus NL63, Common Cold Virus Not Detected     Coronavirus OC43, Common Cold Virus Not Detected     Comment: The Coronavirus strains detected in this test cause the common cold.  These strains are not the COVID-19 (novel Coronavirus)strain   associated with the respiratory disease outbreak.          SARS-CoV2 (COVID-19) Qualitative PCR Not Detected     Human Metapneumovirus Not Detected     Human Rhinovirus/Enterovirus Not Detected     Influenza A (subtypes H1, H1-2009,H3) Not Detected     Influenza B Not Detected     Parainfluenza Virus 1 Not Detected     Parainfluenza Virus 2 Not Detected     Parainfluenza Virus 3 Not Detected     Parainfluenza Virus 4 Not Detected     Respiratory Syncytial Virus Not Detected     Bordetella Parapertussis (ZK2799) Not Detected     Bordetella pertussis (ptxP) Not Detected     Chlamydia pneumoniae Not Detected     Mycoplasma pneumoniae Not Detected     Comment: Respiratory Infection Panel testing performed by Multiplex PCR.       Narrative:      Specimen Source->Nasopharyngeal Swab    MRSA Screen by PCR [[id number]] Collected: 12/31/22 0415    Order Status: Completed Specimen: Nasopharyngeal Swab from Nasal Updated: 12/31/22 0553     MRSA SCREEN BY PCR Negative    Resp Viral Panel PCR, Peds Under 7 Months Nasopharyngeal Swab [[id number]] Collected: 12/31/22 0415    Order Status: Canceled              Radiology Reports  X-Ray Femur 2 View Left    Result Date: 12/16/2022  EXAMINATION: XR FEMUR 2 VIEW LEFT CLINICAL HISTORY: Other fracture of left femur, initial encounter for closed fracture TECHNIQUE: AP and lateral views of the left femur were performed. COMPARISON: 11/08/2022 FINDINGS: There is internal fixation spanning a femoral neck fracture.  There is also knee arthroplasty hardware.  No abnormal lucency surrounding hardware.  No detrimental change in alignment.  Surgical clips medial left thigh.  Small knee joint effusion. Electronically signed by: Yuval Felton  Date:    12/16/2022 Time:    11:51    CT Head Without Contrast    Result Date: 12/31/2022  EXAM DESCRIPTION: CT HEAD WITHOUT IV CONTRAST CLINICAL HISTORY: Head trauma, minor (Age >= 65y); head bleed 2 weeks ago and back on eliquis. Patient fell after having a dizziness episode. Patient hit the side of his head on a marble table tonight. Patient was seen 2 weeks ago for a fall secondary to dizziness. Patient was diagnosed with a head bleed. TECHNIQUE: Multiple axial images were obtained through the brain without intravenous contrast. Coronal and sagittal images were reconstructed. All CT scans at this facility use dose modulation, iterative reconstruction, and/or weight based dosing when appropriate to reduce radiation dose to as low as reasonably achievable. COMPARISON: Previous head CT images dated 12/19/2022. The prior CT report is not available at this time. Reference is also made to previous head CT and report dated 11/18/2022. FINDINGS: There is no evidence of acute intracranial hemorrhage or acute major territorial infarction. The ventricles, sulci and cisterns are grossly unchanged in size. There is no midline shift. There is a minimal chronic subdural hematoma or hygroma overlying the right frontal convexity measuring approximately 4 mm in thickness on the coronal images, grossly unchanged in extent from the previous CT, and improved from the prior dated 11/18/2022. Subcortical and periventricular white matter hypodensities are seen, compatible with chronic microangiopathic disease, grossly unchanged. Vascular calcifications. Mild mucosal thickening as well as an air-fluid level within the left maxillary sinus. The mastoid air cells are clear bilaterally. The calvarium appears grossly unremarkable. Slight scarring along the left parietal scalp. IMPRESSION: 1.  No acute intracranial abnormality. 2.  Minimal chronic subdural hematoma or hygroma overlying the right frontal convexity, grossly unchanged in extent  from the previous CT, and improved from the prior dated 11/18/2022. 3.  White matter changes of chronic microangiopathic disease, grossly unchanged. 4.  Mild acute sinusitis involving the left maxillary sinus. Electronically signed by:  Sharon Brownlee MD  12/31/2022 12:45 AM CST Workstation: [id number]    CT Head Without Contrast    Result Date: 12/19/2022  EXAMINATION: CT HEAD WITHOUT CONTRAST CLINICAL HISTORY: contusion follow up; Traumatic subdural hemorrhage with loss of consciousness of unspecified duration, subsequent encounter TECHNIQUE: Low dose axial images were obtained through the head.  Coronal and sagittal reformations were also performed. Contrast was not administered. COMPARISON: 11/18/2022 FINDINGS: The examination demonstrates resolution of the previous identified right-sided convexity subdural collection.  There is also evolution of the previous right inferolateral temporal parenchymal contusion, now showing only a small area of hypoattenuation in the temporal lobe laterally.  No new hemorrhage is seen.  There is no midline shift.  Ventricles and sulci demonstrate mild involutional changes which are appropriate for the patient's age. Bone windows demonstrate trace fluid or membrane thickening in the right mastoid air cells.  There is no bone destruction or fracture.     Resolution of the right convexity subdural collection and evolution of the right temporal hematoma.  No new abnormalities. Electronically signed by: Shyam Hoffman MD Date:    12/19/2022 Time:    14:35    CT Cervical Spine Without Contrast    Result Date: 12/31/2022  EXAM DESCRIPTION: CT CERVICAL SPINE WITHOUT IV CONTRAST CLINICAL HISTORY: Neck trauma (Age >= 65y). Patient fell after having a dizziness episode. Patient hit the side of his head on a marble table tonight. Patient was seen 2 weeks ago for a fall secondary to dizziness. Patient was diagnosed with a head bleed. TECHNIQUE: Multiple axial images were obtained through  the cervical spine without intravenous contrast. Coronal and sagittal images were reconstructed. All CT scans at this facility use dose modulation, iterative reconstruction, and/or weight based dosing when appropriate to reduce radiation dose to as low as reasonably achievable. COMPARISON: Previous cervical spine CT dated 11/8/2022. FINDINGS: There is straightening of the normal cervical lordosis, possibly positional or secondary to muscle spasm, and in retrospect unchanged. The vertebral heights are preserved. Scattered disc space narrowing, greatest at C5-C6, grossly unchanged. There is no acute fracture or subluxation. No epidural hematoma is visible. The prevertebral soft tissues are grossly unremarkable. The visualized lung apices are clear. Vascular calcifications. The visualized paranasal sinuses are clear. The mastoid air cells are clear. Degenerative changes along the cervical spine. Secondary to broad-based posterior disc osteophyte complex, there is mild to moderate central canal stenosis at C5-C6 6. Multilevel neural foraminal narrowing secondary to hypertrophic changes, with moderate left neuroforaminal narrowing at C3-C4 and C4-C5 and moderate to severe on the right at C5-C6, and mild at other levels. IMPRESSION: 1.  No evidence of acute fracture or subluxation of the cervical spine. 2.  Multilevel degenerative changes of the cervical spine, as above. Electronically signed by:  Sharon Brownlee MD  12/31/2022 12:52 AM CST Workstation: [id number]    NM Hepatobiliary Scan (HIDA)    Result Date: 1/2/2023  Reason: Cholecystitis (Ped 0-18y); nausea , vomiting  and distended gall bladder and sepsis ? Radiopharmaceutical: 7 mCi Technetium 99m Choletec. COMPARISON: CT 12/31/2022 FINDINGS: Tomographic images show uniform hepatic uptake and clearance of radiopharmaceutical. Gallbladder filling and biliary to bowel transit occur within 60 minutes. Imaging was terminated at 33 minutes, due to patient's inability  to tolerate supine imaging without moving. IMPRESSION: Normal hepatobiliary scan. Electronically signed by:  Esteban Flores MD  1/2/2023 12:21 PM CST Workstation: [id number]    X-Ray Chest AP Portable    Result Date: 12/31/2022  EXAMINATION: XR CHEST AP PORTABLE CLINICAL INDICATION: Male, 77 years old. Dizziness COMPARISON: November 10, 2022 and November 8, 2022 x-ray chest, and July 11, 2022 CT chest. FINDINGS: Support Devices: None. Heart: Unchanged Cardiomegaly. Mediastinum: Mediastinal contours are normal. Lungs: Pulmonary vasculature is prominent. Lungs are well aerated and clear. Slightly increased diffuse opacification of the right lung in comparison with the left is felt to be positional and anatomical. Pleura: No pleural effusion is identified. No pneumothorax is present. Osseous Structures: Visualized skeleton is normal. IMPRESSION: 1. Stable cardiomegaly and prominent pulmonary vasculature, compatible with central vascular fullness without pulmonary edema. Electronically signed by:  Eron Bolivar MD  12/31/2022 6:47 AM CST Workstation: [id number]    CT Abdomen Pelvis  Without Contrast    Result Date: 12/31/2022  EXAM DESCRIPTION: CT ABDOMEN PELVIS WITHOUT CONTRAST RadLex: CT ABDOMEN PELVIS WITHOUT IV CONTRAST CLINICAL HISTORY: Abdominal abscess/infection suspected. TECHNIQUE: CT of the abdomen and pelvis without contrast. All CT scans at this facility use dose modulation, iterative reconstruction, and/or weight based dosing when appropriate to reduce radiation dose to as low as reasonably achievable. COMPARISON: None. FINDINGS: The visualized lower thoracic structures demonstrate coronary artery calcifications. Unenhanced images of the liver, spleen, pancreas and adrenal glands appear grossly unremarkable. The gallbladder appears slightly distended. There are multiple bilateral renal calculi measuring up to 1 cm on the right. There is no hydronephrosis bilaterally. No retroperitoneal or mesenteric  lymphadenopathy is identified. No abdominal aortic aneurysm is seen. No bowel obstruction is seen. There is no free intraperitoneal air. The appendix appears unremarkable. There is colonic diverticulosis without definite CT evidence for acute diverticulitis. There is mild fecal loading throughout the colon. No free fluid is identified. Urinary bladder is decompressed. Small prostatic calcifications are present. Prostate gland measures approximately 5.8 cm TR by 3.5 cm AP by 4 cm CC. There are small bilateral inguinal hernias containing fat. There are postsurgical changes at the proximal left femur. No acute fracture is seen. IMPRESSION: 1.  Slight gallbladder distention. 2.  Colonic diverticulosis without definite CT evidence for acute diverticulitis. 3.  Nonobstructive bilateral renal calculi. Electronically signed by:  Brandt Carl DO  12/31/2022 2:35 AM CST Workstation: [id number]    X-Ray KUB    Result Date: 1/1/2023  KUB Clinical history is vomiting COMPARISON: CT abdomen and pelvis dated 12/31/2022 There is a normal bowel gas pattern. No abnormal soft tissue mass or organomegaly. There are bilateral renal calculi. There are degenerative changes of the spine. IMPRESSION: Unremarkable bowel gas pattern Bilateral renal calculi Electronically signed by:  Madeleine Griffin MD  1/1/2023 3:35 PM CST Workstation: [id number]       Meds  Scheduled Meds:   amiodarone  100 mg Oral QAM    apixaban  5 mg Oral BID    atorvastatin  80 mg Oral Daily    chlorhexidine  15 mL Mouth/Throat BID    DULoxetine  40 mg Oral Daily    mupirocin   Nasal BID    oxybutynin  10 mg Oral Daily    phenazopyridine  100 mg Oral TID WM    piperacillin-tazobactam (ZOSYN) IVPB  3.375 g Intravenous Q8H    propranoloL  20 mg Oral BID    sodium chloride 0.9%  500 mL Intravenous Once     Continuous Infusions:   sodium chloride 0.9% Stopped (01/01/23 0315)     PRN Meds:.acetaminophen, albuterol-ipratropium, calcium gluconate IVPB, calcium gluconate IVPB,  calcium gluconate IVPB, dextrose 10%, dextrose 10%, glucagon (human recombinant), glucose, glucose, HYDROcodone-acetaminophen, insulin aspart U-100, magnesium sulfate IVPB, magnesium sulfate IVPB, metoclopramide HCl, naloxone, polyethylene glycol, potassium chloride **AND** potassium chloride **AND** potassium chloride, promethazine (PHENERGAN) IVPB, senna-docusate 8.6-50 mg, simethicone, sodium chloride 0.9%, sodium phosphate IVPB, sodium phosphate IVPB, sodium phosphate IVPB.    Active PT: Yes  Active OT: Yes  Active SLP: No  Assessment & Plan:     Active Hospital Problems    Diagnosis    *Severe sepsis    Subdural hematoma caused by concussion    Chronic heart failure with preserved ejection fraction    Benign essential tremor    Type 2 diabetes mellitus with diabetic nephropathy, without long-term current use of insulin    Anticoagulated    Atrial fibrillation    Aspirin long-term use    Hypertension, essential   PLAN   Altered mental status most likely from polypharmacy and currently resolved and possible from chronic subdural hematoma   KAM on CKD resolved   Complete IV antibiotics  Resume home medication of losartan and titrate upwards as tolerated  Resume home eliquis   PT and placement consulted            Discharge Planning:   Is the patient medically ready for discharge?: no    Reason for patient still in hospital (select all that apply): Patient trending condition and Treatment    Above encounter included review of the medical records, interviewing and examining the patient face-to-face, discussion with family and other health care providers, ordering and interpreting lab/test results and formulating a plan of care.     Medical Decision Making:      [_] Low Complexity  [_] Moderate Complexity  [x] High Complexity      Jony López MD  Department of Hospital Medicine   [facility] [place]

## 2023-01-04 NOTE — PT/OT/SLP PROGRESS
Physical Therapy Treatment    Patient Name:  Hiro Rodríguez   MRN:  [id number]    Recommendations:     Discharge Recommendations: nursing facility, skilled  Discharge Equipment Recommendations:  (TBD at next level. PT recommending pt/wife modify their bed at home to lower the height for increased safety.)  Barriers to discharge:  increased assist with mobility    Assessment:     Hiro Rodríguez is a 77 y.o. male admitted with a medical diagnosis of Severe sepsis.  He presents with the following impairments/functional limitations: weakness, impaired endurance, impaired self care skills, impaired functional mobility, gait instability, impaired balance, decreased safety awareness, decreased lower extremity function, decreased upper extremity function, pain, impaired fine motor.    Pt initially declined visit but RN convinced pt to participate. Pt c/o 7/10 abdominal pain and feeling miserable. Pt requested to be cleaned up first, so extender was notified and cleaned pt up. When therapist returned pt agreeable to sit up in chair after working with OT sitting EOB. Pt required min assist for transfers with verbal cuing for sequencing. Pt is hard or hearing.    Rehab Prognosis: Fair; patient would benefit from acute skilled PT services to address these deficits and reach maximum level of function.    Recent Surgery: * No surgery found *      Plan:     During this hospitalization, patient to be seen 6 x/week to address the identified rehab impairments via gait training, therapeutic activities, therapeutic exercises, neuromuscular re-education and progress toward the following goals:    Plan of Care Expires:  02/03/23    Subjective     Chief Complaint: pt c/o stomach/abdominal pain  Patient/Family Comments/goals: to get stronger  Pain/Comfort:  Pain Rating 1: 7/10  Location 1: abdomen  Pain Addressed 1: Reposition, Distraction, Pre-medicate for activity, Nurse notified, Cessation of Activity      Objective:      Communicated with RNNacho, prior to session.  Patient found  sitting EOB  with peripheral IV, telemetry upon PT entry to room.     General Precautions: Standard, fall, diabetic, hearing impaired  Orthopedic Precautions: N/A  Braces: N/A  Respiratory Status: Room air     Functional Mobility:  Transfers:     Sit to Stand:  minimum assistance with rolling walker  Gait: x 3 steps to chair with RW and Gilson w/ VC for sequencing.      AM-PAC 6 CLICK MOBILITY          Treatment & Education:  Pt educated on importance of time OOB, importance of intermittent mobility, safe techniques for transfers/ambulation, discharge recommendations/options, and use of call light for assistance and fall prevention.      Patient left up in chair with all lines intact, call button in reach, and RN notified..    GOALS:   Multidisciplinary Problems       Physical Therapy Goals          Problem: Physical Therapy    Goal Priority Disciplines Outcome Goal Variances Interventions   Physical Therapy Goal     PT, PT/OT      Description: Goals to be met by: 2/3/23     Patient will increase functional independence with mobility by performin. Supine to sit with Supervision  2. Sit to stand transfer with Supervision  3. Bed to chair transfer with Supervision using Rolling Walker  4. Gait  x 150 feet with Supervision using Rolling Walker.                              Time Tracking:     PT Received On: 23  PT Start Time: 1110     PT Stop Time: 1119  PT Total Time (min): 9 min     Billable Minutes: Therapeutic Activity 9    Treatment Type: Treatment  PT/PTA: PTA     PTA Visit Number: 2023

## 2023-01-04 NOTE — PT/OT/SLP PROGRESS
Occupational Therapy   Treatment    Name: Hiro Rodríguez  MRN: [id number]  Admitting Diagnosis:  Severe sepsis       Recommendations:     Discharge Recommendations: nursing facility, skilled  Discharge Equipment Recommendations:   (TBD next level of care)  Barriers to discharge:  Decreased caregiver support    Assessment:     Hiro Rodríguez is a 77 y.o. male with a medical diagnosis of Severe sepsis.  Pt agreeable to OT therapy session this AM. Performance deficits affecting function are weakness, impaired endurance, impaired self care skills, impaired functional mobility, gait instability, impaired balance, decreased safety awareness, pain, impaired cardiopulmonary response to activity.     Rehab Prognosis:  Good; patient would benefit from acute skilled OT services to address these deficits and reach maximum level of function.       Plan:     Patient to be seen 5 x/week to address the above listed problems via self-care/home management, therapeutic exercises, therapeutic activities  Plan of Care Expires: 02/03/23  Plan of Care Reviewed with: patient    Subjective     Pain/Comfort:  Pain Rating 1: 0/10    Objective:     Communicated with: nursing prior to session.  Patient found HOB elevated with peripheral IV, telemetry, bed alarm upon OT entry to room.    General Precautions: Standard, fall, hearing impaired    Orthopedic Precautions:N/A  Braces: N/A  Respiratory Status: Room air     Occupational Performance:     Bed Mobility:    Patient completed Supine to Sit with minimum assistance and with side rail     Activities of Daily Living:  Lower Body Dressing: stand by assistance seated EOB to don/doff socks with AD (dressing stick, reacher, sock aid); pt knowledgeable with equipment and able to use with no cues/assist needed    Therapeutic Exercise:  UE exercises with tband x 10 reps in all major planes seated EOB with SBA; pt tolerated well    Treatment & Education:  Pt educated on role of OT/POC, importance  of OOB/EOB activity, use of call bell, and safety during ADLs, transfers, and functional mobility.    Patient left sitting edge of bed with all lines intact, call button in reach, and physical therapist present    GOALS:   Multidisciplinary Problems       Occupational Therapy Goals          Problem: Occupational Therapy    Goal Priority Disciplines Outcome Interventions   Occupational Therapy Goal     OT, PT/OT     Description: Goals to be met by: 2/3/23    Patient will increase functional independence with ADLs by performing:    UE Dressing with Supervision.  LE Dressing with Supervision.  Grooming while standing at sink with Supervision.  Toileting from toilet with Supervision for hygiene and clothing management.   Toilet transfer to toilet with Supervision.                         Time Tracking:     OT Date of Treatment: 01/04/23  OT Start Time: 1057  OT Stop Time: 1111  OT Total Time (min): 14 min    Billable Minutes:Self Care/Home Management 14    OT/PILO: OT          1/4/2023

## 2023-01-04 NOTE — PLAN OF CARE
LAURA Lomeli at [facility], pt accepted and will submit to [facility] for auth, today.\     01/04/23 0927   Post-Acute Status   Post-Acute Authorization Placement   Post-Acute Placement Status Pending payor review/awaiting authorization (if required)   Discharge Plan   Discharge Plan A Skilled Nursing Facility

## 2023-01-04 NOTE — PLAN OF CARE
Problem: Adult Inpatient Plan of Care  Goal: Plan of Care Review  Outcome: Ongoing, Progressing     Problem: Adult Inpatient Plan of Care  Goal: Optimal Comfort and Wellbeing  Outcome: Ongoing, Progressing     Problem: Adjustment to Illness (Sepsis/Septic Shock)  Goal: Optimal Coping  Outcome: Ongoing, Progressing

## 2023-01-04 NOTE — PLAN OF CARE
Pt not accepted at [facility], referral sent to other SNFs in the area via [facility].  Pending accepting facility.     01/04/23 5171   Post-Acute Status   Post-Acute Authorization Placement   Post-Acute Placement Status Pending post-acute provider review/more information requested   Discharge Plan   Discharge Plan A Skilled Nursing Facility

## 2023-01-05 LAB
ANION GAP SERPL CALC-SCNC: 7 MMOL/L (ref 8–16)
BACTERIA BLD CULT: NORMAL
BACTERIA BLD CULT: NORMAL
BASOPHILS # BLD AUTO: 0.03 K/UL (ref 0–0.2)
BASOPHILS NFR BLD: 0.3 % (ref 0–1.9)
BUN SERPL-MCNC: 12 MG/DL (ref 8–23)
CALCIUM SERPL-MCNC: 7.8 MG/DL (ref 8.7–10.5)
CHLORIDE SERPL-SCNC: 105 MMOL/L (ref 95–110)
CO2 SERPL-SCNC: 25 MMOL/L (ref 23–29)
CREAT SERPL-MCNC: 0.6 MG/DL (ref 0.5–1.4)
DIFFERENTIAL METHOD: ABNORMAL
EOSINOPHIL # BLD AUTO: 0.2 K/UL (ref 0–0.5)
EOSINOPHIL NFR BLD: 1.4 % (ref 0–8)
ERYTHROCYTE [DISTWIDTH] IN BLOOD BY AUTOMATED COUNT: 14 % (ref 11.5–14.5)
EST. GFR  (NO RACE VARIABLE): >60 ML/MIN/1.73 M^2
GLUCOSE SERPL-MCNC: 105 MG/DL (ref 70–110)
GLUCOSE SERPL-MCNC: 111 MG/DL (ref 70–110)
GLUCOSE SERPL-MCNC: 117 MG/DL (ref 70–110)
GLUCOSE SERPL-MCNC: 130 MG/DL (ref 70–110)
GLUCOSE SERPL-MCNC: 132 MG/DL (ref 70–110)
HCT VFR BLD AUTO: 39.1 % (ref 40–54)
HGB BLD-MCNC: 12.6 G/DL (ref 14–18)
IMM GRANULOCYTES # BLD AUTO: 0.05 K/UL (ref 0–0.04)
IMM GRANULOCYTES NFR BLD AUTO: 0.5 % (ref 0–0.5)
LYMPHOCYTES # BLD AUTO: 2.1 K/UL (ref 1–4.8)
LYMPHOCYTES NFR BLD: 19.9 % (ref 18–48)
MAGNESIUM SERPL-MCNC: 1.7 MG/DL (ref 1.6–2.6)
MCH RBC QN AUTO: 30.4 PG (ref 27–31)
MCHC RBC AUTO-ENTMCNC: 32.2 G/DL (ref 32–36)
MCV RBC AUTO: 94 FL (ref 82–98)
MONOCYTES # BLD AUTO: 0.8 K/UL (ref 0.3–1)
MONOCYTES NFR BLD: 8 % (ref 4–15)
NEUTROPHILS # BLD AUTO: 7.3 K/UL (ref 1.8–7.7)
NEUTROPHILS NFR BLD: 69.9 % (ref 38–73)
NRBC BLD-RTO: 0 /100 WBC
PLATELET # BLD AUTO: 253 K/UL (ref 150–450)
PMV BLD AUTO: 9.8 FL (ref 9.2–12.9)
POTASSIUM SERPL-SCNC: 3.3 MMOL/L (ref 3.5–5.1)
RBC # BLD AUTO: 4.14 M/UL (ref 4.6–6.2)
SODIUM SERPL-SCNC: 137 MMOL/L (ref 136–145)
WBC # BLD AUTO: 10.4 K/UL (ref 3.9–12.7)

## 2023-01-05 PROCEDURE — 80048 BASIC METABOLIC PNL TOTAL CA: CPT | Performed by: FAMILY MEDICINE

## 2023-01-05 PROCEDURE — 25000003 PHARM REV CODE 250: Performed by: INTERNAL MEDICINE

## 2023-01-05 PROCEDURE — 12000002 HC ACUTE/MED SURGE SEMI-PRIVATE ROOM

## 2023-01-05 PROCEDURE — 25000003 PHARM REV CODE 250: Performed by: STUDENT IN AN ORGANIZED HEALTH CARE EDUCATION/TRAINING PROGRAM

## 2023-01-05 PROCEDURE — 83735 ASSAY OF MAGNESIUM: CPT | Performed by: FAMILY MEDICINE

## 2023-01-05 PROCEDURE — 93010 ELECTROCARDIOGRAM REPORT: CPT | Mod: ,,, | Performed by: INTERNAL MEDICINE

## 2023-01-05 PROCEDURE — 93010 EKG 12-LEAD: ICD-10-PCS | Mod: ,,, | Performed by: INTERNAL MEDICINE

## 2023-01-05 PROCEDURE — 97110 THERAPEUTIC EXERCISES: CPT

## 2023-01-05 PROCEDURE — 97530 THERAPEUTIC ACTIVITIES: CPT

## 2023-01-05 PROCEDURE — 36415 COLL VENOUS BLD VENIPUNCTURE: CPT | Performed by: FAMILY MEDICINE

## 2023-01-05 PROCEDURE — 93005 ELECTROCARDIOGRAM TRACING: CPT | Performed by: INTERNAL MEDICINE

## 2023-01-05 PROCEDURE — 25000003 PHARM REV CODE 250: Performed by: FAMILY MEDICINE

## 2023-01-05 PROCEDURE — 85025 COMPLETE CBC W/AUTO DIFF WBC: CPT | Performed by: FAMILY MEDICINE

## 2023-01-05 RX ORDER — LANOLIN ALCOHOL/MO/W.PET/CERES
800 CREAM (GRAM) TOPICAL
Status: DISCONTINUED | OUTPATIENT
Start: 2023-01-05 | End: 2023-01-09 | Stop reason: HOSPADM

## 2023-01-05 RX ORDER — METOPROLOL SUCCINATE 50 MG/1
50 TABLET, EXTENDED RELEASE ORAL DAILY
Status: DISCONTINUED | OUTPATIENT
Start: 2023-01-06 | End: 2023-01-07

## 2023-01-05 RX ORDER — CYPROHEPTADINE HYDROCHLORIDE 4 MG/1
4 TABLET ORAL 3 TIMES DAILY
Status: DISCONTINUED | OUTPATIENT
Start: 2023-01-05 | End: 2023-01-09 | Stop reason: HOSPADM

## 2023-01-05 RX ORDER — SODIUM,POTASSIUM PHOSPHATES 280-250MG
2 POWDER IN PACKET (EA) ORAL
Status: DISCONTINUED | OUTPATIENT
Start: 2023-01-05 | End: 2023-01-09 | Stop reason: HOSPADM

## 2023-01-05 RX ORDER — PROPRANOLOL HYDROCHLORIDE 20 MG/1
20 TABLET ORAL 2 TIMES DAILY
Status: COMPLETED | OUTPATIENT
Start: 2023-01-05 | End: 2023-01-05

## 2023-01-05 RX ORDER — METOPROLOL TARTRATE 1 MG/ML
5 INJECTION, SOLUTION INTRAVENOUS ONCE
Status: COMPLETED | OUTPATIENT
Start: 2023-01-05 | End: 2023-01-05

## 2023-01-05 RX ADMIN — PROPRANOLOL HYDROCHLORIDE 20 MG: 10 TABLET ORAL at 09:01

## 2023-01-05 RX ADMIN — CYPROHEPTADINE HYDROCHLORIDE 4 MG: 4 TABLET ORAL at 08:01

## 2023-01-05 RX ADMIN — DULOXETINE 30 MG: 30 CAPSULE, DELAYED RELEASE ORAL at 08:01

## 2023-01-05 RX ADMIN — ATORVASTATIN CALCIUM 80 MG: 40 TABLET, FILM COATED ORAL at 08:01

## 2023-01-05 RX ADMIN — METOPROLOL TARTRATE 5 MG: 1 INJECTION, SOLUTION INTRAVENOUS at 01:01

## 2023-01-05 RX ADMIN — CYPROHEPTADINE HYDROCHLORIDE 4 MG: 4 TABLET ORAL at 06:01

## 2023-01-05 RX ADMIN — DULOXETINE 30 MG: 30 CAPSULE, DELAYED RELEASE ORAL at 11:01

## 2023-01-05 RX ADMIN — TAMSULOSIN HYDROCHLORIDE 0.4 MG: 0.4 CAPSULE ORAL at 09:01

## 2023-01-05 RX ADMIN — PROPRANOLOL HYDROCHLORIDE 20 MG: 20 TABLET ORAL at 08:01

## 2023-01-05 RX ADMIN — HYDROCODONE BITARTRATE AND ACETAMINOPHEN 1 TABLET: 5; 325 TABLET ORAL at 09:01

## 2023-01-05 RX ADMIN — HYDROCODONE BITARTRATE AND ACETAMINOPHEN 1 TABLET: 5; 325 TABLET ORAL at 10:01

## 2023-01-05 RX ADMIN — APIXABAN 5 MG: 5 TABLET, FILM COATED ORAL at 09:01

## 2023-01-05 RX ADMIN — LOSARTAN POTASSIUM 50 MG: 50 TABLET, FILM COATED ORAL at 09:01

## 2023-01-05 RX ADMIN — OXYBUTYNIN CHLORIDE 10 MG: 5 TABLET, EXTENDED RELEASE ORAL at 09:01

## 2023-01-05 RX ADMIN — HYDROCODONE BITARTRATE AND ACETAMINOPHEN 1 TABLET: 5; 325 TABLET ORAL at 01:01

## 2023-01-05 RX ADMIN — PHENAZOPYRIDINE 100 MG: 100 TABLET ORAL at 09:01

## 2023-01-05 RX ADMIN — APIXABAN 5 MG: 5 TABLET, FILM COATED ORAL at 08:01

## 2023-01-05 RX ADMIN — HYDROCODONE BITARTRATE AND ACETAMINOPHEN 1 TABLET: 5; 325 TABLET ORAL at 06:01

## 2023-01-05 RX ADMIN — PHENAZOPYRIDINE 100 MG: 100 TABLET ORAL at 01:01

## 2023-01-05 RX ADMIN — AMIODARONE HYDROCHLORIDE 100 MG: 100 TABLET ORAL at 06:01

## 2023-01-05 RX ADMIN — SODIUM CHLORIDE: 0.9 INJECTION, SOLUTION INTRAVENOUS at 01:01

## 2023-01-05 NOTE — PT/OT/SLP PROGRESS
"Physical Therapy Treatment    Patient Name:  Hiro Rodríguez   MRN:  [id number]    Recommendations:     Discharge Recommendations: nursing facility, skilled  Discharge Equipment Recommendations:  (TBD at next level. PT recommending pt/wife modify their bed at home to lower the height for increased safety.)  Barriers to discharge: Decreased caregiver support    Assessment:     Hiro Rodríguez is a 77 y.o. male admitted with a medical diagnosis of Severe sepsis.  He presents with the following impairments/functional limitations: weakness, impaired endurance, impaired self care skills, impaired functional mobility, gait instability, impaired balance, decreased safety awareness, decreased lower extremity function, decreased upper extremity function, pain, impaired fine motor.    Pt found HOB slightly elevated and pt agreeable with PT session; however, he reported not feeling great for approximately 30 minutes prior to PT entering room. He described an increasing pressure in his lower abdomen "as if I have to urinate, but can't." Pt's RN was called and she came immediately and scanned pt's bladder with no residual urine found. Pt asked to sit in chair and PT assisted pt to chair with mod a for transfer to sitting and pt c/o dizziness initially. Pt sat a few minutes and transferred to chair with RW and min A, but RN came back in to see what pt was doing due to his HR up to 150bpm. PT did not feel comfortable working on gait training today as pt's HR remained in A-Fib from 130-155 per telemetry.     Rehab Prognosis: Fair; patient would benefit from acute skilled PT services to address these deficits and reach maximum level of function.    Recent Surgery: * No surgery found *      Plan:     During this hospitalization, patient to be seen 6 x/week to address the identified rehab impairments via gait training, therapeutic activities, therapeutic exercises, neuromuscular re-education and progress toward the following " "goals:    Plan of Care Expires:  23    Subjective     Chief Complaint: "for about 30 minutes I haven't felt great." Then, pt dizzy upon sitting up EOB.   Patient/Family Comments/goals: SNF  Pain/Comfort:  Pain Rating 1: 0/10  Pain Rating Post-Intervention 1: 0/10      Objective:     Communicated with VERONICA Griffith prior to session.  Patient found HOB elevated with bed alarm, peripheral IV, telemetry upon PT entry to room.     General Precautions: Standard, fall, diabetic, hearing impaired  Orthopedic Precautions: N/A  Braces: N/A  Respiratory Status: Room air     Functional Mobility:  Bed Mobility:     Scooting: minimum assistance  Supine to Sit: moderate assistance  Transfers:     Sit to Stand:  minimum assistance with rolling walker  Bed to Chair: minimum assistance with rolling walker      AM-PAC 6 CLICK MOBILITY          Treatment & Education:  Pt was educated on the following: call light use, importance of OOB activity and functional mobility to negate the negative effects of prolonged bed rest during this hospitalization, safe transfers/ambulation and discharge planning recommendations/options.      Patient left up in chair with all lines intact, call button in reach, chair alarm on, and RN notified..    GOALS:   Multidisciplinary Problems       Physical Therapy Goals          Problem: Physical Therapy    Goal Priority Disciplines Outcome Goal Variances Interventions   Physical Therapy Goal     PT, PT/OT      Description: Goals to be met by: 2/3/23     Patient will increase functional independence with mobility by performin. Supine to sit with Supervision  2. Sit to stand transfer with Supervision  3. Bed to chair transfer with Supervision using Rolling Walker  4. Gait  x 150 feet with Supervision using Rolling Walker.                              Time Tracking:     PT Received On: 23  PT Start Time: 1004     PT Stop Time: 1020  PT Total Time (min): 16 min     Billable Minutes: Therapeutic " Activity 16    Treatment Type: Treatment  PT/PTA: PT     PTA Visit Number: 1     01/05/2023

## 2023-01-05 NOTE — PT/OT/SLP PROGRESS
Occupational Therapy   Treatment    Name: Hiro Rodríguez  MRN: [id number]  Admitting Diagnosis:  Severe sepsis       Recommendations:     Discharge Recommendations: nursing facility, skilled  Discharge Equipment Recommendations:  other (see comments) (TBD at next level)  Barriers to discharge:  Decreased caregiver support    Assessment:     Hiro Rodríguez is a 77 y.o. male with a medical diagnosis of Severe sepsis.  Performance deficits affecting function are weakness, impaired endurance, impaired self care skills, impaired functional mobility, gait instability, impaired balance, decreased safety awareness, impaired cardiopulmonary response to activity.     Rehab Prognosis:  Fair; patient would benefit from acute skilled OT services to address these deficits and reach maximum level of function.       Plan:     Patient to be seen 5 x/week to address the above listed problems via self-care/home management, therapeutic activities, therapeutic exercises  Plan of Care Expires: 02/03/23  Plan of Care Reviewed with: patient    Subjective     Pain/Comfort:  Pain Rating 1: 0/10  Pain Rating Post-Intervention 1: 0/10    Objective:     Communicated with: nurse prior to session.  Patient found supine with peripheral IV, telemetry, bed alarm upon OT entry to room.    General Precautions: Standard, hearing impaired, fall    Orthopedic Precautions:N/A  Braces: N/A  Respiratory Status: Room air     Occupational Performance:     Bed Mobility:    Patient completed Rolling/Turning to Left with  minimum assistance  Patient completed Rolling/Turning to Right with minimum assistance  Patient completed Supine to Sit with minimum assistance     Therapeutic Exercises:  Patient performed upper body strengthening exercises utilizing red theraband seated at edge of bed.     Treatment & Education:  Patient was educated on importance of activity and getting out of bed, discharge planning and goals to return home, demonstrated call bell for  assistance.    Patient left HOB elevated with all lines intact, call button in reach, and bed alarm on    GOALS:   Multidisciplinary Problems       Occupational Therapy Goals          Problem: Occupational Therapy    Goal Priority Disciplines Outcome Interventions   Occupational Therapy Goal     OT, PT/OT Ongoing, Progressing    Description: Goals to be met by: 2/3/23    Patient will increase functional independence with ADLs by performing:    UE Dressing with Supervision.  LE Dressing with Supervision.  Grooming while standing at sink with Supervision.  Toileting from toilet with Supervision for hygiene and clothing management.   Toilet transfer to toilet with Supervision.                         Time Tracking:     OT Date of Treatment: 01/05/23  OT Start Time: 0931  OT Stop Time: 0940  OT Total Time (min): 9 min    Billable Minutes:Therapeutic Exercise 9               1/5/2023

## 2023-01-05 NOTE — NURSING
Patient reported difficulty urinating and discomfort from such. Brief assessed and was dry. Bladder scan done at bedside and resulted 616 mLs in bladder. MD Debbie notified. Instructed to place messina. No urine return with 16F catheter. 18F Coude catheter successfully placed.

## 2023-01-05 NOTE — PROGRESS NOTES
"[facility]    Progress Note    Patient Name: Hiro Rodríguez  MRN: [id number]  Patient Class: IP- Inpatient   Admission Date: 12/30/2022 11:24 PM  Length of Stay: 5  Attending Physician: Jony López MD  Primary Care Provider: Gautam Castanon III, MD  Face-to-Face encounter date: 01/05/2023  Code status:  Chief Complaint: Fall (Pt arrived by ems after a pt fell after having a dizziness episode. Denies loc. Pt hit the side of his head on a marble table tonight. Pt denies any pain./Pt was seen two weeks ago for a fall secondary to dizziness, pt was dx with a head bleed. Pt restarted his blood thinners once home. )        Subjective:    HPI:Hiro Rodríguez is a 77 y.o. White male   With PMH of chronic subdural hematoma (11/8/22),  pAF on eliquis, DM2, HTN, multiple falls 2/2 polypharmacy,  who presents with fall.     Onset this evening  Occurred after experiencing dizziness  No LOC  +head trauma on marble side table  Pt discharged from [facility] on 11/18/22  He was treated for subdural hematoma due to fall  Since discharge, pt has had poor po intake  Wife reports "he is just stubborn" and refusing to eat/drink  No n/v  No subjective fever   No chills  No abdominal pain  Previous dysuria, since resolved  No diarrhea  +generalized weakness  +fatigue     On 12/28:  new dysuria, urgency, frequency  Saw outpt PCP on 12/29:  started on cipro  BP at that visit was noted 94/57     Pt initially presented to ER today normotensive  While sleeping, noted BP 55/30  Did not recover with positional changes  Pt's BP now responding to fluid resuscitation    Interval History:   12/31  Patient was seen and examined along with the nurse  Tried to talk with the patient and he said let me wake up first   Try to open the eye son look at the pupil but he intentionally close the eyes despite asked to open  Denied having pain anywhere in the body  Pressure is low again and fibrous cc bolus given  Urine " toxicology findings noted.  He did not answer the question whether he took extra pills are not     1/1/23  Patient is awake and alert and normal and talking normal   Mild abdominal pain . BP stable . But over day nausea and vomiting . Abdominal pain .  HIDA ordered since gall bladder distended      1/2/23  Hospital course  Patient was admitted with altered mental status and possible sepsis.  CT scan of the abdomen was done because of abdominal pain and no acute finding but gallbladder distention and HIDA scan was done and negative  Today patient wake up and acting normal and had normal conversation.  He was not doing well with repeated falls after he had subdural hematoma the last time.  Said he will go home if I can arrange the hospital bed .  But later he changed her mind and he wants to go to rehab or skilled facility  K very low and supplementing     1/3:  Patient is AO x3, and states that he would like to go to a facility to get his strength back.  Labs currently pending.  Surgery evaluated patient and stated that patient does not have cholecystitis hence no plans for surgical intervention.  No concerns/issues overnight reported by the patient or the nursing staff.    1/4:  Patient complaining of burning on urination, will start patient on Pyridium, patient has completed 5 days of IV antibiotics.  Awaiting placement.  Spoke to wife yesterday and she stated that patient's depression has worsened, would increase his duloxetine to 30 mg b.i.d. and titrate upwards as tolerated.  Blood pressure is uncontrolled resume patient's losartan 50 mg daily and titrate upwards as tolerated.    1/5:  Bladder scan was done and patient was found to have urinary retention and in and out was done.  Later in the night patient had another episode of urinary retention and Sotelo was placed in.  Consult to urologist placed.  Would adjust blood pressure medications due to elevated heart rate, would change propranolol to Toprol-XL and  discontinue losartan    Review of Systems All other Review of Systems were found to be negative expect for that mentioned already in HPI.     Objective:     Vitals:    01/05/23 0910 01/05/23 1202 01/05/23 1333 01/05/23 1341   BP: (!) 140/94 118/81 118/81    Pulse:  (!) 118     Resp: 18 20  18   Temp:  98 °F (36.7 °C)     TempSrc:  Oral     SpO2:  98%     Weight:       Height:            Vitals reviewed.  Constitutional: No distress.   HENT: NC  Head: Atraumatic.   Cardiovascular: Normal rate, regular rhythm and normal heart sounds.   Pulmonary/Chest: Effort normal. No wheezes.   Abdominal: Soft. Bowel sounds are normal. No distension and no mass. No tenderness  Neurological: Alert.   Skin: Skin is warm and dry.   Psych: Appropriate mood and affect    Following labs were Reviewed   CBC:  Recent Labs   Lab 01/05/23  0440   WBC 10.40   HGB 12.6*   HCT 39.1*        CMP:  Recent Labs   Lab 01/05/23  0440   CALCIUM 7.8*      K 3.3*   CO2 25      BUN 12   CREATININE 0.6       Micro Results  Microbiology Results (last 7 days)       Procedure Component Value Units Date/Time    Blood culture #2 **CANNOT BE ORDERED STAT** [[id number]] Collected: 12/31/22 0040    Order Status: Completed Specimen: Blood from Peripheral, Forearm, Right Updated: 01/05/23 0232     Blood Culture, Routine No growth after 5 days.    Blood culture #1 **CANNOT BE ORDERED STAT** [[id number]] Collected: 12/31/22 0100    Order Status: Completed Specimen: Blood from Peripheral, Upper Arm, Right Updated: 01/05/23 0232     Blood Culture, Routine No growth after 5 days.    Urine culture [[id number]] Collected: 12/31/22 0141    Order Status: Completed Specimen: Urine Updated: 01/02/23 0659     Urine Culture, Routine No growth    Narrative:      Specimen Source->Urine    Respiratory Infection Panel (PCR), Nasopharyngeal [[id number]] Collected: 12/31/22 041    Order Status: Completed Updated: 12/31/22 1841     Respiratory Infection Panel Source NP  swab     Adenovirus Not Detected     Coronavirus 229E, Common Cold Virus Not Detected     Coronavirus HKU1, Common Cold Virus Not Detected     Coronavirus NL63, Common Cold Virus Not Detected     Coronavirus OC43, Common Cold Virus Not Detected     Comment: The Coronavirus strains detected in this test cause the common cold.  These strains are not the COVID-19 (novel Coronavirus)strain   associated with the respiratory disease outbreak.          SARS-CoV2 (COVID-19) Qualitative PCR Not Detected     Human Metapneumovirus Not Detected     Human Rhinovirus/Enterovirus Not Detected     Influenza A (subtypes H1, H1-2009,H3) Not Detected     Influenza B Not Detected     Parainfluenza Virus 1 Not Detected     Parainfluenza Virus 2 Not Detected     Parainfluenza Virus 3 Not Detected     Parainfluenza Virus 4 Not Detected     Respiratory Syncytial Virus Not Detected     Bordetella Parapertussis (HT5715) Not Detected     Bordetella pertussis (ptxP) Not Detected     Chlamydia pneumoniae Not Detected     Mycoplasma pneumoniae Not Detected     Comment: Respiratory Infection Panel testing performed by Multiplex PCR.       Narrative:      Specimen Source->Nasopharyngeal Swab    MRSA Screen by PCR [[id number]] Collected: 12/31/22 0415    Order Status: Completed Specimen: Nasopharyngeal Swab from Nasal Updated: 12/31/22 0553     MRSA SCREEN BY PCR Negative    Resp Viral Panel PCR, Peds Under 7 Months Nasopharyngeal Swab [[id number]] Collected: 12/31/22 0415    Order Status: Canceled              Radiology Reports  X-Ray Femur 2 View Left    Result Date: 12/16/2022  EXAMINATION: XR FEMUR 2 VIEW LEFT CLINICAL HISTORY: Other fracture of left femur, initial encounter for closed fracture TECHNIQUE: AP and lateral views of the left femur were performed. COMPARISON: 11/08/2022 FINDINGS: There is internal fixation spanning a femoral neck fracture.  There is also knee arthroplasty hardware.  No abnormal lucency surrounding hardware.  No  detrimental change in alignment.  Surgical clips medial left thigh.  Small knee joint effusion. Electronically signed by: Yuval Felton Date:    12/16/2022 Time:    11:51    CT Head Without Contrast    Result Date: 12/31/2022  EXAM DESCRIPTION: CT HEAD WITHOUT IV CONTRAST CLINICAL HISTORY: Head trauma, minor (Age >= 65y); head bleed 2 weeks ago and back on eliquis. Patient fell after having a dizziness episode. Patient hit the side of his head on a marble table tonight. Patient was seen 2 weeks ago for a fall secondary to dizziness. Patient was diagnosed with a head bleed. TECHNIQUE: Multiple axial images were obtained through the brain without intravenous contrast. Coronal and sagittal images were reconstructed. All CT scans at this facility use dose modulation, iterative reconstruction, and/or weight based dosing when appropriate to reduce radiation dose to as low as reasonably achievable. COMPARISON: Previous head CT images dated 12/19/2022. The prior CT report is not available at this time. Reference is also made to previous head CT and report dated 11/18/2022. FINDINGS: There is no evidence of acute intracranial hemorrhage or acute major territorial infarction. The ventricles, sulci and cisterns are grossly unchanged in size. There is no midline shift. There is a minimal chronic subdural hematoma or hygroma overlying the right frontal convexity measuring approximately 4 mm in thickness on the coronal images, grossly unchanged in extent from the previous CT, and improved from the prior dated 11/18/2022. Subcortical and periventricular white matter hypodensities are seen, compatible with chronic microangiopathic disease, grossly unchanged. Vascular calcifications. Mild mucosal thickening as well as an air-fluid level within the left maxillary sinus. The mastoid air cells are clear bilaterally. The calvarium appears grossly unremarkable. Slight scarring along the left parietal scalp. IMPRESSION: 1.  No acute  intracranial abnormality. 2.  Minimal chronic subdural hematoma or hygroma overlying the right frontal convexity, grossly unchanged in extent from the previous CT, and improved from the prior dated 11/18/2022. 3.  White matter changes of chronic microangiopathic disease, grossly unchanged. 4.  Mild acute sinusitis involving the left maxillary sinus. Electronically signed by:  Sharon Brownlee MD  12/31/2022 12:45 AM CST Workstation: [id number]    CT Head Without Contrast    Result Date: 12/19/2022  EXAMINATION: CT HEAD WITHOUT CONTRAST CLINICAL HISTORY: contusion follow up; Traumatic subdural hemorrhage with loss of consciousness of unspecified duration, subsequent encounter TECHNIQUE: Low dose axial images were obtained through the head.  Coronal and sagittal reformations were also performed. Contrast was not administered. COMPARISON: 11/18/2022 FINDINGS: The examination demonstrates resolution of the previous identified right-sided convexity subdural collection.  There is also evolution of the previous right inferolateral temporal parenchymal contusion, now showing only a small area of hypoattenuation in the temporal lobe laterally.  No new hemorrhage is seen.  There is no midline shift.  Ventricles and sulci demonstrate mild involutional changes which are appropriate for the patient's age. Bone windows demonstrate trace fluid or membrane thickening in the right mastoid air cells.  There is no bone destruction or fracture.     Resolution of the right convexity subdural collection and evolution of the right temporal hematoma.  No new abnormalities. Electronically signed by: Shyam Hoffman MD Date:    12/19/2022 Time:    14:35    CT Cervical Spine Without Contrast    Result Date: 12/31/2022  EXAM DESCRIPTION: CT CERVICAL SPINE WITHOUT IV CONTRAST CLINICAL HISTORY: Neck trauma (Age >= 65y). Patient fell after having a dizziness episode. Patient hit the side of his head on a marble table tonight. Patient was seen 2  weeks ago for a fall secondary to dizziness. Patient was diagnosed with a head bleed. TECHNIQUE: Multiple axial images were obtained through the cervical spine without intravenous contrast. Coronal and sagittal images were reconstructed. All CT scans at this facility use dose modulation, iterative reconstruction, and/or weight based dosing when appropriate to reduce radiation dose to as low as reasonably achievable. COMPARISON: Previous cervical spine CT dated 11/8/2022. FINDINGS: There is straightening of the normal cervical lordosis, possibly positional or secondary to muscle spasm, and in retrospect unchanged. The vertebral heights are preserved. Scattered disc space narrowing, greatest at C5-C6, grossly unchanged. There is no acute fracture or subluxation. No epidural hematoma is visible. The prevertebral soft tissues are grossly unremarkable. The visualized lung apices are clear. Vascular calcifications. The visualized paranasal sinuses are clear. The mastoid air cells are clear. Degenerative changes along the cervical spine. Secondary to broad-based posterior disc osteophyte complex, there is mild to moderate central canal stenosis at C5-C6 6. Multilevel neural foraminal narrowing secondary to hypertrophic changes, with moderate left neuroforaminal narrowing at C3-C4 and C4-C5 and moderate to severe on the right at C5-C6, and mild at other levels. IMPRESSION: 1.  No evidence of acute fracture or subluxation of the cervical spine. 2.  Multilevel degenerative changes of the cervical spine, as above. Electronically signed by:  Sharon Brownlee MD  12/31/2022 12:52 AM CST Workstation: [id number]    NM Hepatobiliary Scan (HIDA)    Result Date: 1/2/2023  Reason: Cholecystitis (Ped 0-18y); nausea , vomiting  and distended gall bladder and sepsis ? Radiopharmaceutical: 7 mCi Technetium 99m Choletec. COMPARISON: CT 12/31/2022 FINDINGS: Tomographic images show uniform hepatic uptake and clearance of  radiopharmaceutical. Gallbladder filling and biliary to bowel transit occur within 60 minutes. Imaging was terminated at 33 minutes, due to patient's inability to tolerate supine imaging without moving. IMPRESSION: Normal hepatobiliary scan. Electronically signed by:  Esteban Flores MD  1/2/2023 12:21 PM CST Workstation: [id number]    X-Ray Chest AP Portable    Result Date: 12/31/2022  EXAMINATION: XR CHEST AP PORTABLE CLINICAL INDICATION: Male, 77 years old. Dizziness COMPARISON: November 10, 2022 and November 8, 2022 x-ray chest, and July 11, 2022 CT chest. FINDINGS: Support Devices: None. Heart: Unchanged Cardiomegaly. Mediastinum: Mediastinal contours are normal. Lungs: Pulmonary vasculature is prominent. Lungs are well aerated and clear. Slightly increased diffuse opacification of the right lung in comparison with the left is felt to be positional and anatomical. Pleura: No pleural effusion is identified. No pneumothorax is present. Osseous Structures: Visualized skeleton is normal. IMPRESSION: 1. Stable cardiomegaly and prominent pulmonary vasculature, compatible with central vascular fullness without pulmonary edema. Electronically signed by:  Eron Bolivar MD  12/31/2022 6:47 AM CST Workstation: [id number]    CT Abdomen Pelvis  Without Contrast    Result Date: 12/31/2022  EXAM DESCRIPTION: CT ABDOMEN PELVIS WITHOUT CONTRAST RadLex: CT ABDOMEN PELVIS WITHOUT IV CONTRAST CLINICAL HISTORY: Abdominal abscess/infection suspected. TECHNIQUE: CT of the abdomen and pelvis without contrast. All CT scans at this facility use dose modulation, iterative reconstruction, and/or weight based dosing when appropriate to reduce radiation dose to as low as reasonably achievable. COMPARISON: None. FINDINGS: The visualized lower thoracic structures demonstrate coronary artery calcifications. Unenhanced images of the liver, spleen, pancreas and adrenal glands appear grossly unremarkable. The gallbladder appears slightly  distended. There are multiple bilateral renal calculi measuring up to 1 cm on the right. There is no hydronephrosis bilaterally. No retroperitoneal or mesenteric lymphadenopathy is identified. No abdominal aortic aneurysm is seen. No bowel obstruction is seen. There is no free intraperitoneal air. The appendix appears unremarkable. There is colonic diverticulosis without definite CT evidence for acute diverticulitis. There is mild fecal loading throughout the colon. No free fluid is identified. Urinary bladder is decompressed. Small prostatic calcifications are present. Prostate gland measures approximately 5.8 cm TR by 3.5 cm AP by 4 cm CC. There are small bilateral inguinal hernias containing fat. There are postsurgical changes at the proximal left femur. No acute fracture is seen. IMPRESSION: 1.  Slight gallbladder distention. 2.  Colonic diverticulosis without definite CT evidence for acute diverticulitis. 3.  Nonobstructive bilateral renal calculi. Electronically signed by:  Brandt Carl DO  12/31/2022 2:35 AM CST Workstation: [id number]    X-Ray KUB    Result Date: 1/1/2023  KUB Clinical history is vomiting COMPARISON: CT abdomen and pelvis dated 12/31/2022 There is a normal bowel gas pattern. No abnormal soft tissue mass or organomegaly. There are bilateral renal calculi. There are degenerative changes of the spine. IMPRESSION: Unremarkable bowel gas pattern Bilateral renal calculi Electronically signed by:  Madeleine Griffin MD  1/1/2023 3:35 PM CST Workstation: [id number]       Meds  Scheduled Meds:   amiodarone  100 mg Oral QAM    apixaban  5 mg Oral BID    atorvastatin  80 mg Oral Daily    cyproheptadine  4 mg Oral TID    DULoxetine  30 mg Oral BID    losartan  50 mg Oral Daily    phenazopyridine  100 mg Oral TID WM    propranoloL  20 mg Oral BID    sodium chloride 0.9%  500 mL Intravenous Once    tamsulosin  0.4 mg Oral Daily     Continuous Infusions:   sodium chloride 0.9% 100 mL/hr at 01/05/23 1347      PRN Meds:.acetaminophen, albuterol-ipratropium, calcium gluconate IVPB, calcium gluconate IVPB, calcium gluconate IVPB, dextrose 10%, dextrose 10%, glucagon (human recombinant), glucose, glucose, hydrALAZINE, HYDROcodone-acetaminophen, insulin aspart U-100, magnesium sulfate IVPB, magnesium sulfate IVPB, metoclopramide HCl, naloxone, polyethylene glycol, potassium chloride **AND** potassium chloride **AND** potassium chloride, promethazine (PHENERGAN) IVPB, senna-docusate 8.6-50 mg, simethicone, sodium chloride 0.9%, sodium phosphate IVPB, sodium phosphate IVPB, sodium phosphate IVPB.    Active PT: Yes  Active OT: Yes  Active SLP: No  Assessment & Plan:     Active Hospital Problems    Diagnosis    *Severe sepsis    Subdural hematoma caused by concussion    Chronic heart failure with preserved ejection fraction    Benign essential tremor    Type 2 diabetes mellitus with diabetic nephropathy, without long-term current use of insulin    Anticoagulated    Atrial fibrillation    Aspirin long-term use    Hypertension, essential   PLAN   Altered mental status resolved most likely from polypharmacy and currently resolved and possible from chronic subdural hematoma   KAM on CKD resolved   Urinary retention, consult to urologist, patient started on Flomax  Complete IV antibiotics  Resume home medication of losartan and titrate upwards as tolerated  Resume home eliquis   Awaiting placement awaiting placement           Discharge Planning:   Is the patient medically ready for discharge?: no    Reason for patient still in hospital (select all that apply): Patient trending condition and Treatment    Above encounter included review of the medical records, interviewing and examining the patient face-to-face, discussion with family and other health care providers, ordering and interpreting lab/test results and formulating a plan of care.     Medical Decision Making:      [_] Low Complexity  [_] Moderate Complexity  [x] High  Complexity      Jony López MD  Department of Hospital Medicine   [facility]

## 2023-01-05 NOTE — PLAN OF CARE
Problem: Occupational Therapy  Goal: Occupational Therapy Goal  Description: Goals to be met by: 2/3/23    Patient will increase functional independence with ADLs by performing:    UE Dressing with Supervision.  LE Dressing with Supervision.  Grooming while standing at sink with Supervision.  Toileting from toilet with Supervision for hygiene and clothing management.   Toilet transfer to toilet with Supervision.    Outcome: Ongoing, Progressing

## 2023-01-05 NOTE — PLAN OF CARE
Problem: Skin Injury Risk Increased  Goal: Skin Health and Integrity  Intervention: Promote and Optimize Oral Intake  Flowsheets (Taken 1/5/2023 1538)  Oral Nutrition Promotion: calorie-dense liquids provided--Glucerna with meals and recommend Clinimix E @ 100 ml / hr to meet protein needs.     Problem: Oral Intake Inadequate  Goal: Improved Oral Intake  Intervention: Promote and Optimize Oral Intake  Flowsheets (Taken 1/5/2023 1538)  Oral Nutrition Promotion: calorie-dense liquids provided

## 2023-01-05 NOTE — CONSULTS
[facility]  Adult Nutrition   Consult Note    SUMMARY      Consult to eval and rx; patient initial assessment completed 1/02/23.     Recommendations:   1. Continue diabetic cardiac diet as tolerated.  2. Recommend Glucerna with meals.  3. Menu  to obtain daily meal choices.  4. Consider an appetite stimulant.  5. Consider changing IV fluids to Clinimix E @ 100 ml /hr to meet EPN. Will need daily phos lab.     Goals:   Goals: Pt to meet >/= 50% po EEN/EPN.    Dietitian Rounds Brief  Ordered Glucerna with meals. Patient is not eating well per nursing. May consider clinimix and an appetite stimulant. Last BM 1/4/23. RD to luior.    Diet order: Diabetic 2000 kcal Cardiac diet    % Intake of Estimated Energy Needs: 0 - 25 % and 0%  % Meal Intake: 0 - 25 %    Estimated/Assessed Needs  Weight Used For Calorie Calculations: 107.7 kg (237 lb 7 oz)  Energy Calorie Requirements (kcal): 3567-8458 kcals/day  Energy Need Method: Kcal/kg  Protein Requirements: 150-188 g/day  Weight Used For Protein Calculations: 75 kg (165 lb 5.5 oz)     Estimated Fluid Requirement Method: RDA Method  RDA Method (mL): 1185       Weight History:  Wt Readings from Last 5 Encounters:   01/02/23 107.7 kg (237 lb 7 oz)   12/29/22 115.7 kg (255 lb)   12/16/22 115.7 kg (255 lb)   12/15/22 115.7 kg (255 lb)   11/18/22 126 kg (277 lb 12.8 oz)        Reason for Assessment  Reason For Assessment: length of stay  Diagnosis: infection/sepsis  Relevant Medical History: Type 2 diabetes mellitus with diabetic nephropathy, without long-term current use of insulin, Anticoagulated, Atrial fibrillation, Aspirin long-term use, Hypertension, essential, Benign essential tremor, Chronic heart failure with preserved ejection fraction, Subdural hematoma caused by concussion    Medications:Pertinent Medications Reviewed  Scheduled Meds:   amiodarone  100 mg Oral QAM    apixaban  5 mg Oral BID    atorvastatin  80 mg Oral Daily    DULoxetine  30 mg  Oral BID    losartan  50 mg Oral Daily    oxybutynin  10 mg Oral Daily    phenazopyridine  100 mg Oral TID WM    propranoloL  20 mg Oral BID    sodium chloride 0.9%  500 mL Intravenous Once    tamsulosin  0.4 mg Oral Daily     Continuous Infusions:   sodium chloride 0.9% 100 mL/hr at 01/04/23 1424     PRN Meds:.acetaminophen, albuterol-ipratropium, calcium gluconate IVPB, calcium gluconate IVPB, calcium gluconate IVPB, dextrose 10%, dextrose 10%, glucagon (human recombinant), glucose, glucose, hydrALAZINE, HYDROcodone-acetaminophen, insulin aspart U-100, magnesium sulfate IVPB, magnesium sulfate IVPB, metoclopramide HCl, naloxone, polyethylene glycol, potassium chloride **AND** potassium chloride **AND** potassium chloride, promethazine (PHENERGAN) IVPB, senna-docusate 8.6-50 mg, simethicone, sodium chloride 0.9%, sodium phosphate IVPB, sodium phosphate IVPB, sodium phosphate IVPB    Labs: Pertinent Labs Reviewed  Clinical Chemistry:  Recent Labs   Lab 12/31/22  1444 01/01/23  0251 01/05/23  0440   *   < > 137   K 3.0*   < > 3.3*   CL 98   < > 105   CO2 25   < > 25   GLU 91   < > 117*   BUN 30*   < > 12   CREATININE 1.7*   < > 0.6   CALCIUM 7.3*   < > 7.8*   PROT 5.3*  --   --    ALBUMIN 2.6*  --   --    BILITOT 0.9  --   --    ALKPHOS 79  --   --    AST 12  --   --    ALT 11  --   --    ANIONGAP 10   < > 7*   MG  --    < > 1.7    < > = values in this interval not displayed.     CBC:   Recent Labs   Lab 01/05/23  0440   WBC 10.40   RBC 4.14*   HGB 12.6*   HCT 39.1*      MCV 94   MCH 30.4   MCHC 32.2     Lipid Panel:  No results for input(s): CHOL, HDL, LDLCALC, TRIG, CHOLHDL in the last 168 hours.  Cardiac Profile:  Recent Labs   Lab 12/31/22  0102   BNP 52     Inflammatory Labs:  Recent Labs   Lab 12/31/22  0531   CRP 1.31*     Diabetes:  Recent Labs   Lab 12/31/22  1415   HGBA1C 5.8     Thyroid & Parathyroid:  Recent Labs   Lab 12/31/22  0232   TSH 1.870       Monitor and Evaluation  Food and Nutrient  Intake: food and beverage intake, energy intake  Food and Nutrient Adminstration: diet order  Knowledge/Beliefs/Attitudes: food and nutrition knowledge/skill, beliefs and attitudes  Physical Activity and Function: nutrition-related ADLs and IADLs, factors affecting access to physical activity  Anthropometric Measurements: weight, weight change, body mass index  Biochemical Data, Medical Tests and Procedures: lipid profile, inflammatory profile, glucose/endocrine profile, gastrointestinal profile, electrolyte and renal panel  Nutrition-Focused Physical Findings: overall appearance     Nutrition Risk  Level of Risk/Frequency of Follow-up: high     Nutrition Follow-Up  RD Follow-up?: Yes    Lara Carbone RD 01/05/2023 1:02 PM

## 2023-01-06 LAB
ANION GAP SERPL CALC-SCNC: 8 MMOL/L (ref 8–16)
BASOPHILS # BLD AUTO: 0.03 K/UL (ref 0–0.2)
BASOPHILS NFR BLD: 0.2 % (ref 0–1.9)
BUN SERPL-MCNC: 10 MG/DL (ref 8–23)
CALCIUM SERPL-MCNC: 7.6 MG/DL (ref 8.7–10.5)
CHLORIDE SERPL-SCNC: 106 MMOL/L (ref 95–110)
CO2 SERPL-SCNC: 22 MMOL/L (ref 23–29)
CREAT SERPL-MCNC: 0.6 MG/DL (ref 0.5–1.4)
DIFFERENTIAL METHOD: ABNORMAL
EOSINOPHIL # BLD AUTO: 0.1 K/UL (ref 0–0.5)
EOSINOPHIL NFR BLD: 0.7 % (ref 0–8)
ERYTHROCYTE [DISTWIDTH] IN BLOOD BY AUTOMATED COUNT: 13.9 % (ref 11.5–14.5)
EST. GFR  (NO RACE VARIABLE): >60 ML/MIN/1.73 M^2
GLUCOSE SERPL-MCNC: 100 MG/DL (ref 70–110)
GLUCOSE SERPL-MCNC: 111 MG/DL (ref 70–110)
GLUCOSE SERPL-MCNC: 115 MG/DL (ref 70–110)
GLUCOSE SERPL-MCNC: 116 MG/DL (ref 70–110)
GLUCOSE SERPL-MCNC: 120 MG/DL (ref 70–110)
HCT VFR BLD AUTO: 38.4 % (ref 40–54)
HGB BLD-MCNC: 12.2 G/DL (ref 14–18)
IMM GRANULOCYTES # BLD AUTO: 0.06 K/UL (ref 0–0.04)
IMM GRANULOCYTES NFR BLD AUTO: 0.4 % (ref 0–0.5)
LYMPHOCYTES # BLD AUTO: 1.9 K/UL (ref 1–4.8)
LYMPHOCYTES NFR BLD: 14 % (ref 18–48)
MAGNESIUM SERPL-MCNC: 1.5 MG/DL (ref 1.6–2.6)
MCH RBC QN AUTO: 30.1 PG (ref 27–31)
MCHC RBC AUTO-ENTMCNC: 31.8 G/DL (ref 32–36)
MCV RBC AUTO: 95 FL (ref 82–98)
MONOCYTES # BLD AUTO: 0.8 K/UL (ref 0.3–1)
MONOCYTES NFR BLD: 5.9 % (ref 4–15)
NEUTROPHILS # BLD AUTO: 10.7 K/UL (ref 1.8–7.7)
NEUTROPHILS NFR BLD: 78.8 % (ref 38–73)
NRBC BLD-RTO: 0 /100 WBC
PHOSPHATE SERPL-MCNC: 3.1 MG/DL (ref 2.7–4.5)
PLATELET # BLD AUTO: 244 K/UL (ref 150–450)
PMV BLD AUTO: 9.9 FL (ref 9.2–12.9)
POTASSIUM SERPL-SCNC: 3 MMOL/L (ref 3.5–5.1)
POTASSIUM SERPL-SCNC: 3.5 MMOL/L (ref 3.5–5.1)
RBC # BLD AUTO: 4.05 M/UL (ref 4.6–6.2)
SODIUM SERPL-SCNC: 136 MMOL/L (ref 136–145)
WBC # BLD AUTO: 13.52 K/UL (ref 3.9–12.7)

## 2023-01-06 PROCEDURE — 12000002 HC ACUTE/MED SURGE SEMI-PRIVATE ROOM

## 2023-01-06 PROCEDURE — 36415 COLL VENOUS BLD VENIPUNCTURE: CPT | Performed by: FAMILY MEDICINE

## 2023-01-06 PROCEDURE — 36415 COLL VENOUS BLD VENIPUNCTURE: CPT | Performed by: STUDENT IN AN ORGANIZED HEALTH CARE EDUCATION/TRAINING PROGRAM

## 2023-01-06 PROCEDURE — 25000003 PHARM REV CODE 250: Performed by: STUDENT IN AN ORGANIZED HEALTH CARE EDUCATION/TRAINING PROGRAM

## 2023-01-06 PROCEDURE — 84132 ASSAY OF SERUM POTASSIUM: CPT | Performed by: STUDENT IN AN ORGANIZED HEALTH CARE EDUCATION/TRAINING PROGRAM

## 2023-01-06 PROCEDURE — 63600175 PHARM REV CODE 636 W HCPCS: Performed by: INTERNAL MEDICINE

## 2023-01-06 PROCEDURE — 25000003 PHARM REV CODE 250: Performed by: INTERNAL MEDICINE

## 2023-01-06 PROCEDURE — 83735 ASSAY OF MAGNESIUM: CPT | Performed by: FAMILY MEDICINE

## 2023-01-06 PROCEDURE — 25000003 PHARM REV CODE 250: Performed by: FAMILY MEDICINE

## 2023-01-06 PROCEDURE — 80048 BASIC METABOLIC PNL TOTAL CA: CPT | Performed by: FAMILY MEDICINE

## 2023-01-06 PROCEDURE — 85025 COMPLETE CBC W/AUTO DIFF WBC: CPT | Performed by: FAMILY MEDICINE

## 2023-01-06 PROCEDURE — 84100 ASSAY OF PHOSPHORUS: CPT | Performed by: STUDENT IN AN ORGANIZED HEALTH CARE EDUCATION/TRAINING PROGRAM

## 2023-01-06 PROCEDURE — 63600175 PHARM REV CODE 636 W HCPCS: Performed by: STUDENT IN AN ORGANIZED HEALTH CARE EDUCATION/TRAINING PROGRAM

## 2023-01-06 PROCEDURE — 97535 SELF CARE MNGMENT TRAINING: CPT

## 2023-01-06 RX ORDER — MAGNESIUM SULFATE HEPTAHYDRATE 40 MG/ML
2 INJECTION, SOLUTION INTRAVENOUS ONCE
Status: COMPLETED | OUTPATIENT
Start: 2023-01-06 | End: 2023-01-06

## 2023-01-06 RX ORDER — TALC
6 POWDER (GRAM) TOPICAL NIGHTLY PRN
Status: DISCONTINUED | OUTPATIENT
Start: 2023-01-06 | End: 2023-01-09 | Stop reason: HOSPADM

## 2023-01-06 RX ADMIN — MAGNESIUM SULFATE 2 G: 2 INJECTION INTRAVENOUS at 08:01

## 2023-01-06 RX ADMIN — APIXABAN 5 MG: 5 TABLET, FILM COATED ORAL at 08:01

## 2023-01-06 RX ADMIN — DULOXETINE 30 MG: 30 CAPSULE, DELAYED RELEASE ORAL at 08:01

## 2023-01-06 RX ADMIN — CYPROHEPTADINE HYDROCHLORIDE 4 MG: 4 TABLET ORAL at 08:01

## 2023-01-06 RX ADMIN — SODIUM CHLORIDE: 0.9 INJECTION, SOLUTION INTRAVENOUS at 06:01

## 2023-01-06 RX ADMIN — TAMSULOSIN HYDROCHLORIDE 0.4 MG: 0.4 CAPSULE ORAL at 08:01

## 2023-01-06 RX ADMIN — POTASSIUM BICARBONATE 60 MEQ: 391 TABLET, EFFERVESCENT ORAL at 08:01

## 2023-01-06 RX ADMIN — HYDROCODONE BITARTRATE AND ACETAMINOPHEN 1 TABLET: 5; 325 TABLET ORAL at 02:01

## 2023-01-06 RX ADMIN — AMIODARONE HYDROCHLORIDE 100 MG: 100 TABLET ORAL at 06:01

## 2023-01-06 RX ADMIN — Medication 6 MG: at 08:01

## 2023-01-06 RX ADMIN — HYDROCODONE BITARTRATE AND ACETAMINOPHEN 1 TABLET: 5; 325 TABLET ORAL at 08:01

## 2023-01-06 RX ADMIN — HYDROCODONE BITARTRATE AND ACETAMINOPHEN 1 TABLET: 5; 325 TABLET ORAL at 03:01

## 2023-01-06 RX ADMIN — Medication 6 MG: at 03:01

## 2023-01-06 RX ADMIN — ATORVASTATIN CALCIUM 80 MG: 40 TABLET, FILM COATED ORAL at 08:01

## 2023-01-06 NOTE — PT/OT/SLP PROGRESS
Physical Therapy      Patient Name:  Hiro Rodríguez   MRN:  [id number]    Patient not seen today secondary to Patient unwilling to participate, Increased agitation, Other (Comment) (Pt waiting for catheter to be removed & awaiting transfer to [facility]. PT spoke with RN, still waiting for ins auth & catheter staying in. Pt agitated and refused PT.). Will follow-up 1/7/23.

## 2023-01-06 NOTE — PLAN OF CARE
CM s/w patients spouse regarding SNF. Spouse has signed paperwork at [facility] for SNF. CM contacted Kristie at [facility], auth received. [facility] can take patient in the am pending discontinuation of [facility].        01/06/23 1700   Discharge Reassessment   Assessment Type Discharge Planning Reassessment   Did the patient's condition or plan change since previous assessment? No   Discharge Plan discussed with: Spouse/sig other   Discharge Plan A Skilled Nursing Facility   Discharge Plan B Skilled Nursing Facility   Post-Acute Status   Post-Acute Authorization Placement

## 2023-01-06 NOTE — PLAN OF CARE
Pt accepted by [facility], per Kaitlynn and submitted for auth yesterday    SW called pt's wife, Galina and she's okay with pt going to [facility], if insurance approves.  Pending auth.     01/06/23 0943   Post-Acute Status   Post-Acute Authorization Placement   Post-Acute Placement Status Pending payor review/awaiting authorization (if required)   Discharge Plan   Discharge Plan A Skilled Nursing Facility

## 2023-01-06 NOTE — PROGRESS NOTES
"[facility]    Progress Note    Patient Name: Hiro Rodríguez  MRN: [id number]  Patient Class: IP- Inpatient   Admission Date: 12/30/2022 11:24 PM  Length of Stay: 6  Attending Physician: Jony López MD  Primary Care Provider: Gautam Castanon III, MD  Face-to-Face encounter date: 01/06/2023  Code status:  Chief Complaint: Fall (Pt arrived by ems after a pt fell after having a dizziness episode. Denies loc. Pt hit the side of his head on a marble table tonight. Pt denies any pain./Pt was seen two weeks ago for a fall secondary to dizziness, pt was dx with a head bleed. Pt restarted his blood thinners once home. )        Subjective:    HPI:Hiro Rodríguez is a 77 y.o. White male   With PMH of chronic subdural hematoma (11/8/22),  pAF on eliquis, DM2, HTN, multiple falls 2/2 polypharmacy,  who presents with fall.     Onset this evening  Occurred after experiencing dizziness  No LOC  +head trauma on marble side table  Pt discharged from [facility] on 11/18/22  He was treated for subdural hematoma due to fall  Since discharge, pt has had poor po intake  Wife reports "he is just stubborn" and refusing to eat/drink  No n/v  No subjective fever   No chills  No abdominal pain  Previous dysuria, since resolved  No diarrhea  +generalized weakness  +fatigue     On 12/28:  new dysuria, urgency, frequency  Saw outpt PCP on 12/29:  started on cipro  BP at that visit was noted 94/57     Pt initially presented to ER today normotensive  While sleeping, noted BP 55/30  Did not recover with positional changes  Pt's BP now responding to fluid resuscitation    Interval History:   12/31  Patient was seen and examined along with the nurse  Tried to talk with the patient and he said let me wake up first   Try to open the eye son look at the pupil but he intentionally close the eyes despite asked to open  Denied having pain anywhere in the body  Pressure is low again and fibrous cc bolus given  Urine " toxicology findings noted.  He did not answer the question whether he took extra pills are not     1/1/23  Patient is awake and alert and normal and talking normal   Mild abdominal pain . BP stable . But over day nausea and vomiting . Abdominal pain .  HIDA ordered since gall bladder distended      1/2/23  Hospital course  Patient was admitted with altered mental status and possible sepsis.  CT scan of the abdomen was done because of abdominal pain and no acute finding but gallbladder distention and HIDA scan was done and negative  Today patient wake up and acting normal and had normal conversation.  He was not doing well with repeated falls after he had subdural hematoma the last time.  Said he will go home if I can arrange the hospital bed .  But later he changed her mind and he wants to go to rehab or skilled facility  K very low and supplementing     1/3:  Patient is AO x3, and states that he would like to go to a facility to get his strength back.  Labs currently pending.  Surgery evaluated patient and stated that patient does not have cholecystitis hence no plans for surgical intervention.  No concerns/issues overnight reported by the patient or the nursing staff.    1/4:  Patient complaining of burning on urination, will start patient on Pyridium, patient has completed 5 days of IV antibiotics.  Awaiting placement.  Spoke to wife yesterday and she stated that patient's depression has worsened, would increase his duloxetine to 30 mg b.i.d. and titrate upwards as tolerated.  Blood pressure is uncontrolled resume patient's losartan 50 mg daily and titrate upwards as tolerated.    1/5:  Bladder scan was done and patient was found to have urinary retention and in and out was done.  Later in the night patient had another episode of urinary retention and Sotelo was placed in.  Consult to urologist placed.  Would adjust blood pressure medications due to elevated heart rate, would change propranolol to Toprol-XL and  discontinue losartan    1/6:  Urologist evaluated patient and recommended follow-up outpatient.  Awaiting placement    Review of Systems All other Review of Systems were found to be negative expect for that mentioned already in HPI.     Objective:     Vitals:    01/06/23 0833 01/06/23 1156 01/06/23 1457 01/06/23 1713   BP:  122/78  126/68   BP Location:       Pulse:  107  (!) 111   Resp: 18 18 18 18   Temp:  98.4 °F (36.9 °C)  98.6 °F (37 °C)   TempSrc:  Oral  Oral   SpO2:  95%  95%   Weight:       Height:            Vitals reviewed.  Constitutional: No distress.   HENT: NC  Head: Atraumatic.   Cardiovascular: Normal rate, regular rhythm and normal heart sounds.   Pulmonary/Chest: Effort normal. No wheezes.   Abdominal: Soft. Bowel sounds are normal. No distension and no mass. No tenderness  Neurological: Alert.   Skin: Skin is warm and dry.   Psych: Appropriate mood and affect    Following labs were Reviewed   CBC:  Recent Labs   Lab 01/06/23  0437   WBC 13.52*   HGB 12.2*   HCT 38.4*        CMP:  Recent Labs   Lab 01/06/23 0437   CALCIUM 7.6*      K 3.0*   CO2 22*      BUN 10   CREATININE 0.6       Micro Results  Microbiology Results (last 7 days)       Procedure Component Value Units Date/Time    Blood culture #2 **CANNOT BE ORDERED STAT** [[id number]] Collected: 12/31/22 0040    Order Status: Completed Specimen: Blood from Peripheral, Forearm, Right Updated: 01/05/23 0232     Blood Culture, Routine No growth after 5 days.    Blood culture #1 **CANNOT BE ORDERED STAT** [[id number]] Collected: 12/31/22 0100    Order Status: Completed Specimen: Blood from Peripheral, Upper Arm, Right Updated: 01/05/23 0232     Blood Culture, Routine No growth after 5 days.    Urine culture [[id number]] Collected: 12/31/22 0141    Order Status: Completed Specimen: Urine Updated: 01/02/23 0659     Urine Culture, Routine No growth    Narrative:      Specimen Source->Urine    Respiratory Infection Panel (PCR),  Nasopharyngeal [[id number]] Collected: 12/31/22 0415    Order Status: Completed Updated: 12/31/22 1841     Respiratory Infection Panel Source NP swab     Adenovirus Not Detected     Coronavirus 229E, Common Cold Virus Not Detected     Coronavirus HKU1, Common Cold Virus Not Detected     Coronavirus NL63, Common Cold Virus Not Detected     Coronavirus OC43, Common Cold Virus Not Detected     Comment: The Coronavirus strains detected in this test cause the common cold.  These strains are not the COVID-19 (novel Coronavirus)strain   associated with the respiratory disease outbreak.          SARS-CoV2 (COVID-19) Qualitative PCR Not Detected     Human Metapneumovirus Not Detected     Human Rhinovirus/Enterovirus Not Detected     Influenza A (subtypes H1, H1-2009,H3) Not Detected     Influenza B Not Detected     Parainfluenza Virus 1 Not Detected     Parainfluenza Virus 2 Not Detected     Parainfluenza Virus 3 Not Detected     Parainfluenza Virus 4 Not Detected     Respiratory Syncytial Virus Not Detected     Bordetella Parapertussis (XR6990) Not Detected     Bordetella pertussis (ptxP) Not Detected     Chlamydia pneumoniae Not Detected     Mycoplasma pneumoniae Not Detected     Comment: Respiratory Infection Panel testing performed by Multiplex PCR.       Narrative:      Specimen Source->Nasopharyngeal Swab    MRSA Screen by PCR [[id number]] Collected: 12/31/22 0415    Order Status: Completed Specimen: Nasopharyngeal Swab from Nasal Updated: 12/31/22 0553     MRSA SCREEN BY PCR Negative    Resp Viral Panel PCR, Peds Under 7 Months Nasopharyngeal Swab [[id number]] Collected: 12/31/22 0415    Order Status: Canceled              Radiology Reports  X-Ray Femur 2 View Left    Result Date: 12/16/2022  EXAMINATION: XR FEMUR 2 VIEW LEFT CLINICAL HISTORY: Other fracture of left femur, initial encounter for closed fracture TECHNIQUE: AP and lateral views of the left femur were performed. COMPARISON: 11/08/2022 FINDINGS: There is  internal fixation spanning a femoral neck fracture.  There is also knee arthroplasty hardware.  No abnormal lucency surrounding hardware.  No detrimental change in alignment.  Surgical clips medial left thigh.  Small knee joint effusion. Electronically signed by: Yuval Felton Date:    12/16/2022 Time:    11:51    CT Head Without Contrast    Result Date: 12/31/2022  EXAM DESCRIPTION: CT HEAD WITHOUT IV CONTRAST CLINICAL HISTORY: Head trauma, minor (Age >= 65y); head bleed 2 weeks ago and back on eliquis. Patient fell after having a dizziness episode. Patient hit the side of his head on a marble table tonight. Patient was seen 2 weeks ago for a fall secondary to dizziness. Patient was diagnosed with a head bleed. TECHNIQUE: Multiple axial images were obtained through the brain without intravenous contrast. Coronal and sagittal images were reconstructed. All CT scans at this facility use dose modulation, iterative reconstruction, and/or weight based dosing when appropriate to reduce radiation dose to as low as reasonably achievable. COMPARISON: Previous head CT images dated 12/19/2022. The prior CT report is not available at this time. Reference is also made to previous head CT and report dated 11/18/2022. FINDINGS: There is no evidence of acute intracranial hemorrhage or acute major territorial infarction. The ventricles, sulci and cisterns are grossly unchanged in size. There is no midline shift. There is a minimal chronic subdural hematoma or hygroma overlying the right frontal convexity measuring approximately 4 mm in thickness on the coronal images, grossly unchanged in extent from the previous CT, and improved from the prior dated 11/18/2022. Subcortical and periventricular white matter hypodensities are seen, compatible with chronic microangiopathic disease, grossly unchanged. Vascular calcifications. Mild mucosal thickening as well as an air-fluid level within the left maxillary sinus. The mastoid air cells  are clear bilaterally. The calvarium appears grossly unremarkable. Slight scarring along the left parietal scalp. IMPRESSION: 1.  No acute intracranial abnormality. 2.  Minimal chronic subdural hematoma or hygroma overlying the right frontal convexity, grossly unchanged in extent from the previous CT, and improved from the prior dated 11/18/2022. 3.  White matter changes of chronic microangiopathic disease, grossly unchanged. 4.  Mild acute sinusitis involving the left maxillary sinus. Electronically signed by:  Sharon Brownlee MD  12/31/2022 12:45 AM CST Workstation: [id number]    CT Head Without Contrast    Result Date: 12/19/2022  EXAMINATION: CT HEAD WITHOUT CONTRAST CLINICAL HISTORY: contusion follow up; Traumatic subdural hemorrhage with loss of consciousness of unspecified duration, subsequent encounter TECHNIQUE: Low dose axial images were obtained through the head.  Coronal and sagittal reformations were also performed. Contrast was not administered. COMPARISON: 11/18/2022 FINDINGS: The examination demonstrates resolution of the previous identified right-sided convexity subdural collection.  There is also evolution of the previous right inferolateral temporal parenchymal contusion, now showing only a small area of hypoattenuation in the temporal lobe laterally.  No new hemorrhage is seen.  There is no midline shift.  Ventricles and sulci demonstrate mild involutional changes which are appropriate for the patient's age. Bone windows demonstrate trace fluid or membrane thickening in the right mastoid air cells.  There is no bone destruction or fracture.     Resolution of the right convexity subdural collection and evolution of the right temporal hematoma.  No new abnormalities. Electronically signed by: Shyam Hoffman MD Date:    12/19/2022 Time:    14:35    CT Cervical Spine Without Contrast    Result Date: 12/31/2022  EXAM DESCRIPTION: CT CERVICAL SPINE WITHOUT IV CONTRAST CLINICAL HISTORY: Neck trauma  (Age >= 65y). Patient fell after having a dizziness episode. Patient hit the side of his head on a marble table tonight. Patient was seen 2 weeks ago for a fall secondary to dizziness. Patient was diagnosed with a head bleed. TECHNIQUE: Multiple axial images were obtained through the cervical spine without intravenous contrast. Coronal and sagittal images were reconstructed. All CT scans at this facility use dose modulation, iterative reconstruction, and/or weight based dosing when appropriate to reduce radiation dose to as low as reasonably achievable. COMPARISON: Previous cervical spine CT dated 11/8/2022. FINDINGS: There is straightening of the normal cervical lordosis, possibly positional or secondary to muscle spasm, and in retrospect unchanged. The vertebral heights are preserved. Scattered disc space narrowing, greatest at C5-C6, grossly unchanged. There is no acute fracture or subluxation. No epidural hematoma is visible. The prevertebral soft tissues are grossly unremarkable. The visualized lung apices are clear. Vascular calcifications. The visualized paranasal sinuses are clear. The mastoid air cells are clear. Degenerative changes along the cervical spine. Secondary to broad-based posterior disc osteophyte complex, there is mild to moderate central canal stenosis at C5-C6 6. Multilevel neural foraminal narrowing secondary to hypertrophic changes, with moderate left neuroforaminal narrowing at C3-C4 and C4-C5 and moderate to severe on the right at C5-C6, and mild at other levels. IMPRESSION: 1.  No evidence of acute fracture or subluxation of the cervical spine. 2.  Multilevel degenerative changes of the cervical spine, as above. Electronically signed by:  Sharon Brownlee MD  12/31/2022 12:52 AM [facility] Workstation: [id number]    NM Hepatobiliary Scan (HIDA)    Result Date: 1/2/2023  Reason: Cholecystitis (Ped 0-18y); nausea , vomiting  and distended gall bladder and sepsis ? Radiopharmaceutical: 7 mCi  Technetium 99m Choletec. COMPARISON: CT 12/31/2022 FINDINGS: Tomographic images show uniform hepatic uptake and clearance of radiopharmaceutical. Gallbladder filling and biliary to bowel transit occur within 60 minutes. Imaging was terminated at 33 minutes, due to patient's inability to tolerate supine imaging without moving. IMPRESSION: Normal hepatobiliary scan. Electronically signed by:  Esteban Flores MD  1/2/2023 12:21 PM CST Workstation: [id number]    X-Ray Chest AP Portable    Result Date: 12/31/2022  EXAMINATION: XR CHEST AP PORTABLE CLINICAL INDICATION: Male, 77 years old. Dizziness COMPARISON: November 10, 2022 and November 8, 2022 x-ray chest, and July 11, 2022 CT chest. FINDINGS: Support Devices: None. Heart: Unchanged Cardiomegaly. Mediastinum: Mediastinal contours are normal. Lungs: Pulmonary vasculature is prominent. Lungs are well aerated and clear. Slightly increased diffuse opacification of the right lung in comparison with the left is felt to be positional and anatomical. Pleura: No pleural effusion is identified. No pneumothorax is present. Osseous Structures: Visualized skeleton is normal. IMPRESSION: 1. Stable cardiomegaly and prominent pulmonary vasculature, compatible with central vascular fullness without pulmonary edema. Electronically signed by:  Eron Bolivar MD  12/31/2022 6:47 AM CST Workstation: [id number]    CT Abdomen Pelvis  Without Contrast    Result Date: 12/31/2022  EXAM DESCRIPTION: CT ABDOMEN PELVIS WITHOUT CONTRAST RadLex: CT ABDOMEN PELVIS WITHOUT IV CONTRAST CLINICAL HISTORY: Abdominal abscess/infection suspected. TECHNIQUE: CT of the abdomen and pelvis without contrast. All CT scans at this facility use dose modulation, iterative reconstruction, and/or weight based dosing when appropriate to reduce radiation dose to as low as reasonably achievable. COMPARISON: None. FINDINGS: The visualized lower thoracic structures demonstrate coronary artery calcifications. Unenhanced  images of the liver, spleen, pancreas and adrenal glands appear grossly unremarkable. The gallbladder appears slightly distended. There are multiple bilateral renal calculi measuring up to 1 cm on the right. There is no hydronephrosis bilaterally. No retroperitoneal or mesenteric lymphadenopathy is identified. No abdominal aortic aneurysm is seen. No bowel obstruction is seen. There is no free intraperitoneal air. The appendix appears unremarkable. There is colonic diverticulosis without definite CT evidence for acute diverticulitis. There is mild fecal loading throughout the colon. No free fluid is identified. Urinary bladder is decompressed. Small prostatic calcifications are present. Prostate gland measures approximately 5.8 cm TR by 3.5 cm AP by 4 cm CC. There are small bilateral inguinal hernias containing fat. There are postsurgical changes at the proximal left femur. No acute fracture is seen. IMPRESSION: 1.  Slight gallbladder distention. 2.  Colonic diverticulosis without definite CT evidence for acute diverticulitis. 3.  Nonobstructive bilateral renal calculi. Electronically signed by:  Brandt Carl DO  12/31/2022 2:35 AM CST Workstation: [id number]    X-Ray KUB    Result Date: 1/1/2023  KUB Clinical history is vomiting COMPARISON: CT abdomen and pelvis dated 12/31/2022 There is a normal bowel gas pattern. No abnormal soft tissue mass or organomegaly. There are bilateral renal calculi. There are degenerative changes of the spine. IMPRESSION: Unremarkable bowel gas pattern Bilateral renal calculi Electronically signed by:  Madeleine Griffin MD  1/1/2023 3:35 PM CST Workstation: [id number]       Meds  Scheduled Meds:   amiodarone  100 mg Oral QAM    apixaban  5 mg Oral BID    atorvastatin  80 mg Oral Daily    cyproheptadine  4 mg Oral TID    DULoxetine  30 mg Oral BID    metoprolol succinate  50 mg Oral Daily    sodium chloride 0.9%  500 mL Intravenous Once    tamsulosin  0.4 mg Oral Daily     Continuous  Infusions:   sodium chloride 0.9% 100 mL/hr at 01/05/23 1343     PRN Meds:.acetaminophen, albuterol-ipratropium, calcium gluconate IVPB, calcium gluconate IVPB, calcium gluconate IVPB, dextrose 10%, dextrose 10%, glucagon (human recombinant), glucose, glucose, hydrALAZINE, HYDROcodone-acetaminophen, insulin aspart U-100, magnesium oxide, magnesium oxide, magnesium sulfate IVPB, magnesium sulfate IVPB, melatonin, metoclopramide HCl, naloxone, polyethylene glycol, potassium bicarbonate, potassium bicarbonate, potassium bicarbonate, potassium chloride **AND** potassium chloride **AND** potassium chloride, potassium, sodium phosphates, potassium, sodium phosphates, potassium, sodium phosphates, promethazine (PHENERGAN) IVPB, senna-docusate 8.6-50 mg, simethicone, sodium chloride 0.9%, sodium phosphate IVPB, sodium phosphate IVPB, sodium phosphate IVPB.    Active PT: Yes  Active OT: Yes  Active SLP: No  Assessment & Plan:     Active Hospital Problems    Diagnosis    *Severe sepsis    Subdural hematoma caused by concussion    Chronic heart failure with preserved ejection fraction    Benign essential tremor    Type 2 diabetes mellitus with diabetic nephropathy, without long-term current use of insulin    Anticoagulated    Atrial fibrillation    Aspirin long-term use    Hypertension, essential   PLAN   Altered mental status resolved most likely from polypharmacy and currently resolved and possible from chronic subdural hematoma   KAM on CKD resolved   Urinary retention, consult to urologist, patient started on Flomax  Complete IV antibiotics  Resume home medication of losartan and titrate upwards as tolerated  Resume home eliquis   Awaiting placement awaiting placement           Discharge Planning:   Is the patient medically ready for discharge?: no    Reason for patient still in hospital (select all that apply): Patient trending condition and Treatment    Above encounter included review of the medical records, interviewing  and examining the patient face-to-face, discussion with family and other health care providers, ordering and interpreting lab/test results and formulating a plan of care.     Medical Decision Making:      [_] Low Complexity  [_] Moderate Complexity  [x] High Complexity      Jony López MD  Department of Hospital Medicine   [facility]

## 2023-01-06 NOTE — PLAN OF CARE
Problem: Adult Inpatient Plan of Care  Goal: Plan of Care Review  Outcome: Ongoing, Progressing  Goal: Patient-Specific Goal (Individualized)  Outcome: Ongoing, Progressing  Goal: Absence of Hospital-Acquired Illness or Injury  Outcome: Ongoing, Progressing  Goal: Optimal Comfort and Wellbeing  Outcome: Ongoing, Progressing  Goal: Readiness for Transition of Care  Outcome: Ongoing, Progressing     Problem: Diabetes Comorbidity  Goal: Blood Glucose Level Within Targeted Range  Outcome: Ongoing, Progressing     Problem: Adjustment to Illness (Sepsis/Septic Shock)  Goal: Optimal Coping  Outcome: Ongoing, Progressing     Problem: Bleeding (Sepsis/Septic Shock)  Goal: Absence of Bleeding  Outcome: Ongoing, Progressing     Problem: Glycemic Control Impaired (Sepsis/Septic Shock)  Goal: Blood Glucose Level Within Desired Range  Outcome: Ongoing, Progressing     Problem: Infection Progression (Sepsis/Septic Shock)  Goal: Absence of Infection Signs and Symptoms  Outcome: Ongoing, Progressing     Problem: Nutrition Impaired (Sepsis/Septic Shock)  Goal: Optimal Nutrition Intake  Outcome: Ongoing, Progressing     Problem: Infection  Goal: Absence of Infection Signs and Symptoms  Outcome: Ongoing, Progressing     Problem: Skin Injury Risk Increased  Goal: Skin Health and Integrity  Outcome: Ongoing, Progressing     Problem: Oral Intake Inadequate  Goal: Improved Oral Intake  Outcome: Ongoing, Progressing

## 2023-01-06 NOTE — PT/OT/SLP PROGRESS
Occupational Therapy   Treatment    Name: Hiro Rodríguez  MRN: [id number]  Admitting Diagnosis:  Severe sepsis       Recommendations:     Discharge Recommendations: nursing facility, skilled  Discharge Equipment Recommendations:  other (see comments) (TBD at next level of care)  Barriers to discharge:  Decreased caregiver support    Assessment:     Hiro Rodríguez is a 77 y.o. male with a medical diagnosis of Severe sepsis.  Performance deficits affecting function are weakness, impaired endurance, impaired self care skills, impaired functional mobility, gait instability, impaired balance, decreased safety awareness, impaired cardiopulmonary response to activity. Patient is wanting to know status of discharge and placement.     Rehab Prognosis:  Fair; patient would benefit from acute skilled OT services to address these deficits and reach maximum level of function.       Plan:     Patient to be seen 5 x/week to address the above listed problems via self-care/home management, therapeutic activities, therapeutic exercises  Plan of Care Expires: 02/03/23  Plan of Care Reviewed with: patient    Subjective     Pain/Comfort:  Pain Rating 1: 0/10  Pain Rating Post-Intervention 1: 0/10    Objective:     Communicated with: nurse prior to session.  Patient found HOB elevated with peripheral IV, telemetry, bed alarm upon OT entry to room.    General Precautions: Standard, fall, hearing impaired    Orthopedic Precautions:N/A  Braces: N/A  Respiratory Status: Room air     Occupational Performance:     Bed Mobility:    Patient completed Rolling/Turning to Left with  minimum assistance  Patient completed Scooting/Bridging with minimum assistance  Patient completed Supine to Sit with minimum assistance     Activities of Daily Living:  Grooming: stand by assistance/setup for oral care and washing face while seated at edge of bed    Treatment & Education:  Patient was educated on discharge planning, safety during self care,  energy conservation techniques, home modifications, and demonstrated call bell use for assistance.     Patient left supine with all lines intact, call button in reach, and bed alarm on    GOALS:   Multidisciplinary Problems       Occupational Therapy Goals          Problem: Occupational Therapy    Goal Priority Disciplines Outcome Interventions   Occupational Therapy Goal     OT, PT/OT Ongoing, Progressing    Description: Goals to be met by: 2/3/23    Patient will increase functional independence with ADLs by performing:    UE Dressing with Supervision.  LE Dressing with Supervision.  Grooming while standing at sink with Supervision.  Toileting from toilet with Supervision for hygiene and clothing management.   Toilet transfer to toilet with Supervision.                         Time Tracking:     OT Date of Treatment: 01/06/23  OT Start Time: 0950  OT Stop Time: 1000  OT Total Time (min): 10 min    Billable Minutes:Self Care/Home Management 10               1/6/2023

## 2023-01-06 NOTE — CONSULTS
76 yo male w recent hip fx  Several falls as well  Now w urine retention  Sotelo in place  Will need f/u  Please have patient follow up with me in my office

## 2023-01-07 LAB
ANION GAP SERPL CALC-SCNC: 13 MMOL/L (ref 8–16)
BASOPHILS # BLD AUTO: 0.03 K/UL (ref 0–0.2)
BASOPHILS NFR BLD: 0.2 % (ref 0–1.9)
BUN SERPL-MCNC: 11 MG/DL (ref 8–23)
CALCIUM SERPL-MCNC: 7.9 MG/DL (ref 8.7–10.5)
CHLORIDE SERPL-SCNC: 105 MMOL/L (ref 95–110)
CO2 SERPL-SCNC: 19 MMOL/L (ref 23–29)
CREAT SERPL-MCNC: 0.7 MG/DL (ref 0.5–1.4)
DIFFERENTIAL METHOD: ABNORMAL
EOSINOPHIL # BLD AUTO: 0.1 K/UL (ref 0–0.5)
EOSINOPHIL NFR BLD: 0.4 % (ref 0–8)
ERYTHROCYTE [DISTWIDTH] IN BLOOD BY AUTOMATED COUNT: 14 % (ref 11.5–14.5)
EST. GFR  (NO RACE VARIABLE): >60 ML/MIN/1.73 M^2
GLUCOSE SERPL-MCNC: 119 MG/DL (ref 70–110)
GLUCOSE SERPL-MCNC: 119 MG/DL (ref 70–110)
GLUCOSE SERPL-MCNC: 126 MG/DL (ref 70–110)
GLUCOSE SERPL-MCNC: 133 MG/DL (ref 70–110)
GLUCOSE SERPL-MCNC: 139 MG/DL (ref 70–110)
HCT VFR BLD AUTO: 40.8 % (ref 40–54)
HGB BLD-MCNC: 13.2 G/DL (ref 14–18)
IMM GRANULOCYTES # BLD AUTO: 0.12 K/UL (ref 0–0.04)
IMM GRANULOCYTES NFR BLD AUTO: 0.7 % (ref 0–0.5)
LYMPHOCYTES # BLD AUTO: 1.8 K/UL (ref 1–4.8)
LYMPHOCYTES NFR BLD: 10.2 % (ref 18–48)
MAGNESIUM SERPL-MCNC: 1.7 MG/DL (ref 1.6–2.6)
MCH RBC QN AUTO: 30.6 PG (ref 27–31)
MCHC RBC AUTO-ENTMCNC: 32.4 G/DL (ref 32–36)
MCV RBC AUTO: 95 FL (ref 82–98)
MONOCYTES # BLD AUTO: 1.1 K/UL (ref 0.3–1)
MONOCYTES NFR BLD: 6.5 % (ref 4–15)
NEUTROPHILS # BLD AUTO: 14.4 K/UL (ref 1.8–7.7)
NEUTROPHILS NFR BLD: 82 % (ref 38–73)
NRBC BLD-RTO: 0 /100 WBC
PHOSPHATE SERPL-MCNC: 3.2 MG/DL (ref 2.7–4.5)
PLATELET # BLD AUTO: 342 K/UL (ref 150–450)
PLATELET BLD QL SMEAR: ABNORMAL
PMV BLD AUTO: 10.2 FL (ref 9.2–12.9)
POTASSIUM SERPL-SCNC: 3.1 MMOL/L (ref 3.5–5.1)
POTASSIUM SERPL-SCNC: 4 MMOL/L (ref 3.5–5.1)
PROCALCITONIN SERPL IA-MCNC: <0.05 NG/ML (ref 0–0.5)
RBC # BLD AUTO: 4.31 M/UL (ref 4.6–6.2)
SODIUM SERPL-SCNC: 137 MMOL/L (ref 136–145)
WBC # BLD AUTO: 17.51 K/UL (ref 3.9–12.7)

## 2023-01-07 PROCEDURE — 51798 US URINE CAPACITY MEASURE: CPT

## 2023-01-07 PROCEDURE — 63600175 PHARM REV CODE 636 W HCPCS: Performed by: STUDENT IN AN ORGANIZED HEALTH CARE EDUCATION/TRAINING PROGRAM

## 2023-01-07 PROCEDURE — 84132 ASSAY OF SERUM POTASSIUM: CPT | Performed by: STUDENT IN AN ORGANIZED HEALTH CARE EDUCATION/TRAINING PROGRAM

## 2023-01-07 PROCEDURE — 94761 N-INVAS EAR/PLS OXIMETRY MLT: CPT

## 2023-01-07 PROCEDURE — 36415 COLL VENOUS BLD VENIPUNCTURE: CPT | Performed by: FAMILY MEDICINE

## 2023-01-07 PROCEDURE — 85025 COMPLETE CBC W/AUTO DIFF WBC: CPT | Performed by: FAMILY MEDICINE

## 2023-01-07 PROCEDURE — 84145 PROCALCITONIN (PCT): CPT | Performed by: STUDENT IN AN ORGANIZED HEALTH CARE EDUCATION/TRAINING PROGRAM

## 2023-01-07 PROCEDURE — 36415 COLL VENOUS BLD VENIPUNCTURE: CPT | Performed by: STUDENT IN AN ORGANIZED HEALTH CARE EDUCATION/TRAINING PROGRAM

## 2023-01-07 PROCEDURE — 25000003 PHARM REV CODE 250: Performed by: FAMILY MEDICINE

## 2023-01-07 PROCEDURE — 99900035 HC TECH TIME PER 15 MIN (STAT)

## 2023-01-07 PROCEDURE — 25000003 PHARM REV CODE 250: Performed by: STUDENT IN AN ORGANIZED HEALTH CARE EDUCATION/TRAINING PROGRAM

## 2023-01-07 PROCEDURE — 99900031 HC PATIENT EDUCATION (STAT)

## 2023-01-07 PROCEDURE — 25000003 PHARM REV CODE 250: Performed by: INTERNAL MEDICINE

## 2023-01-07 PROCEDURE — 80048 BASIC METABOLIC PNL TOTAL CA: CPT | Performed by: FAMILY MEDICINE

## 2023-01-07 PROCEDURE — 12000002 HC ACUTE/MED SURGE SEMI-PRIVATE ROOM

## 2023-01-07 PROCEDURE — 83735 ASSAY OF MAGNESIUM: CPT | Performed by: FAMILY MEDICINE

## 2023-01-07 PROCEDURE — 84100 ASSAY OF PHOSPHORUS: CPT | Performed by: STUDENT IN AN ORGANIZED HEALTH CARE EDUCATION/TRAINING PROGRAM

## 2023-01-07 PROCEDURE — 97116 GAIT TRAINING THERAPY: CPT

## 2023-01-07 PROCEDURE — 63600175 PHARM REV CODE 636 W HCPCS: Performed by: INTERNAL MEDICINE

## 2023-01-07 RX ORDER — POTASSIUM CHLORIDE 20 MEQ/1
20 TABLET, EXTENDED RELEASE ORAL DAILY
Status: DISCONTINUED | OUTPATIENT
Start: 2023-01-07 | End: 2023-01-09 | Stop reason: HOSPADM

## 2023-01-07 RX ORDER — METOPROLOL TARTRATE 1 MG/ML
5 INJECTION, SOLUTION INTRAVENOUS ONCE
Status: COMPLETED | OUTPATIENT
Start: 2023-01-07 | End: 2023-01-07

## 2023-01-07 RX ORDER — LIDOCAINE HYDROCHLORIDE 40 MG/ML
4 SOLUTION TOPICAL
Status: DISCONTINUED | OUTPATIENT
Start: 2023-01-07 | End: 2023-01-09 | Stop reason: HOSPADM

## 2023-01-07 RX ORDER — DIPHENHYDRAMINE HYDROCHLORIDE 50 MG/ML
25 INJECTION INTRAMUSCULAR; INTRAVENOUS ONCE
Status: COMPLETED | OUTPATIENT
Start: 2023-01-07 | End: 2023-01-07

## 2023-01-07 RX ORDER — TAMSULOSIN HYDROCHLORIDE 0.4 MG/1
0.4 CAPSULE ORAL ONCE
Status: COMPLETED | OUTPATIENT
Start: 2023-01-07 | End: 2023-01-07

## 2023-01-07 RX ORDER — HYDROCODONE BITARTRATE AND ACETAMINOPHEN 10; 325 MG/1; MG/1
1 TABLET ORAL EVERY 4 HOURS PRN
Status: DISCONTINUED | OUTPATIENT
Start: 2023-01-07 | End: 2023-01-09 | Stop reason: HOSPADM

## 2023-01-07 RX ORDER — METOPROLOL SUCCINATE 50 MG/1
100 TABLET, EXTENDED RELEASE ORAL DAILY
Status: DISCONTINUED | OUTPATIENT
Start: 2023-01-08 | End: 2023-01-08

## 2023-01-07 RX ORDER — TAMSULOSIN HYDROCHLORIDE 0.4 MG/1
0.8 CAPSULE ORAL DAILY
Status: DISCONTINUED | OUTPATIENT
Start: 2023-01-08 | End: 2023-01-09 | Stop reason: HOSPADM

## 2023-01-07 RX ADMIN — CYPROHEPTADINE HYDROCHLORIDE 4 MG: 4 TABLET ORAL at 09:01

## 2023-01-07 RX ADMIN — TAMSULOSIN HYDROCHLORIDE 0.4 MG: 0.4 CAPSULE ORAL at 09:01

## 2023-01-07 RX ADMIN — HYDROCODONE BITARTRATE AND ACETAMINOPHEN 1 TABLET: 5; 325 TABLET ORAL at 01:01

## 2023-01-07 RX ADMIN — METOPROLOL TARTRATE 5 MG: 1 INJECTION, SOLUTION INTRAVENOUS at 10:01

## 2023-01-07 RX ADMIN — DIPHENHYDRAMINE HYDROCHLORIDE 25 MG: 50 INJECTION INTRAMUSCULAR; INTRAVENOUS at 02:01

## 2023-01-07 RX ADMIN — APIXABAN 5 MG: 5 TABLET, FILM COATED ORAL at 09:01

## 2023-01-07 RX ADMIN — HYDROCODONE BITARTRATE AND ACETAMINOPHEN 1 TABLET: 5; 325 TABLET ORAL at 09:01

## 2023-01-07 RX ADMIN — AMIODARONE HYDROCHLORIDE 100 MG: 100 TABLET ORAL at 05:01

## 2023-01-07 RX ADMIN — Medication 800 MG: at 09:01

## 2023-01-07 RX ADMIN — SODIUM CHLORIDE: 0.9 INJECTION, SOLUTION INTRAVENOUS at 03:01

## 2023-01-07 RX ADMIN — DULOXETINE 30 MG: 30 CAPSULE, DELAYED RELEASE ORAL at 09:01

## 2023-01-07 RX ADMIN — APIXABAN 5 MG: 5 TABLET, FILM COATED ORAL at 08:01

## 2023-01-07 RX ADMIN — CEFTRIAXONE 1 G: 1 INJECTION, SOLUTION INTRAVENOUS at 09:01

## 2023-01-07 RX ADMIN — HYDROCODONE BITARTRATE AND ACETAMINOPHEN 1 TABLET: 10; 325 TABLET ORAL at 11:01

## 2023-01-07 RX ADMIN — HYDROCODONE BITARTRATE AND ACETAMINOPHEN 1 TABLET: 10; 325 TABLET ORAL at 03:01

## 2023-01-07 RX ADMIN — HYDROCODONE BITARTRATE AND ACETAMINOPHEN 1 TABLET: 5; 325 TABLET ORAL at 05:01

## 2023-01-07 RX ADMIN — ATORVASTATIN CALCIUM 80 MG: 40 TABLET, FILM COATED ORAL at 08:01

## 2023-01-07 RX ADMIN — DULOXETINE 30 MG: 30 CAPSULE, DELAYED RELEASE ORAL at 08:01

## 2023-01-07 RX ADMIN — METOPROLOL SUCCINATE 50 MG: 50 TABLET, FILM COATED, EXTENDED RELEASE ORAL at 05:01

## 2023-01-07 RX ADMIN — CYPROHEPTADINE HYDROCHLORIDE 4 MG: 4 TABLET ORAL at 03:01

## 2023-01-07 RX ADMIN — LIDOCAINE HYDROCHLORIDE 4 ML: 40 SOLUTION TOPICAL at 03:01

## 2023-01-07 RX ADMIN — TAMSULOSIN HYDROCHLORIDE 0.4 MG: 0.4 CAPSULE ORAL at 10:01

## 2023-01-07 RX ADMIN — HYDROCODONE BITARTRATE AND ACETAMINOPHEN 1 TABLET: 10; 325 TABLET ORAL at 08:01

## 2023-01-07 RX ADMIN — POTASSIUM CHLORIDE 20 MEQ: 1500 TABLET, EXTENDED RELEASE ORAL at 09:01

## 2023-01-07 RX ADMIN — POTASSIUM BICARBONATE 35 MEQ: 391 TABLET, EFFERVESCENT ORAL at 01:01

## 2023-01-07 NOTE — PLAN OF CARE
Patient c/o burning pain to catheter/penis most of this shift. Catheter care provided. [place] given twice this shift. Notified Dr. Lewis of pain. Benadryl IV push and Lidocaine topical ordered and administered. Patient now states pain is decreasing. Patient repositioned in bed. Resting at this time.   Problem: Adult Inpatient Plan of Care  Goal: Plan of Care Review  Outcome: Ongoing, Progressing  Goal: Patient-Specific Goal (Individualized)  Outcome: Ongoing, Progressing  Goal: Absence of Hospital-Acquired Illness or Injury  Outcome: Ongoing, Progressing  Goal: Optimal Comfort and Wellbeing  Outcome: Ongoing, Progressing  Goal: Readiness for Transition of Care  Outcome: Ongoing, Progressing     Problem: Diabetes Comorbidity  Goal: Blood Glucose Level Within Targeted Range  Outcome: Ongoing, Progressing     Problem: Bleeding (Sepsis/Septic Shock)  Goal: Absence of Bleeding  Outcome: Ongoing, Progressing     Problem: Glycemic Control Impaired (Sepsis/Septic Shock)  Goal: Blood Glucose Level Within Desired Range  Outcome: Ongoing, Progressing     Problem: Infection Progression (Sepsis/Septic Shock)  Goal: Absence of Infection Signs and Symptoms  Outcome: Ongoing, Progressing     Problem: Nutrition Impaired (Sepsis/Septic Shock)  Goal: Optimal Nutrition Intake  Outcome: Ongoing, Progressing     Problem: Infection  Goal: Absence of Infection Signs and Symptoms  Outcome: Ongoing, Progressing     Problem: Skin Injury Risk Increased  Goal: Skin Health and Integrity  Outcome: Ongoing, Progressing     Problem: Oral Intake Inadequate  Goal: Improved Oral Intake  Outcome: Ongoing, Progressing

## 2023-01-07 NOTE — PROGRESS NOTES
"[facility] Medicine    Progress Note    Patient Name: Hiro Rodríguez  MRN: [id number]  Patient Class: IP- Inpatient   Admission Date: 12/30/2022 11:24 PM  Length of Stay: 7  Attending Physician: Jony López MD  Primary Care Provider: Gautam Castanon III, MD  Face-to-Face encounter date: 01/07/2023  Code status:  Chief Complaint: Fall (Pt arrived by ems after a pt fell after having a dizziness episode. Denies loc. Pt hit the side of his head on a marble table tonight. Pt denies any pain./Pt was seen two weeks ago for a fall secondary to dizziness, pt was dx with a head bleed. Pt restarted his blood thinners once home. )        Subjective:    HPI:Hiro Rodríguez is a 77 y.o. White male   With PMH of chronic subdural hematoma (11/8/22),  pAF on eliquis, DM2, HTN, multiple falls 2/2 polypharmacy,  who presents with fall.     Onset this evening  Occurred after experiencing dizziness  No LOC  +head trauma on marble side table  Pt discharged from [facility] on 11/18/22  He was treated for subdural hematoma due to fall  Since discharge, pt has had poor po intake  Wife reports "he is just stubborn" and refusing to eat/drink  No n/v  No subjective fever   No chills  No abdominal pain  Previous dysuria, since resolved  No diarrhea  +generalized weakness  +fatigue     On 12/28:  new dysuria, urgency, frequency  Saw outpt PCP on 12/29:  started on cipro  BP at that visit was noted 94/57     Pt initially presented to ER today normotensive  While sleeping, noted BP 55/30  Did not recover with positional changes  Pt's BP now responding to fluid resuscitation    Interval History:   12/31  Patient was seen and examined along with the nurse  Tried to talk with the patient and he said let me wake up first   Try to open the eye son look at the pupil but he intentionally close the eyes despite asked to open  Denied having pain anywhere in the body  Pressure is low again and fibrous cc bolus given  Urine " toxicology findings noted.  He did not answer the question whether he took extra pills are not     1/1/23  Patient is awake and alert and normal and talking normal   Mild abdominal pain . BP stable . But over day nausea and vomiting . Abdominal pain .  HIDA ordered since gall bladder distended      1/2/23  Hospital course  Patient was admitted with altered mental status and possible sepsis.  CT scan of the abdomen was done because of abdominal pain and no acute finding but gallbladder distention and HIDA scan was done and negative  Today patient wake up and acting normal and had normal conversation.  He was not doing well with repeated falls after he had subdural hematoma the last time.  Said he will go home if I can arrange the hospital bed .  But later he changed her mind and he wants to go to rehab or skilled facility  K very low and supplementing     1/3:  Patient is AO x3, and states that he would like to go to a facility to get his strength back.  Labs currently pending.  Surgery evaluated patient and stated that patient does not have cholecystitis hence no plans for surgical intervention.  No concerns/issues overnight reported by the patient or the nursing staff.    1/4:  Patient complaining of burning on urination, will start patient on Pyridium, patient has completed 5 days of IV antibiotics.  Awaiting placement.  Spoke to wife yesterday and she stated that patient's depression has worsened, would increase his duloxetine to 30 mg b.i.d. and titrate upwards as tolerated.  Blood pressure is uncontrolled resume patient's losartan 50 mg daily and titrate upwards as tolerated.    1/5:  Bladder scan was done and patient was found to have urinary retention and in and out was done.  Later in the night patient had another episode of urinary retention and Sotelo was placed in.  Consult to urologist placed.  Would adjust blood pressure medications due to elevated heart rate, would change propranolol to Toprol-XL and  discontinue losartan    1/6:  Urologist evaluated patient and recommended follow-up outpatient.  Awaiting placement    1/7:  Discussed with patient and his wife today and they are requesting we take out the Sotelo as patient states that he is in extreme pain due to the Sotelo.  Although I explained to them that patient may give retained afterwards, they stated that they are willing to take that chance.  Would DC Sotelo and increase patient's Flomax.  Patient has been tachycardic, would give patient IV metoprolol 5 mg and increase Toprol to 100 mg.  Patient states that his tachycardia secondary to Sotelo pain.  Potassium low, would replace.    Review of Systems All other Review of Systems were found to be negative expect for that mentioned already in HPI.     Objective:     Vitals:    01/07/23 1050 01/07/23 1051 01/07/23 1151 01/07/23 1152   BP: 124/73 124/73 101/68    Pulse: (!) 125  98    Resp:   18 18   Temp:   97.7 °F (36.5 °C)    TempSrc: Oral  Oral    SpO2: 95%  97%    Weight:       Height:            Vitals reviewed.  Constitutional: No distress.   HENT: NC  Head: Atraumatic.   Cardiovascular: Normal rate, regular rhythm and normal heart sounds.   Pulmonary/Chest: Effort normal. No wheezes.   Abdominal: Soft. Bowel sounds are normal. No distension and no mass. No tenderness  Neurological: Alert.   Skin: Skin is warm and dry.   Psych: Appropriate mood and affect    Following labs were Reviewed   CBC:  Recent Labs   Lab 01/07/23  0630   WBC 17.51*   HGB 13.2*   HCT 40.8        CMP:  Recent Labs   Lab 01/07/23  0630   CALCIUM 7.9*      K 3.1*   CO2 19*      BUN 11   CREATININE 0.7       Micro Results  Microbiology Results (last 7 days)       Procedure Component Value Units Date/Time    Blood culture #2 **CANNOT BE ORDERED STAT** [[id number]] Collected: 12/31/22 0040    Order Status: Completed Specimen: Blood from Peripheral, Forearm, Right Updated: 01/05/23 0232     Blood Culture, Routine No growth  after 5 days.    Blood culture #1 **CANNOT BE ORDERED STAT** [[id number]] Collected: 12/31/22 0100    Order Status: Completed Specimen: Blood from Peripheral, Upper Arm, Right Updated: 01/05/23 0232     Blood Culture, Routine No growth after 5 days.    Urine culture [[id number]] Collected: 12/31/22 0141    Order Status: Completed Specimen: Urine Updated: 01/02/23 0659     Urine Culture, Routine No growth    Narrative:      Specimen Source->Urine    Respiratory Infection Panel (PCR), Nasopharyngeal [[id number]] Collected: 12/31/22 0415    Order Status: Completed Updated: 12/31/22 1841     Respiratory Infection Panel Source NP swab     Adenovirus Not Detected     Coronavirus 229E, Common Cold Virus Not Detected     Coronavirus HKU1, Common Cold Virus Not Detected     Coronavirus NL63, Common Cold Virus Not Detected     Coronavirus OC43, Common Cold Virus Not Detected     Comment: The Coronavirus strains detected in this test cause the common cold.  These strains are not the COVID-19 (novel Coronavirus)strain   associated with the respiratory disease outbreak.          SARS-CoV2 (COVID-19) Qualitative PCR Not Detected     Human Metapneumovirus Not Detected     Human Rhinovirus/Enterovirus Not Detected     Influenza A (subtypes H1, H1-2009,H3) Not Detected     Influenza B Not Detected     Parainfluenza Virus 1 Not Detected     Parainfluenza Virus 2 Not Detected     Parainfluenza Virus 3 Not Detected     Parainfluenza Virus 4 Not Detected     Respiratory Syncytial Virus Not Detected     Bordetella Parapertussis (RG9560) Not Detected     Bordetella pertussis (ptxP) Not Detected     Chlamydia pneumoniae Not Detected     Mycoplasma pneumoniae Not Detected     Comment: Respiratory Infection Panel testing performed by Multiplex PCR.       Narrative:      Specimen Source->Nasopharyngeal Swab             Radiology Reports  X-Ray Femur 2 View Left    Result Date: 12/16/2022  EXAMINATION: XR FEMUR 2 VIEW LEFT CLINICAL HISTORY: Other  fracture of left femur, initial encounter for closed fracture TECHNIQUE: AP and lateral views of the left femur were performed. COMPARISON: 11/08/2022 FINDINGS: There is internal fixation spanning a femoral neck fracture.  There is also knee arthroplasty hardware.  No abnormal lucency surrounding hardware.  No detrimental change in alignment.  Surgical clips medial left thigh.  Small knee joint effusion. Electronically signed by: Yuval Felton Date:    12/16/2022 Time:    11:51    CT Head Without Contrast    Result Date: 12/31/2022  EXAM DESCRIPTION: CT HEAD WITHOUT IV CONTRAST CLINICAL HISTORY: Head trauma, minor (Age >= 65y); head bleed 2 weeks ago and back on eliquis. Patient fell after having a dizziness episode. Patient hit the side of his head on a marble table tonight. Patient was seen 2 weeks ago for a fall secondary to dizziness. Patient was diagnosed with a head bleed. TECHNIQUE: Multiple axial images were obtained through the brain without intravenous contrast. Coronal and sagittal images were reconstructed. All CT scans at this facility use dose modulation, iterative reconstruction, and/or weight based dosing when appropriate to reduce radiation dose to as low as reasonably achievable. COMPARISON: Previous head CT images dated 12/19/2022. The prior CT report is not available at this time. Reference is also made to previous head CT and report dated 11/18/2022. FINDINGS: There is no evidence of acute intracranial hemorrhage or acute major territorial infarction. The ventricles, sulci and cisterns are grossly unchanged in size. There is no midline shift. There is a minimal chronic subdural hematoma or hygroma overlying the right frontal convexity measuring approximately 4 mm in thickness on the coronal images, grossly unchanged in extent from the previous CT, and improved from the prior dated 11/18/2022. Subcortical and periventricular white matter hypodensities are seen, compatible with chronic  microangiopathic disease, grossly unchanged. Vascular calcifications. Mild mucosal thickening as well as an air-fluid level within the left maxillary sinus. The mastoid air cells are clear bilaterally. The calvarium appears grossly unremarkable. Slight scarring along the left parietal scalp. IMPRESSION: 1.  No acute intracranial abnormality. 2.  Minimal chronic subdural hematoma or hygroma overlying the right frontal convexity, grossly unchanged in extent from the previous CT, and improved from the prior dated 11/18/2022. 3.  White matter changes of chronic microangiopathic disease, grossly unchanged. 4.  Mild acute sinusitis involving the left maxillary sinus. Electronically signed by:  Sharon Brownlee MD  12/31/2022 12:45 AM CST Workstation: [id number]    CT Head Without Contrast    Result Date: 12/19/2022  EXAMINATION: CT HEAD WITHOUT CONTRAST CLINICAL HISTORY: contusion follow up; Traumatic subdural hemorrhage with loss of consciousness of unspecified duration, subsequent encounter TECHNIQUE: Low dose axial images were obtained through the head.  Coronal and sagittal reformations were also performed. Contrast was not administered. COMPARISON: 11/18/2022 FINDINGS: The examination demonstrates resolution of the previous identified right-sided convexity subdural collection.  There is also evolution of the previous right inferolateral temporal parenchymal contusion, now showing only a small area of hypoattenuation in the temporal lobe laterally.  No new hemorrhage is seen.  There is no midline shift.  Ventricles and sulci demonstrate mild involutional changes which are appropriate for the patient's age. Bone windows demonstrate trace fluid or membrane thickening in the right mastoid air cells.  There is no bone destruction or fracture.     Resolution of the right convexity subdural collection and evolution of the right temporal hematoma.  No new abnormalities. Electronically signed by: Shyam Hoffman MD  Date:    12/19/2022 Time:    14:35    CT Cervical Spine Without Contrast    Result Date: 12/31/2022  EXAM DESCRIPTION: CT CERVICAL SPINE WITHOUT IV CONTRAST CLINICAL HISTORY: Neck trauma (Age >= 65y). Patient fell after having a dizziness episode. Patient hit the side of his head on a marble table tonight. Patient was seen 2 weeks ago for a fall secondary to dizziness. Patient was diagnosed with a head bleed. TECHNIQUE: Multiple axial images were obtained through the cervical spine without intravenous contrast. Coronal and sagittal images were reconstructed. All CT scans at this facility use dose modulation, iterative reconstruction, and/or weight based dosing when appropriate to reduce radiation dose to as low as reasonably achievable. COMPARISON: Previous cervical spine CT dated 11/8/2022. FINDINGS: There is straightening of the normal cervical lordosis, possibly positional or secondary to muscle spasm, and in retrospect unchanged. The vertebral heights are preserved. Scattered disc space narrowing, greatest at C5-C6, grossly unchanged. There is no acute fracture or subluxation. No epidural hematoma is visible. The prevertebral soft tissues are grossly unremarkable. The visualized lung apices are clear. Vascular calcifications. The visualized paranasal sinuses are clear. The mastoid air cells are clear. Degenerative changes along the cervical spine. Secondary to broad-based posterior disc osteophyte complex, there is mild to moderate central canal stenosis at C5-C6 6. Multilevel neural foraminal narrowing secondary to hypertrophic changes, with moderate left neuroforaminal narrowing at C3-C4 and C4-C5 and moderate to severe on the right at C5-C6, and mild at other levels. IMPRESSION: 1.  No evidence of acute fracture or subluxation of the cervical spine. 2.  Multilevel degenerative changes of the cervical spine, as above. Electronically signed by:  Sharon Brownlee MD  12/31/2022 12:52 AM [facility] Workstation:  [id number]    NM Hepatobiliary Scan (HIDA)    Result Date: 1/2/2023  Reason: Cholecystitis (Ped 0-18y); nausea , vomiting  and distended gall bladder and sepsis ? Radiopharmaceutical: 7 mCi Technetium 99m Choletec. COMPARISON: CT 12/31/2022 FINDINGS: Tomographic images show uniform hepatic uptake and clearance of radiopharmaceutical. Gallbladder filling and biliary to bowel transit occur within 60 minutes. Imaging was terminated at 33 minutes, due to patient's inability to tolerate supine imaging without moving. IMPRESSION: Normal hepatobiliary scan. Electronically signed by:  Esteban Flores MD  1/2/2023 12:21 PM CST Workstation: [id number]    X-Ray Chest AP Portable    Result Date: 12/31/2022  EXAMINATION: XR CHEST AP PORTABLE CLINICAL INDICATION: Male, 77 years old. Dizziness COMPARISON: November 10, 2022 and November 8, 2022 x-ray chest, and July 11, 2022 CT chest. FINDINGS: Support Devices: None. Heart: Unchanged Cardiomegaly. Mediastinum: Mediastinal contours are normal. Lungs: Pulmonary vasculature is prominent. Lungs are well aerated and clear. Slightly increased diffuse opacification of the right lung in comparison with the left is felt to be positional and anatomical. Pleura: No pleural effusion is identified. No pneumothorax is present. Osseous Structures: Visualized skeleton is normal. IMPRESSION: 1. Stable cardiomegaly and prominent pulmonary vasculature, compatible with central vascular fullness without pulmonary edema. Electronically signed by:  Eron Bolivar MD  12/31/2022 6:47 AM CST Workstation: [id number]    CT Abdomen Pelvis  Without Contrast    Result Date: 12/31/2022  EXAM DESCRIPTION: CT ABDOMEN PELVIS WITHOUT CONTRAST RadLex: CT ABDOMEN PELVIS WITHOUT IV CONTRAST CLINICAL HISTORY: Abdominal abscess/infection suspected. TECHNIQUE: CT of the abdomen and pelvis without contrast. All CT scans at this facility use dose modulation, iterative reconstruction, and/or weight based dosing when  appropriate to reduce radiation dose to as low as reasonably achievable. COMPARISON: None. FINDINGS: The visualized lower thoracic structures demonstrate coronary artery calcifications. Unenhanced images of the liver, spleen, pancreas and adrenal glands appear grossly unremarkable. The gallbladder appears slightly distended. There are multiple bilateral renal calculi measuring up to 1 cm on the right. There is no hydronephrosis bilaterally. No retroperitoneal or mesenteric lymphadenopathy is identified. No abdominal aortic aneurysm is seen. No bowel obstruction is seen. There is no free intraperitoneal air. The appendix appears unremarkable. There is colonic diverticulosis without definite CT evidence for acute diverticulitis. There is mild fecal loading throughout the colon. No free fluid is identified. Urinary bladder is decompressed. Small prostatic calcifications are present. Prostate gland measures approximately 5.8 cm TR by 3.5 cm AP by 4 cm CC. There are small bilateral inguinal hernias containing fat. There are postsurgical changes at the proximal left femur. No acute fracture is seen. IMPRESSION: 1.  Slight gallbladder distention. 2.  Colonic diverticulosis without definite CT evidence for acute diverticulitis. 3.  Nonobstructive bilateral renal calculi. Electronically signed by:  Brandt Carl DO  12/31/2022 2:35 AM CST Workstation: [id number]    X-Ray KUB    Result Date: 1/1/2023  KUB Clinical history is vomiting COMPARISON: CT abdomen and pelvis dated 12/31/2022 There is a normal bowel gas pattern. No abnormal soft tissue mass or organomegaly. There are bilateral renal calculi. There are degenerative changes of the spine. IMPRESSION: Unremarkable bowel gas pattern Bilateral renal calculi Electronically signed by:  Madeleine Griffin MD  1/1/2023 3:35 PM CST Workstation: [id number]       Meds  Scheduled Meds:   amiodarone  100 mg Oral QAM    apixaban  5 mg Oral BID    atorvastatin  80 mg Oral Daily     cefTRIAXone (ROCEPHIN) IVPB  1 g Intravenous Q24H    cyproheptadine  4 mg Oral TID    DULoxetine  30 mg Oral BID    [START ON 1/8/2023] metoprolol succinate  100 mg Oral Daily    potassium chloride  20 mEq Oral Daily    sodium chloride 0.9%  500 mL Intravenous Once    [START ON 1/8/2023] tamsulosin  0.8 mg Oral Daily     Continuous Infusions:      PRN Meds:.acetaminophen, albuterol-ipratropium, calcium gluconate IVPB, calcium gluconate IVPB, calcium gluconate IVPB, dextrose 10%, dextrose 10%, glucagon (human recombinant), glucose, glucose, hydrALAZINE, HYDROcodone-acetaminophen, insulin aspart U-100, LIDOcaine HCL 4%, magnesium oxide, magnesium oxide, magnesium sulfate IVPB, magnesium sulfate IVPB, melatonin, metoclopramide HCl, naloxone, polyethylene glycol, potassium bicarbonate, potassium bicarbonate, potassium bicarbonate, potassium chloride **AND** potassium chloride **AND** potassium chloride, potassium, sodium phosphates, potassium, sodium phosphates, potassium, sodium phosphates, promethazine (PHENERGAN) IVPB, senna-docusate 8.6-50 mg, simethicone, sodium chloride 0.9%, sodium phosphate IVPB, sodium phosphate IVPB, sodium phosphate IVPB.    Active PT: Yes  Active OT: Yes  Active SLP: No  Assessment & Plan:     Active Hospital Problems    Diagnosis    *Severe sepsis    Subdural hematoma caused by concussion    Chronic heart failure with preserved ejection fraction    Benign essential tremor    Type 2 diabetes mellitus with diabetic nephropathy, without long-term current use of insulin    Anticoagulated    Atrial fibrillation    Aspirin long-term use    Hypertension, essential   PLAN   Altered mental status resolved most likely from polypharmacy and currently resolved and possible from chronic subdural hematoma   KAM on CKD resolved   Urinary retention, consult to urologist, patient started on Flomax  Complete IV antibiotics  Resume home medication of losartan and titrate upwards as tolerated  Resume home  eliquis   Awaiting placement awaiting placement           Discharge Planning:   Is the patient medically ready for discharge?: no    Reason for patient still in hospital (select all that apply): Patient trending condition and Treatment    Above encounter included review of the medical records, interviewing and examining the patient face-to-face, discussion with family and other health care providers, ordering and interpreting lab/test results and formulating a plan of care.     Medical Decision Making:      [_] Low Complexity  [_] Moderate Complexity  [x] High Complexity      Jony López MD  Department of Hospital Medicine   [facility]

## 2023-01-07 NOTE — PT/OT/SLP PROGRESS
Physical Therapy Treatment    Patient Name:  Hiro Rodríguez   MRN:  [id number]    Recommendations:     Discharge Recommendations: nursing facility, skilled  Discharge Equipment Recommendations: other (see comments) (TBD at next level of care)  Barriers to discharge: None    Assessment:     Hiro Rodríguez is a 77 y.o. male admitted with a medical diagnosis of Severe sepsis.  He presents with the following impairments/functional limitations: weakness, impaired endurance, impaired self care skills, impaired functional mobility, gait instability, impaired balance, decreased safety awareness, impaired cardiopulmonary response to activity.    Rehab Prognosis: Good; patient would benefit from acute skilled PT services to address these deficits and reach maximum level of function.    Recent Surgery: * No surgery found *      Plan:     During this hospitalization, patient to be seen 6 x/week to address the identified rehab impairments via gait training, therapeutic activities, therapeutic exercises, neuromuscular re-education and progress toward the following goals:    Plan of Care Expires:  02/03/23    Subjective     Chief Complaint: dizziness /c movement  Patient/Family Comments/goals: none stated  Pain/Comfort:  Pain Rating 1: 0/10  Pain Rating Post-Intervention 1: 0/10      Objective:     Communicated with nurse prior to session.  Patient found supine with PICC line, telemetry upon PT entry to room.     General Precautions: Standard, fall  Orthopedic Precautions: N/A  Braces: N/A  Respiratory Status: Room air     Functional Mobility:  Bed Mobility:     Rolling Left:  supervision  Supine to Sit: supervision  Transfers:     Sit to Stand:  contact guard assistance with rolling walker  Bed to Chair: minimum assistance with  rolling walker  using  Stand Pivot  Gait: 15ft minimum assistance with rolling walker  Balance: standing supported dynamic: Fair      AM-PAC 6 CLICK MOBILITY  Turning over in bed (including  adjusting bedclothes, sheets and blankets)?: 3  Sitting down on and standing up from a chair with arms (e.g., wheelchair, bedside commode, etc.): 3  Moving from lying on back to sitting on the side of the bed?: 3  Moving to and from a bed to a chair (including a wheelchair)?: 2  Need to walk in hospital room?: 2  Climbing 3-5 steps with a railing?: 1  Basic Mobility Total Score: 14       Treatment & Education:  PT edu pt on using ankle pumps and other LE movements to help dissipate s/s of orthostatic hypotension    Patient left up in chair with all lines intact, call button in reach, and chair alarm on..    GOALS:   Multidisciplinary Problems       Physical Therapy Goals          Problem: Physical Therapy    Goal Priority Disciplines Outcome Goal Variances Interventions   Physical Therapy Goal     PT, PT/OT Ongoing, Progressing     Description: Goals to be met by: 2/3/23     Patient will increase functional independence with mobility by performin. Supine to sit with Supervision  2. Sit to stand transfer with Supervision  3. Bed to chair transfer with Supervision using Rolling Walker  4. Gait  x 150 feet with Supervision using Rolling Walker.                              Time Tracking:     PT Received On: 23  PT Start Time: 1311     PT Stop Time: 1328  PT Total Time (min): 17 min     Billable Minutes: Gait Training 17    Treatment Type: Treatment  PT/PTA: PT     PTA Visit Number: 2023

## 2023-01-08 LAB
ANION GAP SERPL CALC-SCNC: 7 MMOL/L (ref 8–16)
BACTERIA #/AREA URNS HPF: NEGATIVE /HPF
BASOPHILS # BLD AUTO: 0.03 K/UL (ref 0–0.2)
BASOPHILS NFR BLD: 0.3 % (ref 0–1.9)
BUN SERPL-MCNC: 15 MG/DL (ref 8–23)
CALCIUM SERPL-MCNC: 8 MG/DL (ref 8.7–10.5)
CHLORIDE SERPL-SCNC: 106 MMOL/L (ref 95–110)
CO2 SERPL-SCNC: 27 MMOL/L (ref 23–29)
CREAT SERPL-MCNC: 0.7 MG/DL (ref 0.5–1.4)
DIFFERENTIAL METHOD: ABNORMAL
EOSINOPHIL # BLD AUTO: 0.1 K/UL (ref 0–0.5)
EOSINOPHIL NFR BLD: 1.2 % (ref 0–8)
ERYTHROCYTE [DISTWIDTH] IN BLOOD BY AUTOMATED COUNT: 14.5 % (ref 11.5–14.5)
EST. GFR  (NO RACE VARIABLE): >60 ML/MIN/1.73 M^2
GLUCOSE SERPL-MCNC: 110 MG/DL (ref 70–110)
GLUCOSE SERPL-MCNC: 124 MG/DL (ref 70–110)
GLUCOSE SERPL-MCNC: 124 MG/DL (ref 70–110)
GLUCOSE SERPL-MCNC: 126 MG/DL (ref 70–110)
GLUCOSE SERPL-MCNC: 129 MG/DL (ref 70–110)
GLUCOSE SERPL-MCNC: 90 MG/DL (ref 70–110)
HCT VFR BLD AUTO: 39.1 % (ref 40–54)
HGB BLD-MCNC: 12.4 G/DL (ref 14–18)
HYALINE CASTS #/AREA URNS LPF: 40 /LPF
IMM GRANULOCYTES # BLD AUTO: 0.07 K/UL (ref 0–0.04)
IMM GRANULOCYTES NFR BLD AUTO: 0.6 % (ref 0–0.5)
LYMPHOCYTES # BLD AUTO: 3.2 K/UL (ref 1–4.8)
LYMPHOCYTES NFR BLD: 28.2 % (ref 18–48)
MAGNESIUM SERPL-MCNC: 1.9 MG/DL (ref 1.6–2.6)
MCH RBC QN AUTO: 30.9 PG (ref 27–31)
MCHC RBC AUTO-ENTMCNC: 31.7 G/DL (ref 32–36)
MCV RBC AUTO: 98 FL (ref 82–98)
MICROSCOPIC COMMENT: ABNORMAL
MONOCYTES # BLD AUTO: 0.9 K/UL (ref 0.3–1)
MONOCYTES NFR BLD: 7.9 % (ref 4–15)
NEUTROPHILS # BLD AUTO: 6.9 K/UL (ref 1.8–7.7)
NEUTROPHILS NFR BLD: 61.8 % (ref 38–73)
NRBC BLD-RTO: 0 /100 WBC
PHOSPHATE SERPL-MCNC: 3.5 MG/DL (ref 2.7–4.5)
PLATELET # BLD AUTO: 293 K/UL (ref 150–450)
PMV BLD AUTO: 9.8 FL (ref 9.2–12.9)
POTASSIUM SERPL-SCNC: 3.4 MMOL/L (ref 3.5–5.1)
RBC # BLD AUTO: 4.01 M/UL (ref 4.6–6.2)
RBC #/AREA URNS HPF: >100 /HPF (ref 0–4)
SODIUM SERPL-SCNC: 140 MMOL/L (ref 136–145)
SQUAMOUS #/AREA URNS HPF: 1 /HPF
WBC # BLD AUTO: 11.16 K/UL (ref 3.9–12.7)
WBC #/AREA URNS HPF: 3 /HPF (ref 0–5)

## 2023-01-08 PROCEDURE — 51798 US URINE CAPACITY MEASURE: CPT

## 2023-01-08 PROCEDURE — 12000002 HC ACUTE/MED SURGE SEMI-PRIVATE ROOM

## 2023-01-08 PROCEDURE — 81001 URINALYSIS AUTO W/SCOPE: CPT | Performed by: STUDENT IN AN ORGANIZED HEALTH CARE EDUCATION/TRAINING PROGRAM

## 2023-01-08 PROCEDURE — 80048 BASIC METABOLIC PNL TOTAL CA: CPT | Performed by: FAMILY MEDICINE

## 2023-01-08 PROCEDURE — 25000003 PHARM REV CODE 250: Performed by: FAMILY MEDICINE

## 2023-01-08 PROCEDURE — 84100 ASSAY OF PHOSPHORUS: CPT | Performed by: STUDENT IN AN ORGANIZED HEALTH CARE EDUCATION/TRAINING PROGRAM

## 2023-01-08 PROCEDURE — 63600175 PHARM REV CODE 636 W HCPCS: Performed by: STUDENT IN AN ORGANIZED HEALTH CARE EDUCATION/TRAINING PROGRAM

## 2023-01-08 PROCEDURE — 25000003 PHARM REV CODE 250: Performed by: STUDENT IN AN ORGANIZED HEALTH CARE EDUCATION/TRAINING PROGRAM

## 2023-01-08 PROCEDURE — 25000003 PHARM REV CODE 250: Performed by: INTERNAL MEDICINE

## 2023-01-08 PROCEDURE — 85025 COMPLETE CBC W/AUTO DIFF WBC: CPT | Performed by: FAMILY MEDICINE

## 2023-01-08 PROCEDURE — 83735 ASSAY OF MAGNESIUM: CPT | Performed by: FAMILY MEDICINE

## 2023-01-08 PROCEDURE — 36415 COLL VENOUS BLD VENIPUNCTURE: CPT | Performed by: FAMILY MEDICINE

## 2023-01-08 RX ORDER — SODIUM CHLORIDE 9 MG/ML
INJECTION, SOLUTION INTRAVENOUS CONTINUOUS
Status: DISCONTINUED | OUTPATIENT
Start: 2023-01-08 | End: 2023-01-09 | Stop reason: HOSPADM

## 2023-01-08 RX ORDER — LACTULOSE 10 G/15ML
20 SOLUTION ORAL ONCE
Status: COMPLETED | OUTPATIENT
Start: 2023-01-08 | End: 2023-01-08

## 2023-01-08 RX ORDER — METOPROLOL TARTRATE 25 MG/1
25 TABLET, FILM COATED ORAL 2 TIMES DAILY
Status: DISCONTINUED | OUTPATIENT
Start: 2023-01-08 | End: 2023-01-09 | Stop reason: HOSPADM

## 2023-01-08 RX ADMIN — CYPROHEPTADINE HYDROCHLORIDE 4 MG: 4 TABLET ORAL at 08:01

## 2023-01-08 RX ADMIN — APIXABAN 5 MG: 5 TABLET, FILM COATED ORAL at 09:01

## 2023-01-08 RX ADMIN — DULOXETINE 30 MG: 30 CAPSULE, DELAYED RELEASE ORAL at 09:01

## 2023-01-08 RX ADMIN — APIXABAN 5 MG: 5 TABLET, FILM COATED ORAL at 08:01

## 2023-01-08 RX ADMIN — AMIODARONE HYDROCHLORIDE 100 MG: 100 TABLET ORAL at 06:01

## 2023-01-08 RX ADMIN — POTASSIUM BICARBONATE 35 MEQ: 391 TABLET, EFFERVESCENT ORAL at 09:01

## 2023-01-08 RX ADMIN — METOPROLOL TARTRATE 25 MG: 25 TABLET, FILM COATED ORAL at 08:01

## 2023-01-08 RX ADMIN — METOPROLOL TARTRATE 25 MG: 25 TABLET, FILM COATED ORAL at 09:01

## 2023-01-08 RX ADMIN — ATORVASTATIN CALCIUM 80 MG: 40 TABLET, FILM COATED ORAL at 08:01

## 2023-01-08 RX ADMIN — HYDROCODONE BITARTRATE AND ACETAMINOPHEN 1 TABLET: 10; 325 TABLET ORAL at 06:01

## 2023-01-08 RX ADMIN — SODIUM CHLORIDE: 0.9 INJECTION, SOLUTION INTRAVENOUS at 08:01

## 2023-01-08 RX ADMIN — CYPROHEPTADINE HYDROCHLORIDE 4 MG: 4 TABLET ORAL at 02:01

## 2023-01-08 RX ADMIN — Medication 800 MG: at 09:01

## 2023-01-08 RX ADMIN — LACTULOSE 20 G: 20 SOLUTION ORAL at 04:01

## 2023-01-08 RX ADMIN — TAMSULOSIN HYDROCHLORIDE 0.8 MG: 0.4 CAPSULE ORAL at 09:01

## 2023-01-08 RX ADMIN — HYDROCODONE BITARTRATE AND ACETAMINOPHEN 1 TABLET: 10; 325 TABLET ORAL at 05:01

## 2023-01-08 RX ADMIN — POTASSIUM CHLORIDE 20 MEQ: 1500 TABLET, EXTENDED RELEASE ORAL at 09:01

## 2023-01-08 RX ADMIN — HYDROCODONE BITARTRATE AND ACETAMINOPHEN 1 TABLET: 10; 325 TABLET ORAL at 10:01

## 2023-01-08 RX ADMIN — DULOXETINE 30 MG: 30 CAPSULE, DELAYED RELEASE ORAL at 08:01

## 2023-01-08 RX ADMIN — HYDROCODONE BITARTRATE AND ACETAMINOPHEN 1 TABLET: 10; 325 TABLET ORAL at 12:01

## 2023-01-08 RX ADMIN — CYPROHEPTADINE HYDROCHLORIDE 4 MG: 4 TABLET ORAL at 09:01

## 2023-01-08 RX ADMIN — CEFTRIAXONE 1 G: 1 INJECTION, SOLUTION INTRAVENOUS at 12:01

## 2023-01-08 NOTE — NURSING
"0420-Upon entrance to room, patient appears to be confused. Patient stated "What's going on? What day is it?". Patient oriented to self. States he feels wet and legs are tingling. Reoriented patient back to situation and time. VSS. Blood sugar 90. Notified Dr. Lewis.     0435- patient now alert and oriented x4. States he is unsure of why he was confused. Patient states he still feels off. Dr. Lewis in patient room now.   "

## 2023-01-08 NOTE — PROGRESS NOTES
"formerly [facility] Medicine    Progress Note    Patient Name: Hiro Rodríguez  MRN: [id number]  Patient Class: IP- Inpatient   Admission Date: 12/30/2022 11:24 PM  Length of Stay: 8  Attending Physician: Jony López MD  Primary Care Provider: Gautam Castanon III, MD  Face-to-Face encounter date: 01/08/2023  Code status:  Chief Complaint: Fall (Pt arrived by ems after a pt fell after having a dizziness episode. Denies loc. Pt hit the side of his head on a marble table tonight. Pt denies any pain./Pt was seen two weeks ago for a fall secondary to dizziness, pt was dx with a head bleed. Pt restarted his blood thinners once home. )        Subjective:    HPI:Hiro Rodríguez is a 77 y.o. White male   With PMH of chronic subdural hematoma (11/8/22),  pAF on eliquis, DM2, HTN, multiple falls 2/2 polypharmacy,  who presents with fall.     Onset this evening  Occurred after experiencing dizziness  No LOC  +head trauma on marble side table  Pt discharged from [facility] on 11/18/22  He was treated for subdural hematoma due to fall  Since discharge, pt has had poor po intake  Wife reports "he is just stubborn" and refusing to eat/drink  No n/v  No subjective fever   No chills  No abdominal pain  Previous dysuria, since resolved  No diarrhea  +generalized weakness  +fatigue     On 12/28:  new dysuria, urgency, frequency  Saw outpt PCP on 12/29:  started on cipro  BP at that visit was noted 94/57     Pt initially presented to ER today normotensive  While sleeping, noted BP 55/30  Did not recover with positional changes  Pt's BP now responding to fluid resuscitation    Interval History:   12/31  Patient was seen and examined along with the nurse  Tried to talk with the patient and he said let me wake up first   Try to open the eye son look at the pupil but he intentionally close the eyes despite asked to open  Denied having pain anywhere in the body  Pressure is low again and fibrous cc bolus given  Urine " toxicology findings noted.  He did not answer the question whether he took extra pills are not     1/1/23  Patient is awake and alert and normal and talking normal   Mild abdominal pain . BP stable . But over day nausea and vomiting . Abdominal pain .  HIDA ordered since gall bladder distended      1/2/23  Hospital course  Patient was admitted with altered mental status and possible sepsis.  CT scan of the abdomen was done because of abdominal pain and no acute finding but gallbladder distention and HIDA scan was done and negative  Today patient wake up and acting normal and had normal conversation.  He was not doing well with repeated falls after he had subdural hematoma the last time.  Said he will go home if I can arrange the hospital bed .  But later he changed her mind and he wants to go to rehab or skilled facility  K very low and supplementing     1/3:  Patient is AO x3, and states that he would like to go to a facility to get his strength back.  Labs currently pending.  Surgery evaluated patient and stated that patient does not have cholecystitis hence no plans for surgical intervention.  No concerns/issues overnight reported by the patient or the nursing staff.    1/4:  Patient complaining of burning on urination, will start patient on Pyridium, patient has completed 5 days of IV antibiotics.  Awaiting placement.  Spoke to wife yesterday and she stated that patient's depression has worsened, would increase his duloxetine to 30 mg b.i.d. and titrate upwards as tolerated.  Blood pressure is uncontrolled resume patient's losartan 50 mg daily and titrate upwards as tolerated.    1/5:  Bladder scan was done and patient was found to have urinary retention and in and out was done.  Later in the night patient had another episode of urinary retention and Sotelo was placed in.  Consult to urologist placed.  Would adjust blood pressure medications due to elevated heart rate, would change propranolol to Toprol-XL and  discontinue losartan    1/6:  Urologist evaluated patient and recommended follow-up outpatient.  Awaiting placement    1/7:  Discussed with patient and his wife today and they are requesting we take out the Sotelo as patient states that he is in extreme pain due to the Sotelo.  Although I explained to them that patient may give retained afterwards, they stated that they are willing to take that chance.  Would DC Sotelo and increase patient's Flomax.  Patient has been tachycardic, would give patient IV metoprolol 5 mg and increase Toprol to 100 mg.  Patient states that his tachycardia secondary to Sotelo pain.  Potassium low, would replace.    1/8:  Patient is doing significantly better today, pain and tachycardia has resolved however patient has not had any urinary output since Sotelo removal.  I expressed my concern about patient being retained and patient is adamant about not having Sotelo placed back in.  We will bladder scan patient again.    Review of Systems All other Review of Systems were found to be negative expect for that mentioned already in HPI.     Objective:     Vitals:    01/08/23 0816 01/08/23 1206 01/08/23 1223 01/08/23 1630   BP: 104/89  (!) 130/92 124/89   Pulse: 96  (!) 114 (!) 116   Resp: 18 18 18 18   Temp: 98.4 °F (36.9 °C)  98.3 °F (36.8 °C) 98.4 °F (36.9 °C)   TempSrc: Oral  Oral Oral   SpO2: 98%  96% 96%   Weight:       Height:            Vitals reviewed.  Constitutional: No distress.   HENT: NC  Head: Atraumatic.   Cardiovascular: Normal rate, regular rhythm and normal heart sounds.   Pulmonary/Chest: Effort normal. No wheezes.   Abdominal: Soft. Bowel sounds are normal. No distension and no mass. No tenderness  Neurological: Alert.   Skin: Skin is warm and dry.   Psych: Appropriate mood and affect    Following labs were Reviewed   CBC:  Recent Labs   Lab 01/08/23 0446   WBC 11.16   HGB 12.4*   HCT 39.1*        CMP:  Recent Labs   Lab 01/08/23 0446   CALCIUM 8.0*      K 3.4*    CO2 27      BUN 15   CREATININE 0.7       Micro Results  Microbiology Results (last 7 days)       Procedure Component Value Units Date/Time    Blood culture #2 **CANNOT BE ORDERED STAT** [[id number]] Collected: 12/31/22 0040    Order Status: Completed Specimen: Blood from Peripheral, Forearm, Right Updated: 01/05/23 0232     Blood Culture, Routine No growth after 5 days.    Blood culture #1 **CANNOT BE ORDERED STAT** [[id number]] Collected: 12/31/22 0100    Order Status: Completed Specimen: Blood from Peripheral, Upper Arm, Right Updated: 01/05/23 0232     Blood Culture, Routine No growth after 5 days.    Urine culture [[id number]] Collected: 12/31/22 0141    Order Status: Completed Specimen: Urine Updated: 01/02/23 0659     Urine Culture, Routine No growth    Narrative:      Specimen Source->Urine             Radiology Reports  X-Ray Femur 2 View Left    Result Date: 12/16/2022  EXAMINATION: XR FEMUR 2 VIEW LEFT CLINICAL HISTORY: Other fracture of left femur, initial encounter for closed fracture TECHNIQUE: AP and lateral views of the left femur were performed. COMPARISON: 11/08/2022 FINDINGS: There is internal fixation spanning a femoral neck fracture.  There is also knee arthroplasty hardware.  No abnormal lucency surrounding hardware.  No detrimental change in alignment.  Surgical clips medial left thigh.  Small knee joint effusion. Electronically signed by: Yuval Felton Date:    12/16/2022 Time:    11:51    CT Head Without Contrast    Result Date: 12/31/2022  EXAM DESCRIPTION: CT HEAD WITHOUT IV CONTRAST CLINICAL HISTORY: Head trauma, minor (Age >= 65y); head bleed 2 weeks ago and back on eliquis. Patient fell after having a dizziness episode. Patient hit the side of his head on a marble table tonight. Patient was seen 2 weeks ago for a fall secondary to dizziness. Patient was diagnosed with a head bleed. TECHNIQUE: Multiple axial images were obtained through the brain without intravenous contrast.  Coronal and sagittal images were reconstructed. All CT scans at this facility use dose modulation, iterative reconstruction, and/or weight based dosing when appropriate to reduce radiation dose to as low as reasonably achievable. COMPARISON: Previous head CT images dated 12/19/2022. The prior CT report is not available at this time. Reference is also made to previous head CT and report dated 11/18/2022. FINDINGS: There is no evidence of acute intracranial hemorrhage or acute major territorial infarction. The ventricles, sulci and cisterns are grossly unchanged in size. There is no midline shift. There is a minimal chronic subdural hematoma or hygroma overlying the right frontal convexity measuring approximately 4 mm in thickness on the coronal images, grossly unchanged in extent from the previous CT, and improved from the prior dated 11/18/2022. Subcortical and periventricular white matter hypodensities are seen, compatible with chronic microangiopathic disease, grossly unchanged. Vascular calcifications. Mild mucosal thickening as well as an air-fluid level within the left maxillary sinus. The mastoid air cells are clear bilaterally. The calvarium appears grossly unremarkable. Slight scarring along the left parietal scalp. IMPRESSION: 1.  No acute intracranial abnormality. 2.  Minimal chronic subdural hematoma or hygroma overlying the right frontal convexity, grossly unchanged in extent from the previous CT, and improved from the prior dated 11/18/2022. 3.  White matter changes of chronic microangiopathic disease, grossly unchanged. 4.  Mild acute sinusitis involving the left maxillary sinus. Electronically signed by:  Sharon Brownlee MD  12/31/2022 12:45 AM CST Workstation: [id number]    CT Head Without Contrast    Result Date: 12/19/2022  EXAMINATION: CT HEAD WITHOUT CONTRAST CLINICAL HISTORY: contusion follow up; Traumatic subdural hemorrhage with loss of consciousness of unspecified duration, subsequent  encounter TECHNIQUE: Low dose axial images were obtained through the head.  Coronal and sagittal reformations were also performed. Contrast was not administered. COMPARISON: 11/18/2022 FINDINGS: The examination demonstrates resolution of the previous identified right-sided convexity subdural collection.  There is also evolution of the previous right inferolateral temporal parenchymal contusion, now showing only a small area of hypoattenuation in the temporal lobe laterally.  No new hemorrhage is seen.  There is no midline shift.  Ventricles and sulci demonstrate mild involutional changes which are appropriate for the patient's age. Bone windows demonstrate trace fluid or membrane thickening in the right mastoid air cells.  There is no bone destruction or fracture.     Resolution of the right convexity subdural collection and evolution of the right temporal hematoma.  No new abnormalities. Electronically signed by: Shyam Hoffman MD Date:    12/19/2022 Time:    14:35    CT Cervical Spine Without Contrast    Result Date: 12/31/2022  EXAM DESCRIPTION: CT CERVICAL SPINE WITHOUT IV CONTRAST CLINICAL HISTORY: Neck trauma (Age >= 65y). Patient fell after having a dizziness episode. Patient hit the side of his head on a marble table tonight. Patient was seen 2 weeks ago for a fall secondary to dizziness. Patient was diagnosed with a head bleed. TECHNIQUE: Multiple axial images were obtained through the cervical spine without intravenous contrast. Coronal and sagittal images were reconstructed. All CT scans at this facility use dose modulation, iterative reconstruction, and/or weight based dosing when appropriate to reduce radiation dose to as low as reasonably achievable. COMPARISON: Previous cervical spine CT dated 11/8/2022. FINDINGS: There is straightening of the normal cervical lordosis, possibly positional or secondary to muscle spasm, and in retrospect unchanged. The vertebral heights are preserved. Scattered disc  space narrowing, greatest at C5-C6, grossly unchanged. There is no acute fracture or subluxation. No epidural hematoma is visible. The prevertebral soft tissues are grossly unremarkable. The visualized lung apices are clear. Vascular calcifications. The visualized paranasal sinuses are clear. The mastoid air cells are clear. Degenerative changes along the cervical spine. Secondary to broad-based posterior disc osteophyte complex, there is mild to moderate central canal stenosis at C5-C6 6. Multilevel neural foraminal narrowing secondary to hypertrophic changes, with moderate left neuroforaminal narrowing at C3-C4 and C4-C5 and moderate to severe on the right at C5-C6, and mild at other levels. IMPRESSION: 1.  No evidence of acute fracture or subluxation of the cervical spine. 2.  Multilevel degenerative changes of the cervical spine, as above. Electronically signed by:  Sharon Brownlee MD  12/31/2022 12:52 AM CST Workstation: [id number]    NM Hepatobiliary Scan (HIDA)    Result Date: 1/2/2023  Reason: Cholecystitis (Ped 0-18y); nausea , vomiting  and distended gall bladder and sepsis ? Radiopharmaceutical: 7 mCi Technetium 99m Choletec. COMPARISON: CT 12/31/2022 FINDINGS: Tomographic images show uniform hepatic uptake and clearance of radiopharmaceutical. Gallbladder filling and biliary to bowel transit occur within 60 minutes. Imaging was terminated at 33 minutes, due to patient's inability to tolerate supine imaging without moving. IMPRESSION: Normal hepatobiliary scan. Electronically signed by:  Esteban Flores MD  1/2/2023 12:21 PM CST Workstation: [id number]    X-Ray Chest AP Portable    Result Date: 12/31/2022  EXAMINATION: XR CHEST AP PORTABLE CLINICAL INDICATION: Male, 77 years old. Dizziness COMPARISON: November 10, 2022 and November 8, 2022 x-ray chest, and July 11, 2022 CT chest. FINDINGS: Support Devices: None. Heart: Unchanged Cardiomegaly. Mediastinum: Mediastinal contours are normal. Lungs: Pulmonary  vasculature is prominent. Lungs are well aerated and clear. Slightly increased diffuse opacification of the right lung in comparison with the left is felt to be positional and anatomical. Pleura: No pleural effusion is identified. No pneumothorax is present. Osseous Structures: Visualized skeleton is normal. IMPRESSION: 1. Stable cardiomegaly and prominent pulmonary vasculature, compatible with central vascular fullness without pulmonary edema. Electronically signed by:  Eron Bolivar MD  12/31/2022 6:47 AM CST Workstation: [id number]    CT Abdomen Pelvis  Without Contrast    Result Date: 12/31/2022  EXAM DESCRIPTION: CT ABDOMEN PELVIS WITHOUT CONTRAST RadLex: CT ABDOMEN PELVIS WITHOUT IV CONTRAST CLINICAL HISTORY: Abdominal abscess/infection suspected. TECHNIQUE: CT of the abdomen and pelvis without contrast. All CT scans at this facility use dose modulation, iterative reconstruction, and/or weight based dosing when appropriate to reduce radiation dose to as low as reasonably achievable. COMPARISON: None. FINDINGS: The visualized lower thoracic structures demonstrate coronary artery calcifications. Unenhanced images of the liver, spleen, pancreas and adrenal glands appear grossly unremarkable. The gallbladder appears slightly distended. There are multiple bilateral renal calculi measuring up to 1 cm on the right. There is no hydronephrosis bilaterally. No retroperitoneal or mesenteric lymphadenopathy is identified. No abdominal aortic aneurysm is seen. No bowel obstruction is seen. There is no free intraperitoneal air. The appendix appears unremarkable. There is colonic diverticulosis without definite CT evidence for acute diverticulitis. There is mild fecal loading throughout the colon. No free fluid is identified. Urinary bladder is decompressed. Small prostatic calcifications are present. Prostate gland measures approximately 5.8 cm TR by 3.5 cm AP by 4 cm CC. There are small bilateral inguinal hernias  containing fat. There are postsurgical changes at the proximal left femur. No acute fracture is seen. IMPRESSION: 1.  Slight gallbladder distention. 2.  Colonic diverticulosis without definite CT evidence for acute diverticulitis. 3.  Nonobstructive bilateral renal calculi. Electronically signed by:  Brandt Carl DO  12/31/2022 2:35 AM CST Workstation: [id number]    X-Ray KUB    Result Date: 1/1/2023  KUB Clinical history is vomiting COMPARISON: CT abdomen and pelvis dated 12/31/2022 There is a normal bowel gas pattern. No abnormal soft tissue mass or organomegaly. There are bilateral renal calculi. There are degenerative changes of the spine. IMPRESSION: Unremarkable bowel gas pattern Bilateral renal calculi Electronically signed by:  Madeleine Griffin MD  1/1/2023 3:35 PM CST Workstation: [id number]       Meds  Scheduled Meds:   amiodarone  100 mg Oral QAM    apixaban  5 mg Oral BID    atorvastatin  80 mg Oral Daily    cefTRIAXone (ROCEPHIN) IVPB  1 g Intravenous Q24H    cyproheptadine  4 mg Oral TID    DULoxetine  30 mg Oral BID    metoprolol tartrate  25 mg Oral BID    potassium chloride  20 mEq Oral Daily    sodium chloride 0.9%  500 mL Intravenous Once    tamsulosin  0.8 mg Oral Daily     Continuous Infusions:      PRN Meds:.acetaminophen, albuterol-ipratropium, calcium gluconate IVPB, calcium gluconate IVPB, calcium gluconate IVPB, dextrose 10%, dextrose 10%, glucagon (human recombinant), glucose, glucose, hydrALAZINE, HYDROcodone-acetaminophen, insulin aspart U-100, LIDOcaine HCL 4%, magnesium oxide, magnesium oxide, magnesium sulfate IVPB, magnesium sulfate IVPB, melatonin, metoclopramide HCl, naloxone, polyethylene glycol, potassium bicarbonate, potassium bicarbonate, potassium bicarbonate, potassium chloride **AND** potassium chloride **AND** potassium chloride, potassium, sodium phosphates, potassium, sodium phosphates, potassium, sodium phosphates, promethazine (PHENERGAN) IVPB, senna-docusate 8.6-50 mg,  simethicone, sodium chloride 0.9%, sodium phosphate IVPB, sodium phosphate IVPB, sodium phosphate IVPB.    Active PT: Yes  Active OT: Yes  Active SLP: No  Assessment & Plan:     Active Hospital Problems    Diagnosis    *Severe sepsis    Subdural hematoma caused by concussion    Chronic heart failure with preserved ejection fraction    Benign essential tremor    Type 2 diabetes mellitus with diabetic nephropathy, without long-term current use of insulin    Anticoagulated    Atrial fibrillation    Aspirin long-term use    Hypertension, essential   PLAN   Altered mental status resolved most likely from polypharmacy and currently resolved and possible from chronic subdural hematoma   KAM on CKD resolved   Urinary retention, consult to urologist, patient started on Flomax  Complete IV antibiotics  Resume home medication of losartan and titrate upwards as tolerated  Resume home eliquis   Awaiting placement awaiting placement           Discharge Planning:   Is the patient medically ready for discharge?: no    Reason for patient still in hospital (select all that apply): Patient trending condition and Treatment    Above encounter included review of the medical records, interviewing and examining the patient face-to-face, discussion with family and other health care providers, ordering and interpreting lab/test results and formulating a plan of care.     Medical Decision Making:      [_] Low Complexity  [_] Moderate Complexity  [x] High Complexity      Jony López MD  Department of Hospital Medicine   [facility]

## 2023-01-09 VITALS
SYSTOLIC BLOOD PRESSURE: 156 MMHG | TEMPERATURE: 98 F | HEART RATE: 111 BPM | WEIGHT: 258.63 LBS | RESPIRATION RATE: 16 BRPM | OXYGEN SATURATION: 98 % | BODY MASS INDEX: 37.02 KG/M2 | DIASTOLIC BLOOD PRESSURE: 105 MMHG | HEIGHT: 70 IN

## 2023-01-09 LAB
ANION GAP SERPL CALC-SCNC: 10 MMOL/L (ref 8–16)
ANION GAP SERPL CALC-SCNC: 6 MMOL/L (ref 8–16)
BASOPHILS # BLD AUTO: 0.02 K/UL (ref 0–0.2)
BASOPHILS NFR BLD: 0.2 % (ref 0–1.9)
BUN SERPL-MCNC: 14 MG/DL (ref 8–23)
BUN SERPL-MCNC: 14 MG/DL (ref 8–23)
CALCIUM SERPL-MCNC: 7.7 MG/DL (ref 8.7–10.5)
CALCIUM SERPL-MCNC: 7.8 MG/DL (ref 8.7–10.5)
CHLORIDE SERPL-SCNC: 106 MMOL/L (ref 95–110)
CHLORIDE SERPL-SCNC: 107 MMOL/L (ref 95–110)
CO2 SERPL-SCNC: 23 MMOL/L (ref 23–29)
CO2 SERPL-SCNC: 26 MMOL/L (ref 23–29)
CREAT SERPL-MCNC: 0.7 MG/DL (ref 0.5–1.4)
CREAT SERPL-MCNC: 0.7 MG/DL (ref 0.5–1.4)
DIFFERENTIAL METHOD: ABNORMAL
EOSINOPHIL # BLD AUTO: 0.1 K/UL (ref 0–0.5)
EOSINOPHIL NFR BLD: 0.6 % (ref 0–8)
ERYTHROCYTE [DISTWIDTH] IN BLOOD BY AUTOMATED COUNT: 14.7 % (ref 11.5–14.5)
EST. GFR  (NO RACE VARIABLE): >60 ML/MIN/1.73 M^2
EST. GFR  (NO RACE VARIABLE): >60 ML/MIN/1.73 M^2
GLUCOSE SERPL-MCNC: 107 MG/DL (ref 70–110)
GLUCOSE SERPL-MCNC: 118 MG/DL (ref 70–110)
GLUCOSE SERPL-MCNC: 130 MG/DL (ref 70–110)
HCT VFR BLD AUTO: 37.4 % (ref 40–54)
HGB BLD-MCNC: 11.6 G/DL (ref 14–18)
IMM GRANULOCYTES # BLD AUTO: 0.04 K/UL (ref 0–0.04)
IMM GRANULOCYTES NFR BLD AUTO: 0.4 % (ref 0–0.5)
LYMPHOCYTES # BLD AUTO: 2 K/UL (ref 1–4.8)
LYMPHOCYTES NFR BLD: 17.7 % (ref 18–48)
MAGNESIUM SERPL-MCNC: 1.8 MG/DL (ref 1.6–2.6)
MCH RBC QN AUTO: 30.1 PG (ref 27–31)
MCHC RBC AUTO-ENTMCNC: 31 G/DL (ref 32–36)
MCV RBC AUTO: 97 FL (ref 82–98)
MONOCYTES # BLD AUTO: 0.9 K/UL (ref 0.3–1)
MONOCYTES NFR BLD: 7.9 % (ref 4–15)
NEUTROPHILS # BLD AUTO: 8.2 K/UL (ref 1.8–7.7)
NEUTROPHILS NFR BLD: 73.2 % (ref 38–73)
NRBC BLD-RTO: 0 /100 WBC
PHOSPHATE SERPL-MCNC: 3.4 MG/DL (ref 2.7–4.5)
PLATELET # BLD AUTO: 285 K/UL (ref 150–450)
PMV BLD AUTO: 10.1 FL (ref 9.2–12.9)
POTASSIUM SERPL-SCNC: 4 MMOL/L (ref 3.5–5.1)
POTASSIUM SERPL-SCNC: 4.2 MMOL/L (ref 3.5–5.1)
RBC # BLD AUTO: 3.86 M/UL (ref 4.6–6.2)
SODIUM SERPL-SCNC: 139 MMOL/L (ref 136–145)
SODIUM SERPL-SCNC: 139 MMOL/L (ref 136–145)
WBC # BLD AUTO: 11.16 K/UL (ref 3.9–12.7)

## 2023-01-09 PROCEDURE — 97110 THERAPEUTIC EXERCISES: CPT | Mod: CQ

## 2023-01-09 PROCEDURE — 97110 THERAPEUTIC EXERCISES: CPT | Mod: CO

## 2023-01-09 PROCEDURE — 84100 ASSAY OF PHOSPHORUS: CPT | Performed by: STUDENT IN AN ORGANIZED HEALTH CARE EDUCATION/TRAINING PROGRAM

## 2023-01-09 PROCEDURE — 97535 SELF CARE MNGMENT TRAINING: CPT | Mod: CO

## 2023-01-09 PROCEDURE — 25000003 PHARM REV CODE 250: Performed by: STUDENT IN AN ORGANIZED HEALTH CARE EDUCATION/TRAINING PROGRAM

## 2023-01-09 PROCEDURE — 63600175 PHARM REV CODE 636 W HCPCS: Performed by: STUDENT IN AN ORGANIZED HEALTH CARE EDUCATION/TRAINING PROGRAM

## 2023-01-09 PROCEDURE — 51798 US URINE CAPACITY MEASURE: CPT

## 2023-01-09 PROCEDURE — 25000003 PHARM REV CODE 250: Performed by: INTERNAL MEDICINE

## 2023-01-09 PROCEDURE — 85025 COMPLETE CBC W/AUTO DIFF WBC: CPT | Performed by: FAMILY MEDICINE

## 2023-01-09 PROCEDURE — 80048 BASIC METABOLIC PNL TOTAL CA: CPT | Mod: 91 | Performed by: FAMILY MEDICINE

## 2023-01-09 PROCEDURE — 83735 ASSAY OF MAGNESIUM: CPT | Performed by: FAMILY MEDICINE

## 2023-01-09 PROCEDURE — 80048 BASIC METABOLIC PNL TOTAL CA: CPT | Performed by: STUDENT IN AN ORGANIZED HEALTH CARE EDUCATION/TRAINING PROGRAM

## 2023-01-09 PROCEDURE — 30200315 PPD INTRADERMAL TEST REV CODE 302: Performed by: STUDENT IN AN ORGANIZED HEALTH CARE EDUCATION/TRAINING PROGRAM

## 2023-01-09 PROCEDURE — 86580 TB INTRADERMAL TEST: CPT | Performed by: STUDENT IN AN ORGANIZED HEALTH CARE EDUCATION/TRAINING PROGRAM

## 2023-01-09 PROCEDURE — 25000003 PHARM REV CODE 250: Performed by: FAMILY MEDICINE

## 2023-01-09 PROCEDURE — 36415 COLL VENOUS BLD VENIPUNCTURE: CPT | Performed by: FAMILY MEDICINE

## 2023-01-09 PROCEDURE — 36415 COLL VENOUS BLD VENIPUNCTURE: CPT | Performed by: STUDENT IN AN ORGANIZED HEALTH CARE EDUCATION/TRAINING PROGRAM

## 2023-01-09 RX ORDER — POTASSIUM CHLORIDE 750 MG/1
20 TABLET, EXTENDED RELEASE ORAL DAILY
Start: 2023-01-10 | End: 2023-05-25 | Stop reason: SDUPTHER

## 2023-01-09 RX ORDER — IBUPROFEN 200 MG
16 TABLET ORAL
Status: CANCELLED | OUTPATIENT
Start: 2023-01-09

## 2023-01-09 RX ORDER — CYPROHEPTADINE HYDROCHLORIDE 4 MG/1
4 TABLET ORAL 3 TIMES DAILY
Refills: 0
Start: 2023-01-09 | End: 2023-03-01

## 2023-01-09 RX ORDER — GLUCAGON 1 MG
1 KIT INJECTION
Status: CANCELLED | OUTPATIENT
Start: 2023-01-09

## 2023-01-09 RX ORDER — DULOXETIN HYDROCHLORIDE 30 MG/1
30 CAPSULE, DELAYED RELEASE ORAL 2 TIMES DAILY
Qty: 90 CAPSULE | Refills: 1
Start: 2023-01-09 | End: 2023-07-10 | Stop reason: SDUPTHER

## 2023-01-09 RX ORDER — IBUPROFEN 200 MG
24 TABLET ORAL
Status: CANCELLED | OUTPATIENT
Start: 2023-01-09

## 2023-01-09 RX ORDER — MORPHINE SULFATE 4 MG/ML
4 INJECTION, SOLUTION INTRAMUSCULAR; INTRAVENOUS ONCE
Status: COMPLETED | OUTPATIENT
Start: 2023-01-09 | End: 2023-01-09

## 2023-01-09 RX ORDER — TAMSULOSIN HYDROCHLORIDE 0.4 MG/1
0.8 CAPSULE ORAL DAILY
Qty: 60 CAPSULE | Refills: 11
Start: 2023-01-10 | End: 2023-11-07

## 2023-01-09 RX ORDER — INSULIN ASPART 100 [IU]/ML
1-10 INJECTION, SOLUTION INTRAVENOUS; SUBCUTANEOUS
Refills: 0
Start: 2023-01-09 | End: 2023-03-16

## 2023-01-09 RX ADMIN — CEFTRIAXONE 1 G: 1 INJECTION, SOLUTION INTRAVENOUS at 10:01

## 2023-01-09 RX ADMIN — TUBERCULIN PURIFIED PROTEIN DERIVATIVE 5 UNITS: 5 INJECTION, SOLUTION INTRADERMAL at 04:01

## 2023-01-09 RX ADMIN — CYPROHEPTADINE HYDROCHLORIDE 4 MG: 4 TABLET ORAL at 08:01

## 2023-01-09 RX ADMIN — DULOXETINE 30 MG: 30 CAPSULE, DELAYED RELEASE ORAL at 08:01

## 2023-01-09 RX ADMIN — HYDROCODONE BITARTRATE AND ACETAMINOPHEN 1 TABLET: 10; 325 TABLET ORAL at 10:01

## 2023-01-09 RX ADMIN — POTASSIUM CHLORIDE 20 MEQ: 1500 TABLET, EXTENDED RELEASE ORAL at 08:01

## 2023-01-09 RX ADMIN — CYPROHEPTADINE HYDROCHLORIDE 4 MG: 4 TABLET ORAL at 02:01

## 2023-01-09 RX ADMIN — HYDROCODONE BITARTRATE AND ACETAMINOPHEN 1 TABLET: 10; 325 TABLET ORAL at 04:01

## 2023-01-09 RX ADMIN — MORPHINE SULFATE 4 MG: 4 INJECTION, SOLUTION INTRAMUSCULAR; INTRAVENOUS at 02:01

## 2023-01-09 RX ADMIN — METOPROLOL TARTRATE 25 MG: 25 TABLET, FILM COATED ORAL at 08:01

## 2023-01-09 RX ADMIN — POLYETHYLENE GLYCOL 3350 17 G: 17 POWDER, FOR SOLUTION ORAL at 08:01

## 2023-01-09 RX ADMIN — SODIUM CHLORIDE: 0.9 INJECTION, SOLUTION INTRAVENOUS at 02:01

## 2023-01-09 RX ADMIN — TAMSULOSIN HYDROCHLORIDE 0.8 MG: 0.4 CAPSULE ORAL at 08:01

## 2023-01-09 RX ADMIN — AMIODARONE HYDROCHLORIDE 100 MG: 100 TABLET ORAL at 06:01

## 2023-01-09 RX ADMIN — APIXABAN 5 MG: 5 TABLET, FILM COATED ORAL at 08:01

## 2023-01-09 NOTE — PLAN OF CARE
Patient cleared for discharge from case management standpoint.    CM sent discharge orders to [facility] via [facility]. Report called per Nurse Garcia to facility nurse.    Chart and discharge orders reviewed.  Patient discharged to [facility] with no further case management needs.       01/09/23 1703   Final Note   Assessment Type Final Discharge Note   Anticipated Discharge Disposition SNF   Hospital Resources/Appts/Education Provided Provided patient/caregiver with written discharge plan information   Post-Acute Status   Post-Acute Placement Status Set-up Complete/Auth obtained   Discharge Delays (!) Ambulance Transport/Facility Transport

## 2023-01-09 NOTE — PT/OT/SLP PROGRESS
Occupational Therapy   Treatment    Name: Hiro Rodríguez  MRN: [id number]  Admitting Diagnosis:  Severe sepsis       Recommendations:     Discharge Recommendations: nursing facility, skilled  Discharge Equipment Recommendations:  other (see comments) (TBD at next level of care)  Barriers to discharge:  Decreased caregiver support    Assessment:     Hiro Rodríguez is a 77 y.o. male with a medical diagnosis of Severe sepsis.  He presents with questions regarding increased efficiency with bed entry/exit at home (with decreased dizziness) and therapeutic exercises to increase trunk strength. Performance deficits affecting function are weakness, impaired endurance, impaired functional mobility, gait instability, impaired balance, decreased upper extremity function, decreased lower extremity function, impaired self care skills, decreased safety awareness, impaired cardiopulmonary response to activity.     Rehab Prognosis:  Good; patient would benefit from acute skilled OT services to address these deficits and reach maximum level of function.       Plan:     Patient to be seen 5 x/week to address the above listed problems via self-care/home management, therapeutic activities, therapeutic exercises  Plan of Care Expires: 02/03/23  Plan of Care Reviewed with: patient    Subjective     Pain/Comfort:  Pain Rating 1: other (see comments) (no c/o or observed)    Objective:     Communicated with: Katherin MARTIN prior to session.  Patient found HOB elevated with PICC line, telemetry upon OT entry to room.    General Precautions: Standard, fall    Orthopedic Precautions:N/A  Braces: N/A  Respiratory Status: Room air     Occupational Performance:     [facility] 6 Click ADL: 17    Treatment & Education:  -BOURNE ed for bed handle and step stool with tall handle for safe and efficient bed entry/exit; looked up pricing and where to order online and wrote on whiteboard.  -BOURNE ed for trunk strengthening exercises utilizing isometric  "abdominal and oblique holds to creat a "C" motion and squeeze for 3 seconds. Pt completed 5 reps of L/R oblique crunch and 5 reps abdominal crunch. Pt then completed 5 reps scapular retraction to engage back muscles and work his overall trunk stability. BOURNE demo for this TherEx in standing, seated EOB, and pt completed at bed level with HOB raised.    Patient left HOB elevated with all lines intact, call button in reach, bed alarm on, and RNKatherin notified    GOALS:   Multidisciplinary Problems       Occupational Therapy Goals          Problem: Occupational Therapy    Goal Priority Disciplines Outcome Interventions   Occupational Therapy Goal     OT, PT/OT Ongoing, Progressing    Description: Goals to be met by: 2/3/23    Patient will increase functional independence with ADLs by performing:    UE Dressing with Supervision.  LE Dressing with Supervision.  Grooming while standing at sink with Supervision.  Toileting from toilet with Supervision for hygiene and clothing management.   Toilet transfer to toilet with Supervision.                         Time Tracking:     OT Date of Treatment: 01/09/23  OT Start Time: 1503  OT Stop Time: 1527  OT Total Time (min): 24 min    Billable Minutes:Self Care/Home Management 12 min  Therapeutic Exercise 12 min    OT/PILO: PILO     PILO Visit Number: 1 1/9/2023  "

## 2023-01-09 NOTE — NURSING
In report 7am Pt had no urine out put noted from night shift pt refused messina or in and out. Bladder scan done at 900am reading under 40 ccs md notified fluids highly encourage patient educated on importance of messina and in and out cath if not output. States I dont want it md made aware. Will recheck with bladder scan. Pt nutrition and hydration very poor cont to encourage. Bladder scan redone at 5pm no out noted md notified wants in and out pt continues to refused reeducated agreed 600cc obtain dark brown urine with sedement. Md notified of output result  new order to restart fluids ns 150cc/hr ,urinalysis , and messina cath to be placed. Pt refused again reeducated states I dont want it but agreed on replace it. 16f place pt tolerate secure on leg.

## 2023-01-09 NOTE — NURSING
"2100- messina cath bag observed with no urine, irrigated messina with 30cc of urine     2256- bladder scan 237ml. Patient c/o pain to lower abdominal/groin area. Still no urine in messina bag. Furthered the insertion of catheter. Notified Dr. Lewis of no output.     0215- removed catheter d/t no urine output. Patient refusing insertion of coude catheter. Patient states "I will wait until urology comes in. I am not letting you do  that tonight. Just let me sleep". Another nurse spoke with patient and still refusing insertion of coude catheter. Notified Dr. Lewis. Received orders to stop IVF's.     0411- bladder scan 386ml. Explained importance of catheter to patient, continues to refuse. States he has mild pain to groin area. Norco given. Notified Dr. Lewis.   "

## 2023-01-09 NOTE — PLAN OF CARE
Contacted Pt's wife at [facility] at [phone] and informed her of discharge.  She verbalized understanding and agreement with plan.

## 2023-01-09 NOTE — NURSING
Pt still had no urine output since in and out cath yesterday evening pt refused educated on importance pt refused statd he wants to talk to urology. Bladder scan done on several occasion 35 cc found but pt had no output. Md dr fox notified came to bed side spoke with pt at bedside nurse present explain messina cath need and care. Pt refused again request to speak to urology. Urology called and notified dr rodriguez. States he will come see pt later today.     @ around 12pm pt agreed to let nurse place kuda messina. Messina place. Irrigated urine starts to flow dark in color brown red 900cc noted     @330pm uroology came seen pt states messina in place looks good urine flowing dark yellow with brown ting reeducated pt messina to stay in place and follow up ok for dc dr fox notified pt to be dc today     Ppd placed left fa [facility] arrived transported in

## 2023-01-09 NOTE — DISCHARGE INSTRUCTIONS
You are to follow-up with the urologist outpatient concerning your Sotelo catheter.      [facility]  Facility Transfer Orders        Admit to: SNF    Diagnoses:   Active Hospital Problems    Diagnosis  POA    *Severe sepsis [A41.9, R65.20]  Yes    Subdural hematoma caused by concussion [S06.5XAA]  Yes    Chronic heart failure with preserved ejection fraction [I50.32]  Yes    Benign essential tremor [G25.0]  Yes    Type 2 diabetes mellitus with diabetic nephropathy, without long-term current use of insulin [E11.21]  Yes    Anticoagulated [Z79.01]  Not Applicable    Atrial fibrillation [I48.91]  Yes    Aspirin long-term use [Z79.82]  Not Applicable    Hypertension, essential [I10]  Yes      Resolved Hospital Problems   No resolved problems to display.     Allergies: Review of patient's allergies indicates:  No Known Allergies    Code Status: FULL    Vitals: Routine       Diet: diabetic diet: 2000 calorie    Activity: Up in a chair each morning as tolerated and Activity as tolerated    Nursing Precautions: Fall    Bed/Surface: Pressure Redistribution    Consults: PT to evaluate and treat- 3 times a week, OT to evaluate and treat- 3 times a week, and Nutrition to evaluate and recommend diet    Oxygen: room air        Pending Diagnostic Studies:       Procedure Component Value Units Date/Time    EKG 12-lead [[id number]] Collected: 01/05/23 1049    Order Status: Sent Lab Status: In process Updated: 01/05/23 1131    Narrative:      Test Reason : R07.9,    Vent. Rate : 122 BPM     Atrial Rate : 000 BPM     P-R Int : 000 ms          QRS Dur : 094 ms      QT Int : 274 ms       P-R-T Axes : 000 132 264 degrees     QTc Int : 390 ms    Atrial fibrillation with rapid ventricular response  Right axis deviation  Pulmonary disease pattern  Nonspecific T wave abnormality  Abnormal ECG  When compared with ECG of 31-DEC-2022 01:26,  Vent. rate has increased BY  41 BPM  ST no longer depressed in Lateral leads  Nonspecific T  wave abnormality has replaced inverted T waves in Lateral  leads    Referred By: [email or web address]   SELF           Confirmed By:           Imaging:     Miscellaneous Care:   Sotelo Care: Empty Sotelo bag every shift.  Change Sotelo every month  Diabetes Care: Diabetes: Check blood sugar. Fingerstick blood sugar AC and HS  Sliding Scale/Hypoglycemia Protocol: For FSG<80, give 1 amp D50 or 1 glucose tablet. For FSG , do nothing. For -200, give 1 unit of novolog in addition to pre-meal insulin. For -250, give 2 units of novolog in addition to pre-meal insulin. For -300, give 3 units of novolog in addition to pre-meal insulin. For 301-350, give 4 units of novolog in addition to pre-meal insulin. For 351-400, give 5 units of novolog in addition to pre-meal insulin. For FSG >400, give 5 units of novolog in addition to pre-meal insulin and please call MD    IV Access:      Medications: Discontinue all previous medication orders, if any. See new list below.  Current Discharge Medication List        START taking these medications    Details   cyproheptadine (PERIACTIN) 4 mg tablet Take 1 tablet (4 mg total) by mouth 3 (three) times daily.  Refills: 0      insulin aspart U-100 (NOVOLOG) 100 unit/mL (3 mL) InPn pen Inject 1-10 Units into the skin before meals and at bedtime as needed (Hyperglycemia).  Refills: 0      potassium chloride SA (KLOR-CON) 10 MEQ TbSR Take 2 tablets (20 mEq total) by mouth once daily.      tamsulosin (FLOMAX) 0.4 mg Cap Take 2 capsules (0.8 mg total) by mouth once daily.  Qty: 60 capsule, Refills: 11           CONTINUE these medications which have CHANGED    Details   DULoxetine (CYMBALTA) 30 MG capsule Take 1 capsule (30 mg total) by mouth 2 (two) times daily.  Qty: 90 capsule, Refills: 1           CONTINUE these medications which have NOT CHANGED    Details   acetaminophen (TYLENOL) 325 MG tablet Take 650 mg by mouth every 6 (six) hours as needed.      amiodarone (PACERONE) 100 MG  Tab Take 100 mg by mouth every morning.      apixaban (ELIQUIS) 5 mg Tab Take 1 tablet (5 mg total) by mouth 2 (two) times daily.  Qty: 180 tablet, Refills: 1      calcium polycarbophil (FIBER-CAPS, CA POLYCARBOPHIL, ORAL) Take by mouth.      cholecalciferol, vitamin D3, (VITAMIN D3) 50 mcg (2,000 unit) Tab Take by mouth once daily.      cyanocobalamin, vitamin B-12, 1,000 mcg Subl Place 1,000 mcg under the tongue 2 (two) times a day.      cyclobenzaprine (FLEXERIL) 5 MG tablet Take 5 mg by mouth.      ferrous sulfate 27 mg iron Tab Take by mouth.      HYDROcodone-acetaminophen (NORCO)  mg per tablet Take 1 tablet by mouth every 6 (six) hours as needed.      magnesium 250 mg Tab Take 250 mg by mouth 2 (two) times a day.      metFORMIN (GLUCOPHAGE-XR) 500 MG ER 24hr tablet TAKE 1 TABLET BY MOUTH EVERY DAY  Qty: 90 tablet, Refills: 1      metoprolol tartrate (LOPRESSOR) 25 MG tablet Take 1 tablet (25 mg total) by mouth 2 (two) times daily.  Qty: 180 tablet, Refills: 1    Comments: .      omega-3 acid ethyl esters (LOVAZA) 1 gram capsule TAKE 2 CAPSULES BY MOUTH 2 TIMES DAILY.  Qty: 360 capsule, Refills: 1      phenazopyridine (PYRIDIUM) 100 MG tablet Take 1 tablet (100 mg total) by mouth 3 (three) times daily as needed for Pain.  Qty: 12 tablet, Refills: 0    Associated Diagnoses: Dysuria      polyethylene glycol (GLYCOLAX) 17 gram/dose powder Take 17 g by mouth daily as needed.      rosuvastatin (CRESTOR) 20 MG tablet TAKE 1 TABLET BY MOUTH EVERY DAY  Qty: 90 tablet, Refills: 1      tolterodine (DETROL LA) 4 MG 24 hr capsule Take 1 capsule (4 mg total) by mouth once daily.  Qty: 90 capsule, Refills: 1      topiramate (TOPAMAX) 50 MG tablet TAKE 1 TABLET BY MOUTH EVERY DAY  Qty: 90 tablet, Refills: 1      traZODone (DESYREL) 50 MG tablet TAKE 1 TABLET BY MOUTH EVERY EVENING.  Qty: 90 tablet, Refills: 1           STOP taking these medications       losartan (COZAAR) 50 MG tablet Comments:   Reason for Stopping:          oxyCODONE-acetaminophen (PERCOCET)  mg per tablet Comments:   Reason for Stopping:         propranoloL (INDERAL) 20 MG tablet Comments:   Reason for Stopping:         semaglutide (OZEMPIC) 1 mg/dose (4 mg/3 mL) Comments:   Reason for Stopping:         sulfamethoxazole-trimethoprim 800-160mg (BACTRIM DS) 800-160 mg Tab Comments:   Reason for Stopping:             Follow up:    Follow-up Information       Gautam Castanon III, MD Follow up in 1 week(s).    Specialty: Family Medicine  Contact information:  [address] [facility]  [address]  [place] [address]  [phone]               Yoshi Lange MD Follow up in 1 week(s).    Specialty: Urology  Contact information:  [address] [facility]  [address]  [place] [address]  [phone]                               Immunizations Administered as of 1/9/2023       Name Date Dose VIS Date Route Exp Date    COVID-19, MRNA, LN-S, PF (Pfizer) (Purple Cap) 10/24/2022 0.3 mL -- -- --    Lot: [id number]     External: Auto Reconciled From Outside Source     COVID-19, MRNA, LN-S, PF (Pfizer) (Purple Cap) 10/12/2021 0.3 mL -- Intramuscular --    Site: Left arm     : Pf[employer] Inc     Lot: [id number]     COVID-19, MRNA, LN-S, PF (Pfizer) (Purple Cap) 3/3/2021 11:09 AM 0.3 mL 12/12/2020 Intramuscular 6/30/2021    Site: Right deltoid     Given By: Jacques Harding LPN     : Pf[employer]     Lot: [id number]     COVID-19, MRNA, LN-S, PF (Pfizer) (Purple Cap) 2/9/2021  6:08 PM 0.3 mL 12/12/2020 Intramuscular 6/30/2021    Site: Right deltoid     Given By: Blanquita Godoy LPN     : Pf[employer] Inc     Lot: [id number]             This patient has had both covid vaccinations    Some patients may experience side effects after vaccination.  These may include fever, headache, muscle or joint aches.  Most symptoms resolve with 24-48 hours and do not require urgent medical evaluation unless they persist for more than 72 hours or symptoms are concerning for an unrelated medical  condition.          Jony López MD

## 2023-01-09 NOTE — PLAN OF CARE
Patient's nurseJose notified by Pretty Palencia LCSW to call report to Jarvis on station 1 at [facility], [phone].    01/09/23 1721   Discharge Reassessment   Assessment Type Discharge Planning Reassessment

## 2023-01-09 NOTE — PLAN OF CARE
Patient is not medically clear for discharge. Urology re consulted, discharge pending Urology consult.CM updated patient at bedside.CM contacted Kristie at [facility] and updated on anticipated discharge date.CM contacted Galina Rodríguez with update.       01/09/23 2720   Discharge Reassessment   Assessment Type Discharge Planning Reassessment   Did the patient's condition or plan change since previous assessment? Yes   Discharge Plan discussed with: Patient;Spouse/sig other   Discharge Plan A Skilled Nursing Facility   Discharge Plan B Skilled Nursing Facility   DME Needed Upon Discharge  none   Why the patient remains in the hospital Requires continued medical care   Post-Acute Status   Post-Acute Authorization Placement   Post-Acute Placement Status Set-up Complete/Auth obtained

## 2023-01-09 NOTE — PT/OT/SLP PROGRESS
"Physical Therapy Treatment    Patient Name:  Hiro Rodríguez   MRN:  [id number]    Recommendations:     Discharge Recommendations: nursing facility, skilled  Discharge Equipment Recommendations: other (see comments) (TBD at next level of care)  Barriers to discharge:  increased assist with mobility    Assessment:     Hiro Rodríguez is a 77 y.o. male admitted with a medical diagnosis of Severe sepsis.  He presents with the following impairments/functional limitations: weakness, impaired endurance, impaired self care skills, impaired functional mobility, gait instability, impaired balance, decreased safety awareness, impaired cardiopulmonary response to activity.    Pt agreeable to visit. Pt reporting that he has questions about physical therapy related concerns. Pt wanting to know if there is anything he can do about occasional dizziness with mobility. Therapist educated pt on the importance of spending time outside of bed and taking his time with transfers and ambulation and taking breaks when he feels dizzy to see if it abates. Pt reporting that his core muscles are very weak. Pt wanting to know about exercises that he can do in bed for his core, lower extremities, and upper extremities. Pt instructed in there ex that he can perform in bed for trunk strengthening and LE strengthening. Pt very receptive to exercise.    Rehab Prognosis: Fair; patient would benefit from acute skilled PT services to address these deficits and reach maximum level of function.    Recent Surgery: * No surgery found *      Plan:     During this hospitalization, patient to be seen 6 x/week to address the identified rehab impairments via gait training, therapeutic activities, therapeutic exercises, neuromuscular re-education and progress toward the following goals:    Plan of Care Expires:  02/03/23    Subjective     Chief Complaint: pt c/o about core muscles being "like butter"  Patient/Family Comments/goals: to get " stronger  Pain/Comfort:  Pain Rating 1: 0/10      Objective:     Communicated with RN prior to session.  Patient found HOB elevated with telemetry, PICC line upon PT entry to room.     General Precautions: Standard, fall  Orthopedic Precautions: N/A  Braces: N/A  Respiratory Status: Room air     Functional Mobility:  Pt declined      AM-PAC 6 CLICK MOBILITY          Treatment & Education:  Pt educated on importance of time OOB, importance of intermittent mobility, safe techniques for transfers/ambulation, discharge recommendations/options, and use of call light for assistance and fall prevention.  Pt educated and instructed in therapeutic exercise for trunk strengthening and LE strengthening that pt can do in bed throughout the day with verbal cuing for proper form and pacing for optimal strengthening.      Patient left HOB elevated with all lines intact, call button in reach, bed alarm on, and RN notified..    GOALS:   Multidisciplinary Problems       Physical Therapy Goals          Problem: Physical Therapy    Goal Priority Disciplines Outcome Goal Variances Interventions   Physical Therapy Goal     PT, PT/OT Ongoing, Progressing     Description: Goals to be met by: 2/3/23     Patient will increase functional independence with mobility by performin. Supine to sit with Supervision  2. Sit to stand transfer with Supervision  3. Bed to chair transfer with Supervision using Rolling Walker  4. Gait  x 150 feet with Supervision using Rolling Walker.                              Time Tracking:     PT Received On: 23  PT Start Time: 1414     PT Stop Time: 1426  PT Total Time (min): 12 min     Billable Minutes: Therapeutic Exercise 12    Treatment Type: Treatment  PT/PTA: PTA     PTA Visit Number: 2023

## 2023-01-09 NOTE — PHYSICIAN QUERY
PT Name: Hiro Rodríguez  MR #: [id number]     DOCUMENTATION CLARIFICATION      CDS/: Barbara Lambert               Contact information:[phone] or through epic messenger    This form is a permanent document in the medical record.     Query Date: January 9, 2023    Dear Provider,  By submitting this query, we are merely seeking further clarification of documentation.  Please utilize your independent clinical judgment when addressing the question(s) below.     The Medical Record contains the following:    Supporting Clinical Findings Location in Medical Record   admission for acute cystitis with hematuria failing outpatient antibiotics sepsis with lactic acidosis leukocytosis   ER Prov Note   Concern for severe sepsis HP   Severe sepsis qualify but most likely secondary to dehydration and polypharacy  Dr Shepherd's Progress Note 12/31   Admitted with possible sepsis but all cultures are negative and respiratory PCR panel negative  Deescalate antibiotics       Active Hospital Problems :     Diagnosis    *Severe sepsis    Dr Shepherd's Progress Note 1/2          Dr Paredes's progress notes 1/4-1/8             Please clarify if the SEPSIS diagnosis has been:    [  ] Ruled In   [ x ] Ruled Out   [  ] Other/Clarification of findings (please specify):

## 2023-01-09 NOTE — PROGRESS NOTES
[facility]  Adult Nutrition   Progress Note (Follow-Up)    SUMMARY      Recommendations:   1. Continue current diet as tolerated.   2. Continue Glucerna with meals.  3. Continue appetite stimulant.   4. Consult RD for nutrition support recommendations.Consider changing IV fluids to Clinimix E @ 100 ml /hr to meet EPN. Will need daily phos lab.     Goals:   Goals: Pt to meet >/= 50% po EEN/EPN.  Ongoing    Dietitian Rounds Brief  Discussed with RN. Patient eats okay for breakfast but did not eat much for lunch. Drank 50% of Glucernal Last BM 1/08/23. RD to encourage intake. May consider nutrition support. Will follow prn.    Diet order: Diabetic 2000 kcal Cardiac diet    Oral Supplement:    TF rate/provision: Glucerna with meals    % Intake of Estimated Energy Needs: 25 - 50 %  % Meal Intake: 25 - 50 %    Estimated/Assessed Needs  Weight Used For Calorie Calculations: 107.7 kg (237 lb 7 oz)  Energy Calorie Requirements (kcal): 3104-4221 kcals/day  Energy Need Method: Kcal/kg  Protein Requirements: 150-188 g/day  Weight Used For Protein Calculations: 75 kg (165 lb 5.5 oz)     Estimated Fluid Requirement Method: RDA Method  RDA Method (mL): 1185       Weight History:  Wt Readings from Last 5 Encounters:   01/09/23 117.3 kg (258 lb 9.6 oz)   12/29/22 115.7 kg (255 lb)   12/16/22 115.7 kg (255 lb)   12/15/22 115.7 kg (255 lb)   11/18/22 126 kg (277 lb 12.8 oz)        Reason for Assessment  Reason For Assessment: length of stay  Diagnosis: infection/sepsis  Relevant Medical History: Type 2 diabetes mellitus with diabetic nephropathy, without long-term current use of insulin, Anticoagulated, Atrial fibrillation, Aspirin long-term use, Hypertension, essential, Benign essential tremor, Chronic heart failure with preserved ejection fraction, Subdural hematoma caused by concussion    Medications:Pertinent Medications Reviewed  Scheduled Meds:   amiodarone  100 mg Oral QAM    apixaban  5 mg Oral BID     atorvastatin  80 mg Oral Daily    cefTRIAXone (ROCEPHIN) IVPB  1 g Intravenous Q24H    cyproheptadine  4 mg Oral TID    DULoxetine  30 mg Oral BID    metoprolol tartrate  25 mg Oral BID    potassium chloride  20 mEq Oral Daily    sodium chloride 0.9%  500 mL Intravenous Once    tamsulosin  0.8 mg Oral Daily     Continuous Infusions:   sodium chloride 0.9% 150 mL/hr at 01/09/23 0243     PRN Meds:.acetaminophen, albuterol-ipratropium, calcium gluconate IVPB, calcium gluconate IVPB, calcium gluconate IVPB, dextrose 10%, dextrose 10%, glucagon (human recombinant), glucose, glucose, hydrALAZINE, HYDROcodone-acetaminophen, insulin aspart U-100, LIDOcaine HCL 4%, magnesium oxide, magnesium oxide, magnesium sulfate IVPB, magnesium sulfate IVPB, melatonin, metoclopramide HCl, naloxone, polyethylene glycol, potassium bicarbonate, potassium bicarbonate, potassium bicarbonate, potassium chloride **AND** potassium chloride **AND** potassium chloride, potassium, sodium phosphates, potassium, sodium phosphates, potassium, sodium phosphates, promethazine (PHENERGAN) IVPB, senna-docusate 8.6-50 mg, simethicone, sodium chloride 0.9%, sodium phosphate IVPB, sodium phosphate IVPB, sodium phosphate IVPB    Labs: Pertinent Labs Reviewed  Clinical Chemistry:  Recent Labs   Lab 01/07/23  0630 01/07/23  1902 01/08/23  0446 01/09/23  0037 01/09/23  0530     --  140   < > 139   K 3.1*   < > 3.4*   < > 4.2     --  106   < > 107   CO2 19*  --  27   < > 26   *  --  110   < > 118*   BUN 11  --  15   < > 14   CREATININE 0.7  --  0.7   < > 0.7   CALCIUM 7.9*  --  8.0*   < > 7.8*   ANIONGAP 13  --  7*   < > 6*   MG 1.7  --  1.9  --  1.8   PHOS 3.2  --  3.5  --  3.4    < > = values in this interval not displayed.     CBC:   Recent Labs   Lab 01/09/23  0530   WBC 11.16   RBC 3.86*   HGB 11.6*   HCT 37.4*      MCV 97   MCH 30.1   MCHC 31.0*     Lipid Panel:  No results for input(s): CHOL, HDL, LDLCALC, TRIG, CHOLHDL in the  last 168 hours.  Cardiac Profile:  No results for input(s): BNP, CPK, CPKMB, TROPONINI, CKTOTAL in the last 168 hours.  Inflammatory Labs:  No results for input(s): CRP in the last 168 hours.  Diabetes:  No results for input(s): HGBA1C, POCTGLUCOSE in the last 168 hours.  Thyroid & Parathyroid:  No results for input(s): TSH, FREET4, G2AAEAR, H3FAUAG, THYROIDAB in the last 168 hours.    Monitor and Evaluation  Food and Nutrient Intake: food and beverage intake, energy intake  Food and Nutrient Adminstration: diet order  Knowledge/Beliefs/Attitudes: food and nutrition knowledge/skill, beliefs and attitudes  Physical Activity and Function: nutrition-related ADLs and IADLs, factors affecting access to physical activity  Anthropometric Measurements: weight, weight change, body mass index  Biochemical Data, Medical Tests and Procedures: lipid profile, inflammatory profile, glucose/endocrine profile, gastrointestinal profile, electrolyte and renal panel  Nutrition-Focused Physical Findings: overall appearance     Nutrition Risk  Level of Risk/Frequency of Follow-up: high     Nutrition Follow-Up  RD Follow-up?: Yes    Lara Carbone RD 01/09/2023 4:08 PM

## 2023-01-09 NOTE — PROGRESS NOTES
Follow-up from consult last week  77-year-old male with recent fall  Patient had UTI several weeks ago  On blood thinners  Patient had Sotelo catheter placed for urinary retention  Urine is draining  Urine is clear this afternoon  Patient is to be set for discharge to rehab facility    Patient is able to go to rehab facility with Sotelo catheter in place  I will be glad to have him follow up with me  In the office

## 2023-01-09 NOTE — DISCHARGE SUMMARY
"[facility]  Discharge Summary      Patient Name: Hiro Rodríguez  MRN: [id number]  RADHA: [id number]  Patient Class: IP- Inpatient  Admission Date: 12/30/2022  Hospital Length of Stay: 9 days  Discharge Date and Time:  01/09/2023 4:27 PM  Attending Physician: Jony López MD   Discharging Provider: Jony López MD  Primary Care Provider: Gautam Castanon III, MD    Primary Care Team: Networked reference to record PCT     HPI:     Hiro Rodríguez is a 77 y.o. White male   With PMH of chronic subdural hematoma (11/8/22),  pAF on eliquis, DM2, HTN, multiple falls 2/2 polypharmacy,  who presents with fall.     Onset this evening  Occurred after experiencing dizziness  No LOC  +head trauma on marble side table  Pt discharged from [facility] on 11/18/22  He was treated for subdural hematoma due to fall  Since discharge, pt has had poor po intake  Wife reports "he is just stubborn" and refusing to eat/drink  No n/v  No subjective fever   No chills  No abdominal pain  Previous dysuria, since resolved  No diarrhea  +generalized weakness  +fatigue     On 12/28:  new dysuria, urgency, frequency  Saw outpt PCP on 12/29:  started on cipro  BP at that visit was noted 94/57     Pt initially presented to ER today normotensive  While sleeping, noted BP 55/30  Did not recover with positional changes  Pt's BP now responding to fluid resuscitation     * No surgery found *      Hospital Course:     Patient was seen and examined along with the nurse  Tried to talk with the patient and he said let me wake up first   Try to open the eye son look at the pupil but he intentionally close the eyes despite asked to open  Denied having pain anywhere in the body  Pressure is low again and fibrous cc bolus given  Urine toxicology findings noted.  He did not answer the question whether he took extra pills are not    1/1/23  Patient is awake and alert and normal and talking normal   Mild abdominal pain . BP " stable . But over day nausea and vomiting . Abdominal pain .  HIDA ordered since gall bladder distended     1/1/23  Hospital course  Patient was admitted with altered mental status and possible sepsis.  CT scan of the abdomen was done because of abdominal pain and no acute finding but gallbladder distention and HIDA scan was done and negative  Today patient wake up and acting normal and had normal conversation.  He was not doing well with repeated falls after he had subdural hematoma the last time.  Said he will go home if I can arrange the hospital bed .  But later he changed her mind and he wants to go to rehab or skilled facility  K very low and supplementing      1/3:  Patient is AO x3, and states that he would like to go to a facility to get his strength back.  Labs currently pending.  Surgery evaluated patient and stated that patient does not have cholecystitis hence no plans for surgical intervention.  No concerns/issues overnight reported by the patient or the nursing staff.     1/4:  Patient complaining of burning on urination, will start patient on Pyridium, patient has completed 5 days of IV antibiotics.  Awaiting placement.  Spoke to wife yesterday and she stated that patient's depression has worsened, would increase his duloxetine to 30 mg b.i.d. and titrate upwards as tolerated.  Blood pressure is uncontrolled resume patient's losartan 50 mg daily and titrate upwards as tolerated.     1/5:  Bladder scan was done and patient was found to have urinary retention and in and out was done.  Later in the night patient had another episode of urinary retention and Sotelo was placed in.  Consult to urologist placed.  Would adjust blood pressure medications due to elevated heart rate, would change propranolol to Toprol-XL and discontinue losartan     1/6:  Urologist evaluated patient and recommended follow-up outpatient.  Awaiting placement     1/7:  Discussed with patient and his wife today and they are  requesting we take out the Sotelo as patient states that he is in extreme pain due to the Sotelo.  Although I explained to them that patient may give retained afterwards, they stated that they are willing to take that chance.  Would DC Sotelo and increase patient's Flomax.  Patient has been tachycardic, would give patient IV metoprolol 5 mg and increase Toprol to 100 mg.  Patient states that his tachycardia secondary to Sotelo pain.  Potassium low, would replace.     1/8:  Patient is doing significantly better today, pain and tachycardia has resolved however patient has not had any urinary output since Sotelo removal.  I expressed my concern about patient being retained and patient is adamant about not having Sotelo placed back in.  We will bladder scan patient again.    1/9:  Overnight patient was found to be retained and Sotelo had to be placed in.  Urologist re-evaluated the patient and stated patient keep Sotelo in and cleared him for discharge to rehab facility with Sotelo catheter in place and to follow-up with him outpatient.    Physical examination on discharge:  Constitutional: No distress.   HENT: NC  Head: Atraumatic.   Cardiovascular: Normal rate, regular rhythm and normal heart sounds.   Pulmonary/Chest: Effort normal. No wheezes.   Abdominal: Soft. Bowel sounds are normal. No distension and no mass. No tenderness, Sotelo in-situ  Neurological: Alert.   Skin: Skin is warm and dry.   Psych: Appropriate mood and affect    I have seen the patient on the day of discharge and reviewed the discharge instructions as outlined.    Goals of Care Treatment Preferences:  Code Status: Full Code      Consults:   Consults (From admission, onward)          Status Ordering Provider     Inpatient consult to Urology  Once        Provider:  Yoshi Lange MD    Completed ETHAN GLORIA     Inpatient consult to Registered Dietitian/Nutritionist  Once        Provider:  (Not yet assigned)    Completed ETHAN GLORIA      Inpatient consult to   Once        Provider:  (Not yet assigned)    Acknowledged MARY ELLEN MCDOWELL     Inpatient consult to   Once        Provider:  (Not yet assigned)    Acknowledged MARY ELLEN MCDOWELL     Inpatient consult to General Surgery  Once        Provider:  Parviz Nolasco MD    Completed MARY ELLEN MCDOWELL     Inpatient consult to Hospitalist  Once        Provider:  Nora Bates MD    Acknowledged NORA BATES            No new Assessment & Plan notes have been filed under this hospital service since the last note was generated.  Service: Hospital Medicine    Final Active Diagnoses:    Diagnosis Date Noted POA    PRINCIPAL PROBLEM:  Severe sepsis [A41.9, R65.20] 12/31/2022 Yes    Subdural hematoma caused by concussion [S06.5XAA] 11/09/2022 Yes    Chronic heart failure with preserved ejection fraction [I50.32] 04/21/2021 Yes    Benign essential tremor [G25.0] 01/06/2021 Yes    Type 2 diabetes mellitus with diabetic nephropathy, without long-term current use of insulin [E11.21] 08/31/2020 Yes    Anticoagulated [Z79.01] 08/31/2020 Not Applicable    Atrial fibrillation [I48.91] 08/31/2020 Yes    Aspirin long-term use [Z79.82] 08/31/2020 Not Applicable    Hypertension, essential [I10] 08/31/2020 Yes      Problems Resolved During this Admission:       Discharged Condition: good    Disposition: Skilled Nursing Facility    Follow Up:   Follow-up Information       Gautam Castanon III, MD Follow up in 1 week(s).    Specialty: Family Medicine  Contact information:  [address] [facility]  [address]  [place] [address]  [phone]               Yoshi Lange MD Follow up in 1 week(s).    Specialty: Urology  Contact information:  [address] [facility]  [address]  [place] [address]  [phone]                           Patient Instructions:      Diet Cardiac     Activity as tolerated       Significant Diagnostic Studies: Labs:   BMP:   Recent Labs   Lab 01/08/23  0446 01/09/23  0037 01/09/23  6963     130* 118*    139 139   K 3.4* 4.0 4.2    106 107   CO2 27 23 26   BUN 15 14 14   CREATININE 0.7 0.7 0.7   CALCIUM 8.0* 7.7* 7.8*   MG 1.9  --  1.8   , CMP   Recent Labs   Lab 01/08/23  0446 01/09/23  0037 01/09/23  0530    139 139   K 3.4* 4.0 4.2    106 107   CO2 27 23 26    130* 118*   BUN 15 14 14   CREATININE 0.7 0.7 0.7   CALCIUM 8.0* 7.7* 7.8*   ANIONGAP 7* 10 6*   , CBC   Recent Labs   Lab 01/08/23  0446 01/09/23  0530   WBC 11.16 11.16   HGB 12.4* 11.6*   HCT 39.1* 37.4*    285    and All labs within the past 24 hours have been reviewed  Microbiology:   Blood Culture   Lab Results   Component Value Date    LABBLOO No growth after 5 days. 12/31/2022    and Urine Culture    Lab Results   Component Value Date    LABURIN No growth 12/31/2022     Radiology: X-Ray: CXR: X-Ray Chest 1 View (CXR): No results found for this visit on 12/30/22. and X-Ray Chest PA and Lateral (CXR): No results found for this visit on 12/30/22.  CT scan: CT ABDOMEN PELVIS WITH CONTRAST: No results found for this visit on 12/30/22. and CT ABDOMEN PELVIS WITHOUT CONTRAST:   Results for orders placed or performed during the hospital encounter of 12/30/22   CT Abdomen Pelvis  Without Contrast    Narrative    EXAM DESCRIPTION:  CT ABDOMEN PELVIS WITHOUT CONTRAST  RadLex: CT ABDOMEN PELVIS WITHOUT IV CONTRAST    CLINICAL HISTORY:  Abdominal abscess/infection suspected.    TECHNIQUE:  CT of the abdomen and pelvis without contrast.  All CT scans at this facility use dose modulation, iterative reconstruction, and/or weight based dosing when appropriate to reduce radiation dose to as low as reasonably achievable.    COMPARISON: None.    FINDINGS:    The visualized lower thoracic structures demonstrate coronary artery calcifications.    Unenhanced images of the liver, spleen, pancreas and adrenal glands appear grossly unremarkable. The gallbladder appears slightly distended. There are multiple  bilateral renal calculi measuring up to 1 cm on the right. There is no hydronephrosis bilaterally. No retroperitoneal or mesenteric lymphadenopathy is identified. No abdominal aortic aneurysm is seen. No bowel obstruction is seen. There is no free intraperitoneal air. The appendix appears unremarkable. There is colonic diverticulosis without definite CT evidence for acute diverticulitis. There is mild fecal loading throughout the colon. No free fluid is identified. Urinary bladder is decompressed. Small prostatic calcifications are present. Prostate gland measures approximately 5.8 cm TR by 3.5 cm AP by 4 cm CC. There are small bilateral inguinal hernias containing fat. There are postsurgical changes at the proximal left femur. No acute fracture is seen.    IMPRESSION:  1.  Slight gallbladder distention.  2.  Colonic diverticulosis without definite CT evidence for acute diverticulitis.  3.  Nonobstructive bilateral renal calculi.    Electronically signed by:  Brandt Carl DO  12/31/2022 2:35 AM [facility] Workstation: [id number]       Pending Diagnostic Studies:       Procedure Component Value Units Date/Time    EKG 12-lead [[id number]] Collected: 01/05/23 1049    Order Status: Sent Lab Status: In process Updated: 01/05/23 1131    Narrative:      Test Reason : R07.9,    Vent. Rate : 122 BPM     Atrial Rate : 000 BPM     P-R Int : 000 ms          QRS Dur : 094 ms      QT Int : 274 ms       P-R-T Axes : 000 132 264 degrees     QTc Int : 390 ms    Atrial fibrillation with rapid ventricular response  Right axis deviation  Pulmonary disease pattern  Nonspecific T wave abnormality  Abnormal ECG  When compared with ECG of 31-DEC-2022 01:26,  Vent. rate has increased BY  41 BPM  ST no longer depressed in Lateral leads  Nonspecific T wave abnormality has replaced inverted T waves in Lateral  leads    Referred By: [email or web address]   SELF           Confirmed By:            Medications:  Reconciled Home Medications:      Medication List         START taking these medications      cyproheptadine 4 mg tablet  Commonly known as: PERIACTIN  Take 1 tablet (4 mg total) by mouth 3 (three) times daily.     insulin aspart U-100 100 unit/mL (3 mL) Inpn pen  Commonly known as: NovoLOG  Inject 1-10 Units into the skin before meals and at bedtime as needed (Hyperglycemia).     potassium chloride 10 MEQ Tbsr  Commonly known as: KLOR-CON  Take 2 tablets (20 mEq total) by mouth once daily.  Start taking on: January 10, 2023     tamsulosin 0.4 mg Cap  Commonly known as: FLOMAX  Take 2 capsules (0.8 mg total) by mouth once daily.  Start taking on: January 10, 2023     topiramate 50 MG tablet  Commonly known as: TOPAMAX  TAKE 1 TABLET BY MOUTH EVERY DAY            CHANGE how you take these medications      DULoxetine 30 MG capsule  Commonly known as: CYMBALTA  Take 1 capsule (30 mg total) by mouth 2 (two) times daily.  What changed: when to take this            CONTINUE taking these medications      acetaminophen 325 MG tablet  Commonly known as: TYLENOL  Take 650 mg by mouth every 6 (six) hours as needed.     amiodarone 100 MG Tab  Commonly known as: PACERONE  Take 100 mg by mouth every morning.     apixaban 5 mg Tab  Commonly known as: ELIQUIS  Take 1 tablet (5 mg total) by mouth 2 (two) times daily.     cholecalciferol (vitamin D3) 50 mcg (2,000 unit) Tab  Commonly known as: VITAMIN D3  Take by mouth once daily.     cyanocobalamin (vitamin B-12) 1,000 mcg Subl  Place 1,000 mcg under the tongue 2 (two) times a day.     cyclobenzaprine 5 MG tablet  Commonly known as: FLEXERIL  Take 5 mg by mouth.     ferrous sulfate 27 mg iron Tab  Take by mouth.     FIBER-CAPS (CA POLYCARBOPHIL) ORAL  Take by mouth.     HYDROcodone-acetaminophen  mg per tablet  Commonly known as: NORCO  Take 1 tablet by mouth every 6 (six) hours as needed.     magnesium 250 mg Tab  Take 250 mg by mouth 2 (two) times a day.     metFORMIN 500 MG ER 24hr tablet  Commonly known as:  GLUCOPHAGE-XR  TAKE 1 TABLET BY MOUTH EVERY DAY     metoprolol tartrate 25 MG tablet  Commonly known as: LOPRESSOR  Take 1 tablet (25 mg total) by mouth 2 (two) times daily.     omega-3 acid ethyl esters 1 gram capsule  Commonly known as: LOVAZA  TAKE 2 CAPSULES BY MOUTH 2 TIMES DAILY.     phenazopyridine 100 MG tablet  Commonly known as: PYRIDIUM  Take 1 tablet (100 mg total) by mouth 3 (three) times daily as needed for Pain.     polyethylene glycol 17 gram/dose powder  Commonly known as: GLYCOLAX  Take 17 g by mouth daily as needed.     rosuvastatin 20 MG tablet  Commonly known as: CRESTOR  TAKE 1 TABLET BY MOUTH EVERY DAY     tolterodine 4 MG 24 hr capsule  Commonly known as: DETROL LA  Take 1 capsule (4 mg total) by mouth once daily.     traZODone 50 MG tablet  Commonly known as: DESYREL  TAKE 1 TABLET BY MOUTH EVERY EVENING.            STOP taking these medications      losartan 50 MG tablet  Commonly known as: COZAAR     oxyCODONE-acetaminophen  mg per tablet  Commonly known as: PERCOCET     propranoloL 20 MG tablet  Commonly known as: INDERAL     semaglutide 1 mg/dose (4 mg/3 mL)  Commonly known as: OZEMPIC     sulfamethoxazole-trimethoprim 800-160mg 800-160 mg Tab  Commonly known as: BACTRIM DS              Indwelling Lines/Drains at time of discharge:   Lines/Drains/Airways       Drain  Duration                  Urethral Catheter 01/08/23 1900 16 Fr. <1 day                    Time spent on the discharge of patient: 35 minutes         Jony López MD  Department of Hospital Medicine  [facility]

## 2023-01-10 NOTE — PT/OT/SLP DISCHARGE
Occupational Therapy Discharge Summary    Hiro Rodríguez  MRN: [id number]   Principal Problem: Severe sepsis      Patient Discharged from acute Occupational Therapy on 1/6/23.  Please refer to prior OT note dated 1/9/23 for functional status.    Assessment:      Goals partially met.    Objective:     GOALS:   Multidisciplinary Problems       Occupational Therapy Goals          Problem: Occupational Therapy    Goal Priority Disciplines Outcome Interventions   Occupational Therapy Goal     OT, PT/OT Ongoing, Progressing    Description: Goals to be met by: 2/3/23    Patient will increase functional independence with ADLs by performing:    UE Dressing with Supervision.  LE Dressing with Supervision.  Grooming while standing at sink with Supervision.  Toileting from toilet with Supervision for hygiene and clothing management.   Toilet transfer to toilet with Supervision.                         Reasons for Discontinuation of Therapy Services  Transfer to alternate level of care.      Plan:     Patient Discharged to: Skilled Nursing Facility    1/10/2023

## 2023-01-17 DIAGNOSIS — R39.11 URINARY HESITANCY: Primary | ICD-10-CM

## 2023-01-19 DIAGNOSIS — S72.8X2A OTHER FRACTURE OF LEFT FEMUR, INITIAL ENCOUNTER FOR CLOSED FRACTURE: Primary | ICD-10-CM

## 2023-01-22 ENCOUNTER — HOSPITAL ENCOUNTER (INPATIENT)
Facility: HOSPITAL | Age: 78
LOS: 3 days | Discharge: NURSING, REHAB OR LONG-TERM CARE | DRG: 698 | End: 2023-01-26
Attending: EMERGENCY MEDICINE | Admitting: INTERNAL MEDICINE
Payer: MEDICARE

## 2023-01-22 DIAGNOSIS — I50.32 CHRONIC HEART FAILURE WITH PRESERVED EJECTION FRACTION: ICD-10-CM

## 2023-01-22 DIAGNOSIS — I95.9 HYPOTENSION: ICD-10-CM

## 2023-01-22 DIAGNOSIS — A41.52 SEPSIS DUE TO PSEUDOMONAS SPECIES, UNSPECIFIED WHETHER ACUTE ORGAN DYSFUNCTION PRESENT: ICD-10-CM

## 2023-01-22 DIAGNOSIS — N39.0 URINARY TRACT INFECTION ASSOCIATED WITH INDWELLING URETHRAL CATHETER, SUBSEQUENT ENCOUNTER: ICD-10-CM

## 2023-01-22 DIAGNOSIS — G93.41 ENCEPHALOPATHY, METABOLIC: ICD-10-CM

## 2023-01-22 DIAGNOSIS — T83.511D URINARY TRACT INFECTION ASSOCIATED WITH INDWELLING URETHRAL CATHETER, SUBSEQUENT ENCOUNTER: ICD-10-CM

## 2023-01-22 DIAGNOSIS — N39.0 URINARY TRACT INFECTION WITH HEMATURIA, SITE UNSPECIFIED: Primary | ICD-10-CM

## 2023-01-22 DIAGNOSIS — A41.9 SEPSIS, DUE TO UNSPECIFIED ORGANISM, UNSPECIFIED WHETHER ACUTE ORGAN DYSFUNCTION PRESENT: ICD-10-CM

## 2023-01-22 DIAGNOSIS — S06.5X0S: ICD-10-CM

## 2023-01-22 DIAGNOSIS — R31.9 URINARY TRACT INFECTION WITH HEMATURIA, SITE UNSPECIFIED: Primary | ICD-10-CM

## 2023-01-22 DIAGNOSIS — R33.9 URINARY RETENTION: ICD-10-CM

## 2023-01-22 DIAGNOSIS — G93.41 METABOLIC ENCEPHALOPATHY: ICD-10-CM

## 2023-01-22 DIAGNOSIS — R00.0 TACHYCARDIA: ICD-10-CM

## 2023-01-22 PROBLEM — R33.8 BENIGN PROSTATIC HYPERPLASIA WITH URINARY RETENTION: Status: ACTIVE | Noted: 2022-12-15

## 2023-01-22 PROBLEM — R94.31 ABNORMAL EKG: Status: ACTIVE | Noted: 2023-01-22

## 2023-01-22 PROBLEM — N30.90 CYSTITIS: Status: ACTIVE | Noted: 2023-01-22

## 2023-01-22 PROBLEM — N40.1 BENIGN PROSTATIC HYPERPLASIA WITH URINARY RETENTION: Status: ACTIVE | Noted: 2022-12-15

## 2023-01-22 LAB
ALBUMIN SERPL BCP-MCNC: 2.5 G/DL (ref 3.5–5.2)
ALP SERPL-CCNC: 92 U/L (ref 55–135)
ALT SERPL W/O P-5'-P-CCNC: 9 U/L (ref 10–44)
ANION GAP SERPL CALC-SCNC: 10 MMOL/L (ref 8–16)
ANION GAP SERPL CALC-SCNC: 9 MMOL/L (ref 8–16)
AST SERPL-CCNC: 12 U/L (ref 10–40)
BACTERIA #/AREA URNS HPF: ABNORMAL /HPF
BASOPHILS # BLD AUTO: 0.03 K/UL (ref 0–0.2)
BASOPHILS NFR BLD: 0.2 % (ref 0–1.9)
BILIRUB SERPL-MCNC: 0.7 MG/DL (ref 0.1–1)
BILIRUB UR QL STRIP: NEGATIVE
BNP SERPL-MCNC: 536 PG/ML (ref 0–99)
BUN SERPL-MCNC: 25 MG/DL (ref 8–23)
BUN SERPL-MCNC: 26 MG/DL (ref 8–23)
CALCIUM SERPL-MCNC: 8.6 MG/DL (ref 8.7–10.5)
CALCIUM SERPL-MCNC: 8.8 MG/DL (ref 8.7–10.5)
CHLORIDE SERPL-SCNC: 104 MMOL/L (ref 95–110)
CHLORIDE SERPL-SCNC: 104 MMOL/L (ref 95–110)
CLARITY UR: ABNORMAL
CO2 SERPL-SCNC: 25 MMOL/L (ref 23–29)
CO2 SERPL-SCNC: 25 MMOL/L (ref 23–29)
COLOR UR: YELLOW
CREAT SERPL-MCNC: 1 MG/DL (ref 0.5–1.4)
CREAT SERPL-MCNC: 1 MG/DL (ref 0.5–1.4)
DIFFERENTIAL METHOD: ABNORMAL
EOSINOPHIL # BLD AUTO: 0 K/UL (ref 0–0.5)
EOSINOPHIL NFR BLD: 0.1 % (ref 0–8)
ERYTHROCYTE [DISTWIDTH] IN BLOOD BY AUTOMATED COUNT: 14.6 % (ref 11.5–14.5)
EST. GFR  (NO RACE VARIABLE): >60 ML/MIN/1.73 M^2
EST. GFR  (NO RACE VARIABLE): >60 ML/MIN/1.73 M^2
GLUCOSE SERPL-MCNC: 118 MG/DL (ref 70–110)
GLUCOSE SERPL-MCNC: 119 MG/DL (ref 70–110)
GLUCOSE UR QL STRIP: NEGATIVE
HCT VFR BLD AUTO: 43.1 % (ref 40–54)
HGB BLD-MCNC: 13.1 G/DL (ref 14–18)
HGB UR QL STRIP: ABNORMAL
HYALINE CASTS #/AREA URNS LPF: 11 /LPF
IMM GRANULOCYTES # BLD AUTO: 0.05 K/UL (ref 0–0.04)
IMM GRANULOCYTES NFR BLD AUTO: 0.4 % (ref 0–0.5)
KETONES UR QL STRIP: NEGATIVE
LACTATE SERPL-SCNC: 1.9 MMOL/L (ref 0.5–1.9)
LEUKOCYTE ESTERASE UR QL STRIP: ABNORMAL
LYMPHOCYTES # BLD AUTO: 1.4 K/UL (ref 1–4.8)
LYMPHOCYTES NFR BLD: 10.4 % (ref 18–48)
MCH RBC QN AUTO: 29.4 PG (ref 27–31)
MCHC RBC AUTO-ENTMCNC: 30.4 G/DL (ref 32–36)
MCV RBC AUTO: 97 FL (ref 82–98)
MICROSCOPIC COMMENT: ABNORMAL
MONOCYTES # BLD AUTO: 1.2 K/UL (ref 0.3–1)
MONOCYTES NFR BLD: 8.6 % (ref 4–15)
NEUTROPHILS # BLD AUTO: 11.1 K/UL (ref 1.8–7.7)
NEUTROPHILS NFR BLD: 80.3 % (ref 38–73)
NITRITE UR QL STRIP: NEGATIVE
NRBC BLD-RTO: 0 /100 WBC
PH UR STRIP: 6 [PH] (ref 5–8)
PLATELET # BLD AUTO: 275 K/UL (ref 150–450)
PMV BLD AUTO: 9.8 FL (ref 9.2–12.9)
POTASSIUM SERPL-SCNC: 4.4 MMOL/L (ref 3.5–5.1)
POTASSIUM SERPL-SCNC: 4.5 MMOL/L (ref 3.5–5.1)
PROT SERPL-MCNC: 6.3 G/DL (ref 6–8.4)
PROT UR QL STRIP: ABNORMAL
RBC # BLD AUTO: 4.46 M/UL (ref 4.6–6.2)
RBC #/AREA URNS HPF: 14 /HPF (ref 0–4)
SODIUM SERPL-SCNC: 138 MMOL/L (ref 136–145)
SODIUM SERPL-SCNC: 139 MMOL/L (ref 136–145)
SP GR UR STRIP: 1.01 (ref 1–1.03)
SQUAMOUS #/AREA URNS HPF: 0 /HPF
TROPONIN I SERPL HS-MCNC: 31.5 PG/ML (ref 0–14.9)
URN SPEC COLLECT METH UR: ABNORMAL
UROBILINOGEN UR STRIP-ACNC: NEGATIVE EU/DL
WBC # BLD AUTO: 13.79 K/UL (ref 3.9–12.7)
WBC #/AREA URNS HPF: >100 /HPF (ref 0–5)

## 2023-01-22 PROCEDURE — 84484 ASSAY OF TROPONIN QUANT: CPT | Performed by: EMERGENCY MEDICINE

## 2023-01-22 PROCEDURE — 93005 ELECTROCARDIOGRAM TRACING: CPT | Performed by: SPECIALIST

## 2023-01-22 PROCEDURE — 93010 EKG 12-LEAD: ICD-10-PCS | Mod: ,,, | Performed by: SPECIALIST

## 2023-01-22 PROCEDURE — 81001 URINALYSIS AUTO W/SCOPE: CPT | Performed by: EMERGENCY MEDICINE

## 2023-01-22 PROCEDURE — 36415 COLL VENOUS BLD VENIPUNCTURE: CPT | Performed by: EMERGENCY MEDICINE

## 2023-01-22 PROCEDURE — 83880 ASSAY OF NATRIURETIC PEPTIDE: CPT | Performed by: EMERGENCY MEDICINE

## 2023-01-22 PROCEDURE — 51702 INSERT TEMP BLADDER CATH: CPT

## 2023-01-22 PROCEDURE — 63600175 PHARM REV CODE 636 W HCPCS: Mod: TB,JG | Performed by: EMERGENCY MEDICINE

## 2023-01-22 PROCEDURE — 87077 CULTURE AEROBIC IDENTIFY: CPT | Mod: 59 | Performed by: EMERGENCY MEDICINE

## 2023-01-22 PROCEDURE — 96361 HYDRATE IV INFUSION ADD-ON: CPT

## 2023-01-22 PROCEDURE — 87086 URINE CULTURE/COLONY COUNT: CPT | Performed by: EMERGENCY MEDICINE

## 2023-01-22 PROCEDURE — 25000003 PHARM REV CODE 250: Performed by: EMERGENCY MEDICINE

## 2023-01-22 PROCEDURE — 96367 TX/PROPH/DG ADDL SEQ IV INF: CPT

## 2023-01-22 PROCEDURE — 83605 ASSAY OF LACTIC ACID: CPT | Performed by: EMERGENCY MEDICINE

## 2023-01-22 PROCEDURE — 99285 EMERGENCY DEPT VISIT HI MDM: CPT | Mod: 25

## 2023-01-22 PROCEDURE — 80053 COMPREHEN METABOLIC PANEL: CPT | Performed by: EMERGENCY MEDICINE

## 2023-01-22 PROCEDURE — 85025 COMPLETE CBC W/AUTO DIFF WBC: CPT | Performed by: EMERGENCY MEDICINE

## 2023-01-22 PROCEDURE — 25000003 PHARM REV CODE 250

## 2023-01-22 PROCEDURE — 87154 CUL TYP ID BLD PTHGN 6+ TRGT: CPT | Performed by: EMERGENCY MEDICINE

## 2023-01-22 PROCEDURE — 93010 ELECTROCARDIOGRAM REPORT: CPT | Mod: ,,, | Performed by: SPECIALIST

## 2023-01-22 PROCEDURE — 80048 BASIC METABOLIC PNL TOTAL CA: CPT | Performed by: EMERGENCY MEDICINE

## 2023-01-22 PROCEDURE — 96365 THER/PROPH/DIAG IV INF INIT: CPT

## 2023-01-22 PROCEDURE — 87040 BLOOD CULTURE FOR BACTERIA: CPT | Mod: 59 | Performed by: EMERGENCY MEDICINE

## 2023-01-22 PROCEDURE — 87186 SC STD MICRODIL/AGAR DIL: CPT | Mod: 59 | Performed by: EMERGENCY MEDICINE

## 2023-01-22 RX ORDER — SODIUM CHLORIDE 9 MG/ML
1000 INJECTION, SOLUTION INTRAVENOUS
Status: COMPLETED | OUTPATIENT
Start: 2023-01-22 | End: 2023-01-22

## 2023-01-22 RX ORDER — NOREPINEPHRINE BITARTRATE/D5W 4MG/250ML
PLASTIC BAG, INJECTION (ML) INTRAVENOUS
Status: COMPLETED
Start: 2023-01-22 | End: 2023-01-22

## 2023-01-22 RX ORDER — NOREPINEPHRINE BITARTRATE/D5W 4MG/250ML
0-3 PLASTIC BAG, INJECTION (ML) INTRAVENOUS CONTINUOUS
Status: DISCONTINUED | OUTPATIENT
Start: 2023-01-22 | End: 2023-01-23

## 2023-01-22 RX ORDER — CEFEPIME HYDROCHLORIDE 1 G/50ML
1 INJECTION, SOLUTION INTRAVENOUS
Status: COMPLETED | OUTPATIENT
Start: 2023-01-22 | End: 2023-01-22

## 2023-01-22 RX ORDER — VANCOMYCIN HCL IN 5 % DEXTROSE 1G/250ML
2000 PLASTIC BAG, INJECTION (ML) INTRAVENOUS ONCE
Status: COMPLETED | OUTPATIENT
Start: 2023-01-22 | End: 2023-01-22

## 2023-01-22 RX ADMIN — SODIUM CHLORIDE 1000 ML: 0.9 INJECTION, SOLUTION INTRAVENOUS at 08:01

## 2023-01-22 RX ADMIN — CEFEPIME HYDROCHLORIDE 1 G: 1 INJECTION, SOLUTION INTRAVENOUS at 09:01

## 2023-01-22 RX ADMIN — NOREPINEPHRINE BITARTRATE 0.05 MCG/KG/MIN: 4 INJECTION, SOLUTION INTRAVENOUS at 09:01

## 2023-01-22 RX ADMIN — VANCOMYCIN HYDROCHLORIDE 2000 MG: 1 INJECTION, POWDER, LYOPHILIZED, FOR SOLUTION INTRAVENOUS at 09:01

## 2023-01-22 NOTE — Clinical Note
Diagnosis: Metabolic encephalopathy [348.31.ICD-9-CM]   Admitting Provider:: BEAU MUNROE [[id number]]   Future Attending Provider: BEAU MUNROE [[id number]]   Reason for IP Medical Treatment  (Clinical interventions that can only be accomplished in the IP setting? ) :: iv therapy   Estimated Length of Stay:: 3-4 midnights   I certify that Inpatient services for greater than or equal to 2 midnights are medically necessary:: Yes   Plans for Post-Acute care--if anticipated (pick the single best option):: D. Skilled Nursing Placement   Special Needs:: No Special Needs [1]

## 2023-01-23 ENCOUNTER — EXTERNAL HOME HEALTH (OUTPATIENT)
Dept: HOME HEALTH SERVICES | Facility: HOSPITAL | Age: 78
End: 2023-01-23
Payer: MEDICARE

## 2023-01-23 PROBLEM — R33.9 URINARY RETENTION: Status: ACTIVE | Noted: 2023-01-23

## 2023-01-23 PROBLEM — L89.301 PRESSURE INJURY OF BUTTOCK, STAGE 1: Status: ACTIVE | Noted: 2023-01-23

## 2023-01-23 PROBLEM — T83.511A URINARY TRACT INFECTION ASSOCIATED WITH INDWELLING URETHRAL CATHETER: Status: ACTIVE | Noted: 2023-01-23

## 2023-01-23 PROBLEM — N39.0 URINARY TRACT INFECTION ASSOCIATED WITH INDWELLING URETHRAL CATHETER: Status: ACTIVE | Noted: 2023-01-23

## 2023-01-23 PROBLEM — A41.9 SEPSIS: Status: ACTIVE | Noted: 2023-01-23

## 2023-01-23 LAB
ACINETOBACTER CALCOACETICUS/BAUMANNII COMPLEX: NOT DETECTED
ANION GAP SERPL CALC-SCNC: 9 MMOL/L (ref 8–16)
BACTEROIDES FRAGILIS: NOT DETECTED
BASOPHILS # BLD AUTO: 0.03 K/UL (ref 0–0.2)
BASOPHILS NFR BLD: 0.2 % (ref 0–1.9)
BUN SERPL-MCNC: 25 MG/DL (ref 8–23)
CALCIUM SERPL-MCNC: 8.3 MG/DL (ref 8.7–10.5)
CANDIDA ALBICANS: NOT DETECTED
CANDIDA AURIS: NOT DETECTED
CANDIDA GLABRATA: NOT DETECTED
CANDIDA KRUSEI: NOT DETECTED
CANDIDA PARAPSILOSIS: NOT DETECTED
CANDIDA TROPICALIS: NOT DETECTED
CHLORIDE SERPL-SCNC: 104 MMOL/L (ref 95–110)
CO2 SERPL-SCNC: 25 MMOL/L (ref 23–29)
CREAT SERPL-MCNC: 0.8 MG/DL (ref 0.5–1.4)
CRYPTOCOCCUS NEOFORMANS/GATTII: NOT DETECTED
CTX-M GENE: NOT DETECTED
DIFFERENTIAL METHOD: ABNORMAL
ENTEROBACTER CLOACAE COMPLEX: NOT DETECTED
ENTEROBACTERALES: NOT DETECTED
ENTEROCOCCUS FAECALIS: NOT DETECTED
ENTEROCOCCUS FAECIUM: NOT DETECTED
EOSINOPHIL # BLD AUTO: 0 K/UL (ref 0–0.5)
EOSINOPHIL NFR BLD: 0.2 % (ref 0–8)
ERYTHROCYTE [DISTWIDTH] IN BLOOD BY AUTOMATED COUNT: 14.7 % (ref 11.5–14.5)
ESCHERICHIA COLI: NOT DETECTED
EST. GFR  (NO RACE VARIABLE): >60 ML/MIN/1.73 M^2
GLUCOSE SERPL-MCNC: 148 MG/DL (ref 70–110)
GLUCOSE SERPL-MCNC: 167 MG/DL (ref 70–110)
GLUCOSE SERPL-MCNC: 88 MG/DL (ref 70–110)
HAEMOPHILUS INFLUENZAE: NOT DETECTED
HCT VFR BLD AUTO: 40.2 % (ref 40–54)
HGB BLD-MCNC: 12.4 G/DL (ref 14–18)
IMM GRANULOCYTES # BLD AUTO: 0.1 K/UL (ref 0–0.04)
IMM GRANULOCYTES NFR BLD AUTO: 0.6 % (ref 0–0.5)
IMP GENE: NOT DETECTED
KLEBSIELLA AEROGENES: NOT DETECTED
KLEBSIELLA OXYTOCA: NOT DETECTED
KLEBSIELLA PNEUMONIAE GROUP: NOT DETECTED
KPC: NOT DETECTED
LISTERIA MONOCYTOGENES: NOT DETECTED
LYMPHOCYTES # BLD AUTO: 1.6 K/UL (ref 1–4.8)
LYMPHOCYTES NFR BLD: 10 % (ref 18–48)
MAGNESIUM SERPL-MCNC: 1.8 MG/DL (ref 1.6–2.6)
MCH RBC QN AUTO: 29.9 PG (ref 27–31)
MCHC RBC AUTO-ENTMCNC: 30.8 G/DL (ref 32–36)
MCR-1: ABNORMAL
MCV RBC AUTO: 97 FL (ref 82–98)
MEC A/C AND MREJ (MRSA): ABNORMAL
MEC A/C: ABNORMAL
MONOCYTES # BLD AUTO: 1.4 K/UL (ref 0.3–1)
MONOCYTES NFR BLD: 8.5 % (ref 4–15)
NDM: NOT DETECTED
NEISSERIA MENINGITIDIS: NOT DETECTED
NEUTROPHILS # BLD AUTO: 13.1 K/UL (ref 1.8–7.7)
NEUTROPHILS NFR BLD: 80.5 % (ref 38–73)
NRBC BLD-RTO: 0 /100 WBC
OXA-48-LIKE: ABNORMAL
PLATELET # BLD AUTO: 251 K/UL (ref 150–450)
PMV BLD AUTO: 10.3 FL (ref 9.2–12.9)
POTASSIUM SERPL-SCNC: 4.8 MMOL/L (ref 3.5–5.1)
PREALB SERPL-MCNC: 4 MG/DL (ref 20–43)
PROTEUS SPECIES: NOT DETECTED
PSEUDOMONAS AERUGINOSA: DETECTED
RBC # BLD AUTO: 4.15 M/UL (ref 4.6–6.2)
SALMONELLA SP: NOT DETECTED
SERRATIA MARCESCENS: NOT DETECTED
SODIUM SERPL-SCNC: 138 MMOL/L (ref 136–145)
STAPHYLOCOCCUS AUREUS: NOT DETECTED
STAPHYLOCOCCUS EPIDERMIDIS: NOT DETECTED
STAPHYLOCOCCUS LUGDUNESIS: NOT DETECTED
STAPHYLOCOCCUS SPECIES: NOT DETECTED
STENOTROPHOMONAS MALTOPHILIA: NOT DETECTED
STREPTOCOCCUS AGALACTIAE: NOT DETECTED
STREPTOCOCCUS PNEUMONIAE: NOT DETECTED
STREPTOCOCCUS PYOGENES: NOT DETECTED
STREPTOCOCCUS SPECIES: NOT DETECTED
TROPONIN I SERPL HS-MCNC: 24.5 PG/ML (ref 0–14.9)
TROPONIN I SERPL HS-MCNC: 27.2 PG/ML (ref 0–14.9)
VAN A/B: ABNORMAL
VIM: NOT DETECTED
WBC # BLD AUTO: 16.26 K/UL (ref 3.9–12.7)

## 2023-01-23 PROCEDURE — 97165 OT EVAL LOW COMPLEX 30 MIN: CPT

## 2023-01-23 PROCEDURE — 25000003 PHARM REV CODE 250: Performed by: INTERNAL MEDICINE

## 2023-01-23 PROCEDURE — 84484 ASSAY OF TROPONIN QUANT: CPT | Mod: 91 | Performed by: INTERNAL MEDICINE

## 2023-01-23 PROCEDURE — 85025 COMPLETE CBC W/AUTO DIFF WBC: CPT | Performed by: INTERNAL MEDICINE

## 2023-01-23 PROCEDURE — 63600175 PHARM REV CODE 636 W HCPCS: Performed by: INTERNAL MEDICINE

## 2023-01-23 PROCEDURE — 83735 ASSAY OF MAGNESIUM: CPT | Performed by: INTERNAL MEDICINE

## 2023-01-23 PROCEDURE — 97162 PT EVAL MOD COMPLEX 30 MIN: CPT

## 2023-01-23 PROCEDURE — 80048 BASIC METABOLIC PNL TOTAL CA: CPT | Performed by: INTERNAL MEDICINE

## 2023-01-23 PROCEDURE — 63600175 PHARM REV CODE 636 W HCPCS: Performed by: EMERGENCY MEDICINE

## 2023-01-23 PROCEDURE — 25000242 PHARM REV CODE 250 ALT 637 W/ HCPCS: Performed by: INTERNAL MEDICINE

## 2023-01-23 PROCEDURE — 96375 TX/PRO/DX INJ NEW DRUG ADDON: CPT

## 2023-01-23 PROCEDURE — 97530 THERAPEUTIC ACTIVITIES: CPT

## 2023-01-23 PROCEDURE — 84134 ASSAY OF PREALBUMIN: CPT | Performed by: INTERNAL MEDICINE

## 2023-01-23 PROCEDURE — 21400001 HC TELEMETRY ROOM

## 2023-01-23 PROCEDURE — 82962 GLUCOSE BLOOD TEST: CPT

## 2023-01-23 PROCEDURE — 96361 HYDRATE IV INFUSION ADD-ON: CPT

## 2023-01-23 PROCEDURE — 96366 THER/PROPH/DIAG IV INF ADDON: CPT

## 2023-01-23 RX ORDER — HYDROCODONE BITARTRATE AND ACETAMINOPHEN 10; 325 MG/1; MG/1
1 TABLET ORAL EVERY 4 HOURS PRN
Status: DISCONTINUED | OUTPATIENT
Start: 2023-01-23 | End: 2023-01-23

## 2023-01-23 RX ORDER — TALC
6 POWDER (GRAM) TOPICAL NIGHTLY PRN
Status: DISCONTINUED | OUTPATIENT
Start: 2023-01-23 | End: 2023-01-23

## 2023-01-23 RX ORDER — CHOLECALCIFEROL (VITAMIN D3) 50 MCG
2000 TABLET ORAL DAILY
Status: DISCONTINUED | OUTPATIENT
Start: 2023-01-23 | End: 2023-01-23

## 2023-01-23 RX ORDER — GLUCAGON 1 MG
1 KIT INJECTION
Status: DISCONTINUED | OUTPATIENT
Start: 2023-01-23 | End: 2023-01-26 | Stop reason: HOSPADM

## 2023-01-23 RX ORDER — LANOLIN ALCOHOL/MO/W.PET/CERES
800 CREAM (GRAM) TOPICAL
Status: DISCONTINUED | OUTPATIENT
Start: 2023-01-23 | End: 2023-01-26 | Stop reason: HOSPADM

## 2023-01-23 RX ORDER — TAMSULOSIN HYDROCHLORIDE 0.4 MG/1
0.8 CAPSULE ORAL DAILY
Status: DISCONTINUED | OUTPATIENT
Start: 2023-01-23 | End: 2023-01-23

## 2023-01-23 RX ORDER — AMIODARONE HYDROCHLORIDE 100 MG/1
100 TABLET ORAL EVERY MORNING
Status: DISCONTINUED | OUTPATIENT
Start: 2023-01-23 | End: 2023-01-23

## 2023-01-23 RX ORDER — DULOXETIN HYDROCHLORIDE 30 MG/1
30 CAPSULE, DELAYED RELEASE ORAL 2 TIMES DAILY
Status: DISCONTINUED | OUTPATIENT
Start: 2023-01-23 | End: 2023-01-23

## 2023-01-23 RX ORDER — AMIODARONE HYDROCHLORIDE 200 MG/1
200 TABLET ORAL DAILY
Status: DISCONTINUED | OUTPATIENT
Start: 2023-01-23 | End: 2023-01-23

## 2023-01-23 RX ORDER — IBUPROFEN 200 MG
16 TABLET ORAL
Status: DISCONTINUED | OUTPATIENT
Start: 2023-01-23 | End: 2023-01-26 | Stop reason: HOSPADM

## 2023-01-23 RX ORDER — LANOLIN ALCOHOL/MO/W.PET/CERES
2000 CREAM (GRAM) TOPICAL DAILY
Status: DISCONTINUED | OUTPATIENT
Start: 2023-01-23 | End: 2023-01-23

## 2023-01-23 RX ORDER — INSULIN ASPART 100 [IU]/ML
0-5 INJECTION, SOLUTION INTRAVENOUS; SUBCUTANEOUS
Status: DISCONTINUED | OUTPATIENT
Start: 2023-01-23 | End: 2023-01-26 | Stop reason: HOSPADM

## 2023-01-23 RX ORDER — METOPROLOL TARTRATE 25 MG/1
25 TABLET, FILM COATED ORAL 2 TIMES DAILY
Status: DISCONTINUED | OUTPATIENT
Start: 2023-01-23 | End: 2023-01-23

## 2023-01-23 RX ORDER — DOCUSATE SODIUM 100 MG/1
100 CAPSULE, LIQUID FILLED ORAL 2 TIMES DAILY PRN
COMMUNITY

## 2023-01-23 RX ORDER — FENTANYL CITRATE 50 UG/ML
INJECTION, SOLUTION INTRAMUSCULAR; INTRAVENOUS CODE/TRAUMA/SEDATION MEDICATION
Status: DISCONTINUED | OUTPATIENT
Start: 2023-01-23 | End: 2023-01-23

## 2023-01-23 RX ORDER — TRAZODONE HYDROCHLORIDE 50 MG/1
50 TABLET ORAL NIGHTLY
Status: DISCONTINUED | OUTPATIENT
Start: 2023-01-23 | End: 2023-01-23

## 2023-01-23 RX ORDER — OXYBUTYNIN CHLORIDE 5 MG/1
10 TABLET, EXTENDED RELEASE ORAL DAILY
Status: DISCONTINUED | OUTPATIENT
Start: 2023-01-23 | End: 2023-01-26 | Stop reason: HOSPADM

## 2023-01-23 RX ORDER — IBUPROFEN 200 MG
24 TABLET ORAL
Status: DISCONTINUED | OUTPATIENT
Start: 2023-01-23 | End: 2023-01-26 | Stop reason: HOSPADM

## 2023-01-23 RX ORDER — ONDANSETRON 2 MG/ML
4 INJECTION INTRAMUSCULAR; INTRAVENOUS EVERY 8 HOURS PRN
Status: DISCONTINUED | OUTPATIENT
Start: 2023-01-23 | End: 2023-01-26 | Stop reason: HOSPADM

## 2023-01-23 RX ORDER — GLUC/MSM/COLGN2/HYAL/ANTIARTH3 375-375-20
240 TABLET ORAL DAILY
COMMUNITY
End: 2023-03-16

## 2023-01-23 RX ORDER — CEFEPIME HYDROCHLORIDE 1 G/50ML
1 INJECTION, SOLUTION INTRAVENOUS
Status: DISCONTINUED | OUTPATIENT
Start: 2023-01-23 | End: 2023-01-23

## 2023-01-23 RX ORDER — ATORVASTATIN CALCIUM 40 MG/1
40 TABLET, FILM COATED ORAL DAILY
Status: DISCONTINUED | OUTPATIENT
Start: 2023-01-23 | End: 2023-01-26 | Stop reason: HOSPADM

## 2023-01-23 RX ORDER — ACETAMINOPHEN 325 MG/1
650 TABLET ORAL EVERY 8 HOURS PRN
Status: DISCONTINUED | OUTPATIENT
Start: 2023-01-23 | End: 2023-01-26 | Stop reason: HOSPADM

## 2023-01-23 RX ORDER — AMIODARONE HYDROCHLORIDE 200 MG/1
200 TABLET ORAL 2 TIMES DAILY
Status: DISCONTINUED | OUTPATIENT
Start: 2023-01-23 | End: 2023-01-26 | Stop reason: HOSPADM

## 2023-01-23 RX ORDER — MORPHINE SULFATE 4 MG/ML
4 INJECTION, SOLUTION INTRAMUSCULAR; INTRAVENOUS EVERY 4 HOURS PRN
Status: DISCONTINUED | OUTPATIENT
Start: 2023-01-23 | End: 2023-01-23

## 2023-01-23 RX ORDER — SODIUM,POTASSIUM PHOSPHATES 280-250MG
2 POWDER IN PACKET (EA) ORAL
Status: DISCONTINUED | OUTPATIENT
Start: 2023-01-23 | End: 2023-01-26 | Stop reason: HOSPADM

## 2023-01-23 RX ORDER — SODIUM CHLORIDE, SODIUM LACTATE, POTASSIUM CHLORIDE, CALCIUM CHLORIDE 600; 310; 30; 20 MG/100ML; MG/100ML; MG/100ML; MG/100ML
INJECTION, SOLUTION INTRAVENOUS CONTINUOUS
Status: DISCONTINUED | OUTPATIENT
Start: 2023-01-23 | End: 2023-01-23

## 2023-01-23 RX ORDER — TOPIRAMATE 25 MG/1
50 TABLET ORAL DAILY
Status: DISCONTINUED | OUTPATIENT
Start: 2023-01-23 | End: 2023-01-26 | Stop reason: HOSPADM

## 2023-01-23 RX ORDER — SODIUM CHLORIDE 9 MG/ML
INJECTION, SOLUTION INTRAVENOUS CONTINUOUS
Status: DISCONTINUED | OUTPATIENT
Start: 2023-01-23 | End: 2023-01-24

## 2023-01-23 RX ADMIN — APIXABAN 5 MG: 5 TABLET, FILM COATED ORAL at 10:01

## 2023-01-23 RX ADMIN — ATORVASTATIN CALCIUM 40 MG: 40 TABLET, FILM COATED ORAL at 10:01

## 2023-01-23 RX ADMIN — TOPIRAMATE 50 MG: 25 TABLET, FILM COATED ORAL at 10:01

## 2023-01-23 RX ADMIN — HYDROCODONE BITARTRATE AND ACETAMINOPHEN 1 TABLET: 10; 325 TABLET ORAL at 01:01

## 2023-01-23 RX ADMIN — NOREPINEPHRINE BITARTRATE 0.1 MCG/KG/MIN: 4 INJECTION, SOLUTION INTRAVENOUS at 03:01

## 2023-01-23 RX ADMIN — VANCOMYCIN HYDROCHLORIDE 1750 MG: 500 INJECTION, POWDER, LYOPHILIZED, FOR SOLUTION INTRAVENOUS at 10:01

## 2023-01-23 RX ADMIN — PSYLLIUM HUSK 1 PACKET: 3.4 POWDER ORAL at 10:01

## 2023-01-23 RX ADMIN — TRAZODONE HYDROCHLORIDE 50 MG: 50 TABLET ORAL at 01:01

## 2023-01-23 RX ADMIN — OXYBUTYNIN CHLORIDE 10 MG: 5 TABLET, EXTENDED RELEASE ORAL at 10:01

## 2023-01-23 RX ADMIN — SODIUM CHLORIDE: 0.9 INJECTION, SOLUTION INTRAVENOUS at 10:01

## 2023-01-23 RX ADMIN — FENTANYL CITRATE 50 MCG: 0.05 INJECTION, SOLUTION INTRAMUSCULAR; INTRAVENOUS at 12:01

## 2023-01-23 RX ADMIN — HYDROCODONE BITARTRATE AND ACETAMINOPHEN 1 TABLET: 10; 325 TABLET ORAL at 06:01

## 2023-01-23 RX ADMIN — PIPERACILLIN SODIUM AND TAZOBACTAM SODIUM 4.5 G: 4; .5 INJECTION, POWDER, LYOPHILIZED, FOR SOLUTION INTRAVENOUS at 07:01

## 2023-01-23 RX ADMIN — APIXABAN 5 MG: 5 TABLET, FILM COATED ORAL at 01:01

## 2023-01-23 RX ADMIN — AMIODARONE HYDROCHLORIDE 200 MG: 200 TABLET ORAL at 05:01

## 2023-01-23 RX ADMIN — AMIODARONE HYDROCHLORIDE 100 MG: 100 TABLET ORAL at 07:01

## 2023-01-23 RX ADMIN — TAMSULOSIN HYDROCHLORIDE 0.8 MG: 0.4 CAPSULE ORAL at 10:01

## 2023-01-23 RX ADMIN — PSYLLIUM HUSK 1 PACKET: 3.4 POWDER ORAL at 01:01

## 2023-01-23 RX ADMIN — ACETAMINOPHEN 650 MG: 325 TABLET ORAL at 11:01

## 2023-01-23 RX ADMIN — CEFEPIME HYDROCHLORIDE 1 G: 1 INJECTION, SOLUTION INTRAVENOUS at 03:01

## 2023-01-23 RX ADMIN — CEFEPIME HYDROCHLORIDE 1 G: 1 INJECTION, SOLUTION INTRAVENOUS at 05:01

## 2023-01-23 RX ADMIN — SODIUM CHLORIDE, SODIUM LACTATE, POTASSIUM CHLORIDE, AND CALCIUM CHLORIDE: .6; .31; .03; .02 INJECTION, SOLUTION INTRAVENOUS at 01:01

## 2023-01-23 RX ADMIN — NOREPINEPHRINE BITARTRATE 0.2 MCG/KG/MIN: 4 INJECTION, SOLUTION INTRAVENOUS at 10:01

## 2023-01-23 NOTE — ASSESSMENT & PLAN NOTE
Inpatient admission for Metabolic Encephalopathy; Acute Cystitis; Ischemic EKG; Replaced Sotelo; Cardiology consultation--repeat troponin and EKG in AM; continue gentle hydration with low dose norepinephrine to maintain BP; as patient has been in institutions will cover broadly until species and sensitivities are known; wound care for decubitus; continuing home regimen for chronic maladies including beta blockade for IHD (now that BP has improved with volume/pressor)--holding po hyperglycemics with low dose sliding scale; Urology consultation--continuing high dose tamsulosin; TSH with AM labs for review

## 2023-01-23 NOTE — PLAN OF CARE
Problem: Physical Therapy  Goal: Physical Therapy Goal  Description: All physical therapy goals to be met by discharge:    1. Supine to sit with Stand-by Assistance  2. Sit to stand transfer with Stand-by Assistance  3.. Bed to chair transfer with Supervision using Rolling Walker  4. Gait  x 50 feet with Minimal Assistance using Rolling Walker.    Outcome: Ongoing, Progressing

## 2023-01-23 NOTE — PT/OT/SLP EVAL
Occupational Therapy   Evaluation    Name: Hiro Rodríguez  MRN: [id number]  Admitting Diagnosis: Encephalopathy, metabolic  Recent Surgery: * No surgery found *      Recommendations:     Discharge Recommendations: nursing facility, skilled  Discharge Equipment Recommendations:   (TBD)  Barriers to discharge:  Decreased caregiver support    Assessment:     Hiro Rodríguez is a 77 y.o. male with a medical diagnosis of Encephalopathy, metabolic.  He presents with general weakness. Performance deficits affecting function: weakness, impaired endurance, impaired self care skills, impaired functional mobility, gait instability, impaired balance, impaired sensation, decreased lower extremity function, decreased safety awareness, impaired cardiopulmonary response to activity.      Rehab Prognosis: Fair; patient would benefit from acute skilled OT services to address these deficits and reach maximum level of function.       Plan:     Patient to be seen 5 x/week to address the above listed problems via self-care/home management, therapeutic activities, therapeutic exercises  Plan of Care Expires: 02/23/23  Plan of Care Reviewed with: patient, spouse    Subjective     Chief Complaint: general weakness  Patient/Family Comments/goals: improved cognition, functional mobility and ADL independence.    Occupational Profile:  Living Environment: lives with spouse in a 1 story home, no steps to enter.  Previous level of function: required assistance from spouse for bathing and dressing.  Roles and Routines: limited homemaker  Equipment Used at Home: walker, rolling, bedside commode, wheelchair, grab bar  Assistance upon Discharge: Spouse    Pain/Comfort:  Pain Rating 1: 7/10  Location 1:  (Groin, lower spine and BLE)  Pain Addressed 1: Reposition, Distraction  Pain Rating Post-Intervention 1: 7/10    Patients cultural, spiritual, Yarsanism conflicts given the current situation: no    Objective:     Communicated with: nurse prior  to session.  Patient found HOB elevated with telemetry, peripheral IV, messina catheter upon OT entry to room.    General Precautions: Standard, fall  Orthopedic Precautions: LLE weight bearing as tolerated  Braces: N/A  Respiratory Status: Room air    Occupational Performance:    Bed Mobility:    Patient completed Scooting/Bridging with maximal assistance  Patient completed Supine to Sit with maximal assistance  Patient completed Sit to Supine with maximal assistance  Performed unsupported sitting EOB with contact guard assistance.    Activities of Daily Living:  Grooming: contact guard assistance to wash face sitting EOB.    Cognitive/Visual Perceptual:  Cognitive/Psychosocial Skills:     -       Oriented to: Person, Place, Time, and Situation   -       Follows Commands/attention:Follows multistep  commands  -       Communication: clear/fluent and hard of hearing  -       Memory: No Deficits noted  -       Safety awareness/insight to disability: intact   -       Mood/Affect/Coping skills/emotional control: Cooperative and Pleasant  Visual/Perceptual:      -Intact Acuity    Physical Exam:  Balance:    -       Sitting: Contact Guard  Upper Extremity Range of Motion:     -       Right Upper Extremity: WFL  -       Left Upper Extremity: WFL  Upper Extremity Strength:    -       Right Upper Extremity: 3+/5  -       Left Upper Extremity: 3+/5   Strength:    -       Right Upper Extremity: WFL  -       Left Upper Extremity: WFL  Fine Motor Coordination:    -       Intact    AMPAC 6 Click ADL:  AMPAC Total Score: 16    Treatment & Education:  Patient educated on the purpose of Occupational Therapy and the importance of getting OOB.    Patient left HOB elevated with all lines intact, call button in reach, bed alarm on, and spouse present    GOALS:   Multidisciplinary Problems       Occupational Therapy Goals          Problem: Occupational Therapy    Goal Priority Disciplines Outcome Interventions   Occupational Therapy  Goal     OT, PT/OT     Description: Goals to be met by: 2/23/2023     Patient will increase functional independence with ADLs by performing:    UE Dressing with Supervision.  LE Dressing with Supervision.  Grooming while standing at sink with Supervision.  Toileting from toilet with Supervision for hygiene and clothing management.   Toilet transfer to toilet with Supervision.                         History:     Past Medical History:   Diagnosis Date    CHF (congestive heart failure)     Hypertension          Past Surgical History:   Procedure Laterality Date    FRACTURE SURGERY      INTRAMEDULLARY RODDING OF TROCHANTER OF FEMUR Left 11/10/2022    Procedure: INSERTION, ITRAMEDULLARY SANTI, FEMUR, TROCHANTER;  Surgeon: Richard Phoenix MD;  Location: [facility];  Service: Orthopedics;  Laterality: Left;       Time Tracking:     OT Date of Treatment: 01/23/23  OT Start Time: 1106  OT Stop Time: 1122  OT Total Time (min): 16 min    Billable Minutes:Evaluation 4  Therapeutic Activity 12    1/23/2023

## 2023-01-23 NOTE — ED NOTES
Pt resting. Explained wait for room assignment. No complaint at this time. Pt states feels better. Family present.

## 2023-01-23 NOTE — PROGRESS NOTES
VANCOMYCIN PHARMACOKINETIC NOTE:  Vancomycin Day # 1    Objective/Assessment:    Diagnosis/Indication for Vancomycin:Urinary Tract Infection      77 y.o., male; Actual Body Weight = 114.3 kg (252 lb).    The patient has the following labs:  1/23/2023 Estimated Creatinine Clearance: 97.9 mL/min (based on SCr of 0.8 mg/dL). Lab Results   Component Value Date    BUN 25 (H) 01/23/2023     Lab Results   Component Value Date    WBC 16.26 (H) 01/23/2023          Plan:  Adjust vancomycin dose and/or frequency based on the patient's actual weight and renal function:  Initiate Vancomycin 1750 mg IV every 12 hours.  Orders have been entered into patient's chart.        Vancomycin trough level has been ordered for 1/24 @ 0900, prior to 4th dose.     Pharmacy will manage vancomycin therapy, monitor serum vancomycin levels, monitor renal function and adjust regimen as necessary.    Thank you for allowing us to participate in this patient's care.     Eron Santos 1/23/2023   Department of Pharmacy  Ext [phone]

## 2023-01-23 NOTE — SUBJECTIVE & OBJECTIVE
Past Medical History:   Diagnosis Date    CHF (congestive heart failure)     Hypertension        Past Surgical History:   Procedure Laterality Date    FRACTURE SURGERY      INTRAMEDULLARY RODDING OF TROCHANTER OF FEMUR Left 11/10/2022    Procedure: INSERTION, ITRAMEDULLARY SANTI, FEMUR, TROCHANTER;  Surgeon: Richard Phoenix MD;  Location: [facility];  Service: Orthopedics;  Laterality: Left;       Review of patient's allergies indicates:  No Known Allergies    No current facility-administered medications on file prior to encounter.     Current Outpatient Medications on File Prior to Encounter   Medication Sig    acetaminophen (TYLENOL) 325 MG tablet Take 650 mg by mouth every 6 (six) hours as needed.    amiodarone (PACERONE) 100 MG Tab Take 100 mg by mouth every morning.    apixaban (ELIQUIS) 5 mg Tab Take 1 tablet (5 mg total) by mouth 2 (two) times daily.    calcium polycarbophil (FIBER-CAPS, CA POLYCARBOPHIL, ORAL) Take by mouth.    cholecalciferol, vitamin D3, (VITAMIN D3) 50 mcg (2,000 unit) Tab Take by mouth once daily.    cyanocobalamin, vitamin B-12, 1,000 mcg Subl Place 1,000 mcg under the tongue 2 (two) times a day.    cyclobenzaprine (FLEXERIL) 5 MG tablet Take 5 mg by mouth.    cyproheptadine (PERIACTIN) 4 mg tablet Take 1 tablet (4 mg total) by mouth 3 (three) times daily.    DULoxetine (CYMBALTA) 30 MG capsule Take 1 capsule (30 mg total) by mouth 2 (two) times daily.    ferrous sulfate 27 mg iron Tab Take by mouth.    HYDROcodone-acetaminophen (NORCO)  mg per tablet Take 1 tablet by mouth every 6 (six) hours as needed.    insulin aspart U-100 (NOVOLOG) 100 unit/mL (3 mL) InPn pen Inject 1-10 Units into the skin before meals and at bedtime as needed (Hyperglycemia).    magnesium 250 mg Tab Take 250 mg by mouth 2 (two) times a day.    metFORMIN (GLUCOPHAGE-XR) 500 MG ER 24hr tablet TAKE 1 TABLET BY MOUTH EVERY DAY    metoprolol tartrate (LOPRESSOR) 25 MG tablet Take 1 tablet (25 mg total) by mouth  2 (two) times daily.    omega-3 acid ethyl esters (LOVAZA) 1 gram capsule TAKE 2 CAPSULES BY MOUTH 2 TIMES DAILY.    phenazopyridine (PYRIDIUM) 100 MG tablet Take 1 tablet (100 mg total) by mouth 3 (three) times daily as needed for Pain.    polyethylene glycol (GLYCOLAX) 17 gram/dose powder Take 17 g by mouth daily as needed.    potassium chloride SA (KLOR-CON) 10 MEQ TbSR Take 2 tablets (20 mEq total) by mouth once daily.    rosuvastatin (CRESTOR) 20 MG tablet TAKE 1 TABLET BY MOUTH EVERY DAY    tamsulosin (FLOMAX) 0.4 mg Cap Take 2 capsules (0.8 mg total) by mouth once daily.    tolterodine (DETROL LA) 4 MG 24 hr capsule Take 1 capsule (4 mg total) by mouth once daily.    topiramate (TOPAMAX) 50 MG tablet TAKE 1 TABLET BY MOUTH EVERY DAY    traZODone (DESYREL) 50 MG tablet TAKE 1 TABLET BY MOUTH EVERY EVENING.     Family History    None       Tobacco Use    Smoking status: Never    Smokeless tobacco: Never   Substance and Sexual Activity    Alcohol use: Yes     Alcohol/week: 1.0 standard drink     Types: 1 Shots of liquor per week    Drug use: Not Currently    Sexual activity: Yes     Partners: Female     Review of Systems   Unable to perform ROS: Mental status change   Objective:     Vital Signs (Most Recent):  Temp: 97.9 °F (36.6 °C) (01/22/23 2147)  Pulse: 88 (01/22/23 2345)  Resp: 18 (01/22/23 2345)  BP: 118/68 (01/22/23 2345)  SpO2: 100 % (01/22/23 2345) Vital Signs (24h Range):  Temp:  [97.9 °F (36.6 °C)] 97.9 °F (36.6 °C)  Pulse:  [] 88  Resp:  [18-23] 18  SpO2:  [94 %-100 %] 100 %  BP: ()/(42-76) 118/68     Weight: 114.3 kg (252 lb)  Body mass index is 36.16 kg/m².    Physical Exam  Vitals and nursing note reviewed.   Constitutional:       Appearance: He is well-developed. He is obese.      Comments: Very [place]; Oriented to self only   HENT:      Head: Normocephalic and atraumatic.      Right Ear: External ear normal.      Left Ear: External ear normal.      Nose: Nose normal.   Eyes:       Conjunctiva/sclera: Conjunctivae normal.      Pupils: Pupils are equal, round, and reactive to light.   Cardiovascular:      Rate and Rhythm: Normal rate. Rhythm irregular.      Heart sounds: Normal heart sounds.   Pulmonary:      Effort: Pulmonary effort is normal.      Breath sounds: Normal breath sounds.      Comments: Decreased entry bases without adventitious sounds currently  Abdominal:      General: Bowel sounds are normal.      Palpations: Abdomen is soft.   Genitourinary:     Comments: Sotelo inserted  Musculoskeletal:         General: Normal range of motion.      Cervical back: Normal range of motion and neck supple.   Skin:     General: Skin is warm and dry.      Capillary Refill: Capillary refill takes less than 2 seconds.   Neurological:      Mental Status: He is alert and oriented to person, place, and time.      Comments: Oriented to self only  CN 2-12 intact by inspection  Power: moves all extremities spontaneously against gravity     Psychiatric:         Behavior: Behavior normal.         Thought Content: Thought content normal.         Judgment: Judgment normal.      Comments: Unable to assess         CRANIAL NERVES     CN III, IV, VI   Pupils are equal, round, and reactive to light.     Significant Labs: All pertinent labs within the past 24 hours have been reviewed.  CBC:   Recent Labs   Lab 01/22/23 2040   WBC 13.79*   HGB 13.1*   HCT 43.1        CMP:   Recent Labs   Lab 01/22/23 2040 01/22/23 2122    139   K 4.4 4.5    104   CO2 25 25   * 118*   BUN 26* 25*   CREATININE 1.0 1.0   CALCIUM 8.6* 8.8   PROT 6.3  --    ALBUMIN 2.5*  --    BILITOT 0.7  --    ALKPHOS 92  --    AST 12  --    ALT 9*  --    ANIONGAP 9 10     Cardiac Markers:   Recent Labs   Lab 01/22/23 2040   *     Troponin:   Recent Labs   Lab 01/22/23 2122   TROPONINIHS 31.5*       Significant Imaging: I have reviewed all pertinent imaging results/findings within the past 24 hours.

## 2023-01-23 NOTE — ASSESSMENT & PLAN NOTE
Inpatient admission for Metabolic Encephalopathy; Acute Cystitis; Ischemic EKG; Replaced Sotelo; Cardiology consultation--repeat troponin and EKG in AM; continue gentle hydration with low dose norepinephrine to maintain BP; as patient has been in institutions will cover broadly until species and sensitivities are known; wound care for decubitus; continuing home regimen for chronic maladies including beta blockade for IHD (now that BP has improved with volume/pressor)--holding po hyperglycemics with low dose sliding scale; Urology consultation--continuing high dose tamsulosin; TSH with AM labs for review; wound care evaluation

## 2023-01-23 NOTE — HPI
"77 year old male with history of PAF (apixaban), HTN, DM 2, Morbid Obesity, s/p right hip ORIF 11/2022 (recuperating at local SNF) presented to  ED via EMS after being found with altered mental status at the SNF. History from wife at bedside. Patient is very hard of hearing and is currently altered. Reportedly he has been receiving PT/OT at [facility] making slow progression back from above ORIF. This evening when his wife came to visit "He was just not himself". He was found to be hypotensive at the SNF and sent here. Upon presentation he was found to be hypotensive with BP 90's systolic, and tachycardic 110-120's, both of which normalized after bolus of 1 liter LR and eventually initiation of low dose norepinephrine.    He has had urinary retention and had had Sotelo catheter, which was removed 3 days ago at the [facility]. He has known BPH. When he arrived here, Sotelo was reinserted and over a liter drained immediately. The patient denies any pain currently. No CP/SOB. No orthopnea, PND or edema.     Has stage 1 decubitus buttocks (POA).    In ED Labs reviewed and noted below: mild leukocytosis with normal Hct; normal electrolytes and renal function with prerenal azotemia; hypoalbuminemia with normal hepatic function; BNP is moderately elevated with minimal elevation of HS trop (repeat ordered and pending); Lactate is normal. UA: dirty with 2+ protein and 3+ occult blood--reflexed to culture. Blood cultured. CXR reviewed: Cardiomegaly with prominent vascularture and small basilar effusions. EKG reviewed: rated controlled a fib with deep inferolateral ST segment sagging, but not acute ST segments.    CT Head: no acute intracranial pathology    CT Abdo/Pelvis:   IMPRESSION:  1: Bilateral nephrolithiasis. 7 mm upper left ureteral calculus causing mild hydronephrosis and hydroureter.  2: Small pleural fluid collections and basilar lung atelectasis.  3: Diverticulosis.  4: Inflammation around the bladder suggesting cystitis.  5: " Mild to moderate prostate gland enlargement.     Discussed with ED MD; Inpatient admission for Metabolic Encephalopathy; Acute Cystitis; Ischemic EKG; Replaced Sotelo; Cardiology consultation--repeat troponin and EKG in AM; continue gentle hydration with low dose norepinephrine to maintain BP; as patient has been in institutions will cover broadly until species and sensitivities are known; wound care for decubitus; continuing home regimen for chronic maladies including beta blockade for IHD (now that BP has improved with volume/pressor)--holding po hyperglycemics with low dose sliding scale; Urology consultation--continuing high dose tamsulosin; TSH with AM labs for review    Face to Face occurred on 01/22/23 with note finished on 01/23/23

## 2023-01-23 NOTE — H&P
"[facility] - Emergency Dept  Hospital Medicine  History & Physical    Patient Name: Hiro Rodríguez  MRN: [id number]  Patient Class: IP- Inpatient  Admission Date: 1/22/2023  Attending Physician: Shyam Belle MD  Primary Care Provider: Gautam Castanon III, MD         Patient information was obtained from patient, spouse/SO, past medical records, ER records and ED MD.     Subjective:     Principal Problem:Encephalopathy, metabolic    Chief Complaint:   Chief Complaint   Patient presents with    Hypotension    Altered Mental Status     CT delay due to unstable vital signs    Urinary Retention     Sotelo removed three days ago        HPI: 77 year old male with history of PAF (apixaban), HTN, DM 2, Morbid Obesity, s/p right hip ORIF 11/2022 (recuperating at local SNF) presented to  ED via EMS after being found with altered mental status at the SNF. History from wife at bedside. Patient is very hard of hearing and is currently altered. Reportedly he has been receiving PT/OT at SNF making slow progression back from above ORIF. This evening when his wife came to visit "He was just not himself". He was found to be hypotensive at the SNF and sent here. Upon presentation he was found to be hypotensive with BP 90's systolic, and tachycardic 110-120's, both of which normalized after bolus of 1 liter LR and eventually initiation of low dose norepinephrine.    He has had urinary retention and had had Sotelo catheter, which was removed 3 days ago at the SNF. He has known BPH. When he arrived here, Sotelo was reinserted and over a liter drained immediately. The patient denies any pain currently. No CP/SOB. No orthopnea, PND or edema.     Has stage 1 decubitus buttocks (POA).    In ED Labs reviewed and noted below: mild leukocytosis with normal Hct; normal electrolytes and renal function with prerenal azotemia; hypoalbuminemia with normal hepatic function; BNP is moderately elevated with minimal elevation of HS " trop (repeat ordered and pending); Lactate is normal. UA: dirty with 2+ protein and 3+ occult blood--reflexed to culture. Blood cultured. CXR reviewed: Cardiomegaly with prominent vascularture and small basilar effusions. EKG reviewed: rated controlled a fib with deep inferolateral ST segment sagging, but not acute ST segments.    CT Head: no acute intracranial pathology    CT Abdo/Pelvis:   IMPRESSION:  1: Bilateral nephrolithiasis. 7 mm upper left ureteral calculus causing mild hydronephrosis and hydroureter.  2: Small pleural fluid collections and basilar lung atelectasis.  3: Diverticulosis.  4: Inflammation around the bladder suggesting cystitis.  5: Mild to moderate prostate gland enlargement.     Discussed with ED MD; Inpatient admission for Metabolic Encephalopathy; Acute Cystitis; Ischemic EKG; Replaced Sotelo; Cardiology consultation--repeat troponin and EKG in AM; continue gentle hydration with low dose norepinephrine to maintain BP; as patient has been in institutions will cover broadly until species and sensitivities are known; wound care for decubitus; continuing home regimen for chronic maladies including beta blockade for IHD (now that BP has improved with volume/pressor)--holding po hyperglycemics with low dose sliding scale; Urology consultation--continuing high dose tamsulosin; TSH with AM labs for review    Face to Face occurred on 01/22/23 with note finished on 01/23/23      Past Medical History:   Diagnosis Date    CHF (congestive heart failure)     Hypertension        Past Surgical History:   Procedure Laterality Date    FRACTURE SURGERY      INTRAMEDULLARY RODDING OF TROCHANTER OF FEMUR Left 11/10/2022    Procedure: INSERTION, ITRAMEDULLARY SANTI, FEMUR, TROCHANTER;  Surgeon: Richard Phoenix MD;  Location: [facility];  Service: Orthopedics;  Laterality: Left;       Review of patient's allergies indicates:  No Known Allergies    No current facility-administered medications on file prior to  encounter.     Current Outpatient Medications on File Prior to Encounter   Medication Sig    acetaminophen (TYLENOL) 325 MG tablet Take 650 mg by mouth every 6 (six) hours as needed.    amiodarone (PACERONE) 100 MG Tab Take 100 mg by mouth every morning.    apixaban (ELIQUIS) 5 mg Tab Take 1 tablet (5 mg total) by mouth 2 (two) times daily.    calcium polycarbophil (FIBER-CAPS, CA POLYCARBOPHIL, ORAL) Take by mouth.    cholecalciferol, vitamin D3, (VITAMIN D3) 50 mcg (2,000 unit) Tab Take by mouth once daily.    cyanocobalamin, vitamin B-12, 1,000 mcg Subl Place 1,000 mcg under the tongue 2 (two) times a day.    cyclobenzaprine (FLEXERIL) 5 MG tablet Take 5 mg by mouth.    cyproheptadine (PERIACTIN) 4 mg tablet Take 1 tablet (4 mg total) by mouth 3 (three) times daily.    DULoxetine (CYMBALTA) 30 MG capsule Take 1 capsule (30 mg total) by mouth 2 (two) times daily.    ferrous sulfate 27 mg iron Tab Take by mouth.    HYDROcodone-acetaminophen (NORCO)  mg per tablet Take 1 tablet by mouth every 6 (six) hours as needed.    insulin aspart U-100 (NOVOLOG) 100 unit/mL (3 mL) InPn pen Inject 1-10 Units into the skin before meals and at bedtime as needed (Hyperglycemia).    magnesium 250 mg Tab Take 250 mg by mouth 2 (two) times a day.    metFORMIN (GLUCOPHAGE-XR) 500 MG ER 24hr tablet TAKE 1 TABLET BY MOUTH EVERY DAY    metoprolol tartrate (LOPRESSOR) 25 MG tablet Take 1 tablet (25 mg total) by mouth 2 (two) times daily.    omega-3 acid ethyl esters (LOVAZA) 1 gram capsule TAKE 2 CAPSULES BY MOUTH 2 TIMES DAILY.    phenazopyridine (PYRIDIUM) 100 MG tablet Take 1 tablet (100 mg total) by mouth 3 (three) times daily as needed for Pain.    polyethylene glycol (GLYCOLAX) 17 gram/dose powder Take 17 g by mouth daily as needed.    potassium chloride SA (KLOR-CON) 10 MEQ TbSR Take 2 tablets (20 mEq total) by mouth once daily.    rosuvastatin (CRESTOR) 20 MG tablet TAKE 1 TABLET BY MOUTH EVERY DAY     tamsulosin (FLOMAX) 0.4 mg Cap Take 2 capsules (0.8 mg total) by mouth once daily.    tolterodine (DETROL LA) 4 MG 24 hr capsule Take 1 capsule (4 mg total) by mouth once daily.    topiramate (TOPAMAX) 50 MG tablet TAKE 1 TABLET BY MOUTH EVERY DAY    traZODone (DESYREL) 50 MG tablet TAKE 1 TABLET BY MOUTH EVERY EVENING.     Family History    None       Tobacco Use    Smoking status: Never    Smokeless tobacco: Never   Substance and Sexual Activity    Alcohol use: Yes     Alcohol/week: 1.0 standard drink     Types: 1 Shots of liquor per week    Drug use: Not Currently    Sexual activity: Yes     Partners: Female     Review of Systems   Unable to perform ROS: Mental status change   Objective:     Vital Signs (Most Recent):  Temp: 97.9 °F (36.6 °C) (01/22/23 2147)  Pulse: 88 (01/22/23 2345)  Resp: 18 (01/22/23 2345)  BP: 118/68 (01/22/23 2345)  SpO2: 100 % (01/22/23 2345) Vital Signs (24h Range):  Temp:  [97.9 °F (36.6 °C)] 97.9 °F (36.6 °C)  Pulse:  [] 88  Resp:  [18-23] 18  SpO2:  [94 %-100 %] 100 %  BP: ()/(42-76) 118/68     Weight: 114.3 kg (252 lb)  Body mass index is 36.16 kg/m².    Physical Exam  Vitals and nursing note reviewed.   Constitutional:       Appearance: He is well-developed. He is obese.      Comments: Very [place]; Oriented to self only   HENT:      Head: Normocephalic and atraumatic.      Right Ear: External ear normal.      Left Ear: External ear normal.      Nose: Nose normal.   Eyes:      Conjunctiva/sclera: Conjunctivae normal.      Pupils: Pupils are equal, round, and reactive to light.   Cardiovascular:      Rate and Rhythm: Normal rate. Rhythm irregular.      Heart sounds: Normal heart sounds.   Pulmonary:      Effort: Pulmonary effort is normal.      Breath sounds: Normal breath sounds.      Comments: Decreased entry bases without adventitious sounds currently  Abdominal:      General: Bowel sounds are normal.      Palpations: Abdomen is soft.   Genitourinary:     Comments:  Sotelo inserted  Musculoskeletal:         General: Normal range of motion.      Cervical back: Normal range of motion and neck supple.   Skin:     General: Skin is warm and dry.      Capillary Refill: Capillary refill takes less than 2 seconds.   Neurological:      Mental Status: He is alert and oriented to person, place, and time.      Comments: Oriented to self only  CN 2-12 intact by inspection  Power: moves all extremities spontaneously against gravity     Psychiatric:         Behavior: Behavior normal.         Thought Content: Thought content normal.         Judgment: Judgment normal.      Comments: Unable to assess         CRANIAL NERVES     CN III, IV, VI   Pupils are equal, round, and reactive to light.     Significant Labs: All pertinent labs within the past 24 hours have been reviewed.  CBC:   Recent Labs   Lab 01/22/23 2040   WBC 13.79*   HGB 13.1*   HCT 43.1        CMP:   Recent Labs   Lab 01/22/23 2040 01/22/23 2122    139   K 4.4 4.5    104   CO2 25 25   * 118*   BUN 26* 25*   CREATININE 1.0 1.0   CALCIUM 8.6* 8.8   PROT 6.3  --    ALBUMIN 2.5*  --    BILITOT 0.7  --    ALKPHOS 92  --    AST 12  --    ALT 9*  --    ANIONGAP 9 10     Cardiac Markers:   Recent Labs   Lab 01/22/23 2040   *     Troponin:   Recent Labs   Lab 01/22/23 2122   TROPONINIHS 31.5*       Significant Imaging: I have reviewed all pertinent imaging results/findings within the past 24 hours.    Assessment/Plan:     * Encephalopathy, metabolic  Inpatient admission for Metabolic Encephalopathy; Acute Cystitis; Ischemic EKG; Replaced Sotelo; Cardiology consultation--repeat troponin and EKG in AM; continue gentle hydration with low dose norepinephrine to maintain BP; as patient has been in institutions will cover broadly until species and sensitivities are known; wound care for decubitus; continuing home regimen for chronic maladies including beta blockade for IHD (now that BP has improved with  volume/pressor)--holding po hyperglycemics with low dose sliding scale; Urology consultation--continuing high dose tamsulosin; TSH with AM labs for review          Pressure injury of buttock, stage 1  Inpatient admission for Metabolic Encephalopathy; Acute Cystitis; Ischemic EKG; Replaced Sotelo; Cardiology consultation--repeat troponin and EKG in AM; continue gentle hydration with low dose norepinephrine to maintain BP; as patient has been in institutions will cover broadly until species and sensitivities are known; wound care for decubitus; continuing home regimen for chronic maladies including beta blockade for IHD (now that BP has improved with volume/pressor)--holding po hyperglycemics with low dose sliding scale; Urology consultation--continuing high dose tamsulosin; TSH with AM labs for review; wound care evaluation          Abnormal EKG  Inpatient admission for Metabolic Encephalopathy; Acute Cystitis; Ischemic EKG; Replaced Sotelo; Cardiology consultation--repeat troponin and EKG in AM; continue gentle hydration with low dose norepinephrine to maintain BP; as patient has been in institutions will cover broadly until species and sensitivities are known; wound care for decubitus; continuing home regimen for chronic maladies including beta blockade for IHD (now that BP has improved with volume/pressor)--holding po hyperglycemics with low dose sliding scale; Urology consultation--continuing high dose tamsulosin; TSH with AM labs for review          Cystitis  Inpatient admission for Metabolic Encephalopathy; Acute Cystitis; Ischemic EKG; Replaced Sotelo; Cardiology consultation--repeat troponin and EKG in AM; continue gentle hydration with low dose norepinephrine to maintain BP; as patient has been in institutions will cover broadly until species and sensitivities are known; wound care for decubitus; continuing home regimen for chronic maladies including beta blockade for IHD (now that BP has improved with  volume/pressor)--holding po hyperglycemics with low dose sliding scale; Urology consultation--continuing high dose tamsulosin; TSH with AM labs for review          Benign prostatic hyperplasia with urinary retention  Inpatient admission for Metabolic Encephalopathy; Acute Cystitis; Ischemic EKG; Replaced Sotelo; Cardiology consultation--repeat troponin and EKG in AM; continue gentle hydration with low dose norepinephrine to maintain BP; as patient has been in institutions will cover broadly until species and sensitivities are known; wound care for decubitus; continuing home regimen for chronic maladies including beta blockade for IHD (now that BP has improved with volume/pressor)--holding po hyperglycemics with low dose sliding scale; Urology consultation--continuing high dose tamsulosin; TSH with AM labs for review            VTE Risk Mitigation (From admission, onward)         Ordered     apixaban tablet 5 mg  2 times daily         01/23/23 0012     IP VTE HIGH RISK PATIENT  Once         01/23/23 0012     Place sequential compression device  Until discontinued         01/23/23 0012     Reason for No Pharmacological VTE Prophylaxis  Once        Question:  Reasons:  Answer:  Already adequately anticoagulated on oral Anticoagulants    01/23/23 0012                   Shyam Belle MD  Department of Hospital Medicine   [facility] - Emergency Dept

## 2023-01-23 NOTE — PT/OT/SLP EVAL
"Physical Therapy Evaluation    Patient Name:  Hiro Rodríguez   MRN:  [id number]    Recommendations:     Discharge Recommendations: nursing facility, skilled   Discharge Equipment Recommendations: other (see comments) (TBD)   Barriers to discharge:  increased assist needed    Assessment:     Hiro Rodríguez is a 77 y.o. male admitted with a medical diagnosis of Encephalopathy, metabolic.  He presents with the following impairments/functional limitations: weakness, impaired endurance, impaired self care skills, impaired functional mobility, gait instability, impaired balance, impaired cognition, decreased coordination, decreased lower extremity function, decreased safety awareness, impaired cardiopulmonary response to activity.    Pt was also recently admitted for ORIF LLE and subsequently went to SNF to continue therapy.  Pt is asleep upon entering room but arouses to sound of therapist voice.  Pt oriented to self but not place or time.  He asks "what kind of place is this? Are we in a movie place?"  He requires max A x2 for all bed mobility and c/o feeling unwell with intermittent A-fib.Pt declined attempt to stand w/RW.  Will progress as able. will recommend return to SNF once medically stable.    Rehab Prognosis: Fair; patient would benefit from acute skilled PT services to address these deficits and reach maximum level of function.    Recent Surgery: * No surgery found *      Plan:     During this hospitalization, patient to be seen 6 x/week to address the identified rehab impairments via therapeutic activities, therapeutic exercises, gait training and progress toward the following goals:    Plan of Care Expires:       Subjective     Chief Complaint: "I dont feel good"  Patient/Family Comments/goals: get better  Pain/Comfort:  Pain Rating 1: 0/10    Patients cultural, spiritual, [employer] conflicts given the current situation:      Living Environment:  Pt lives with wife in SS home no steps to enter  Prior " "to admission, patients level of function was prior to recent ORIF pt was ambulatory with RW and occasional falls, pt comes to hospital today from SNF.  Equipment used at home: walker, rolling, bedside commode, wheelchair, grab bar.  DME owned (not currently used): none.  Upon discharge, patient will have assistance from wife.    Objective:     Communicated with RN prior to session.  Patient found HOB elevated with messina catheter, peripheral IV, pulse ox (continuous), telemetry  upon PT entry to room.    General Precautions: Standard, fall  Orthopedic Precautions:LLE weight bearing as tolerated   Braces:    Respiratory Status: Room air    Exams:  Cognitive Exam:  Patient is oriented to Person and pt states "what is this place, is it a movie place?"  RLE ROM: WFL  RLE Strength: WFL  LLE ROM: WFL except L hip  LLE Strength: 3/5    Functional Mobility:  Bed Mobility:     Rolling Left:  maximal assistance  Rolling Right: maximal assistance  Scooting: maximal assistance and of 2 persons  Supine to Sit: maximal assistance and of 2 persons  Sit to Supine: maximal assistance and of 2 persons      AM-PAC 6 CLICK MOBILITY  Total Score:9       Treatment & Education:  Pt was educated on the following: call light use, importance of OOB activity and functional mobility to negate the negative effects of prolonged bed rest during this hospitalization, safe transfers/ambulation and discharge planning recommendations/options.     Patient left HOB elevated with all lines intact, call button in reach, and RN notified.    GOALS:   Multidisciplinary Problems       Physical Therapy Goals          Problem: Physical Therapy    Goal Priority Disciplines Outcome Goal Variances Interventions   Physical Therapy Goal     PT, PT/OT Ongoing, Progressing     Description: All physical therapy goals to be met by discharge:    1. Supine to sit with Stand-by Assistance  2. Sit to stand transfer with Stand-by Assistance  3.. Bed to chair transfer with " Supervision using Rolling Walker  4. Gait  x 50 feet with Minimal Assistance using Rolling Walker.                         History:     Past Medical History:   Diagnosis Date    CHF (congestive heart failure)     Hypertension        Past Surgical History:   Procedure Laterality Date    FRACTURE SURGERY      INTRAMEDULLARY RODDING OF TROCHANTER OF FEMUR Left 11/10/2022    Procedure: INSERTION, ITRAMEDULLARY SANTI, FEMUR, TROCHANTER;  Surgeon: Richard Phoenix MD;  Location: [facility];  Service: Orthopedics;  Laterality: Left;       Time Tracking:     PT Received On: 01/23/23  PT Start Time: 0914     PT Stop Time: 0930  PT Total Time (min): 16 min     Billable Minutes: Evaluation 8 and Therapeutic Activity 8      01/23/2023

## 2023-01-23 NOTE — CONSULTS
77-year-old male I have seen previously for urinary retention  Patient recently failed another voiding trial    Admitted for hypotension, and tachycardia  Patient required Sotelo catheterization here in the ER  Now with a Sotelo catheter in place draining clear urine    Excellent renal function creatinine 0.8    Once patient is discharged can resume workup for urinary retention and bladder outlet obstruction

## 2023-01-23 NOTE — ED NOTES
Pt resting. Explained wait to pt & family for room assignment. On cm, pulse ox, nibp. Speech clear. No complaint at this time.

## 2023-01-23 NOTE — ED PROVIDER NOTES
Encounter Date: 1/22/2023       History     Chief Complaint   Patient presents with    Hypotension    Altered Mental Status     CT delay due to unstable vital signs    Urinary Retention     Sotelo removed three days ago     Chief complaint is weakness and slight confusion.  The patient now is alert oriented x3.  His blood pressure at the [facility] was in the 80 systolic range.  His blood pressure here is almost 100systolic now.  He states he had a Sotelo catheter removed 3 days ago and finished antibiotics for bladder infection.  He does have a history of congestive heart failure hypertension.  He denies any complaints except mild abdominal pain.      Review of patient's allergies indicates:  No Known Allergies  Past Medical History:   Diagnosis Date    CHF (congestive heart failure)     Hypertension      Past Surgical History:   Procedure Laterality Date    FRACTURE SURGERY      INTRAMEDULLARY RODDING OF TROCHANTER OF FEMUR Left 11/10/2022    Procedure: INSERTION, ITRAMEDULLARY SANTI, FEMUR, TROCHANTER;  Surgeon: Richard Phoenix MD;  Location: [facility];  Service: Orthopedics;  Laterality: Left;     No family history on file.  Social History     Tobacco Use    Smoking status: Never    Smokeless tobacco: Never   Substance Use Topics    Alcohol use: Yes     Alcohol/week: 1.0 standard drink     Types: 1 Shots of liquor per week    Drug use: Not Currently     Review of Systems   Constitutional:  Negative for chills and fever.   HENT:  Negative for ear pain, rhinorrhea and sore throat.    Eyes:  Negative for pain and visual disturbance.   Respiratory:  Negative for cough and shortness of breath.    Cardiovascular:  Negative for chest pain and palpitations.   Gastrointestinal:  Positive for abdominal pain. Negative for constipation, diarrhea, nausea and vomiting.   Genitourinary:  Negative for dysuria, frequency, hematuria and urgency.   Musculoskeletal:  Negative for back pain, joint swelling and myalgias.   Skin:   Negative for rash.   Neurological:  Negative for dizziness, seizures, weakness and headaches.   Psychiatric/Behavioral:  Negative for dysphoric mood. The patient is not nervous/anxious.      Physical Exam     Initial Vitals   BP Pulse Resp Temp SpO2   01/22/23 2014 01/22/23 2014 01/22/23 2014 01/22/23 2147 01/22/23 2014   136/76 87 18 97.9 °F (36.6 °C) (!) 94 %      MAP       --                Physical Exam    Nursing note and vitals reviewed.  Constitutional: He appears well-developed and well-nourished.   HENT:   Head: Normocephalic and atraumatic.   Eyes: Conjunctivae, EOM and lids are normal. Pupils are equal, round, and reactive to light.   Neck: Trachea normal. Neck supple. No thyroid mass present.   Cardiovascular:  Normal rate, regular rhythm and normal heart sounds.           Pulmonary/Chest: Breath sounds normal. No respiratory distress.   Abdominal: Abdomen is soft. There is no abdominal tenderness.   Tenderness diffusely greater in the left lower quadrant   Musculoskeletal:         General: Normal range of motion.      Cervical back: Neck supple.     Neurological: He is alert and oriented to person, place, and time. He has normal strength and normal reflexes. No cranial nerve deficit or sensory deficit.   Skin: Skin is warm and dry.   Psychiatric: He has a normal mood and affect. His speech is normal and behavior is normal. Judgment and thought content normal.       ED Course   Procedures  Labs Reviewed   CBC W/ AUTO DIFFERENTIAL - Abnormal; Notable for the following components:       Result Value    WBC 13.79 (*)     RBC 4.46 (*)     Hemoglobin 13.1 (*)     MCHC 30.4 (*)     RDW 14.6 (*)     Gran # (ANC) 11.1 (*)     Immature Grans (Abs) 0.05 (*)     Mono # 1.2 (*)     Gran % 80.3 (*)     Lymph % 10.4 (*)     All other components within normal limits   COMPREHENSIVE METABOLIC PANEL - Abnormal; Notable for the following components:    Glucose 119 (*)     BUN 26 (*)     Calcium 8.6 (*)     Albumin 2.5 (*)      ALT 9 (*)     All other components within normal limits   URINALYSIS, REFLEX TO URINE CULTURE - Abnormal; Notable for the following components:    Appearance, UA Cloudy (*)     Protein, UA 2+ (*)     Occult Blood UA 3+ (*)     Leukocytes, UA 3+ (*)     All other components within normal limits    Narrative:     Specimen Source->Urine   BASIC METABOLIC PANEL - Abnormal; Notable for the following components:    Glucose 118 (*)     BUN 25 (*)     All other components within normal limits   TROPONIN I HIGH SENSITIVITY - Abnormal; Notable for the following components:    Troponin I High Sensitivity 31.5 (*)     All other components within normal limits   URINALYSIS MICROSCOPIC - Abnormal; Notable for the following components:    RBC, UA 14 (*)     WBC, UA >100 (*)     Bacteria Moderate (*)     Hyaline Casts, UA 11 (*)     All other components within normal limits    Narrative:     Specimen Source->Urine   B-TYPE NATRIURETIC PEPTIDE - Abnormal; Notable for the following components:     (*)     All other components within normal limits   CULTURE, BLOOD   CULTURE, BLOOD   CULTURE, URINE   LACTIC ACID, PLASMA   B-TYPE NATRIURETIC PEPTIDE   TROPONIN I HIGH SENSITIVITY   LACTIC ACID, PLASMA          Imaging Results              X-Ray Chest AP Portable (In process)                      CT Abdomen Pelvis  Without Contrast (Final result)  Result time 01/22/23 21:30:57   Procedure changed from CT Abdomen Pelvis With Contrast     Final result by Christopher Loaiza MD (01/22/23 21:30:57)                   Narrative:    EXAM DESCRIPTION: CT ABDOMEN PELVIS WITHOUT CONTRAST    CLINICAL INDICATION:  Abdominal pain, acute, nonlocalized    TECHNIQUE: Axial imaging obtained through the abdomen and pelvis. Sagittal and coronal reconstructions obtained.    CONTRAST: None    COMPARISON: December 31, 2022    FINDINGS:  CT abdomen/pelvis: Atelectasis at the lung bases with trace amount of pleural fluid bilaterally. The heart is  borderline in size with a trace amount of pericardial fluid.    Right-sided gynecomastia.    The noncontrast appearance the liver, spleen, adrenal glands, pancreas are without acute finding. Pancreatic atrophy is present.    Bilateral nonobstructing renal calculi are present. There is mild left-sided hydronephrosis and hydroureter due to a calculus in the upper left ureter measuring 7 mm.    The bladder has surrounding inflammation suggesting cystitis. There is a 3 mm calculus layering dependently within the bladder.    The prostate gland is mildly enlarged.    The appendix is normal. The terminal ileum is unremarkable. No acute bowel distention or inflammation is apparent. Mild constipation. Diverticulosis without evidence of diverticulitis.    No free air or free fluid.    Previous internal fixation left hip.      IMPRESSION:  1: Bilateral nephrolithiasis. 7 mm upper left ureteral calculus causing mild hydronephrosis and hydroureter.  2: Small pleural fluid collections and basilar lung atelectasis.  3: Diverticulosis.  4: Inflammation around the bladder suggesting cystitis.  5: Mild to moderate prostate gland enlargement.        RADIATION DOSE REDUCTION: This exam was performed according to our departmental dose-optimization program which includes use of Automated Exposure Control, adjustment of the mA and/or kV according to patient size and/or use of iterative reconstruction technique.    Electronically signed by:  Christopher Loaiza MD  1/22/2023 9:30 PM CST Workstation: [id number]                                     CT Head Without Contrast (Final result)  Result time 01/22/23 21:09:16      Final result by Christopher Loaiza MD (01/22/23 21:09:16)                   Narrative:    EXAMINATION: CT HEAD WITHOUT CONTRAST    CLINICAL HISTORY: Mental status change, unknown cause    COMPARISON: December 30, 2022    TECHNIQUE: Noncontrast axial images of the head were obtained. Sagittal and coronal reformatted images  obtained.    FINDINGS:  CT head: Small air-fluid level in the left maxillary sinus. Mild chronic left ethmoid and frontal sinus disease.    The cerebellar tonsils are in the appropriate position. The pituitary gland is not enlarged. The ventricles are normal in size.    Age-related small vessel changes and mild volume loss. No acute intracranial finding or mass lesion.    IMPRESSION:  1: Age-related changes. No acute intracranial finding.  2: Acute left maxillary sinusitis.          RADIATION DOSE REDUCTION: This exam was performed according to our departmental dose-optimization program which includes use of Automated Exposure Control, adjustment of the mA and/or kV according to patient size and/or use of iterative reconstruction technique.    Electronically signed by:  Christopher Loaiza MD  1/22/2023 9:09 PM [facility] Workstation: [id number]                                     Medications   vancomycin in dextrose 5 % 1 gram/250 mL IVPB 2,000 mg (2,000 mg Intravenous New Bag 1/22/23 2149)   NORepinephrine 4 mg in dextrose 5% 250 mL infusion (premix) (titrating) (0.1 mcg/kg/min × 114.3 kg Intravenous Rate/Dose Change 1/22/23 2158)   0.9%  NaCl infusion (0 mLs Intravenous Stopped 1/22/23 2147)   cefepime in dextrose 5 % 1 gram/50 mL IVPB 1 g (0 g Intravenous Stopped 1/22/23 2138)     Medical Decision Making:   ED Management:  The patient was evaluated by the hospitalist and will be admitted for urinary tract infection hypotension abnormal EKG slightly elevated troponin.                        Clinical Impression:   Final diagnoses:  [N39.0, R31.9] Urinary tract infection with hematuria, site unspecified (Primary)        ED Disposition Condition    Observation Stable                Jessica Villalobos MD  01/22/23 7896

## 2023-01-24 LAB
ANION GAP SERPL CALC-SCNC: 7 MMOL/L (ref 8–16)
BACTERIA UR CULT: ABNORMAL
BASOPHILS # BLD AUTO: 0.02 K/UL (ref 0–0.2)
BASOPHILS NFR BLD: 0.2 % (ref 0–1.9)
BUN SERPL-MCNC: 17 MG/DL (ref 8–23)
CALCIUM SERPL-MCNC: 8 MG/DL (ref 8.7–10.5)
CHLORIDE SERPL-SCNC: 104 MMOL/L (ref 95–110)
CO2 SERPL-SCNC: 26 MMOL/L (ref 23–29)
CREAT SERPL-MCNC: 0.7 MG/DL (ref 0.5–1.4)
DIFFERENTIAL METHOD: ABNORMAL
EOSINOPHIL # BLD AUTO: 0.1 K/UL (ref 0–0.5)
EOSINOPHIL NFR BLD: 0.6 % (ref 0–8)
ERYTHROCYTE [DISTWIDTH] IN BLOOD BY AUTOMATED COUNT: 14.6 % (ref 11.5–14.5)
EST. GFR  (NO RACE VARIABLE): >60 ML/MIN/1.73 M^2
GLUCOSE SERPL-MCNC: 102 MG/DL (ref 70–110)
GLUCOSE SERPL-MCNC: 104 MG/DL (ref 70–110)
GLUCOSE SERPL-MCNC: 109 MG/DL (ref 70–110)
GLUCOSE SERPL-MCNC: 84 MG/DL (ref 70–110)
GLUCOSE SERPL-MCNC: 88 MG/DL (ref 70–110)
HCT VFR BLD AUTO: 37 % (ref 40–54)
HGB BLD-MCNC: 11.5 G/DL (ref 14–18)
IMM GRANULOCYTES # BLD AUTO: 0.05 K/UL (ref 0–0.04)
IMM GRANULOCYTES NFR BLD AUTO: 0.5 % (ref 0–0.5)
LYMPHOCYTES # BLD AUTO: 1.2 K/UL (ref 1–4.8)
LYMPHOCYTES NFR BLD: 10.7 % (ref 18–48)
MAGNESIUM SERPL-MCNC: 1.6 MG/DL (ref 1.6–2.6)
MCH RBC QN AUTO: 30.1 PG (ref 27–31)
MCHC RBC AUTO-ENTMCNC: 31.1 G/DL (ref 32–36)
MCV RBC AUTO: 97 FL (ref 82–98)
MONOCYTES # BLD AUTO: 0.9 K/UL (ref 0.3–1)
MONOCYTES NFR BLD: 8.1 % (ref 4–15)
NEUTROPHILS # BLD AUTO: 8.8 K/UL (ref 1.8–7.7)
NEUTROPHILS NFR BLD: 79.9 % (ref 38–73)
NRBC BLD-RTO: 0 /100 WBC
PLATELET # BLD AUTO: 199 K/UL (ref 150–450)
PMV BLD AUTO: 10.1 FL (ref 9.2–12.9)
POTASSIUM SERPL-SCNC: 3.8 MMOL/L (ref 3.5–5.1)
RBC # BLD AUTO: 3.82 M/UL (ref 4.6–6.2)
SODIUM SERPL-SCNC: 137 MMOL/L (ref 136–145)
WBC # BLD AUTO: 11.04 K/UL (ref 3.9–12.7)

## 2023-01-24 PROCEDURE — 80048 BASIC METABOLIC PNL TOTAL CA: CPT | Performed by: INTERNAL MEDICINE

## 2023-01-24 PROCEDURE — 87040 BLOOD CULTURE FOR BACTERIA: CPT | Performed by: INTERNAL MEDICINE

## 2023-01-24 PROCEDURE — 21400001 HC TELEMETRY ROOM

## 2023-01-24 PROCEDURE — 25000003 PHARM REV CODE 250: Performed by: INTERNAL MEDICINE

## 2023-01-24 PROCEDURE — 83735 ASSAY OF MAGNESIUM: CPT | Performed by: INTERNAL MEDICINE

## 2023-01-24 PROCEDURE — 97530 THERAPEUTIC ACTIVITIES: CPT | Mod: CQ

## 2023-01-24 PROCEDURE — 94761 N-INVAS EAR/PLS OXIMETRY MLT: CPT

## 2023-01-24 PROCEDURE — 25000242 PHARM REV CODE 250 ALT 637 W/ HCPCS: Performed by: INTERNAL MEDICINE

## 2023-01-24 PROCEDURE — 36415 COLL VENOUS BLD VENIPUNCTURE: CPT | Performed by: INTERNAL MEDICINE

## 2023-01-24 PROCEDURE — 99900035 HC TECH TIME PER 15 MIN (STAT)

## 2023-01-24 PROCEDURE — 63600175 PHARM REV CODE 636 W HCPCS: Performed by: INTERNAL MEDICINE

## 2023-01-24 PROCEDURE — 85025 COMPLETE CBC W/AUTO DIFF WBC: CPT | Performed by: INTERNAL MEDICINE

## 2023-01-24 PROCEDURE — 27000221 HC OXYGEN, UP TO 24 HOURS

## 2023-01-24 RX ORDER — FAMOTIDINE 20 MG/1
20 TABLET, FILM COATED ORAL 2 TIMES DAILY
Status: DISCONTINUED | OUTPATIENT
Start: 2023-01-24 | End: 2023-01-26 | Stop reason: HOSPADM

## 2023-01-24 RX ADMIN — PIPERACILLIN SODIUM AND TAZOBACTAM SODIUM 4.5 G: 4; .5 INJECTION, POWDER, LYOPHILIZED, FOR SOLUTION INTRAVENOUS at 03:01

## 2023-01-24 RX ADMIN — ACETAMINOPHEN 650 MG: 325 TABLET ORAL at 08:01

## 2023-01-24 RX ADMIN — PIPERACILLIN SODIUM AND TAZOBACTAM SODIUM 4.5 G: 4; .5 INJECTION, POWDER, LYOPHILIZED, FOR SOLUTION INTRAVENOUS at 08:01

## 2023-01-24 RX ADMIN — APIXABAN 5 MG: 5 TABLET, FILM COATED ORAL at 08:01

## 2023-01-24 RX ADMIN — FAMOTIDINE 20 MG: 20 TABLET, FILM COATED ORAL at 08:01

## 2023-01-24 RX ADMIN — ATORVASTATIN CALCIUM 40 MG: 40 TABLET, FILM COATED ORAL at 08:01

## 2023-01-24 RX ADMIN — AMIODARONE HYDROCHLORIDE 200 MG: 200 TABLET ORAL at 08:01

## 2023-01-24 RX ADMIN — PSYLLIUM HUSK 1 PACKET: 3.4 POWDER ORAL at 08:01

## 2023-01-24 RX ADMIN — OXYBUTYNIN CHLORIDE 10 MG: 5 TABLET, EXTENDED RELEASE ORAL at 08:01

## 2023-01-24 RX ADMIN — TOPIRAMATE 50 MG: 25 TABLET, FILM COATED ORAL at 08:01

## 2023-01-24 RX ADMIN — PIPERACILLIN SODIUM AND TAZOBACTAM SODIUM 4.5 G: 4; .5 INJECTION, POWDER, LYOPHILIZED, FOR SOLUTION INTRAVENOUS at 11:01

## 2023-01-24 RX ADMIN — PSYLLIUM HUSK 1 PACKET: 3.4 POWDER ORAL at 09:01

## 2023-01-24 NOTE — PLAN OF CARE
CM s/w Kristie at [facility] and patient is accepted back into their SNF unit. CM notified Galina (spouse). Caregiver is nopt opposed to SNF if patient has available SNF days. CM will reach out to Kristie in am.

## 2023-01-24 NOTE — PROGRESS NOTES
"[facility] Medicine  Progress Note    Patient Name: Hiro Rodríguez  MRN: [id number]  Patient Class: IP- Inpatient   Admission Date: 1/22/2023  Length of Stay: 1 days  Attending Physician: Clyde Nagy MD  Primary Care Provider: Gautam Castanon III, MD        Subjective:     Principal Problem:Sepsis        HPI:  77 year old male with history of PAF (apixaban), HTN, DM 2, Morbid Obesity, s/p right hip ORIF 11/2022 (recuperating at local SNF) presented to  ED via EMS after being found with altered mental status at the SNF. History from wife at bedside. Patient is very hard of hearing and is currently altered. Reportedly he has been receiving PT/OT at SNF making slow progression back from above ORIF. This evening when his wife came to visit "He was just not himself". He was found to be hypotensive at the SNF and sent here. Upon presentation he was found to be hypotensive with BP 90's systolic, and tachycardic 110-120's, both of which normalized after bolus of 1 liter LR and eventually initiation of low dose norepinephrine.    He has had urinary retention and had had Sotelo catheter, which was removed 3 days ago at the SNF. He has known BPH. When he arrived here, Sotelo was reinserted and over a liter drained immediately. The patient denies any pain currently. No CP/SOB. No orthopnea, PND or edema.     Has stage 1 decubitus buttocks (POA).    In ED Labs reviewed and noted below: mild leukocytosis with normal Hct; normal electrolytes and renal function with prerenal azotemia; hypoalbuminemia with normal hepatic function; BNP is moderately elevated with minimal elevation of HS trop (repeat ordered and pending); Lactate is normal. UA: dirty with 2+ protein and 3+ occult blood--reflexed to culture. Blood cultured. CXR reviewed: Cardiomegaly with prominent vascularture and small basilar effusions. EKG reviewed: rated controlled a fib with deep inferolateral ST segment sagging, but not acute ST " segments.    CT Head: no acute intracranial pathology    CT Abdo/Pelvis:   IMPRESSION:  1: Bilateral nephrolithiasis. 7 mm upper left ureteral calculus causing mild hydronephrosis and hydroureter.  2: Small pleural fluid collections and basilar lung atelectasis.  3: Diverticulosis.  4: Inflammation around the bladder suggesting cystitis.  5: Mild to moderate prostate gland enlargement.     Discussed with ED MD; Inpatient admission for Metabolic Encephalopathy; Acute Cystitis; Ischemic EKG; Replaced Sotelo; Cardiology consultation--repeat troponin and EKG in AM; continue gentle hydration with low dose norepinephrine to maintain BP; as patient has been in institutions will cover broadly until species and sensitivities are known; wound care for decubitus; continuing home regimen for chronic maladies including beta blockade for IHD (now that BP has improved with volume/pressor)--holding po hyperglycemics with low dose sliding scale; Urology consultation--continuing high dose tamsulosin; TSH with AM labs for review    Face to Face occurred on 01/22/23 with note finished on 01/23/23      Overview/Hospital Course:  1/23  Off levophed drip  HR controlled     1/24  Blood and urine culture grew Pseudomonas  Repeat blood culture results pending       Interval History:     Review of Systems   Unable to perform ROS: Mental status change   Objective:     Vital Signs (Most Recent):  Temp: 97.7 °F (36.5 °C) (01/24/23 1500)  Pulse: 106 (01/24/23 1500)  Resp: 17 (01/24/23 1500)  BP: (!) 142/87 (01/24/23 1500)  SpO2: 99 % (01/24/23 1500)   Vital Signs (24h Range):  Temp:  [97.3 °F (36.3 °C)-98.3 °F (36.8 °C)] 97.7 °F (36.5 °C)  Pulse:  [] 106  Resp:  [15-18] 17  SpO2:  [97 %-100 %] 99 %  BP: ()/(57-87) 142/87     Weight: 114.8 kg (253 lb 1.4 oz)  Body mass index is 36.31 kg/m².    Intake/Output Summary (Last 24 hours) at 1/24/2023 1651  Last data filed at 1/24/2023 1604  Gross per 24 hour   Intake 1045.42 ml   Output 2150  ml   Net -1104.58 ml      Physical Exam  Vitals and nursing note reviewed.   Constitutional:       Appearance: He is well-developed.      Comments: Somnolent    HENT:      Head: Atraumatic.      Right Ear: External ear normal.      Left Ear: External ear normal.      Nose: Nose normal.      Mouth/Throat:      Mouth: Mucous membranes are moist.   Cardiovascular:      Rate and Rhythm: Normal rate.   Pulmonary:      Effort: Pulmonary effort is normal.   Abdominal:      Palpations: Abdomen is soft.   Genitourinary:     Comments: Messina   Musculoskeletal:         General: Normal range of motion.      Cervical back: Full passive range of motion without pain and normal range of motion.   Skin:     General: Skin is warm.   Neurological:      Comments: Somnolent        Significant Labs: All pertinent labs within the past 24 hours have been reviewed.  CBC:   Recent Labs   Lab 01/22/23 2040 01/23/23 0311 01/24/23  0433   WBC 13.79* 16.26* 11.04   HGB 13.1* 12.4* 11.5*   HCT 43.1 40.2 37.0*    251 199     CMP:   Recent Labs   Lab 01/22/23 2040 01/22/23 2122 01/23/23 0311 01/24/23  0433    139 138 137   K 4.4 4.5 4.8 3.8    104 104 104   CO2 25 25 25 26   * 118* 167* 109   BUN 26* 25* 25* 17   CREATININE 1.0 1.0 0.8 0.7   CALCIUM 8.6* 8.8 8.3* 8.0*   PROT 6.3  --   --   --    ALBUMIN 2.5*  --   --   --    BILITOT 0.7  --   --   --    ALKPHOS 92  --   --   --    AST 12  --   --   --    ALT 9*  --   --   --    ANIONGAP 9 10 9 7*       Significant Imaging: I have reviewed all pertinent imaging results/findings within the past 24 hours.      Assessment/Plan:      * Sepsis  Uro sepsis  UC and BC Grew Pseudomonas   Was  on levophed and now weaned off   Rending repeat blood culture results  If stable can go back to SNF tomorrow with Chronic indwelling messina       Encephalopathy, metabolic  Due to sepsis   Condition improving         Urinary tract infection associated with indwelling urethral  catheter  Maintain present Rx       Urinary retention  Has chronic indwelling messina catheter  Can go back to SNF with Messina catheter       Pressure injury of buttock, stage 1  Aware         Benign prostatic hyperplasia with urinary retention  Aware  Chronic issue           Type 2 diabetes mellitus with diabetic polyneuropathy, without long-term current use of insulin  SSI while here     Atrial fibrillation  Chronic stable issue           VTE Risk Mitigation (From admission, onward)         Ordered     apixaban tablet 5 mg  2 times daily         01/23/23 0012     IP VTE HIGH RISK PATIENT  Once         01/23/23 0012     Place sequential compression device  Until discontinued         01/23/23 0012     Reason for No Pharmacological VTE Prophylaxis  Once        Question:  Reasons:  Answer:  Already adequately anticoagulated on oral Anticoagulants    01/23/23 0012                Discharge Planning   BRENNAN: 1/25/2023     Code Status: Full Code   Is the patient medically ready for discharge?:     Reason for patient still in hospital (select all that apply): Treatment  Discharge Plan A: Home Health                  Clyde Nagy MD  Department of Hospital Medicine   [facility]

## 2023-01-24 NOTE — PT/OT/SLP PROGRESS
"Physical Therapy Treatment    Patient Name:  Hiro Rodríguez   MRN:  [id number]    Recommendations:     Discharge Recommendations: nursing facility, skilled  Discharge Equipment Recommendations: other (see comments) (TBD)  Barriers to discharge:  increased assist with mobility    Assessment:     Hiro Rodríguez is a 77 y.o. male admitted with a medical diagnosis of Sepsis.  He presents with the following impairments/functional limitations: weakness, impaired endurance, impaired self care skills, impaired functional mobility, gait instability, impaired balance, impaired cognition, decreased coordination, decreased lower extremity function, decreased safety awareness, impaired cardiopulmonary response to activity.    Pt initially agreeable to visit. Therapist and tech set up for supine to sit transfer with pt raising B LE with min assist for socks to be adjusted in preparation of transfer. Pt then asked, "Can we do this later?" Therapist educated pt on the importance of out of bed activity and intermittent mobility and that if pt wants to return to skilled nursing facility he needs to participate with therapy every day. Pt responded, "Can we just do this tomorrow?" Therapist again educated pt that he needs to participate every day. Therapist asked pt to lift B LE and advance them towards the EOB to initiate supine to sit transfer. Pt stated, "No I can't." Therapist attempted to assist pt with moving BLE with pt resisting and stating, "No, I don't want to."    Rehab Prognosis: Fair; patient would benefit from acute skilled PT services to address these deficits and reach maximum level of function.    Recent Surgery: * No surgery found *      Plan:     During this hospitalization, patient to be seen 6 x/week to address the identified rehab impairments via therapeutic activities, gait training, therapeutic exercises and progress toward the following goals:    Plan of Care Expires:       Subjective     Chief Complaint: " pt initially agreeable but then became resistive to therapy  Patient/Family Comments/goals: none verbalized  Pain/Comfort:  Pain Rating 1: other (see comments) (not rated)  Location 1: abdomen (and groin)  Pain Addressed 1: Reposition, Distraction      Objective:     Communicated with RN prior to session.  Patient found HOB elevated with messina catheter, peripheral IV, pulse ox (continuous), telemetry, bed alarm upon PT entry to room.     General Precautions: Standard, fall  Orthopedic Precautions: LLE weight bearing as tolerated  Braces:    Respiratory Status: Nasal cannula, flow 2 L/min     Functional Mobility:  Bed Mobility:     Supine to Sit: attempted to initiate transfer with pt resisting mobility      AM-PAC 6 CLICK MOBILITY          Treatment & Education:  Pt educated on importance of time OOB, importance of intermittent mobility, discharge recommendations/options, and use of call light for assistance and fall prevention.      Patient left HOB elevated with all lines intact, call button in reach, bed alarm on, and RN notified..    GOALS:   Multidisciplinary Problems       Physical Therapy Goals          Problem: Physical Therapy    Goal Priority Disciplines Outcome Goal Variances Interventions   Physical Therapy Goal     PT, PT/OT Ongoing, Progressing     Description: All physical therapy goals to be met by discharge:    1. Supine to sit with Stand-by Assistance  2. Sit to stand transfer with Stand-by Assistance  3.. Bed to chair transfer with Supervision using Rolling Walker  4. Gait  x 50 feet with Minimal Assistance using Rolling Walker.                         Time Tracking:     PT Received On: 01/24/23  PT Start Time: 0901     PT Stop Time: 0910  PT Total Time (min): 9 min     Billable Minutes: Therapeutic Activity 9    Treatment Type: Treatment  PT/PTA: PTA     PTA Visit Number: 1     01/24/2023

## 2023-01-24 NOTE — PROGRESS NOTES
"[facility] - Emergency Dept  Hospital Medicine  Progress Note    Patient Name: Hiro Rodríguez  MRN: [id number]  Patient Class: IP- Inpatient   Admission Date: 1/22/2023  Length of Stay: 0 days  Attending Physician: Clyde Nagy MD  Primary Care Provider: Gautam Castanon III, MD        Subjective:     Principal Problem:Sepsis        HPI:  77 year old male with history of PAF (apixaban), HTN, DM 2, Morbid Obesity, s/p right hip ORIF 11/2022 (recuperating at local SNF) presented to  ED via EMS after being found with altered mental status at the SNF. History from wife at bedside. Patient is very hard of hearing and is currently altered. Reportedly he has been receiving PT/OT at SNF making slow progression back from above ORIF. This evening when his wife came to visit "He was just not himself". He was found to be hypotensive at the SNF and sent here. Upon presentation he was found to be hypotensive with BP 90's systolic, and tachycardic 110-120's, both of which normalized after bolus of 1 liter LR and eventually initiation of low dose norepinephrine.    He has had urinary retention and had had Sotelo catheter, which was removed 3 days ago at the SNF. He has known BPH. When he arrived here, Sotelo was reinserted and over a liter drained immediately. The patient denies any pain currently. No CP/SOB. No orthopnea, PND or edema.     Has stage 1 decubitus buttocks (POA).    In ED Labs reviewed and noted below: mild leukocytosis with normal Hct; normal electrolytes and renal function with prerenal azotemia; hypoalbuminemia with normal hepatic function; BNP is moderately elevated with minimal elevation of HS trop (repeat ordered and pending); Lactate is normal. UA: dirty with 2+ protein and 3+ occult blood--reflexed to culture. Blood cultured. CXR reviewed: Cardiomegaly with prominent vascularture and small basilar effusions. EKG reviewed: rated controlled a fib with deep inferolateral ST segment sagging, but not " acute ST segments.    CT Head: no acute intracranial pathology    CT Abdo/Pelvis:   IMPRESSION:  1: Bilateral nephrolithiasis. 7 mm upper left ureteral calculus causing mild hydronephrosis and hydroureter.  2: Small pleural fluid collections and basilar lung atelectasis.  3: Diverticulosis.  4: Inflammation around the bladder suggesting cystitis.  5: Mild to moderate prostate gland enlargement.     Discussed with ED MD; Inpatient admission for Metabolic Encephalopathy; Acute Cystitis; Ischemic EKG; Replaced Sotelo; Cardiology consultation--repeat troponin and EKG in AM; continue gentle hydration with low dose norepinephrine to maintain BP; as patient has been in institutions will cover broadly until species and sensitivities are known; wound care for decubitus; continuing home regimen for chronic maladies including beta blockade for IHD (now that BP has improved with volume/pressor)--holding po hyperglycemics with low dose sliding scale; Urology consultation--continuing high dose tamsulosin; TSH with AM labs for review    Face to Face occurred on 01/22/23 with note finished on 01/23/23      Overview/Hospital Course:  1/23  Off levophed drip  HR controlled       Interval History:     Review of Systems   Constitutional:  Positive for fatigue. Negative for activity change and appetite change.   HENT:  Negative for congestion and dental problem.    Eyes:  Negative for discharge and itching.   Respiratory:  Negative for shortness of breath.    Cardiovascular:  Positive for palpitations. Negative for chest pain.   Gastrointestinal:  Negative for abdominal distention and abdominal pain.   Endocrine: Negative for cold intolerance.   Genitourinary:  Negative for difficulty urinating and dysuria.   Musculoskeletal:  Negative for arthralgias and back pain.   Skin:  Negative for color change.   Neurological:  Negative for dizziness and facial asymmetry.   Hematological:  Negative for adenopathy.   Psychiatric/Behavioral:   Negative for agitation and behavioral problems.    Objective:     Vital Signs (Most Recent):  Temp: 97.9 °F (36.6 °C) (01/22/23 2147)  Pulse: 104 (01/23/23 1852)  Resp: 12 (01/23/23 0729)  BP: 127/71 (01/23/23 1852)  SpO2: 100 % (01/23/23 1852) Vital Signs (24h Range):  Temp:  [97.9 °F (36.6 °C)] 97.9 °F (36.6 °C)  Pulse:  [] 104  Resp:  [12-30] 12  SpO2:  [94 %-100 %] 100 %  BP: ()/(42-87) 127/71     Weight: 114.3 kg (252 lb)  Body mass index is 36.16 kg/m².    Intake/Output Summary (Last 24 hours) at 1/23/2023 1859  Last data filed at 1/23/2023 1708  Gross per 24 hour   Intake 2957.87 ml   Output --   Net 2957.87 ml      Physical Exam  Vitals and nursing note reviewed.   Constitutional:       Appearance: He is well-developed.   HENT:      Head: Atraumatic.      Right Ear: External ear normal.      Left Ear: External ear normal.      Nose: Nose normal.      Mouth/Throat:      Mouth: Mucous membranes are moist.   Cardiovascular:      Rate and Rhythm: Tachycardia present. Rhythm irregular.   Pulmonary:      Effort: Pulmonary effort is normal.   Musculoskeletal:         General: Normal range of motion.      Cervical back: Full passive range of motion without pain and normal range of motion.   Skin:     General: Skin is warm.   Neurological:      Mental Status: He is alert and oriented to person, place, and time.   Psychiatric:         Behavior: Behavior normal.       Significant Labs: All pertinent labs within the past 24 hours have been reviewed.  CBC:   Recent Labs   Lab 01/22/23 2040 01/23/23 0311   WBC 13.79* 16.26*   HGB 13.1* 12.4*   HCT 43.1 40.2    251     CMP:   Recent Labs   Lab 01/22/23 2040 01/22/23 2122 01/23/23 0311    139 138   K 4.4 4.5 4.8    104 104   CO2 25 25 25   * 118* 167*   BUN 26* 25* 25*   CREATININE 1.0 1.0 0.8   CALCIUM 8.6* 8.8 8.3*   PROT 6.3  --   --    ALBUMIN 2.5*  --   --    BILITOT 0.7  --   --    ALKPHOS 92  --   --    AST 12  --   --     ALT 9*  --   --    ANIONGAP 9 10 9       Significant Imaging: I have reviewed all pertinent imaging results/findings within the past 24 hours.      Assessment/Plan:      * Sepsis  uro sepsis  UTI grew Pseudomonas   Was  on levophed and now weaned off       Encephalopathy, metabolic  As above  Condition improving         Urinary tract infection associated with indwelling urethral catheter  Maintain present Rx       Urinary retention  Has chronic indwelling messina catheter  Can go back to SNF with Messina catheter       Pressure injury of buttock, stage 1  Aware         Benign prostatic hyperplasia with urinary retention  Aware  Chronic issue           Type 2 diabetes mellitus with diabetic polyneuropathy, without long-term current use of insulin  SSI while here     Atrial fibrillation  Chronic stable issue           VTE Risk Mitigation (From admission, onward)         Ordered     apixaban tablet 5 mg  2 times daily         01/23/23 0012     IP VTE HIGH RISK PATIENT  Once         01/23/23 0012     Place sequential compression device  Until discontinued         01/23/23 0012     Reason for No Pharmacological VTE Prophylaxis  Once        Question:  Reasons:  Answer:  Already adequately anticoagulated on oral Anticoagulants    01/23/23 0012                Discharge Planning   BRENNAN: 1/25/2023     Code Status: Full Code   Is the patient medically ready for discharge?:     Reason for patient still in hospital (select all that apply): Treatment                     Clyde Nagy MD  Department of Hospital Medicine   [facility] - Emergency Dept

## 2023-01-24 NOTE — SUBJECTIVE & OBJECTIVE
Interval History:     Review of Systems   Unable to perform ROS: Mental status change   Objective:     Vital Signs (Most Recent):  Temp: 97.7 °F (36.5 °C) (01/24/23 1500)  Pulse: 106 (01/24/23 1500)  Resp: 17 (01/24/23 1500)  BP: (!) 142/87 (01/24/23 1500)  SpO2: 99 % (01/24/23 1500)   Vital Signs (24h Range):  Temp:  [97.3 °F (36.3 °C)-98.3 °F (36.8 °C)] 97.7 °F (36.5 °C)  Pulse:  [] 106  Resp:  [15-18] 17  SpO2:  [97 %-100 %] 99 %  BP: ()/(57-87) 142/87     Weight: 114.8 kg (253 lb 1.4 oz)  Body mass index is 36.31 kg/m².    Intake/Output Summary (Last 24 hours) at 1/24/2023 1651  Last data filed at 1/24/2023 1604  Gross per 24 hour   Intake 1045.42 ml   Output 2150 ml   Net -1104.58 ml      Physical Exam  Vitals and nursing note reviewed.   Constitutional:       Appearance: He is well-developed.      Comments: Somnolent    HENT:      Head: Atraumatic.      Right Ear: External ear normal.      Left Ear: External ear normal.      Nose: Nose normal.      Mouth/Throat:      Mouth: Mucous membranes are moist.   Cardiovascular:      Rate and Rhythm: Normal rate.   Pulmonary:      Effort: Pulmonary effort is normal.   Abdominal:      Palpations: Abdomen is soft.   Genitourinary:     Comments: Sotelo   Musculoskeletal:         General: Normal range of motion.      Cervical back: Full passive range of motion without pain and normal range of motion.   Skin:     General: Skin is warm.   Neurological:      Comments: Somnolent        Significant Labs: All pertinent labs within the past 24 hours have been reviewed.  CBC:   Recent Labs   Lab 01/22/23 2040 01/23/23  0311 01/24/23  0433   WBC 13.79* 16.26* 11.04   HGB 13.1* 12.4* 11.5*   HCT 43.1 40.2 37.0*    251 199     CMP:   Recent Labs   Lab 01/22/23  2040 01/22/23 2122 01/23/23 0311 01/24/23  0433    139 138 137   K 4.4 4.5 4.8 3.8    104 104 104   CO2 25 25 25 26   * 118* 167* 109   BUN 26* 25* 25* 17   CREATININE 1.0 1.0 0.8 0.7    CALCIUM 8.6* 8.8 8.3* 8.0*   PROT 6.3  --   --   --    ALBUMIN 2.5*  --   --   --    BILITOT 0.7  --   --   --    ALKPHOS 92  --   --   --    AST 12  --   --   --    ALT 9*  --   --   --    ANIONGAP 9 10 9 7*       Significant Imaging: I have reviewed all pertinent imaging results/findings within the past 24 hours.

## 2023-01-24 NOTE — PLAN OF CARE
Problem: Infection  Goal: Absence of Infection Signs and Symptoms  Outcome: Ongoing, Progressing     Problem: Adult Inpatient Plan of Care  Goal: Plan of Care Review  Outcome: Ongoing, Progressing  Goal: Patient-Specific Goal (Individualized)  Outcome: Ongoing, Progressing  Goal: Absence of Hospital-Acquired Illness or Injury  Outcome: Ongoing, Progressing  Goal: Optimal Comfort and Wellbeing  Outcome: Ongoing, Progressing  Goal: Readiness for Transition of Care  Outcome: Ongoing, Progressing     Problem: Diabetes Comorbidity  Goal: Blood Glucose Level Within Targeted Range  Outcome: Ongoing, Progressing     Problem: Adjustment to Illness (Sepsis/Septic Shock)  Goal: Optimal Coping  Outcome: Ongoing, Progressing     Problem: Bleeding (Sepsis/Septic Shock)  Goal: Absence of Bleeding  Outcome: Ongoing, Progressing     Problem: Glycemic Control Impaired (Sepsis/Septic Shock)  Goal: Blood Glucose Level Within Desired Range  Outcome: Ongoing, Progressing     Problem: Infection Progression (Sepsis/Septic Shock)  Goal: Absence of Infection Signs and Symptoms  Outcome: Ongoing, Progressing     Problem: Nutrition Impaired (Sepsis/Septic Shock)  Goal: Optimal Nutrition Intake  Outcome: Ongoing, Progressing     Problem: Skin Injury Risk Increased  Goal: Skin Health and Integrity  Outcome: Ongoing, Progressing

## 2023-01-24 NOTE — PLAN OF CARE
[facility]  Initial Discharge Assessment       Primary Care Provider: Gautam Castanon III, MD    Admission Diagnosis: Metabolic encephalopathy [G93.41]  Urinary tract infection with hematuria, site unspecified [N39.0, R31.9]    Admission Date: 1/22/2023  Expected Discharge Date: 1/25/2023    Discharge Barriers Identified: None    Initial assessment completed at bedside with patient and over phone with spouse Galina. Patient was unable to participate    Payor: [employer] MANAGED MCARE / Plan: "[employer] [facility] MEDICARE ADVANTAGE / Product Type: Medicare Advantage /     Extended Emergency Contact Information  Primary Emergency Contact: Galina Rodríguez  Mobile Phone: [phone]  Relation: Relative  Preferred language: English   needed? No    Discharge Plan A: Home Health  Discharge Plan B: Home Health      [id number] [place] - [address]  [address]  [place] [address]  Phone: [phone] Fax: [phone]    [facility] - [address]  [address] [facility]  [address] [place] [address]  Phone: [phone] Fax: [phone]    [facility]  [address]  [facility] [address]  Phone: [phone] Fax: [phone]      Initial Assessment (most recent)       Adult Discharge Assessment - 01/24/23 1149          Discharge Assessment    Assessment Type Discharge Planning Assessment     Confirmed/corrected address, phone number and insurance Yes     Confirmed Demographics Correct on Facesheet     Source of Information patient     When was your last doctors appointment? 01/17/23     Reason For Admission metabolic encephalopathy     People in Home spouse     Facility Arrived From: [facility]     Do you expect to return to your current living situation? No     Do you have help at home or someone to help you manage your care at home? Yes     Who are your caregiver(s)  and their phone number(s)? Galina (spouse)[phone]     Walking or Climbing Stairs ambulation difficulty, requires equipment     Mobility Management wheelchair     Dressing/Bathing bathing difficulty, assistance 1 person     Dressing/Bathing Management shower chair     Do you have any problems with: Needs other help     Specify other help Galina (spouse)[phone]     Home Layout Able to live on 1st floor     Equipment Currently Used at Home walker, rolling;wheelchair;bedside commode;shower chair;grab bar     Readmission within 30 days? No   OBS 3 weeks ago    Do you take prescription medications? Yes     Do you have prescription coverage? Yes     Coverage [facility]     Do you have any problems affording any of your prescribed medications? No     Is the patient taking medications as prescribed? yes     Who is going to help you get home at discharge? Galina (spouse)[phone]     How do you get to doctors appointments? family or friend will provide     Are you on dialysis? No     Do you take coumadin? No     Discharge Plan A Home Health     Discharge Plan B Home Health     DME Needed Upon Discharge  none     Discharge Plan discussed with: Spouse/sig other;Patient     Name(s) and Number(s) Galina (spouse)[phone]     Discharge Barriers Identified None        Physical Activity    On average, how many days per week do you engage in moderate to strenuous exercise (like a brisk walk)? 0 days     On average, how many minutes do you engage in exercise at this level? 0 min        Financial Resource Strain    How hard is it for you to pay for the very basics like food, housing, medical care, and heating? Not hard at all        Housing Stability    In the last 12 months, was there a time when you were not able to pay the mortgage or rent on time? No     In the last 12 months, how many places have you lived? 1     In the last 12 months, was there a time when you did not have a steady place to sleep or slept in a shelter  (including now)? No        Transportation Needs    In the past 12 months, has lack of transportation kept you from medical appointments or from getting medications? No     In the past 12 months, has lack of transportation kept you from meetings, work, or from getting things needed for daily living? No        Food Insecurity    Within the past 12 months, you worried that your food would run out before you got the money to buy more. Never true     Within the past 12 months, the food you bought just didn't last and you didn't have money to get more. Never true        Stress    Do you feel stress - tense, restless, nervous, or anxious, or unable to sleep at night because your mind is troubled all the time - these days? To some extent        Social Connections    In a typical week, how many times do you talk on the phone with family, friends, or neighbors? More than three times a week     How often do you get together with friends or relatives? Once a week     How often do you attend Taoism or Adventist services? Never     Do you belong to any clubs or organizations such as Taoism groups, unions, fraternal or athletic groups, or school groups? No     How often do you attend meetings of the clubs or organizations you belong to? Never     Are you , , , , never , or living with a partner?         Alcohol Use    Q1: How often do you have a drink containing alcohol? Never     Q2: How many drinks containing alcohol do you have on a typical day when you are drinking? Patient does not drink     Q3: How often do you have six or more drinks on one occasion? Never        OTHER    Name(s) of People in Home Galina (spouse)[phone]

## 2023-01-24 NOTE — CARE UPDATE
01/24/23 0946   PRE-TX-O2   Device (Oxygen Therapy) nasal cannula   $ Is the patient on Low Flow Oxygen? Yes   Flow (L/min) 2   SpO2 98 %   Pulse Oximetry Type Intermittent   $ Pulse Oximetry - Multiple Charge Pulse Oximetry - Multiple   Pulse (!) 114   Resp 15   Respiratory Evaluation   $ Care Plan Tech Time 15 min

## 2023-01-24 NOTE — PROGRESS NOTES
Report given to VERONICA Medley. Pt transferred in hospital bed with telemetry box on. Pt denies any pain or SOB, no distress noted. Family with pt at bedside.

## 2023-01-24 NOTE — HOSPITAL COURSE
Patient with chronic Urinary retention and has Sotelo insitu got admitted from SNF with Sepsis(uro sepsis)  Sepsis and associated septic shock was treated with pressors/iv fluids and iv abx  UC/BC grew Pseudomonas  Pt was evaluated by Urologist   Pts condition got better and was discharged back to SNF

## 2023-01-24 NOTE — ASSESSMENT & PLAN NOTE
Uro sepsis  UC and BC Grew Pseudomonas   Was  on levophed and now weaned off   Rending repeat blood culture results  If stable can go back to SNF tomorrow with Chronic indwelling messina

## 2023-01-24 NOTE — PLAN OF CARE
Problem: Infection  Goal: Absence of Infection Signs and Symptoms  Outcome: Ongoing, Progressing     Problem: Adult Inpatient Plan of Care  Goal: Plan of Care Review  Outcome: Ongoing, Progressing  Goal: Patient-Specific Goal (Individualized)  Outcome: Ongoing, Progressing  Goal: Absence of Hospital-Acquired Illness or Injury  Outcome: Ongoing, Progressing  Goal: Optimal Comfort and Wellbeing  Outcome: Ongoing, Progressing  Goal: Readiness for Transition of Care  Outcome: Ongoing, Progressing     Problem: Diabetes Comorbidity  Goal: Blood Glucose Level Within Targeted Range  Outcome: Ongoing, Progressing     Problem: Adjustment to Illness (Sepsis/Septic Shock)  Goal: Optimal Coping  Outcome: Ongoing, Progressing     Problem: Bleeding (Sepsis/Septic Shock)  Goal: Absence of Bleeding  Outcome: Ongoing, Progressing     Problem: Glycemic Control Impaired (Sepsis/Septic Shock)  Goal: Blood Glucose Level Within Desired Range  Outcome: Ongoing, Progressing     Problem: Infection Progression (Sepsis/Septic Shock)  Goal: Absence of Infection Signs and Symptoms  Outcome: Ongoing, Progressing     Problem: Nutrition Impaired (Sepsis/Septic Shock)  Goal: Optimal Nutrition Intake  Outcome: Ongoing, Progressing     Problem: Skin Injury Risk Increased  Goal: Skin Health and Integrity  Outcome: Ongoing, Progressing     Problem: Impaired Wound Healing  Goal: Optimal Wound Healing  Outcome: Ongoing, Progressing

## 2023-01-24 NOTE — SUBJECTIVE & OBJECTIVE
Interval History:     Review of Systems   Constitutional:  Positive for fatigue. Negative for activity change and appetite change.   HENT:  Negative for congestion and dental problem.    Eyes:  Negative for discharge and itching.   Respiratory:  Negative for shortness of breath.    Cardiovascular:  Positive for palpitations. Negative for chest pain.   Gastrointestinal:  Negative for abdominal distention and abdominal pain.   Endocrine: Negative for cold intolerance.   Genitourinary:  Negative for difficulty urinating and dysuria.   Musculoskeletal:  Negative for arthralgias and back pain.   Skin:  Negative for color change.   Neurological:  Negative for dizziness and facial asymmetry.   Hematological:  Negative for adenopathy.   Psychiatric/Behavioral:  Negative for agitation and behavioral problems.    Objective:     Vital Signs (Most Recent):  Temp: 97.9 °F (36.6 °C) (01/22/23 2147)  Pulse: 104 (01/23/23 1852)  Resp: 12 (01/23/23 0729)  BP: 127/71 (01/23/23 1852)  SpO2: 100 % (01/23/23 1852) Vital Signs (24h Range):  Temp:  [97.9 °F (36.6 °C)] 97.9 °F (36.6 °C)  Pulse:  [] 104  Resp:  [12-30] 12  SpO2:  [94 %-100 %] 100 %  BP: ()/(42-87) 127/71     Weight: 114.3 kg (252 lb)  Body mass index is 36.16 kg/m².    Intake/Output Summary (Last 24 hours) at 1/23/2023 1859  Last data filed at 1/23/2023 1708  Gross per 24 hour   Intake 2957.87 ml   Output --   Net 2957.87 ml      Physical Exam  Vitals and nursing note reviewed.   Constitutional:       Appearance: He is well-developed.   HENT:      Head: Atraumatic.      Right Ear: External ear normal.      Left Ear: External ear normal.      Nose: Nose normal.      Mouth/Throat:      Mouth: Mucous membranes are moist.   Cardiovascular:      Rate and Rhythm: Tachycardia present. Rhythm irregular.   Pulmonary:      Effort: Pulmonary effort is normal.   Musculoskeletal:         General: Normal range of motion.      Cervical back: Full passive range of motion  without pain and normal range of motion.   Skin:     General: Skin is warm.   Neurological:      Mental Status: He is alert and oriented to person, place, and time.   Psychiatric:         Behavior: Behavior normal.       Significant Labs: All pertinent labs within the past 24 hours have been reviewed.  CBC:   Recent Labs   Lab 01/22/23 2040 01/23/23 0311   WBC 13.79* 16.26*   HGB 13.1* 12.4*   HCT 43.1 40.2    251     CMP:   Recent Labs   Lab 01/22/23 2040 01/22/23 2122 01/23/23 0311    139 138   K 4.4 4.5 4.8    104 104   CO2 25 25 25   * 118* 167*   BUN 26* 25* 25*   CREATININE 1.0 1.0 0.8   CALCIUM 8.6* 8.8 8.3*   PROT 6.3  --   --    ALBUMIN 2.5*  --   --    BILITOT 0.7  --   --    ALKPHOS 92  --   --    AST 12  --   --    ALT 9*  --   --    ANIONGAP 9 10 9       Significant Imaging: I have reviewed all pertinent imaging results/findings within the past 24 hours.

## 2023-01-24 NOTE — PT/OT/SLP PROGRESS
Occupational Therapy      Patient Name:  Hiro Rodríguez   MRN:  [id number]    Patient not seen today secondary to Patient unwilling to participate. Will follow-up tomorrow.    1/24/2023

## 2023-01-25 PROBLEM — Z75.8 DISCHARGE PLANNING ISSUES: Status: ACTIVE | Noted: 2023-01-25

## 2023-01-25 PROBLEM — G93.41 ENCEPHALOPATHY, METABOLIC: Status: RESOLVED | Noted: 2023-01-22 | Resolved: 2023-01-25

## 2023-01-25 PROBLEM — Z02.9 DISCHARGE PLANNING ISSUES: Status: ACTIVE | Noted: 2023-01-25

## 2023-01-25 PROBLEM — A41.9 SEPSIS: Status: RESOLVED | Noted: 2023-01-23 | Resolved: 2023-01-25

## 2023-01-25 LAB
ANION GAP SERPL CALC-SCNC: 8 MMOL/L (ref 8–16)
BACTERIA BLD CULT: ABNORMAL
BASOPHILS # BLD AUTO: 0.02 K/UL (ref 0–0.2)
BASOPHILS NFR BLD: 0.2 % (ref 0–1.9)
BUN SERPL-MCNC: 12 MG/DL (ref 8–23)
CALCIUM SERPL-MCNC: 8.1 MG/DL (ref 8.7–10.5)
CHLORIDE SERPL-SCNC: 105 MMOL/L (ref 95–110)
CO2 SERPL-SCNC: 25 MMOL/L (ref 23–29)
CREAT SERPL-MCNC: 0.7 MG/DL (ref 0.5–1.4)
DIFFERENTIAL METHOD: ABNORMAL
EOSINOPHIL # BLD AUTO: 0.1 K/UL (ref 0–0.5)
EOSINOPHIL NFR BLD: 0.6 % (ref 0–8)
ERYTHROCYTE [DISTWIDTH] IN BLOOD BY AUTOMATED COUNT: 14.6 % (ref 11.5–14.5)
EST. GFR  (NO RACE VARIABLE): >60 ML/MIN/1.73 M^2
GLUCOSE SERPL-MCNC: 101 MG/DL (ref 70–110)
GLUCOSE SERPL-MCNC: 105 MG/DL (ref 70–110)
GLUCOSE SERPL-MCNC: 113 MG/DL (ref 70–110)
GLUCOSE SERPL-MCNC: 92 MG/DL (ref 70–110)
HCT VFR BLD AUTO: 35 % (ref 40–54)
HGB BLD-MCNC: 11 G/DL (ref 14–18)
IMM GRANULOCYTES # BLD AUTO: 0.06 K/UL (ref 0–0.04)
IMM GRANULOCYTES NFR BLD AUTO: 0.7 % (ref 0–0.5)
LYMPHOCYTES # BLD AUTO: 1.3 K/UL (ref 1–4.8)
LYMPHOCYTES NFR BLD: 16.4 % (ref 18–48)
MAGNESIUM SERPL-MCNC: 1.7 MG/DL (ref 1.6–2.6)
MCH RBC QN AUTO: 29.8 PG (ref 27–31)
MCHC RBC AUTO-ENTMCNC: 31.4 G/DL (ref 32–36)
MCV RBC AUTO: 95 FL (ref 82–98)
MONOCYTES # BLD AUTO: 0.6 K/UL (ref 0.3–1)
MONOCYTES NFR BLD: 7.1 % (ref 4–15)
NEUTROPHILS # BLD AUTO: 6.1 K/UL (ref 1.8–7.7)
NEUTROPHILS NFR BLD: 75 % (ref 38–73)
NRBC BLD-RTO: 0 /100 WBC
PLATELET # BLD AUTO: 223 K/UL (ref 150–450)
PMV BLD AUTO: 10 FL (ref 9.2–12.9)
POTASSIUM SERPL-SCNC: 3.2 MMOL/L (ref 3.5–5.1)
RBC # BLD AUTO: 3.69 M/UL (ref 4.6–6.2)
SODIUM SERPL-SCNC: 138 MMOL/L (ref 136–145)
WBC # BLD AUTO: 8.15 K/UL (ref 3.9–12.7)

## 2023-01-25 PROCEDURE — 21400001 HC TELEMETRY ROOM

## 2023-01-25 PROCEDURE — 36415 COLL VENOUS BLD VENIPUNCTURE: CPT | Performed by: INTERNAL MEDICINE

## 2023-01-25 PROCEDURE — 83735 ASSAY OF MAGNESIUM: CPT | Performed by: INTERNAL MEDICINE

## 2023-01-25 PROCEDURE — 63600175 PHARM REV CODE 636 W HCPCS: Performed by: INTERNAL MEDICINE

## 2023-01-25 PROCEDURE — 85025 COMPLETE CBC W/AUTO DIFF WBC: CPT | Performed by: INTERNAL MEDICINE

## 2023-01-25 PROCEDURE — 97535 SELF CARE MNGMENT TRAINING: CPT

## 2023-01-25 PROCEDURE — 97110 THERAPEUTIC EXERCISES: CPT | Mod: CQ

## 2023-01-25 PROCEDURE — 25000242 PHARM REV CODE 250 ALT 637 W/ HCPCS: Performed by: INTERNAL MEDICINE

## 2023-01-25 PROCEDURE — 80048 BASIC METABOLIC PNL TOTAL CA: CPT | Performed by: INTERNAL MEDICINE

## 2023-01-25 PROCEDURE — 25000003 PHARM REV CODE 250: Performed by: INTERNAL MEDICINE

## 2023-01-25 PROCEDURE — 97530 THERAPEUTIC ACTIVITIES: CPT | Mod: CQ

## 2023-01-25 PROCEDURE — 25000003 PHARM REV CODE 250: Performed by: STUDENT IN AN ORGANIZED HEALTH CARE EDUCATION/TRAINING PROGRAM

## 2023-01-25 RX ORDER — TALC
6 POWDER (GRAM) TOPICAL NIGHTLY PRN
Status: DISCONTINUED | OUTPATIENT
Start: 2023-01-25 | End: 2023-01-26 | Stop reason: HOSPADM

## 2023-01-25 RX ORDER — CIPROFLOXACIN 500 MG/1
500 TABLET ORAL 2 TIMES DAILY
Qty: 28 TABLET | Refills: 0 | Status: SHIPPED | OUTPATIENT
Start: 2023-01-25 | End: 2023-02-08

## 2023-01-25 RX ADMIN — PIPERACILLIN SODIUM AND TAZOBACTAM SODIUM 4.5 G: 4; .5 INJECTION, POWDER, LYOPHILIZED, FOR SOLUTION INTRAVENOUS at 10:01

## 2023-01-25 RX ADMIN — Medication 6 MG: at 12:01

## 2023-01-25 RX ADMIN — POTASSIUM BICARBONATE 35 MEQ: 391 TABLET, EFFERVESCENT ORAL at 10:01

## 2023-01-25 RX ADMIN — Medication 800 MG: at 08:01

## 2023-01-25 RX ADMIN — Medication 6 MG: at 08:01

## 2023-01-25 RX ADMIN — PSYLLIUM HUSK 1 PACKET: 3.4 POWDER ORAL at 08:01

## 2023-01-25 RX ADMIN — APIXABAN 5 MG: 5 TABLET, FILM COATED ORAL at 08:01

## 2023-01-25 RX ADMIN — FAMOTIDINE 20 MG: 20 TABLET, FILM COATED ORAL at 08:01

## 2023-01-25 RX ADMIN — TOPIRAMATE 50 MG: 25 TABLET, FILM COATED ORAL at 08:01

## 2023-01-25 RX ADMIN — ATORVASTATIN CALCIUM 40 MG: 40 TABLET, FILM COATED ORAL at 08:01

## 2023-01-25 RX ADMIN — PIPERACILLIN SODIUM AND TAZOBACTAM SODIUM 4.5 G: 4; .5 INJECTION, POWDER, LYOPHILIZED, FOR SOLUTION INTRAVENOUS at 03:01

## 2023-01-25 RX ADMIN — OXYBUTYNIN CHLORIDE 10 MG: 5 TABLET, EXTENDED RELEASE ORAL at 08:01

## 2023-01-25 RX ADMIN — POTASSIUM BICARBONATE 35 MEQ: 391 TABLET, EFFERVESCENT ORAL at 08:01

## 2023-01-25 RX ADMIN — AMIODARONE HYDROCHLORIDE 200 MG: 200 TABLET ORAL at 08:01

## 2023-01-25 RX ADMIN — PIPERACILLIN SODIUM AND TAZOBACTAM SODIUM 4.5 G: 4; .5 INJECTION, POWDER, LYOPHILIZED, FOR SOLUTION INTRAVENOUS at 08:01

## 2023-01-25 NOTE — PT/OT/SLP PROGRESS
Occupational Therapy   Treatment    Name: Hiro Rodríguez  MRN: [id number]  Admitting Diagnosis:  Sepsis       Recommendations:     Discharge Recommendations: nursing facility, skilled  Discharge Equipment Recommendations:   (TBD)  Barriers to discharge:  Decreased caregiver support    Assessment:     Hiro Rodríguez is a 77 y.o. male with a medical diagnosis of Sepsis.  Performance deficits affecting function are weakness, impaired endurance, impaired self care skills, impaired functional mobility, gait instability, impaired balance, decreased lower extremity function, decreased safety awareness, impaired cardiopulmonary response to activity.     Rehab Prognosis:  Fair; patient would benefit from acute skilled OT services to address these deficits and reach maximum level of function.       Plan:     Patient to be seen 5 x/week to address the above listed problems via self-care/home management, therapeutic activities, therapeutic exercises  Plan of Care Expires: 02/23/23  Plan of Care Reviewed with: patient, family    Subjective     Pain/Comfort:  Pain Rating 1: 0/10  Pain Rating Post-Intervention 1: 0/10    Objective:     Communicated with: nurse Dietz prior to session.  Patient found HOB elevated with messina catheter, peripheral IV, telemetry upon OT entry to room.    General Precautions: Standard, fall    Orthopedic Precautions:LLE weight bearing as tolerated  Braces: N/A  Respiratory Status: Room air     Occupational Performance:     Bed Mobility:    Patient completed Scooting/Bridging with moderate assistance  Patient completed Supine to Sit with moderate assistance     Functional Mobility/Transfers:  Patient completed Sit <> Stand Transfer with maximal assistance  with  rolling walker   Patient completed Bed <> Chair Transfer using Stand Pivot technique with moderate assistance and maximal assistance with rolling walker    Activities of Daily Living:  Grooming: supervision with performing oral hygiene  while seated in chair.   Toileting: dependence with Purewick in place      AMPAC 6 Click ADL:      Treatment & Education:  OT ed patient on safety with walker use for functional mobility with cues for hand placement & sequencing.       Patient left up in chair with all lines intact, call button in reach, chair alarm on, and nurse notified    GOALS:   Multidisciplinary Problems       Occupational Therapy Goals          Problem: Occupational Therapy    Goal Priority Disciplines Outcome Interventions   Occupational Therapy Goal     OT, PT/OT     Description: Goals to be met by: 2/23/2023     Patient will increase functional independence with ADLs by performing:    UE Dressing with Supervision.  LE Dressing with Supervision.  Grooming while standing at sink with Supervision.  Toileting from toilet with Supervision for hygiene and clothing management.   Toilet transfer to toilet with Supervision.                         Time Tracking:     OT Date of Treatment: 01/25/23  OT Start Time: 1022  OT Stop Time: 1050  OT Total Time (min): 28 min    Billable Minutes:Self Care/Home Management 28    OT/PILO: OT          1/25/2023

## 2023-01-25 NOTE — PLAN OF CARE
Patient cleared for discharge from case management standpoint.    Follow up appointments scheduled and added to AVS. CM referred patient to home NP program.    CM sent referral to  at [facility] home health. Patient is known to home [facility], Gurdeep planned for 1/26.    Chart and discharge orders reviewed.  Patient discharged home with [facility] no further case management needs.        1645-   PATIENT AND SPOUSE NOW WANT SNF.   WOO contacted Kristie at [facility]. Kristie submitted auth and [facility] requested godfrey to peer. Dr Nagy did peer to peer but [facility] medical director requested todays PT note. CM sent to Kristie whom uploaded to [facility] auth request. WOO notified Nurse J Luis of discharge plan change.         01/25/23 1155   Final Note   Assessment Type Final Discharge Note   Anticipated Discharge Disposition [facility] Resources/Appts/Education Provided Provided patient/caregiver with written discharge plan information;Appointments scheduled and added to AVS   Post-Acute Status   Post-Acute Authorization [facility]   Home Health Status Set-up Complete/Auth obtained

## 2023-01-25 NOTE — PT/OT/SLP PROGRESS
Physical Therapy Treatment    Patient Name:  Hiro Rodríguez   MRN:  [id number]    Recommendations:     Discharge Recommendations: nursing facility, skilled  Discharge Equipment Recommendations: other (see comments) (TBD)  Barriers to discharge:  increased assist with mobility    Assessment:     Hiro Rodríguez is a 77 y.o. male admitted with a medical diagnosis of Sepsis.  He presents with the following impairments/functional limitations: weakness, impaired endurance, impaired self care skills, impaired functional mobility, gait instability, impaired balance, decreased lower extremity function, decreased safety awareness, impaired cardiopulmonary response to activity.    RN and extender attempting to transfer pt from bed to chair but unable to complete transfer. Therapist and tech assist to stand pivot pt from chair to bed with max to total assist x 4. Pt required max assist x 2-3 for sit to supine transfer. RN reports that pt's spouse plans to take him home today and that she has messaged CM about pt needing to go to a rehab facility as he still requires a lot of assistance. Therapist discussed with pt and wife the plan for wife to take pt home with HHPT and pt's mobility goals for home. Pt and spouse educated that pt is still weak and still required significant assistance for mobility which exceeds what the wife can provide at home. CM entered and assisted with convincing pt and spouse that it would be difficult to manage at home with pt as weak as he is currently and that he has a week of SNF days left and it would be beneficial for him to finish out his skilled nursing days and then return home. Pt and spouse in agreement that pt should finish out his skilled nursing days and then return home.    Rehab Prognosis: Fair; patient would benefit from acute skilled PT services to address these deficits and reach maximum level of function.    Recent Surgery: * No surgery found *      Plan:     During this  hospitalization, patient to be seen 6 x/week to address the identified rehab impairments via gait training, therapeutic activities, therapeutic exercises and progress toward the following goals:    Plan of Care Expires:       Subjective     Chief Complaint: pt reports that he is just fatigued from sitting up for 4 hours and that is why is was difficult for him to transfer back to bed  Patient/Family Comments/goals: for pt to be stronger enough for spouse to take care of him at home  Pain/Comfort:  Pain Rating 1: 0/10      Objective:     Communicated with RN prior to session.  Patient found  RN and extender attempting to get pt from chair to bed  with messina catheter, peripheral IV, telemetry upon PT entry to room.     General Precautions: Standard, fall  Orthopedic Precautions: LLE weight bearing as tolerated  Braces:    Respiratory Status: Nasal cannula, flow 2 L/min     Functional Mobility:  Bed Mobility:     Sit to Supine: maximal assistance and of 2-3 persons  Transfers:     Bed to Chair: maximal assistance, total assistance, and of 4 persons with  no AD  using  Stand Pivot      AM-PAC 6 CLICK MOBILITY          Treatment & Education:  Pt and spouse educated on discharge recommendations and how much assistance pt is requiring for mobility and that it would be beneficial for him to finish out his SNF days to get stronger before returning home as the wife is the only one at home to assist pt.  Pt instructed in supine LE TE with demonstration including SLR, heel slides, hip abd/add, and ankle DF/PF with verbal cuing for proper form and pacing for optimal strengthening.      Patient left HOB elevated with all lines intact, call button in reach, bed alarm on, RN notified, and spouse and CM present..    GOALS:   Multidisciplinary Problems       Physical Therapy Goals          Problem: Physical Therapy    Goal Priority Disciplines Outcome Goal Variances Interventions   Physical Therapy Goal     PT, PT/OT Ongoing,  Progressing     Description: All physical therapy goals to be met by discharge:    1. Supine to sit with Stand-by Assistance  2. Sit to stand transfer with Stand-by Assistance  3.. Bed to chair transfer with Supervision using Rolling Walker  4. Gait  x 50 feet with Minimal Assistance using Rolling Walker.                         Time Tracking:     PT Received On: 01/25/23  PT Start Time: 1326     PT Stop Time: 1405  PT Total Time (min): 39 min     Billable Minutes: Therapeutic Activity 9 and Therapeutic Exercise 30    Treatment Type: Treatment  PT/PTA: PTA     PTA Visit Number: 2     01/25/2023

## 2023-01-26 VITALS
DIASTOLIC BLOOD PRESSURE: 95 MMHG | HEART RATE: 96 BPM | SYSTOLIC BLOOD PRESSURE: 159 MMHG | OXYGEN SATURATION: 98 % | HEIGHT: 70 IN | WEIGHT: 253 LBS | RESPIRATION RATE: 18 BRPM | TEMPERATURE: 97 F | BODY MASS INDEX: 36.22 KG/M2

## 2023-01-26 LAB
GLUCOSE SERPL-MCNC: 102 MG/DL (ref 70–110)
GLUCOSE SERPL-MCNC: 98 MG/DL (ref 70–110)

## 2023-01-26 PROCEDURE — 63600175 PHARM REV CODE 636 W HCPCS: Performed by: INTERNAL MEDICINE

## 2023-01-26 PROCEDURE — 25000003 PHARM REV CODE 250: Performed by: INTERNAL MEDICINE

## 2023-01-26 PROCEDURE — 97530 THERAPEUTIC ACTIVITIES: CPT

## 2023-01-26 RX ADMIN — ATORVASTATIN CALCIUM 40 MG: 40 TABLET, FILM COATED ORAL at 09:01

## 2023-01-26 RX ADMIN — PIPERACILLIN SODIUM AND TAZOBACTAM SODIUM 4.5 G: 4; .5 INJECTION, POWDER, LYOPHILIZED, FOR SOLUTION INTRAVENOUS at 11:01

## 2023-01-26 RX ADMIN — PIPERACILLIN SODIUM AND TAZOBACTAM SODIUM 4.5 G: 4; .5 INJECTION, POWDER, LYOPHILIZED, FOR SOLUTION INTRAVENOUS at 04:01

## 2023-01-26 RX ADMIN — TOPIRAMATE 50 MG: 25 TABLET, FILM COATED ORAL at 09:01

## 2023-01-26 RX ADMIN — APIXABAN 5 MG: 5 TABLET, FILM COATED ORAL at 09:01

## 2023-01-26 RX ADMIN — AMIODARONE HYDROCHLORIDE 200 MG: 200 TABLET ORAL at 09:01

## 2023-01-26 RX ADMIN — FAMOTIDINE 20 MG: 20 TABLET, FILM COATED ORAL at 09:01

## 2023-01-26 RX ADMIN — OXYBUTYNIN CHLORIDE 10 MG: 5 TABLET, EXTENDED RELEASE ORAL at 09:01

## 2023-01-26 NOTE — SUBJECTIVE & OBJECTIVE
Interval History:     Review of Systems   Constitutional:  Positive for fatigue. Negative for activity change and appetite change.   HENT:  Negative for congestion and dental problem.    Eyes:  Negative for discharge and itching.   Respiratory:  Negative for shortness of breath.    Cardiovascular:  Negative for chest pain.   Gastrointestinal:  Negative for abdominal distention and abdominal pain.   Endocrine: Negative for cold intolerance.   Genitourinary:  Negative for difficulty urinating and dysuria.   Musculoskeletal:  Negative for arthralgias and back pain.   Skin:  Negative for color change.   Neurological:  Positive for weakness. Negative for dizziness and facial asymmetry.   Hematological:  Negative for adenopathy.   Psychiatric/Behavioral:  Negative for agitation and behavioral problems.    Objective:     Vital Signs (Most Recent):  Temp: 99.2 °F (37.3 °C) (01/25/23 1540)  Pulse: 108 (01/25/23 1540)  Resp: 18 (01/25/23 1540)  BP: 116/71 (01/25/23 1540)  SpO2: 99 % (01/25/23 1540) Vital Signs (24h Range):  Temp:  [97.6 °F (36.4 °C)-99.2 °F (37.3 °C)] 99.2 °F (37.3 °C)  Pulse:  [] 108  Resp:  [17-18] 18  SpO2:  [95 %-100 %] 99 %  BP: (116-135)/(69-82) 116/71     Weight: 114.8 kg (253 lb)  Body mass index is 36.3 kg/m².    Intake/Output Summary (Last 24 hours) at 1/25/2023 1942  Last data filed at 1/25/2023 1753  Gross per 24 hour   Intake 120 ml   Output 2220 ml   Net -2100 ml      Physical Exam  Vitals and nursing note reviewed.   Constitutional:       Appearance: He is well-developed.   HENT:      Head: Atraumatic.      Right Ear: External ear normal.      Left Ear: External ear normal.      Nose: Nose normal.      Mouth/Throat:      Mouth: Mucous membranes are moist.   Cardiovascular:      Rate and Rhythm: Normal rate.   Pulmonary:      Effort: Pulmonary effort is normal.   Musculoskeletal:         General: Normal range of motion.      Cervical back: Full passive range of motion without pain and  normal range of motion.   Skin:     General: Skin is warm.   Neurological:      Mental Status: He is alert and oriented to person, place, and time.   Psychiatric:         Behavior: Behavior normal.       Significant Labs: All pertinent labs within the past 24 hours have been reviewed.  CBC:   Recent Labs   Lab 01/24/23 0433 01/25/23 0434   WBC 11.04 8.15   HGB 11.5* 11.0*   HCT 37.0* 35.0*    223     CMP:   Recent Labs   Lab 01/24/23 0433 01/25/23 0434    138   K 3.8 3.2*    105   CO2 26 25    92   BUN 17 12   CREATININE 0.7 0.7   CALCIUM 8.0* 8.1*   ANIONGAP 7* 8       Significant Imaging: I have reviewed all pertinent imaging results/findings within the past 24 hours.

## 2023-01-26 NOTE — ASSESSMENT & PLAN NOTE
Uro sepsis  UC and BC Grew Pseudomonas   Was  on levophed and now weaned off   Repeat blood culture results were negative for any growth   Medically stable and  can go back to SNF tomorrow with Chronic indwelling messina

## 2023-01-26 NOTE — NURSING
Report called to [facility]. Spoke with Jarvis LARSEN . Their transport to pick him up in about 30 to 45 minutes.

## 2023-01-26 NOTE — ASSESSMENT & PLAN NOTE
Pt got discharged today  Had Peer to peer review with insurance company staff today  They want latest PT note from today  Awaiting insurance company decision pending tomorrow

## 2023-01-26 NOTE — PT/OT/SLP PROGRESS
Physical Therapy Treatment    Patient Name:  Hiro Rodríguez   MRN:  [id number]    Recommendations:     Discharge Recommendations: nursing facility, skilled  Discharge Equipment Recommendations: other (see comments) (TBD at next level of care)  Barriers to discharge:  increased level of assist needed    Assessment:     Hiro Rodríguez is a 77 y.o. male admitted with a medical diagnosis of Sepsis.  He presents with the following impairments/functional limitations: weakness, impaired endurance, impaired self care skills, impaired functional mobility, gait instability, impaired balance, decreased upper extremity function, decreased lower extremity function, impaired skin.    Pt found lying supin in bed with HOB slightly elevated.  Pt is A & oriented to self and type of place/situation.  He is agreeable to therapy.  Pt  continues to require maxA x2 for bed mobility, improved sit to stand transfers today with modA.  Vcs and tactile cues for weight shift for pt to side step left and right with [place] using RW.  Pt continues to fatigue easily and declines OOB.  Continue to recommend SNF placement for maximize safety and function prior to DC home with wife.    Rehab Prognosis: Fair; patient would benefit from acute skilled PT services to address these deficits and reach maximum level of function.    Recent Surgery: * No surgery found *      Plan:     During this hospitalization, patient to be seen 6 x/week to address the identified rehab impairments via gait training, therapeutic activities, therapeutic exercises, neuromuscular re-education and progress toward the following goals:    Plan of Care Expires:       Subjective     Chief Complaint: weakness  Patient/Family Comments/goals: go home  Pain/Comfort:  Pain Rating 1: 0/10      Objective:     Communicated with Shay MARTIN prior to session.  Patient found HOB elevated with messina catheter, peripheral IV, telemetry upon PT entry to room.     General Precautions: Standard,  fall  Orthopedic Precautions: LLE weight bearing as tolerated  Braces:    Respiratory Status: Nasal cannula, flow 2 L/min     Functional Mobility:  Bed Mobility:     Rolling Right: maximal assistance  Supine to Sit: maximal assistance and of 2 persons  Sit to Supine: maximal assistance and of 2 persons  Transfers:     Sit to Stand:  moderate assistance with rolling walker      AM-PAC 6 CLICK MOBILITY  Turning over in bed (including adjusting bedclothes, sheets and blankets)?: 2  Sitting down on and standing up from a chair with arms (e.g., wheelchair, bedside commode, etc.): 2  Moving from lying on back to sitting on the side of the bed?: 2  Moving to and from a bed to a chair (including a wheelchair)?: 1  Need to walk in hospital room?: 1  Climbing 3-5 steps with a railing?: 1  Basic Mobility Total Score: 9       Treatment & Education:  Pt was educated on the following: call light use, importance of OOB activity and functional mobility to negate the negative effects of prolonged bed rest during this hospitalization, safe transfers/ambulation and discharge planning recommendations/options.     Patient left HOB elevated with all lines intact, call button in reach, bed alarm on, and RN notified..    GOALS:   Multidisciplinary Problems       Physical Therapy Goals          Problem: Physical Therapy    Goal Priority Disciplines Outcome Goal Variances Interventions   Physical Therapy Goal     PT, PT/OT Ongoing, Progressing     Description: All physical therapy goals to be met by discharge:    1. Supine to sit with Stand-by Assistance  2. Sit to stand transfer with Stand-by Assistance  3.. Bed to chair transfer with Supervision using Rolling Walker  4. Gait  x 50 feet with Minimal Assistance using Rolling Walker.                         Time Tracking:     PT Received On: 01/26/23  PT Start Time: 1204     PT Stop Time: 1220  PT Total Time (min): 16 min     Billable Minutes: Therapeutic Activity 16    Treatment Type:  Treatment  PT/PTA: PT     PTA Visit Number: 2     01/26/2023

## 2023-01-26 NOTE — PLAN OF CARE
Problem: Adult Inpatient Plan of Care  Goal: Plan of Care Review  Outcome: Ongoing, Progressing  Goal: Patient-Specific Goal (Individualized)  Outcome: Ongoing, Progressing  Goal: Absence of Hospital-Acquired Illness or Injury  Outcome: Ongoing, Progressing  Goal: Optimal Comfort and Wellbeing  Outcome: Ongoing, Progressing  Goal: Readiness for Transition of Care  Outcome: Ongoing, Progressing     Problem: Bleeding (Sepsis/Septic Shock)  Goal: Absence of Bleeding  Outcome: Ongoing, Progressing     Problem: Infection Progression (Sepsis/Septic Shock)  Goal: Absence of Infection Signs and Symptoms  Outcome: Ongoing, Progressing

## 2023-01-26 NOTE — PLAN OF CARE
CM contacted [facility]-- Peer to peer line at -- [phone] option 5 to inquire about status of Peer to peer yesterday evening. [facility] has received updated clinicals and are reviewing for SNF consideration.

## 2023-01-26 NOTE — PLAN OF CARE
Patient cleared for discharge from case management standpoint.    WOO sent dc orders to [facility]. Nurse Day called report to facility nurse. [facility] is providing transportation from hospital to SNF.    Chart and discharge orders reviewed.  Patient discharged to [facility] with no further case management needs.         01/26/23 1445   Final Note   Assessment Type Final Discharge Note   Anticipated Discharge Disposition SNF   What phone number can be called within the next 1-3 days to see how you are doing after discharge? [phone]   Hospital Resources/Appts/Education Provided Provided patient/caregiver with written discharge plan information   Post-Acute Status   Post-Acute Authorization Placement   Post-Acute Placement Status Set-up Complete/Auth obtained   Home Health Status Discharge Plan Changed   Discharge Delays (!) Ambulance Transport/Facility Transport

## 2023-01-26 NOTE — PLAN OF CARE
Problem: Infection  Goal: Absence of Infection Signs and Symptoms  Outcome: Met     Problem: Adult Inpatient Plan of Care  Goal: Plan of Care Review  Outcome: Met  Goal: Patient-Specific Goal (Individualized)  Outcome: Met  Goal: Absence of Hospital-Acquired Illness or Injury  Outcome: Met  Goal: Optimal Comfort and Wellbeing  Outcome: Met  Goal: Readiness for Transition of Care  Outcome: Met     Problem: Diabetes Comorbidity  Goal: Blood Glucose Level Within Targeted Range  Outcome: Met     Problem: Adjustment to Illness (Sepsis/Septic Shock)  Goal: Optimal Coping  Outcome: Met     Problem: Bleeding (Sepsis/Septic Shock)  Goal: Absence of Bleeding  Outcome: Met     Problem: Glycemic Control Impaired (Sepsis/Septic Shock)  Goal: Blood Glucose Level Within Desired Range  Outcome: Met     Problem: Skin Injury Risk Increased  Goal: Skin Health and Integrity  Outcome: Met     Problem: Impaired Wound Healing  Goal: Optimal Wound Healing  Outcome: Met

## 2023-01-26 NOTE — PROGRESS NOTES
"[facility] includes the [facility] Medicine  Progress Note    Patient Name: Hiro Rodríguez  MRN: [id number]  Patient Class: IP- Inpatient   Admission Date: 1/22/2023  Length of Stay: 2 days  Attending Physician: Clyde Nagy MD  Primary Care Provider: Gautam Castanon III, MD        Subjective:     Principal Problem:Sepsis        HPI:  77 year old male with history of PAF (apixaban), HTN, DM 2, Morbid Obesity, s/p right hip ORIF 11/2022 (recuperating at local SNF) presented to  ED via EMS after being found with altered mental status at the SNF. History from wife at bedside. Patient is very hard of hearing and is currently altered. Reportedly he has been receiving PT/OT at SNF making slow progression back from above ORIF. This evening when his wife came to visit "He was just not himself". He was found to be hypotensive at the SNF and sent here. Upon presentation he was found to be hypotensive with BP 90's systolic, and tachycardic 110-120's, both of which normalized after bolus of 1 liter LR and eventually initiation of low dose norepinephrine.    He has had urinary retention and had had Sotelo catheter, which was removed 3 days ago at the SNF. He has known BPH. When he arrived here, Sotelo was reinserted and over a liter drained immediately. The patient denies any pain currently. No CP/SOB. No orthopnea, PND or edema.     Has stage 1 decubitus buttocks (POA).    In ED Labs reviewed and noted below: mild leukocytosis with normal Hct; normal electrolytes and renal function with prerenal azotemia; hypoalbuminemia with normal hepatic function; BNP is moderately elevated with minimal elevation of HS trop (repeat ordered and pending); Lactate is normal. UA: dirty with 2+ protein and 3+ occult blood--reflexed to culture. Blood cultured. CXR reviewed: Cardiomegaly with prominent vascularture and small basilar effusions. EKG reviewed: rated controlled a fib with deep inferolateral ST segment sagging, but not acute ST " segments.    CT Head: no acute intracranial pathology    CT Abdo/Pelvis:   IMPRESSION:  1: Bilateral nephrolithiasis. 7 mm upper left ureteral calculus causing mild hydronephrosis and hydroureter.  2: Small pleural fluid collections and basilar lung atelectasis.  3: Diverticulosis.  4: Inflammation around the bladder suggesting cystitis.  5: Mild to moderate prostate gland enlargement.     Discussed with ED MD; Inpatient admission for Metabolic Encephalopathy; Acute Cystitis; Ischemic EKG; Replaced Sotelo; Cardiology consultation--repeat troponin and EKG in AM; continue gentle hydration with low dose norepinephrine to maintain BP; as patient has been in institutions will cover broadly until species and sensitivities are known; wound care for decubitus; continuing home regimen for chronic maladies including beta blockade for IHD (now that BP has improved with volume/pressor)--holding po hyperglycemics with low dose sliding scale; Urology consultation--continuing high dose tamsulosin; TSH with AM labs for review    Face to Face occurred on 01/22/23 with note finished on 01/23/23      Overview/Hospital Course:  1/23  Off levophed drip  HR controlled     1/24  Blood and urine culture grew Pseudomonas  Repeat blood culture results pending       1/25  Pt got discharged today  Had Peer to peer review with insurance company staff today  They want latest PT note from today  Awaiting insurance company decision pending tomorrow       Interval History:     Review of Systems   Constitutional:  Positive for fatigue. Negative for activity change and appetite change.   HENT:  Negative for congestion and dental problem.    Eyes:  Negative for discharge and itching.   Respiratory:  Negative for shortness of breath.    Cardiovascular:  Negative for chest pain.   Gastrointestinal:  Negative for abdominal distention and abdominal pain.   Endocrine: Negative for cold intolerance.   Genitourinary:  Negative for difficulty urinating and  dysuria.   Musculoskeletal:  Negative for arthralgias and back pain.   Skin:  Negative for color change.   Neurological:  Positive for weakness. Negative for dizziness and facial asymmetry.   Hematological:  Negative for adenopathy.   Psychiatric/Behavioral:  Negative for agitation and behavioral problems.    Objective:     Vital Signs (Most Recent):  Temp: 99.2 °F (37.3 °C) (01/25/23 1540)  Pulse: 108 (01/25/23 1540)  Resp: 18 (01/25/23 1540)  BP: 116/71 (01/25/23 1540)  SpO2: 99 % (01/25/23 1540) Vital Signs (24h Range):  Temp:  [97.6 °F (36.4 °C)-99.2 °F (37.3 °C)] 99.2 °F (37.3 °C)  Pulse:  [] 108  Resp:  [17-18] 18  SpO2:  [95 %-100 %] 99 %  BP: (116-135)/(69-82) 116/71     Weight: 114.8 kg (253 lb)  Body mass index is 36.3 kg/m².    Intake/Output Summary (Last 24 hours) at 1/25/2023 1942  Last data filed at 1/25/2023 1753  Gross per 24 hour   Intake 120 ml   Output 2220 ml   Net -2100 ml      Physical Exam  Vitals and nursing note reviewed.   Constitutional:       Appearance: He is well-developed.   HENT:      Head: Atraumatic.      Right Ear: External ear normal.      Left Ear: External ear normal.      Nose: Nose normal.      Mouth/Throat:      Mouth: Mucous membranes are moist.   Cardiovascular:      Rate and Rhythm: Normal rate.   Pulmonary:      Effort: Pulmonary effort is normal.   Musculoskeletal:         General: Normal range of motion.      Cervical back: Full passive range of motion without pain and normal range of motion.   Skin:     General: Skin is warm.   Neurological:      Mental Status: He is alert and oriented to person, place, and time.   Psychiatric:         Behavior: Behavior normal.       Significant Labs: All pertinent labs within the past 24 hours have been reviewed.  CBC:   Recent Labs   Lab 01/24/23 0433 01/25/23  0434   WBC 11.04 8.15   HGB 11.5* 11.0*   HCT 37.0* 35.0*    223     CMP:   Recent Labs   Lab 01/24/23  0433 01/25/23  0434    138   K 3.8 3.2*     105   CO2 26 25    92   BUN 17 12   CREATININE 0.7 0.7   CALCIUM 8.0* 8.1*   ANIONGAP 7* 8       Significant Imaging: I have reviewed all pertinent imaging results/findings within the past 24 hours.      Assessment/Plan:      * Sepsis  Uro sepsis  UC and BC Grew Pseudomonas   Was  on levophed and now weaned off   Repeat blood culture results were negative for any growth   Medically stable and  can go back to SNF tomorrow with Chronic indwelling messina       Discharge planning issues  Pt got discharged today  Had Peer to peer review with insurance company staff today  They want latest PT note from today  Awaiting insurance company decision pending tomorrow       Urinary tract infection associated with indwelling urethral catheter  Maintain present Rx       Urinary retention  Has chronic indwelling messina catheter  Can go back to SNF with Messina catheter       Pressure injury of buttock, stage 1  Aware         Benign prostatic hyperplasia with urinary retention  Aware  Chronic issue           Type 2 diabetes mellitus with diabetic polyneuropathy, without long-term current use of insulin  SSI while here     Atrial fibrillation  Chronic stable issue           VTE Risk Mitigation (From admission, onward)         Ordered     apixaban tablet 5 mg  2 times daily         01/23/23 0012     IP VTE HIGH RISK PATIENT  Once         01/23/23 0012     Place sequential compression device  Until discontinued         01/23/23 0012     Reason for No Pharmacological VTE Prophylaxis  Once        Question:  Reasons:  Answer:  Already adequately anticoagulated on oral Anticoagulants    01/23/23 0012                Discharge Planning   BRENNAN: 1/25/2023     Code Status: Full Code   Is the patient medically ready for discharge?:     Reason for patient still in hospital (select all that apply): Pending disposition medically stable  Discharge Plan A: Home Health                  Clyde Nagy MD  Department of Hospital Medicine   [place]  [facility]

## 2023-01-26 NOTE — DISCHARGE SUMMARY
"[facility] Medicine  Discharge Summary      Patient Name: Hiro Rdoríguez  MRN: [id number]  RADHA: [id number]  Patient Class: IP- Inpatient  Admission Date: 1/22/2023  Hospital Length of Stay: 3 days  Discharge Date and Time:  01/26/2023 12:50 PM  Attending Physician: Clyde Nagy MD   Discharging Provider: Clyde Nagy MD  Primary Care Provider: Gautam Castanon III, MD    Primary Care Team: Networked reference to record PCT     HPI:   77 year old male with history of PAF (apixaban), HTN, DM 2, Morbid Obesity, s/p right hip ORIF 11/2022 (recuperating at local SNF) presented to  ED via EMS after being found with altered mental status at the SNF. History from wife at bedside. Patient is very hard of hearing and is currently altered. Reportedly he has been receiving PT/OT at SNF making slow progression back from above ORIF. This evening when his wife came to visit "He was just not himself". He was found to be hypotensive at the SNF and sent here. Upon presentation he was found to be hypotensive with BP 90's systolic, and tachycardic 110-120's, both of which normalized after bolus of 1 liter LR and eventually initiation of low dose norepinephrine.    He has had urinary retention and had had Sotelo catheter, which was removed 3 days ago at the SNF. He has known BPH. When he arrived here, Sotelo was reinserted and over a liter drained immediately. The patient denies any pain currently. No CP/SOB. No orthopnea, PND or edema.     Has stage 1 decubitus buttocks (POA).    In ED Labs reviewed and noted below: mild leukocytosis with normal Hct; normal electrolytes and renal function with prerenal azotemia; hypoalbuminemia with normal hepatic function; BNP is moderately elevated with minimal elevation of HS trop (repeat ordered and pending); Lactate is normal. UA: dirty with 2+ protein and 3+ occult blood--reflexed to culture. Blood cultured. CXR reviewed: Cardiomegaly with prominent vascularture and small " basilar effusions. EKG reviewed: rated controlled a fib with deep inferolateral ST segment sagging, but not acute ST segments.    CT Head: no acute intracranial pathology    CT Abdo/Pelvis:   IMPRESSION:  1: Bilateral nephrolithiasis. 7 mm upper left ureteral calculus causing mild hydronephrosis and hydroureter.  2: Small pleural fluid collections and basilar lung atelectasis.  3: Diverticulosis.  4: Inflammation around the bladder suggesting cystitis.  5: Mild to moderate prostate gland enlargement.     Discussed with ED MD; Inpatient admission for Metabolic Encephalopathy; Acute Cystitis; Ischemic EKG; Replaced Sotelo; Cardiology consultation--repeat troponin and EKG in AM; continue gentle hydration with low dose norepinephrine to maintain BP; as patient has been in institutions will cover broadly until species and sensitivities are known; wound care for decubitus; continuing home regimen for chronic maladies including beta blockade for IHD (now that BP has improved with volume/pressor)--holding po hyperglycemics with low dose sliding scale; Urology consultation--continuing high dose tamsulosin; TSH with AM labs for review    Face to Face occurred on 01/22/23 with note finished on 01/23/23      * No surgery found *      Hospital Course:   Patient with chronic Urinary retention and has Sotelo insitu got admitted from SNF with Sepsis(uro sepsis)  Sepsis and associated septic shock was treated with pressors/iv fluids and iv abx  UC/BC grew Pseudomonas  Pt was evaluated by Urologist   Pts condition got better and was discharged back to SNF        Goals of Care Treatment Preferences:  Code Status: Full Code      Consults:   Consults (From admission, onward)        Status Ordering Provider     Inpatient consult to   Once        Provider:  (Not yet assigned)    Acknowledged DENIS HERNANDEZ     Inpatient consult to   Once        Provider:  (Not yet assigned)    Acknowledged DENIS HERNANDEZ     Inpatient  consult to Urology  Once        Provider:  Yoshi Lange MD    Completed BEAU MUNROE     Inpatient consult to Hospitalist  Once        Provider:  Beau Munroe MD    Acknowledged BEAU MUNROE          No new Assessment & Plan notes have been filed under this hospital service since the last note was generated.  Service: Hospital Medicine    Final Active Diagnoses:    Diagnosis Date Noted POA    PRINCIPAL PROBLEM:  Sepsis [A41.9] 01/23/2023 Unknown    Discharge planning issues [Z02.9] 01/25/2023 Not Applicable    Pressure injury of buttock, stage 1 [L89.301] 01/23/2023 Yes    Urinary retention [R33.9] 01/23/2023 Unknown    Urinary tract infection associated with indwelling urethral catheter [T83.511A, N39.0] 01/23/2023 Unknown    Benign prostatic hyperplasia with urinary retention [N40.1, R33.8] 12/15/2022 Yes    Type 2 diabetes mellitus with diabetic polyneuropathy, without long-term current use of insulin [E11.42] 01/06/2021 Yes    Atrial fibrillation [I48.91] 08/31/2020 Yes      Problems Resolved During this Admission:    Diagnosis Date Noted Date Resolved POA    Encephalopathy, metabolic [G93.41] 01/22/2023 01/25/2023 Yes       Discharged Condition: good    Disposition: Skilled Nursing Facility    Follow Up:   Follow-up Information     Yoshi Lange MD Follow up in 1 week(s).    Specialty: Urology  Contact information:  [address] [address]  [phone]                       Patient Instructions:      Ambulatory referral/consult to Outpatient Case Management   Referral Priority: Routine Referral Type: Consultation   Referral Reason: Specialty Services Required   Number of Visits Requested: 1     Diet Cardiac       Significant Diagnostic Studies: Labs:   CMP   Recent Labs   Lab 01/25/23  0434      K 3.2*      CO2 25   GLU 92   BUN 12   CREATININE 0.7   CALCIUM 8.1*   ANIONGAP 8    and CBC   Recent Labs   Lab 01/25/23  0434   WBC 8.15   HGB  11.0*   HCT 35.0*          Pending Diagnostic Studies:     None         Medications:  Reconciled Home Medications:      Medication List      START taking these medications    ciprofloxacin HCl 500 MG tablet  Commonly known as: CIPRO  Take 1 tablet (500 mg total) by mouth 2 (two) times daily. for 14 days        CHANGE how you take these medications    metFORMIN 500 MG ER 24hr tablet  Commonly known as: GLUCOPHAGE-XR  TAKE 1 TABLET BY MOUTH EVERY DAY  What changed: when to take this     rosuvastatin 20 MG tablet  Commonly known as: CRESTOR  TAKE 1 TABLET BY MOUTH EVERY DAY  What changed:   · when to take this  · additional instructions     topiramate 50 MG tablet  Commonly known as: TOPAMAX  TAKE 1 TABLET BY MOUTH EVERY DAY  What changed: when to take this        CONTINUE taking these medications    acetaminophen 325 MG tablet  Commonly known as: TYLENOL  Take 650 mg by mouth every 6 (six) hours as needed.     amiodarone 100 MG Tab  Commonly known as: PACERONE  Take 100 mg by mouth every morning.     apixaban 5 mg Tab  Commonly known as: ELIQUIS  Take 1 tablet (5 mg total) by mouth 2 (two) times daily.     cholecalciferol (vitamin D3) 50 mcg (2,000 unit) Tab  Commonly known as: VITAMIN D3  Take 1 tablet by mouth once daily.     cyanocobalamin (vitamin B-12) 1,000 mcg Subl  Place 1,000 mcg under the tongue 2 (two) times a day.     cyproheptadine 4 mg tablet  Commonly known as: PERIACTIN  Take 1 tablet (4 mg total) by mouth 3 (three) times daily.     docusate sodium 100 MG capsule  Commonly known as: COLACE  Take 100 mg by mouth 2 (two) times daily.     DULoxetine 30 MG capsule  Commonly known as: CYMBALTA  Take 1 capsule (30 mg total) by mouth 2 (two) times daily.     ferrous gluconate 236 mg (27 mg iron) Tab  Take 240 mg by mouth once daily.     FIBER-CAPS (CA POLYCARBOPHIL) ORAL  Take 1 tablet by mouth once daily.     insulin aspart U-100 100 unit/mL (3 mL) Inpn pen  Commonly known as: NovoLOG  Inject 1-10  Units into the skin before meals and at bedtime as needed (Hyperglycemia).     magnesium 250 mg Tab  Take 250 mg by mouth 2 (two) times a day.     metoprolol tartrate 25 MG tablet  Commonly known as: LOPRESSOR  Take 1 tablet (25 mg total) by mouth 2 (two) times daily.     omega-3 acid ethyl esters 1 gram capsule  Commonly known as: LOVAZA  TAKE 2 CAPSULES BY MOUTH 2 TIMES DAILY.     phenazopyridine 100 MG tablet  Commonly known as: PYRIDIUM  Take 1 tablet (100 mg total) by mouth 3 (three) times daily as needed for Pain.     polyethylene glycol 17 gram/dose powder  Commonly known as: GLYCOLAX  Take 17 g by mouth daily as needed.     potassium chloride 10 MEQ Tbsr  Commonly known as: KLOR-CON  Take 2 tablets (20 mEq total) by mouth once daily.     tamsulosin 0.4 mg Cap  Commonly known as: FLOMAX  Take 2 capsules (0.8 mg total) by mouth once daily.     tolterodine 4 MG 24 hr capsule  Commonly known as: DETROL LA  Take 1 capsule (4 mg total) by mouth once daily.     traZODone 50 MG tablet  Commonly known as: DESYREL  TAKE 1 TABLET BY MOUTH EVERY EVENING.        STOP taking these medications    cyclobenzaprine 5 MG tablet  Commonly known as: FLEXERIL     HYDROcodone-acetaminophen  mg per tablet  Commonly known as: NORCO            Indwelling Lines/Drains at time of discharge:   Lines/Drains/Airways     Drain  Duration                Urethral Catheter 01/22/23 2110 Non-latex 16 Fr. 3 days              Physical Exam  Cardiovascular:      Rate and Rhythm: Normal rate.   Neurological:      General: No focal deficit present.      Mental Status: He is alert.       Time spent on the discharge of patient: 45 minutes         Clyde Nagy MD  Department of Hospital Medicine  [facility]

## 2023-01-26 NOTE — PT/OT/SLP PROGRESS
Occupational Therapy      Patient Name:  Hiro Rodríguez   MRN:  [id number]    Patient not seen today secondary to Patient fatigue. Will follow-up next scheduled service date.    1/26/2023

## 2023-01-27 LAB — BACTERIA BLD CULT: NORMAL

## 2023-01-29 LAB — BACTERIA BLD CULT: NORMAL

## 2023-01-30 NOTE — PT/OT/SLP DISCHARGE
Occupational Therapy Discharge Summary    Hiro Rodríguez  MRN: [id number]   Principal Problem: Sepsis      Patient Discharged from acute Occupational Therapy on 1/26/2023.  Please refer to prior OT note dated 1/25/2023 for functional status.    Assessment:      Patient appropriate for care in another setting.    Objective:     GOALS:   Multidisciplinary Problems       Occupational Therapy Goals       Not on file              Multidisciplinary Problems (Resolved)          Problem: Occupational Therapy    Goal Priority Disciplines Outcome Interventions   Occupational Therapy Goal   (Resolved)     OT, PT/OT Met    Description: Goals to be met by: 2/23/2023     Patient will increase functional independence with ADLs by performing:    UE Dressing with Supervision.  LE Dressing with Supervision.  Grooming while standing at sink with Supervision.  Toileting from toilet with Supervision for hygiene and clothing management.   Toilet transfer to toilet with Supervision.                         Reasons for Discontinuation of Therapy Services  Transfer to alternate level of care.      Plan:     Patient Discharged to: Skilled Nursing Facility    1/30/2023

## 2023-02-02 ENCOUNTER — TELEPHONE (OUTPATIENT)
Dept: FAMILY MEDICINE | Facility: CLINIC | Age: 78
End: 2023-02-02
Payer: MEDICARE

## 2023-02-02 NOTE — TELEPHONE ENCOUNTER
----- Message from Jorden Walsh sent at 2/2/2023 12:02 PM CST -----  Type:  Sooner Appointment Request    Caller is requesting a sooner appointment.  Caller declined first available appointment listed below.  Caller will not accept being placed on the waitlist and is requesting a message be sent to doctor.    Name of Caller:  Taya/ Simon Gandara   When is the first available appointment?  Pt is scheduled on 02/10/23 but would like to be seen ASAP.  Symptoms:  Medication review  Best Call Back Number:  Patient-- [phone]  Additional Information:

## 2023-02-06 ENCOUNTER — TELEPHONE (OUTPATIENT)
Dept: FAMILY MEDICINE | Facility: CLINIC | Age: 78
End: 2023-02-06
Payer: MEDICARE

## 2023-02-06 NOTE — TELEPHONE ENCOUNTER
----- Message from Heather Oates sent at 2/6/2023  3:35 PM CST -----  Contact: [phone]  Type: Needs Medical Advice  Who Called:  Pts wife     Best Call Back Number: [phone]  Additional Information: Pts wife is calling to ask what lab work he will need for appt on Friday. Pts Home Health is coming tomorrow to do labs. Pls call back and advise      Pt already has Dmp / psa ordres for another

## 2023-02-07 ENCOUNTER — LAB VISIT (OUTPATIENT)
Dept: LAB | Facility: HOSPITAL | Age: 78
End: 2023-02-07
Attending: SPECIALIST
Payer: MEDICARE

## 2023-02-07 ENCOUNTER — HOSPITAL ENCOUNTER (OUTPATIENT)
Dept: RADIOLOGY | Facility: HOSPITAL | Age: 78
Discharge: HOME | End: 2023-02-07
Attending: SPECIALIST
Payer: MEDICARE

## 2023-02-07 DIAGNOSIS — N20.1 CALCULUS OF URETER: ICD-10-CM

## 2023-02-07 DIAGNOSIS — R32 UNSPECIFIED URINARY INCONTINENCE: ICD-10-CM

## 2023-02-07 DIAGNOSIS — E11.42 DIABETIC POLYNEUROPATHY: ICD-10-CM

## 2023-02-07 DIAGNOSIS — N20.1 CALCULUS OF URETER: Primary | ICD-10-CM

## 2023-02-07 DIAGNOSIS — I11.0 HYPERTENSIVE HEART DISEASE WITH CONGESTIVE HEART FAILURE: Primary | ICD-10-CM

## 2023-02-07 LAB
ANION GAP SERPL CALC-SCNC: 10 MMOL/L (ref 8–16)
BUN SERPL-MCNC: 11 MG/DL (ref 8–23)
CALCIUM SERPL-MCNC: 8.2 MG/DL (ref 8.7–10.5)
CHLORIDE SERPL-SCNC: 105 MMOL/L (ref 95–110)
CO2 SERPL-SCNC: 23 MMOL/L (ref 23–29)
CREAT SERPL-MCNC: 0.8 MG/DL (ref 0.5–1.4)
EST. GFR  (NO RACE VARIABLE): >60 ML/MIN/1.73 M^2
GLUCOSE SERPL-MCNC: 120 MG/DL (ref 70–110)
POTASSIUM SERPL-SCNC: 4.2 MMOL/L (ref 3.5–5.1)
SODIUM SERPL-SCNC: 138 MMOL/L (ref 136–145)

## 2023-02-07 PROCEDURE — 84153 ASSAY OF PSA TOTAL: CPT | Performed by: SPECIALIST

## 2023-02-07 PROCEDURE — 80048 BASIC METABOLIC PNL TOTAL CA: CPT | Performed by: SPECIALIST

## 2023-02-07 PROCEDURE — G0179 MD RECERTIFICATION HHA PT: HCPCS | Mod: ,,, | Performed by: FAMILY MEDICINE

## 2023-02-07 PROCEDURE — 74018 RADEX ABDOMEN 1 VIEW: CPT | Mod: TC,PO

## 2023-02-07 PROCEDURE — G0179 PR HOME HEALTH MD RECERTIFICATION: ICD-10-PCS | Mod: ,,, | Performed by: FAMILY MEDICINE

## 2023-02-07 PROCEDURE — 36415 COLL VENOUS BLD VENIPUNCTURE: CPT | Performed by: SPECIALIST

## 2023-02-08 ENCOUNTER — TELEPHONE (OUTPATIENT)
Dept: CARDIOLOGY | Facility: CLINIC | Age: 78
End: 2023-02-08
Payer: MEDICARE

## 2023-02-08 LAB
PROSTATE SPECIFIC ANTIGEN, TOTAL: 19.3 NG/ML (ref 0–4)
PSA FREE MFR SERPL: 76.53 %
PSA FREE SERPL-MCNC: 14.77 NG/ML (ref 0–1.5)

## 2023-02-08 NOTE — LETTER
2023    Hiro Rodríguez  [address]  [place] [address]             [facility] [place] [facility]  [address]  [place] [address]  Phone: [phone]  Fax: [phone] Patient: Hiro Rodríguez  : 1945  Referring Doctor: JAGRUTI Mendez MD / Ijeoma Chavira NP  Telephone:[phone]  Consulting Provider: Yoshi Lange III MD  Procedure: Stone Extraction    Current Outpatient Medications   Medication Sig    acetaminophen (TYLENOL) 325 MG tablet Take 650 mg by mouth every 6 (six) hours as needed.    amiodarone (PACERONE) 100 MG Tab Take 100 mg by mouth every morning.    apixaban (ELIQUIS) 5 mg Tab Take 1 tablet (5 mg total) by mouth 2 (two) times daily.    calcium polycarbophil (FIBER-CAPS, CA POLYCARBOPHIL, ORAL) Take 1 tablet by mouth once daily.    cholecalciferol, vitamin D3, (VITAMIN D3) 50 mcg (2,000 unit) Tab Take 1 tablet by mouth once daily.    ciprofloxacin HCl (CIPRO) 500 MG tablet Take 1 tablet (500 mg total) by mouth 2 (two) times daily. for 14 days    cyanocobalamin, vitamin B-12, 1,000 mcg Subl Place 1,000 mcg under the tongue 2 (two) times a day.    cyproheptadine (PERIACTIN) 4 mg tablet Take 1 tablet (4 mg total) by mouth 3 (three) times daily.    docusate sodium (COLACE) 100 MG capsule Take 100 mg by mouth 2 (two) times daily.    DULoxetine (CYMBALTA) 30 MG capsule Take 1 capsule (30 mg total) by mouth 2 (two) times daily.    ferrous gluconate 236 mg (27 mg iron) Tab Take 240 mg by mouth once daily.    insulin aspart U-100 (NOVOLOG) 100 unit/mL (3 mL) InPn pen Inject 1-10 Units into the skin before meals and at bedtime as needed (Hyperglycemia).    magnesium 250 mg Tab Take 250 mg by mouth 2 (two) times a day.    metFORMIN (GLUCOPHAGE-XR) 500 MG ER 24hr tablet TAKE 1 TABLET BY MOUTH EVERY DAY (Patient taking differently: Take 500 mg by mouth 2 (two) times a day.)    metoprolol tartrate (LOPRESSOR) 25 MG  tablet Take 1 tablet (25 mg total) by mouth 2 (two) times daily.    omega-3 acid ethyl esters (LOVAZA) 1 gram capsule TAKE 2 CAPSULES BY MOUTH 2 TIMES DAILY. (Patient taking differently: Take 2 g by mouth 2 (two) times daily.)    phenazopyridine (PYRIDIUM) 100 MG tablet Take 1 tablet (100 mg total) by mouth 3 (three) times daily as needed for Pain.    polyethylene glycol (GLYCOLAX) 17 gram/dose powder Take 17 g by mouth daily as needed.    potassium chloride SA (KLOR-CON) 10 MEQ TbSR Take 2 tablets (20 mEq total) by mouth once daily.    rosuvastatin (CRESTOR) 20 MG tablet TAKE 1 TABLET BY MOUTH EVERY DAY (Patient taking differently: Take 20 mg by mouth once daily. TAKE 1 TABLET BY MOUTH EVERY DAY)    tamsulosin (FLOMAX) 0.4 mg Cap Take 2 capsules (0.8 mg total) by mouth once daily.    tolterodine (DETROL LA) 4 MG 24 hr capsule Take 1 capsule (4 mg total) by mouth once daily.    topiramate (TOPAMAX) 50 MG tablet TAKE 1 TABLET BY MOUTH EVERY DAY (Patient taking differently: Take 50 mg by mouth once daily.)    traZODone (DESYREL) 50 MG tablet TAKE 1 TABLET BY MOUTH EVERY EVENING. (Patient taking differently: Take 50 mg by mouth every evening.)     No current facility-administered medications for this visit.       This patient has been assessed for risk factors for clearance of surgery with the following stipulations:Moderate Risk     _X__ No contraindications  __X_ Recommendations for antiplatelet/anticoagulant medications:Hold Eliquis for _3___ days prior procedure.   __X_ Cleared for surgery with the Moderate risk.   ___ Not cleared for surgery due to the following reasons:      If you have any questions regarding the above, please contact my office at [phone]  Sincerely,    Ijeoma Chavira NP

## 2023-02-20 PROBLEM — K92.2 ACUTE UPPER GASTROINTESTINAL BLEEDING: Status: RESOLVED | Noted: 2022-11-14 | Resolved: 2023-02-20

## 2023-02-22 ENCOUNTER — TELEPHONE (OUTPATIENT)
Dept: FAMILY MEDICINE | Facility: CLINIC | Age: 78
End: 2023-02-22
Payer: MEDICARE

## 2023-02-22 ENCOUNTER — TELEPHONE (OUTPATIENT)
Dept: CARDIOLOGY | Facility: CLINIC | Age: 78
End: 2023-02-22
Payer: MEDICARE

## 2023-02-22 NOTE — TELEPHONE ENCOUNTER
Tried calling patient to schedule an appointment. There was no answer and I lm to call back to schedule.

## 2023-02-22 NOTE — TELEPHONE ENCOUNTER
----- Message from Matias Nathan sent at 2/22/2023  4:08 PM CST -----  Contact: Spouse/Galina  Type:  Patient Returning Call    Who Called:  Spouse/Galina  Who Left Message for Patient:  Chantal  Does the patient know what this is regarding?:  Yes  Best Call Back Number:  [phone]   Additional Information:

## 2023-02-22 NOTE — TELEPHONE ENCOUNTER
----- Message from Demarco May sent at 2/22/2023  1:55 PM CST -----  Contact: pt's wife Juan at  [phone]  Type:  Sooner Appointment Request    Caller is requesting a sooner appointment.  Caller declined first available appointment listed below.  Caller will not accept being placed on the waitlist and is requesting a message be sent to doctor.    Name of Caller:  pt's wife Juan  When is the first available appointment?  3/22  Symptoms:  f/u for abnormal EKG  Best Call Back Number:  [phone]  Additional Information:  pt's wife Juan is calling the office to schedule an appt for her  regarding an f/u for abnormal EKG and the date of 3/22 came up and she states he needs to be seen sooner. Please call back and advise.  PER THE PT'S WIFE JUAN HER  WAS ON SOMETHING FOR HIS POTASSIUM AND NOW HE'S OUT DO THEY STILL WANT HIM TO TAKE THAT OR NOT

## 2023-02-23 ENCOUNTER — HOSPITAL ENCOUNTER (OUTPATIENT)
Dept: RADIOLOGY | Facility: HOSPITAL | Age: 78
Discharge: HOME | End: 2023-02-23
Attending: SPECIALIST
Payer: MEDICARE

## 2023-02-23 DIAGNOSIS — R39.11 URINARY HESITANCY: ICD-10-CM

## 2023-02-23 PROCEDURE — 76857 US EXAM PELVIC LIMITED: CPT | Mod: TC,PO

## 2023-02-23 PROCEDURE — 76770 US EXAM ABDO BACK WALL COMP: CPT | Mod: TC,PO

## 2023-02-23 NOTE — TELEPHONE ENCOUNTER
Spoke with pt wife gave apt 3/6/23 at 11:50 dr rincon I sent the Rx to the pharmacy. To kong to enter res from prior apt.

## 2023-02-23 NOTE — TELEPHONE ENCOUNTER
----- Message from Hilda Darby sent at 2/22/2023  4:56 PM CST -----  Contact: Pt Wife Galina  Type:  Sooner Appointment Request    Caller is requesting a sooner appointment.    Name of Caller:Pt Wife Galina  When is the first available appointment?3/14  Symptoms:Recent Hospital stay (Med Review)  Would the patient rather a call back or a response via [facility]? call  Best Call Back Number:[phone]    Additional Information: Please call to advise if Dr. Castanon can see the pt sooner than 3/14 for a hospital follow up...          Ultram Sent

## 2023-03-01 ENCOUNTER — EXTERNAL HOME HEALTH (OUTPATIENT)
Dept: HOME HEALTH SERVICES | Facility: HOSPITAL | Age: 78
End: 2023-03-01
Payer: MEDICARE

## 2023-03-01 ENCOUNTER — HOSPITAL ENCOUNTER (INPATIENT)
Facility: HOSPITAL | Age: 78
LOS: 7 days | Discharge: HOME HEALTH | DRG: 660 | End: 2023-03-08
Attending: EMERGENCY MEDICINE | Admitting: STUDENT IN AN ORGANIZED HEALTH CARE EDUCATION/TRAINING PROGRAM
Payer: MEDICARE

## 2023-03-01 ENCOUNTER — OFFICE VISIT (OUTPATIENT)
Dept: CARDIOLOGY | Facility: CLINIC | Age: 78
End: 2023-03-01
Payer: MEDICARE

## 2023-03-01 VITALS
SYSTOLIC BLOOD PRESSURE: 90 MMHG | BODY MASS INDEX: 36.3 KG/M2 | WEIGHT: 253 LBS | HEART RATE: 92 BPM | DIASTOLIC BLOOD PRESSURE: 62 MMHG | OXYGEN SATURATION: 95 %

## 2023-03-01 DIAGNOSIS — I48.91 ATRIAL FIBRILLATION, UNSPECIFIED TYPE: Primary | ICD-10-CM

## 2023-03-01 DIAGNOSIS — N39.0 URINARY TRACT INFECTION ASSOCIATED WITH INDWELLING URETHRAL CATHETER, INITIAL ENCOUNTER: ICD-10-CM

## 2023-03-01 DIAGNOSIS — E87.6 HYPOKALEMIA: ICD-10-CM

## 2023-03-01 DIAGNOSIS — R33.8 BENIGN PROSTATIC HYPERPLASIA WITH URINARY RETENTION: ICD-10-CM

## 2023-03-01 DIAGNOSIS — I50.32 CHRONIC HEART FAILURE WITH PRESERVED EJECTION FRACTION: ICD-10-CM

## 2023-03-01 DIAGNOSIS — L89.301 PRESSURE INJURY OF BUTTOCK, STAGE 1, UNSPECIFIED LATERALITY: ICD-10-CM

## 2023-03-01 DIAGNOSIS — E78.5 HYPERLIPIDEMIA, UNSPECIFIED HYPERLIPIDEMIA TYPE: ICD-10-CM

## 2023-03-01 DIAGNOSIS — A41.9 SEPSIS, DUE TO UNSPECIFIED ORGANISM, UNSPECIFIED WHETHER ACUTE ORGAN DYSFUNCTION PRESENT: Primary | ICD-10-CM

## 2023-03-01 DIAGNOSIS — B96.20 BACTEREMIA DUE TO ESCHERICHIA COLI: ICD-10-CM

## 2023-03-01 DIAGNOSIS — R78.81 BACTEREMIA DUE TO ESCHERICHIA COLI: ICD-10-CM

## 2023-03-01 DIAGNOSIS — T83.511A URINARY TRACT INFECTION ASSOCIATED WITH INDWELLING URETHRAL CATHETER, INITIAL ENCOUNTER: ICD-10-CM

## 2023-03-01 DIAGNOSIS — I48.0 PAROXYSMAL ATRIAL FIBRILLATION: ICD-10-CM

## 2023-03-01 DIAGNOSIS — N40.1 BENIGN PROSTATIC HYPERPLASIA WITH URINARY RETENTION: ICD-10-CM

## 2023-03-01 DIAGNOSIS — C44.90 SKIN CANCER: ICD-10-CM

## 2023-03-01 DIAGNOSIS — I10 HYPERTENSION, ESSENTIAL: ICD-10-CM

## 2023-03-01 LAB
ALBUMIN SERPL BCP-MCNC: 2.6 G/DL (ref 3.5–5.2)
ALP SERPL-CCNC: 66 U/L (ref 55–135)
ALT SERPL W/O P-5'-P-CCNC: 13 U/L (ref 10–44)
ANION GAP SERPL CALC-SCNC: 11 MMOL/L (ref 8–16)
APTT BLDCRRT: 28.5 SEC (ref 21–32)
AST SERPL-CCNC: 14 U/L (ref 10–40)
BACTERIA #/AREA URNS HPF: ABNORMAL /HPF
BASOPHILS # BLD AUTO: 0.04 K/UL (ref 0–0.2)
BASOPHILS NFR BLD: 0.3 % (ref 0–1.9)
BILIRUB SERPL-MCNC: 0.6 MG/DL (ref 0.1–1)
BILIRUB UR QL STRIP: NEGATIVE
BUN SERPL-MCNC: 15 MG/DL (ref 8–23)
CALCIUM SERPL-MCNC: 8.4 MG/DL (ref 8.7–10.5)
CHLORIDE SERPL-SCNC: 99 MMOL/L (ref 95–110)
CLARITY UR: ABNORMAL
CO2 SERPL-SCNC: 27 MMOL/L (ref 23–29)
COLOR UR: YELLOW
CREAT SERPL-MCNC: 0.7 MG/DL (ref 0.5–1.4)
DIFFERENTIAL METHOD: ABNORMAL
EOSINOPHIL # BLD AUTO: 0.1 K/UL (ref 0–0.5)
EOSINOPHIL NFR BLD: 0.4 % (ref 0–8)
ERYTHROCYTE [DISTWIDTH] IN BLOOD BY AUTOMATED COUNT: 15.5 % (ref 11.5–14.5)
EST. GFR  (NO RACE VARIABLE): >60 ML/MIN/1.73 M^2
GLUCOSE SERPL-MCNC: 172 MG/DL (ref 70–110)
GLUCOSE UR QL STRIP: NEGATIVE
HCT VFR BLD AUTO: 40.3 % (ref 40–54)
HGB BLD-MCNC: 13 G/DL (ref 14–18)
HGB UR QL STRIP: ABNORMAL
HYALINE CASTS #/AREA URNS LPF: 62 /LPF
IMM GRANULOCYTES # BLD AUTO: 0.05 K/UL (ref 0–0.04)
IMM GRANULOCYTES NFR BLD AUTO: 0.4 % (ref 0–0.5)
INFLUENZA A, MOLECULAR: NEGATIVE
INFLUENZA B, MOLECULAR: NEGATIVE
INR PPP: 1.1 (ref 0.8–1.2)
KETONES UR QL STRIP: NEGATIVE
LACTATE SERPL-SCNC: 1.9 MMOL/L (ref 0.5–1.9)
LEUKOCYTE ESTERASE UR QL STRIP: ABNORMAL
LIPASE SERPL-CCNC: 22 U/L (ref 4–60)
LYMPHOCYTES # BLD AUTO: 1.3 K/UL (ref 1–4.8)
LYMPHOCYTES NFR BLD: 9.4 % (ref 18–48)
MAGNESIUM SERPL-MCNC: 1.5 MG/DL (ref 1.6–2.6)
MCH RBC QN AUTO: 29.9 PG (ref 27–31)
MCHC RBC AUTO-ENTMCNC: 32.3 G/DL (ref 32–36)
MCV RBC AUTO: 93 FL (ref 82–98)
MICROSCOPIC COMMENT: ABNORMAL
MONOCYTES # BLD AUTO: 0.9 K/UL (ref 0.3–1)
MONOCYTES NFR BLD: 6 % (ref 4–15)
NEUTROPHILS # BLD AUTO: 11.9 K/UL (ref 1.8–7.7)
NEUTROPHILS NFR BLD: 83.5 % (ref 38–73)
NITRITE UR QL STRIP: POSITIVE
NRBC BLD-RTO: 0 /100 WBC
PH UR STRIP: 6 [PH] (ref 5–8)
PLATELET # BLD AUTO: 290 K/UL (ref 150–450)
PMV BLD AUTO: 9.6 FL (ref 9.2–12.9)
POTASSIUM SERPL-SCNC: 3.4 MMOL/L (ref 3.5–5.1)
PROT SERPL-MCNC: 6.2 G/DL (ref 6–8.4)
PROT UR QL STRIP: ABNORMAL
PROTHROMBIN TIME: 11.6 SEC (ref 9–12.5)
RBC # BLD AUTO: 4.35 M/UL (ref 4.6–6.2)
RBC #/AREA URNS HPF: >100 /HPF (ref 0–4)
SARS-COV-2 RDRP RESP QL NAA+PROBE: NEGATIVE
SODIUM SERPL-SCNC: 137 MMOL/L (ref 136–145)
SP GR UR STRIP: 1.01 (ref 1–1.03)
SPECIMEN SOURCE: NORMAL
SQUAMOUS #/AREA URNS HPF: 2 /HPF
TROPONIN I SERPL HS-MCNC: 10.3 PG/ML (ref 0–14.9)
URN SPEC COLLECT METH UR: ABNORMAL
UROBILINOGEN UR STRIP-ACNC: NEGATIVE EU/DL
WBC # BLD AUTO: 14.18 K/UL (ref 3.9–12.7)
WBC #/AREA URNS HPF: >100 /HPF (ref 0–5)

## 2023-03-01 PROCEDURE — 3288F PR FALLS RISK ASSESSMENT DOCUMENTED: ICD-10-PCS | Mod: CPTII,S$GLB,,

## 2023-03-01 PROCEDURE — 87086 URINE CULTURE/COLONY COUNT: CPT | Performed by: EMERGENCY MEDICINE

## 2023-03-01 PROCEDURE — 99214 PR OFFICE/OUTPT VISIT, EST, LEVL IV, 30-39 MIN: ICD-10-PCS | Mod: S$GLB,,,

## 2023-03-01 PROCEDURE — 25000003 PHARM REV CODE 250: Performed by: EMERGENCY MEDICINE

## 2023-03-01 PROCEDURE — 1160F RVW MEDS BY RX/DR IN RCRD: CPT | Mod: CPTII,S$GLB,,

## 2023-03-01 PROCEDURE — 93005 EKG 12-LEAD: ICD-10-PCS | Mod: S$GLB,,,

## 2023-03-01 PROCEDURE — 93005 ELECTROCARDIOGRAM TRACING: CPT | Mod: S$GLB,,,

## 2023-03-01 PROCEDURE — 85730 THROMBOPLASTIN TIME PARTIAL: CPT | Performed by: EMERGENCY MEDICINE

## 2023-03-01 PROCEDURE — 63600175 PHARM REV CODE 636 W HCPCS: Mod: TB,JG | Performed by: STUDENT IN AN ORGANIZED HEALTH CARE EDUCATION/TRAINING PROGRAM

## 2023-03-01 PROCEDURE — 3288F FALL RISK ASSESSMENT DOCD: CPT | Mod: CPTII,S$GLB,,

## 2023-03-01 PROCEDURE — 96365 THER/PROPH/DIAG IV INF INIT: CPT

## 2023-03-01 PROCEDURE — 96361 HYDRATE IV INFUSION ADD-ON: CPT

## 2023-03-01 PROCEDURE — 85610 PROTHROMBIN TIME: CPT | Performed by: EMERGENCY MEDICINE

## 2023-03-01 PROCEDURE — 1160F PR REVIEW ALL MEDS BY PRESCRIBER/CLIN PHARMACIST DOCUMENTED: ICD-10-PCS | Mod: CPTII,S$GLB,,

## 2023-03-01 PROCEDURE — 99999 PR PBB SHADOW E&M-EST. PATIENT-LVL IV: ICD-10-PCS | Mod: PBBFAC,,,

## 2023-03-01 PROCEDURE — 93010 EKG 12-LEAD: ICD-10-PCS | Mod: 76,S$GLB,, | Performed by: GENERAL PRACTICE

## 2023-03-01 PROCEDURE — 99291 CRITICAL CARE FIRST HOUR: CPT

## 2023-03-01 PROCEDURE — 85025 COMPLETE CBC W/AUTO DIFF WBC: CPT | Performed by: EMERGENCY MEDICINE

## 2023-03-01 PROCEDURE — 63600175 PHARM REV CODE 636 W HCPCS: Performed by: EMERGENCY MEDICINE

## 2023-03-01 PROCEDURE — 87502 INFLUENZA DNA AMP PROBE: CPT | Performed by: EMERGENCY MEDICINE

## 2023-03-01 PROCEDURE — 3078F PR MOST RECENT DIASTOLIC BLOOD PRESSURE < 80 MM HG: ICD-10-PCS | Mod: CPTII,S$GLB,,

## 2023-03-01 PROCEDURE — 99999 PR PBB SHADOW E&M-EST. PATIENT-LVL IV: CPT | Mod: PBBFAC,,,

## 2023-03-01 PROCEDURE — 87040 BLOOD CULTURE FOR BACTERIA: CPT | Mod: 59 | Performed by: EMERGENCY MEDICINE

## 2023-03-01 PROCEDURE — U0002 COVID-19 LAB TEST NON-CDC: HCPCS | Performed by: EMERGENCY MEDICINE

## 2023-03-01 PROCEDURE — 80053 COMPREHEN METABOLIC PANEL: CPT | Performed by: EMERGENCY MEDICINE

## 2023-03-01 PROCEDURE — 3074F SYST BP LT 130 MM HG: CPT | Mod: CPTII,S$GLB,,

## 2023-03-01 PROCEDURE — 93010 EKG 12-LEAD: ICD-10-PCS | Mod: ,,, | Performed by: GENERAL PRACTICE

## 2023-03-01 PROCEDURE — 1100F PTFALLS ASSESS-DOCD GE2>/YR: CPT | Mod: CPTII,S$GLB,,

## 2023-03-01 PROCEDURE — 1159F MED LIST DOCD IN RCRD: CPT | Mod: CPTII,S$GLB,,

## 2023-03-01 PROCEDURE — 3078F DIAST BP <80 MM HG: CPT | Mod: CPTII,S$GLB,,

## 2023-03-01 PROCEDURE — 93010 ELECTROCARDIOGRAM REPORT: CPT | Mod: ,,, | Performed by: GENERAL PRACTICE

## 2023-03-01 PROCEDURE — 84484 ASSAY OF TROPONIN QUANT: CPT | Performed by: EMERGENCY MEDICINE

## 2023-03-01 PROCEDURE — 87186 SC STD MICRODIL/AGAR DIL: CPT | Performed by: EMERGENCY MEDICINE

## 2023-03-01 PROCEDURE — 12000002 HC ACUTE/MED SURGE SEMI-PRIVATE ROOM

## 2023-03-01 PROCEDURE — 83605 ASSAY OF LACTIC ACID: CPT | Performed by: EMERGENCY MEDICINE

## 2023-03-01 PROCEDURE — 25000003 PHARM REV CODE 250: Performed by: STUDENT IN AN ORGANIZED HEALTH CARE EDUCATION/TRAINING PROGRAM

## 2023-03-01 PROCEDURE — 99214 OFFICE O/P EST MOD 30 MIN: CPT | Mod: S$GLB,,,

## 2023-03-01 PROCEDURE — 83735 ASSAY OF MAGNESIUM: CPT | Performed by: EMERGENCY MEDICINE

## 2023-03-01 PROCEDURE — 3074F PR MOST RECENT SYSTOLIC BLOOD PRESSURE < 130 MM HG: ICD-10-PCS | Mod: CPTII,S$GLB,,

## 2023-03-01 PROCEDURE — 83690 ASSAY OF LIPASE: CPT | Performed by: EMERGENCY MEDICINE

## 2023-03-01 PROCEDURE — 87077 CULTURE AEROBIC IDENTIFY: CPT | Mod: 59 | Performed by: EMERGENCY MEDICINE

## 2023-03-01 PROCEDURE — 81001 URINALYSIS AUTO W/SCOPE: CPT | Performed by: EMERGENCY MEDICINE

## 2023-03-01 PROCEDURE — 87154 CUL TYP ID BLD PTHGN 6+ TRGT: CPT | Performed by: EMERGENCY MEDICINE

## 2023-03-01 PROCEDURE — 93005 ELECTROCARDIOGRAM TRACING: CPT | Performed by: GENERAL PRACTICE

## 2023-03-01 PROCEDURE — 1159F PR MEDICATION LIST DOCUMENTED IN MEDICAL RECORD: ICD-10-PCS | Mod: CPTII,S$GLB,,

## 2023-03-01 PROCEDURE — 25500020 PHARM REV CODE 255: Performed by: EMERGENCY MEDICINE

## 2023-03-01 PROCEDURE — 1100F PR PT FALLS ASSESS DOC 2+ FALLS/FALL W/INJURY/YR: ICD-10-PCS | Mod: CPTII,S$GLB,,

## 2023-03-01 PROCEDURE — 96367 TX/PROPH/DG ADDL SEQ IV INF: CPT

## 2023-03-01 PROCEDURE — 93010 ELECTROCARDIOGRAM REPORT: CPT | Mod: 76,S$GLB,, | Performed by: GENERAL PRACTICE

## 2023-03-01 RX ORDER — HYDROCODONE BITARTRATE AND ACETAMINOPHEN 10; 325 MG/1; MG/1
1 TABLET ORAL 4 TIMES DAILY PRN
Status: DISCONTINUED | OUTPATIENT
Start: 2023-03-01 | End: 2023-03-08 | Stop reason: HOSPADM

## 2023-03-01 RX ORDER — MAGNESIUM SULFATE HEPTAHYDRATE 40 MG/ML
2 INJECTION, SOLUTION INTRAVENOUS ONCE
Status: COMPLETED | OUTPATIENT
Start: 2023-03-01 | End: 2023-03-01

## 2023-03-01 RX ORDER — ACETAMINOPHEN 325 MG/1
650 TABLET ORAL EVERY 6 HOURS PRN
Status: DISCONTINUED | OUTPATIENT
Start: 2023-03-01 | End: 2023-03-08 | Stop reason: HOSPADM

## 2023-03-01 RX ORDER — CYPROHEPTADINE HYDROCHLORIDE 4 MG/1
4 TABLET ORAL 3 TIMES DAILY
Status: DISCONTINUED | OUTPATIENT
Start: 2023-03-01 | End: 2023-03-08 | Stop reason: HOSPADM

## 2023-03-01 RX ORDER — HYDROCODONE BITARTRATE AND ACETAMINOPHEN 10; 325 MG/1; MG/1
1 TABLET ORAL 4 TIMES DAILY PRN
Status: ON HOLD | COMMUNITY
Start: 2023-03-01 | End: 2023-04-20 | Stop reason: HOSPADM

## 2023-03-01 RX ORDER — TRAZODONE HYDROCHLORIDE 50 MG/1
50 TABLET ORAL NIGHTLY
Status: DISCONTINUED | OUTPATIENT
Start: 2023-03-01 | End: 2023-03-08 | Stop reason: HOSPADM

## 2023-03-01 RX ORDER — IBUPROFEN 200 MG
16 TABLET ORAL
Status: DISCONTINUED | OUTPATIENT
Start: 2023-03-01 | End: 2023-03-08 | Stop reason: HOSPADM

## 2023-03-01 RX ORDER — POTASSIUM CHLORIDE 20 MEQ/1
20 TABLET, EXTENDED RELEASE ORAL DAILY
Status: DISCONTINUED | OUTPATIENT
Start: 2023-03-02 | End: 2023-03-08 | Stop reason: HOSPADM

## 2023-03-01 RX ORDER — TALC
6 POWDER (GRAM) TOPICAL NIGHTLY PRN
Status: DISCONTINUED | OUTPATIENT
Start: 2023-03-01 | End: 2023-03-08 | Stop reason: HOSPADM

## 2023-03-01 RX ORDER — INSULIN ASPART 100 [IU]/ML
1-10 INJECTION, SOLUTION INTRAVENOUS; SUBCUTANEOUS
Status: DISCONTINUED | OUTPATIENT
Start: 2023-03-01 | End: 2023-03-08 | Stop reason: HOSPADM

## 2023-03-01 RX ORDER — SODIUM CHLORIDE 0.9 % (FLUSH) 0.9 %
10 SYRINGE (ML) INJECTION
Status: DISCONTINUED | OUTPATIENT
Start: 2023-03-01 | End: 2023-03-08 | Stop reason: HOSPADM

## 2023-03-01 RX ORDER — TAMSULOSIN HYDROCHLORIDE 0.4 MG/1
0.8 CAPSULE ORAL DAILY
Status: DISCONTINUED | OUTPATIENT
Start: 2023-03-02 | End: 2023-03-08 | Stop reason: HOSPADM

## 2023-03-01 RX ORDER — INSULIN ASPART 100 [IU]/ML
1-10 INJECTION, SOLUTION INTRAVENOUS; SUBCUTANEOUS
Status: DISCONTINUED | OUTPATIENT
Start: 2023-03-01 | End: 2023-03-01

## 2023-03-01 RX ORDER — CEFEPIME HYDROCHLORIDE 1 G/50ML
2 INJECTION, SOLUTION INTRAVENOUS
Status: COMPLETED | OUTPATIENT
Start: 2023-03-01 | End: 2023-03-01

## 2023-03-01 RX ORDER — DOCUSATE SODIUM 100 MG/1
100 CAPSULE, LIQUID FILLED ORAL 2 TIMES DAILY
Status: DISCONTINUED | OUTPATIENT
Start: 2023-03-01 | End: 2023-03-08 | Stop reason: HOSPADM

## 2023-03-01 RX ORDER — CEFEPIME HYDROCHLORIDE 1 G/50ML
2 INJECTION, SOLUTION INTRAVENOUS
Status: DISCONTINUED | OUTPATIENT
Start: 2023-03-02 | End: 2023-03-02

## 2023-03-01 RX ORDER — LISINOPRIL 20 MG/1
20 TABLET ORAL 2 TIMES DAILY
COMMUNITY
Start: 2022-12-29 | End: 2023-03-16

## 2023-03-01 RX ORDER — LANOLIN ALCOHOL/MO/W.PET/CERES
400 CREAM (GRAM) TOPICAL DAILY
Status: DISCONTINUED | OUTPATIENT
Start: 2023-03-02 | End: 2023-03-08 | Stop reason: HOSPADM

## 2023-03-01 RX ORDER — POLYETHYLENE GLYCOL 3350 17 G/17G
17 POWDER, FOR SOLUTION ORAL 2 TIMES DAILY
Status: DISCONTINUED | OUTPATIENT
Start: 2023-03-01 | End: 2023-03-08 | Stop reason: HOSPADM

## 2023-03-01 RX ORDER — IBUPROFEN 200 MG
24 TABLET ORAL
Status: DISCONTINUED | OUTPATIENT
Start: 2023-03-01 | End: 2023-03-08 | Stop reason: HOSPADM

## 2023-03-01 RX ORDER — TOPIRAMATE 25 MG/1
50 TABLET ORAL DAILY
Status: DISCONTINUED | OUTPATIENT
Start: 2023-03-02 | End: 2023-03-08 | Stop reason: HOSPADM

## 2023-03-01 RX ORDER — METOPROLOL TARTRATE 25 MG/1
25 TABLET, FILM COATED ORAL 2 TIMES DAILY
Status: DISCONTINUED | OUTPATIENT
Start: 2023-03-01 | End: 2023-03-08 | Stop reason: HOSPADM

## 2023-03-01 RX ORDER — GLUCAGON 1 MG
1 KIT INJECTION
Status: DISCONTINUED | OUTPATIENT
Start: 2023-03-01 | End: 2023-03-08 | Stop reason: HOSPADM

## 2023-03-01 RX ORDER — PANTOPRAZOLE SODIUM 40 MG/1
40 TABLET, DELAYED RELEASE ORAL 2 TIMES DAILY
Status: DISCONTINUED | OUTPATIENT
Start: 2023-03-01 | End: 2023-03-08 | Stop reason: HOSPADM

## 2023-03-01 RX ORDER — PANTOPRAZOLE SODIUM 40 MG/1
40 TABLET, DELAYED RELEASE ORAL 2 TIMES DAILY
COMMUNITY
Start: 2023-02-18 | End: 2023-03-16

## 2023-03-01 RX ORDER — ATORVASTATIN CALCIUM 40 MG/1
40 TABLET, FILM COATED ORAL DAILY
Status: DISCONTINUED | OUTPATIENT
Start: 2023-03-01 | End: 2023-03-08 | Stop reason: HOSPADM

## 2023-03-01 RX ORDER — DULOXETIN HYDROCHLORIDE 30 MG/1
30 CAPSULE, DELAYED RELEASE ORAL 2 TIMES DAILY
Status: DISCONTINUED | OUTPATIENT
Start: 2023-03-01 | End: 2023-03-08 | Stop reason: HOSPADM

## 2023-03-01 RX ORDER — PHENAZOPYRIDINE HYDROCHLORIDE 100 MG/1
100 TABLET, FILM COATED ORAL 3 TIMES DAILY PRN
Status: DISCONTINUED | OUTPATIENT
Start: 2023-03-01 | End: 2023-03-08 | Stop reason: HOSPADM

## 2023-03-01 RX ORDER — ACETAMINOPHEN 325 MG/1
650 TABLET ORAL EVERY 8 HOURS PRN
Status: DISCONTINUED | OUTPATIENT
Start: 2023-03-01 | End: 2023-03-08 | Stop reason: HOSPADM

## 2023-03-01 RX ORDER — ONDANSETRON 2 MG/ML
4 INJECTION INTRAMUSCULAR; INTRAVENOUS EVERY 8 HOURS PRN
Status: DISCONTINUED | OUTPATIENT
Start: 2023-03-01 | End: 2023-03-08 | Stop reason: HOSPADM

## 2023-03-01 RX ORDER — AMIODARONE HYDROCHLORIDE 100 MG/1
100 TABLET ORAL EVERY MORNING
Status: DISCONTINUED | OUTPATIENT
Start: 2023-03-02 | End: 2023-03-08 | Stop reason: HOSPADM

## 2023-03-01 RX ADMIN — APIXABAN 5 MG: 5 TABLET, FILM COATED ORAL at 09:03

## 2023-03-01 RX ADMIN — PANTOPRAZOLE SODIUM 40 MG: 40 TABLET, DELAYED RELEASE ORAL at 09:03

## 2023-03-01 RX ADMIN — CYPROHEPTADINE HYDROCHLORIDE 4 MG: 4 TABLET ORAL at 11:03

## 2023-03-01 RX ADMIN — MAGNESIUM SULFATE HEPTAHYDRATE 2 G: 40 INJECTION, SOLUTION INTRAVENOUS at 06:03

## 2023-03-01 RX ADMIN — DULOXETINE 30 MG: 30 CAPSULE, DELAYED RELEASE ORAL at 09:03

## 2023-03-01 RX ADMIN — METOPROLOL TARTRATE 25 MG: 25 TABLET, FILM COATED ORAL at 09:03

## 2023-03-01 RX ADMIN — ATORVASTATIN CALCIUM 40 MG: 40 TABLET, FILM COATED ORAL at 09:03

## 2023-03-01 RX ADMIN — POLYETHYLENE GLYCOL 3350 17 G: 17 POWDER, FOR SOLUTION ORAL at 11:03

## 2023-03-01 RX ADMIN — DOCUSATE SODIUM 100 MG: 100 CAPSULE, LIQUID FILLED ORAL at 09:03

## 2023-03-01 RX ADMIN — Medication 6 MG: at 11:03

## 2023-03-01 RX ADMIN — TRAZODONE HYDROCHLORIDE 50 MG: 50 TABLET ORAL at 09:03

## 2023-03-01 RX ADMIN — SODIUM CHLORIDE 1000 ML: 0.9 INJECTION, SOLUTION INTRAVENOUS at 04:03

## 2023-03-01 RX ADMIN — CEFEPIME HYDROCHLORIDE 2 G: 2 INJECTION, SOLUTION INTRAVENOUS at 05:03

## 2023-03-01 RX ADMIN — IOHEXOL 100 ML: 350 INJECTION, SOLUTION INTRAVENOUS at 05:03

## 2023-03-01 NOTE — PROGRESS NOTES
" Subjective:    Patient ID:  Hiro Rodríguez is a 77 y.o. male patient here for evaluation Hospital Follow Up (01/22/2023/)      History of Present Illness:       Patient presents for a hospital follow-up appointment.  He was admitted for altered mental status from SNF and was found to be hypotensive and tachycardic with sepsis from UTI.  Patient was discharged back to SNF and has since been discharged to his home where he was receiving home health and physical therapy.  His blood pressure has been stable at home per his wife.  He has been feeling well.  He denied chest pain, shortness of breath, palpitations, dizziness, lightheadedness, worsening edema or bleeding.      During examination patient is alert and oriented, no acute distress, BP was 90s over 60s.  Blood pressure was rechecked multiple times inconsistently hypotensive.  Patient was asymptomatic during examination.  However EKG was obtained and patient was found to be in AFib RVR with heart rate 115-120.  Patient then reported that he felt weak and unwell.  Patient was instructed then to proceed to the ED for further evaluation and workup for AFib RVR and acute hypotension.         FROM HOSPITAL DISCHARGE SUMMARY 1/26/23  HPI:   77 year old male with history of PAF (apixaban), HTN, DM 2, Morbid Obesity, s/p right hip ORIF 11/2022 (recuperating at local SNF) presented to  ED via EMS after being found with altered mental status at the SNF. History from wife at bedside. Patient is very hard of hearing and is currently altered. Reportedly he has been receiving PT/OT at SNF making slow progression back from above ORIF. This evening when his wife came to visit "He was just not himself". He was found to be hypotensive at the SNF and sent here. Upon presentation he was found to be hypotensive with BP 90's systolic, and tachycardic 110-120's, both of which normalized after bolus of 1 liter LR and eventually initiation of low dose norepinephrine.     He has had " urinary retention and had had Sotelo catheter, which was removed 3 days ago at the SNF. He has known BPH. When he arrived here, Sotelo was reinserted and over a liter drained immediately. The patient denies any pain currently. No CP/SOB. No orthopnea, PND or edema.      Has stage 1 decubitus buttocks (POA).     In ED Labs reviewed and noted below: mild leukocytosis with normal Hct; normal electrolytes and renal function with prerenal azotemia; hypoalbuminemia with normal hepatic function; BNP is moderately elevated with minimal elevation of HS trop (repeat ordered and pending); Lactate is normal. UA: dirty with 2+ protein and 3+ occult blood--reflexed to culture. Blood cultured. CXR reviewed: Cardiomegaly with prominent vascularture and small basilar effusions. EKG reviewed: rated controlled a fib with deep inferolateral ST segment sagging, but not acute ST segments.     CT Head: no acute intracranial pathology     CT Abdo/Pelvis:   IMPRESSION:  1: Bilateral nephrolithiasis. 7 mm upper left ureteral calculus causing mild hydronephrosis and hydroureter.  2: Small pleural fluid collections and basilar lung atelectasis.  3: Diverticulosis.  4: Inflammation around the bladder suggesting cystitis.  5: Mild to moderate prostate gland enlargement.      Discussed with ED MD; Inpatient admission for Metabolic Encephalopathy; Acute Cystitis; Ischemic EKG; Replaced Sotelo; Cardiology consultation--repeat troponin and EKG in AM; continue gentle hydration with low dose norepinephrine to maintain BP; as patient has been in institutions will cover broadly until species and sensitivities are known; wound care for decubitus; continuing home regimen for chronic maladies including beta blockade for IHD (now that BP has improved with volume/pressor)--holding po hyperglycemics with low dose sliding scale; Urology consultation--continuing high dose tamsulosin; TSH with AM labs for review     Face to Face occurred on 01/22/23 with note  finished on 01/23/23        * No surgery found *       Hospital Course:   Patient with chronic Urinary retention and has Sotelo insitu got admitted from SNF with Sepsis(uro sepsis)  Sepsis and associated septic shock was treated with pressors/iv fluids and iv abx  UC/BC grew Pseudomonas  Pt was evaluated by Urologist   Pts condition got better and was discharged back to SNF   START taking these medications    ciprofloxacin HCl 500 MG tablet  Commonly known as: CIPRO  Take 1 tablet (500 mg total) by mouth 2 (two) times daily. for 14 days          CHANGE how you take these medications    metFORMIN 500 MG ER 24hr tablet  Commonly known as: GLUCOPHAGE-XR  TAKE 1 TABLET BY MOUTH EVERY DAY  What changed: when to take this      rosuvastatin 20 MG tablet  Commonly known as: CRESTOR  TAKE 1 TABLET BY MOUTH EVERY DAY  What changed:   when to take this  additional instructions      topiramate 50 MG tablet  Commonly known as: TOPAMAX  TAKE 1 TABLET BY MOUTH EVERY DAY  What changed: when to take this          CONTINUE taking these medications    acetaminophen 325 MG tablet  Commonly known as: TYLENOL  Take 650 mg by mouth every 6 (six) hours as needed.      amiodarone 100 MG Tab  Commonly known as: PACERONE  Take 100 mg by mouth every morning.      apixaban 5 mg Tab  Commonly known as: ELIQUIS  Take 1 tablet (5 mg total) by mouth 2 (two) times daily.      cholecalciferol (vitamin D3) 50 mcg (2,000 unit) Tab  Commonly known as: VITAMIN D3  Take 1 tablet by mouth once daily.      cyanocobalamin (vitamin B-12) 1,000 mcg Subl  Place 1,000 mcg under the tongue 2 (two) times a day.      cyproheptadine 4 mg tablet  Commonly known as: PERIACTIN  Take 1 tablet (4 mg total) by mouth 3 (three) times daily.      docusate sodium 100 MG capsule  Commonly known as: COLACE  Take 100 mg by mouth 2 (two) times daily.      DULoxetine 30 MG capsule  Commonly known as: CYMBALTA  Take 1 capsule (30 mg total) by mouth 2 (two) times daily.      ferrous  gluconate 236 mg (27 mg iron) Tab  Take 240 mg by mouth once daily.      FIBER-CAPS (CA POLYCARBOPHIL) ORAL  Take 1 tablet by mouth once daily.      insulin aspart U-100 100 unit/mL (3 mL) Inpn pen  Commonly known as: NovoLOG  Inject 1-10 Units into the skin before meals and at bedtime as needed (Hyperglycemia).      magnesium 250 mg Tab  Take 250 mg by mouth 2 (two) times a day.      metoprolol tartrate 25 MG tablet  Commonly known as: LOPRESSOR  Take 1 tablet (25 mg total) by mouth 2 (two) times daily.      omega-3 acid ethyl esters 1 gram capsule  Commonly known as: LOVAZA  TAKE 2 CAPSULES BY MOUTH 2 TIMES DAILY.      phenazopyridine 100 MG tablet  Commonly known as: PYRIDIUM  Take 1 tablet (100 mg total) by mouth 3 (three) times daily as needed for Pain.      polyethylene glycol 17 gram/dose powder  Commonly known as: GLYCOLAX  Take 17 g by mouth daily as needed.      potassium chloride 10 MEQ Tbsr  Commonly known as: KLOR-CON  Take 2 tablets (20 mEq total) by mouth once daily.      tamsulosin 0.4 mg Cap  Commonly known as: FLOMAX  Take 2 capsules (0.8 mg total) by mouth once daily.      tolterodine 4 MG 24 hr capsule  Commonly known as: DETROL LA  Take 1 capsule (4 mg total) by mouth once daily.      traZODone 50 MG tablet  Commonly known as: DESYREL  TAKE 1 TABLET BY MOUTH EVERY EVENING.          STOP taking these medications    cyclobenzaprine 5 MG tablet  Commonly known as: FLEXERIL      HYDROcodone-acetaminophen  mg per tablet  Commonly known as: NORCO         Review of patient's allergies indicates:  No Known Allergies    Past Medical History:   Diagnosis Date    CHF (congestive heart failure)     Hypertension      Past Surgical History:   Procedure Laterality Date    FRACTURE SURGERY      INTRAMEDULLARY RODDING OF TROCHANTER OF FEMUR Left 11/10/2022    Procedure: INSERTION, ITRAMEDULLARY SANTI, FEMUR, TROCHANTER;  Surgeon: Richard Phoenix MD;  Location: [facility];  Service: Orthopedics;   Laterality: Left;     Social History     Tobacco Use    Smoking status: Never    Smokeless tobacco: Never   Substance Use Topics    Alcohol use: Yes     Alcohol/week: 1.0 standard drink     Types: 1 Shots of liquor per week    Drug use: Not Currently        Review of Systems:    As noted in HPI in addition      REVIEW OF SYSTEMS  CARDIOVASCULAR: No recent chest pain, palpitations, arm, neck, or jaw pain  RESPIRATORY: No recent fever, cough chills, SOB or congestion  : No blood in the urine  GI: No Nausea, vomiting, constipation, diarrhea, blood, or reflux.  MUSCULOSKELETAL: No myalgias+ positive generalized weakness  NEURO: No lightheadedness or dizziness    EYES: No Double vision, blurry, vision or headache              Objective        Vitals:    03/01/23 1347   BP: 90/62   Pulse: 92       LIPIDS - LAST 2   Lab Results   Component Value Date    CHOL 176 07/07/2022    CHOL 115 (L) 04/08/2022    HDL 36 (L) 07/07/2022    HDL 38 (L) 04/08/2022    LDLCALC 100.0 07/07/2022    LDLCALC 53.4 (L) 04/08/2022    TRIG 200 (H) 07/07/2022    TRIG 118 04/08/2022    CHOLHDL 20.5 07/07/2022    CHOLHDL 33.0 04/08/2022       CBC - LAST 2  Lab Results   Component Value Date    WBC 14.18 (H) 03/01/2023    WBC 8.15 01/25/2023    RBC 4.35 (L) 03/01/2023    RBC 3.69 (L) 01/25/2023    HGB 13.0 (L) 03/01/2023    HGB 11.0 (L) 01/25/2023    HCT 40.3 03/01/2023    HCT 35.0 (L) 01/25/2023    MCV 93 03/01/2023    MCV 95 01/25/2023    MCH 29.9 03/01/2023    MCH 29.8 01/25/2023    MCHC 32.3 03/01/2023    MCHC 31.4 (L) 01/25/2023    RDW 15.5 (H) 03/01/2023    RDW 14.6 (H) 01/25/2023     03/01/2023     01/25/2023    MPV 9.6 03/01/2023    MPV 10.0 01/25/2023    GRAN 11.9 (H) 03/01/2023    GRAN 83.5 (H) 03/01/2023    LYMPH 1.3 03/01/2023    LYMPH 9.4 (L) 03/01/2023    MONO 0.9 03/01/2023    MONO 6.0 03/01/2023    BASO 0.04 03/01/2023    BASO 0.02 01/25/2023    NRBC 0 03/01/2023    NRBC 0 01/25/2023       CHEMISTRY & LIVER FUNCTION -  LAST 2  Lab Results   Component Value Date     02/07/2023     01/25/2023    K 4.2 02/07/2023    K 3.2 (L) 01/25/2023     02/07/2023     01/25/2023    CO2 23 02/07/2023    CO2 25 01/25/2023    ANIONGAP 10 02/07/2023    ANIONGAP 8 01/25/2023    BUN 11 02/07/2023    BUN 12 01/25/2023    CREATININE 0.8 02/07/2023    CREATININE 0.7 01/25/2023     (H) 02/07/2023    GLU 92 01/25/2023    CALCIUM 8.2 (L) 02/07/2023    CALCIUM 8.1 (L) 01/25/2023    MG 1.7 01/25/2023    MG 1.6 01/24/2023    ALBUMIN 2.5 (L) 01/22/2023    ALBUMIN 2.6 (L) 12/31/2022    PROT 6.3 01/22/2023    PROT 5.3 (L) 12/31/2022    ALKPHOS 92 01/22/2023    ALKPHOS 79 12/31/2022    ALT 9 (L) 01/22/2023    ALT 11 12/31/2022    AST 12 01/22/2023    AST 12 12/31/2022    BILITOT 0.7 01/22/2023    BILITOT 0.9 12/31/2022        CARDIAC PROFILE - LAST 2  Lab Results   Component Value Date     (H) 01/22/2023    BNP 52 12/31/2022    TROPONINI <0.030 11/08/2022    TROPONINIHS 27.2 (H) 01/23/2023    TROPONINIHS 24.5 (H) 01/23/2023        COAGULATION - LAST 2  Lab Results   Component Value Date    LABPT 14.2 (H) 11/10/2022    LABPT 13.2 11/09/2022    INR 1.1 03/01/2023    INR 1.2 12/31/2022    APTT 28.5 03/01/2023    APTT 26.5 12/31/2022       ENDOCRINE & PSA - LAST 2  Lab Results   Component Value Date    HGBA1C 5.8 12/31/2022    HGBA1C 7.0 (H) 11/09/2022    MICROALBUR 3.2 02/13/2020    TSH 1.870 12/31/2022    PROCAL <0.05 01/07/2023    PROCAL 0.09 12/31/2022    PSA 0.85 10/26/2021    PSA 0.91 08/20/2020        ECHOCARDIOGRAM RESULTS  Results for orders placed during the hospital encounter of 11/08/22    Echo    Interpretation Summary  · The left ventricle is normal in size with moderate concentric hypertrophy and normal systolic function.  · The estimated ejection fraction is 70%.  · Normal left ventricular diastolic function.  · The sinuses of Valsalva is mildly dilated.  · The ascending aorta is dilated. Recommend CT scan to  evaluate entire aorta for further evaluation.  · Mild tricuspid regurgitation.  · Normal central venous pressure (3 mmHg).  · The estimated PA systolic pressure is 61 mmHg.  · There is pulmonary hypertension.  · Unable to visualize right ventricle and right atrium but they both grossly appear to be dilated with RV function mildly reduced.      CURRENT/PREVIOUS VISIT EKG  Results for orders placed or performed during the hospital encounter of 03/01/23   EKG 12-lead    Collection Time: 03/01/23  2:27 PM    Narrative    Test Reason : I95.9,    Vent. Rate : 113 BPM     Atrial Rate : 000 BPM     P-R Int : 000 ms          QRS Dur : 092 ms      QT Int : 346 ms       P-R-T Axes : 000 055 214 degrees     QTc Int : 474 ms    Atrial fibrillation with rapid ventricular response  Septal infarct (cited on or before 01-MAR-2023)  ST and T wave abnormality, consider lateral ischemia  Abnormal ECG  When compared with ECG of 01-MAR-2023 14:12,  Serial changes of Septal infarct Present    Referred By: [email or web address]   SELF           Confirmed By:      No valid procedures specified.   No results found for this or any previous visit.    No valid procedures specified.    PHYSICAL EXAM  CONSTITUTIONAL:  Chronically ill-appearing elderly male in no apparent distress  NECK: no carotid bruit, no JVD  LUNGS: CTA  CHEST WALL: no tenderness  HEART:  Irregular rate and rhythm; AFib RVR on EKG.  S1, S2 normal, no murmurs clicks or gallops.    ABDOMEN: soft, non-tender; bowel sounds normal  EXTREMITIES: Extremities normal, 1+ nonpitting edema, no calf tenderness noted  NEURO: AAO X 3    I HAVE REVIEWED :    The vital signs, nurses notes, and all the pertinent radiology and labs.        Current Outpatient Medications   Medication Instructions    acetaminophen (TYLENOL) 650 mg, Oral, Every 6 hours PRN    amiodarone (PACERONE) 100 mg, Oral, Every morning    apixaban (ELIQUIS) 5 mg, Oral, 2 times daily    calcium polycarbophil (FIBER-CAPS, CA  POLYCARBOPHIL, ORAL) 1 tablet, Oral, Daily    cholecalciferol, vitamin D3, (VITAMIN D3) 50 mcg (2,000 unit) Tab 1 tablet, Oral, Daily    cyanocobalamin (vitamin B-12) 1,000 mcg, Sublingual, 2 times daily    cyproheptadine (PERIACTIN) 4 mg, Oral, 3 times daily    docusate sodium (COLACE) 100 mg, Oral, 2 times daily    DULoxetine (CYMBALTA) 30 mg, Oral, 2 times daily    ferrous gluconate 240 mg, Oral, Daily    insulin aspart U-100 (NOVOLOG) 1-10 Units, Subcutaneous, Before meals & nightly PRN    lisinopriL (PRINIVIL,ZESTRIL) 20 mg, Oral, 2 times daily    magnesium 250 mg, Oral, 2 times daily    metFORMIN (GLUCOPHAGE-XR) 500 MG ER 24hr tablet TAKE 1 TABLET BY MOUTH EVERY DAY    metoprolol tartrate (LOPRESSOR) 25 mg, Oral, 2 times daily    omega-3 acid ethyl esters (LOVAZA) 1 gram capsule TAKE 2 CAPSULES BY MOUTH 2 TIMES DAILY.    phenazopyridine (PYRIDIUM) 100 mg, Oral, 3 times daily PRN    polyethylene glycol (GLYCOLAX) 17 g, Oral, Daily PRN    potassium chloride SA (KLOR-CON) 10 MEQ TbSR 20 mEq, Oral, Daily    rosuvastatin (CRESTOR) 20 MG tablet TAKE 1 TABLET BY MOUTH EVERY DAY    tamsulosin (FLOMAX) 0.8 mg, Oral, Daily    tolterodine (DETROL LA) 4 mg, Oral, Daily    topiramate (TOPAMAX) 50 MG tablet TAKE 1 TABLET BY MOUTH EVERY DAY    traZODone (DESYREL) 50 MG tablet TAKE 1 TABLET BY MOUTH EVERY EVENING.          Assessment & Plan     Atrial fibrillation  Patient has chronic atrial fibrillation.  Patient was found to be in AFib RVR during examination today.  Patient was also hypotensive and symptomatic near the end of his appointment.  Patient was then recommended to proceed to the ER for further workup and evaluation.  Patient was agreeable to this plan of care and was assisted to the ER via wheelchair with wife at side.        Hypertension, essential  BP 90/62 during examination.  Repeat blood pressure is consistently low.  Patient was found to be in AFib RVR.  Patient was instructed to go to the ED.  Explained to  wife that she should continue to monitor blood pressure at home and hold antihypertensives for systolic BP less than 100.    Hyperlipidemia    Continue omega-3 fatty acids daily, rosuvastatin 20 mg daily.  Continue heart healthy low sodium diet.    Chronic heart failure with preserved ejection fraction  Patient appears euvolemic during examination.  No acute shortness of breath or peripheral edema at this time.  Patient is also hypotensive during examination.  Patient has been instructed to proceed to the ER for further evaluation of hypotension and AFib RVR.  Continue 1.5 L fluid restriction and 2 g low-sodium diet.    Hypokalemia  BMP ordered.  Patient is no longer taking potassium chloride at home.  He states that he is run out of this medication and is unsure whether he should continue.  Patient had 1 episode of hypokalemia during hospitalization.  We will repeat BMP to further evaluate potassium level and determine whether further supplementation is required.          No follow-ups on file.

## 2023-03-01 NOTE — ASSESSMENT & PLAN NOTE
Patient appears euvolemic during examination.  No acute shortness of breath or peripheral edema at this time.  Patient is also hypotensive during examination.  Patient has been instructed to proceed to the ER for further evaluation of hypotension and AFib RVR.  Continue 1.5 L fluid restriction and 2 g low-sodium diet.

## 2023-03-01 NOTE — ED PROVIDER NOTES
Encounter Date: 3/1/2023       History     Chief Complaint   Patient presents with    Dizziness     While at cardiology office pt noted to have a fib rvr with rate of 116, started feeling dizzy, low bp in triage     HPI    Hiro Rodríguez is a 77 y.o. male with a past medical history of paroxysmal atrial fibrillation currently on Eliquis, hypertension, type 2 diabetes, history of urinary retention status post Sotelo catheter, and a recent hip fracture that occurred in November status post ORIF and had a prolonged SNF stay the presented to the emergency department from cardiology clinic for evaluation of hypotension, abdominal pain, and arrhythmia.  Per chart review, patient has had recent hospitalizations for sepsis thought to be secondary to UTI.  Urine and blood cultures grew Pseudomonas.  He was stabilized and then discharged back to skilled nursing facility. Ultimately discharged home and was following with home health.  He was seen in cardiology clinic today and found to be hypotensive with blood pressure in the 90s.  He is currently complaining of mild suprapubic abdominal pain and generalized fatigue.  Denies fever, chest pain, shortness of breath, nausea, vomiting, changes in p.o. intake, and changes in bowel habits.    Review of recent echo shows ascending aortic aneurysm in there was a recommendation for CT evaluation for follow-up.    Review of patient's allergies indicates:  No Known Allergies  Past Medical History:   Diagnosis Date    CHF (congestive heart failure)     Hypertension      Past Surgical History:   Procedure Laterality Date    FRACTURE SURGERY      INTRAMEDULLARY RODDING OF TROCHANTER OF FEMUR Left 11/10/2022    Procedure: INSERTION, ITRAMEDULLARY SANTI, FEMUR, TROCHANTER;  Surgeon: Richard Phoenix MD;  Location: [facility];  Service: Orthopedics;  Laterality: Left;     No family history on file.  Social History     Tobacco Use    Smoking status: Never    Smokeless tobacco: Never    Substance Use Topics    Alcohol use: Yes     Alcohol/week: 1.0 standard drink     Types: 1 Shots of liquor per week    Drug use: Not Currently     Review of Systems   Constitutional:  Positive for fatigue. Negative for fever.   HENT:  Negative for sore throat.    Respiratory:  Negative for shortness of breath.    Cardiovascular:  Negative for chest pain.   Gastrointestinal:  Negative for nausea.   Genitourinary:  Negative for dysuria.   Musculoskeletal:  Negative for back pain.   Skin:  Negative for rash.   Neurological:  Negative for weakness.   Hematological:  Does not bruise/bleed easily.     Physical Exam     Initial Vitals [03/01/23 1437]   BP Pulse Resp Temp SpO2   91/65 (!) 120 18 98.9 °F (37.2 °C) (!) 94 %      MAP       --         Physical Exam    Constitutional: He appears well-developed and well-nourished.   HENT:   Head: Normocephalic and atraumatic.   Eyes: EOM are normal. Pupils are equal, round, and reactive to light.   Neck:   Normal range of motion.  Cardiovascular:  Normal heart sounds.           Atrial fibrillation at rate of 120   Pulmonary/Chest: Breath sounds normal. No respiratory distress. He has no wheezes. He has no rales.   Abdominal: Abdomen is soft. He exhibits no distension. There is abdominal tenderness (suprapubic). There is no rebound and no guarding.   Musculoskeletal:      Cervical back: Normal range of motion.     Neurological: He is alert and oriented to person, place, and time. He has normal strength. No cranial nerve deficit or sensory deficit. GCS score is 15. GCS eye subscore is 4. GCS verbal subscore is 5. GCS motor subscore is 6.   Skin: Capillary refill takes less than 2 seconds. No rash noted.   Psychiatric: He has a normal mood and affect.       ED Course   Critical Care    Date/Time: 3/1/2023 8:04 PM  Performed by: Rudi Victor MD  Authorized by: Rudi Victor MD   Direct patient critical care time: 15 minutes  Additional history critical care time: 5  minutes  Ordering / reviewing critical care time: 5 minutes  Documentation critical care time: 5 minutes  Consulting other physicians critical care time: 5 minutes  Total critical care time (exclusive of procedural time) : 35 minutes      Labs Reviewed   MAGNESIUM - Abnormal; Notable for the following components:       Result Value    Magnesium 1.5 (*)     All other components within normal limits   CBC W/ AUTO DIFFERENTIAL - Abnormal; Notable for the following components:    WBC 14.18 (*)     RBC 4.35 (*)     Hemoglobin 13.0 (*)     RDW 15.5 (*)     Gran # (ANC) 11.9 (*)     Immature Grans (Abs) 0.05 (*)     Gran % 83.5 (*)     Lymph % 9.4 (*)     All other components within normal limits   COMPREHENSIVE METABOLIC PANEL - Abnormal; Notable for the following components:    Potassium 3.4 (*)     Glucose 172 (*)     Calcium 8.4 (*)     Albumin 2.6 (*)     All other components within normal limits   URINALYSIS, REFLEX TO URINE CULTURE - Abnormal; Notable for the following components:    Appearance, UA Cloudy (*)     Protein, UA 1+ (*)     Occult Blood UA 3+ (*)     Nitrite, UA Positive (*)     Leukocytes, UA 3+ (*)     All other components within normal limits    Narrative:     Specimen Source->Urine   URINALYSIS MICROSCOPIC - Abnormal; Notable for the following components:    RBC, UA >100 (*)     WBC, UA >100 (*)     Bacteria Many (*)     Hyaline Casts, UA 62 (*)     All other components within normal limits    Narrative:     Specimen Source->Urine   CULTURE, BLOOD   CULTURE, BLOOD   CULTURE, URINE   PROTIME-INR   APTT   SARS-COV-2 RNA AMPLIFICATION, QUAL   INFLUENZA A AND B ANTIGEN    Narrative:     Specimen Source->Nasopharyngeal Swab   TROPONIN I HIGH SENSITIVITY   LACTIC ACID, PLASMA   LIPASE   POCT CREATININE   POCT GLUCOSE MONITORING CONTINUOUS   POCT GLUCOSE MONITORING CONTINUOUS        ECG Results              EKG 12-lead (In process)  Result time 03/01/23 15:07:21      In process by Interface, Lab In [facility]  (03/01/23 15:07:21)                   Narrative:    Test Reason : I95.9,    Vent. Rate : 113 BPM     Atrial Rate : 000 BPM     P-R Int : 000 ms          QRS Dur : 092 ms      QT Int : 346 ms       P-R-T Axes : 000 055 214 degrees     QTc Int : 474 ms    Atrial fibrillation with rapid ventricular response  Septal infarct (cited on or before 01-MAR-2023)  ST and T wave abnormality, consider lateral ischemia  Abnormal ECG  When compared with ECG of 01-MAR-2023 14:12,  Serial changes of Septal infarct Present    Referred By: [email or web address]   SELF           Confirmed By:                                   Imaging Results              CT Chest Abdomen Pelvis With Contrast (xpd) (Final result)  Result time 03/01/23 18:30:54      Final result by Shanta Yu DO (03/01/23 18:30:54)                   Narrative:    CT CHEST, ABDOMEN, AND PELVIS WITH INTRAVENOUS CONTRAST: 3/1/2023 6:26 PM CST    HISTORY: 77 years  old Male with Aortic aneurysm, known or suspected.    COMPARISON: CT of the abdomen and pelvis from 1/20/2023    TECHNIQUE: Axial contiguous images were obtained from the lung apices to the proximal femurs with intravenous intravenous contrast administered. Sagittal and coronal reconstructions were also obtained and reviewed. This exam was performed according to our departmental dose-optimization program, which includes automated exposure control, adjustment of the mA and/or KV according to the patient's size and/or use of iterative reconstruction technique.    FINDINGS:    CARDIOMEDIASTINAL STRUCTURES: The heart size is mildly enlarged. No pericardial effusion is seen.    LYMPH NODES: No significant mediastinal, supraclavicular, or axillary lymphadenopathy is seen.    AORTA: The ascending aorta is at the upper limits of normal in size. The visualized proximal subclavian, vertebral, and common carotid arterial vasculature is unremarkable. There is atherosclerotic calcification seen at the aorta. There are no findings to  suggest an aortic dissection.    PULMONARY ARTERY: The main pulmonary artery is normal in size. There are no findings to suggest a pulmonary embolism. The main pulmonary artery is suboptimally opacified for evaluation    THYROID: The thyroid gland is heterogeneous.    TRACHEA: The central tracheobronchial tree is patent.    PARENCHYMA: There are bibasilar space opacities.    PLEURA: Trace bilateral pleural fluid is seen. There is no evidence of a pneumothorax.    LIVER: The visualized hepatic parenchyma demonstrates no focal abnormality.  The main portal vein is well opacified with contrast.    GALLBLADDER: The gallbladder demonstrates no evidence of calcified gallstones.    SPLEEN: The spleen is normal in size.    PANCREAS: The pancreas is unremarkable.    ADRENAL GLANDS: The bilateral adrenal glands are unremarkable.    KIDNEYS: There is moderate left hydroureteronephrosis, secondary to at least 2 calculi at the midportion of the left ureter measuring 8 to 9 mm and 6 to 7 mm in size. No hydronephrosis is seen on the right side.. There are punctate bilateral renal calculi present. There is stranding seen around the left kidney.    PELVIS: The urinary bladder is decompressed by Sotelo catheter. There is stranding seen around the urinary bladder with bladder wall thickening suspected cystitis. No obvious bladder calculi are seen.    STOMACH: The stomach is not well distended.    BOWEL: The small bowel loops appear unremarkable. No pericolonic inflammatory stranding is seen. There are multiple colonic diverticula without evidence to suggest acute diverticulitis. There is a moderate amount of stool seen throughout the colon.    APPENDIX: The appendix is unremarkable.    PERITONEUM: There is no evidence of pneumoperitoneum or free fluid.    VESSELS: The IVC is unremarkable. The abdominal aorta is within normal limits of size.    LYMPH NODES: No significantly enlarged lymph nodes are seen in the abdomen or  pelvis.    BONES AND SOFT TISSUES: No acute osseous abnormality is seen. There is incidental bilateral gynecomastia present.  There is an intramedullary kelsey and interlocking screw seen at the left hip. There are multilevel degenerative changes seen at the spine.    IMPRESSION: The urinary bladder is decompressed by Sotelo catheter. There is stranding seen around the urinary bladder with bladder wall thickening suspected cystitis. No obvious bladder calculi are seen.    There is moderate left hydroureteronephrosis, secondary to at least 2 calculi at the midportion of the left ureter measuring 8 to 9 mm and 6 to 7 mm in size. There is stranding seen around the left kidney. Superimposed infection is not excluded.    There are small bilateral pleural effusions with bibasilar airspace opacities which can be seen with atelectasis and/or infection.    The abdominal aorta is within normal limits of size. No evidence is seen to suggest aortic dissection.    Hepatic steatosis    Electronically signed by:  Shanta Yu DO  3/1/2023 6:30 PM CST Workstation: [id number]                                     X-Ray Chest AP Portable (Final result)  Result time 03/01/23 15:59:10      Final result by Jeramy Gardner MD (03/01/23 15:59:10)                   Narrative:    Chest single view    CLINICAL DATA: Dizziness    FINDINGS: Comparison to January 22. Cardiomegaly is stable. The mediastinum is unremarkable. There is a probable small left pleural effusion, similar in appearance to the prior study. Increased density in the retrocardiac region, left lung base, suggests atelectasis or infiltrate. The right lung is clear.    IMPRESSION:  1. Stable cardiomegaly.  2. Left basilar volume loss or infiltrate.  3. Small left pleural effusion.    Electronically signed by:  Jeramy Gardner MD  3/1/2023 3:59 PM CST Workstation: [id number]                                     Medications   magnesium sulfate 2g in water 50mL IVPB (premix) (2 g  Intravenous New Bag 3/1/23 4405)   acetaminophen tablet 650 mg (has no administration in time range)   amiodarone tablet 100 mg (has no administration in time range)   apixaban tablet 5 mg (has no administration in time range)   cyproheptadine 4 mg tablet 4 mg (has no administration in time range)   docusate sodium capsule 100 mg (has no administration in time range)   DULoxetine DR capsule 30 mg (has no administration in time range)   HYDROcodone-acetaminophen  mg per tablet 1 tablet (has no administration in time range)   insulin aspart U-100 pen 1-10 Units (has no administration in time range)   magnesium oxide tablet 400 mg (has no administration in time range)   metoprolol tartrate (LOPRESSOR) tablet 25 mg (has no administration in time range)   pantoprazole EC tablet 40 mg (has no administration in time range)   phenazopyridine tablet 100 mg (has no administration in time range)   polyethylene glycol packet 17 g (has no administration in time range)   potassium chloride SA CR tablet 20 mEq (has no administration in time range)   atorvastatin tablet 40 mg (has no administration in time range)   tamsulosin 24 hr capsule 0.8 mg (has no administration in time range)   topiramate tablet 50 mg (has no administration in time range)   traZODone tablet 50 mg (has no administration in time range)   sodium chloride 0.9% flush 10 mL (has no administration in time range)   melatonin tablet 6 mg (has no administration in time range)   cefepime in dextrose 5 % IVPB 2 g (has no administration in time range)   ondansetron injection 4 mg (has no administration in time range)   acetaminophen tablet 650 mg (has no administration in time range)   glucose chewable tablet 16 g (has no administration in time range)   glucose chewable tablet 24 g (has no administration in time range)   glucagon (human recombinant) injection 1 mg (has no administration in time range)   dextrose 10% bolus 125 mL 125 mL (has no administration in  time range)   dextrose 10% bolus 250 mL 250 mL (has no administration in time range)   insulin aspart U-100 pen 1-10 Units (has no administration in time range)   sodium chloride 0.9% bolus 1,000 mL 1,000 mL (0 mLs Intravenous Stopped 3/1/23 1754)   cefepime in dextrose 5 % IVPB 2 g (0 g Intravenous Stopped 3/1/23 1842)   iohexoL (OMNIPAQUE 350) injection 100 mL (100 mLs Intravenous Given 3/1/23 1707)     Medical Decision Making:   History:   Old Medical Records: I decided to obtain old medical records.  Old Records Summarized: records from clinic visits, records from previous admission(s) and records from another hospital.  Initial Assessment:   Hiro Rodríguez is a 77 y.o. male with a past medical history of paroxysmal atrial fibrillation currently on Eliquis, hypertension, type 2 diabetes, history of urinary retention status post Sotelo catheter, and a recent hip fracture that occurred in November status post ORIF and had a prolonged SNF stay the presented to the emergency department from cardiology clinic for evaluation of hypotension, abdominal pain, and arrhythmia.  Initial vitals notable for systolic blood pressure of 90 and heart rate in the 120s.  Chronically ill-appearing male.  Suprapubic tenderness without rebound or guarding.  Sotelo catheter in place.  Lungs clear to auscultation bilaterally.  Heart sounds within normal limits.  Limited mobility secondary to hip fracture.  Differential Diagnosis:   Sepsis, arrhythmia, ACS, pneumonia, UTI, COVID-19, intra-abdominal infection, abscess, and kidney stone.  I believe his atrial fibrillation is more likely related to sepsis than an arrhythmia that is causing hypotension.  Clinical Tests:   Lab Tests: Ordered and Reviewed  The following lab test(s) were unremarkable: CMP, Lactate, Urinalysis and CBC       <> Summary of Lab: Lactate was negative.  Leukocytosis seen on CBC.  CMP with out significant electrolyte abnormalities.  UA shows positive nitrates and 3+  "leuk Estrace.  Consistent with UTI.  Blood cultures pending.  Radiological Study: Ordered and Reviewed  Medical Tests: Ordered and Reviewed  Sepsis Perfusion Assessment: "I attest a sepsis perfusion exam was performed within 6 hours of sepsis, severe sepsis, or septic shock presentation, following fluid resuscitation."  ED Management:  Given 1 L of fluid with subsequent improvement of symptoms.  Patient is no longer tachycardic after this.  Additional fluid administration is no longer indicated.  Per chart review, patient has had Pseudomonas both in the urine cultures and blood cultures, so was given cefepime.  CT of abdomen shows 2 left-sided renal stones.  Admitted to Hospital Medicine.    Blade Mcpherson  \[facility]\"" Emergency Medicine PGY3  03/01/2023 7:33 PM            Attending Attestation:   Physician Attestation Statement for Resident:  As the supervising MD   Physician Attestation Statement: I have personally seen and examined this patient.   I agree with the above history.  -: Patient with history atrial fibrillation.  Patient was noted to have low blood pressure associated with atrial fibrillation RVR at cardiologist's office.  Patient with history of bacteremia with Pseudomonas secondary to UTI approximally 6 weeks ago.  Patient denies any fever chills.  Patient does have a indwelling Sotelo now.  Similar symptoms with previous sepsis.   As the supervising MD I agree with the above PE.   -: Patient presents with weakness.  Patient does have initial low blood pressure.  Patient did not have serial hypotension.  Lactate is normal.  Patient blood pressure improved with fluid bolus here.  Patient does have urinary tract infection.  Given previous Pseudomonas cefepime initiated.  Hospitalist consulted for admission.     I have reviewed the following: old records at this facility, old EKGs, old x-rays, old CTs and EKG reports.                           Clinical Impression:   Final diagnoses:  [A41.9] Sepsis, due to " unspecified organism, unspecified whether acute organ dysfunction present (Primary)  [T83.511A, N39.0] Urinary tract infection associated with indwelling urethral catheter, initial encounter        ED Disposition Condition    Admit Stable                Blade Mcpherson MD  Resident  03/01/23 1933       Blade Mcpherson MD  Resident  03/01/23 1935       Rudi Victor MD  03/01/23 2004

## 2023-03-01 NOTE — ASSESSMENT & PLAN NOTE
Continue omega-3 fatty acids daily, rosuvastatin 20 mg daily.  Continue heart healthy low sodium diet.

## 2023-03-01 NOTE — ASSESSMENT & PLAN NOTE
BP 90/62 during examination.  Repeat blood pressure is consistently low.  Patient was found to be in AFib RVR.  Patient was instructed to go to the ED.  Explained to wife that she should continue to monitor blood pressure at home and hold antihypertensives for systolic BP less than 100.

## 2023-03-01 NOTE — ASSESSMENT & PLAN NOTE
BMP ordered.  Patient is no longer taking potassium chloride at home.  He states that he is run out of this medication and is unsure whether he should continue.  Patient had 1 episode of hypokalemia during hospitalization.  We will repeat BMP to further evaluate potassium level and determine whether further supplementation is required.

## 2023-03-01 NOTE — ASSESSMENT & PLAN NOTE
Patient has chronic atrial fibrillation.  Patient was found to be in AFib RVR during examination today.  Patient was also hypotensive and symptomatic near the end of his appointment.  Patient was then recommended to proceed to the ER for further workup and evaluation.  Patient was agreeable to this plan of care and was assisted to the ER via wheelchair with wife at side.

## 2023-03-02 ENCOUNTER — ANESTHESIA (OUTPATIENT)
Dept: SURGERY | Facility: HOSPITAL | Age: 78
DRG: 660 | End: 2023-03-02
Payer: MEDICARE

## 2023-03-02 ENCOUNTER — ANESTHESIA EVENT (OUTPATIENT)
Dept: SURGERY | Facility: HOSPITAL | Age: 78
DRG: 660 | End: 2023-03-02
Payer: MEDICARE

## 2023-03-02 DIAGNOSIS — T88.4XXA HARD TO INTUBATE, INITIAL ENCOUNTER: Primary | ICD-10-CM

## 2023-03-02 PROBLEM — N12 PYELONEPHRITIS OF LEFT KIDNEY: Status: ACTIVE | Noted: 2023-03-02

## 2023-03-02 PROBLEM — I48.0 PAROXYSMAL ATRIAL FIBRILLATION: Status: ACTIVE | Noted: 2020-08-31

## 2023-03-02 PROBLEM — B96.20 BACTEREMIA DUE TO ESCHERICHIA COLI: Status: ACTIVE | Noted: 2023-03-02

## 2023-03-02 PROBLEM — R78.81 BACTEREMIA DUE TO ESCHERICHIA COLI: Status: ACTIVE | Noted: 2023-03-02

## 2023-03-02 PROBLEM — N13.30 HYDRONEPHROSIS OF LEFT KIDNEY: Status: ACTIVE | Noted: 2023-03-02

## 2023-03-02 LAB
ACINETOBACTER CALCOACETICUS/BAUMANNII COMPLEX: NOT DETECTED
ALBUMIN SERPL BCP-MCNC: 2.2 G/DL (ref 3.5–5.2)
ALP SERPL-CCNC: 55 U/L (ref 55–135)
ALT SERPL W/O P-5'-P-CCNC: 10 U/L (ref 10–44)
ANION GAP SERPL CALC-SCNC: 6 MMOL/L (ref 8–16)
AST SERPL-CCNC: 11 U/L (ref 10–40)
BACTEROIDES FRAGILIS: NOT DETECTED
BASOPHILS # BLD AUTO: 0.01 K/UL (ref 0–0.2)
BASOPHILS NFR BLD: 0.1 % (ref 0–1.9)
BILIRUB SERPL-MCNC: 0.6 MG/DL (ref 0.1–1)
BUN SERPL-MCNC: 14 MG/DL (ref 8–23)
CALCIUM SERPL-MCNC: 7.9 MG/DL (ref 8.7–10.5)
CANDIDA ALBICANS: NOT DETECTED
CANDIDA AURIS: NOT DETECTED
CANDIDA GLABRATA: NOT DETECTED
CANDIDA KRUSEI: NOT DETECTED
CANDIDA PARAPSILOSIS: NOT DETECTED
CANDIDA TROPICALIS: NOT DETECTED
CHLORIDE SERPL-SCNC: 108 MMOL/L (ref 95–110)
CO2 SERPL-SCNC: 27 MMOL/L (ref 23–29)
CREAT SERPL-MCNC: 0.6 MG/DL (ref 0.5–1.4)
CRYPTOCOCCUS NEOFORMANS/GATTII: NOT DETECTED
CTX-M GENE: NOT DETECTED
DIFFERENTIAL METHOD: ABNORMAL
ENTEROBACTER CLOACAE COMPLEX: NOT DETECTED
ENTEROBACTERALES: ABNORMAL
ENTEROCOCCUS FAECALIS: NOT DETECTED
ENTEROCOCCUS FAECIUM: NOT DETECTED
EOSINOPHIL # BLD AUTO: 0 K/UL (ref 0–0.5)
EOSINOPHIL NFR BLD: 0.2 % (ref 0–8)
ERYTHROCYTE [DISTWIDTH] IN BLOOD BY AUTOMATED COUNT: 15.5 % (ref 11.5–14.5)
ESCHERICHIA COLI: DETECTED
EST. GFR  (NO RACE VARIABLE): >60 ML/MIN/1.73 M^2
ESTIMATED AVG GLUCOSE: 120 MG/DL (ref 68–131)
GLUCOSE SERPL-MCNC: 102 MG/DL (ref 70–110)
GLUCOSE SERPL-MCNC: 104 MG/DL (ref 70–110)
GLUCOSE SERPL-MCNC: 106 MG/DL (ref 70–110)
GLUCOSE SERPL-MCNC: 106 MG/DL (ref 70–110)
GLUCOSE SERPL-MCNC: 107 MG/DL (ref 70–110)
GLUCOSE SERPL-MCNC: 174 MG/DL (ref 70–110)
HAEMOPHILUS INFLUENZAE: NOT DETECTED
HBA1C MFR BLD: 5.8 % (ref 4.5–6.2)
HCO3 UR-SCNC: 25.9 MMOL/L (ref 24–28)
HCT VFR BLD AUTO: 33.9 % (ref 40–54)
HCT VFR BLD CALC: 33 %PCV (ref 36–54)
HGB BLD-MCNC: 11 G/DL (ref 14–18)
IMM GRANULOCYTES # BLD AUTO: 0.1 K/UL (ref 0–0.04)
IMM GRANULOCYTES NFR BLD AUTO: 0.8 % (ref 0–0.5)
IMP GENE: NOT DETECTED
KLEBSIELLA AEROGENES: NOT DETECTED
KLEBSIELLA OXYTOCA: NOT DETECTED
KLEBSIELLA PNEUMONIAE GROUP: NOT DETECTED
KPC: NOT DETECTED
LISTERIA MONOCYTOGENES: NOT DETECTED
LYMPHOCYTES # BLD AUTO: 1.8 K/UL (ref 1–4.8)
LYMPHOCYTES NFR BLD: 13.7 % (ref 18–48)
MCH RBC QN AUTO: 29.6 PG (ref 27–31)
MCHC RBC AUTO-ENTMCNC: 32.4 G/DL (ref 32–36)
MCR-1: NOT DETECTED
MCV RBC AUTO: 91 FL (ref 82–98)
MEC A/C AND MREJ (MRSA): ABNORMAL
MEC A/C: ABNORMAL
MONOCYTES # BLD AUTO: 0.8 K/UL (ref 0.3–1)
MONOCYTES NFR BLD: 6.2 % (ref 4–15)
NDM: NOT DETECTED
NEISSERIA MENINGITIDIS: NOT DETECTED
NEUTROPHILS # BLD AUTO: 10.3 K/UL (ref 1.8–7.7)
NEUTROPHILS NFR BLD: 79 % (ref 38–73)
NRBC BLD-RTO: 0 /100 WBC
OXA-48-LIKE: NOT DETECTED
PCO2 BLDA: 43.3 MMHG (ref 35–45)
PH SMN: 7.38 [PH] (ref 7.35–7.45)
PLATELET # BLD AUTO: 267 K/UL (ref 150–450)
PMV BLD AUTO: 9.6 FL (ref 9.2–12.9)
PO2 BLDA: 23 MMHG (ref 40–60)
POC BE: 1 MMOL/L
POC IONIZED CALCIUM: 1.17 MMOL/L (ref 1.06–1.42)
POC SATURATED O2: 38 % (ref 95–100)
POC TCO2: 27 MMOL/L (ref 24–29)
POTASSIUM BLD-SCNC: 2.9 MMOL/L (ref 3.5–5.1)
POTASSIUM SERPL-SCNC: 2.9 MMOL/L (ref 3.5–5.1)
PROT SERPL-MCNC: 5.1 G/DL (ref 6–8.4)
PROTEUS SPECIES: NOT DETECTED
PSEUDOMONAS AERUGINOSA: NOT DETECTED
RBC # BLD AUTO: 3.71 M/UL (ref 4.6–6.2)
SALMONELLA SP: NOT DETECTED
SAMPLE: ABNORMAL
SERRATIA MARCESCENS: NOT DETECTED
SODIUM BLD-SCNC: 140 MMOL/L (ref 136–145)
SODIUM SERPL-SCNC: 141 MMOL/L (ref 136–145)
STAPHYLOCOCCUS AUREUS: NOT DETECTED
STAPHYLOCOCCUS EPIDERMIDIS: NOT DETECTED
STAPHYLOCOCCUS LUGDUNESIS: NOT DETECTED
STAPHYLOCOCCUS SPECIES: NOT DETECTED
STENOTROPHOMONAS MALTOPHILIA: NOT DETECTED
STREPTOCOCCUS AGALACTIAE: NOT DETECTED
STREPTOCOCCUS PNEUMONIAE: NOT DETECTED
STREPTOCOCCUS PYOGENES: NOT DETECTED
STREPTOCOCCUS SPECIES: NOT DETECTED
VAN A/B: ABNORMAL
VIM: NOT DETECTED
WBC # BLD AUTO: 13.02 K/UL (ref 3.9–12.7)

## 2023-03-02 PROCEDURE — C2617 STENT, NON-COR, TEM W/O DEL: HCPCS | Performed by: SPECIALIST

## 2023-03-02 PROCEDURE — 25000003 PHARM REV CODE 250: Performed by: STUDENT IN AN ORGANIZED HEALTH CARE EDUCATION/TRAINING PROGRAM

## 2023-03-02 PROCEDURE — 36000707: Performed by: SPECIALIST

## 2023-03-02 PROCEDURE — 85025 COMPLETE CBC W/AUTO DIFF WBC: CPT | Performed by: STUDENT IN AN ORGANIZED HEALTH CARE EDUCATION/TRAINING PROGRAM

## 2023-03-02 PROCEDURE — 25000003 PHARM REV CODE 250: Performed by: NURSE ANESTHETIST, CERTIFIED REGISTERED

## 2023-03-02 PROCEDURE — 80053 COMPREHEN METABOLIC PANEL: CPT | Performed by: STUDENT IN AN ORGANIZED HEALTH CARE EDUCATION/TRAINING PROGRAM

## 2023-03-02 PROCEDURE — 82360 CALCULUS ASSAY QUANT: CPT | Performed by: SPECIALIST

## 2023-03-02 PROCEDURE — 63600175 PHARM REV CODE 636 W HCPCS: Performed by: NURSE ANESTHETIST, CERTIFIED REGISTERED

## 2023-03-02 PROCEDURE — 12000002 HC ACUTE/MED SURGE SEMI-PRIVATE ROOM

## 2023-03-02 PROCEDURE — 97166 OT EVAL MOD COMPLEX 45 MIN: CPT

## 2023-03-02 PROCEDURE — 27201423 OPTIME MED/SURG SUP & DEVICES STERILE SUPPLY: Performed by: SPECIALIST

## 2023-03-02 PROCEDURE — 83036 HEMOGLOBIN GLYCOSYLATED A1C: CPT | Performed by: STUDENT IN AN ORGANIZED HEALTH CARE EDUCATION/TRAINING PROGRAM

## 2023-03-02 PROCEDURE — 36000706: Performed by: SPECIALIST

## 2023-03-02 PROCEDURE — 36415 COLL VENOUS BLD VENIPUNCTURE: CPT | Performed by: STUDENT IN AN ORGANIZED HEALTH CARE EDUCATION/TRAINING PROGRAM

## 2023-03-02 PROCEDURE — C1769 GUIDE WIRE: HCPCS | Performed by: SPECIALIST

## 2023-03-02 PROCEDURE — 63600175 PHARM REV CODE 636 W HCPCS: Performed by: INTERNAL MEDICINE

## 2023-03-02 PROCEDURE — 37000009 HC ANESTHESIA EA ADD 15 MINS: Performed by: SPECIALIST

## 2023-03-02 PROCEDURE — 63600175 PHARM REV CODE 636 W HCPCS: Mod: TB,JG | Performed by: STUDENT IN AN ORGANIZED HEALTH CARE EDUCATION/TRAINING PROGRAM

## 2023-03-02 PROCEDURE — 25000003 PHARM REV CODE 250: Performed by: SPECIALIST

## 2023-03-02 PROCEDURE — 71000039 HC RECOVERY, EACH ADD'L HOUR: Performed by: SPECIALIST

## 2023-03-02 PROCEDURE — 71000033 HC RECOVERY, INTIAL HOUR: Performed by: SPECIALIST

## 2023-03-02 PROCEDURE — 37000008 HC ANESTHESIA 1ST 15 MINUTES: Performed by: SPECIALIST

## 2023-03-02 DEVICE — STENT URETERAL FIRM 4.8FX26 ASCERTA: Type: IMPLANTABLE DEVICE | Site: URETER | Status: FUNCTIONAL

## 2023-03-02 RX ORDER — ROCURONIUM BROMIDE 10 MG/ML
INJECTION, SOLUTION INTRAVENOUS
Status: DISCONTINUED | OUTPATIENT
Start: 2023-03-02 | End: 2023-03-02

## 2023-03-02 RX ORDER — ONDANSETRON 2 MG/ML
INJECTION INTRAMUSCULAR; INTRAVENOUS
Status: DISCONTINUED | OUTPATIENT
Start: 2023-03-02 | End: 2023-03-02

## 2023-03-02 RX ORDER — FENTANYL CITRATE 50 UG/ML
INJECTION, SOLUTION INTRAMUSCULAR; INTRAVENOUS
Status: DISCONTINUED | OUTPATIENT
Start: 2023-03-02 | End: 2023-03-02

## 2023-03-02 RX ORDER — FAMOTIDINE 10 MG/ML
INJECTION INTRAVENOUS
Status: DISCONTINUED | OUTPATIENT
Start: 2023-03-02 | End: 2023-03-02

## 2023-03-02 RX ORDER — LIDOCAINE HYDROCHLORIDE 20 MG/ML
JELLY TOPICAL
Status: DISCONTINUED | OUTPATIENT
Start: 2023-03-02 | End: 2023-03-02 | Stop reason: HOSPADM

## 2023-03-02 RX ORDER — LIDOCAINE HYDROCHLORIDE 20 MG/ML
INJECTION, SOLUTION EPIDURAL; INFILTRATION; INTRACAUDAL; PERINEURAL
Status: DISCONTINUED | OUTPATIENT
Start: 2023-03-02 | End: 2023-03-02

## 2023-03-02 RX ORDER — SUCCINYLCHOLINE CHLORIDE 20 MG/ML
INJECTION INTRAMUSCULAR; INTRAVENOUS
Status: DISCONTINUED | OUTPATIENT
Start: 2023-03-02 | End: 2023-03-02

## 2023-03-02 RX ORDER — PROPOFOL 10 MG/ML
VIAL (ML) INTRAVENOUS
Status: DISCONTINUED | OUTPATIENT
Start: 2023-03-02 | End: 2023-03-02

## 2023-03-02 RX ORDER — CEFAZOLIN SODIUM 1 G/50ML
SOLUTION INTRAVENOUS
Status: DISCONTINUED | OUTPATIENT
Start: 2023-03-02 | End: 2023-03-02

## 2023-03-02 RX ORDER — MIDAZOLAM HYDROCHLORIDE 1 MG/ML
INJECTION INTRAMUSCULAR; INTRAVENOUS
Status: DISCONTINUED | OUTPATIENT
Start: 2023-03-02 | End: 2023-03-02

## 2023-03-02 RX ADMIN — AMIODARONE HYDROCHLORIDE 100 MG: 100 TABLET ORAL at 06:03

## 2023-03-02 RX ADMIN — CEFAZOLIN SODIUM 2 G: 1 SOLUTION INTRAVENOUS at 02:03

## 2023-03-02 RX ADMIN — TOPIRAMATE 50 MG: 25 TABLET, FILM COATED ORAL at 09:03

## 2023-03-02 RX ADMIN — CEFTRIAXONE 2 G: 2 INJECTION, SOLUTION INTRAVENOUS at 05:03

## 2023-03-02 RX ADMIN — APIXABAN 5 MG: 5 TABLET, FILM COATED ORAL at 09:03

## 2023-03-02 RX ADMIN — FENTANYL CITRATE 50 MCG: 50 INJECTION INTRAMUSCULAR; INTRAVENOUS at 02:03

## 2023-03-02 RX ADMIN — TAMSULOSIN HYDROCHLORIDE 0.8 MG: 0.4 CAPSULE ORAL at 09:03

## 2023-03-02 RX ADMIN — CYPROHEPTADINE HYDROCHLORIDE 4 MG: 4 TABLET ORAL at 09:03

## 2023-03-02 RX ADMIN — MAGNESIUM OXIDE 400 MG (241.3 MG MAGNESIUM) TABLET 400 MG: at 09:03

## 2023-03-02 RX ADMIN — CEFEPIME HYDROCHLORIDE 2 G: 2 INJECTION, SOLUTION INTRAVENOUS at 09:03

## 2023-03-02 RX ADMIN — DULOXETINE 30 MG: 30 CAPSULE, DELAYED RELEASE ORAL at 09:03

## 2023-03-02 RX ADMIN — Medication 160 MG: at 02:03

## 2023-03-02 RX ADMIN — ONDANSETRON 4 MG: 2 INJECTION INTRAMUSCULAR; INTRAVENOUS at 02:03

## 2023-03-02 RX ADMIN — FAMOTIDINE 20 MG: 10 INJECTION, SOLUTION INTRAVENOUS at 02:03

## 2023-03-02 RX ADMIN — METOPROLOL TARTRATE 25 MG: 25 TABLET, FILM COATED ORAL at 09:03

## 2023-03-02 RX ADMIN — ROCURONIUM BROMIDE 10 MG: 10 INJECTION, SOLUTION INTRAVENOUS at 02:03

## 2023-03-02 RX ADMIN — LIDOCAINE HYDROCHLORIDE 50 MG: 20 INJECTION, SOLUTION EPIDURAL; INFILTRATION; INTRACAUDAL; PERINEURAL at 02:03

## 2023-03-02 RX ADMIN — POTASSIUM CHLORIDE 20 MEQ: 1500 TABLET, EXTENDED RELEASE ORAL at 09:03

## 2023-03-02 RX ADMIN — PROPOFOL 150 MG: 10 INJECTION, EMULSION INTRAVENOUS at 02:03

## 2023-03-02 RX ADMIN — DOCUSATE SODIUM 100 MG: 100 CAPSULE, LIQUID FILLED ORAL at 09:03

## 2023-03-02 RX ADMIN — MIDAZOLAM HYDROCHLORIDE 2 MG: 1 INJECTION, SOLUTION INTRAMUSCULAR; INTRAVENOUS at 02:03

## 2023-03-02 RX ADMIN — ATORVASTATIN CALCIUM 40 MG: 40 TABLET, FILM COATED ORAL at 09:03

## 2023-03-02 RX ADMIN — PANTOPRAZOLE SODIUM 40 MG: 40 TABLET, DELAYED RELEASE ORAL at 06:03

## 2023-03-02 RX ADMIN — PANTOPRAZOLE SODIUM 40 MG: 40 TABLET, DELAYED RELEASE ORAL at 05:03

## 2023-03-02 RX ADMIN — CYPROHEPTADINE HYDROCHLORIDE 4 MG: 4 TABLET ORAL at 05:03

## 2023-03-02 RX ADMIN — SODIUM CHLORIDE, SODIUM LACTATE, POTASSIUM CHLORIDE, AND CALCIUM CHLORIDE: .6; .31; .03; .02 INJECTION, SOLUTION INTRAVENOUS at 02:03

## 2023-03-02 RX ADMIN — TRAZODONE HYDROCHLORIDE 50 MG: 50 TABLET ORAL at 09:03

## 2023-03-02 RX ADMIN — CEFEPIME HYDROCHLORIDE 2 G: 2 INJECTION, SOLUTION INTRAVENOUS at 02:03

## 2023-03-02 NOTE — CONSULTS
Left buttock healing stage 2, 2x0.5x0.1cm pink non draining, calmoseptine applied.  Covered with Mepilex.  Bilateral heels blanching but red.  Heel protectors on.  Instructed nurse and charge nurse patient needs waffle mattress and pillows for positioning. No extras in room.

## 2023-03-02 NOTE — PLAN OF CARE
Problem: Diabetes Comorbidity  Goal: Blood Glucose Level Within Targeted Range  Outcome: Ongoing, Progressing     Problem: UTI (Urinary Tract Infection)  Goal: Improved Infection Symptoms  Outcome: Ongoing, Progressing

## 2023-03-02 NOTE — ANESTHESIA PROCEDURE NOTES
Intubation    Date/Time: 3/2/2023 2:27 PM  Performed by: Ade Ly CRNA  Authorized by: Anant Mueller MD     Intubation:     Induction:  Intravenous    Intubated:  Postinduction    Mask Ventilation:  Easy mask    Attempts:  1    Attempted By:  Staff anesthesiologist    Method of Intubation:  Direct    Blade:  Dwyer 3    Laryngeal View Grade: Grade I - full view of cords      Difficult Airway Encountered?: No      Complications:  None    Airway Device:  Oral endotracheal tube    Airway Device Size:  7.5    Style/Cuff Inflation:  Cuffed (inflated to minimal occlusive pressure)    Tube secured:  24    Secured at:  The lips    Placement Verified By:  Capnometry    Complicating Factors:  Poor neck/head extension and oropharyngeal edema or fat    Findings Post-Intubation:  BS equal bilateral

## 2023-03-02 NOTE — OP NOTE
Operative Note         SUMMARY     Surgery Date:  3/2/2023     Assistant:     Indications:  77-year-old male with obstructed left kidney  Here for left ureteral stent placed      Pre-op Diagnosis:   Left ureteral stone    Post-op Diagnosis:  Same    Procedure:  Cystoscopy with left stent placement  Bladder stone removed    Anesthesia: General    Description of Procedure:   Patient brought to cystoscopy suite.  Placed in the cystoscopy position.  Patient is prepped and draped.  Anesthesia is given.  We enter the urinary bladder with the 21 fr cystoscope.   No lesions found within the bladder  We cannulate the left ureter with a Glidewire  We remove the stone with a grasper  We then easily placed a stent into the left ureter  With the stent good location we removed telescope  Brought to recovery in good condition    Findings/Key Components:      Patient may return to follow-up with me next week with a KUB  And will plan any further stone treatment    Estimated Blood Loss:    None

## 2023-03-02 NOTE — PROGRESS NOTES
"[facility]  Adult Nutrition   Progress Note (Initial Assessment)     SUMMARY     Recommendations  Recommendation/Intervention:   1. Recommend advancing to Cardiac and Diabetic Diet restrictions once medically appropriate.   2. Recommend adding Chocolate Glucerna BID once medically appropriate.   3. Consider adding peanut butter and jelly sandwich at lunch after NPO status removed to assist in meeting needs (per pt prefrences).   4. Will continue to monitor pt condition and status and make further recommendations as appropriate.    Goals: 1. Pt to achieve >75% intake  Nutrition Goal Status: new    Dietitian Rounds Brief  Pt is a 78 y/o male admitted for atrial fibrillation. DI met with pt and family to assess nutritional status.    Pt and family reports Mr. Perkins has decreased appetite and PO intake at home. Since admission he has consumed <50% of his provided meals (moved to NPO status after breakfast this morning for a procedure). Pt displayed mild orbital and temporal wasting. Family and pt were unsure when asked about weight loss in the past weeks to months, but said "probably". Malnutrition is likely for this patient given reported history, however, unable to verify as  weight loss amount/duration is un-confirmed and wasting may be age related.     Recommend advancing diet to Cardiac and Diabetic restriction once removed from NPO status. Additionally recommend adding Glucerna Chocolate BID following diet advancement. Pt stated he liked Peanut Butter and Jelly sandwiches, consider adding once daily to lunch to encourage PO intake through meeting pt preferences.     Will continue to follow pt status and make further recommendations as appropriate.       Diet order:   Current Diet Order: NPO           Evaluation of Received Nutrient/Fluid Intake  Energy Calories Required: not meeting needs  Protein Required: not meeting needs     % Intake of Estimated Energy Needs: 0 - 25 %  % Meal Intake: 0 - 25 " "%      Intake/Output Summary (Last 24 hours) at 3/2/2023 1519  Last data filed at 3/2/2023 1509  Gross per 24 hour   Intake 2050 ml   Output 300 ml   Net 1750 ml        Anthropometrics  Temp: 97.8 °F (36.6 °C)  Height Method: Measured  Height: 5' 10" (177.8 cm)  Height (inches): 70 in  Weight Method: Bed Scale  Weight: 107.5 kg (236 lb 15.9 oz)  Weight (lb): 237 lb  Ideal Body Weight (IBW), Male: 166 lb  % Ideal Body Weight, Male (lb): 142.77 %  BMI (Calculated): 34  BMI Grade: 30 - 34.9- obesity - grade I       Estimated/Assessed Needs  Weight Used For Calorie Calculations: 107.5 kg (236 lb 15.9 oz)  Energy Calorie Requirements (kcal): 2150kcal-2687kcal (20-25kcal/kg  Energy Need Method: Kcal/kg  Protein Requirements: 75-113g (1-1.5g/kg IBW) (Per guidlines for Age/ BMI)  Weight Used For Protein Calculations: 75.4 kg (166 lb 5.4 oz)  Fluid Requirements (mL): 2687ml  Estimated Fluid Requirement Method: other (see comments) (25ml/kg per guidelines for age/CHF)  RDA Method (mL): 2150       Reason for Assessment  Reason For Assessment: identified at risk by screening criteria (MST 5)  Diagnosis: cardiac disease (Atrial Fibrilltion/CHF)  Relevant Medical History: CHF, T2 DM without long term use of insulin, HTN, HLD, Hx of pressure injury, hearing loss  Interdisciplinary Rounds: did not attend    Nutrition/Diet History  Spiritual, Cultural Beliefs, Jainism Practices, Values that Affect Care: no  Food Allergies: NKFA  Factors Affecting Nutritional Intake: NPO (Per RN pt states getting tired chewing)    Nutrition Risk Screen  Nutrition Risk Screen: no indicators present       Altered Skin Integrity 03/01/23 2330 Right Heel Blister(s)-Wound Image: Images linked       Altered Skin Integrity 01/24/23 0330 Right Buttocks #1 Other (comment) Intact skin with non-blanchable redness of localized area-Wound Image: Images linked       Altered Skin Integrity 03/01/23 2330 Left Buttocks Ulceration-Wound Image: Images linked       " Altered Skin Integrity 03/01/23 2300 Left Heel-Wound Image: Images linked  MST Score: 5  Have you recently lost weight without trying?: Yes: 34 lbs or more  Weight loss score: 4  Have you been eating poorly because of a decreased appetite?: Yes  Appetite score: 1       Weight History:  Wt Readings from Last 5 Encounters:   03/01/23 107.5 kg (236 lb 15.9 oz)   03/01/23 114.8 kg (253 lb)   01/25/23 114.8 kg (253 lb)   01/09/23 117.3 kg (258 lb 9.6 oz)   12/29/22 115.7 kg (255 lb)        Medications:Pertinent Medications Reviewed  Scheduled Meds:   amiodarone  100 mg Oral QAM    apixaban  5 mg Oral BID    atorvastatin  40 mg Oral Daily    ceFEPime (MAXIPIME) IVPB  2 g Intravenous Q8H    cyproheptadine  4 mg Oral TID    docusate sodium  100 mg Oral BID    DULoxetine  30 mg Oral BID    magnesium oxide  400 mg Oral Daily    metoprolol tartrate  25 mg Oral BID    pantoprazole  40 mg Oral BID    polyethylene glycol  17 g Oral BID    potassium chloride  20 mEq Oral Daily    tamsulosin  0.8 mg Oral Daily    topiramate  50 mg Oral Daily    traZODone  50 mg Oral QHS     Continuous Infusions:  PRN Meds:.acetaminophen, acetaminophen, dextrose 10%, dextrose 10%, glucagon (human recombinant), glucose, glucose, HYDROcodone-acetaminophen, insulin aspart U-100, melatonin, ondansetron, phenazopyridine, sodium chloride 0.9%    Labs: Pertinent Labs Reviewed  Clinical Chemistry:  Recent Labs   Lab 03/01/23  1601 03/02/23  0511    141   K 3.4* 2.9*   CL 99 108   CO2 27 27   * 106   BUN 15 14   CREATININE 0.7 0.6   CALCIUM 8.4* 7.9*   PROT 6.2 5.1*   ALBUMIN 2.6* 2.2*   BILITOT 0.6 0.6   ALKPHOS 66 55   AST 14 11   ALT 13 10   ANIONGAP 11 6*   MG 1.5*  --    LIPASE 22  --      CBC:   Recent Labs   Lab 03/02/23  0511 03/02/23  1407   WBC 13.02*  --    RBC 3.71*  --    HGB 11.0*  --    HCT 33.9* 33*     --    MCV 91  --    MCH 29.6  --    MCHC 32.4  --      Lipid Panel:  No results for input(s): CHOL, HDL, LDLCALC, TRIG,  CHOLHDL in the last 168 hours.  Cardiac Profile:  No results for input(s): BNP, CPK, CPKMB, TROPONINI, CKTOTAL in the last 168 hours.  Inflammatory Labs:  No results for input(s): CRP in the last 168 hours.  Diabetes:  Recent Labs   Lab 03/02/23  0511   HGBA1C 5.8     Thyroid & Parathyroid:  No results for input(s): TSH, FREET4, R7HWRDZ, C4FXCNI, THYROIDAB in the last 168 hours.    Monitor and Evaluation  Food and Nutrient Intake: food and beverage intake, energy intake  Food and Nutrient Adminstration: diet order  Knowledge/Beliefs/Attitudes: food and nutrition knowledge/skill, beliefs and attitudes  Physical Activity and Function: nutrition-related ADLs and IADLs, factors affecting access to physical activity  Anthropometric Measurements: weight, weight change, body mass index  Biochemical Data, Medical Tests and Procedures: electrolyte and renal panel, gastrointestinal profile, glucose/endocrine profile, inflammatory profile, lipid profile  Nutrition-Focused Physical Findings: overall appearance     Nutrition Risk  Level of Risk/Frequency of Follow-up: high     Nutrition Follow-Up  RD Follow-up?: Yes      Alek Chávez, Student Dietitian 03/02/2023 3:19 PM      I certify that I directed the dietetic intern in service delivery and guided them using my skilled judgment. As the cosigning dietitian, I have reviewed the dietetic interns documentation and am responsible for the treatment, assessment, and plan.  Lara Carbone RD LDN  3/02/23 4:09 pm

## 2023-03-02 NOTE — CONSULTS
77-year-old male with obstructing left upper ureteral calculi  Patient continues to be in discomfort  Plan for emergency cystoscopic stenting

## 2023-03-02 NOTE — TRANSFER OF CARE
"Anesthesia Transfer of Care Note    Patient: Hiro Rodríguez    Procedure(s) Performed: Procedure(s) (LRB):  CYSTOSCOPY, WITH URETERAL STENT INSERTION (N/A)  REMOVAL, CALCULUS, BLADDER    Patient location: PACU    Anesthesia Type: general    Transport from OR: Transported from OR on room air with adequate spontaneous ventilation    Post pain: adequate analgesia    Post assessment: no apparent anesthetic complications    Post vital signs: stable    Level of consciousness: awake and alert    Nausea/Vomiting: no nausea/vomiting    Complications: none    Transfer of care protocol was followed      Last vitals:   Visit Vitals  /71   Pulse 77   Temp 36.6 °C (97.8 °F) (Oral)   Resp 18   Ht 5' 10" (1.778 m)   Wt 107.5 kg (236 lb 15.9 oz)   SpO2 97%   BMI 34.01 kg/m²     "

## 2023-03-02 NOTE — ANESTHESIA POSTPROCEDURE EVALUATION
Anesthesia Post Evaluation    Patient: Hiro Rodríguez    Procedure(s) Performed: Procedure(s) (LRB):  CYSTOSCOPY, WITH URETERAL STENT INSERTION (N/A)  REMOVAL, CALCULUS, BLADDER    Final Anesthesia Type: general      Patient location during evaluation: PACU  Patient participation: Yes- Able to Participate  Level of consciousness: awake and alert, oriented and awake  Post-procedure vital signs: reviewed and stable  Pain management: adequate  Airway patency: patent    PONV status at discharge: No PONV  Anesthetic complications: no      Cardiovascular status: blood pressure returned to baseline, hemodynamically stable and stable  Respiratory status: unassisted, spontaneous ventilation and nasal cannula  Hydration status: euvolemic  Follow-up not needed.          Vitals Value Taken Time   /72 03/02/23 1545   Temp 99.1 03/02/23 1548   Pulse 79 03/02/23 1546   Resp 17 03/02/23 1546   SpO2 99 % 03/02/23 1546   Vitals shown include unvalidated device data.      No case tracking events are documented in the log.      Pain/Hossein Score: No data recorded

## 2023-03-02 NOTE — PT/OT/SLP PROGRESS
Physical Therapy      Patient Name:  Hiro Rodírguez   MRN:  [id number]    Patient not seen today secondary to Patient unwilling to participate, Other (Comment) (pt declined PT eval today or any mobility due to awaiting procedure for bladder today and in pain at this time.). Will follow-up 3/3/23.

## 2023-03-02 NOTE — PROGRESS NOTES
Patient is status post emergency cystoscopic stent placement for obstructive uropathy  He can follow-up with me next week with a KUB  Will plan any stone treatment at that point

## 2023-03-02 NOTE — PT/OT/SLP EVAL
Occupational Therapy   Evaluation    Name: Hiro Rodríguez  MRN: [id number]  Admitting Diagnosis: Atrial fibrillation  Recent Surgery: Procedure(s) (LRB):  CYSTOSCOPY, WITH URETERAL STENT INSERTION (N/A)      Recommendations:     Discharge Recommendations:  (IPR vs SNF depending on patient progression with therapy.)  Discharge Equipment Recommendations:   (TBD)  Barriers to discharge:   (Increased physical assistance for functional mobility and ADL over the past 2 weeks.)    Assessment:     Hiro Rodríguez is a 77 y.o. male with a medical diagnosis of Atrial fibrillation.  He presents with general weakness. Performance deficits affecting function: weakness, impaired endurance, impaired self care skills, impaired functional mobility, gait instability, impaired balance, decreased lower extremity function, decreased safety awareness, impaired cardiopulmonary response to activity.      Rehab Prognosis: Fair; patient would benefit from acute skilled OT services to address these deficits and reach maximum level of function.       Plan:     Patient to be seen 5 x/week to address the above listed problems via self-care/home management, therapeutic activities, therapeutic exercises  Plan of Care Expires: 04/02/23  Plan of Care Reviewed with: patient    Subjective     Chief Complaint: progressive weakness overt he past 2 weeks.  Patient/Family Comments/goals: improved functional mobility and ADL independence.    Occupational Profile:  Living Environment: lives with spouse in a 1 story home, no steps to enter.  Previous level of function: patient with extensive LTAC/SNF stays over the past 3-4 months. Finally d/c home with home health services. Patient reports that he had progressed to supervision for Adls and ambulation in the home using rolling walker. Has become generally weak over the past 2 weeks to the point of needing assistance with ambulation and ADLs.  Roles and Routines: limited homemaker  Equipment Used at  Home: walker, rolling, wheelchair, bedside commode, shower chair, grab bar  Assistance upon Discharge: Spouse    Pain/Comfort:  Pain Rating 1: 0/10  Pain Rating Post-Intervention 1: 0/10    Patients cultural, spiritual, Gnosticist conflicts given the current situation: no    Objective:     Communicated with: nurse prior to session.  Patient found HOB elevated with telemetry, peripheral IV, PureWick upon OT entry to room.    General Precautions: Standard, fall  Orthopedic Precautions: N/A  Braces: N/A  Respiratory Status: Room air    Occupational Performance:    Bed Mobility:    Declined EOB or OOB activity    Activities of Daily Living:  Grooming: contact guard assistance to wash face bed level.    Cognitive/Visual Perceptual:  Cognitive/Psychosocial Skills:     -       Oriented to: Person, Place, and Situation   -       Follows Commands/attention:Follows two-step commands  -       Communication: clear/fluent and hard of hearing  -       Memory: Impaired STM  -       Safety awareness/insight to disability: impaired   -       Mood/Affect/Coping skills/emotional control: Flat affect and Guarded  Visual/Perceptual:      -Intact Acuity    Physical Exam:  Upper Extremity Range of Motion:     -       Right Upper Extremity: WFL  -       Left Upper Extremity: WFL  Upper Extremity Strength:    -       Right Upper Extremity: 3+/5  -       Left Upper Extremity: 3+/5   Strength:    -       Right Upper Extremity: fair  -       Left Upper Extremity: fair  Fine Motor Coordination:    -       Intact    AMPAC 6 Click ADL:  AMPAC Total Score: 14    Treatment & Education:  Patient educated on the purpose of Occupational Therapy and the importance of getting OOB.    Patient left HOB elevated with all lines intact, call button in reach, and bed alarm on    GOALS:   Multidisciplinary Problems       Occupational Therapy Goals          Problem: Occupational Therapy    Goal Priority Disciplines Outcome Interventions   Occupational  Therapy Goal     OT, PT/OT     Description: Goals to be met by: 4/2/2023     Patient will increase functional independence with ADLs by performing:    UE Dressing with Minimal Assistance.  LE Dressing with Minimal Assistance.  Grooming while seated with Stand-by Assistance.  Toileting from bedside commode with Minimal Assistance for hygiene and clothing management.   Sitting at edge of bed x10 minutes with Stand-by Assistance.  Supine to sit with Stand-by Assistance.  Toilet transfer to bedside commode with Stand-by Assistance.  Upper extremity exercise program x10 reps per handout, with assistance as needed.                         History:     Past Medical History:   Diagnosis Date    CHF (congestive heart failure)     Hypertension          Past Surgical History:   Procedure Laterality Date    FRACTURE SURGERY      INTRAMEDULLARY RODDING OF TROCHANTER OF FEMUR Left 11/10/2022    Procedure: INSERTION, ITRAMEDULLARY SANTI, FEMUR, TROCHANTER;  Surgeon: Richard Phoenix MD;  Location: [facility];  Service: Orthopedics;  Laterality: Left;       Time Tracking:     OT Date of Treatment: 03/02/23  OT Start Time: 0929  OT Stop Time: 0940  OT Total Time (min): 11 min    Billable Minutes:Evaluation 11    3/2/2023

## 2023-03-02 NOTE — ANESTHESIA PREPROCEDURE EVALUATION
03/02/2023  Hiro Rodríguez is a 77 y.o., male.      Patient Active Problem List   Diagnosis    Type 2 diabetes mellitus with diabetic nephropathy, without long-term current use of insulin    Morbid obesity    Anticoagulated    Atrial fibrillation    Aspirin long-term use    Hypertension, essential    Hyperlipidemia    Type 2 diabetes mellitus with diabetic polyneuropathy, without long-term current use of insulin    Benign essential tremor    Chronic heart failure with preserved ejection fraction    Hearing loss    Insomnia    Skin cancer    Status post knee replacement    BMI 38.0-38.9,adult    Subdural hematoma caused by concussion    Subarachnoid bleed    Closed fracture of left hip    Prerenal azotemia    Subdural hemorrhage following injury without open intracranial wound and with no loss of consciousness    Uses walker    Benign prostatic hyperplasia with urinary retention    Severe sepsis    Pressure injury of buttock, stage 1    Urinary retention    Sepsis    Urinary tract infection associated with indwelling urethral catheter    Discharge planning issues    Hypokalemia       Past Surgical History:   Procedure Laterality Date    FRACTURE SURGERY      INTRAMEDULLARY RODDING OF TROCHANTER OF FEMUR Left 11/10/2022    Procedure: INSERTION, ITRAMEDULLARY SANTI, FEMUR, TROCHANTER;  Surgeon: Richard Phoenix MD;  Location: [facility];  Service: Orthopedics;  Laterality: Left;        Tobacco Use:  The patient  reports that he has never smoked. He has never used smokeless tobacco.     Results for orders placed or performed during the hospital encounter of 03/01/23   EKG 12-lead    Collection Time: 03/01/23  2:27 PM    Narrative    Test Reason : I95.9,    Vent. Rate : 113 BPM     Atrial Rate : 000 BPM     P-R Int : 000 ms          QRS Dur : 092 ms      QT Int : 346 ms        P-R-T Axes : 000 055 214 degrees     QTc Int : 474 ms    Atrial fibrillation with rapid ventricular response  Septal infarct (cited on or before 01-MAR-2023)  ST and T wave abnormality, consider lateral ischemia  Abnormal ECG  When compared with ECG of 01-MAR-2023 14:12,  Serial changes of Septal infarct Present    Referred By: [email or web address]   SELF           Confirmed By:         Imaging Results          CT Chest Abdomen Pelvis With Contrast (xpd) (Final result)  Result time 03/01/23 18:30:54    Final result by Shanta Yu DO (03/01/23 18:30:54)                 Narrative:    CT CHEST, ABDOMEN, AND PELVIS WITH INTRAVENOUS CONTRAST: 3/1/2023 6:26 PM CST    HISTORY: 77 years  old Male with Aortic aneurysm, known or suspected.    COMPARISON: CT of the abdomen and pelvis from 1/20/2023    TECHNIQUE: Axial contiguous images were obtained from the lung apices to the proximal femurs with intravenous intravenous contrast administered. Sagittal and coronal reconstructions were also obtained and reviewed. This exam was performed according to our departmental dose-optimization program, which includes automated exposure control, adjustment of the mA and/or KV according to the patient's size and/or use of iterative reconstruction technique.    FINDINGS:    CARDIOMEDIASTINAL STRUCTURES: The heart size is mildly enlarged. No pericardial effusion is seen.    LYMPH NODES: No significant mediastinal, supraclavicular, or axillary lymphadenopathy is seen.    AORTA: The ascending aorta is at the upper limits of normal in size. The visualized proximal subclavian, vertebral, and common carotid arterial vasculature is unremarkable. There is atherosclerotic calcification seen at the aorta. There are no findings to suggest an aortic dissection.    PULMONARY ARTERY: The main pulmonary artery is normal in size. There are no findings to suggest a pulmonary embolism. The main pulmonary artery is suboptimally opacified for evaluation    THYROID:  The thyroid gland is heterogeneous.    TRACHEA: The central tracheobronchial tree is patent.    PARENCHYMA: There are bibasilar space opacities.    PLEURA: Trace bilateral pleural fluid is seen. There is no evidence of a pneumothorax.    LIVER: The visualized hepatic parenchyma demonstrates no focal abnormality.  The main portal vein is well opacified with contrast.    GALLBLADDER: The gallbladder demonstrates no evidence of calcified gallstones.    SPLEEN: The spleen is normal in size.    PANCREAS: The pancreas is unremarkable.    ADRENAL GLANDS: The bilateral adrenal glands are unremarkable.    KIDNEYS: There is moderate left hydroureteronephrosis, secondary to at least 2 calculi at the midportion of the left ureter measuring 8 to 9 mm and 6 to 7 mm in size. No hydronephrosis is seen on the right side.. There are punctate bilateral renal calculi present. There is stranding seen around the left kidney.    PELVIS: The urinary bladder is decompressed by Sotelo catheter. There is stranding seen around the urinary bladder with bladder wall thickening suspected cystitis. No obvious bladder calculi are seen.    STOMACH: The stomach is not well distended.    BOWEL: The small bowel loops appear unremarkable. No pericolonic inflammatory stranding is seen. There are multiple colonic diverticula without evidence to suggest acute diverticulitis. There is a moderate amount of stool seen throughout the colon.    APPENDIX: The appendix is unremarkable.    PERITONEUM: There is no evidence of pneumoperitoneum or free fluid.    VESSELS: The IVC is unremarkable. The abdominal aorta is within normal limits of size.    LYMPH NODES: No significantly enlarged lymph nodes are seen in the abdomen or pelvis.    BONES AND SOFT TISSUES: No acute osseous abnormality is seen. There is incidental bilateral gynecomastia present.  There is an intramedullary kelsey and interlocking screw seen at the left hip. There are multilevel degenerative changes  seen at the spine.    IMPRESSION: The urinary bladder is decompressed by Sotelo catheter. There is stranding seen around the urinary bladder with bladder wall thickening suspected cystitis. No obvious bladder calculi are seen.    There is moderate left hydroureteronephrosis, secondary to at least 2 calculi at the midportion of the left ureter measuring 8 to 9 mm and 6 to 7 mm in size. There is stranding seen around the left kidney. Superimposed infection is not excluded.    There are small bilateral pleural effusions with bibasilar airspace opacities which can be seen with atelectasis and/or infection.    The abdominal aorta is within normal limits of size. No evidence is seen to suggest aortic dissection.    Hepatic steatosis    Electronically signed by:  Shanta Yu DO  3/1/2023 6:30 PM CST Workstation: [id number]                             X-Ray Chest AP Portable (Final result)  Result time 03/01/23 15:59:10    Final result by Jeramy Gardner MD (03/01/23 15:59:10)                 Narrative:    Chest single view    CLINICAL DATA: Dizziness    FINDINGS: Comparison to January 22. Cardiomegaly is stable. The mediastinum is unremarkable. There is a probable small left pleural effusion, similar in appearance to the prior study. Increased density in the retrocardiac region, left lung base, suggests atelectasis or infiltrate. The right lung is clear.    IMPRESSION:  1. Stable cardiomegaly.  2. Left basilar volume loss or infiltrate.  3. Small left pleural effusion.    Electronically signed by:  Jeramy Gardner MD  3/1/2023 3:59 PM CST Workstation: [id number]                               Lab Results   Component Value Date    WBC 13.02 (H) 03/02/2023    HGB 11.0 (L) 03/02/2023    HCT 33.9 (L) 03/02/2023    MCV 91 03/02/2023     03/02/2023     BMP  Lab Results   Component Value Date     03/02/2023    K 2.9 (LL) 03/02/2023     03/02/2023    CO2 27 03/02/2023    BUN 14 03/02/2023    CREATININE 0.6  03/02/2023    CALCIUM 7.9 (L) 03/02/2023    ANIONGAP 6 (L) 03/02/2023     03/02/2023     (H) 03/01/2023     (H) 02/07/2023       Results for orders placed during the hospital encounter of 11/08/22    Echo    Interpretation Summary  · The left ventricle is normal in size with moderate concentric hypertrophy and normal systolic function.  · The estimated ejection fraction is 70%.  · Normal left ventricular diastolic function.  · The sinuses of Valsalva is mildly dilated.  · The ascending aorta is dilated. Recommend CT scan to evaluate entire aorta for further evaluation.  · Mild tricuspid regurgitation.  · Normal central venous pressure (3 mmHg).  · The estimated PA systolic pressure is 61 mmHg.  · There is pulmonary hypertension.  · Unable to visualize right ventricle and right atrium but they both grossly appear to be dilated with RV function mildly reduced.        Pre-op Assessment    I have reviewed the Patient Summary Reports.     I have reviewed the Nursing Notes. I have reviewed the NPO Status.   I have reviewed the Medications.     Review of Systems  Anesthesia Hx:  No problems with previous Anesthesia  Denies Family Hx of Anesthesia complications.   Denies Personal Hx of Anesthesia complications.   Social:  Non-Smoker, No Alcohol Use    Hematology/Oncology:        Hematology Comments: Eliquis Therapy, taken this morning. --  Cancer in past history (Skin):  surgery  Oncology Comments: Skin cancer     EENT/Dental:   Full upper dentures Ears General/Symptom(s) Hearing Impairment:    Cardiovascular:   Exercise tolerance: good Hypertension Dysrhythmias atrial fibrillation CHF (Patient denies any history of CHF and goes to the gym. Chest x-ray this morning shows mild pulmonary edema. )  Denies Orthopnea. hyperlipidemia  Denies QUACH. ECG has been reviewed. 11/11/2022 echo shows 70% EF, moderate pulmonary hypertension, mild TR   Pulmonary:  Pulmonary Normal  Denies Shortness of breath.     Renal/:   Chronic Renal Disease ( current prerenal azotemia), CKD BPH    Hepatic/GI:  Hepatic/GI Normal    Musculoskeletal:  Musculoskeletal Normal    Neurological:   Neuromuscular Disease, Right temporal lobe contusion status post fall, with acute on chronic subdural hematoma and minimal adjacent subarachnoid hemorrhage per imaging November 2022 after fall with hip fracture.   Endocrine:   Diabetes, type 2  Obesity / BMI > 30  Psych:  Psychiatric Normal  Sleep Disorder and Insomnia. Sleep Disorder and Insomnia.        Physical Exam  General: Well nourished, Cooperative, Alert and Oriented    Airway:  Mallampati: III   Mouth Opening: Normal  TM Distance: > 6 cm  Tongue: Normal  Neck ROM: Normal ROM    Dental:  Intact    Chest/Lungs:  Clear to auscultation, Normal Respiratory Rate    Heart:  Rate: Normal  Rhythm: Irregularly Irregular  Sounds: Normal        Anesthesia Plan  Type of Anesthesia, risks & benefits discussed:    Anesthesia Type: Gen ETT  Intra-op Monitoring Plan: Standard ASA Monitors  Post Op Pain Control Plan: multimodal analgesia  Induction:  IV  Airway Plan: , Post-Induction  Informed Consent: Informed consent signed with the Patient and all parties understand the risks and agree with anesthesia plan.  All questions answered.   ASA Score: 3  Anesthesia Plan Notes: GETA  Patient had orange juice at 6:30 a.m. this morning.  Potassium 40 mEq IV        Ready For Surgery From Anesthesia Perspective.     .

## 2023-03-02 NOTE — PROGRESS NOTES
[facility] Medicine  Progress Note    Patient name: Hiro Rodríguez  MRN: [id number]  Admit Date: 3/1/2023   LOS: 1 day     SUBJECTIVE:     Principal problem: Bacteremia due to Escherichia coli    Interval History:  Admitted after being sent from cardiologist's office due to RVR and hypotension.  Further workup revealed UTI and subsequent bacteremia (rapid PCR - E. Coli).  Hypotension has improved.  No longer in RVR.  Chronic back pain is about the same.    Summary:   77-year-old  male with paroxysmal atrial fibrillation, type 2 DM, hyperlipidemia sent to the ED from cardiologist's office secondary to AFib with RVR and hypotension.  Further workup revealed UTI which was thought to be the trigger for RVR.  Initiated on cefepime which has been switched to ceftriaxone.  With cultures positive for Gram-negative rods and rapid ID PCR growing E coli.  Imaging revealed rate left hydroureteronephrosis secondary to at least 2 calculi in the midportion of the left ureter.  Seen by Urology and underwent cystoscopy with left ureteral stent placement and removal of bladder stone.        Scheduled Meds:   amiodarone  100 mg Oral QAM    apixaban  5 mg Oral BID    atorvastatin  40 mg Oral Daily    cefTRIAXone (ROCEPHIN) IVPB  2 g Intravenous Q24H    cyproheptadine  4 mg Oral TID    docusate sodium  100 mg Oral BID    DULoxetine  30 mg Oral BID    magnesium oxide  400 mg Oral Daily    metoprolol tartrate  25 mg Oral BID    pantoprazole  40 mg Oral BID    polyethylene glycol  17 g Oral BID    potassium chloride  20 mEq Oral Daily    tamsulosin  0.8 mg Oral Daily    topiramate  50 mg Oral Daily    traZODone  50 mg Oral QHS     Continuous Infusions:  PRN Meds:acetaminophen, acetaminophen, dextrose 10%, dextrose 10%, glucagon (human recombinant), glucose, glucose, HYDROcodone-acetaminophen, insulin aspart U-100, melatonin, ondansetron, phenazopyridine, sodium chloride 0.9%    Review of patient's  allergies indicates:  Not on File    Review of Systems: As per interval history    OBJECTIVE:     Vital Signs (Most Recent)  Temp: 97.8 °F (36.6 °C) (03/02/23 1126)  Pulse: 77 (03/02/23 1126)  Resp: 18 (03/02/23 1126)  BP: 134/71 (03/02/23 1126)  SpO2: 97 % (03/02/23 1126)    Vital Signs Range (Last 24H):  Temp:  [97.7 °F (36.5 °C)-98.6 °F (37 °C)]   Pulse:  []   Resp:  [15-22]   BP: (104-134)/(63-80)   SpO2:  [93 %-97 %]     I & O (Last 24H):  Intake/Output Summary (Last 24 hours) at 3/2/2023 1538  Last data filed at 3/2/2023 1509  Gross per 24 hour   Intake 2050 ml   Output 300 ml   Net 1750 ml       Physical Exam:  General: Patient resting comfortably in no acute distress. Appears as stated age. Calm  Eyes: No conjunctival injection. No scleral icterus.  ENT:  Hard of hearing.  No discharge from ears. No nasal discharge.   Neck: Supple, trachea midline. No JVD  CVS:  Irregularly irregular.  Rate controlled.  No LE edema BL  Lungs:  No tachypnea or accessory muscle use.  Clear to auscultation bilaterally  Abdomen:  Soft, nontender and nondistended.  No organomegaly  Neuro: AOx3. Moves all extremities. Follows commands. Responds appropriately   Skin:  No rash or erythema noted  :  Sotelo catheter    Laboratory:  I have reviewed all pertinent lab results within the past 24 hours.  CBC:   Recent Labs   Lab 03/02/23  0511 03/02/23  1407   WBC 13.02*  --    RBC 3.71*  --    HGB 11.0*  --    HCT 33.9* 33*     --    MCV 91  --    MCH 29.6  --    MCHC 32.4  --      CMP:   Recent Labs   Lab 03/02/23  0511      CALCIUM 7.9*   ALBUMIN 2.2*   PROT 5.1*      K 2.9*   CO2 27      BUN 14   CREATININE 0.6   ALKPHOS 55   ALT 10   AST 11   BILITOT 0.6       Diagnostic Results:  Labs: Reviewed  CT: Reviewed    ASSESSMENT/PLAN:       Active Hospital Problems    Diagnosis  POA    *Bacteremia due to Escherichia coli [R78.81, B96.20]  Yes    Hydronephrosis of left kidney [N13.30]  Yes    Pyelonephritis  of left kidney [N12]  Yes    Hypokalemia [E87.6]  Yes    Urinary retention [R33.9]  Yes    Hearing loss [H91.90]  Yes    Type 2 diabetes mellitus with diabetic polyneuropathy, without long-term current use of insulin [E11.42]  Yes    Hyperlipidemia [E78.5]  Yes    Paroxysmal atrial fibrillation [I48.0]  Yes    Hypertension, essential [I10]  Yes      Resolved Hospital Problems   No resolved problems to display.         Plan:   Bacteremia from E coli likely secondary to UTI and pyelonephritis  Change antibiotics to IV ceftriaxone 2 grams Q 24 hours   Follow urine culture until final and adjust accordingly   Status post left ureteral stent placement.  Needs outpatient Urology follow-up for definitive stone management    AFib with RVR - likely triggered from bacteremia   Currently rates are controlled  Continue metoprolol tartrate and amiodarone   Apixaban for anticoagulation    BPH with LUTS   Continue tamsulosin  Currently has Sotelo catheter     Hypokalemia noted - replete and monitor per protocol    Continue home medications for chronic medical conditions  Seen by PT/OT for weakness; follow recommendations      VTE Risk Mitigation (From admission, onward)           Ordered     apixaban tablet 5 mg  2 times daily         03/01/23 1942     Reason for No Pharmacological VTE Prophylaxis  Once        Question:  Reasons:  Answer:  Already adequately anticoagulated on oral Anticoagulants    03/01/23 1942     IP VTE HIGH RISK PATIENT  Once         03/01/23 1942     Place sequential compression device  Until discontinued         03/01/23 1942                        Department Hospital Medicine  [facility]  Orlando Gonzalez MD  Date of service: 03/02/2023

## 2023-03-02 NOTE — H&P
[facility] Medicine History & Physical Examination   Patient Name: Hiro Rodríguez  MRN: [id number]  Patient Class: IP- Inpatient   Admission Date: 3/1/2023  2:34 PM  Length of Stay: 0  Attending Physician: Hilario Nixon MD  Primary Care Provider: Gautam Castanon III, MD  Face-to-Face encounter date: 03/01/2023  Code Status: Full Code  MPOA:  Chief Complaint: Dizziness (While at cardiology office pt noted to have a fib rvr with rate of 116, started feeling dizzy, low bp in triage)        HISTORY OF PRESENT ILLNESS:   Hiro Rodríguez is a 77 y.o. male with known history of paroxysmal atrial fibrillation currently on Eliquis, hypertension, type 2 diabetes, history of urinary retention status post Sotelo catheter, and a recent hip fracture that occurred in November status post ORIF and had a prolonged SNF stay who was sent to ER from cardiology clinic for afib/rvr, abdominal pain, weakness and hypotension.  Patient states he began having issues related to urinary tract infections, falls which led to an injured back and hemorrhagic CVA which led to hospital stay and eventual SNF placement.  He has been discharged to home, but since being at home he and his elderly wife have struggled with daily care, ADLs etc.. he was previously able to ambulate, but now even with his wife attempting to help he can not get out of bed most of the time.  He recently had to call the fire department bc he made it to the shower, but was then unable to stand and get out of the shower.  Patient denies change in vision, hearing, headache, fever, cough, congestion, runny nose, chest pain, shortness of breath, palpitations, diarrhea, vomiting, hematuria, joint pain and back pain. History was obtained from the patient and ER physician Sign-out.       REVIEW OF SYSTEMS:   10 Point Review of System was performed and was found to be negative except for that mentioned already in the HPI above.     PAST MEDICAL HISTORY:      Past Medical History:   Diagnosis Date    CHF (congestive heart failure)     Hypertension        PAST SURGICAL HISTORY:     Past Surgical History:   Procedure Laterality Date    FRACTURE SURGERY      INTRAMEDULLARY RODDING OF TROCHANTER OF FEMUR Left 11/10/2022    Procedure: INSERTION, ITRAMEDULLARY SANTI, FEMUR, TROCHANTER;  Surgeon: Richard Phoenix MD;  Location: [facility];  Service: Orthopedics;  Laterality: Left;       ALLERGIES:   Patient has no known allergies.    FAMILY HISTORY:   No family history on file.    SOCIAL HISTORY:     Social History     Tobacco Use    Smoking status: Never    Smokeless tobacco: Never   Substance Use Topics    Alcohol use: Yes     Alcohol/week: 1.0 standard drink     Types: 1 Shots of liquor per week        Social History     Substance and Sexual Activity   Sexual Activity Yes    Partners: Female        HOME MEDICATIONS:     Prior to Admission medications    Medication Sig Start Date End Date Taking? Authorizing Provider   acetaminophen (TYLENOL) 325 MG tablet Take 650 mg by mouth every 6 (six) hours as needed. 12/7/22   Historical Provider   amiodarone (PACERONE) 100 MG Tab Take 100 mg by mouth every morning. 12/7/22   Historical Provider   apixaban (ELIQUIS) 5 mg Tab Take 1 tablet (5 mg total) by mouth 2 (two) times daily. 4/12/22   Gautam Castanon III, MD   calcium polycarbophil (FIBER-CAPS, CA POLYCARBOPHIL, ORAL) Take 1 tablet by mouth once daily.    Historical Provider   cholecalciferol, vitamin D3, (VITAMIN D3) 50 mcg (2,000 unit) Tab Take 1 tablet by mouth once daily.    Historical Provider   cyanocobalamin, vitamin B-12, 1,000 mcg Subl Place 1,000 mcg under the tongue 2 (two) times a day.    Historical Provider   cyproheptadine (PERIACTIN) 4 mg tablet Take 1 tablet (4 mg total) by mouth 3 (three) times daily. 1/9/23   Jony López MD   docusate sodium (COLACE) 100 MG capsule Take 100 mg by mouth 2 (two) times daily.    Historical Provider   DULoxetine (CYMBALTA)  30 MG capsule Take 1 capsule (30 mg total) by mouth 2 (two) times daily. 1/9/23   Jony López MD   ferrous gluconate 236 mg (27 mg iron) Tab Take 240 mg by mouth once daily.    Historical Provider   HYDROcodone-acetaminophen (NORCO)  mg per tablet Take 1 tablet by mouth 4 (four) times daily as needed. 3/1/23   Historical Provider   insulin aspart U-100 (NOVOLOG) 100 unit/mL (3 mL) InPn pen Inject 1-10 Units into the skin before meals and at bedtime as needed (Hyperglycemia). 1/9/23 1/9/24  Jony López MD   lisinopriL (PRINIVIL,ZESTRIL) 20 MG tablet Take 20 mg by mouth 2 (two) times daily. 12/29/22   Historical Provider   magnesium 250 mg Tab Take 250 mg by mouth 2 (two) times a day.    Historical Provider   metFORMIN (GLUCOPHAGE-XR) 500 MG ER 24hr tablet TAKE 1 TABLET BY MOUTH EVERY DAY  Patient taking differently: Take 500 mg by mouth 2 (two) times a day. 11/27/22   Gautam Castanon III, MD   metoprolol tartrate (LOPRESSOR) 25 MG tablet Take 1 tablet (25 mg total) by mouth 2 (two) times daily. 4/12/22   Gautam Castanon III, MD   omega-3 acid ethyl esters (LOVAZA) 1 gram capsule TAKE 2 CAPSULES BY MOUTH 2 TIMES DAILY.  Patient taking differently: Take 2 g by mouth 2 (two) times daily. 1/5/23   Gautam Castanon III, MD   pantoprazole (PROTONIX) 40 MG tablet Take 40 mg by mouth 2 (two) times daily. 2/18/23   Historical Provider   phenazopyridine (PYRIDIUM) 100 MG tablet Take 1 tablet (100 mg total) by mouth 3 (three) times daily as needed for Pain.  Patient not taking: Reported on 3/1/2023 12/29/22   Mariia Hendrickson NP   polyethylene glycol (GLYCOLAX) 17 gram/dose powder Take 17 g by mouth daily as needed. 12/7/22   Historical Provider   potassium chloride SA (KLOR-CON) 10 MEQ TbSR Take 2 tablets (20 mEq total) by mouth once daily.  Patient not taking: Reported on 3/1/2023 1/10/23   Jony López MD   rosuvastatin (CRESTOR) 20 MG tablet TAKE 1 TABLET BY MOUTH EVERY DAY  Patient taking  "differently: Take 20 mg by mouth once daily. TAKE 1 TABLET BY MOUTH EVERY DAY 11/27/22   Gautam Castanon III, MD   tamsulosin (FLOMAX) 0.4 mg Cap Take 2 capsules (0.8 mg total) by mouth once daily. 1/10/23 1/10/24  Jony López MD   tolterodine (DETROL LA) 4 MG 24 hr capsule Take 1 capsule (4 mg total) by mouth once daily. 4/12/22   Gautam Castanon III, MD   topiramate (TOPAMAX) 50 MG tablet TAKE 1 TABLET BY MOUTH EVERY DAY  Patient taking differently: Take 50 mg by mouth once daily. 1/5/23   Gautam Castanon III, MD   traZODone (DESYREL) 50 MG tablet TAKE 1 TABLET BY MOUTH EVERY EVENING.  Patient taking differently: Take 50 mg by mouth every evening. 12/15/22   Gautam Castanon III, MD         PHYSICAL EXAM:   /80   Pulse 98   Temp 98.9 °F (37.2 °C) (Oral)   Resp 15   Ht 5' 10" (1.778 m)   Wt 113.6 kg (250 lb 7.1 oz)   SpO2 96%   BMI 35.93 kg/m²   Vitals Reviewed  General appearance:  no apparent distress.  Skin: No Rash.   Neuro: Motor and sensory exams grossly intact. Good tone. Power in all 4 extremities 5/5.   HENT: Atraumatic head. dry mucous membranes of oral cavity.  Eyes: Normal extraocular movements.   Lungs: Clear on right, exp crackles to left. No wheezing present.   Heart: irreg rhythm, rate around 105  no murmurs/gallop/rub. No pedal edema. No JVD present.   Abdomen: Soft, non-distended, non-tender. No rebound tenderness/guarding. Hypo bs. Extremities: No cyanosis, clubbing.  Psych/mental status: Alert and oriented. Cooperative. Responds appropriately to questions. Pleasant and motivated to get back to previous functional status       EMERGENCY DEPARTMENT LABS AND IMAGING:     Labs Reviewed   MAGNESIUM - Abnormal; Notable for the following components:       Result Value    Magnesium 1.5 (*)     All other components within normal limits   CBC W/ AUTO DIFFERENTIAL - Abnormal; Notable for the following components:    WBC 14.18 (*)     RBC 4.35 (*)     Hemoglobin 13.0 (*)     RDW 15.5 " (*)     Gran # (ANC) 11.9 (*)     Immature Grans (Abs) 0.05 (*)     Gran % 83.5 (*)     Lymph % 9.4 (*)     All other components within normal limits   COMPREHENSIVE METABOLIC PANEL - Abnormal; Notable for the following components:    Potassium 3.4 (*)     Glucose 172 (*)     Calcium 8.4 (*)     Albumin 2.6 (*)     All other components within normal limits   URINALYSIS, REFLEX TO URINE CULTURE - Abnormal; Notable for the following components:    Appearance, UA Cloudy (*)     Protein, UA 1+ (*)     Occult Blood UA 3+ (*)     Nitrite, UA Positive (*)     Leukocytes, UA 3+ (*)     All other components within normal limits    Narrative:     Specimen Source->Urine   URINALYSIS MICROSCOPIC - Abnormal; Notable for the following components:    RBC, UA >100 (*)     WBC, UA >100 (*)     Bacteria Many (*)     Hyaline Casts, UA 62 (*)     All other components within normal limits    Narrative:     Specimen Source->Urine   CULTURE, BLOOD   CULTURE, BLOOD   CULTURE, URINE   PROTIME-INR   APTT   SARS-COV-2 RNA AMPLIFICATION, QUAL   INFLUENZA A AND B ANTIGEN    Narrative:     Specimen Source->Nasopharyngeal Swab   TROPONIN I HIGH SENSITIVITY   LACTIC ACID, PLASMA   LIPASE   POCT CREATININE   POCT GLUCOSE MONITORING CONTINUOUS   POCT GLUCOSE MONITORING CONTINUOUS       CT Chest Abdomen Pelvis With Contrast (xpd)   Final Result      X-Ray Chest AP Portable   Final Result          ASSESSMENT & PLAN:   Hiro Rodríguez is a 77 y.o. male admitted for Af/rvr, hypotension, abdominal pain, UTI with possible sepsis, unable to care for self at home    Active Hospital Problems    Diagnosis    *Atrial fibrillation    Urinary retention    Type 2 diabetes mellitus with diabetic polyneuropathy, without long-term current use of insulin        Plan    Af/rvr, hypotension  BP initially 90/60, HR 120s  He received 1 L IVF with improvement  Admit to med with tele monitoring  Restart home metoprolol and amio  Continue elaquis  Encourage PO  fluid  Maintenance IVF as needed    Lower quad Abd pain  UTI, sepsis  UA+ bacteria, wbcs and nitrite  Ucx in process  previous ucx with pseudomnas  -Start cefepime   -Received IVF for sepsis protocol  -Abd CT: The urinary bladder is decompressed by Messina catheter. There is stranding seen around the urinary bladder with bladder wall thickening suspected cystitis. No obvious bladder calculi are seen.  There is moderate left hydroureteronephrosis, secondary to at least 2 calculi at the midportion of the left ureter measuring 8 to 9 mm and 6 to 7 mm in size. There is stranding seen around the left kidney. Superimposed infection is not excluded.  There are small bilateral pleural effusions with bibasilar airspace opacities which can be seen with atelectasis and/or infection.  The abdominal aorta is within normal limits of size. No evidence is seen to suggest aortic dissection.  Hepatic steatosis  -messina changed out per ER  -continue tamsulosin  -urology consult    Frailty, Decreased ability to perform ADLs, decreased caregiver support with elderly wife  -PT/OT consult  -case management consult for possible placement  -patient is very motivated to regain functional status that was lost during hospital and SNF stay recently    Diet: diabetic, cardiac    DVT Prophylaxis:  elaquis  and Encourage ambulation. OOB as tolerated.     Discharge Planning and Disposition:   would benefit from IPR placement    Expected LOS: 2-3 nights    This patient is high risk for life-threatening deterioration and death secondary to above comorbidities and need for IV treatment. This patient meets inpatient criteria.   ______________________________________________________________________________________________________________________________________________________________    INPATIENT LIST OF MEDICATIONS     Current Facility-Administered Medications:     acetaminophen tablet 650 mg, 650 mg, Oral, Q6H PRN, Vince Nixon MD     acetaminophen tablet 650 mg, 650 mg, Oral, Q8H PRN, Vince Nixon MD    [START ON 3/2/2023] amiodarone tablet 100 mg, 100 mg, Oral, QAM, Vince Nixon MD    apixaban tablet 5 mg, 5 mg, Oral, BID, Vince Nixon MD    [START ON 3/2/2023] atorvastatin tablet 40 mg, 40 mg, Oral, Daily, Vince Nixon MD    cefepime in dextrose 5 % IVPB 2 g, 2 g, Intravenous, Q8H, Vince Nixon MD    cyproheptadine 4 mg tablet 4 mg, 4 mg, Oral, TID, Vince Nixon MD    dextrose 10% bolus 125 mL 125 mL, 12.5 g, Intravenous, PRN, Vince Nixon MD    dextrose 10% bolus 250 mL 250 mL, 25 g, Intravenous, PRN, Vince Nixon MD    docusate sodium capsule 100 mg, 100 mg, Oral, BID, Vince Nixon MD    DULoxetine DR capsule 30 mg, 30 mg, Oral, BID, Vince Nixon MD    glucagon (human recombinant) injection 1 mg, 1 mg, Intramuscular, PRN, Vince Nixon MD    glucose chewable tablet 16 g, 16 g, Oral, PRN, Vince Nixon MD    glucose chewable tablet 24 g, 24 g, Oral, PRN, Vince Nixon MD    HYDROcodone-acetaminophen  mg per tablet 1 tablet, 1 tablet, Oral, QID PRN, Vince Nixon MD    insulin aspart U-100 pen 1-10 Units, 1-10 Units, Subcutaneous, QID (AC + HS) PRN, Vince Nixon MD    insulin aspart U-100 pen 1-10 Units, 1-10 Units, Subcutaneous, QID (AC + HS) PRN, Vince Nixon MD    [START ON 3/2/2023] magnesium oxide tablet 400 mg, 400 mg, Oral, Daily, Vince Nixon MD    magnesium sulfate 2g in water 50mL IVPB (premix), 2 g, Intravenous, Once, Rudi Victor MD, Last Rate: 25 mL/hr at 03/01/23 1859, 2 g at 03/01/23 1859    melatonin tablet 6 mg, 6 mg, Oral, Nightly PRN, Vince Nixon MD    metoprolol tartrate (LOPRESSOR) tablet 25 mg, 25 mg, Oral, BID, Vince Nixon MD    ondansetron injection 4 mg, 4 mg, Intravenous, Q8H PRN, Vince JONES  MD Hussain    pantoprazole EC tablet 40 mg, 40 mg, Oral, BID, Vince Nixon MD    phenazopyridine tablet 100 mg, 100 mg, Oral, TID PRN, Vince Nixon MD    polyethylene glycol packet 17 g, 17 g, Oral, BID, Vince Nixon MD    [START ON 3/2/2023] potassium chloride SA CR tablet 20 mEq, 20 mEq, Oral, Daily, Vince Nixon MD    sodium chloride 0.9% flush 10 mL, 10 mL, Intravenous, PRN, Vince Nixon MD    [START ON 3/2/2023] tamsulosin 24 hr capsule 0.8 mg, 0.8 mg, Oral, Daily, Vince Nixon MD    [START ON 3/2/2023] topiramate tablet 50 mg, 50 mg, Oral, Daily, Vince Nixon MD    traZODone tablet 50 mg, 50 mg, Oral, QHS, Vince Nixon MD    Current Outpatient Medications:     acetaminophen (TYLENOL) 325 MG tablet, Take 650 mg by mouth every 6 (six) hours as needed., Disp: , Rfl:     amiodarone (PACERONE) 100 MG Tab, Take 100 mg by mouth every morning., Disp: , Rfl:     apixaban (ELIQUIS) 5 mg Tab, Take 1 tablet (5 mg total) by mouth 2 (two) times daily., Disp: 180 tablet, Rfl: 1    calcium polycarbophil (FIBER-CAPS, CA POLYCARBOPHIL, ORAL), Take 1 tablet by mouth once daily., Disp: , Rfl:     cholecalciferol, vitamin D3, (VITAMIN D3) 50 mcg (2,000 unit) Tab, Take 1 tablet by mouth once daily., Disp: , Rfl:     cyanocobalamin, vitamin B-12, 1,000 mcg Subl, Place 1,000 mcg under the tongue 2 (two) times a day., Disp: , Rfl:     cyproheptadine (PERIACTIN) 4 mg tablet, Take 1 tablet (4 mg total) by mouth 3 (three) times daily., Disp: , Rfl: 0    docusate sodium (COLACE) 100 MG capsule, Take 100 mg by mouth 2 (two) times daily., Disp: , Rfl:     DULoxetine (CYMBALTA) 30 MG capsule, Take 1 capsule (30 mg total) by mouth 2 (two) times daily., Disp: 90 capsule, Rfl: 1    ferrous gluconate 236 mg (27 mg iron) Tab, Take 240 mg by mouth once daily., Disp: , Rfl:     HYDROcodone-acetaminophen (NORCO)  mg per tablet, Take 1 tablet by mouth 4  (four) times daily as needed., Disp: , Rfl:     insulin aspart U-100 (NOVOLOG) 100 unit/mL (3 mL) InPn pen, Inject 1-10 Units into the skin before meals and at bedtime as needed (Hyperglycemia)., Disp: , Rfl: 0    lisinopriL (PRINIVIL,ZESTRIL) 20 MG tablet, Take 20 mg by mouth 2 (two) times daily., Disp: , Rfl:     magnesium 250 mg Tab, Take 250 mg by mouth 2 (two) times a day., Disp: , Rfl:     metFORMIN (GLUCOPHAGE-XR) 500 MG ER 24hr tablet, TAKE 1 TABLET BY MOUTH EVERY DAY (Patient taking differently: Take 500 mg by mouth 2 (two) times a day.), Disp: 90 tablet, Rfl: 1    metoprolol tartrate (LOPRESSOR) 25 MG tablet, Take 1 tablet (25 mg total) by mouth 2 (two) times daily., Disp: 180 tablet, Rfl: 1    omega-3 acid ethyl esters (LOVAZA) 1 gram capsule, TAKE 2 CAPSULES BY MOUTH 2 TIMES DAILY. (Patient taking differently: Take 2 g by mouth 2 (two) times daily.), Disp: 360 capsule, Rfl: 1    pantoprazole (PROTONIX) 40 MG tablet, Take 40 mg by mouth 2 (two) times daily., Disp: , Rfl:     phenazopyridine (PYRIDIUM) 100 MG tablet, Take 1 tablet (100 mg total) by mouth 3 (three) times daily as needed for Pain. (Patient not taking: Reported on 3/1/2023), Disp: 12 tablet, Rfl: 0    polyethylene glycol (GLYCOLAX) 17 gram/dose powder, Take 17 g by mouth daily as needed., Disp: , Rfl:     potassium chloride SA (KLOR-CON) 10 MEQ TbSR, Take 2 tablets (20 mEq total) by mouth once daily. (Patient not taking: Reported on 3/1/2023), Disp: , Rfl:     rosuvastatin (CRESTOR) 20 MG tablet, TAKE 1 TABLET BY MOUTH EVERY DAY (Patient taking differently: Take 20 mg by mouth once daily. TAKE 1 TABLET BY MOUTH EVERY DAY), Disp: 90 tablet, Rfl: 1    tamsulosin (FLOMAX) 0.4 mg Cap, Take 2 capsules (0.8 mg total) by mouth once daily., Disp: 60 capsule, Rfl: 11    tolterodine (DETROL LA) 4 MG 24 hr capsule, Take 1 capsule (4 mg total) by mouth once daily., Disp: 90 capsule, Rfl: 1    topiramate (TOPAMAX) 50 MG tablet, TAKE 1 TABLET BY MOUTH  EVERY DAY (Patient taking differently: Take 50 mg by mouth once daily.), Disp: 90 tablet, Rfl: 1    traZODone (DESYREL) 50 MG tablet, TAKE 1 TABLET BY MOUTH EVERY EVENING. (Patient taking differently: Take 50 mg by mouth every evening.), Disp: 90 tablet, Rfl: 1      Scheduled Meds:   [START ON 3/2/2023] amiodarone  100 mg Oral QAM    apixaban  5 mg Oral BID    [START ON 3/2/2023] atorvastatin  40 mg Oral Daily    ceFEPime (MAXIPIME) IVPB  2 g Intravenous Q8H    cyproheptadine  4 mg Oral TID    docusate sodium  100 mg Oral BID    DULoxetine  30 mg Oral BID    [START ON 3/2/2023] magnesium oxide  400 mg Oral Daily    magnesium sulfate IVPB  2 g Intravenous Once    metoprolol tartrate  25 mg Oral BID    pantoprazole  40 mg Oral BID    polyethylene glycol  17 g Oral BID    [START ON 3/2/2023] potassium chloride  20 mEq Oral Daily    [START ON 3/2/2023] tamsulosin  0.8 mg Oral Daily    [START ON 3/2/2023] topiramate  50 mg Oral Daily    traZODone  50 mg Oral QHS     Continuous Infusions:  PRN Meds:.acetaminophen, acetaminophen, dextrose 10%, dextrose 10%, glucagon (human recombinant), glucose, glucose, HYDROcodone-acetaminophen, insulin aspart U-100, insulin aspart U-100, melatonin, ondansetron, phenazopyridine, sodium chloride 0.9%      Vince Nixon MD  [facility] Hospitalist  03/01/2023

## 2023-03-03 ENCOUNTER — PATIENT MESSAGE (OUTPATIENT)
Dept: FAMILY MEDICINE | Facility: CLINIC | Age: 78
End: 2023-03-03
Payer: MEDICARE

## 2023-03-03 ENCOUNTER — DOCUMENT SCAN (OUTPATIENT)
Dept: HOME HEALTH SERVICES | Facility: HOSPITAL | Age: 78
End: 2023-03-03
Payer: MEDICARE

## 2023-03-03 LAB
ALBUMIN SERPL BCP-MCNC: 2.1 G/DL (ref 3.5–5.2)
ALP SERPL-CCNC: 52 U/L (ref 55–135)
ALT SERPL W/O P-5'-P-CCNC: 10 U/L (ref 10–44)
ANION GAP SERPL CALC-SCNC: 5 MMOL/L (ref 8–16)
AST SERPL-CCNC: 16 U/L (ref 10–40)
BACTERIA UR CULT: ABNORMAL
BASOPHILS # BLD AUTO: 0.02 K/UL (ref 0–0.2)
BASOPHILS NFR BLD: 0.2 % (ref 0–1.9)
BILIRUB SERPL-MCNC: 0.5 MG/DL (ref 0.1–1)
BUN SERPL-MCNC: 14 MG/DL (ref 8–23)
CALCIUM SERPL-MCNC: 7.2 MG/DL (ref 8.7–10.5)
CHLORIDE SERPL-SCNC: 108 MMOL/L (ref 95–110)
CO2 SERPL-SCNC: 29 MMOL/L (ref 23–29)
CREAT SERPL-MCNC: 0.7 MG/DL (ref 0.5–1.4)
DIFFERENTIAL METHOD: ABNORMAL
EOSINOPHIL # BLD AUTO: 0.1 K/UL (ref 0–0.5)
EOSINOPHIL NFR BLD: 0.5 % (ref 0–8)
ERYTHROCYTE [DISTWIDTH] IN BLOOD BY AUTOMATED COUNT: 15.8 % (ref 11.5–14.5)
EST. GFR  (NO RACE VARIABLE): >60 ML/MIN/1.73 M^2
GLUCOSE SERPL-MCNC: 137 MG/DL (ref 70–110)
GLUCOSE SERPL-MCNC: 91 MG/DL (ref 70–110)
GLUCOSE SERPL-MCNC: 94 MG/DL (ref 70–110)
GLUCOSE SERPL-MCNC: 96 MG/DL (ref 70–110)
HCT VFR BLD AUTO: 34 % (ref 40–54)
HGB BLD-MCNC: 11.2 G/DL (ref 14–18)
IMM GRANULOCYTES # BLD AUTO: 0.04 K/UL (ref 0–0.04)
IMM GRANULOCYTES NFR BLD AUTO: 0.4 % (ref 0–0.5)
LYMPHOCYTES # BLD AUTO: 1.9 K/UL (ref 1–4.8)
LYMPHOCYTES NFR BLD: 19.1 % (ref 18–48)
MCH RBC QN AUTO: 30 PG (ref 27–31)
MCHC RBC AUTO-ENTMCNC: 32.9 G/DL (ref 32–36)
MCV RBC AUTO: 91 FL (ref 82–98)
MONOCYTES # BLD AUTO: 0.9 K/UL (ref 0.3–1)
MONOCYTES NFR BLD: 8.5 % (ref 4–15)
NEUTROPHILS # BLD AUTO: 7.2 K/UL (ref 1.8–7.7)
NEUTROPHILS NFR BLD: 71.3 % (ref 38–73)
NRBC BLD-RTO: 0 /100 WBC
PLATELET # BLD AUTO: 254 K/UL (ref 150–450)
PMV BLD AUTO: 9.7 FL (ref 9.2–12.9)
POTASSIUM SERPL-SCNC: 3.6 MMOL/L (ref 3.5–5.1)
PROT SERPL-MCNC: 4.9 G/DL (ref 6–8.4)
RBC # BLD AUTO: 3.73 M/UL (ref 4.6–6.2)
SODIUM SERPL-SCNC: 142 MMOL/L (ref 136–145)
WBC # BLD AUTO: 10.12 K/UL (ref 3.9–12.7)

## 2023-03-03 PROCEDURE — 99900035 HC TECH TIME PER 15 MIN (STAT)

## 2023-03-03 PROCEDURE — 97116 GAIT TRAINING THERAPY: CPT

## 2023-03-03 PROCEDURE — 97110 THERAPEUTIC EXERCISES: CPT

## 2023-03-03 PROCEDURE — 80053 COMPREHEN METABOLIC PANEL: CPT | Performed by: STUDENT IN AN ORGANIZED HEALTH CARE EDUCATION/TRAINING PROGRAM

## 2023-03-03 PROCEDURE — 94799 UNLISTED PULMONARY SVC/PX: CPT

## 2023-03-03 PROCEDURE — 25000003 PHARM REV CODE 250: Performed by: STUDENT IN AN ORGANIZED HEALTH CARE EDUCATION/TRAINING PROGRAM

## 2023-03-03 PROCEDURE — 12000002 HC ACUTE/MED SURGE SEMI-PRIVATE ROOM

## 2023-03-03 PROCEDURE — 99900031 HC PATIENT EDUCATION (STAT)

## 2023-03-03 PROCEDURE — 63600175 PHARM REV CODE 636 W HCPCS: Performed by: INTERNAL MEDICINE

## 2023-03-03 PROCEDURE — 36415 COLL VENOUS BLD VENIPUNCTURE: CPT | Performed by: STUDENT IN AN ORGANIZED HEALTH CARE EDUCATION/TRAINING PROGRAM

## 2023-03-03 PROCEDURE — 85025 COMPLETE CBC W/AUTO DIFF WBC: CPT | Performed by: STUDENT IN AN ORGANIZED HEALTH CARE EDUCATION/TRAINING PROGRAM

## 2023-03-03 PROCEDURE — 94761 N-INVAS EAR/PLS OXIMETRY MLT: CPT

## 2023-03-03 PROCEDURE — 97162 PT EVAL MOD COMPLEX 30 MIN: CPT

## 2023-03-03 RX ADMIN — MAGNESIUM OXIDE 400 MG (241.3 MG MAGNESIUM) TABLET 400 MG: at 09:03

## 2023-03-03 RX ADMIN — CYPROHEPTADINE HYDROCHLORIDE 4 MG: 4 TABLET ORAL at 05:03

## 2023-03-03 RX ADMIN — PANTOPRAZOLE SODIUM 40 MG: 40 TABLET, DELAYED RELEASE ORAL at 05:03

## 2023-03-03 RX ADMIN — ATORVASTATIN CALCIUM 40 MG: 40 TABLET, FILM COATED ORAL at 08:03

## 2023-03-03 RX ADMIN — APIXABAN 5 MG: 5 TABLET, FILM COATED ORAL at 08:03

## 2023-03-03 RX ADMIN — TAMSULOSIN HYDROCHLORIDE 0.8 MG: 0.4 CAPSULE ORAL at 09:03

## 2023-03-03 RX ADMIN — DULOXETINE 30 MG: 30 CAPSULE, DELAYED RELEASE ORAL at 08:03

## 2023-03-03 RX ADMIN — POTASSIUM CHLORIDE 20 MEQ: 1500 TABLET, EXTENDED RELEASE ORAL at 09:03

## 2023-03-03 RX ADMIN — TOPIRAMATE 50 MG: 25 TABLET, FILM COATED ORAL at 09:03

## 2023-03-03 RX ADMIN — AMIODARONE HYDROCHLORIDE 100 MG: 100 TABLET ORAL at 09:03

## 2023-03-03 RX ADMIN — DULOXETINE 30 MG: 30 CAPSULE, DELAYED RELEASE ORAL at 09:03

## 2023-03-03 RX ADMIN — DOCUSATE SODIUM 100 MG: 100 CAPSULE, LIQUID FILLED ORAL at 09:03

## 2023-03-03 RX ADMIN — TRAZODONE HYDROCHLORIDE 50 MG: 50 TABLET ORAL at 08:03

## 2023-03-03 RX ADMIN — ACETAMINOPHEN 650 MG: 325 TABLET ORAL at 09:03

## 2023-03-03 RX ADMIN — CYPROHEPTADINE HYDROCHLORIDE 4 MG: 4 TABLET ORAL at 08:03

## 2023-03-03 RX ADMIN — POLYETHYLENE GLYCOL 3350 17 G: 17 POWDER, FOR SOLUTION ORAL at 09:03

## 2023-03-03 RX ADMIN — CEFTRIAXONE 2 G: 2 INJECTION, SOLUTION INTRAVENOUS at 05:03

## 2023-03-03 RX ADMIN — POLYETHYLENE GLYCOL 3350 17 G: 17 POWDER, FOR SOLUTION ORAL at 08:03

## 2023-03-03 RX ADMIN — APIXABAN 5 MG: 5 TABLET, FILM COATED ORAL at 09:03

## 2023-03-03 RX ADMIN — METOPROLOL TARTRATE 25 MG: 25 TABLET, FILM COATED ORAL at 08:03

## 2023-03-03 RX ADMIN — METOPROLOL TARTRATE 25 MG: 25 TABLET, FILM COATED ORAL at 09:03

## 2023-03-03 RX ADMIN — CYPROHEPTADINE HYDROCHLORIDE 4 MG: 4 TABLET ORAL at 09:03

## 2023-03-03 RX ADMIN — DOCUSATE SODIUM 100 MG: 100 CAPSULE, LIQUID FILLED ORAL at 08:03

## 2023-03-03 NOTE — PLAN OF CARE
03/03/23 0900   Patient Assessment/Suction   Level of Consciousness (AVPU) alert   Respiratory Effort Unlabored;Normal   Expansion/Accessory Muscles/Retractions no use of accessory muscles   Rhythm/Pattern, Respiratory unlabored   PRE-TX-O2   Device (Oxygen Therapy) room air   SpO2 95 %   Pulse Oximetry Type Continuous   $ Pulse Oximetry - Multiple Charge Pulse Oximetry - Multiple   Pulse 85   Resp 16   Education   $ Education DME Oxygen   Respiratory Evaluation   $ Care Plan Tech Time 15 min   $ Eval/Re-eval Charges Re-evaluation

## 2023-03-03 NOTE — PROGRESS NOTES
Patient is status post emergency cystoscopic stent placement  For an obstructing left upper ureteral stone  From a urologic standpoint he is stable and ready for discharge    Please have him follow up with me in the next 1-2 weeks with a KUB, where stone procedures can be planned

## 2023-03-03 NOTE — PLAN OF CARE
[facility]  Initial Discharge Assessment       Primary Care Provider: Gautam Castanon III, MD    Admission Diagnosis: Severe sepsis [A41.9, R65.20]  Sepsis, due to unspecified organism, unspecified whether acute organ dysfunction present [A41.9]    Admission Date: 3/1/2023  Expected Discharge Date: 3/4/2023    Discharge Barriers Identified: None    Payor: [facility] MCARE / Plan: [facility] MEDICARE ADVANTAGE / Product Type: Medicare Advantage /     Extended Emergency Contact Information  Primary Emergency Contact: Galina Rodríguez  Mobile Phone: [phone]  Relation: Relative  Preferred language: English   needed? No    Discharge Plan A: Rehab  Discharge Plan B: Skilled Nursing Facility      [id number] - [place] - [address]  [address]  [facility] [address]  Phone: [phone] Fax: [phone]    [facility] - [address]  [address] [address]  Phone: [phone] Fax: [phone]    [facility]  [address]  [facility] [address]  Phone: [phone] Fax: [phone]      Initial Assessment (most recent)       Adult Discharge Assessment - 03/03/23 1412          Discharge Assessment    Assessment Type Discharge Planning Assessment     Confirmed/corrected address, phone number and insurance Yes     Confirmed Demographics Correct on Facesheet     Source of Information patient     Communicated BRENNAN with patient/caregiver Date not available/Unable to determine     People in Home spouse     Do you expect to return to your current living situation? Yes     Do you have help at home or someone to help you manage your care at home? Yes     Who are your caregiver(s) and their phone number(s)? Spouse/Galina [phone]     Prior to hospitilization cognitive status: Alert/Oriented     Current cognitive status: Alert/Oriented      Walking or Climbing Stairs ambulation difficulty, requires equipment     Mobility Management wheelchair, rolling walker     Dressing/Bathing bathing difficulty, requires equipment     Dressing/Bathing Management shower chair, grab bars     Home Accessibility wheelchair accessible     Home Layout Able to live on 1st floor     Equipment Currently Used at Home bedside commode;wheelchair;walker, rolling;grab bar;hospital bed;shower chair     Readmission within 30 days? No     Patient currently being followed by outpatient case management? No     Do you currently have service(s) that help you manage your care at home? No     Do you take prescription medications? Yes     Do you have prescription coverage? Yes     Coverage [facility]     Do you have any problems affording any of your prescribed medications? No     Is the patient taking medications as prescribed? yes     Who is going to help you get home at discharge? Spouse/Galina [phone]     How do you get to doctors appointments? car, drives self;family or friend will provide     Are you on dialysis? No     Do you take coumadin? No     Discharge Plan A Rehab     Discharge Plan B Skilled Nursing Facility     DME Needed Upon Discharge  none     Discharge Plan discussed with: Patient     Discharge Barriers Identified None        OTHER    Name(s) of People in Home Spouse/Galina [phone]

## 2023-03-03 NOTE — PLAN OF CARE
Problem: Infection  Goal: Absence of Infection Signs and Symptoms  Outcome: Ongoing, Progressing     Problem: Adult Inpatient Plan of Care  Goal: Plan of Care Review  Outcome: Ongoing, Progressing  Goal: Patient-Specific Goal (Individualized)  Outcome: Ongoing, Progressing  Goal: Absence of Hospital-Acquired Illness or Injury  Outcome: Ongoing, Progressing  Goal: Optimal Comfort and Wellbeing  Outcome: Ongoing, Progressing  Goal: Readiness for Transition of Care  Outcome: Ongoing, Progressing     Problem: Adjustment to Illness (Sepsis/Septic Shock)  Goal: Optimal Coping  Outcome: Ongoing, Progressing     Problem: Bleeding (Sepsis/Septic Shock)  Goal: Absence of Bleeding  Outcome: Ongoing, Progressing     Problem: Glycemic Control Impaired (Sepsis/Septic Shock)  Goal: Blood Glucose Level Within Desired Range  Outcome: Ongoing, Progressing     Problem: Infection Progression (Sepsis/Septic Shock)  Goal: Absence of Infection Signs and Symptoms  Outcome: Ongoing, Progressing     Problem: Nutrition Impaired (Sepsis/Septic Shock)  Goal: Optimal Nutrition Intake  Outcome: Ongoing, Progressing     Problem: Impaired Wound Healing  Goal: Optimal Wound Healing  Outcome: Ongoing, Progressing     Problem: Diabetes Comorbidity  Goal: Blood Glucose Level Within Targeted Range  Outcome: Ongoing, Progressing     Problem: Skin Injury Risk Increased  Goal: Skin Health and Integrity  Outcome: Ongoing, Progressing     Problem: UTI (Urinary Tract Infection)  Goal: Improved Infection Symptoms  Outcome: Ongoing, Progressing

## 2023-03-03 NOTE — PT/OT/SLP PROGRESS
Occupational Therapy   Treatment    Name: Hiro Rodríguez  MRN: [id number]  Admitting Diagnosis:  Bacteremia due to Escherichia coli  1 Day Post-Op  Pre-op Diagnosis: Sepsis with multiple organ dysfunction (MOD) [A41.9, R65.20]    Procedure(s):  CYSTOSCOPY, WITH URETERAL STENT INSERTION  REMOVAL, CALCULUS, BLADDER     Recommendations:     Discharge Recommendations:  (IPR vs SNF depending on patient progression with therapy.)  Discharge Equipment Recommendations:   (TBD)  Barriers to discharge:  Decreased caregiver support (increased level of assistance)    Assessment:     Hiro Rodríguez is a 77 y.o. male with a medical diagnosis of Bacteremia due to Escherichia coli.  He presents with Performance deficits affecting function are weakness, impaired endurance, impaired self care skills, impaired functional mobility, gait instability, impaired balance, decreased coordination, decreased upper extremity function, decreased lower extremity function, decreased safety awareness, pain, decreased ROM.     Pt declined OOB citing BLE pain after ambulation today, pain 7/10 and not feeling well. Nurse notified and pt requested pain med    s. Pt's affect was flat and lunch tray food nearly untouched.  Pt agreeable to BUE exercises in bed. Angeline ex without issue. Continue with OT POC.   Rehab Prognosis:  Good and Fair; patient would benefit from acute skilled OT services to address these deficits and reach maximum level of function.       Plan:     Patient to be seen 5 x/week to address the above listed problems via self-care/home management, therapeutic activities, therapeutic exercises  Plan of Care Expires: 04/02/23  Plan of Care Reviewed with: patient    Subjective     Pain/Comfort:  Pain Rating 1: 7/10  Location - Side 1: Bilateral  Location - Orientation 1: generalized  Location 1: leg  Pain Addressed 1: Nurse notified  Pain Rating Post-Intervention 1: 7/10    Objective:     Communicated with: nurse prior to session.   Patient found HOB elevated with bed alarm, telemetry upon OT entry to room.    General Precautions: Standard, diabetic    Orthopedic Precautions:N/A  Braces: N/A  Respiratory Status: Room air     Occupational Performance:     Bed Mobility:     Declined due to BLE pain    Functional Mobility/Transfers:   Same as above    Activities of Daily Living:  Same as above        Treatment & Education:  Purpose of oT and POC  Impt of OOB activity, risks of prolonged bed rest explained to pt.  Pt agreeable to UE AROM ex 10 x 1 set all major joints and planes while lying in bed with supervision.  All questions/concerns addressed within scope.     Patient left supine with all lines intact, call button in reach, bed alarm on, and nurse notified    GOALS:   Multidisciplinary Problems       Occupational Therapy Goals          Problem: Occupational Therapy    Goal Priority Disciplines Outcome Interventions   Occupational Therapy Goal     OT, PT/OT     Description: Goals to be met by: 4/2/2023     Patient will increase functional independence with ADLs by performing:    UE Dressing with Minimal Assistance.  LE Dressing with Minimal Assistance.  Grooming while seated with Stand-by Assistance.  Toileting from bedside commode with Minimal Assistance for hygiene and clothing management.   Sitting at edge of bed x10 minutes with Stand-by Assistance.  Supine to sit with Stand-by Assistance.  Toilet transfer to bedside commode with Stand-by Assistance.  Upper extremity exercise program x10 reps per handout, with assistance as needed.                         Time Tracking:     OT Date of Treatment: 03/03/23  OT Start Time: 1301  OT Stop Time: 1309  OT Total Time (min): 8 min    Billable Minutes:Therapeutic Exercise 8  Total Time 8    OT/PILO: OT          3/3/2023

## 2023-03-03 NOTE — PLAN OF CARE
Spoke with patient at bedside concerning Inpatient Rehab placement, to which patient verbalizes understanding and agreement.  Patient choice form signed and scanned into .  Referral sent to the following facilities via [facility]: [facility].       03/03/23 1420   Post-Acute Status   Post-Acute Authorization Placement   Post-Acute Placement Status Referrals Sent   Patient choice form signed by patient/caregiver List with quality metrics by geographic area provided   Discharge Delays None known at this time   Discharge Plan   Discharge Plan A Rehab   Discharge Plan B Skilled Nursing Facility

## 2023-03-03 NOTE — PLAN OF CARE
Problem: Infection  Goal: Absence of Infection Signs and Symptoms  Outcome: Ongoing, Progressing     Problem: Adult Inpatient Plan of Care  Goal: Plan of Care Review  Outcome: Ongoing, Progressing  Goal: Patient-Specific Goal (Individualized)  Outcome: Ongoing, Progressing  Goal: Absence of Hospital-Acquired Illness or Injury  Outcome: Ongoing, Progressing  Goal: Optimal Comfort and Wellbeing  Outcome: Ongoing, Progressing  Goal: Readiness for Transition of Care  Outcome: Ongoing, Progressing     Problem: Skin Injury Risk Increased  Goal: Skin Health and Integrity  Outcome: Ongoing, Progressing     Problem: UTI (Urinary Tract Infection)  Goal: Improved Infection Symptoms  Outcome: Ongoing, Progressing

## 2023-03-03 NOTE — PT/OT/SLP EVAL
Physical Therapy Evaluation    Patient Name:  Hiro Rodríguez   MRN:  [id number]    Recommendations:     Discharge Recommendations: rehabilitation facility   Discharge Equipment Recommendations: none   Barriers to discharge:  Increased caregiver burden of care    Assessment:     Hiro Rodríguez is a 77 y.o. male admitted with a medical diagnosis of Bacteremia due to Escherichia coli.  He presents with the following impairments/functional limitations: weakness, impaired endurance, impaired self care skills, impaired functional mobility, gait instability, impaired balance, impaired cardiopulmonary response to activity .    Pt had recent hip ORIF was [facility] to SNF and was home 3 weeks before this present admit. Pt presented in supine and t/f'ed to sit with Mod A, sit to stand with Mod A and ambulated 10 ft with RW and Min A x2.    Rehab Prognosis: Fair; patient would benefit from acute skilled PT services to address these deficits and reach maximum level of function.    Recent Surgery: Procedure(s) (LRB):  CYSTOSCOPY, WITH URETERAL STENT INSERTION (N/A)  REMOVAL, CALCULUS, BLADDER 1 Day Post-Op    Plan:     During this hospitalization, patient to be seen 6 x/week to address the identified rehab impairments via gait training, therapeutic activities, therapeutic exercises and progress toward the following goals:    Plan of Care Expires:  04/03/23    Subjective     Chief Complaint: weakness  Patient/Family Comments/goals: Return home with wife  Pain/Comfort:       Patients cultural, spiritual, Sikh conflicts given the current situation:      Living Environment:  Pt lives at home with his wife in a [facility] w/o .  Prior to admission, patient was minimally ambulatory with rollator and his wife's assistance.Equipment used at home: walker, rolling, wheelchair, bedside commode, hospital bed, shower chair, grab bar.  DME owned (not currently used): none.  Upon discharge, patient will have assistance from  facility/wife.    Objective:     Communicated with nurse prior to session.  Patient found supine with telemetry, peripheral IV  upon PT entry to room.    General Precautions: Standard, fall  Orthopedic Precautions:    Braces:    Respiratory Status: Room air    Exams:  RLE ROM: WFL  RLE Strength: WFL  LLE ROM: WFL  LLE Strength: WFL    Functional Mobility:  Bed Mobility:     Supine to Sit: moderate assistance  Transfers:     Sit to Stand:  moderate assistance and of 2 persons with rolling walker  Gait: 10 ft RW and Min A x2   Balance: unsupported sitting balance, Mod A for standing balance      AM-PAC 6 CLICK MOBILITY  Total Score:12       Treatment & Education:  Pt was educated on the role of PT  for assessment, treatment with emphasis on safety and increased mobility and D/C  recommendations.     Patient left up in chair with all lines intact and call button in reach.    GOALS:   Multidisciplinary Problems       Physical Therapy Goals          Problem: Physical Therapy    Goal Priority Disciplines Outcome Goal Variances Interventions   Physical Therapy Goal     PT, PT/OT      Description: Goals to be met by: 4/3/2023     Patient will increase functional independence with mobility by performin. Supine to sit with MInimal Assistance  2. Sit to stand transfer with Minimal Assistance  3. Bed to chair transfer with Contact Guard Assistance using Rolling Walker  4. Gait  x 100  feet with Contact Guard Assistance using Rolling Walker.                          History:     Past Medical History:   Diagnosis Date    CHF (congestive heart failure)     Hypertension        Past Surgical History:   Procedure Laterality Date    CYSTOSCOPY W/ URETERAL STENT PLACEMENT N/A 3/2/2023    Procedure: CYSTOSCOPY, WITH URETERAL STENT INSERTION;  Surgeon: Yoshi Lange MD;  Location: [facility];  Service: Urology;  Laterality: N/A;    FRACTURE SURGERY      INTRAMEDULLARY RODDING OF TROCHANTER OF FEMUR Left 11/10/2022    Procedure:  INSERTION, ITRAMEDULLARY SANTI, FEMUR, TROCHANTER;  Surgeon: Richard Phoenix MD;  Location: [facility] OR;  Service: Orthopedics;  Laterality: Left;    REMOVAL, CALCULUS, BLADDER  3/2/2023    Procedure: REMOVAL, CALCULUS, BLADDER;  Surgeon: Yoshi Lange MD;  Location: [facility];  Service: Urology;;       Time Tracking:     PT Received On: 03/03/23  PT Start Time: 0910     PT Stop Time: 0923  PT Total Time (min): 13 min     Billable Minutes: Evaluation 5 minutes  and Gait Training 8 minutes      03/03/2023

## 2023-03-04 LAB
ALBUMIN SERPL BCP-MCNC: 2.2 G/DL (ref 3.5–5.2)
ALP SERPL-CCNC: 49 U/L (ref 55–135)
ALT SERPL W/O P-5'-P-CCNC: 14 U/L (ref 10–44)
ANION GAP SERPL CALC-SCNC: 8 MMOL/L (ref 8–16)
AST SERPL-CCNC: 21 U/L (ref 10–40)
BASOPHILS # BLD AUTO: 0.02 K/UL (ref 0–0.2)
BASOPHILS NFR BLD: 0.2 % (ref 0–1.9)
BILIRUB SERPL-MCNC: 0.4 MG/DL (ref 0.1–1)
BUN SERPL-MCNC: 12 MG/DL (ref 8–23)
CALCIUM SERPL-MCNC: 7.3 MG/DL (ref 8.7–10.5)
CHLORIDE SERPL-SCNC: 106 MMOL/L (ref 95–110)
CO2 SERPL-SCNC: 25 MMOL/L (ref 23–29)
CREAT SERPL-MCNC: 0.5 MG/DL (ref 0.5–1.4)
DIFFERENTIAL METHOD: ABNORMAL
EOSINOPHIL # BLD AUTO: 0 K/UL (ref 0–0.5)
EOSINOPHIL NFR BLD: 0.4 % (ref 0–8)
ERYTHROCYTE [DISTWIDTH] IN BLOOD BY AUTOMATED COUNT: 15.6 % (ref 11.5–14.5)
EST. GFR  (NO RACE VARIABLE): >60 ML/MIN/1.73 M^2
GLUCOSE SERPL-MCNC: 108 MG/DL (ref 70–110)
GLUCOSE SERPL-MCNC: 117 MG/DL (ref 70–110)
GLUCOSE SERPL-MCNC: 127 MG/DL (ref 70–110)
GLUCOSE SERPL-MCNC: 131 MG/DL (ref 70–110)
GLUCOSE SERPL-MCNC: 138 MG/DL (ref 70–110)
GLUCOSE SERPL-MCNC: 163 MG/DL (ref 70–110)
GLUCOSE SERPL-MCNC: 178 MG/DL (ref 70–110)
HCT VFR BLD AUTO: 34.5 % (ref 40–54)
HGB BLD-MCNC: 11.2 G/DL (ref 14–18)
IMM GRANULOCYTES # BLD AUTO: 0.03 K/UL (ref 0–0.04)
IMM GRANULOCYTES NFR BLD AUTO: 0.3 % (ref 0–0.5)
LYMPHOCYTES # BLD AUTO: 2 K/UL (ref 1–4.8)
LYMPHOCYTES NFR BLD: 20.3 % (ref 18–48)
MCH RBC QN AUTO: 29.8 PG (ref 27–31)
MCHC RBC AUTO-ENTMCNC: 32.5 G/DL (ref 32–36)
MCV RBC AUTO: 92 FL (ref 82–98)
MONOCYTES # BLD AUTO: 0.7 K/UL (ref 0.3–1)
MONOCYTES NFR BLD: 7.1 % (ref 4–15)
NEUTROPHILS # BLD AUTO: 7 K/UL (ref 1.8–7.7)
NEUTROPHILS NFR BLD: 71.7 % (ref 38–73)
NRBC BLD-RTO: 0 /100 WBC
PLATELET # BLD AUTO: 239 K/UL (ref 150–450)
PMV BLD AUTO: 9.1 FL (ref 9.2–12.9)
POTASSIUM SERPL-SCNC: 3.3 MMOL/L (ref 3.5–5.1)
PROT SERPL-MCNC: 4.9 G/DL (ref 6–8.4)
RBC # BLD AUTO: 3.76 M/UL (ref 4.6–6.2)
SODIUM SERPL-SCNC: 139 MMOL/L (ref 136–145)
WBC # BLD AUTO: 9.77 K/UL (ref 3.9–12.7)

## 2023-03-04 PROCEDURE — 63600175 PHARM REV CODE 636 W HCPCS: Performed by: INTERNAL MEDICINE

## 2023-03-04 PROCEDURE — 36415 COLL VENOUS BLD VENIPUNCTURE: CPT | Performed by: STUDENT IN AN ORGANIZED HEALTH CARE EDUCATION/TRAINING PROGRAM

## 2023-03-04 PROCEDURE — 80053 COMPREHEN METABOLIC PANEL: CPT | Performed by: STUDENT IN AN ORGANIZED HEALTH CARE EDUCATION/TRAINING PROGRAM

## 2023-03-04 PROCEDURE — 85025 COMPLETE CBC W/AUTO DIFF WBC: CPT | Performed by: STUDENT IN AN ORGANIZED HEALTH CARE EDUCATION/TRAINING PROGRAM

## 2023-03-04 PROCEDURE — 25000003 PHARM REV CODE 250: Performed by: STUDENT IN AN ORGANIZED HEALTH CARE EDUCATION/TRAINING PROGRAM

## 2023-03-04 PROCEDURE — 97530 THERAPEUTIC ACTIVITIES: CPT

## 2023-03-04 PROCEDURE — 12000002 HC ACUTE/MED SURGE SEMI-PRIVATE ROOM

## 2023-03-04 PROCEDURE — 25000003 PHARM REV CODE 250: Performed by: INTERNAL MEDICINE

## 2023-03-04 RX ORDER — SODIUM,POTASSIUM PHOSPHATES 280-250MG
2 POWDER IN PACKET (EA) ORAL
Status: DISCONTINUED | OUTPATIENT
Start: 2023-03-04 | End: 2023-03-08 | Stop reason: HOSPADM

## 2023-03-04 RX ORDER — POTASSIUM CHLORIDE 20 MEQ/1
40 TABLET, EXTENDED RELEASE ORAL ONCE
Status: COMPLETED | OUTPATIENT
Start: 2023-03-04 | End: 2023-03-04

## 2023-03-04 RX ORDER — LANOLIN ALCOHOL/MO/W.PET/CERES
800 CREAM (GRAM) TOPICAL
Status: DISCONTINUED | OUTPATIENT
Start: 2023-03-04 | End: 2023-03-08 | Stop reason: HOSPADM

## 2023-03-04 RX ADMIN — POTASSIUM CHLORIDE 20 MEQ: 1500 TABLET, EXTENDED RELEASE ORAL at 09:03

## 2023-03-04 RX ADMIN — DOCUSATE SODIUM 100 MG: 100 CAPSULE, LIQUID FILLED ORAL at 09:03

## 2023-03-04 RX ADMIN — PANTOPRAZOLE SODIUM 40 MG: 40 TABLET, DELAYED RELEASE ORAL at 05:03

## 2023-03-04 RX ADMIN — DOCUSATE SODIUM 100 MG: 100 CAPSULE, LIQUID FILLED ORAL at 08:03

## 2023-03-04 RX ADMIN — HYDROCODONE BITARTRATE AND ACETAMINOPHEN 1 TABLET: 10; 325 TABLET ORAL at 04:03

## 2023-03-04 RX ADMIN — POTASSIUM CHLORIDE 40 MEQ: 1500 TABLET, EXTENDED RELEASE ORAL at 03:03

## 2023-03-04 RX ADMIN — DULOXETINE 30 MG: 30 CAPSULE, DELAYED RELEASE ORAL at 09:03

## 2023-03-04 RX ADMIN — CEFTRIAXONE 2 G: 2 INJECTION, SOLUTION INTRAVENOUS at 05:03

## 2023-03-04 RX ADMIN — ATORVASTATIN CALCIUM 40 MG: 40 TABLET, FILM COATED ORAL at 08:03

## 2023-03-04 RX ADMIN — TAMSULOSIN HYDROCHLORIDE 0.8 MG: 0.4 CAPSULE ORAL at 09:03

## 2023-03-04 RX ADMIN — APIXABAN 5 MG: 5 TABLET, FILM COATED ORAL at 09:03

## 2023-03-04 RX ADMIN — APIXABAN 5 MG: 5 TABLET, FILM COATED ORAL at 08:03

## 2023-03-04 RX ADMIN — CYPROHEPTADINE HYDROCHLORIDE 4 MG: 4 TABLET ORAL at 08:03

## 2023-03-04 RX ADMIN — POLYETHYLENE GLYCOL 3350 17 G: 17 POWDER, FOR SOLUTION ORAL at 09:03

## 2023-03-04 RX ADMIN — Medication 6 MG: at 12:03

## 2023-03-04 RX ADMIN — TRAZODONE HYDROCHLORIDE 50 MG: 50 TABLET ORAL at 08:03

## 2023-03-04 RX ADMIN — MAGNESIUM OXIDE 400 MG (241.3 MG MAGNESIUM) TABLET 400 MG: at 09:03

## 2023-03-04 RX ADMIN — ACETAMINOPHEN 650 MG: 325 TABLET ORAL at 09:03

## 2023-03-04 RX ADMIN — METOPROLOL TARTRATE 25 MG: 25 TABLET, FILM COATED ORAL at 09:03

## 2023-03-04 RX ADMIN — CYPROHEPTADINE HYDROCHLORIDE 4 MG: 4 TABLET ORAL at 03:03

## 2023-03-04 RX ADMIN — DULOXETINE 30 MG: 30 CAPSULE, DELAYED RELEASE ORAL at 08:03

## 2023-03-04 RX ADMIN — AMIODARONE HYDROCHLORIDE 100 MG: 100 TABLET ORAL at 06:03

## 2023-03-04 RX ADMIN — CYPROHEPTADINE HYDROCHLORIDE 4 MG: 4 TABLET ORAL at 09:03

## 2023-03-04 RX ADMIN — METOPROLOL TARTRATE 25 MG: 25 TABLET, FILM COATED ORAL at 08:03

## 2023-03-04 RX ADMIN — POLYETHYLENE GLYCOL 3350 17 G: 17 POWDER, FOR SOLUTION ORAL at 08:03

## 2023-03-04 RX ADMIN — TOPIRAMATE 50 MG: 25 TABLET, FILM COATED ORAL at 09:03

## 2023-03-04 NOTE — PROGRESS NOTES
[facility] Medicine  Progress Note    Patient name: Hiro Rodríguez  MRN: [id number]  Admit Date: 3/1/2023   LOS: 2 days     SUBJECTIVE:     Principal problem: Bacteremia due to Escherichia coli    Interval History:  No acute overnight events reported.  Experienced left hip pain when working with PT but otherwise stable chronic back pain.  Afebrile overnight    Summary:   77-year-old  male with paroxysmal atrial fibrillation, type 2 DM, hyperlipidemia sent to the ED from cardiologist's office secondary to AFib with RVR and hypotension.  Further workup revealed UTI which was thought to be the trigger for RVR.  Initiated on cefepime which has been switched to ceftriaxone.  Urine and blood cultures growing E coli.  Imaging revealed moderate left hydroureteronephrosis secondary to at least 2 calculi in the midportion of the left ureter.  Seen by Urology and underwent cystoscopy with left ureteral stent placement and removal of bladder stone.  We will need outpatient urology follow up for tentative ureteral stone management.    Seen by PT/OT who recommended rehab at discharge.    Scheduled Meds:   amiodarone  100 mg Oral QAM    apixaban  5 mg Oral BID    atorvastatin  40 mg Oral Daily    cefTRIAXone (ROCEPHIN) IVPB  2 g Intravenous Q24H    cyproheptadine  4 mg Oral TID    docusate sodium  100 mg Oral BID    DULoxetine  30 mg Oral BID    magnesium oxide  400 mg Oral Daily    metoprolol tartrate  25 mg Oral BID    pantoprazole  40 mg Oral BID    polyethylene glycol  17 g Oral BID    potassium chloride  20 mEq Oral Daily    tamsulosin  0.8 mg Oral Daily    topiramate  50 mg Oral Daily    traZODone  50 mg Oral QHS     Continuous Infusions:  PRN Meds:acetaminophen, acetaminophen, dextrose 10%, dextrose 10%, glucagon (human recombinant), glucose, glucose, HYDROcodone-acetaminophen, insulin aspart U-100, melatonin, ondansetron, phenazopyridine, sodium chloride 0.9%    Review of patient's  allergies indicates:  Not on File    Review of Systems: As per interval history    OBJECTIVE:     Vital Signs (Most Recent)  Temp: 97.4 °F (36.3 °C) (03/03/23 1658)  Pulse: 89 (03/03/23 1658)  Resp: 18 (03/03/23 1658)  BP: 135/82 (03/03/23 1658)  SpO2: (!) 94 % (03/03/23 1658)    Vital Signs Range (Last 24H):  Temp:  [97.4 °F (36.3 °C)-98 °F (36.7 °C)]   Pulse:  [82-93]   Resp:  [16-20]   BP: (108-144)/(76-93)   SpO2:  [94 %-97 %]     I & O (Last 24H):No intake or output data in the 24 hours ending 03/03/23 1841      Physical Exam:  General: Patient resting comfortably in no acute distress. Appears as stated age. Calm  Eyes: No conjunctival injection. No scleral icterus.  ENT:  Hard of hearing.  No discharge from ears. No nasal discharge.   Neck: Supple, trachea midline. No JVD  CVS:  Irregularly irregular.  Rate controlled.  No LE edema BL  Lungs:  No tachypnea or accessory muscle use.  Clear to auscultation bilaterally  Abdomen:  Soft, nontender and nondistended.  No organomegaly  Neuro: AOx3. Moves all extremities. Follows commands. Responds appropriately   Skin:  No rash or erythema noted  :  Sotelo catheter    Laboratory:  I have reviewed all pertinent lab results within the past 24 hours.  CBC:   Recent Labs   Lab 03/03/23  0621   WBC 10.12   RBC 3.73*   HGB 11.2*   HCT 34.0*      MCV 91   MCH 30.0   MCHC 32.9       CMP:   Recent Labs   Lab 03/03/23  0621   GLU 96   CALCIUM 7.2*   ALBUMIN 2.1*   PROT 4.9*      K 3.6   CO2 29      BUN 14   CREATININE 0.7   ALKPHOS 52*   ALT 10   AST 16   BILITOT 0.5         Diagnostic Results:  Labs: Reviewed  CT: Reviewed    ASSESSMENT/PLAN:       Active Hospital Problems    Diagnosis  POA    *Bacteremia due to Escherichia coli [R78.81, B96.20]  Yes    Hydronephrosis of left kidney [N13.30]  Yes    Pyelonephritis of left kidney [N12]  Yes    Hypokalemia [E87.6]  Yes    Urinary retention [R33.9]  Yes    Hearing loss [H91.90]  Yes    Type 2 diabetes mellitus  with diabetic polyneuropathy, without long-term current use of insulin [E11.42]  Yes    Hyperlipidemia [E78.5]  Yes    Paroxysmal atrial fibrillation [I48.0]  Yes    Hypertension, essential [I10]  Yes      Resolved Hospital Problems   No resolved problems to display.         Plan:   Bacteremia from E coli likely secondary to UTI and pyelonephritis  Change antibiotics to IV ceftriaxone 2 grams Q 24 hours - needs at least 10 days   Status post left ureteral stent placement and bladder stone removal on 03/02.  Needs outpatient Urology follow-up for definitive stone management    AFib with RVR - likely triggered from bacteremia   Currently rates are controlled  Continue metoprolol tartrate and amiodarone   Apixaban for anticoagulation    BPH with LUTS   Continue tamsulosin  Currently has Sotelo catheter     Hypokalemia noted - replete and monitor per protocol    Continue home medications for chronic medical conditions  Seen by PT/OT for weakness; recommended rehabilitation at discharge      VTE Risk Mitigation (From admission, onward)           Ordered     apixaban tablet 5 mg  2 times daily         03/01/23 1942     Reason for No Pharmacological VTE Prophylaxis  Once        Question:  Reasons:  Answer:  Already adequately anticoagulated on oral Anticoagulants    03/01/23 1942     IP VTE HIGH RISK PATIENT  Once         03/01/23 1942     Place sequential compression device  Until discontinued         03/01/23 1942                        Department Hospital Medicine  [facility]  Orlando Gonzalez MD  Date of service: 03/03/2023

## 2023-03-04 NOTE — PLAN OF CARE
Problem: Physical Therapy  Goal: Physical Therapy Goal  Description: Goals to be met by: 4/3/2023     Patient will increase functional independence with mobility by performin. Supine to sit with MInimal Assistance  2. Sit to stand transfer with Minimal Assistance  3. Bed to chair transfer with Contact Guard Assistance using Rolling Walker  4. Gait  x 100  feet with Contact Guard Assistance using Rolling Walker.     Outcome: Ongoing, Progressing

## 2023-03-04 NOTE — PROGRESS NOTES
[facility] Medicine  Progress Note    Patient name: Hiro Rodríguez  MRN: [id number]  Admit Date: 3/1/2023   LOS: 3 days     SUBJECTIVE:     Principal problem: Bacteremia due to Escherichia coli    Interval History:  No acute overnight events reported.  Resting comfortably. CM kindly updated that he has been accepted by facility but we have not receive insurance authorization for placement at rehab.     Summary:   77-year-old  male with paroxysmal atrial fibrillation, type 2 DM, hyperlipidemia sent to the ED from cardiologist's office secondary to AFib with RVR and hypotension.  Further workup revealed UTI which was thought to be the trigger for RVR.  Initiated on cefepime which has been switched to ceftriaxone.  Urine and blood cultures growing E coli.  Imaging revealed moderate left hydroureteronephrosis secondary to at least 2 calculi in the midportion of the left ureter.  Seen by Urology and underwent cystoscopy with left ureteral stent placement and removal of bladder stone.  We will need outpatient urology follow up for tentative ureteral stone management.    Seen by PT/OT who recommended rehab at discharge.  CM consulted and  placement is in process.    Scheduled Meds:   amiodarone  100 mg Oral QAM    apixaban  5 mg Oral BID    atorvastatin  40 mg Oral Daily    cefTRIAXone (ROCEPHIN) IVPB  2 g Intravenous Q24H    cyproheptadine  4 mg Oral TID    docusate sodium  100 mg Oral BID    DULoxetine  30 mg Oral BID    magnesium oxide  400 mg Oral Daily    metoprolol tartrate  25 mg Oral BID    pantoprazole  40 mg Oral BID    polyethylene glycol  17 g Oral BID    potassium chloride  20 mEq Oral Daily    tamsulosin  0.8 mg Oral Daily    topiramate  50 mg Oral Daily    traZODone  50 mg Oral QHS     Continuous Infusions:  PRN Meds:acetaminophen, acetaminophen, dextrose 10%, dextrose 10%, glucagon (human recombinant), glucose, glucose, HYDROcodone-acetaminophen, insulin aspart U-100,  melatonin, ondansetron, phenazopyridine, sodium chloride 0.9%    Review of patient's allergies indicates:  Not on File    Review of Systems: As per interval history    OBJECTIVE:     Vital Signs (Most Recent)  Temp: 98.3 °F (36.8 °C) (03/04/23 1128)  Pulse: 88 (03/04/23 1128)  Resp: 18 (03/04/23 1128)  BP: 110/74 (03/04/23 1128)  SpO2: 97 % (03/04/23 1128)    Vital Signs Range (Last 24H):  Temp:  [97.4 °F (36.3 °C)-98.3 °F (36.8 °C)]   Pulse:  [80-89]   Resp:  [17-20]   BP: (110-137)/(67-96)   SpO2:  [94 %-97 %]     I & O (Last 24H):  Intake/Output Summary (Last 24 hours) at 3/4/2023 1509  Last data filed at 3/4/2023 0614  Gross per 24 hour   Intake 240 ml   Output --   Net 240 ml         Physical Exam:  General: Patient resting comfortably in no acute distress. Appears as stated age. Calm  Eyes: No conjunctival injection. No scleral icterus.  ENT:  Hard of hearing.  No discharge from ears. No nasal discharge.   Neck: Supple, trachea midline. No JVD  CVS:  No LE edema BL  Lungs:  No tachypnea or accessory muscle use.   Abdomen:  Nondistended  Neuro: AOx3. Moves all extremities. Follows commands. Responds appropriately   Skin:  No rash or erythema noted  :  Sotelo catheter    Laboratory:  I have reviewed all pertinent lab results within the past 24 hours.  CBC:   Recent Labs   Lab 03/04/23  0759   WBC 9.77   RBC 3.76*   HGB 11.2*   HCT 34.5*      MCV 92   MCH 29.8   MCHC 32.5       CMP:   Recent Labs   Lab 03/04/23  0759   *   CALCIUM 7.3*   ALBUMIN 2.2*   PROT 4.9*      K 3.3*   CO2 25      BUN 12   CREATININE 0.5   ALKPHOS 49*   ALT 14   AST 21   BILITOT 0.4         Diagnostic Results:  Labs: Reviewed  CT: Reviewed    ASSESSMENT/PLAN:       Active Hospital Problems    Diagnosis  POA    *Bacteremia due to Escherichia coli [R78.81, B96.20]  Yes    Hydronephrosis of left kidney [N13.30]  Yes    Pyelonephritis of left kidney [N12]  Yes    Hypokalemia [E87.6]  Yes    Urinary retention [R33.9]   Yes    Hearing loss [H91.90]  Yes    Type 2 diabetes mellitus with diabetic polyneuropathy, without long-term current use of insulin [E11.42]  Yes    Hyperlipidemia [E78.5]  Yes    Paroxysmal atrial fibrillation [I48.0]  Yes    Hypertension, essential [I10]  Yes      Resolved Hospital Problems   No resolved problems to display.         Plan:   Bacteremia from E coli likely secondary to UTI and pyelonephritis  Change antibiotics to IV ceftriaxone 2 grams Q 24 hours - needs at least 10 days   Status post left ureteral stent placement and bladder stone removal on 03/02.  Needs outpatient Urology follow-up for definitive stone management    AFib with RVR - likely triggered from bacteremia   Currently rates are controlled  Continue metoprolol tartrate and amiodarone   Apixaban for anticoagulation    BPH with LUTS   Continue tamsulosin  Currently has Sotelo catheter     Hypokalemia noted - replete and monitor per protocol    Continue home medications for chronic medical conditions  Seen by PT/OT for weakness; recommended rehabilitation at discharge. CM kindly arranging.  He has been accepted by the facility but waiting on insurance authorization.      VTE Risk Mitigation (From admission, onward)           Ordered     apixaban tablet 5 mg  2 times daily         03/01/23 1942     Reason for No Pharmacological VTE Prophylaxis  Once        Question:  Reasons:  Answer:  Already adequately anticoagulated on oral Anticoagulants    03/01/23 1942     IP VTE HIGH RISK PATIENT  Once         03/01/23 1942     Place sequential compression device  Until discontinued         03/01/23 1942                        Department Hospital Medicine  [facility]  Danielle Lugo MD  Date of service: 03/04/2023

## 2023-03-04 NOTE — PLAN OF CARE
Problem: Infection  Goal: Absence of Infection Signs and Symptoms  Outcome: Ongoing, Progressing     Problem: Adult Inpatient Plan of Care  Goal: Plan of Care Review  Outcome: Ongoing, Progressing  Goal: Patient-Specific Goal (Individualized)  Outcome: Ongoing, Progressing  Goal: Optimal Comfort and Wellbeing  Outcome: Ongoing, Progressing  Goal: Readiness for Transition of Care  Outcome: Ongoing, Progressing

## 2023-03-04 NOTE — PLAN OF CARE
Dr. Lugo requesting update on authorization.  Called Swati Jiang with [facility] and left message requesting return call.

## 2023-03-04 NOTE — PT/OT/SLP PROGRESS
Physical Therapy Treatment    Patient Name:  Hiro Rodríguez   MRN:  [id number]    Recommendations:     Discharge Recommendations: rehabilitation facility  Discharge Equipment Recommendations: none  Barriers to discharge: None    Assessment:     Hiro Rodríguez is a 78 y.o. male admitted with a medical diagnosis of Bacteremia due to Escherichia coli.  He presents with the following impairments/functional limitations: weakness, impaired endurance, impaired functional mobility, gait instability, impaired balance, decreased upper extremity function, decreased lower extremity function, decreased safety awareness, pain. Patient is agreeable to participation with PT treatment with motivation provided by PT and RN. He reports 7/10 LLE pain at rest, but declines need for pain medication. He requires MaxA for supine to sit and ModA for sit to stand with RW. He performed a step transfer from EOB to WC with Min-ModA and RW. He agrees to remain sitting up in WC with all needs met and RN notified.     Rehab Prognosis: Good; patient would benefit from acute skilled PT services to address these deficits and reach maximum level of function.    Recent Surgery: Procedure(s) (LRB):  CYSTOSCOPY, WITH URETERAL STENT INSERTION (N/A)  REMOVAL, CALCULUS, BLADDER 2 Days Post-Op    Plan:     During this hospitalization, patient to be seen 6 x/week to address the identified rehab impairments via gait training, therapeutic activities, therapeutic exercises and progress toward the following goals:    Plan of Care Expires:  04/03/23    Subjective     Chief Complaint: LLE pain   Patient/Family Comments/goals: agrees to sit up in WC after RN states that spouse called and said to make sure he gets up to WC  Pain/Comfort:  Pain Rating 1: 7/10  Location - Side 1: Left  Location 1: leg  Pain Addressed 1: Nurse notified      Objective:     Communicated with VERONICA Calderón prior to session.  Patient found HOB elevated with bed alarm, telemetry upon PT  entry to room.     General Precautions: Standard, fall  Orthopedic Precautions: N/A  Braces: N/A  Respiratory Status: Room air     Functional Mobility:  Bed Mobility:     Supine to Sit: maximal assistance  Transfers:     Sit to Stand:  moderate assistance with rolling walker  Bed to Chair: minimum assistance and moderate assistance with  rolling walker  using  Step Transfer      AM-PAC 6 CLICK MOBILITY  Turning over in bed (including adjusting bedclothes, sheets and blankets)?: 2  Sitting down on and standing up from a chair with arms (e.g., wheelchair, bedside commode, etc.): 2  Moving from lying on back to sitting on the side of the bed?: 2  Moving to and from a bed to a chair (including a wheelchair)?: 2  Need to walk in hospital room?: 2  Climbing 3-5 steps with a railing?: 1  Basic Mobility Total Score: 11       Treatment & Education:  Patient was educated on the importance of OOB activity and functional mobility to negate negative effects of prolonged bed rest during hospitalization, safe transfers and ambulation, and D/C planning     Patient left up in chair with all lines intact, call button in reach, and RN notified..    GOALS:   Multidisciplinary Problems       Physical Therapy Goals          Problem: Physical Therapy    Goal Priority Disciplines Outcome Goal Variances Interventions   Physical Therapy Goal     PT, PT/OT Ongoing, Progressing     Description: Goals to be met by: 4/3/2023     Patient will increase functional independence with mobility by performin. Supine to sit with MInimal Assistance  2. Sit to stand transfer with Minimal Assistance  3. Bed to chair transfer with Contact Guard Assistance using Rolling Walker  4. Gait  x 100  feet with Contact Guard Assistance using Rolling Walker.                          Time Tracking:     PT Received On: 23  PT Start Time: 1041     PT Stop Time: 1055  PT Total Time (min): 14 min     Billable Minutes: Therapeutic Activity 14    Treatment Type:  Treatment  PT/PTA: PT     PTA Visit Number: 0     03/04/2023

## 2023-03-05 LAB
BACTERIA BLD CULT: ABNORMAL
GLUCOSE SERPL-MCNC: 113 MG/DL (ref 70–110)
GLUCOSE SERPL-MCNC: 121 MG/DL (ref 70–110)
GLUCOSE SERPL-MCNC: 125 MG/DL (ref 70–110)
GLUCOSE SERPL-MCNC: 127 MG/DL (ref 70–110)
MAGNESIUM SERPL-MCNC: 1.7 MG/DL (ref 1.6–2.6)
POTASSIUM SERPL-SCNC: 3.8 MMOL/L (ref 3.5–5.1)

## 2023-03-05 PROCEDURE — 25000003 PHARM REV CODE 250: Performed by: INTERNAL MEDICINE

## 2023-03-05 PROCEDURE — 83735 ASSAY OF MAGNESIUM: CPT | Performed by: INTERNAL MEDICINE

## 2023-03-05 PROCEDURE — 12000002 HC ACUTE/MED SURGE SEMI-PRIVATE ROOM

## 2023-03-05 PROCEDURE — 63600175 PHARM REV CODE 636 W HCPCS: Performed by: INTERNAL MEDICINE

## 2023-03-05 PROCEDURE — 25000003 PHARM REV CODE 250: Performed by: STUDENT IN AN ORGANIZED HEALTH CARE EDUCATION/TRAINING PROGRAM

## 2023-03-05 PROCEDURE — 84132 ASSAY OF SERUM POTASSIUM: CPT | Performed by: INTERNAL MEDICINE

## 2023-03-05 PROCEDURE — 36415 COLL VENOUS BLD VENIPUNCTURE: CPT | Performed by: INTERNAL MEDICINE

## 2023-03-05 RX ADMIN — CEFTRIAXONE 2 G: 2 INJECTION, SOLUTION INTRAVENOUS at 04:03

## 2023-03-05 RX ADMIN — MAGNESIUM OXIDE 400 MG (241.3 MG MAGNESIUM) TABLET 400 MG: at 09:03

## 2023-03-05 RX ADMIN — DOCUSATE SODIUM 100 MG: 100 CAPSULE, LIQUID FILLED ORAL at 08:03

## 2023-03-05 RX ADMIN — ACETAMINOPHEN 650 MG: 325 TABLET ORAL at 09:03

## 2023-03-05 RX ADMIN — POTASSIUM CHLORIDE 20 MEQ: 1500 TABLET, EXTENDED RELEASE ORAL at 09:03

## 2023-03-05 RX ADMIN — CYPROHEPTADINE HYDROCHLORIDE 4 MG: 4 TABLET ORAL at 09:03

## 2023-03-05 RX ADMIN — MAGNESIUM OXIDE 400 MG (241.3 MG MAGNESIUM) TABLET 800 MG: at 09:03

## 2023-03-05 RX ADMIN — POLYETHYLENE GLYCOL 3350 17 G: 17 POWDER, FOR SOLUTION ORAL at 09:03

## 2023-03-05 RX ADMIN — DOCUSATE SODIUM 100 MG: 100 CAPSULE, LIQUID FILLED ORAL at 09:03

## 2023-03-05 RX ADMIN — POLYETHYLENE GLYCOL 3350 17 G: 17 POWDER, FOR SOLUTION ORAL at 08:03

## 2023-03-05 RX ADMIN — TAMSULOSIN HYDROCHLORIDE 0.8 MG: 0.4 CAPSULE ORAL at 09:03

## 2023-03-05 RX ADMIN — METOPROLOL TARTRATE 25 MG: 25 TABLET, FILM COATED ORAL at 08:03

## 2023-03-05 RX ADMIN — PANTOPRAZOLE SODIUM 40 MG: 40 TABLET, DELAYED RELEASE ORAL at 06:03

## 2023-03-05 RX ADMIN — PANTOPRAZOLE SODIUM 40 MG: 40 TABLET, DELAYED RELEASE ORAL at 04:03

## 2023-03-05 RX ADMIN — DULOXETINE 30 MG: 30 CAPSULE, DELAYED RELEASE ORAL at 09:03

## 2023-03-05 RX ADMIN — DULOXETINE 30 MG: 30 CAPSULE, DELAYED RELEASE ORAL at 08:03

## 2023-03-05 RX ADMIN — CYPROHEPTADINE HYDROCHLORIDE 4 MG: 4 TABLET ORAL at 08:03

## 2023-03-05 RX ADMIN — TRAZODONE HYDROCHLORIDE 50 MG: 50 TABLET ORAL at 08:03

## 2023-03-05 RX ADMIN — AMIODARONE HYDROCHLORIDE 100 MG: 100 TABLET ORAL at 06:03

## 2023-03-05 RX ADMIN — Medication 6 MG: at 08:03

## 2023-03-05 RX ADMIN — ATORVASTATIN CALCIUM 40 MG: 40 TABLET, FILM COATED ORAL at 08:03

## 2023-03-05 RX ADMIN — TOPIRAMATE 50 MG: 25 TABLET, FILM COATED ORAL at 09:03

## 2023-03-05 RX ADMIN — Medication 6 MG: at 01:03

## 2023-03-05 RX ADMIN — APIXABAN 5 MG: 5 TABLET, FILM COATED ORAL at 09:03

## 2023-03-05 RX ADMIN — METOPROLOL TARTRATE 25 MG: 25 TABLET, FILM COATED ORAL at 09:03

## 2023-03-05 RX ADMIN — CYPROHEPTADINE HYDROCHLORIDE 4 MG: 4 TABLET ORAL at 03:03

## 2023-03-05 RX ADMIN — APIXABAN 5 MG: 5 TABLET, FILM COATED ORAL at 08:03

## 2023-03-05 NOTE — PLAN OF CARE
Problem: Infection  Goal: Absence of Infection Signs and Symptoms  Outcome: Unable to Meet, Plan Revised     Problem: Adult Inpatient Plan of Care  Goal: Plan of Care Review  Outcome: Unable to Meet, Plan Revised  Goal: Patient-Specific Goal (Individualized)  Outcome: Unable to Meet, Plan Revised  Goal: Absence of Hospital-Acquired Illness or Injury  Outcome: Unable to Meet, Plan Revised  Goal: Optimal Comfort and Wellbeing  Outcome: Unable to Meet, Plan Revised  Goal: Readiness for Transition of Care  Outcome: Unable to Meet, Plan Revised     Problem: Adjustment to Illness (Sepsis/Septic Shock)  Goal: Optimal Coping  Outcome: Unable to Meet, Plan Revised     Problem: Bleeding (Sepsis/Septic Shock)  Goal: Absence of Bleeding  Outcome: Unable to Meet, Plan Revised     Problem: Glycemic Control Impaired (Sepsis/Septic Shock)  Goal: Blood Glucose Level Within Desired Range  Outcome: Unable to Meet, Plan Revised     Problem: Infection Progression (Sepsis/Septic Shock)  Goal: Absence of Infection Signs and Symptoms  Outcome: Unable to Meet, Plan Revised     Problem: Nutrition Impaired (Sepsis/Septic Shock)  Goal: Optimal Nutrition Intake  Outcome: Unable to Meet, Plan Revised     Problem: Impaired Wound Healing  Goal: Optimal Wound Healing  Outcome: Unable to Meet, Plan Revised     Problem: Diabetes Comorbidity  Goal: Blood Glucose Level Within Targeted Range  Outcome: Unable to Meet, Plan Revised     Problem: Skin Injury Risk Increased  Goal: Skin Health and Integrity  Outcome: Unable to Meet, Plan Revised     Problem: UTI (Urinary Tract Infection)  Goal: Improved Infection Symptoms  Outcome: Unable to Meet, Plan Revised

## 2023-03-05 NOTE — PLAN OF CARE
Problem: Infection  Goal: Absence of Infection Signs and Symptoms  Outcome: Ongoing, Progressing     Problem: Adult Inpatient Plan of Care  Goal: Plan of Care Review  Outcome: Ongoing, Progressing  Goal: Patient-Specific Goal (Individualized)  Outcome: Ongoing, Progressing  Goal: Absence of Hospital-Acquired Illness or Injury  Outcome: Ongoing, Progressing  Goal: Optimal Comfort and Wellbeing  Outcome: Ongoing, Progressing  Goal: Readiness for Transition of Care  Outcome: Ongoing, Progressing      home

## 2023-03-05 NOTE — PLAN OF CARE
Late Entry 3/4/23:    Received call from Swati Jiang with [facility].  Referral was sent Friday afternoon; therefore, authorization was not received.  Swati will follow up on Monday.

## 2023-03-05 NOTE — PROGRESS NOTES
[facility] Medicine  Progress Note    Patient name: Hiro Rodríguez  MRN: [id number]  Admit Date: 3/1/2023   LOS: 4 days     SUBJECTIVE:     Principal problem: Bacteremia due to Escherichia coli    Interval History:  No acute overnight events reported.  Resting comfortably. CM kindly updated that he has been accepted by facility but we have not receive insurance authorization for placement at rehab. We discussed the Ozempic he was previously on- he questions if this could have caused some of his recent issues such as infection, N/V, Renal issues. Per my review, it definitely can cause GI symptoms and KAM.  No present KAM.    Summary:   77-year-old  male with paroxysmal atrial fibrillation, type 2 DM, hyperlipidemia sent to the ED from cardiologist's office secondary to AFib with RVR and hypotension.  Further workup revealed UTI which was thought to be the trigger for RVR.  Initiated on cefepime which has been switched to ceftriaxone.  Urine and blood cultures growing E coli.  Imaging revealed moderate left hydroureteronephrosis secondary to at least 2 calculi in the midportion of the left ureter.  Seen by Urology and underwent cystoscopy with left ureteral stent placement and removal of bladder stone.  We will need outpatient urology with Dr. Lange once out of rehab for definitive stone management.    Seen by PT/OT who recommended rehab at discharge.  CM consulted and  placement is in process.    Scheduled Meds:   amiodarone  100 mg Oral QAM    apixaban  5 mg Oral BID    atorvastatin  40 mg Oral Daily    cefTRIAXone (ROCEPHIN) IVPB  2 g Intravenous Q24H    cyproheptadine  4 mg Oral TID    docusate sodium  100 mg Oral BID    DULoxetine  30 mg Oral BID    magnesium oxide  400 mg Oral Daily    metoprolol tartrate  25 mg Oral BID    pantoprazole  40 mg Oral BID    polyethylene glycol  17 g Oral BID    potassium chloride  20 mEq Oral Daily    tamsulosin  0.8 mg Oral Daily     topiramate  50 mg Oral Daily    traZODone  50 mg Oral QHS     Continuous Infusions:  PRN Meds:acetaminophen, acetaminophen, dextrose 10%, dextrose 10%, glucagon (human recombinant), glucose, glucose, HYDROcodone-acetaminophen, insulin aspart U-100, magnesium oxide, magnesium oxide, melatonin, ondansetron, phenazopyridine, potassium bicarbonate, potassium bicarbonate, potassium bicarbonate, potassium, sodium phosphates, potassium, sodium phosphates, potassium, sodium phosphates, sodium chloride 0.9%    Review of patient's allergies indicates:  Not on File    Review of Systems: As per interval history    OBJECTIVE:     Vital Signs (Most Recent)  Temp: 97.5 °F (36.4 °C) (03/05/23 0736)  Pulse: 61 (03/05/23 1223)  Resp: 18 (03/05/23 1223)  BP: (!) 136/94 (notified nurse ) (03/05/23 1223)  SpO2: 95 % (03/05/23 1223)    Vital Signs Range (Last 24H):  Temp:  [97.5 °F (36.4 °C)-98.1 °F (36.7 °C)]   Pulse:  [61-96]   Resp:  [18]   BP: (133-148)/(71-96)   SpO2:  [95 %-97 %]     I & O (Last 24H):  Intake/Output Summary (Last 24 hours) at 3/5/2023 1247  Last data filed at 3/5/2023 0544  Gross per 24 hour   Intake 240 ml   Output --   Net 240 ml         Physical Exam:  General: Patient resting comfortably in no acute distress. Appears as stated age. Calm  Eyes: No conjunctival injection. No scleral icterus.  ENT:  Hard of hearing.  No discharge from ears. No nasal discharge.   Neck: Supple, trachea midline. No JVD  CVS:  No LE edema BL  Lungs:  No tachypnea or accessory muscle use.   Abdomen:  Nondistended  Neuro: AOx3. Moves all extremities. Follows commands. Responds appropriately   Skin:  No rash or erythema noted  :  Sotelo catheter    Laboratory:  I have reviewed all pertinent lab results within the past 24 hours.  CBC:   Recent Labs   Lab 03/04/23  0759   WBC 9.77   RBC 3.76*   HGB 11.2*   HCT 34.5*      MCV 92   MCH 29.8   MCHC 32.5       CMP:   Recent Labs   Lab 03/04/23  0759 03/05/23  0501   *  --     CALCIUM 7.3*  --    ALBUMIN 2.2*  --    PROT 4.9*  --      --    K 3.3* 3.8   CO2 25  --      --    BUN 12  --    CREATININE 0.5  --    ALKPHOS 49*  --    ALT 14  --    AST 21  --    BILITOT 0.4  --          Diagnostic Results:  Labs: Reviewed  CT: Reviewed    ASSESSMENT/PLAN:       Active Hospital Problems    Diagnosis  POA    *Bacteremia due to Escherichia coli [R78.81, B96.20]  Yes    Hydronephrosis of left kidney [N13.30]  Yes    Pyelonephritis of left kidney [N12]  Yes    Hypokalemia [E87.6]  Yes    Urinary retention [R33.9]  Yes    Hearing loss [H91.90]  Yes    Type 2 diabetes mellitus with diabetic polyneuropathy, without long-term current use of insulin [E11.42]  Yes    Hyperlipidemia [E78.5]  Yes    Paroxysmal atrial fibrillation [I48.0]  Yes    Hypertension, essential [I10]  Yes      Resolved Hospital Problems   No resolved problems to display.         Plan:   Bacteremia from E coli likely secondary to UTI and pyelonephritis  Change antibiotics to IV ceftriaxone 2 grams Q 24 hours - needs at least 10 days.  Could transition to bactrim as an oral alternative if facility not able to accommodate Rocephin.  Status post left ureteral stent placement and bladder stone removal on 03/02.  Needs outpatient Urology follow-up with Dr. Lange after rehab for definitive stone management    AFib with RVR - likely triggered from bacteremia   Currently rates are controlled  Continue metoprolol tartrate and amiodarone   Apixaban for anticoagulation    BPH with LUTS   Continue tamsulosin  Currently has Sotelo catheter     Hypokalemia noted - replete and monitor per protocol    Continue home medications for chronic medical conditions  Seen by PT/OT for weakness; recommended rehabilitation at discharge. WOO kindly arranging.  He has been accepted by the facility but waiting on insurance authorization.      VTE Risk Mitigation (From admission, onward)           Ordered     apixaban tablet 5 mg  2 times daily          03/01/23 1942     Reason for No Pharmacological VTE Prophylaxis  Once        Question:  Reasons:  Answer:  Already adequately anticoagulated on oral Anticoagulants    03/01/23 1942     IP VTE HIGH RISK PATIENT  Once         03/01/23 1942     Place sequential compression device  Until discontinued         03/01/23 1942                        Department Hospital Medicine  On license of [facility]  Danielle Lugo MD  Date of service: 03/05/2023

## 2023-03-06 ENCOUNTER — TELEPHONE (OUTPATIENT)
Dept: FAMILY MEDICINE | Facility: CLINIC | Age: 78
End: 2023-03-06

## 2023-03-06 LAB
BACTERIA BLD CULT: NORMAL
GLUCOSE SERPL-MCNC: 134 MG/DL (ref 70–110)
GLUCOSE SERPL-MCNC: 145 MG/DL (ref 70–110)
GLUCOSE SERPL-MCNC: 163 MG/DL (ref 70–110)
GLUCOSE SERPL-MCNC: 99 MG/DL (ref 70–110)

## 2023-03-06 PROCEDURE — 97116 GAIT TRAINING THERAPY: CPT | Mod: CQ

## 2023-03-06 PROCEDURE — 25000003 PHARM REV CODE 250: Performed by: STUDENT IN AN ORGANIZED HEALTH CARE EDUCATION/TRAINING PROGRAM

## 2023-03-06 PROCEDURE — 63600175 PHARM REV CODE 636 W HCPCS: Performed by: INTERNAL MEDICINE

## 2023-03-06 PROCEDURE — 97530 THERAPEUTIC ACTIVITIES: CPT

## 2023-03-06 PROCEDURE — 12000002 HC ACUTE/MED SURGE SEMI-PRIVATE ROOM

## 2023-03-06 PROCEDURE — 97535 SELF CARE MNGMENT TRAINING: CPT

## 2023-03-06 RX ADMIN — PANTOPRAZOLE SODIUM 40 MG: 40 TABLET, DELAYED RELEASE ORAL at 06:03

## 2023-03-06 RX ADMIN — CYPROHEPTADINE HYDROCHLORIDE 4 MG: 4 TABLET ORAL at 09:03

## 2023-03-06 RX ADMIN — TOPIRAMATE 50 MG: 25 TABLET, FILM COATED ORAL at 09:03

## 2023-03-06 RX ADMIN — AMIODARONE HYDROCHLORIDE 100 MG: 100 TABLET ORAL at 06:03

## 2023-03-06 RX ADMIN — CYPROHEPTADINE HYDROCHLORIDE 4 MG: 4 TABLET ORAL at 05:03

## 2023-03-06 RX ADMIN — ACETAMINOPHEN 650 MG: 325 TABLET ORAL at 09:03

## 2023-03-06 RX ADMIN — PANTOPRAZOLE SODIUM 40 MG: 40 TABLET, DELAYED RELEASE ORAL at 05:03

## 2023-03-06 RX ADMIN — DOCUSATE SODIUM 100 MG: 100 CAPSULE, LIQUID FILLED ORAL at 09:03

## 2023-03-06 RX ADMIN — METOPROLOL TARTRATE 25 MG: 25 TABLET, FILM COATED ORAL at 09:03

## 2023-03-06 RX ADMIN — CEFTRIAXONE 2 G: 2 INJECTION, SOLUTION INTRAVENOUS at 05:03

## 2023-03-06 RX ADMIN — MAGNESIUM OXIDE 400 MG (241.3 MG MAGNESIUM) TABLET 400 MG: at 09:03

## 2023-03-06 RX ADMIN — TRAZODONE HYDROCHLORIDE 50 MG: 50 TABLET ORAL at 09:03

## 2023-03-06 RX ADMIN — POLYETHYLENE GLYCOL 3350 17 G: 17 POWDER, FOR SOLUTION ORAL at 09:03

## 2023-03-06 RX ADMIN — APIXABAN 5 MG: 5 TABLET, FILM COATED ORAL at 09:03

## 2023-03-06 RX ADMIN — DULOXETINE 30 MG: 30 CAPSULE, DELAYED RELEASE ORAL at 09:03

## 2023-03-06 RX ADMIN — POTASSIUM CHLORIDE 20 MEQ: 1500 TABLET, EXTENDED RELEASE ORAL at 09:03

## 2023-03-06 RX ADMIN — TAMSULOSIN HYDROCHLORIDE 0.8 MG: 0.4 CAPSULE ORAL at 09:03

## 2023-03-06 RX ADMIN — ATORVASTATIN CALCIUM 40 MG: 40 TABLET, FILM COATED ORAL at 09:03

## 2023-03-06 NOTE — TELEPHONE ENCOUNTER
Done  ----- Message from Janey Mcghee sent at 3/6/2023  9:34 AM CST -----  Contact: Galina  Type: Needs Medical Advice    Who Called: Glaina/ DOMENIC  Best Call Back Number: [phone]  Additional  Information: PT wife  calling to inform office that pt is in the hospital for another days.  That's why appt was canceled will back later to michelle  Please Advise- Thank you

## 2023-03-06 NOTE — PT/OT/SLP PROGRESS
Occupational Therapy   Treatment    Name: Hiro Rodríguez  MRN: [id number]  Admitting Diagnosis:  Bacteremia due to Escherichia coli  4 Days Post-Op    Recommendations:     Discharge Recommendations:  (IPR vs SNF)  Discharge Equipment Recommendations:   (TBD)  Barriers to discharge:   (Increased physical assistance with functional mobility and ADLs.)    Assessment:     Hiro Rodríguez is a 78 y.o. male with a medical diagnosis of Bacteremia due to Escherichia coli.  He presents with improving medical acuity. Patient participated in bed mobility, unsupported sitting EOB, LB dressing sitting EOB and bed/chair transfer using rolling walker. Performance deficits affecting function are weakness, impaired endurance, impaired self care skills, impaired functional mobility, gait instability, impaired balance, decreased lower extremity function, decreased safety awareness, impaired cardiopulmonary response to activity.     Rehab Prognosis:  Good; patient would benefit from acute skilled OT services to address these deficits and reach maximum level of function.       Plan:     Patient to be seen 5 x/week to address the above listed problems via self-care/home management, therapeutic activities, therapeutic exercises  Plan of Care Expires: 04/02/23  Plan of Care Reviewed with: patient    Subjective     Pain/Comfort:  Pain Rating 1: 0/10  Pain Rating Post-Intervention 1: 0/10    Objective:     Communicated with: nurse prior to session.  Patient found HOB elevated with telemetry, peripheral IV upon OT entry to room.    General Precautions: Standard, fall    Orthopedic Precautions:N/A  Braces: N/A  Respiratory Status: Room air     Occupational Performance:     Bed Mobility:    Patient completed Scooting/Bridging with moderate assistance  Patient completed Supine to Sit with moderate assistance   Performed unsupported sitting EOB with contact guard assistance.    Functional Mobility/Transfers:  Patient completed Bed <> Chair  Transfer using Step Transfer technique with minimum assistance with rolling walker  Functional Mobility: ambulated 5 feet using rolling walker with minimal assistance.    Activities of Daily Living:  Lower Body Dressing: maximal assistance to don socks sitting EOB.      [facility] 6 Click ADL:      Treatment & Education:  Patient educated about the importance of Occupational Therapy and the importance of getting OB. Patient further instructed to sit in chair for at least 2 hours.    Patient left up in chair with all lines intact, call button in reach, and chair alarm on    GOALS:   Multidisciplinary Problems       Occupational Therapy Goals          Problem: Occupational Therapy    Goal Priority Disciplines Outcome Interventions   Occupational Therapy Goal     OT, PT/OT     Description: Goals to be met by: 4/2/2023     Patient will increase functional independence with ADLs by performing:    UE Dressing with Minimal Assistance.  LE Dressing with Minimal Assistance.  Grooming while seated with Stand-by Assistance.  Toileting from bedside commode with Minimal Assistance for hygiene and clothing management.   Sitting at edge of bed x10 minutes with Stand-by Assistance.  Supine to sit with Stand-by Assistance.  Toilet transfer to bedside commode with Stand-by Assistance.  Upper extremity exercise program x10 reps per handout, with assistance as needed.                         Time Tracking:     OT Date of Treatment: 03/06/23  OT Start Time: 1024  OT Stop Time: 1049  OT Total Time (min): 25 min    Billable Minutes:Self Care/Home Management 12  Therapeutic Activity 13    OT/PILO: OT          3/6/2023

## 2023-03-06 NOTE — PROGRESS NOTES
[facility] Medicine  Progress Note    Patient name: Hiro Rodríguez  MRN: [id number]  Admit Date: 3/1/2023   LOS: 5 days     SUBJECTIVE:     Principal problem: Bacteremia due to Escherichia coli    Interval History:  No acute overnight events reported.  Resting comfortably. CM kindly updated that he has been accepted by facility but we have not receive insurance authorization for placement at rehab. Submitting updated therapy notes to the facility today.  OT supplements he did well with therapy today and he is up in the chair at the time of my exam.    Summary:   77-year-old  male with paroxysmal atrial fibrillation, type 2 DM, hyperlipidemia sent to the ED from cardiologist's office secondary to AFib with RVR and hypotension.  Further workup revealed UTI which was thought to be the trigger for RVR.  Initiated on cefepime which has been switched to ceftriaxone.  Urine and blood cultures growing E coli.  Imaging revealed moderate left hydroureteronephrosis secondary to at least 2 calculi in the midportion of the left ureter.  Seen by Urology and underwent cystoscopy with left ureteral stent placement and removal of bladder stone.  We will need outpatient urology with Dr. Lange once out of rehab for definitive stone management.    Seen by PT/OT who recommended rehab at discharge.  CM consulted and  placement is in process.    Scheduled Meds:   amiodarone  100 mg Oral QAM    apixaban  5 mg Oral BID    atorvastatin  40 mg Oral Daily    cefTRIAXone (ROCEPHIN) IVPB  2 g Intravenous Q24H    cyproheptadine  4 mg Oral TID    docusate sodium  100 mg Oral BID    DULoxetine  30 mg Oral BID    magnesium oxide  400 mg Oral Daily    metoprolol tartrate  25 mg Oral BID    pantoprazole  40 mg Oral BID    polyethylene glycol  17 g Oral BID    potassium chloride  20 mEq Oral Daily    tamsulosin  0.8 mg Oral Daily    topiramate  50 mg Oral Daily    traZODone  50 mg Oral QHS     Continuous  Infusions:  PRN Meds:acetaminophen, acetaminophen, dextrose 10%, dextrose 10%, glucagon (human recombinant), glucose, glucose, HYDROcodone-acetaminophen, insulin aspart U-100, magnesium oxide, magnesium oxide, melatonin, ondansetron, phenazopyridine, potassium bicarbonate, potassium bicarbonate, potassium bicarbonate, potassium, sodium phosphates, potassium, sodium phosphates, potassium, sodium phosphates, sodium chloride 0.9%    Review of patient's allergies indicates:  Not on File    Review of Systems: As per interval history    OBJECTIVE:     Vital Signs (Most Recent)  Temp: 97.3 °F (36.3 °C) (03/06/23 0744)  Pulse: 86 (03/06/23 0954)  Resp: 16 (03/06/23 0744)  BP: (!) 147/94 (03/06/23 0954)  SpO2: 96 % (03/06/23 0744)    Vital Signs Range (Last 24H):  Temp:  [97.3 °F (36.3 °C)-98.9 °F (37.2 °C)]   Pulse:  [61-93]   Resp:  [16-18]   BP: (130-157)/(79-94)   SpO2:  [95 %-97 %]     I & O (Last 24H):  Intake/Output Summary (Last 24 hours) at 3/6/2023 1145  Last data filed at 3/5/2023 1448  Gross per 24 hour   Intake 240 ml   Output --   Net 240 ml         Physical Exam:  General: Patient resting comfortably in no acute distress. Appears as stated age. Calm  Eyes: No conjunctival injection. No scleral icterus.  ENT:  Hard of hearing.  No discharge from ears. No nasal discharge.   Neck: Supple, trachea midline. No JVD  CVS:  No LE edema BL  Lungs:  No tachypnea or accessory muscle use.   Abdomen:  Nondistended  Neuro: AOx3. Moves all extremities. Follows commands. Responds appropriately   Skin:  No rash or erythema noted  :  Sotelo catheter    Laboratory:  I have reviewed all pertinent lab results within the past 24 hours.  CBC:   Recent Labs   Lab 03/04/23  0759   WBC 9.77   RBC 3.76*   HGB 11.2*   HCT 34.5*      MCV 92   MCH 29.8   MCHC 32.5       CMP:   Recent Labs   Lab 03/04/23  0759 03/05/23  0501   *  --    CALCIUM 7.3*  --    ALBUMIN 2.2*  --    PROT 4.9*  --      --    K 3.3* 3.8   CO2 25   --      --    BUN 12  --    CREATININE 0.5  --    ALKPHOS 49*  --    ALT 14  --    AST 21  --    BILITOT 0.4  --          Diagnostic Results:  Labs: Reviewed  CT: Reviewed    ASSESSMENT/PLAN:       Active Hospital Problems    Diagnosis  POA    *Bacteremia due to Escherichia coli [R78.81, B96.20]  Yes    Hydronephrosis of left kidney [N13.30]  Yes    Pyelonephritis of left kidney [N12]  Yes    Hypokalemia [E87.6]  Yes    Urinary retention [R33.9]  Yes    Hearing loss [H91.90]  Yes    Type 2 diabetes mellitus with diabetic polyneuropathy, without long-term current use of insulin [E11.42]  Yes    Hyperlipidemia [E78.5]  Yes    Paroxysmal atrial fibrillation [I48.0]  Yes    Hypertension, essential [I10]  Yes      Resolved Hospital Problems   No resolved problems to display.         Plan:   Bacteremia from E coli likely secondary to UTI and pyelonephritis  Change antibiotics to IV ceftriaxone 2 grams Q 24 hours - needs at least 10 days.  Could transition to bactrim as an oral alternative if facility not able to accommodate Rocephin.  Status post left ureteral stent placement and bladder stone removal on 03/02.  Needs outpatient Urology follow-up with Dr. Lange after rehab for definitive stone management    AFib with RVR - likely triggered from bacteremia   Currently rates are controlled  Continue metoprolol tartrate and amiodarone   Apixaban for anticoagulation    BPH with LUTS   Continue tamsulosin  Currently has Sotelo catheter     Hypokalemia noted - replete and monitor per protocol    Continue home medications for chronic medical conditions  Seen by PT/OT for weakness; recommended rehabilitation at discharge. WOO kindly arranging.  He has been accepted by the facility but waiting on insurance authorization.      VTE Risk Mitigation (From admission, onward)           Ordered     apixaban tablet 5 mg  2 times daily         03/01/23 1942     Reason for No Pharmacological VTE Prophylaxis  Once        Question:   Reasons:  Answer:  Already adequately anticoagulated on oral Anticoagulants    03/01/23 1942     IP VTE HIGH RISK PATIENT  Once         03/01/23 1942     Place sequential compression device  Until discontinued         03/01/23 1942                        Department Hospital Medicine  [facility]  Danielle Lugo MD  Date of service: 03/06/2023

## 2023-03-06 NOTE — PLAN OF CARE
Pt was accepted at [facility]. [facility] auth was requested       03/06/23 1918   Post-Acute Status   Post-Acute Authorization Placement   Post-Acute Placement Status Pending payor review/awaiting authorization (if required)

## 2023-03-06 NOTE — PROGRESS NOTES
[facility]  Adult Nutrition   Progress Note (Follow-Up)    SUMMARY      Recommendations:   1. Continue current diet  2. Continue Glucerna shake with meals and HS snack.3. RD to follow for intake, labs and wt as needed.     Goals:   Goals: 1. Pt to achieve >75% intakeProgressing    Dietitian Rounds Brief  Patient eating >/= 50% of meals and snacks. Labs improved. Last BM 3/05/23. Plan is for dc soon today or tomorrow.    Diet order: Cardiac Diabetic diet with HS snack    Oral Supplement: Glucerna with meals.    % Intake of Estimated Energy Needs: 50 - 75 %  % Meal Intake: 50 - 75 %    Estimated/Assessed Needs  Weight Used For Calorie Calculations: 107.5 kg (236 lb 15.9 oz)  Energy Calorie Requirements (kcal): 2150kcal-2687kcal (20-25kcal/kg  Energy Need Method: Kcal/kg  Protein Requirements: 75-113g (1-1.5g/kg IBW) (Per guidlines for Age/ BMI)  Weight Used For Protein Calculations: 75.4 kg (166 lb 5.4 oz)  Fluid Requirements (mL): 2687ml  Estimated Fluid Requirement Method: other (see comments) (25ml/kg per guidelines for age/CHF)  RDA Method (mL): 2150     Weight History:  Wt Readings from Last 5 Encounters:   03/01/23 107.5 kg (236 lb 15.9 oz)   03/01/23 114.8 kg (253 lb)   01/25/23 114.8 kg (253 lb)   01/09/23 117.3 kg (258 lb 9.6 oz)   12/29/22 115.7 kg (255 lb)      Reason for Assessment  Reason For Assessment: identified at risk by screening criteria (MST 5)  Diagnosis: cardiac disease (Atrial Fibrilltion/CHF)  Relevant Medical History: CHF, T2 DM without long term use of insulin, HTN, HLD, Hx of pressure injury, hearing loss  Interdisciplinary Rounds: did not attend    Medications:Pertinent Medications Reviewed  Scheduled Meds:   amiodarone  100 mg Oral QAM    apixaban  5 mg Oral BID    atorvastatin  40 mg Oral Daily    cefTRIAXone (ROCEPHIN) IVPB  2 g Intravenous Q24H    cyproheptadine  4 mg Oral TID    docusate sodium  100 mg Oral BID    DULoxetine  30 mg Oral BID    magnesium oxide  400 mg  Oral Daily    metoprolol tartrate  25 mg Oral BID    pantoprazole  40 mg Oral BID    polyethylene glycol  17 g Oral BID    potassium chloride  20 mEq Oral Daily    tamsulosin  0.8 mg Oral Daily    topiramate  50 mg Oral Daily    traZODone  50 mg Oral QHS     Continuous Infusions:  PRN Meds:.acetaminophen, acetaminophen, dextrose 10%, dextrose 10%, glucagon (human recombinant), glucose, glucose, HYDROcodone-acetaminophen, insulin aspart U-100, magnesium oxide, magnesium oxide, melatonin, ondansetron, phenazopyridine, potassium bicarbonate, potassium bicarbonate, potassium bicarbonate, potassium, sodium phosphates, potassium, sodium phosphates, potassium, sodium phosphates, sodium chloride 0.9%    Labs: Pertinent Labs Reviewed  Clinical Chemistry:  Recent Labs   Lab 03/01/23  1601 03/04/23  0759 03/05/23  0501    139  --    K 3.4* 3.3* 3.8   CL 99 106  --    CO2 27 25  --    * 117*  --    BUN 15 12  --    CREATININE 0.7 0.5  --    CALCIUM 8.4* 7.3*  --    PROT 6.2 4.9*  --    ALBUMIN 2.6* 2.2*  --    BILITOT 0.6 0.4  --    ALKPHOS 66 49*  --    AST 14 21  --    ALT 13 14  --    ANIONGAP 11 8  --    MG 1.5*  --  1.7   LIPASE 22  --   --     < > = values in this interval not displayed.     CBC:   Recent Labs   Lab 03/04/23  0759   WBC 9.77   RBC 3.76*   HGB 11.2*   HCT 34.5*      MCV 92   MCH 29.8   MCHC 32.5     Diabetes:  Recent Labs   Lab 03/02/23  0511   HGBA1C 5.8     Thyroid & Parathyroid:  No results for input(s): TSH, FREET4, A4URJMK, P9HXKKM, THYROIDAB in the last 168 hours.    Monitor and Evaluation  Food and Nutrient Intake: food and beverage intake, energy intake  Food and Nutrient Adminstration: diet order  Knowledge/Beliefs/Attitudes: food and nutrition knowledge/skill, beliefs and attitudes  Physical Activity and Function: nutrition-related ADLs and IADLs, factors affecting access to physical activity  Anthropometric Measurements: weight, weight change, body mass index  Biochemical  Data, Medical Tests and Procedures: electrolyte and renal panel, gastrointestinal profile, glucose/endocrine profile, inflammatory profile, lipid profile  Nutrition-Focused Physical Findings: overall appearance     Nutrition Risk  Level of Risk/Frequency of Follow-up: high     Nutrition Follow-Up  RD Follow-up?: Yes    Lara Carbone RD 03/06/2023 2:18 PM

## 2023-03-06 NOTE — PLAN OF CARE
WOO s/w Swati at [facility]. Patient is accepted and liaison is submitting for auth after therapy notes are charted today.       03/06/23 1216   Discharge Reassessment   Assessment Type Discharge Planning Reassessment   Did the patient's condition or plan change since previous assessment? No   Discharge Plan discussed with: Patient   Discharge Plan A Rehab   Discharge Plan B Rehab   DME Needed Upon Discharge  none   Discharge Barriers Identified None   Post-Acute Status   Post-Acute Authorization Placement   Post-Acute Placement Status Referrals Sent

## 2023-03-07 LAB
GLUCOSE SERPL-MCNC: 111 MG/DL (ref 70–110)
GLUCOSE SERPL-MCNC: 131 MG/DL (ref 70–110)
GLUCOSE SERPL-MCNC: 158 MG/DL (ref 70–110)
GLUCOSE SERPL-MCNC: 163 MG/DL (ref 70–110)

## 2023-03-07 PROCEDURE — 63600175 PHARM REV CODE 636 W HCPCS: Performed by: INTERNAL MEDICINE

## 2023-03-07 PROCEDURE — 97535 SELF CARE MNGMENT TRAINING: CPT

## 2023-03-07 PROCEDURE — 97530 THERAPEUTIC ACTIVITIES: CPT | Mod: CQ

## 2023-03-07 PROCEDURE — 97116 GAIT TRAINING THERAPY: CPT | Mod: CQ

## 2023-03-07 PROCEDURE — 25000003 PHARM REV CODE 250: Performed by: INTERNAL MEDICINE

## 2023-03-07 PROCEDURE — 97530 THERAPEUTIC ACTIVITIES: CPT

## 2023-03-07 PROCEDURE — 12000002 HC ACUTE/MED SURGE SEMI-PRIVATE ROOM

## 2023-03-07 PROCEDURE — 25000003 PHARM REV CODE 250: Performed by: STUDENT IN AN ORGANIZED HEALTH CARE EDUCATION/TRAINING PROGRAM

## 2023-03-07 PROCEDURE — 82962 GLUCOSE BLOOD TEST: CPT

## 2023-03-07 RX ADMIN — DULOXETINE 30 MG: 30 CAPSULE, DELAYED RELEASE ORAL at 09:03

## 2023-03-07 RX ADMIN — DOCUSATE SODIUM 100 MG: 100 CAPSULE, LIQUID FILLED ORAL at 09:03

## 2023-03-07 RX ADMIN — POLYETHYLENE GLYCOL 3350 17 G: 17 POWDER, FOR SOLUTION ORAL at 09:03

## 2023-03-07 RX ADMIN — CYPROHEPTADINE HYDROCHLORIDE 4 MG: 4 TABLET ORAL at 09:03

## 2023-03-07 RX ADMIN — APIXABAN 5 MG: 5 TABLET, FILM COATED ORAL at 08:03

## 2023-03-07 RX ADMIN — TAMSULOSIN HYDROCHLORIDE 0.8 MG: 0.4 CAPSULE ORAL at 09:03

## 2023-03-07 RX ADMIN — Medication 6 MG: at 08:03

## 2023-03-07 RX ADMIN — CEFTRIAXONE 2 G: 2 INJECTION, SOLUTION INTRAVENOUS at 05:03

## 2023-03-07 RX ADMIN — SODIUM CHLORIDE 1000 ML: 9 INJECTION, SOLUTION INTRAVENOUS at 03:03

## 2023-03-07 RX ADMIN — METOPROLOL TARTRATE 25 MG: 25 TABLET, FILM COATED ORAL at 09:03

## 2023-03-07 RX ADMIN — AMIODARONE HYDROCHLORIDE 100 MG: 100 TABLET ORAL at 06:03

## 2023-03-07 RX ADMIN — DULOXETINE 30 MG: 30 CAPSULE, DELAYED RELEASE ORAL at 08:03

## 2023-03-07 RX ADMIN — MAGNESIUM OXIDE 400 MG (241.3 MG MAGNESIUM) TABLET 400 MG: at 09:03

## 2023-03-07 RX ADMIN — POLYETHYLENE GLYCOL 3350 17 G: 17 POWDER, FOR SOLUTION ORAL at 08:03

## 2023-03-07 RX ADMIN — PANTOPRAZOLE SODIUM 40 MG: 40 TABLET, DELAYED RELEASE ORAL at 06:03

## 2023-03-07 RX ADMIN — TOPIRAMATE 50 MG: 25 TABLET, FILM COATED ORAL at 09:03

## 2023-03-07 RX ADMIN — POTASSIUM CHLORIDE 20 MEQ: 1500 TABLET, EXTENDED RELEASE ORAL at 09:03

## 2023-03-07 RX ADMIN — METOPROLOL TARTRATE 25 MG: 25 TABLET, FILM COATED ORAL at 08:03

## 2023-03-07 RX ADMIN — TRAZODONE HYDROCHLORIDE 50 MG: 50 TABLET ORAL at 08:03

## 2023-03-07 RX ADMIN — CYPROHEPTADINE HYDROCHLORIDE 4 MG: 4 TABLET ORAL at 02:03

## 2023-03-07 RX ADMIN — DOCUSATE SODIUM 100 MG: 100 CAPSULE, LIQUID FILLED ORAL at 08:03

## 2023-03-07 RX ADMIN — ATORVASTATIN CALCIUM 40 MG: 40 TABLET, FILM COATED ORAL at 08:03

## 2023-03-07 RX ADMIN — APIXABAN 5 MG: 5 TABLET, FILM COATED ORAL at 09:03

## 2023-03-07 RX ADMIN — PANTOPRAZOLE SODIUM 40 MG: 40 TABLET, DELAYED RELEASE ORAL at 05:03

## 2023-03-07 NOTE — PT/OT/SLP PROGRESS
Physical Therapy Treatment    Patient Name:  Hiro Rodríguez   MRN:  [id number]    Recommendations:     Discharge Recommendations: rehabilitation facility  Discharge Equipment Recommendations: none  Barriers to discharge:  min to mod assist x 1-2 persons for safe mobility    Assessment:     Hiro Rodríguez is a 78 y.o. male admitted with a medical diagnosis of Bacteremia due to Escherichia coli.  He presents with the following impairments/functional limitations: weakness, impaired endurance, impaired functional mobility, gait instability, impaired balance, decreased upper extremity function, decreased lower extremity function, decreased safety awareness, pain.    Pt agreeable to visit. Pt reports he is soiled and needs to be cleaned up. Pt really wanting to ambulate as he was unable to earlier today. Pt's BP taken at 126 systolic over 80s diastolic. Pt with no reports of feeling faint or dizzy. Extender entered to help get pt cleaned up. Pt tolerated static standing at bedside with RW and min assist for hygiene. Pt ambulated 60' with RW and min assist with decreased step length, and slow vonda. Pt with dyspnea and fatigue post ambulation, SpO2 95%.    Rehab Prognosis: Fair; patient would benefit from acute skilled PT services to address these deficits and reach maximum level of function.    Recent Surgery: Procedure(s) (LRB):  CYSTOSCOPY, WITH URETERAL STENT INSERTION (N/A)  REMOVAL, CALCULUS, BLADDER 5 Days Post-Op    Plan:     During this hospitalization, patient to be seen 6 x/week to address the identified rehab impairments via gait training, therapeutic activities, therapeutic exercises and progress toward the following goals:    Plan of Care Expires:  04/03/23    Subjective     Chief Complaint: pt reports bladder accident and needing to be cleaned up  Patient/Family Comments/goals: to get better  Pain/Comfort:  Pain Rating 1: 0/10      Objective:     Communicated with RN prior to session.  Patient found  HOB elevated with telemetry, bed alarm, peripheral IV upon PT entry to room.     General Precautions: Standard, fall  Orthopedic Precautions: N/A  Braces: N/A  Respiratory Status: Room air     Functional Mobility:  Bed Mobility:     Supine to Sit: moderate assistance  Sit to Supine: minimum assistance  Transfers:     Sit to Stand:  minimum assistance and moderate assistance with rolling walker  Gait: x 60' with RW and min assist, decreased step length and slow vonda  Balance: static standing at bedside with RW and [place] for hygiene      AM-PAC 6 CLICK MOBILITY          Treatment & Education:  Pt educated on importance of time OOB, importance of intermittent mobility, safe techniques for transfers/ambulation, discharge recommendations/options, and use of call light for assistance and fall prevention.      Patient left HOB elevated with all lines intact, call button in reach, bed alarm on, and RN notified..    GOALS:   Multidisciplinary Problems       Physical Therapy Goals          Problem: Physical Therapy    Goal Priority Disciplines Outcome Goal Variances Interventions   Physical Therapy Goal     PT, PT/OT Ongoing, Progressing     Description: Goals to be met by: 4/3/2023     Patient will increase functional independence with mobility by performin. Supine to sit with MInimal Assistance  2. Sit to stand transfer with Minimal Assistance  3. Bed to chair transfer with Contact Guard Assistance using Rolling Walker  4. Gait  x 100  feet with Contact Guard Assistance using Rolling Walker.                          Time Tracking:     PT Received On: 23  PT Start Time: 1424     PT Stop Time: 1440  PT Total Time (min): 16 min     Billable Minutes: Gait Training 16    Treatment Type: Treatment  PT/PTA: PTA     PTA Visit Number: 2     2023

## 2023-03-07 NOTE — PLAN OF CARE
Problem: Occupational Therapy  Goal: Occupational Therapy Goal  Description: Goals to be met by: 4/2/2023     Patient will increase functional independence with ADLs by performing:    UE Dressing with Minimal Assistance.  LE Dressing with Minimal Assistance.  Grooming while seated with Stand-by Assistance.  Toileting from bedside commode with Minimal Assistance for hygiene and clothing management.   Sitting at edge of bed x10 minutes with Stand-by Assistance.  Supine to sit with Stand-by Assistance.  Toilet transfer to bedside commode with Stand-by Assistance.  Upper extremity exercise program x10 reps per handout, with assistance as needed.    Outcome: Ongoing, Progressing

## 2023-03-07 NOTE — PLAN OF CARE
Problem: Infection  Goal: Absence of Infection Signs and Symptoms  Outcome: Ongoing, Progressing     Problem: Adult Inpatient Plan of Care  Goal: Plan of Care Review  Outcome: Ongoing, Progressing  Goal: Patient-Specific Goal (Individualized)  Outcome: Ongoing, Progressing  Goal: Absence of Hospital-Acquired Illness or Injury  Outcome: Ongoing, Progressing  Goal: Optimal Comfort and Wellbeing  Outcome: Ongoing, Progressing  Goal: Readiness for Transition of Care  Outcome: Ongoing, Progressing     Problem: Adjustment to Illness (Sepsis/Septic Shock)  Goal: Optimal Coping  Outcome: Ongoing, Progressing     Problem: Bleeding (Sepsis/Septic Shock)  Goal: Absence of Bleeding  Outcome: Ongoing, Progressing     Problem: Glycemic Control Impaired (Sepsis/Septic Shock)  Goal: Blood Glucose Level Within Desired Range  Outcome: Ongoing, Progressing      0730 Bedside and Verbal shift change report given to KEYANNA Mitchell RN (oncoming nurse) by DANGELO Ponce RN (offgoing nurse). Report included the following information SBAR, Kardex, Intake/Output, and MAR. Pt in bed, call light in reach. 1020 Came into pt's room to administer meds, pt had taken hi flow off, O2 90% on room air. Placed pt on 3L NC, pt up to 95%. Respiratory called, pt will remain on 3L. Pt brought to 97% on 2L NC. 
 
1242 Pt assessed. No signs of acute distress. Pt in bed. 
 
1400 Pt pulled out line, new IV placed in Right hand 1710 Pt assessed. No signs of acute distress. Pt in bed. 1930 Bedside and Verbal shift change report given to WILMER Bautista RN (oncoming nurse) by Spencer Mitchell RN (offgoing nurse). Report included the following information SBAR, Kardex, Intake/Output, MAR, and Recent Results. Pt in bed, call light in reach.

## 2023-03-07 NOTE — PT/OT/SLP PROGRESS
Physical Therapy Treatment    Patient Name:  Hiro Rodríguez   MRN:  [id number]    Recommendations:     Discharge Recommendations: rehabilitation facility  Discharge Equipment Recommendations: none  Barriers to discharge:  pt requires min to mod assist x 1-2 persons for safe mobility    Assessment:     Hiro Rodríguez is a 78 y.o. male admitted with a medical diagnosis of Bacteremia due to Escherichia coli.  He presents with the following impairments/functional limitations: weakness, impaired endurance, impaired functional mobility, gait instability, impaired balance, decreased upper extremity function, decreased lower extremity function, decreased safety awareness, pain.    Pt up in chair and reporting that he feels faint. Pt's BP taken at 81/59 with . Pt required min assist x 2 for stand pivot transfer from chair to bed and min assist for transfer to supine. RN and extender informed.    Rehab Prognosis: Fair; patient would benefit from acute skilled PT services to address these deficits and reach maximum level of function.    Recent Surgery: Procedure(s) (LRB):  CYSTOSCOPY, WITH URETERAL STENT INSERTION (N/A)  REMOVAL, CALCULUS, BLADDER 5 Days Post-Op    Plan:     During this hospitalization, patient to be seen 6 x/week to address the identified rehab impairments via gait training, therapeutic activities, therapeutic exercises and progress toward the following goals:    Plan of Care Expires:  04/03/23    Subjective     Chief Complaint: pt reports feeling faint  Patient/Family Comments/goals: to get better  Pain/Comfort:  Pain Rating 1: 0/10      Objective:     Communicated with RN prior to session.  Patient found up in chair with telemetry upon PT entry to room.     General Precautions: Standard, fall  Orthopedic Precautions: N/A  Braces: N/A  Respiratory Status: Room air     Functional Mobility:  Bed Mobility:     Sit to Supine: minimum assistance  Transfers:     Bed to Chair: minimum assistance and of  2 persons with  no AD  using  Stand Pivot      AM-PAC 6 CLICK MOBILITY          Treatment & Education:  Pt educated on importance of time OOB, importance of intermittent mobility, safe techniques for transfers/ambulation, discharge recommendations/options, and use of call light for assistance and fall prevention.      Patient left HOB elevated with all lines intact, call button in reach, bed alarm on, and RN notified..    GOALS:   Multidisciplinary Problems       Physical Therapy Goals          Problem: Physical Therapy    Goal Priority Disciplines Outcome Goal Variances Interventions   Physical Therapy Goal     PT, PT/OT Ongoing, Progressing     Description: Goals to be met by: 4/3/2023     Patient will increase functional independence with mobility by performin. Supine to sit with MInimal Assistance  2. Sit to stand transfer with Minimal Assistance  3. Bed to chair transfer with Contact Guard Assistance using Rolling Walker  4. Gait  x 100  feet with Contact Guard Assistance using Rolling Walker.                          Time Tracking:     PT Received On: 23  PT Start Time: 1106     PT Stop Time: 1116  PT Total Time (min): 10 min     Billable Minutes: Therapeutic Activity 10    Treatment Type: Treatment  PT/PTA: PTA     PTA Visit Number: 2     2023

## 2023-03-07 NOTE — PLAN OF CARE
Patient accepted at [facility]. WOO messaged Yani to follow up on submission for auth. Yani stated would submit 3/6 after therapy notes were in yesterday. CM will follow up with [facility] auth. CM spoke with patient spouse. Spouse agrees with plan of care.    1351- WOO s/w Desi from [facility], auth submitted 3/6. Auth still pending.     03/07/23 1337   Discharge Reassessment   Assessment Type Discharge Planning Reassessment   Did the patient's condition or plan change since previous assessment? No   Discharge Plan discussed with: Patient;Spouse/sig other   Discharge Plan A Rehab   Discharge Plan B Rehab   DME Needed Upon Discharge  none   Discharge Barriers Identified None   Post-Acute Status   Post-Acute Authorization Placement   Post-Acute Placement Status Pending payor review/awaiting authorization (if required)   Discharge Delays (!) Post-Acute Set-up

## 2023-03-07 NOTE — PT/OT/SLP PROGRESS
Occupational Therapy   Treatment    Name: Hiro Rodríguez  MRN: [id number]  Admitting Diagnosis:  Bacteremia due to Escherichia coli  5 Days Post-Op    Recommendations:     Discharge Recommendations:  (IPR vs SNF)  Discharge Equipment Recommendations:   (TBD)  Barriers to discharge:   (Increased physical assistance with functional mobility and ADLs.)    Assessment:     Hiro Rodríguez is a 78 y.o. male with a medical diagnosis of Bacteremia due to Escherichia coli.  He presents with improving medical acuity and functional mobility. Patient participated in bed mobility, unsupported sitting EOB, grooming standing at sink, chair transfer and ambulation in the hospital room using a rolling walker. Performance deficits affecting function are weakness, impaired endurance, impaired self care skills, impaired functional mobility, gait instability, decreased upper extremity function, decreased lower extremity function, decreased safety awareness, impaired cardiopulmonary response to activity.     Rehab Prognosis:  Good; patient would benefit from acute skilled OT services to address these deficits and reach maximum level of function.       Plan:     Patient to be seen 5 x/week to address the above listed problems via self-care/home management, therapeutic activities, therapeutic exercises  Plan of Care Expires: 04/02/23  Plan of Care Reviewed with: patient    Subjective     Pain/Comfort:  Pain Rating 1: 0/10  Pain Rating Post-Intervention 1: 0/10    Objective:     Communicated with: nurse prior to session.  Patient found HOB elevated with telemetry, peripheral IV upon OT entry to room.    General Precautions: Standard, fall    Orthopedic Precautions:N/A  Braces: N/A  Respiratory Status: Room air     Occupational Performance:     Bed Mobility:    Patient completed Scooting/Bridging with minimum assistance  Patient completed Supine to Sit with minimum assistance   Performed unsupported sitting EOB with contact guard  assistance.    Functional Mobility/Transfers:  Patient completed Sit <> Stand Transfer with minimum assistance  with  rolling walker   Functional Mobility: ambulated 15 feet in the hospital room with minimal assistance using rolling walker.    Activities of Daily Living:  Grooming: minimum assistance to wash hands standing at sink.      [facility] 6 Click ADL: 19    Treatment & Education:  Patient reported feeling light headed upon sitting EOB at beginning of session. Patient waited a few minutes until light headed feeling subsided, before performing standing ADL activity.    Patient left up in chair with all lines intact, call button in reach, and chair alarm on    GOALS:   Multidisciplinary Problems       Occupational Therapy Goals          Problem: Occupational Therapy    Goal Priority Disciplines Outcome Interventions   Occupational Therapy Goal     OT, PT/OT Ongoing, Progressing    Description: Goals to be met by: 4/2/2023     Patient will increase functional independence with ADLs by performing:    UE Dressing with Minimal Assistance.  LE Dressing with Minimal Assistance.  Grooming while seated with Stand-by Assistance.  Toileting from bedside commode with Minimal Assistance for hygiene and clothing management.   Sitting at edge of bed x10 minutes with Stand-by Assistance.  Supine to sit with Stand-by Assistance.  Toilet transfer to bedside commode with Stand-by Assistance.  Upper extremity exercise program x10 reps per handout, with assistance as needed.                         Time Tracking:     OT Date of Treatment: 03/07/23  OT Start Time: 1010  OT Stop Time: 1033  OT Total Time (min): 23 min    Billable Minutes:Self Care/Home Management 12  Therapeutic Activity 11    OT/PILO: OT          3/7/2023

## 2023-03-08 ENCOUNTER — TELEPHONE (OUTPATIENT)
Dept: FAMILY MEDICINE | Facility: CLINIC | Age: 78
End: 2023-03-08
Payer: MEDICARE

## 2023-03-08 VITALS
SYSTOLIC BLOOD PRESSURE: 130 MMHG | HEART RATE: 89 BPM | OXYGEN SATURATION: 95 % | HEIGHT: 70 IN | DIASTOLIC BLOOD PRESSURE: 80 MMHG | WEIGHT: 237 LBS | TEMPERATURE: 98 F | RESPIRATION RATE: 20 BRPM | BODY MASS INDEX: 33.93 KG/M2

## 2023-03-08 LAB
ALBUMIN SERPL BCP-MCNC: 2.5 G/DL (ref 3.5–5.2)
ALP SERPL-CCNC: 57 U/L (ref 55–135)
ALT SERPL W/O P-5'-P-CCNC: 17 U/L (ref 10–44)
ANION GAP SERPL CALC-SCNC: 7 MMOL/L (ref 8–16)
AST SERPL-CCNC: 20 U/L (ref 10–40)
BILIRUB SERPL-MCNC: 0.4 MG/DL (ref 0.1–1)
BUN SERPL-MCNC: 9 MG/DL (ref 8–23)
CALCIUM SERPL-MCNC: 8.1 MG/DL (ref 8.7–10.5)
CHLORIDE SERPL-SCNC: 111 MMOL/L (ref 95–110)
CO2 SERPL-SCNC: 24 MMOL/L (ref 23–29)
CREAT SERPL-MCNC: 0.5 MG/DL (ref 0.5–1.4)
ERYTHROCYTE [DISTWIDTH] IN BLOOD BY AUTOMATED COUNT: 15.5 % (ref 11.5–14.5)
EST. GFR  (NO RACE VARIABLE): >60 ML/MIN/1.73 M^2
GLUCOSE SERPL-MCNC: 114 MG/DL (ref 70–110)
GLUCOSE SERPL-MCNC: 166 MG/DL (ref 70–110)
GLUCOSE SERPL-MCNC: 248 MG/DL (ref 70–110)
HCT VFR BLD AUTO: 37.4 % (ref 40–54)
HGB BLD-MCNC: 12.2 G/DL (ref 14–18)
MAGNESIUM SERPL-MCNC: 1.8 MG/DL (ref 1.6–2.6)
MCH RBC QN AUTO: 29.8 PG (ref 27–31)
MCHC RBC AUTO-ENTMCNC: 32.6 G/DL (ref 32–36)
MCV RBC AUTO: 91 FL (ref 82–98)
PLATELET # BLD AUTO: 281 K/UL (ref 150–450)
PMV BLD AUTO: 9.4 FL (ref 9.2–12.9)
POTASSIUM SERPL-SCNC: 3.8 MMOL/L (ref 3.5–5.1)
PROT SERPL-MCNC: 5.3 G/DL (ref 6–8.4)
RBC # BLD AUTO: 4.09 M/UL (ref 4.6–6.2)
SODIUM SERPL-SCNC: 142 MMOL/L (ref 136–145)
WBC # BLD AUTO: 9.49 K/UL (ref 3.9–12.7)

## 2023-03-08 PROCEDURE — 80053 COMPREHEN METABOLIC PANEL: CPT | Performed by: INTERNAL MEDICINE

## 2023-03-08 PROCEDURE — 63600175 PHARM REV CODE 636 W HCPCS: Performed by: INTERNAL MEDICINE

## 2023-03-08 PROCEDURE — 25000003 PHARM REV CODE 250: Performed by: STUDENT IN AN ORGANIZED HEALTH CARE EDUCATION/TRAINING PROGRAM

## 2023-03-08 PROCEDURE — 97535 SELF CARE MNGMENT TRAINING: CPT

## 2023-03-08 PROCEDURE — 36415 COLL VENOUS BLD VENIPUNCTURE: CPT | Performed by: INTERNAL MEDICINE

## 2023-03-08 PROCEDURE — 97116 GAIT TRAINING THERAPY: CPT | Mod: CQ

## 2023-03-08 PROCEDURE — 83735 ASSAY OF MAGNESIUM: CPT | Performed by: INTERNAL MEDICINE

## 2023-03-08 PROCEDURE — 97530 THERAPEUTIC ACTIVITIES: CPT | Mod: CQ

## 2023-03-08 PROCEDURE — 85027 COMPLETE CBC AUTOMATED: CPT | Performed by: INTERNAL MEDICINE

## 2023-03-08 RX ORDER — CYPROHEPTADINE HYDROCHLORIDE 4 MG/1
4 TABLET ORAL 3 TIMES DAILY
Refills: 0
Start: 2023-03-08 | End: 2023-03-16

## 2023-03-08 RX ORDER — SULFAMETHOXAZOLE AND TRIMETHOPRIM 800; 160 MG/1; MG/1
1 TABLET ORAL 2 TIMES DAILY
Qty: 4 TABLET | Refills: 0 | Status: SHIPPED | OUTPATIENT
Start: 2023-03-08 | End: 2023-03-10

## 2023-03-08 RX ADMIN — MAGNESIUM OXIDE 400 MG (241.3 MG MAGNESIUM) TABLET 400 MG: at 08:03

## 2023-03-08 RX ADMIN — APIXABAN 5 MG: 5 TABLET, FILM COATED ORAL at 08:03

## 2023-03-08 RX ADMIN — DULOXETINE 30 MG: 30 CAPSULE, DELAYED RELEASE ORAL at 08:03

## 2023-03-08 RX ADMIN — CEFTRIAXONE 2 G: 2 INJECTION, SOLUTION INTRAVENOUS at 06:03

## 2023-03-08 RX ADMIN — CYPROHEPTADINE HYDROCHLORIDE 4 MG: 4 TABLET ORAL at 08:03

## 2023-03-08 RX ADMIN — DOCUSATE SODIUM 100 MG: 100 CAPSULE, LIQUID FILLED ORAL at 08:03

## 2023-03-08 RX ADMIN — TOPIRAMATE 50 MG: 25 TABLET, FILM COATED ORAL at 08:03

## 2023-03-08 RX ADMIN — PANTOPRAZOLE SODIUM 40 MG: 40 TABLET, DELAYED RELEASE ORAL at 06:03

## 2023-03-08 RX ADMIN — AMIODARONE HYDROCHLORIDE 100 MG: 100 TABLET ORAL at 06:03

## 2023-03-08 RX ADMIN — POTASSIUM CHLORIDE 20 MEQ: 1500 TABLET, EXTENDED RELEASE ORAL at 08:03

## 2023-03-08 RX ADMIN — POLYETHYLENE GLYCOL 3350 17 G: 17 POWDER, FOR SOLUTION ORAL at 08:03

## 2023-03-08 RX ADMIN — ACETAMINOPHEN 650 MG: 325 TABLET ORAL at 08:03

## 2023-03-08 RX ADMIN — CYPROHEPTADINE HYDROCHLORIDE 4 MG: 4 TABLET ORAL at 06:03

## 2023-03-08 RX ADMIN — TAMSULOSIN HYDROCHLORIDE 0.8 MG: 0.4 CAPSULE ORAL at 08:03

## 2023-03-08 RX ADMIN — METOPROLOL TARTRATE 25 MG: 25 TABLET, FILM COATED ORAL at 08:03

## 2023-03-08 NOTE — PT/OT/SLP PROGRESS
Physical Therapy Treatment    Patient Name:  Hiro Rodríguez   MRN:  [id number]    Recommendations:     Discharge Recommendations: rehabilitation facility  Discharge Equipment Recommendations: none  Barriers to discharge:  min-mod assist x 1-2; decreased caregiver support    Assessment:     Hiro Rodríguez is a 78 y.o. male admitted with a medical diagnosis of Bacteremia due to Escherichia coli.  He presents with the following impairments/functional limitations: weakness, impaired endurance, impaired functional mobility, gait instability, impaired balance, decreased upper extremity function, decreased lower extremity function, decreased safety awareness, pain.    Therapist returned so pt would have the opportunity to ambulate as he was very eager to ambulate and has been making progress. Pt's /92 with no c/o dizziness. Pt ambulated 70' with RW and min to mod assist with pt beginning to fatigue towards end of ambulation trial. RN informed about pt's BP.    Rehab Prognosis: Fair; patient would benefit from acute skilled PT services to address these deficits and reach maximum level of function.    Recent Surgery: Procedure(s) (LRB):  CYSTOSCOPY, WITH URETERAL STENT INSERTION (N/A)  REMOVAL, CALCULUS, BLADDER 6 Days Post-Op    Plan:     During this hospitalization, patient to be seen 6 x/week to address the identified rehab impairments via gait training, therapeutic activities, therapeutic exercises and progress toward the following goals:    Plan of Care Expires:  04/03/23    Subjective     Chief Complaint: none verbalized  Patient/Family Comments/goals: to go to rehab  Pain/Comfort:  Pain Rating 1: 0/10      Objective:     Communicated with RN prior to session.  Patient found HOB elevated with telemetry, peripheral IV upon PT entry to room.     General Precautions: Standard, fall  Orthopedic Precautions: N/A  Braces: N/A  Respiratory Status: Room air     Functional Mobility:  Bed Mobility:     Supine to Sit:  moderate assistance  Sit to Supine: minimum assistance  Transfers:     Sit to Stand:  minimum assistance with rolling walker  Gait: x 70' with min-modA, more assist as pt fatigued      AM-PAC 6 CLICK MOBILITY          Treatment & Education:  Pt educated on importance of time OOB, importance of intermittent mobility, safe techniques for transfers/ambulation, discharge recommendations/options, and use of call light for assistance and fall prevention.      Patient left HOB elevated with all lines intact, call button in reach, bed alarm on, and RN notified..    GOALS:   Multidisciplinary Problems       Physical Therapy Goals          Problem: Physical Therapy    Goal Priority Disciplines Outcome Goal Variances Interventions   Physical Therapy Goal     PT, PT/OT Ongoing, Progressing     Description: Goals to be met by: 4/3/2023     Patient will increase functional independence with mobility by performin. Supine to sit with MInimal Assistance  2. Sit to stand transfer with Minimal Assistance  3. Bed to chair transfer with Contact Guard Assistance using Rolling Walker  4. Gait  x 100  feet with Contact Guard Assistance using Rolling Walker.                          Time Tracking:     PT Received On: 23  PT Start Time: 1419     PT Stop Time: 1428  PT Total Time (min): 9 min     Billable Minutes: Gait Training 9    Treatment Type: Treatment  PT/PTA: PTA     PTA Visit Number: 3     2023

## 2023-03-08 NOTE — PT/OT/SLP PROGRESS
Occupational Therapy   Treatment    Name: Hiro Rodríguez  MRN: [id number]  Admitting Diagnosis:  Bacteremia due to Escherichia coli  6 Days Post-Op    Recommendations:     Discharge Recommendations: nursing facility, skilled  Discharge Equipment Recommendations:   (TBD)  Barriers to discharge:  Decreased caregiver support    Assessment:     Hiro Rodríguez is a 78 y.o. male with a medical diagnosis of Bacteremia due to Escherichia coli.  Performance deficits affecting function are weakness, impaired endurance, impaired self care skills, impaired functional mobility, gait instability, impaired balance, impaired cardiopulmonary response to activity.     Pt presented supine; his brief was soiled; pt required total A for bed level hygiene.  Despite encouragement to sit on bedside commode, pt declined stating he cannot feel when he needs to use the bathroom.  Patient reported feeling light headed upon sitting EOB at beginning of session. Patient waited a few minutes until light headed feeling subsided before performing further activity.  Once the feeling subsided, he participated in lower body dressing and transfer to bedside chair with no further c/o dizziness.     Rehab Prognosis:  Fair; patient would benefit from acute skilled OT services to address these deficits and reach maximum level of function.       Plan:     Patient to be seen 3 x/week to address the above listed problems via self-care/home management, therapeutic activities, therapeutic exercises  Plan of Care Expires: 04/02/23  Plan of Care Reviewed with: patient    Subjective     Pain/Comfort:  Pain Rating 1: 0/10  Pain Rating Post-Intervention 1: 0/10    Objective:     Communicated with: nurse prior to session.  Patient found supine with telemetry upon OT entry to room.    General Precautions: Standard, fall    Orthopedic Precautions:N/A  Braces: N/A  Respiratory Status: Room air     Occupational Performance:     Bed Mobility:    Patient completed  Rolling/Turning to Left with  minimum assistance and with side rail  Patient completed Rolling/Turning to Right with minimum assistance and with side rail  Patient completed Supine to Sit with moderate assistance     Functional Mobility/Transfers:  Patient completed Sit <> Stand Transfer with minimum assistance  with  rolling walker   Patient completed Bed <> Chair Transfer using Stand Pivot technique with minimum assistance with rolling walker; noted decreased controlled descent to chair    Activities of Daily Living:  Lower Body Dressing: maximal assistance to don brief at EOB  Toileting: total assistance for bed level hygiene; pt found soiled with both urine and BM    Patient left up in chair with all lines intact and call button in reach    GOALS:   Multidisciplinary Problems       Occupational Therapy Goals          Problem: Occupational Therapy    Goal Priority Disciplines Outcome Interventions   Occupational Therapy Goal     OT, PT/OT Ongoing, Progressing    Description: Goals to be met by: 4/2/2023     Patient will increase functional independence with ADLs by performing:    UE Dressing with Minimal Assistance.  LE Dressing with Minimal Assistance.  Grooming while seated with Stand-by Assistance.  Toileting from bedside commode with Minimal Assistance for hygiene and clothing management.   Sitting at edge of bed x10 minutes with Stand-by Assistance.  Supine to sit with Stand-by Assistance.  Toilet transfer to bedside commode with Stand-by Assistance.  Upper extremity exercise program x10 reps per handout, with assistance as needed.                         Time Tracking:     OT Date of Treatment: 03/08/23  OT Start Time: 1051  OT Stop Time: 1116  OT Total Time (min): 25 min    Billable Minutes:Self Care/Home Management 25    OT/PILO: OT          3/8/2023

## 2023-03-08 NOTE — PT/OT/SLP PROGRESS
Physical Therapy Treatment    Patient Name:  Hiro Rodríguez   MRN:  [id number]    Recommendations:     Discharge Recommendations: rehabilitation facility  Discharge Equipment Recommendations: none  Barriers to discharge:  + orthostatics; min-mod assist x 1-2, decreased caregiver support    Assessment:     Hiro Rodríguez is a 78 y.o. male admitted with a medical diagnosis of Bacteremia due to Escherichia coli.  He presents with the following impairments/functional limitations: weakness, impaired endurance, impaired functional mobility, gait instability, impaired balance, decreased upper extremity function, decreased lower extremity function, decreased safety awareness, pain.    Pt up in chair and agreeable to visit. Pt had + orthostatics yesterday so BP taken today. BP in sitting 92/77 with pt beginning to have tremulous extremities. Pt unable to stand with RW and max assist for one person with three attempts. Extender assist passing by and asked for assist. Pt able to stand with RW and mod assist x 2, unable to get BP reading in standing with pt reporting dizziness. Pt request to return to bed and ambulated 5' to bed with RW and min assist x 2. BP sitting /79. Pt required min assist to return to supine. RN informed. Pt request that therapist come back so that he can ambulate as he would really like to ambulate today.    Rehab Prognosis: Fair; patient would benefit from acute skilled PT services to address these deficits and reach maximum level of function.    Recent Surgery: Procedure(s) (LRB):  CYSTOSCOPY, WITH URETERAL STENT INSERTION (N/A)  REMOVAL, CALCULUS, BLADDER 6 Days Post-Op    Plan:     During this hospitalization, patient to be seen 6 x/week to address the identified rehab impairments via gait training, therapeutic activities, therapeutic exercises and progress toward the following goals:    Plan of Care Expires:  04/03/23    Subjective     Chief Complaint: pt with dizziness upon standing  and tremulous extremities  Patient/Family Comments/goals: to get stronger  Pain/Comfort:  Pain Rating 1: 0/10      Objective:     Communicated with RN prior to session.  Patient found up in chair with telemetry, bed alarm, peripheral IV upon PT entry to room.     General Precautions: Standard, fall  Orthopedic Precautions: N/A  Braces: N/A  Respiratory Status: Room air     Functional Mobility:  Bed Mobility:     Sit to Supine: minimum assistance  Transfers:     Sit to Stand:  moderate assistance and of 2 persons with rolling walker  Gait: x 5' with RW and [place] x2      AM-PAC 6 CLICK MOBILITY          Treatment & Education:  Pt educated on importance of time OOB, importance of intermittent mobility, safe techniques for transfers/ambulation, discharge recommendations/options, and use of call light for assistance and fall prevention.      Patient left supine with all lines intact, call button in reach, bed alarm on, and RN notified..    GOALS:   Multidisciplinary Problems       Physical Therapy Goals          Problem: Physical Therapy    Goal Priority Disciplines Outcome Goal Variances Interventions   Physical Therapy Goal     PT, PT/OT Ongoing, Progressing     Description: Goals to be met by: 4/3/2023     Patient will increase functional independence with mobility by performin. Supine to sit with MInimal Assistance  2. Sit to stand transfer with Minimal Assistance  3. Bed to chair transfer with Contact Guard Assistance using Rolling Walker  4. Gait  x 100  feet with Contact Guard Assistance using Rolling Walker.                          Time Tracking:     PT Received On: 23  PT Start Time: 1120     PT Stop Time: 1136  PT Total Time (min): 16 min     Billable Minutes: Therapeutic Activity 16    Treatment Type: Treatment  PT/PTA: PTA     PTA Visit Number: 3     2023

## 2023-03-08 NOTE — PLAN OF CARE
Patient cleared for discharge from case management standpoint.    Follow up appointments scheduled and added to AVS. Spouse will self schedule hospital follow up appointments. CM sent referral to [facility] and [facility].    CM sent discharge orders to [facility]. Patient is established patient and will re admit with SOC 3/9/23    Chart and discharge orders reviewed.  Patient discharged home with [facility] home health no further case management needs.         03/08/23 1658   Final Note   Assessment Type Discharge Planning Assessment   Anticipated Discharge Disposition [facility] Resources/Appts/Education Provided Provided patient/caregiver with written discharge plan information   Post-Acute Status   Post-Acute Authorization Home Health   Post-Acute Placement Status Discharge Plan Changed   Home Health Status Set-up Complete/Auth obtained   Patient choice form signed by patient/caregiver List with quality metrics by geographic area provided   Discharge Delays (!) Personal Transportation

## 2023-03-08 NOTE — PLAN OF CARE
Problem: Infection  Goal: Absence of Infection Signs and Symptoms  Outcome: Met     Problem: Adult Inpatient Plan of Care  Goal: Plan of Care Review  Outcome: Met  Goal: Patient-Specific Goal (Individualized)  Outcome: Met  Goal: Absence of Hospital-Acquired Illness or Injury  Outcome: Met  Goal: Optimal Comfort and Wellbeing  Outcome: Met  Goal: Readiness for Transition of Care  Outcome: Met     Problem: Adjustment to Illness (Sepsis/Septic Shock)  Goal: Optimal Coping  Outcome: Met     Problem: Bleeding (Sepsis/Septic Shock)  Goal: Absence of Bleeding  Outcome: Met     Problem: Glycemic Control Impaired (Sepsis/Septic Shock)  Goal: Blood Glucose Level Within Desired Range  Outcome: Met     Problem: Infection Progression (Sepsis/Septic Shock)  Goal: Absence of Infection Signs and Symptoms  Outcome: Met     Problem: Nutrition Impaired (Sepsis/Septic Shock)  Goal: Optimal Nutrition Intake  Outcome: Met     Problem: Impaired Wound Healing  Goal: Optimal Wound Healing  Outcome: Met     Problem: Diabetes Comorbidity  Goal: Blood Glucose Level Within Targeted Range  Outcome: Met     Problem: Skin Injury Risk Increased  Goal: Skin Health and Integrity  Outcome: Met     Problem: UTI (Urinary Tract Infection)  Goal: Improved Infection Symptoms  Outcome: Met

## 2023-03-08 NOTE — PLAN OF CARE
[facility] offered Peer to peer prior to decision for Rehab. CM informed DR Lugo. Dr Lugo will call in for Peer to peer prior to deadline 12:30 pm.    1542- Peer to peer completed and denial upheld. CM contacted patients spouse, Galina to discuss discharge plan. Patient will discharge home with home health. CM obtained patient choice, referral sent to [facility].CM requested discharge orders. Patient medically cleared by Dr Lugo this am.

## 2023-03-08 NOTE — PROGRESS NOTES
[facility] Medicine  Progress Note    Patient name: Hiro Rodríguez  MRN: [id number]  Admit Date: 3/1/2023   LOS: 6 days     SUBJECTIVE:     Principal problem: Bacteremia due to Escherichia coli    Interval History:  Patient felt lightheaded when he was sitting in the chair today. SBP was in the 80s at that time.  He got back in bed but later was able to do some ambulation with PT when BP normalized.  Orthostatics checked and were positive.  He perceives he is eating and drinking well with no N/V or loose stool.  I have ordered a fluid bolus and compression stockings.    Summary:   77-year-old  male with paroxysmal atrial fibrillation, type 2 DM, hyperlipidemia sent to the ED from cardiologist's office secondary to AFib with RVR and hypotension.  Further workup revealed UTI which was thought to be the trigger for RVR.  Initiated on cefepime which has been switched to ceftriaxone.  Urine and blood cultures growing E coli.  Imaging revealed moderate left hydroureteronephrosis secondary to at least 2 calculi in the midportion of the left ureter.  Seen by Urology and underwent cystoscopy with left ureteral stent placement and removal of bladder stone.  We will need outpatient urology with Dr. Lange once out of rehab for definitive stone management. 3/7 found to be orthostatic.     Seen by PT/OT who recommended rehab at discharge.  CM consulted and  placement is in process.    Scheduled Meds:   amiodarone  100 mg Oral QAM    apixaban  5 mg Oral BID    atorvastatin  40 mg Oral Daily    cefTRIAXone (ROCEPHIN) IVPB  2 g Intravenous Q24H    cyproheptadine  4 mg Oral TID    docusate sodium  100 mg Oral BID    DULoxetine  30 mg Oral BID    magnesium oxide  400 mg Oral Daily    metoprolol tartrate  25 mg Oral BID    pantoprazole  40 mg Oral BID    polyethylene glycol  17 g Oral BID    potassium chloride  20 mEq Oral Daily    tamsulosin  0.8 mg Oral Daily    topiramate  50 mg Oral Daily     traZODone  50 mg Oral QHS     Continuous Infusions:  PRN Meds:acetaminophen, acetaminophen, dextrose 10%, dextrose 10%, glucagon (human recombinant), glucose, glucose, HYDROcodone-acetaminophen, insulin aspart U-100, magnesium oxide, magnesium oxide, melatonin, ondansetron, phenazopyridine, potassium bicarbonate, potassium bicarbonate, potassium bicarbonate, potassium, sodium phosphates, potassium, sodium phosphates, potassium, sodium phosphates, sodium chloride 0.9%    Review of patient's allergies indicates:  Not on File    Review of Systems: As per interval history    OBJECTIVE:     Vital Signs (Most Recent)  Temp: 97.8 °F (36.6 °C) (03/07/23 1646)  Pulse: 93 (03/07/23 1646)  Resp: 18 (03/07/23 1646)  BP: 131/86 (03/07/23 1646)  SpO2: 96 % (03/07/23 1646)    Vital Signs Range (Last 24H):  Temp:  [97.5 °F (36.4 °C)-98.8 °F (37.1 °C)]   Pulse:  []   Resp:  [18]   BP: ()/(63-99)   SpO2:  [94 %-98 %]     I & O (Last 24H):  Intake/Output Summary (Last 24 hours) at 3/7/2023 1845  Last data filed at 3/7/2023 1715  Gross per 24 hour   Intake 960 ml   Output --   Net 960 ml         Physical Exam:  General: Patient resting comfortably in no acute distress. Appears as stated age. Calm  Eyes: No conjunctival injection. No scleral icterus.  ENT:  Hard of hearing.  No discharge from ears. No nasal discharge.   Neck: Supple, trachea midline. No JVD  CVS:  No LE edema BL  Lungs:  No tachypnea or accessory muscle use.   Abdomen:  Nondistended  Neuro: AOx3. Moves all extremities. Follows commands. Responds appropriately   Skin:  No rash or erythema noted  :  Sotelo catheter    Laboratory:  I have reviewed all pertinent lab results within the past 24 hours.  CBC:   Recent Labs   Lab 03/04/23  0759   WBC 9.77   RBC 3.76*   HGB 11.2*   HCT 34.5*      MCV 92   MCH 29.8   MCHC 32.5       CMP:   Recent Labs   Lab 03/04/23  0759 03/05/23  0501   *  --    CALCIUM 7.3*  --    ALBUMIN 2.2*  --    PROT 4.9*  --       --    K 3.3* 3.8   CO2 25  --      --    BUN 12  --    CREATININE 0.5  --    ALKPHOS 49*  --    ALT 14  --    AST 21  --    BILITOT 0.4  --          Diagnostic Results:  Labs: Reviewed  CT: Reviewed    ASSESSMENT/PLAN:       Active Hospital Problems    Diagnosis  POA    *Bacteremia due to Escherichia coli [R78.81, B96.20]  Yes    Hydronephrosis of left kidney [N13.30]  Yes    Pyelonephritis of left kidney [N12]  Yes    Hypokalemia [E87.6]  Yes    Urinary retention [R33.9]  Yes    Hearing loss [H91.90]  Yes    Type 2 diabetes mellitus with diabetic polyneuropathy, without long-term current use of insulin [E11.42]  Yes    Hyperlipidemia [E78.5]  Yes    Paroxysmal atrial fibrillation [I48.0]  Yes    Hypertension, essential [I10]  Yes      Resolved Hospital Problems   No resolved problems to display.         Plan:   Bacteremia from E coli likely secondary to UTI and pyelonephritis  Change antibiotics to IV ceftriaxone 2 grams Q 24 hours - needs at least 10 days.  Could transition to bactrim as an oral alternative if facility not able to accommodate Rocephin.  Status post left ureteral stent placement and bladder stone removal on 03/02.  Needs outpatient Urology follow-up with Dr. Lange after rehab for definitive stone management    AFib with RVR - likely triggered from bacteremia   Currently rates are controlled  Continue metoprolol tartrate and amiodarone   Apixaban for anticoagulation    BPH with LUTS   Continue tamsulosin  Currently has Sotelo catheter     Orthostatic hypotension:   Fluid bolus, compression stockings ordered. AM labs ordered for completeness.  On a few medication that could be contributing (lopressor, Flomax) but unsure why manifesting now when he has been on these medication since 3/1 and these are home medication.    Hypokalemia noted - replete and monitor per protocol    Continue home medications for chronic medical conditions  Seen by PT/OT for weakness; recommended  rehabilitation at discharge. WOO kindly arranging.  He has been accepted by the facility but waiting on insurance authorization.      VTE Risk Mitigation (From admission, onward)           Ordered     apixaban tablet 5 mg  2 times daily         03/01/23 1942     Reason for No Pharmacological VTE Prophylaxis  Once        Question:  Reasons:  Answer:  Already adequately anticoagulated on oral Anticoagulants    03/01/23 1942     IP VTE HIGH RISK PATIENT  Once         03/01/23 1942     Place sequential compression device  Until discontinued         03/01/23 1942                        Department Hospital Medicine  [facility]  Danielle Lugo MD  Date of service: 03/07/2023

## 2023-03-09 ENCOUNTER — TELEPHONE (OUTPATIENT)
Dept: FAMILY MEDICINE | Facility: CLINIC | Age: 78
End: 2023-03-09
Payer: MEDICARE

## 2023-03-09 LAB
CALCIUM OXALATE DIHYDRATE MFR STONE IR: 20 %
CALCULI COMPOSITION: NORMAL
CALCULI DISCLAIMER: NORMAL
CALCULI, PLEASE NOTE: NORMAL
COLOR STONE: NORMAL
COM MFR STONE: 70 %
LABORATORY COMMENT REPORT: NORMAL
PHOTO: NORMAL
SIZE STONE: NORMAL MM
URATE MFR STONE: 10 %
WT STONE: 29 MG

## 2023-03-09 NOTE — NURSING
Patient discharged to home with wife. Transported via wheelchair to car with x1 nurse assist. VSS. Patient denies any pain. Discharge instructions reviewed with wife and patient. IV removed.

## 2023-03-09 NOTE — PT/OT/SLP DISCHARGE
Occupational Therapy Discharge Summary    Hiro Rodríguez  MRN: [id number]   Principal Problem: Bacteremia due to Escherichia coli      Patient Discharged from acute Occupational Therapy on 3/8/2023.  Please refer to prior OT note dated 3/8/2023 for functional status.    Assessment:      Patient has not met goals.    Objective:     GOALS:   Multidisciplinary Problems       Occupational Therapy Goals       Not on file              Multidisciplinary Problems (Resolved)          Problem: Occupational Therapy    Goal Priority Disciplines Outcome Interventions   Occupational Therapy Goal   (Resolved)     OT, PT/OT Met    Description: Goals to be met by: 4/2/2023     Patient will increase functional independence with ADLs by performing:    UE Dressing with Minimal Assistance.  LE Dressing with Minimal Assistance.  Grooming while seated with Stand-by Assistance.  Toileting from bedside commode with Minimal Assistance for hygiene and clothing management.   Sitting at edge of bed x10 minutes with Stand-by Assistance.  Supine to sit with Stand-by Assistance.  Toilet transfer to bedside commode with Stand-by Assistance.  Upper extremity exercise program x10 reps per handout, with assistance as needed.                         Reasons for Discontinuation of Therapy Services  Patient d/c home.       Plan:     Patient Discharged to: Home with Home Health Service    3/9/2023

## 2023-03-09 NOTE — TELEPHONE ENCOUNTER
----- Message from Shelli Jules sent at 3/9/2023  9:33 AM CST -----  Type:  Sooner Apoointment Request    Caller is requesting a sooner appointment.  Caller declined first available appointment listed below.  Caller will not accept being placed on the waitlist and is requesting a message be sent to doctor.  Name of Caller:Galina (wife)  When is the first available appointment?none   Symptoms:hospital f/u   Would the patient rather a call back or a response via [employer]? Call  Best Call Back Number:[phone]  Additional Information: none

## 2023-03-09 NOTE — PLAN OF CARE
03/09/23 1616   Final Note   Assessment Type Final Discharge Note   Anticipated Discharge Disposition Home-Health

## 2023-03-09 NOTE — TELEPHONE ENCOUNTER
----- Message from Ayana Garza sent at 3/8/2023  4:00 PM CST -----  Contact: pt  Type: Needs Medical Advice         Who Called: pt  Best Call Back Number:[phone]  Additional Information: Requesting a call back regarding  pt needs hospital follow up. Pt is being discharged from [facility] 03/08.   Please Advise- Thank you

## 2023-03-09 NOTE — HOSPITAL COURSE
77-year-old  male with paroxysmal atrial fibrillation, type 2 DM, hyperlipidemia sent to the ED from cardiologist's office secondary to AFib with RVR and hypotension.  Further workup revealed UTI which was thought to be the trigger for RVR.  Initiated on cefepime which has been switched to ceftriaxone.  Urine and blood cultures growing E coli.  Imaging revealed moderate left hydroureteronephrosis secondary to at least 2 calculi in the midportion of the left ureter.  Seen by Urology and underwent cystoscopy with left ureteral stent placement and removal of bladder stone.  We will need outpatient urology with Dr. Lange once out of rehab for definitive stone management. 3/7 found to be orthostatic. IVFs given and compression stockings placed and this improved but did not resolve.  Orthostatic precautions and strategies discussed as I believe this could have been why he was hypotensive in the Cardiology's office which led him to presenting to theED. I cautioned this may persist indefinitely as does not seem to be just a simple volume depletion mediated. He was able to work with therapy on day of discharge without any issue with lightheadedness.  Therapy had recommended rehab and a good portion of the hospital say was waiting for an accepting facility and insurance auth.  A peer to peer was performed and his insurance denied IPR on the grounds that he was ambulating 70 feet with mild assist, did not have the medical complexity that required a face to face visit with a physician three days a week and no need for frequent lab checks. CM discussed with his wife and dispo changed to home with home health.  HH arranged.  He has completed  8 days of IV Abx.  I have provided a rx for 2 more days of Bactrim based on sensitivities and he will have completed a 10 day course of abx.  He will follow up with Urology and PCP.  Examined on day of discharge and alert, NAD, comfortable respirations on room air.  Return  precautions given.

## 2023-03-09 NOTE — DISCHARGE SUMMARY
[facility]  Discharge Summary      Patient Name: Hiro Rodríguez  MRN: [id number]  RADHA: [id number]  Patient Class: IP- Inpatient  Admission Date: 3/1/2023  Hospital Length of Stay: 7 days  Discharge Date and Time: 3/8/2023  7:30 PM  Attending Physician: No att. providers found   Discharging Provider: Danielle Lugo MD  Primary Care Provider: Gautam Castanon III, MD    Primary Care Team: Networked reference to record PCT     HPI:   No notes on file    Procedure(s) (LRB):  CYSTOSCOPY, WITH URETERAL STENT INSERTION (N/A)  REMOVAL, CALCULUS, BLADDER      Hospital Course:   77-year-old  male with paroxysmal atrial fibrillation, type 2 DM, hyperlipidemia sent to the ED from cardiologist's office secondary to AFib with RVR and hypotension.  Further workup revealed UTI which was thought to be the trigger for RVR.  Initiated on cefepime which has been switched to ceftriaxone.  Urine and blood cultures growing E coli.  Imaging revealed moderate left hydroureteronephrosis secondary to at least 2 calculi in the midportion of the left ureter.  Seen by Urology and underwent cystoscopy with left ureteral stent placement and removal of bladder stone.  We will need outpatient urology with Dr. Lange once out of rehab for definitive stone management. 3/7 found to be orthostatic. IVFs given and compression stockings placed and this improved but did not resolve.  Orthostatic precautions and strategies discussed as I believe this could have been why he was hypotensive in the Cardiology's office which led him to presenting to theED. I cautioned this may persist indefinitely as does not seem to be just a simple volume depletion mediated. He was able to work with therapy on day of discharge without any issue with lightheadedness.  Therapy had recommended rehab and a good portion of the hospital say was waiting for an accepting facility and insurance auth.  A peer to peer was performed and  his insurance denied IPR on the grounds that he was ambulating 70 feet with mild assist, did not have the medical complexity that required a face to face visit with a physician three days a week and no need for frequent lab checks. CM discussed with his wife and dispo changed to home with home health.  HH arranged.  He has completed  8 days of IV Abx.  I have provided a rx for 2 more days of Bactrim based on sensitivities and he will have completed a 10 day course of abx.  He will follow up with Urology and PCP.  Examined on day of discharge and alert, NAD, comfortable respirations on room air.  Return precautions given.            Goals of Care Treatment Preferences:  Code Status: Full Code      Consults:   Consults (From admission, onward)        Status Ordering Provider     Inpatient consult to Urology  Once        Provider:  (Not yet assigned)    Completed ALONSO HERNANDEZ          No new Assessment & Plan notes have been filed under this hospital service since the last note was generated.  Service: Hospital Medicine    Final Active Diagnoses:    Diagnosis Date Noted POA    PRINCIPAL PROBLEM:  Bacteremia due to Escherichia coli [R78.81, B96.20] 03/02/2023 Yes    Hydronephrosis of left kidney [N13.30] 03/02/2023 Yes    Pyelonephritis of left kidney [N12] 03/02/2023 Yes    Hypokalemia [E87.6] 03/01/2023 Yes    Urinary retention [R33.9] 01/23/2023 Yes    Hearing loss [H91.90] 07/18/2021 Yes    Type 2 diabetes mellitus with diabetic polyneuropathy, without long-term current use of insulin [E11.42] 01/06/2021 Yes    Hyperlipidemia [E78.5] 01/06/2021 Yes    Paroxysmal atrial fibrillation [I48.0] 08/31/2020 Yes    Hypertension, essential [I10] 08/31/2020 Yes      Problems Resolved During this Admission:       Discharged Condition: good    [place] [facility]    Follow Up:   Follow-up Information     [facility] Follow up.    Specialties: Home Health Services, Home  Therapy Services, Home Living Aide Services  Contact information:  [address] [facility] [address]  [phone]           Gautam Castanon III, MD Follow up.    Specialty: Family Medicine  Contact information:  [address] [facility]  [address]  [place] [address]  [phone]             Yoshi Lange MD. Schedule an appointment as soon as possible for a visit in 2 week(s).    Specialty: Urology  Why: post hospital discharge follow up for ureteral stone  Contact information:  [address] [facility]  [address]  [place] [address]  [phone]                       Patient Instructions:      Ambulatory referral/consult to [facility]   Standing Status: Future   Referral Priority: Routine Referral Type: Consultation   Referral Reason: Specialty Services Required   Number of Visits Requested: 1     Ambulatory referral/consult to Outpatient Case Management   Referral Priority: Routine Referral Type: Consultation   Referral Reason: Specialty Services Required   Number of Visits Requested: 1     Ambulatory referral/consult to Home Health   Standing Status: Future   Referral Priority: Routine Referral Type: Home Health Care   Referral Reason: Specialty Services Required   Requested Specialty: Home Health Services   Number of Visits Requested: 1     Diet diabetic     Activity as tolerated       Significant Diagnostic Studies: Labs:   CMP   Recent Labs   Lab 03/08/23  0457      K 3.8   *   CO2 24   *   BUN 9   CREATININE 0.5   CALCIUM 8.1*   PROT 5.3*   ALBUMIN 2.5*   BILITOT 0.4   ALKPHOS 57   AST 20   ALT 17   ANIONGAP 7*    and CBC   Recent Labs   Lab 03/08/23  0457   WBC 9.49   HGB 12.2*   HCT 37.4*          Pending Diagnostic Studies:     Procedure Component Value Units Date/Time    Urinary Stone Analysis [[id number]] Collected: 03/02/23 1453    Order Status: Sent Lab Status: In process Updated: 03/03/23 1400    Specimen: Stone          Medications:  Reconciled Home  Medications:      Medication List      START taking these medications    sulfamethoxazole-trimethoprim 800-160mg 800-160 mg Tab  Commonly known as: BACTRIM DS  Take 1 tablet by mouth 2 (two) times daily. for 2 days        CHANGE how you take these medications    metFORMIN 500 MG ER 24hr tablet  Commonly known as: GLUCOPHAGE-XR  TAKE 1 TABLET BY MOUTH EVERY DAY  What changed: when to take this     rosuvastatin 20 MG tablet  Commonly known as: CRESTOR  TAKE 1 TABLET BY MOUTH EVERY DAY  What changed:   · when to take this  · additional instructions     topiramate 50 MG tablet  Commonly known as: TOPAMAX  TAKE 1 TABLET BY MOUTH EVERY DAY  What changed: when to take this        CONTINUE taking these medications    acetaminophen 325 MG tablet  Commonly known as: TYLENOL  Take 650 mg by mouth every 6 (six) hours as needed.     amiodarone 100 MG Tab  Commonly known as: PACERONE  Take 100 mg by mouth every morning.     apixaban 5 mg Tab  Commonly known as: ELIQUIS  Take 1 tablet (5 mg total) by mouth 2 (two) times daily.     cholecalciferol (vitamin D3) 50 mcg (2,000 unit) Tab  Commonly known as: VITAMIN D3  Take 1 tablet by mouth once daily.     cyanocobalamin (vitamin B-12) 1,000 mcg Subl  Place 1,000 mcg under the tongue 2 (two) times a day.     cyproheptadine 4 mg tablet  Commonly known as: PERIACTIN  Take 1 tablet (4 mg total) by mouth 3 (three) times daily.     docusate sodium 100 MG capsule  Commonly known as: COLACE  Take 100 mg by mouth 2 (two) times daily.     DULoxetine 30 MG capsule  Commonly known as: CYMBALTA  Take 1 capsule (30 mg total) by mouth 2 (two) times daily.     ferrous gluconate 236 mg (27 mg iron) Tab  Take 240 mg by mouth once daily.     FIBER-CAPS (CA POLYCARBOPHIL) ORAL  Take 1 tablet by mouth once daily.     HYDROcodone-acetaminophen  mg per tablet  Commonly known as: NORCO  Take 1 tablet by mouth 4 (four) times daily as needed.     insulin aspart U-100 100 unit/mL (3 mL) Inpn  pen  Commonly known as: NovoLOG  Inject 1-10 Units into the skin before meals and at bedtime as needed (Hyperglycemia).     lisinopriL 20 MG tablet  Commonly known as: PRINIVIL,ZESTRIL  Take 20 mg by mouth 2 (two) times daily.     magnesium 250 mg Tab  Take 250 mg by mouth 2 (two) times a day.     metoprolol tartrate 25 MG tablet  Commonly known as: LOPRESSOR  Take 1 tablet (25 mg total) by mouth 2 (two) times daily.     omega-3 acid ethyl esters 1 gram capsule  Commonly known as: LOVAZA  TAKE 2 CAPSULES BY MOUTH 2 TIMES DAILY.     pantoprazole 40 MG tablet  Commonly known as: PROTONIX  Take 40 mg by mouth 2 (two) times daily.     polyethylene glycol 17 gram/dose powder  Commonly known as: GLYCOLAX  Take 17 g by mouth daily as needed.     tamsulosin 0.4 mg Cap  Commonly known as: FLOMAX  Take 2 capsules (0.8 mg total) by mouth once daily.     tolterodine 4 MG 24 hr capsule  Commonly known as: DETROL LA  Take 1 capsule (4 mg total) by mouth once daily.     traZODone 50 MG tablet  Commonly known as: DESYREL  TAKE 1 TABLET BY MOUTH EVERY EVENING.        ASK your doctor about these medications    phenazopyridine 100 MG tablet  Commonly known as: PYRIDIUM  Take 1 tablet (100 mg total) by mouth 3 (three) times daily as needed for Pain.     potassium chloride 10 MEQ Tbsr  Commonly known as: KLOR-CON  Take 2 tablets (20 mEq total) by mouth once daily.            Indwelling Lines/Drains at time of discharge:   Lines/Drains/Airways     None                 Time spent on the discharge of patient: 35 minutes         Danielle Lugo MD  Department of Hospital Medicine  [facility]

## 2023-03-13 ENCOUNTER — TELEPHONE (OUTPATIENT)
Dept: FAMILY MEDICINE | Facility: CLINIC | Age: 78
End: 2023-03-13

## 2023-03-13 NOTE — TELEPHONE ENCOUNTER
----- Message from Hilda Mckeonon sent at 3/13/2023  4:10 PM CDT -----  Contact: self  Type:  Needs Medical Advice    Who Called: Pt  Symptoms (please be specific): Pt would like fasting lab orders put in system   How long has patient had these symptoms:  n/a     Would the patient rather a call back or a response via [facility]? call  Best Call Back Number: [phone]    Additional Information: Pt would like to be tested for A1C, PSA, UTI mainly, prior to visit...     Please call pt to advise...    Thank you...

## 2023-03-16 ENCOUNTER — OFFICE VISIT (OUTPATIENT)
Dept: FAMILY MEDICINE | Facility: CLINIC | Age: 78
End: 2023-03-16
Payer: MEDICARE

## 2023-03-16 ENCOUNTER — HOSPITAL ENCOUNTER (INPATIENT)
Facility: HOSPITAL | Age: 78
LOS: 8 days | Discharge: NURSING, REHAB OR LONG-TERM CARE | DRG: 308 | End: 2023-03-24
Attending: EMERGENCY MEDICINE | Admitting: INTERNAL MEDICINE
Payer: MEDICARE

## 2023-03-16 VITALS
OXYGEN SATURATION: 94 % | BODY MASS INDEX: 32.13 KG/M2 | HEIGHT: 70 IN | WEIGHT: 224.44 LBS | HEART RATE: 108 BPM | SYSTOLIC BLOOD PRESSURE: 80 MMHG | DIASTOLIC BLOOD PRESSURE: 40 MMHG

## 2023-03-16 DIAGNOSIS — E11.42 TYPE 2 DIABETES MELLITUS WITH DIABETIC POLYNEUROPATHY, WITHOUT LONG-TERM CURRENT USE OF INSULIN: ICD-10-CM

## 2023-03-16 DIAGNOSIS — I95.1 ORTHOSTATIC HYPOTENSION: ICD-10-CM

## 2023-03-16 DIAGNOSIS — N39.0 COMPLICATED UTI (URINARY TRACT INFECTION): ICD-10-CM

## 2023-03-16 DIAGNOSIS — N20.0 NEPHROLITHIASIS: ICD-10-CM

## 2023-03-16 DIAGNOSIS — Z09 HOSPITAL DISCHARGE FOLLOW-UP: Primary | ICD-10-CM

## 2023-03-16 DIAGNOSIS — R78.81 BACTEREMIA: ICD-10-CM

## 2023-03-16 DIAGNOSIS — R39.198 INCREASING RESIDUAL URINE: ICD-10-CM

## 2023-03-16 DIAGNOSIS — I95.89 OTHER SPECIFIED HYPOTENSION: ICD-10-CM

## 2023-03-16 DIAGNOSIS — R78.81 BACTEREMIA DUE TO ESCHERICHIA COLI: ICD-10-CM

## 2023-03-16 DIAGNOSIS — A41.9 SEPSIS, DUE TO UNSPECIFIED ORGANISM, UNSPECIFIED WHETHER ACUTE ORGAN DYSFUNCTION PRESENT: ICD-10-CM

## 2023-03-16 DIAGNOSIS — I48.91 ATRIAL FIBRILLATION WITH RAPID VENTRICULAR RESPONSE: Primary | ICD-10-CM

## 2023-03-16 DIAGNOSIS — N13.30 HYDRONEPHROSIS OF LEFT KIDNEY: ICD-10-CM

## 2023-03-16 DIAGNOSIS — R07.9 CHEST PAIN: ICD-10-CM

## 2023-03-16 DIAGNOSIS — I95.9 HYPOTENSION, UNSPECIFIED HYPOTENSION TYPE: ICD-10-CM

## 2023-03-16 DIAGNOSIS — B96.20 BACTEREMIA DUE TO ESCHERICHIA COLI: ICD-10-CM

## 2023-03-16 DIAGNOSIS — R53.1 WEAKNESS: ICD-10-CM

## 2023-03-16 PROBLEM — E66.9 OBESITY (BMI 30.0-34.9): Status: ACTIVE | Noted: 2023-03-16

## 2023-03-16 PROBLEM — E66.811 OBESITY (BMI 30.0-34.9): Status: ACTIVE | Noted: 2023-03-16

## 2023-03-16 PROBLEM — K21.9 GERD (GASTROESOPHAGEAL REFLUX DISEASE): Status: ACTIVE | Noted: 2023-03-16

## 2023-03-16 LAB
ALBUMIN SERPL BCP-MCNC: 3.4 G/DL (ref 3.5–5.2)
ALP SERPL-CCNC: 65 U/L (ref 55–135)
ALT SERPL W/O P-5'-P-CCNC: 18 U/L (ref 10–44)
ANION GAP SERPL CALC-SCNC: 8 MMOL/L (ref 8–16)
APTT BLDCRRT: 26.1 SEC (ref 21–32)
AST SERPL-CCNC: 23 U/L (ref 10–40)
BASOPHILS # BLD AUTO: 0.05 K/UL (ref 0–0.2)
BASOPHILS NFR BLD: 0.4 % (ref 0–1.9)
BILIRUB SERPL-MCNC: 0.8 MG/DL (ref 0.1–1)
BNP SERPL-MCNC: 238 PG/ML (ref 0–99)
BUN SERPL-MCNC: 18 MG/DL (ref 8–23)
CALCIUM SERPL-MCNC: 9.3 MG/DL (ref 8.7–10.5)
CHLORIDE SERPL-SCNC: 101 MMOL/L (ref 95–110)
CO2 SERPL-SCNC: 28 MMOL/L (ref 23–29)
CREAT SERPL-MCNC: 0.9 MG/DL (ref 0.5–1.4)
CRP SERPL-MCNC: 0.86 MG/DL
DIFFERENTIAL METHOD: ABNORMAL
EOSINOPHIL # BLD AUTO: 0.1 K/UL (ref 0–0.5)
EOSINOPHIL NFR BLD: 0.5 % (ref 0–8)
ERYTHROCYTE [DISTWIDTH] IN BLOOD BY AUTOMATED COUNT: 16 % (ref 11.5–14.5)
ERYTHROCYTE [SEDIMENTATION RATE] IN BLOOD BY WESTERGREN METHOD: 22 MM/HR (ref 0–10)
EST. GFR  (NO RACE VARIABLE): >60 ML/MIN/1.73 M^2
GLUCOSE SERPL-MCNC: 108 MG/DL (ref 70–110)
GLUCOSE SERPL-MCNC: 154 MG/DL (ref 70–110)
HCT VFR BLD AUTO: 45.1 % (ref 40–54)
HGB BLD-MCNC: 14.4 G/DL (ref 14–18)
IMM GRANULOCYTES # BLD AUTO: 0.07 K/UL (ref 0–0.04)
IMM GRANULOCYTES NFR BLD AUTO: 0.5 % (ref 0–0.5)
INR PPP: 1.1 (ref 0.8–1.2)
LACTATE SERPL-SCNC: 1.6 MMOL/L (ref 0.5–1.9)
LACTATE SERPL-SCNC: 2.3 MMOL/L (ref 0.5–1.9)
LYMPHOCYTES # BLD AUTO: 1.8 K/UL (ref 1–4.8)
LYMPHOCYTES NFR BLD: 13.8 % (ref 18–48)
MAGNESIUM SERPL-MCNC: 1.7 MG/DL (ref 1.6–2.6)
MCH RBC QN AUTO: 29.9 PG (ref 27–31)
MCHC RBC AUTO-ENTMCNC: 31.9 G/DL (ref 32–36)
MCV RBC AUTO: 94 FL (ref 82–98)
MONOCYTES # BLD AUTO: 0.7 K/UL (ref 0.3–1)
MONOCYTES NFR BLD: 5.6 % (ref 4–15)
NEUTROPHILS # BLD AUTO: 10.4 K/UL (ref 1.8–7.7)
NEUTROPHILS NFR BLD: 79.2 % (ref 38–73)
NRBC BLD-RTO: 0 /100 WBC
PLATELET # BLD AUTO: 386 K/UL (ref 150–450)
PMV BLD AUTO: 9.5 FL (ref 9.2–12.9)
POTASSIUM SERPL-SCNC: 3.8 MMOL/L (ref 3.5–5.1)
PROCALCITONIN SERPL IA-MCNC: <0.05 NG/ML (ref 0–0.5)
PROT SERPL-MCNC: 6.6 G/DL (ref 6–8.4)
PROTHROMBIN TIME: 11.5 SEC (ref 9–12.5)
RBC # BLD AUTO: 4.82 M/UL (ref 4.6–6.2)
SODIUM SERPL-SCNC: 137 MMOL/L (ref 136–145)
TROPONIN I SERPL HS-MCNC: 15.3 PG/ML (ref 0–14.9)
TROPONIN I SERPL HS-MCNC: 9.3 PG/ML (ref 0–14.9)
WBC # BLD AUTO: 13.15 K/UL (ref 3.9–12.7)

## 2023-03-16 PROCEDURE — 84145 PROCALCITONIN (PCT): CPT | Performed by: NURSE PRACTITIONER

## 2023-03-16 PROCEDURE — 1160F PR REVIEW ALL MEDS BY PRESCRIBER/CLIN PHARMACIST DOCUMENTED: ICD-10-PCS | Mod: CPTII,S$GLB,, | Performed by: NURSE PRACTITIONER

## 2023-03-16 PROCEDURE — 93005 ELECTROCARDIOGRAM TRACING: CPT | Performed by: INTERNAL MEDICINE

## 2023-03-16 PROCEDURE — 1111F PR DISCHARGE MEDS RECONCILED W/ CURRENT OUTPATIENT MED LIST: ICD-10-PCS | Mod: CPTII,S$GLB,, | Performed by: NURSE PRACTITIONER

## 2023-03-16 PROCEDURE — 86140 C-REACTIVE PROTEIN: CPT | Performed by: NURSE PRACTITIONER

## 2023-03-16 PROCEDURE — 93010 EKG 12-LEAD: ICD-10-PCS | Mod: ,,, | Performed by: INTERNAL MEDICINE

## 2023-03-16 PROCEDURE — 1100F PTFALLS ASSESS-DOCD GE2>/YR: CPT | Mod: CPTII,S$GLB,, | Performed by: NURSE PRACTITIONER

## 2023-03-16 PROCEDURE — 3078F PR MOST RECENT DIASTOLIC BLOOD PRESSURE < 80 MM HG: ICD-10-PCS | Mod: CPTII,S$GLB,, | Performed by: NURSE PRACTITIONER

## 2023-03-16 PROCEDURE — 87154 CUL TYP ID BLD PTHGN 6+ TRGT: CPT | Performed by: NURSE PRACTITIONER

## 2023-03-16 PROCEDURE — 87077 CULTURE AEROBIC IDENTIFY: CPT | Performed by: NURSE PRACTITIONER

## 2023-03-16 PROCEDURE — 85610 PROTHROMBIN TIME: CPT | Performed by: EMERGENCY MEDICINE

## 2023-03-16 PROCEDURE — 1125F PR PAIN SEVERITY QUANTIFIED, PAIN PRESENT: ICD-10-PCS | Mod: CPTII,S$GLB,, | Performed by: NURSE PRACTITIONER

## 2023-03-16 PROCEDURE — 3288F PR FALLS RISK ASSESSMENT DOCUMENTED: ICD-10-PCS | Mod: CPTII,S$GLB,, | Performed by: NURSE PRACTITIONER

## 2023-03-16 PROCEDURE — 85730 THROMBOPLASTIN TIME PARTIAL: CPT | Performed by: EMERGENCY MEDICINE

## 2023-03-16 PROCEDURE — 84484 ASSAY OF TROPONIN QUANT: CPT | Performed by: EMERGENCY MEDICINE

## 2023-03-16 PROCEDURE — 96374 THER/PROPH/DIAG INJ IV PUSH: CPT

## 2023-03-16 PROCEDURE — 84484 ASSAY OF TROPONIN QUANT: CPT | Mod: 91 | Performed by: NURSE PRACTITIONER

## 2023-03-16 PROCEDURE — 1111F DSCHRG MED/CURRENT MED MERGE: CPT | Mod: CPTII,S$GLB,, | Performed by: NURSE PRACTITIONER

## 2023-03-16 PROCEDURE — 3074F SYST BP LT 130 MM HG: CPT | Mod: CPTII,S$GLB,, | Performed by: NURSE PRACTITIONER

## 2023-03-16 PROCEDURE — 1160F RVW MEDS BY RX/DR IN RCRD: CPT | Mod: CPTII,S$GLB,, | Performed by: NURSE PRACTITIONER

## 2023-03-16 PROCEDURE — 85651 RBC SED RATE NONAUTOMATED: CPT | Performed by: NURSE PRACTITIONER

## 2023-03-16 PROCEDURE — 3288F FALL RISK ASSESSMENT DOCD: CPT | Mod: CPTII,S$GLB,, | Performed by: NURSE PRACTITIONER

## 2023-03-16 PROCEDURE — 82962 GLUCOSE BLOOD TEST: CPT

## 2023-03-16 PROCEDURE — 20000000 HC ICU ROOM

## 2023-03-16 PROCEDURE — 83880 ASSAY OF NATRIURETIC PEPTIDE: CPT | Performed by: EMERGENCY MEDICINE

## 2023-03-16 PROCEDURE — 3074F PR MOST RECENT SYSTOLIC BLOOD PRESSURE < 130 MM HG: ICD-10-PCS | Mod: CPTII,S$GLB,, | Performed by: NURSE PRACTITIONER

## 2023-03-16 PROCEDURE — 83605 ASSAY OF LACTIC ACID: CPT | Performed by: EMERGENCY MEDICINE

## 2023-03-16 PROCEDURE — 1159F MED LIST DOCD IN RCRD: CPT | Mod: CPTII,S$GLB,, | Performed by: NURSE PRACTITIONER

## 2023-03-16 PROCEDURE — 63600175 PHARM REV CODE 636 W HCPCS: Performed by: EMERGENCY MEDICINE

## 2023-03-16 PROCEDURE — 85025 COMPLETE CBC W/AUTO DIFF WBC: CPT | Performed by: EMERGENCY MEDICINE

## 2023-03-16 PROCEDURE — 80053 COMPREHEN METABOLIC PANEL: CPT | Performed by: EMERGENCY MEDICINE

## 2023-03-16 PROCEDURE — 25000003 PHARM REV CODE 250: Performed by: EMERGENCY MEDICINE

## 2023-03-16 PROCEDURE — 93010 ELECTROCARDIOGRAM REPORT: CPT | Mod: ,,, | Performed by: INTERNAL MEDICINE

## 2023-03-16 PROCEDURE — 96361 HYDRATE IV INFUSION ADD-ON: CPT

## 2023-03-16 PROCEDURE — 99213 PR OFFICE/OUTPT VISIT, EST, LEVL III, 20-29 MIN: ICD-10-PCS | Mod: S$GLB,,, | Performed by: NURSE PRACTITIONER

## 2023-03-16 PROCEDURE — 99285 EMERGENCY DEPT VISIT HI MDM: CPT | Mod: 25

## 2023-03-16 PROCEDURE — 83605 ASSAY OF LACTIC ACID: CPT | Mod: 91 | Performed by: NURSE PRACTITIONER

## 2023-03-16 PROCEDURE — 83735 ASSAY OF MAGNESIUM: CPT | Performed by: EMERGENCY MEDICINE

## 2023-03-16 PROCEDURE — 87186 SC STD MICRODIL/AGAR DIL: CPT | Performed by: NURSE PRACTITIONER

## 2023-03-16 PROCEDURE — 99999 PR PBB SHADOW E&M-EST. PATIENT-LVL V: CPT | Mod: PBBFAC,,, | Performed by: NURSE PRACTITIONER

## 2023-03-16 PROCEDURE — 99999 PR PBB SHADOW E&M-EST. PATIENT-LVL V: ICD-10-PCS | Mod: PBBFAC,,, | Performed by: NURSE PRACTITIONER

## 2023-03-16 PROCEDURE — 99213 OFFICE O/P EST LOW 20 MIN: CPT | Mod: S$GLB,,, | Performed by: NURSE PRACTITIONER

## 2023-03-16 PROCEDURE — 1125F AMNT PAIN NOTED PAIN PRSNT: CPT | Mod: CPTII,S$GLB,, | Performed by: NURSE PRACTITIONER

## 2023-03-16 PROCEDURE — 87040 BLOOD CULTURE FOR BACTERIA: CPT | Mod: 59 | Performed by: NURSE PRACTITIONER

## 2023-03-16 PROCEDURE — 1159F PR MEDICATION LIST DOCUMENTED IN MEDICAL RECORD: ICD-10-PCS | Mod: CPTII,S$GLB,, | Performed by: NURSE PRACTITIONER

## 2023-03-16 PROCEDURE — 3078F DIAST BP <80 MM HG: CPT | Mod: CPTII,S$GLB,, | Performed by: NURSE PRACTITIONER

## 2023-03-16 PROCEDURE — 1100F PR PT FALLS ASSESS DOC 2+ FALLS/FALL W/INJURY/YR: ICD-10-PCS | Mod: CPTII,S$GLB,, | Performed by: NURSE PRACTITIONER

## 2023-03-16 PROCEDURE — 63600175 PHARM REV CODE 636 W HCPCS: Performed by: NURSE PRACTITIONER

## 2023-03-16 RX ORDER — SODIUM,POTASSIUM PHOSPHATES 280-250MG
2 POWDER IN PACKET (EA) ORAL
Status: DISCONTINUED | OUTPATIENT
Start: 2023-03-16 | End: 2023-03-24 | Stop reason: HOSPADM

## 2023-03-16 RX ORDER — NALOXONE HCL 0.4 MG/ML
0.02 VIAL (ML) INJECTION
Status: DISCONTINUED | OUTPATIENT
Start: 2023-03-16 | End: 2023-03-24 | Stop reason: HOSPADM

## 2023-03-16 RX ORDER — SODIUM CHLORIDE, SODIUM LACTATE, POTASSIUM CHLORIDE, CALCIUM CHLORIDE 600; 310; 30; 20 MG/100ML; MG/100ML; MG/100ML; MG/100ML
INJECTION, SOLUTION INTRAVENOUS CONTINUOUS
Status: DISCONTINUED | OUTPATIENT
Start: 2023-03-16 | End: 2023-03-17

## 2023-03-16 RX ORDER — MORPHINE SULFATE 2 MG/ML
2 INJECTION, SOLUTION INTRAMUSCULAR; INTRAVENOUS EVERY 4 HOURS PRN
Status: DISCONTINUED | OUTPATIENT
Start: 2023-03-16 | End: 2023-03-17

## 2023-03-16 RX ORDER — LANOLIN ALCOHOL/MO/W.PET/CERES
800 CREAM (GRAM) TOPICAL
Status: DISCONTINUED | OUTPATIENT
Start: 2023-03-16 | End: 2023-03-24 | Stop reason: HOSPADM

## 2023-03-16 RX ORDER — INSULIN ASPART 100 [IU]/ML
0-5 INJECTION, SOLUTION INTRAVENOUS; SUBCUTANEOUS
Status: DISCONTINUED | OUTPATIENT
Start: 2023-03-16 | End: 2023-03-24 | Stop reason: HOSPADM

## 2023-03-16 RX ORDER — HYDROCODONE BITARTRATE AND ACETAMINOPHEN 5; 325 MG/1; MG/1
1 TABLET ORAL
Status: COMPLETED | OUTPATIENT
Start: 2023-03-16 | End: 2023-03-16

## 2023-03-16 RX ORDER — TALC
6 POWDER (GRAM) TOPICAL NIGHTLY PRN
Status: DISCONTINUED | OUTPATIENT
Start: 2023-03-16 | End: 2023-03-24 | Stop reason: HOSPADM

## 2023-03-16 RX ORDER — IBUPROFEN 200 MG
24 TABLET ORAL
Status: DISCONTINUED | OUTPATIENT
Start: 2023-03-16 | End: 2023-03-24 | Stop reason: HOSPADM

## 2023-03-16 RX ORDER — AMIODARONE HYDROCHLORIDE 150 MG/3ML
150 INJECTION, SOLUTION INTRAVENOUS
Status: COMPLETED | OUTPATIENT
Start: 2023-03-16 | End: 2023-03-16

## 2023-03-16 RX ORDER — SODIUM CHLORIDE 0.9 % (FLUSH) 0.9 %
10 SYRINGE (ML) INJECTION EVERY 12 HOURS PRN
Status: DISCONTINUED | OUTPATIENT
Start: 2023-03-16 | End: 2023-03-24 | Stop reason: HOSPADM

## 2023-03-16 RX ORDER — ACETAMINOPHEN 325 MG/1
650 TABLET ORAL EVERY 4 HOURS PRN
Status: DISCONTINUED | OUTPATIENT
Start: 2023-03-16 | End: 2023-03-24 | Stop reason: HOSPADM

## 2023-03-16 RX ORDER — IBUPROFEN 200 MG
16 TABLET ORAL
Status: DISCONTINUED | OUTPATIENT
Start: 2023-03-16 | End: 2023-03-24 | Stop reason: HOSPADM

## 2023-03-16 RX ORDER — PANTOPRAZOLE SODIUM 40 MG/1
40 TABLET, DELAYED RELEASE ORAL
Status: DISCONTINUED | OUTPATIENT
Start: 2023-03-17 | End: 2023-03-24 | Stop reason: HOSPADM

## 2023-03-16 RX ORDER — GLUCAGON 1 MG
1 KIT INJECTION
Status: DISCONTINUED | OUTPATIENT
Start: 2023-03-16 | End: 2023-03-24 | Stop reason: HOSPADM

## 2023-03-16 RX ADMIN — AMIODARONE HYDROCHLORIDE 150 MG: 50 INJECTION, SOLUTION INTRAVENOUS at 04:03

## 2023-03-16 RX ADMIN — HYDROCODONE BITARTRATE AND ACETAMINOPHEN 1 TABLET: 5; 325 TABLET ORAL at 04:03

## 2023-03-16 RX ADMIN — SODIUM CHLORIDE 500 ML: 0.9 INJECTION, SOLUTION INTRAVENOUS at 06:03

## 2023-03-16 RX ADMIN — CEFTRIAXONE 2 G: 2 INJECTION, SOLUTION INTRAVENOUS at 07:03

## 2023-03-16 RX ADMIN — AMIODARONE HYDROCHLORIDE 1 MG/MIN: 1.8 INJECTION, SOLUTION INTRAVENOUS at 04:03

## 2023-03-16 RX ADMIN — AMIODARONE HYDROCHLORIDE 1 MG/MIN: 1.8 INJECTION, SOLUTION INTRAVENOUS at 09:03

## 2023-03-16 RX ADMIN — SODIUM CHLORIDE, SODIUM LACTATE, POTASSIUM CHLORIDE, AND CALCIUM CHLORIDE: .6; .31; .03; .02 INJECTION, SOLUTION INTRAVENOUS at 11:03

## 2023-03-16 NOTE — ED PROVIDER NOTES
Encounter Date: 3/16/2023       History     Chief Complaint   Patient presents with    Hypotension     At clinic today and found to be hypotensive and is currently being treated for UTI     This is a 78 year old male with history of paroxysmal afib, recent admission for sepsis, UTI, bacteremia, obstructing ureteral stone comes in from [facility] for recurrent hypotension. Patient reports that he was in clinic and began to feel generally weak. They checked his BP and it was low and he was tachycardic. Patient reports that he felt generally weak. He denies any focal weakness or numbness. No chest pain or shortness of breath. No dysuria or hematuria. No exacerbating or alleviating factors.    Review of patient's allergies indicates:  No Known Allergies  Past Medical History:   Diagnosis Date    CHF (congestive heart failure)     Hypertension      Past Surgical History:   Procedure Laterality Date    CYSTOSCOPY W/ URETERAL STENT PLACEMENT N/A 3/2/2023    Procedure: CYSTOSCOPY, WITH URETERAL STENT INSERTION;  Surgeon: Yoshi Lange MD;  Location: [facility] OR;  Service: Urology;  Laterality: N/A;    FRACTURE SURGERY      INTRAMEDULLARY RODDING OF TROCHANTER OF FEMUR Left 11/10/2022    Procedure: INSERTION, ITRAMEDULLARY SANTI, FEMUR, TROCHANTER;  Surgeon: Richard Phoenix MD;  Location: [facility] OR;  Service: Orthopedics;  Laterality: Left;    REMOVAL, CALCULUS, BLADDER  3/2/2023    Procedure: REMOVAL, CALCULUS, BLADDER;  Surgeon: Yoshi Lange MD;  Location: [facility];  Service: Urology;;     No family history on file.  Social History     Tobacco Use    Smoking status: Never    Smokeless tobacco: Never   Substance Use Topics    Alcohol use: Yes     Alcohol/week: 1.0 standard drink     Types: 1 Shots of liquor per week    Drug use: Not Currently     Review of Systems   Constitutional:  Negative for chills and fever.   HENT:  Negative for congestion, sore throat and trouble swallowing.    Respiratory:   Negative for cough and shortness of breath.    Cardiovascular:  Negative for chest pain and palpitations.   Gastrointestinal:  Negative for abdominal pain, diarrhea, nausea and vomiting.   Genitourinary:  Negative for dysuria and flank pain.   Musculoskeletal:  Negative for back pain and neck pain.   Neurological:  Positive for weakness. Negative for numbness and headaches.   Psychiatric/Behavioral:  Negative for agitation and confusion.    All other systems reviewed and are negative.    Physical Exam     Initial Vitals [03/16/23 1517]   BP Pulse Resp Temp SpO2   (!) 122/95 90 18 98.8 °F (37.1 °C) 96 %      MAP       --         Physical Exam    Nursing note and vitals reviewed.  Constitutional: Vital signs are normal. He appears well-developed and well-nourished.  Non-toxic appearance. No distress.   HENT:   Head: Normocephalic and atraumatic.   Mouth/Throat: Oropharynx is clear and moist.   Eyes: Conjunctivae and EOM are normal. Pupils are equal, round, and reactive to light.   Neck: Neck supple.   Normal range of motion.  Cardiovascular:  Intact distal pulses.           Irregularly irregular and tachycardic   Pulmonary/Chest: Breath sounds normal. He has no wheezes.   Abdominal: Abdomen is soft. Bowel sounds are normal. There is no abdominal tenderness.   Musculoskeletal:         General: No tenderness or edema. Normal range of motion.      Cervical back: Normal range of motion and neck supple. No rigidity. No muscular tenderness.     Lymphadenopathy:     He has no cervical adenopathy.     He has no axillary adenopathy.   Neurological: He is alert and oriented to person, place, and time. He has normal strength. No cranial nerve deficit or sensory deficit. Gait normal.   Skin: Skin is warm, dry and intact.   Psychiatric: He has a normal mood and affect. His behavior is normal.       ED Course   Critical Care    Date/Time: 3/16/2023 6:20 PM  Performed by: Leatha Shelton MD  Authorized by: Leatha Shelton MD    Direct patient critical care time: 10 minutes  Additional history critical care time: 10 minutes  Ordering / reviewing critical care time: 10 minutes  Documentation critical care time: 5 minutes  Consulting other physicians critical care time: 5 minutes  Total critical care time (exclusive of procedural time) : 40 minutes  Critical care time was exclusive of separately billable procedures and treating other patients.  Critical care was necessary to treat or prevent imminent or life-threatening deterioration of the following conditions: sepsis and cardiac failure.  Critical care was time spent personally by me on the following activities: blood draw for specimens, development of treatment plan with patient or surrogate, discussions with consultants, examination of patient, evaluation of patient's response to treatment, interpretation of cardiac output measurements, obtaining history from patient or surrogate, ordering and performing treatments and interventions, ordering and review of laboratory studies, ordering and review of radiographic studies, pulse oximetry, re-evaluation of patient's condition and review of old charts.      Labs Reviewed   CBC W/ AUTO DIFFERENTIAL - Abnormal; Notable for the following components:       Result Value    WBC 13.15 (*)     MCHC 31.9 (*)     RDW 16.0 (*)     Gran # (ANC) 10.4 (*)     Immature Grans (Abs) 0.07 (*)     Gran % 79.2 (*)     Lymph % 13.8 (*)     All other components within normal limits   COMPREHENSIVE METABOLIC PANEL - Abnormal; Notable for the following components:    Glucose 154 (*)     Albumin 3.4 (*)     All other components within normal limits   B-TYPE NATRIURETIC PEPTIDE - Abnormal; Notable for the following components:     (*)     All other components within normal limits   LACTIC ACID, PLASMA - Abnormal; Notable for the following components:    Lactate (Lactic Acid) 2.3 (*)     All other components within normal limits    Narrative:     LA critical  result(s) called and verbal readback obtained from Zoe Rothman RN/ED  by JB8 03/16/2023 16:53   TROPONIN I HIGH SENSITIVITY   MAGNESIUM   PROTIME-INR   APTT   URINALYSIS, REFLEX TO URINE CULTURE     EKG Readings: (Independently Interpreted)   Time: 1525  Rate: 97 bpm  AFib.  Incomplete right bundle-branch block.    Anterior, inferior and lateral T-wave flattening and inversions.    These are less pronounced than prior EKG.   ECG Results              EKG 12-lead (In process)  Result time 03/16/23 15:51:23      In process by Interface, Lab In [facility] (03/16/23 15:51:23)                   Narrative:    Test Reason : R53.1,    Vent. Rate : 097 BPM     Atrial Rate : 104 BPM     P-R Int : 000 ms          QRS Dur : 094 ms      QT Int : 342 ms       P-R-T Axes : 000 033 194 degrees     QTc Int : 434 ms    Atrial fibrillation  Incomplete right bundle branch block  ST and T wave abnormality, consider inferolateral ischemia  Abnormal ECG  When compared with ECG of 01-MAR-2023 15:27,  Incomplete right bundle branch block is now Present  Criteria for Septal infarct are no longer Present    Referred By: [email or web address]   SELF           Confirmed By:                                   Imaging Results              X-Ray Chest 1 View (Final result)  Result time 03/16/23 16:05:53      Final result by Viet Matt MD (03/16/23 16:05:53)                   Narrative:    CLINICAL HISTORY:  78 years (1945) Male Hypotension w/ weakness    TECHNIQUE:  Portable AP radiograph the chest.    COMPARISON:  Radiograph from March 1, 2023.    FINDINGS:  Mildly increased central hilar interstitial opacities are seen bilaterally suggestive of either mild edema, atypical infection/pneumonia or bronchitis in the appropriate clinical setting.  There is blunting of the left costophrenic angle consistent with a trace pleural effusion. No pneumothorax is identified. The heart is mildly enlarged. Osseous structures appear within normal limits. The  visualized upper abdomen is unremarkable.    IMPRESSION:  Cardiomegaly and findings suggestive of mild pulmonary vascular congestion/trace edema.                  .            Electronically signed by:  Viet Matt MD  3/16/2023 4:05 PM CDT Workstation: [id number]                                     Medications   amiodarone 360 mg/200 mL (1.8 mg/mL) infusion (1 mg/min Intravenous New Bag 3/16/23 1627)   sodium chloride 0.9% bolus 500 mL 500 mL (500 mLs Intravenous New Bag 3/16/23 1819)   cefTRIAXone (ROCEPHIN) 2 g/50 mL D5W IVPB (has no administration in time range)   amiodarone injection 150 mg (150 mg Intravenous Given 3/16/23 1623)   HYDROcodone-acetaminophen 5-325 mg per tablet 1 tablet (1 tablet Oral Given 3/16/23 1624)     Medical Decision Making:   Initial Assessment:   This is a 78-year-old male with history of paroxysmal AFib, recent admission for sepsis and bacteremia secondary to UTI kidney stones comes in sent from [facility] for hypotension and generalized weakness.  On evaluation here patient is normotensive.  He was apparently hypotensive in clinic.  He is orthostatic.  He has an irregularly irregular rhythm and is tachycardic.  He is neurologically intact.    Orders included EKG, CBC, CMP, troponin, BNP, chest x-ray.  Lactic acid and UA were ordered.    Comorbidities contributing to patient's presentation include his history of paroxysmal AFib and recent sepsis and recurrent UTIs.  Exacerbation of chronic conditions include patient is currently in AFib with RVR.  Differential Diagnosis:   Sepsis, bacteremia, dehydration, electrolyte abnormality, AFib with RVR, orthostasis.  Independently Interpreted Test(s):   I have ordered and independently interpreted X-rays - see summary below.       <> Summary of X-Ray Reading(s): Chest x-ray was independently reviewed by me and was significant for cardiomegaly and mild pulmonary vascular congestion.  I have ordered and independently  interpreted EKG Reading(s) - see prior notes  Clinical Tests:   Lab Tests: Ordered and Reviewed       <> Summary of Lab: Labs are reviewed are significant for an elevated white count and elevated lactic acid.  BNP was minimally elevated.  UA was still pending.  ED Management:  I reviewed patient's external records.  Patient was discharged on March 9th after admission for bacteremia and sepsis.  Additionally I reviewed his echo from November of 2022.  His EF was normal at that time.    Patient's EKG showed AFib with RVR.  He is on oral amiodarone.  It is unclear in the light of the normal EF why amiodarone was the drug of choice that he was discharged on.  However since he is already on oral amiodarone he was started on amiodarone IV.  He was given a bolus and started on a drip.  On re-evaluation his heart rate normalized.  UA was still pending but likely is the source of his infection.  He will be admitted for continued treatment.  Of note he was given broad-spectrum antibiotics for fluids were held and were only given in small amounts secondary to concern over volume overload.                        Clinical Impression:   Final diagnoses:  [R53.1] Weakness  [I48.91] Atrial fibrillation with rapid ventricular response (Primary)  [A41.9] Sepsis, due to unspecified organism, unspecified whether acute organ dysfunction present  [I95.9] Hypotension, unspecified hypotension type        ED Disposition Condition    Admit Stable                Leatha Shelton MD  03/16/23 5192

## 2023-03-16 NOTE — HPI
Hiro Rodríguez is a 78 y.o. male with a history as  has a past medical history of CHF (congestive heart failure) and Hypertension. who presented to the ED with a Hypotension (At clinic today and found to be hypotensive and is currently being treated for UTI)    Patient presents to the emergency room from PCPs office.  Patient tells me he was at his normal PCP checkup.  Reports they were attempting to get his vital signs and was unable to obtain a blood pressure however noted elevated heart rate.  Patient reported at that time he was feeling faint and weak as if he was going to pass out.  So he was brought here to the emergency room.      Patient further reports over last 3 months he has been in and out of the hospital for various different reasons including kidney stones, orthostatic hypotension, blood infection.  He reports his health continues to decline over these past 3 months.     Denies fever, chills, diaphoresis, SOB, dizziness, HA, chest pain, palpitations, NVD, recent trauma or any other associated symptoms.     Lab and imaging obtained and reviewed. CBC shows WBCs 13.15 MCHC 31.9 RDW 16.0 g% 79.2 l% 13.8. CMP shows glucose 154 alb 3.4. . EKG shows a-fib rate controlled on amiodarone gtt. Lactate 2.3.  On admit, afebrile HR 90 RR 18 /95 Sats 96% on RA. CXR shows cardiomegaly and findings suggestive of mild pulmonary vascular congestion/trace edema.       Per ED provider, patient on presentation noted to be in AFib RVR, amiodarone drip initiated.  Also noted to have elevated white count with low blood pressure, sepsis protocol initiated.  Given patient recent admission with urosepsis E.Coli sensitive to rocephin, IV ABX initiated.  Will admit to ICU for management of AFib RVR in sepsis with possible urosepsis.

## 2023-03-16 NOTE — PROGRESS NOTES
Subjective:       Patient ID: Hiro Rodríguez is a 78 y.o. male.    Chief Complaint: hospital follow up     HPI   79 y/o male patient with medical problems listed below presents for hospital discharge follow up.     Admission Date: 3/1/2023 at [facility]  Hospital Length of Stay: 7 days  Discharge Date and Time: 3/8/2023  7:30 PM    Hospital Course  Patient with PAF (apixaban), HTN, DM 2, Morbid Obesity, s/p right hip ORIFsent to the ED from cardiologist's office secondary to AFib with RVR and hypotension. Further workup revealed UTI which was thought to be the trigger for RVR. Initiated on cefepime which has been switched to ceftriaxone.  Urine and blood cultures growing E coli.  Imaging revealed moderate left hydroureteronephrosis secondary to at least 2 calculi in the midportion of the left ureter. Seen by Urology and underwent cystoscopy with left ureteral stent placement and removal of bladder stone. Discharged with urology outpatient follow up and home care service. 3/7 found to be orthostatic. IVFs given and compression stockings placed and this improved but did not resolve. Orthostatic precautions and strategies discussed.    START taking these medications    sulfamethoxazole-trimethoprim 800-160mg 800-160 mg Tab  Commonly known as: BACTRIM DS  Take 1 tablet by mouth 2 (two) times daily. for 2 days          CHANGE how you take these medications    metFORMIN 500 MG ER 24hr tablet  Commonly known as: GLUCOPHAGE-XR  TAKE 1 TABLET BY MOUTH EVERY DAY  What changed: when to take this      rosuvastatin 20 MG tablet  Commonly known as: CRESTOR  TAKE 1 TABLET BY MOUTH EVERY DAY  What changed:   when to take this  additional instructions      topiramate 50 MG tablet  Commonly known as: TOPAMAX  TAKE 1 TABLET BY MOUTH EVERY DAY  What changed: when to take this          CONTINUE taking these medications    acetaminophen 325 MG tablet  Commonly known as: TYLENOL  Take 650 mg by mouth every 6 (six) hours as needed.       amiodarone 100 MG Tab  Commonly known as: PACERONE  Take 100 mg by mouth every morning.      apixaban 5 mg Tab  Commonly known as: ELIQUIS  Take 1 tablet (5 mg total) by mouth 2 (two) times daily.      cholecalciferol (vitamin D3) 50 mcg (2,000 unit) Tab  Commonly known as: VITAMIN D3  Take 1 tablet by mouth once daily.      cyanocobalamin (vitamin B-12) 1,000 mcg Subl  Place 1,000 mcg under the tongue 2 (two) times a day.      cyproheptadine 4 mg tablet  Commonly known as: PERIACTIN  Take 1 tablet (4 mg total) by mouth 3 (three) times daily.      docusate sodium 100 MG capsule  Commonly known as: COLACE  Take 100 mg by mouth 2 (two) times daily.      DULoxetine 30 MG capsule  Commonly known as: CYMBALTA  Take 1 capsule (30 mg total) by mouth 2 (two) times daily.      ferrous gluconate 236 mg (27 mg iron) Tab  Take 240 mg by mouth once daily.      FIBER-CAPS (CA POLYCARBOPHIL) ORAL  Take 1 tablet by mouth once daily.      HYDROcodone-acetaminophen  mg per tablet  Commonly known as: NORCO  Take 1 tablet by mouth 4 (four) times daily as needed.      insulin aspart U-100 100 unit/mL (3 mL) Inpn pen  Commonly known as: NovoLOG  Inject 1-10 Units into the skin before meals and at bedtime as needed (Hyperglycemia).      lisinopriL 20 MG tablet  Commonly known as: PRINIVIL,ZESTRIL  Take 20 mg by mouth 2 (two) times daily.      magnesium 250 mg Tab  Take 250 mg by mouth 2 (two) times a day.      metoprolol tartrate 25 MG tablet  Commonly known as: LOPRESSOR  Take 1 tablet (25 mg total) by mouth 2 (two) times daily.      omega-3 acid ethyl esters 1 gram capsule  Commonly known as: LOVAZA  TAKE 2 CAPSULES BY MOUTH 2 TIMES DAILY.      pantoprazole 40 MG tablet  Commonly known as: PROTONIX  Take 40 mg by mouth 2 (two) times daily.      polyethylene glycol 17 gram/dose powder  Commonly known as: GLYCOLAX  Take 17 g by mouth daily as needed.      tamsulosin 0.4 mg Cap  Commonly known as: FLOMAX  Take 2 capsules (0.8 mg  total) by mouth once daily.      tolterodine 4 MG 24 hr capsule  Commonly known as: DETROL LA  Take 1 capsule (4 mg total) by mouth once daily.      traZODone 50 MG tablet  Commonly known as: DESYREL  TAKE 1 TABLET BY MOUTH EVERY EVENING.          ASK your doctor about these medications    phenazopyridine 100 MG tablet  Commonly known as: PYRIDIUM  Take 1 tablet (100 mg total) by mouth 3 (three) times daily as needed for Pain.      potassium chloride 10 MEQ Tbsr  Commonly known as: KLOR-CON  Take 2 tablets (20 mEq total) by mouth once daily.     Today patient was accompanied by spouse for hospital discharge follow up. He states he is going to faint and weak since this morning. Noted BP was 60/40. FSG in clinic was 180. He states takes all medication as instructed. He states completed antibiotic but still did not feel improved.     Labs reviewed from 3/2023    Patient Active Problem List   Diagnosis    Type 2 diabetes mellitus with diabetic nephropathy, without long-term current use of insulin    Morbid obesity    Anticoagulated    Paroxysmal atrial fibrillation    Aspirin long-term use    Hypertension, essential    Hyperlipidemia    Type 2 diabetes mellitus with diabetic polyneuropathy, without long-term current use of insulin    Benign essential tremor    Chronic heart failure with preserved ejection fraction    Hearing loss    Insomnia    Skin cancer    Status post knee replacement    BMI 38.0-38.9,adult    Subdural hematoma caused by concussion    Subarachnoid bleed    Closed fracture of left hip    Prerenal azotemia    Subdural hemorrhage following injury without open intracranial wound and with no loss of consciousness    Uses walker    Benign prostatic hyperplasia with urinary retention    Severe sepsis    Pressure injury of buttock, stage 1    Urinary retention    Sepsis    Urinary tract infection associated with indwelling urethral catheter    Discharge planning issues    Hypokalemia    Hard to intubate     Bacteremia due to Escherichia coli    Hydronephrosis of left kidney    Pyelonephritis of left kidney      Review of patient's allergies indicates:  No Known Allergies    Past Surgical History:   Procedure Laterality Date    CYSTOSCOPY W/ URETERAL STENT PLACEMENT N/A 3/2/2023    Procedure: CYSTOSCOPY, WITH URETERAL STENT INSERTION;  Surgeon: Yoshi Lange MD;  Location: [facility];  Service: Urology;  Laterality: N/A;    FRACTURE SURGERY      INTRAMEDULLARY RODDING OF TROCHANTER OF FEMUR Left 11/10/2022    Procedure: INSERTION, ITRAMEDULLARY SANTI, FEMUR, TROCHANTER;  Surgeon: Richard Phoenix MD;  Location: [facility];  Service: Orthopedics;  Laterality: Left;    REMOVAL, CALCULUS, BLADDER  3/2/2023    Procedure: REMOVAL, CALCULUS, BLADDER;  Surgeon: Yoshi Lange MD;  Location: [facility];  Service: Urology;;      No current facility-administered medications for this visit.    Current Outpatient Medications:     acetaminophen (TYLENOL) 325 MG tablet, Take 650 mg by mouth every 6 (six) hours as needed., Disp: , Rfl:     amiodarone (PACERONE) 100 MG Tab, Take 100 mg by mouth every morning., Disp: , Rfl:     apixaban (ELIQUIS) 5 mg Tab, Take 1 tablet (5 mg total) by mouth 2 (two) times daily., Disp: 180 tablet, Rfl: 1    calcium polycarbophil (FIBER-CAPS, CA POLYCARBOPHIL, ORAL), Take 1 tablet by mouth once daily., Disp: , Rfl:     cholecalciferol, vitamin D3, (VITAMIN D3) 50 mcg (2,000 unit) Tab, Take 1 tablet by mouth once daily., Disp: , Rfl:     cyanocobalamin, vitamin B-12, 1,000 mcg Subl, Place 1,000 mcg under the tongue 2 (two) times a day., Disp: , Rfl:     cyproheptadine (PERIACTIN) 4 mg tablet, Take 1 tablet (4 mg total) by mouth 3 (three) times daily., Disp: , Rfl: 0    docusate sodium (COLACE) 100 MG capsule, Take 100 mg by mouth 2 (two) times daily., Disp: , Rfl:     DULoxetine (CYMBALTA) 30 MG capsule, Take 1 capsule (30 mg total) by mouth 2 (two) times daily., Disp: 90 capsule, Rfl: 1    ferrous  gluconate 236 mg (27 mg iron) Tab, Take 240 mg by mouth once daily., Disp: , Rfl:     HYDROcodone-acetaminophen (NORCO)  mg per tablet, Take 1 tablet by mouth 4 (four) times daily as needed., Disp: , Rfl:     insulin aspart U-100 (NOVOLOG) 100 unit/mL (3 mL) InPn pen, Inject 1-10 Units into the skin before meals and at bedtime as needed (Hyperglycemia)., Disp: , Rfl: 0    lisinopriL (PRINIVIL,ZESTRIL) 20 MG tablet, Take 20 mg by mouth 2 (two) times daily., Disp: , Rfl:     magnesium 250 mg Tab, Take 250 mg by mouth 2 (two) times a day., Disp: , Rfl:     metFORMIN (GLUCOPHAGE-XR) 500 MG ER 24hr tablet, TAKE 1 TABLET BY MOUTH EVERY DAY (Patient taking differently: Take 500 mg by mouth 2 (two) times a day.), Disp: 90 tablet, Rfl: 1    metoprolol tartrate (LOPRESSOR) 25 MG tablet, Take 1 tablet (25 mg total) by mouth 2 (two) times daily., Disp: 180 tablet, Rfl: 1    omega-3 acid ethyl esters (LOVAZA) 1 gram capsule, TAKE 2 CAPSULES BY MOUTH 2 TIMES DAILY. (Patient taking differently: Take 2 g by mouth 2 (two) times daily.), Disp: 360 capsule, Rfl: 1    pantoprazole (PROTONIX) 40 MG tablet, Take 40 mg by mouth 2 (two) times daily., Disp: , Rfl:     polyethylene glycol (GLYCOLAX) 17 gram/dose powder, Take 17 g by mouth daily as needed., Disp: , Rfl:     rosuvastatin (CRESTOR) 20 MG tablet, TAKE 1 TABLET BY MOUTH EVERY DAY (Patient taking differently: Take 20 mg by mouth once daily. TAKE 1 TABLET BY MOUTH EVERY DAY), Disp: 90 tablet, Rfl: 1    semaglutide (OZEMPIC) 1 mg/dose (4 mg/3 mL), Inject 1 mg into the skin every 7 days., Disp: 4 pen, Rfl: 5    tamsulosin (FLOMAX) 0.4 mg Cap, Take 2 capsules (0.8 mg total) by mouth once daily., Disp: 60 capsule, Rfl: 11    tolterodine (DETROL LA) 4 MG 24 hr capsule, Take 1 capsule (4 mg total) by mouth once daily., Disp: 90 capsule, Rfl: 1    topiramate (TOPAMAX) 50 MG tablet, TAKE 1 TABLET BY MOUTH EVERY DAY (Patient taking differently: Take 50 mg by mouth once daily.), Disp:  "90 tablet, Rfl: 1    traZODone (DESYREL) 50 MG tablet, TAKE 1 TABLET BY MOUTH EVERY EVENING. (Patient taking differently: Take 50 mg by mouth every evening.), Disp: 90 tablet, Rfl: 1    phenazopyridine (PYRIDIUM) 100 MG tablet, Take 1 tablet (100 mg total) by mouth 3 (three) times daily as needed for Pain. (Patient not taking: Reported on 3/1/2023), Disp: 12 tablet, Rfl: 0    potassium chloride SA (KLOR-CON) 10 MEQ TbSR, Take 2 tablets (20 mEq total) by mouth once daily. (Patient not taking: Reported on 3/1/2023), Disp: , Rfl:     Facility-Administered Medications Ordered in Other Visits:     amiodarone 360 mg/200 mL (1.8 mg/mL) infusion, 1 mg/min, Intravenous, Continuous, Leatha Shelton MD, Last Rate: 33.3 mL/hr at 03/16/23 1627, 1 mg/min at 03/16/23 1627    Lab Results   Component Value Date    WBC 13.15 (H) 03/16/2023    HGB 14.4 03/16/2023    HCT 45.1 03/16/2023     03/16/2023    CHOL 176 07/07/2022    TRIG 200 (H) 07/07/2022    HDL 36 (L) 07/07/2022    ALT 18 03/16/2023    AST 23 03/16/2023     03/16/2023    K 3.8 03/16/2023     03/16/2023    CREATININE 0.9 03/16/2023    BUN 18 03/16/2023    CO2 28 03/16/2023    TSH 1.870 12/31/2022    PSA 0.85 10/26/2021    INR 1.1 03/16/2023    HGBA1C 5.8 03/02/2023    MICROALBUR 3.2 02/13/2020     Review of Systems   Constitutional:  Negative for chills and fever.   Respiratory:  Negative for chest tightness and shortness of breath.    Cardiovascular:  Negative for chest pain and palpitations.   Gastrointestinal:  Negative for abdominal pain.   Neurological:  Positive for dizziness and weakness. Negative for headaches.       Objective:   BP (!) 80/40   Pulse 108   Ht 5' 10" (1.778 m)   Wt 101.8 kg (224 lb 6.9 oz)   SpO2 (!) 94%   BMI 32.20 kg/m²         Physical Exam  Vitals reviewed.   Constitutional:       General: He is not in acute distress.     Appearance: Normal appearance. He is ill-appearing.      Comments: Patient is sitting on wheelchair "   Cardiovascular:      Rate and Rhythm: Normal rate. Rhythm irregular.      Pulses: Normal pulses.      Heart sounds: Normal heart sounds.   Pulmonary:      Effort: Pulmonary effort is normal.      Breath sounds: Normal breath sounds.   Chest:      Chest wall: No deformity, swelling or edema.   Abdominal:      General: Abdomen is flat. Bowel sounds are normal.      Palpations: Abdomen is soft.   Musculoskeletal:      Cervical back: Normal range of motion.   Skin:     General: Skin is warm and dry.   Neurological:      General: No focal deficit present.      Mental Status: He is alert.      Motor: Weakness present.   Psychiatric:         Mood and Affect: Mood normal.         Behavior: Behavior normal.       Assessment:       1. Hospital discharge follow-up    2. Bacteremia due to Escherichia coli    3. Hydronephrosis of left kidney    4. Type 2 diabetes mellitus with diabetic polyneuropathy, without long-term current use of insulin    5. Other specified hypotension        Plan:       1. Hospital discharge follow-up    2. Bacteremia due to Escherichia coli    3. Hydronephrosis of left kidney    4. Type 2 diabetes mellitus with diabetic polyneuropathy, without long-term current use of insulin  - POCT Glucose, Hand-Held Device    5. Other specified hypotension  - Patient appeared ill and pale, alert & oriented x 3   - BP 60/40 and repeated BP 80/40  - FS  - Sent patient to ED via EMS concerning for hypotension and for the evaluation of possible uro sepsis    Ranjith SOTO

## 2023-03-17 PROBLEM — I27.20 PULMONARY HYPERTENSION: Chronic | Status: ACTIVE | Noted: 2023-03-17

## 2023-03-17 PROBLEM — E11.42 TYPE 2 DIABETES MELLITUS WITH DIABETIC POLYNEUROPATHY, WITHOUT LONG-TERM CURRENT USE OF INSULIN: Chronic | Status: ACTIVE | Noted: 2021-01-06

## 2023-03-17 PROBLEM — I95.9 HYPOTENSION: Status: ACTIVE | Noted: 2023-03-17

## 2023-03-17 PROBLEM — I95.1 ORTHOSTATIC HYPOTENSION: Status: ACTIVE | Noted: 2023-03-17

## 2023-03-17 LAB
ALBUMIN SERPL BCP-MCNC: 2.9 G/DL (ref 3.5–5.2)
ALP SERPL-CCNC: 57 U/L (ref 55–135)
ALT SERPL W/O P-5'-P-CCNC: 14 U/L (ref 10–44)
ANION GAP SERPL CALC-SCNC: 13 MMOL/L (ref 8–16)
APTT BLDCRRT: 28.6 SEC (ref 21–32)
AST SERPL-CCNC: 14 U/L (ref 10–40)
BACTERIA #/AREA URNS HPF: ABNORMAL /HPF
BASOPHILS # BLD AUTO: 0.05 K/UL (ref 0–0.2)
BASOPHILS NFR BLD: 0.4 % (ref 0–1.9)
BILIRUB SERPL-MCNC: 0.3 MG/DL (ref 0.1–1)
BILIRUB UR QL STRIP: NEGATIVE
BUN SERPL-MCNC: 21 MG/DL (ref 8–23)
CALCIUM SERPL-MCNC: 8.9 MG/DL (ref 8.7–10.5)
CHLORIDE SERPL-SCNC: 103 MMOL/L (ref 95–110)
CLARITY UR: ABNORMAL
CO2 SERPL-SCNC: 25 MMOL/L (ref 23–29)
COLOR UR: ABNORMAL
CREAT SERPL-MCNC: 0.6 MG/DL (ref 0.5–1.4)
DIFFERENTIAL METHOD: ABNORMAL
EOSINOPHIL # BLD AUTO: 0.1 K/UL (ref 0–0.5)
EOSINOPHIL NFR BLD: 1 % (ref 0–8)
ERYTHROCYTE [DISTWIDTH] IN BLOOD BY AUTOMATED COUNT: 16.1 % (ref 11.5–14.5)
EST. GFR  (NO RACE VARIABLE): >60 ML/MIN/1.73 M^2
FOLATE SERPL-MCNC: 4 NG/ML (ref 4–24)
GLUCOSE SERPL-MCNC: 103 MG/DL (ref 70–110)
GLUCOSE SERPL-MCNC: 107 MG/DL (ref 70–110)
GLUCOSE SERPL-MCNC: 147 MG/DL (ref 70–110)
GLUCOSE SERPL-MCNC: 94 MG/DL (ref 70–110)
GLUCOSE UR QL STRIP: NEGATIVE
HCT VFR BLD AUTO: 38 % (ref 40–54)
HGB BLD-MCNC: 12.2 G/DL (ref 14–18)
HGB UR QL STRIP: ABNORMAL
HYALINE CASTS #/AREA URNS LPF: 26 /LPF
IMM GRANULOCYTES # BLD AUTO: 0.03 K/UL (ref 0–0.04)
IMM GRANULOCYTES NFR BLD AUTO: 0.3 % (ref 0–0.5)
INR PPP: 1.1 (ref 0.8–1.2)
KETONES UR QL STRIP: NEGATIVE
LACTATE SERPL-SCNC: 1.4 MMOL/L (ref 0.5–1.9)
LACTATE SERPL-SCNC: 1.4 MMOL/L (ref 0.5–1.9)
LEUKOCYTE ESTERASE UR QL STRIP: ABNORMAL
LYMPHOCYTES # BLD AUTO: 2.5 K/UL (ref 1–4.8)
LYMPHOCYTES NFR BLD: 21.5 % (ref 18–48)
MAGNESIUM SERPL-MCNC: 1.6 MG/DL (ref 1.6–2.6)
MCH RBC QN AUTO: 29.8 PG (ref 27–31)
MCHC RBC AUTO-ENTMCNC: 32.1 G/DL (ref 32–36)
MCV RBC AUTO: 93 FL (ref 82–98)
MICROSCOPIC COMMENT: ABNORMAL
MONOCYTES # BLD AUTO: 0.9 K/UL (ref 0.3–1)
MONOCYTES NFR BLD: 7.7 % (ref 4–15)
NEUTROPHILS # BLD AUTO: 8.1 K/UL (ref 1.8–7.7)
NEUTROPHILS NFR BLD: 69.1 % (ref 38–73)
NITRITE UR QL STRIP: POSITIVE
NRBC BLD-RTO: 0 /100 WBC
PH UR STRIP: 6 [PH] (ref 5–8)
PHOSPHATE SERPL-MCNC: 3.9 MG/DL (ref 2.7–4.5)
PLATELET # BLD AUTO: 273 K/UL (ref 150–450)
PMV BLD AUTO: 9.8 FL (ref 9.2–12.9)
POTASSIUM SERPL-SCNC: 3.6 MMOL/L (ref 3.5–5.1)
PROT SERPL-MCNC: 5.8 G/DL (ref 6–8.4)
PROT UR QL STRIP: ABNORMAL
PROTHROMBIN TIME: 11.5 SEC (ref 9–12.5)
RBC # BLD AUTO: 4.1 M/UL (ref 4.6–6.2)
RBC #/AREA URNS HPF: >100 /HPF (ref 0–4)
SODIUM SERPL-SCNC: 141 MMOL/L (ref 136–145)
SP GR UR STRIP: 1.02 (ref 1–1.03)
SQUAMOUS #/AREA URNS HPF: 1 /HPF
TROPONIN I SERPL HS-MCNC: 11.2 PG/ML (ref 0–14.9)
URN SPEC COLLECT METH UR: ABNORMAL
UROBILINOGEN UR STRIP-ACNC: NEGATIVE EU/DL
VIT B12 SERPL-MCNC: >1500 PG/ML (ref 210–950)
WBC # BLD AUTO: 11.7 K/UL (ref 3.9–12.7)
WBC #/AREA URNS HPF: >100 /HPF (ref 0–5)

## 2023-03-17 PROCEDURE — 83605 ASSAY OF LACTIC ACID: CPT | Performed by: NURSE PRACTITIONER

## 2023-03-17 PROCEDURE — 63600175 PHARM REV CODE 636 W HCPCS: Mod: TB,JG | Performed by: HOSPITALIST

## 2023-03-17 PROCEDURE — 84100 ASSAY OF PHOSPHORUS: CPT | Performed by: NURSE PRACTITIONER

## 2023-03-17 PROCEDURE — 25000003 PHARM REV CODE 250: Performed by: INTERNAL MEDICINE

## 2023-03-17 PROCEDURE — 25000003 PHARM REV CODE 250

## 2023-03-17 PROCEDURE — 82746 ASSAY OF FOLIC ACID SERUM: CPT | Performed by: NURSE PRACTITIONER

## 2023-03-17 PROCEDURE — 81001 URINALYSIS AUTO W/SCOPE: CPT | Performed by: NURSE PRACTITIONER

## 2023-03-17 PROCEDURE — 80053 COMPREHEN METABOLIC PANEL: CPT | Performed by: NURSE PRACTITIONER

## 2023-03-17 PROCEDURE — 63600175 PHARM REV CODE 636 W HCPCS: Performed by: NURSE PRACTITIONER

## 2023-03-17 PROCEDURE — 63600175 PHARM REV CODE 636 W HCPCS: Performed by: INTERNAL MEDICINE

## 2023-03-17 PROCEDURE — 99223 PR INITIAL HOSPITAL CARE,LEVL III: ICD-10-PCS | Mod: ,,, | Performed by: INTERNAL MEDICINE

## 2023-03-17 PROCEDURE — 25000003 PHARM REV CODE 250: Performed by: NURSE PRACTITIONER

## 2023-03-17 PROCEDURE — 87077 CULTURE AEROBIC IDENTIFY: CPT | Performed by: NURSE PRACTITIONER

## 2023-03-17 PROCEDURE — 36415 COLL VENOUS BLD VENIPUNCTURE: CPT | Performed by: NURSE PRACTITIONER

## 2023-03-17 PROCEDURE — 99223 1ST HOSP IP/OBS HIGH 75: CPT | Mod: ,,, | Performed by: INTERNAL MEDICINE

## 2023-03-17 PROCEDURE — 94761 N-INVAS EAR/PLS OXIMETRY MLT: CPT

## 2023-03-17 PROCEDURE — 87086 URINE CULTURE/COLONY COUNT: CPT | Performed by: NURSE PRACTITIONER

## 2023-03-17 PROCEDURE — 85730 THROMBOPLASTIN TIME PARTIAL: CPT | Performed by: NURSE PRACTITIONER

## 2023-03-17 PROCEDURE — 84484 ASSAY OF TROPONIN QUANT: CPT | Performed by: NURSE PRACTITIONER

## 2023-03-17 PROCEDURE — 20000000 HC ICU ROOM

## 2023-03-17 PROCEDURE — 82607 VITAMIN B-12: CPT | Performed by: NURSE PRACTITIONER

## 2023-03-17 PROCEDURE — 85610 PROTHROMBIN TIME: CPT | Performed by: NURSE PRACTITIONER

## 2023-03-17 PROCEDURE — 85025 COMPLETE CBC W/AUTO DIFF WBC: CPT | Performed by: NURSE PRACTITIONER

## 2023-03-17 PROCEDURE — 83735 ASSAY OF MAGNESIUM: CPT | Performed by: NURSE PRACTITIONER

## 2023-03-17 PROCEDURE — 87186 SC STD MICRODIL/AGAR DIL: CPT | Performed by: NURSE PRACTITIONER

## 2023-03-17 RX ORDER — TAMSULOSIN HYDROCHLORIDE 0.4 MG/1
0.8 CAPSULE ORAL DAILY
Status: DISCONTINUED | OUTPATIENT
Start: 2023-03-17 | End: 2023-03-18

## 2023-03-17 RX ORDER — ALBUMIN HUMAN 250 G/1000ML
12.5 SOLUTION INTRAVENOUS ONCE
Status: DISCONTINUED | OUTPATIENT
Start: 2023-03-17 | End: 2023-03-17

## 2023-03-17 RX ORDER — AMIODARONE HYDROCHLORIDE 100 MG/1
100 TABLET ORAL EVERY MORNING
Status: DISCONTINUED | OUTPATIENT
Start: 2023-03-17 | End: 2023-03-17

## 2023-03-17 RX ORDER — METOPROLOL TARTRATE 25 MG/1
25 TABLET, FILM COATED ORAL 3 TIMES DAILY
Status: DISCONTINUED | OUTPATIENT
Start: 2023-03-17 | End: 2023-03-24 | Stop reason: HOSPADM

## 2023-03-17 RX ORDER — AMOXICILLIN 250 MG
2 CAPSULE ORAL 2 TIMES DAILY
Status: DISCONTINUED | OUTPATIENT
Start: 2023-03-17 | End: 2023-03-24 | Stop reason: HOSPADM

## 2023-03-17 RX ORDER — DULOXETIN HYDROCHLORIDE 30 MG/1
30 CAPSULE, DELAYED RELEASE ORAL 2 TIMES DAILY
Status: DISCONTINUED | OUTPATIENT
Start: 2023-03-17 | End: 2023-03-24 | Stop reason: HOSPADM

## 2023-03-17 RX ORDER — AMIODARONE HYDROCHLORIDE 200 MG/1
200 TABLET ORAL EVERY MORNING
Status: DISCONTINUED | OUTPATIENT
Start: 2023-03-17 | End: 2023-03-17

## 2023-03-17 RX ORDER — PRAVASTATIN SODIUM 40 MG/1
80 TABLET ORAL NIGHTLY
Status: DISCONTINUED | OUTPATIENT
Start: 2023-03-17 | End: 2023-03-24 | Stop reason: HOSPADM

## 2023-03-17 RX ORDER — DILTIAZEM HYDROCHLORIDE 180 MG/1
180 CAPSULE, COATED, EXTENDED RELEASE ORAL DAILY
Status: DISCONTINUED | OUTPATIENT
Start: 2023-03-17 | End: 2023-03-17

## 2023-03-17 RX ORDER — FOLIC ACID 1 MG/1
1 TABLET ORAL DAILY
Status: DISCONTINUED | OUTPATIENT
Start: 2023-03-17 | End: 2023-03-24 | Stop reason: HOSPADM

## 2023-03-17 RX ORDER — BALSAM PERU/CASTOR OIL
OINTMENT (GRAM) TOPICAL DAILY
Status: DISCONTINUED | OUTPATIENT
Start: 2023-03-18 | End: 2023-03-24 | Stop reason: HOSPADM

## 2023-03-17 RX ORDER — AMIODARONE HYDROCHLORIDE 200 MG/1
200 TABLET ORAL EVERY MORNING
Status: DISCONTINUED | OUTPATIENT
Start: 2023-03-18 | End: 2023-03-17

## 2023-03-17 RX ORDER — HYDROCODONE BITARTRATE AND ACETAMINOPHEN 10; 325 MG/1; MG/1
1 TABLET ORAL 4 TIMES DAILY PRN
Status: DISCONTINUED | OUTPATIENT
Start: 2023-03-17 | End: 2023-03-24 | Stop reason: HOSPADM

## 2023-03-17 RX ORDER — TOPIRAMATE 25 MG/1
50 TABLET ORAL DAILY
Status: DISCONTINUED | OUTPATIENT
Start: 2023-03-17 | End: 2023-03-24 | Stop reason: HOSPADM

## 2023-03-17 RX ORDER — MUPIROCIN 20 MG/G
OINTMENT TOPICAL 2 TIMES DAILY
Status: DISCONTINUED | OUTPATIENT
Start: 2023-03-17 | End: 2023-03-17

## 2023-03-17 RX ORDER — METOPROLOL TARTRATE 25 MG/1
25 TABLET, FILM COATED ORAL 2 TIMES DAILY
Status: DISCONTINUED | OUTPATIENT
Start: 2023-03-17 | End: 2023-03-17

## 2023-03-17 RX ORDER — HYDROCODONE BITARTRATE AND ACETAMINOPHEN 10; 325 MG/1; MG/1
1 TABLET ORAL 4 TIMES DAILY PRN
Status: DISCONTINUED | OUTPATIENT
Start: 2023-03-17 | End: 2023-03-17

## 2023-03-17 RX ORDER — CHLORHEXIDINE GLUCONATE ORAL RINSE 1.2 MG/ML
15 SOLUTION DENTAL 2 TIMES DAILY
Status: DISCONTINUED | OUTPATIENT
Start: 2023-03-17 | End: 2023-03-17

## 2023-03-17 RX ORDER — MIDODRINE HYDROCHLORIDE 2.5 MG/1
5 TABLET ORAL ONCE
Status: COMPLETED | OUTPATIENT
Start: 2023-03-17 | End: 2023-03-17

## 2023-03-17 RX ORDER — CHOLECALCIFEROL (VITAMIN D3) 50 MCG
1 TABLET ORAL DAILY
Status: DISCONTINUED | OUTPATIENT
Start: 2023-03-17 | End: 2023-03-24 | Stop reason: HOSPADM

## 2023-03-17 RX ADMIN — TAMSULOSIN HYDROCHLORIDE 0.8 MG: 0.4 CAPSULE ORAL at 09:03

## 2023-03-17 RX ADMIN — APIXABAN 5 MG: 5 TABLET, FILM COATED ORAL at 01:03

## 2023-03-17 RX ADMIN — Medication 2000 UNITS: at 09:03

## 2023-03-17 RX ADMIN — METOPROLOL TARTRATE 25 MG: 25 TABLET, FILM COATED ORAL at 01:03

## 2023-03-17 RX ADMIN — SODIUM CHLORIDE 250 ML: 0.9 INJECTION, SOLUTION INTRAVENOUS at 04:03

## 2023-03-17 RX ADMIN — MORPHINE SULFATE 2 MG: 2 INJECTION, SOLUTION INTRAMUSCULAR; INTRAVENOUS at 06:03

## 2023-03-17 RX ADMIN — MAGNESIUM OXIDE 400 MG (241.3 MG MAGNESIUM) TABLET 800 MG: at 01:03

## 2023-03-17 RX ADMIN — TOPIRAMATE 50 MG: 25 TABLET, FILM COATED ORAL at 09:03

## 2023-03-17 RX ADMIN — PRAVASTATIN SODIUM 80 MG: 40 TABLET ORAL at 08:03

## 2023-03-17 RX ADMIN — AMIODARONE HYDROCHLORIDE 0.5 MG/MIN: 1.8 INJECTION, SOLUTION INTRAVENOUS at 06:03

## 2023-03-17 RX ADMIN — DULOXETINE 30 MG: 30 CAPSULE, DELAYED RELEASE ORAL at 09:03

## 2023-03-17 RX ADMIN — MAGNESIUM OXIDE 400 MG (241.3 MG MAGNESIUM) TABLET 800 MG: at 06:03

## 2023-03-17 RX ADMIN — DULOXETINE 30 MG: 30 CAPSULE, DELAYED RELEASE ORAL at 01:03

## 2023-03-17 RX ADMIN — CEFTRIAXONE 2 G: 2 INJECTION, SOLUTION INTRAVENOUS at 08:03

## 2023-03-17 RX ADMIN — DULOXETINE 30 MG: 30 CAPSULE, DELAYED RELEASE ORAL at 08:03

## 2023-03-17 RX ADMIN — POTASSIUM BICARBONATE 50 MEQ: 977.5 TABLET, EFFERVESCENT ORAL at 06:03

## 2023-03-17 RX ADMIN — MORPHINE SULFATE 2 MG: 2 INJECTION, SOLUTION INTRAMUSCULAR; INTRAVENOUS at 12:03

## 2023-03-17 RX ADMIN — APIXABAN 5 MG: 5 TABLET, FILM COATED ORAL at 08:03

## 2023-03-17 RX ADMIN — HYDROCODONE BITARTRATE AND ACETAMINOPHEN 1 TABLET: 10; 325 TABLET ORAL at 09:03

## 2023-03-17 RX ADMIN — SENNOSIDES AND DOCUSATE SODIUM 2 TABLET: 50; 8.6 TABLET ORAL at 08:03

## 2023-03-17 RX ADMIN — FOLIC ACID 1 MG: 1 TABLET ORAL at 01:03

## 2023-03-17 RX ADMIN — APIXABAN 5 MG: 5 TABLET, FILM COATED ORAL at 09:03

## 2023-03-17 RX ADMIN — DILTIAZEM HYDROCHLORIDE 180 MG: 180 CAPSULE, COATED, EXTENDED RELEASE ORAL at 01:03

## 2023-03-17 RX ADMIN — MIDODRINE HYDROCHLORIDE 5 MG: 2.5 TABLET ORAL at 04:03

## 2023-03-17 RX ADMIN — AMIODARONE HYDROCHLORIDE 0.5 MG/MIN: 1.8 INJECTION, SOLUTION INTRAVENOUS at 02:03

## 2023-03-17 RX ADMIN — METOPROLOL TARTRATE 25 MG: 25 TABLET, FILM COATED ORAL at 09:03

## 2023-03-17 RX ADMIN — PRAVASTATIN SODIUM 80 MG: 40 TABLET ORAL at 01:03

## 2023-03-17 RX ADMIN — SENNOSIDES AND DOCUSATE SODIUM 2 TABLET: 50; 8.6 TABLET ORAL at 09:03

## 2023-03-17 RX ADMIN — PANTOPRAZOLE SODIUM 40 MG: 40 TABLET, DELAYED RELEASE ORAL at 06:03

## 2023-03-17 NOTE — SUBJECTIVE & OBJECTIVE
Interval History:  See hospital course.  Patient states at home his blood pressure was intermittently low mostly on standing.  Did not have Messina catheter at home and was voiding spontaneously.  Has not yet followed up with outpatient Neurology.  States last night he had sacral pain, states oral pain medication more effective than IV morphine.  Patient is hard of hearing.  Denies any nausea, vomiting, shortness of breath, chest pain.  Telemetry atrial fibrillation.  Blood pressure trend better.  T-max 98.8°.  Labs with WBC 11, hemoglobin 12.2, BUN/creatinine 21/0.6, UA positive.  Previous echo with EF of 70%.  Discussed with patient.  No visitors at bedside.  Discussed with nursing.    Review of Systems   Constitutional:  Negative for fever.   HENT:          Hard of hearing   Respiratory:  Negative for shortness of breath.    Cardiovascular:  Negative for chest pain.   Genitourinary:         Has messina   Psychiatric/Behavioral:  Negative for confusion.    Objective:     Vital Signs (Most Recent):  Temp: 97.6 °F (36.4 °C) (03/17/23 0701)  Pulse: 95 (03/17/23 0807)  Resp: 18 (03/17/23 0807)  BP: 134/71 (03/17/23 0701)  SpO2: (!) 94 % (03/17/23 0807)   Vital Signs (24h Range):  Temp:  [97.6 °F (36.4 °C)-98.8 °F (37.1 °C)] 97.6 °F (36.4 °C)  Pulse:  [] 95  Resp:  [13-24] 18  SpO2:  [82 %-97 %] 94 %  BP: ()/(40-95) 134/71     Weight: 103.1 kg (227 lb 4.7 oz)  Body mass index is 32.61 kg/m².    Intake/Output Summary (Last 24 hours) at 3/17/2023 0903  Last data filed at 3/17/2023 0633  Gross per 24 hour   Intake 830 ml   Output 115 ml   Net 715 ml      Physical Exam  Vitals and nursing note reviewed.   Constitutional:       Comments: Lying in bed, cooperative   HENT:      Head: Normocephalic and atraumatic.      Ears:      Comments: Hard of hearing     Mouth/Throat:      Mouth: Mucous membranes are moist.   Cardiovascular:      Rate and Rhythm: Normal rate. Rhythm irregular.      Comments: Trace LE  edema  Pulmonary:      Comments: On RA, no wheeze or accessory muscle use  Abdominal:      General: Bowel sounds are normal. There is no distension.      Palpations: Abdomen is soft.      Tenderness: There is no abdominal tenderness.   Genitourinary:     Comments: Sotelo catheter in place with dark urine draining  Skin:     General: Skin is warm.   Neurological:      Mental Status: He is alert and oriented to person, place, and time.   Psychiatric:         Mood and Affect: Mood normal.       Significant Labs: BMP:   Recent Labs   Lab 03/17/23  0446         K 3.6      CO2 25   BUN 21   CREATININE 0.6   CALCIUM 8.9   MG 1.6     CBC:   Recent Labs   Lab 03/16/23  1533 03/17/23  0446   WBC 13.15* 11.70   HGB 14.4 12.2*   HCT 45.1 38.0*    273     CMP:   Recent Labs   Lab 03/16/23  1533 03/17/23  0446    141   K 3.8 3.6    103   CO2 28 25   * 107   BUN 18 21   CREATININE 0.9 0.6   CALCIUM 9.3 8.9   PROT 6.6 5.8*   ALBUMIN 3.4* 2.9*   BILITOT 0.8 0.3   ALKPHOS 65 57   AST 23 14   ALT 18 14   ANIONGAP 8 13     Cardiac Markers:   Recent Labs   Lab 03/16/23  1533   *     Lactic Acid:   Recent Labs   Lab 03/16/23  2057 03/16/23  2345 03/17/23  0805   LACTATE 1.6 1.4 1.4     Magnesium:   Recent Labs   Lab 03/16/23  1533 03/17/23  0446   MG 1.7 1.6     POCT Glucose: No results for input(s): POCTGLUCOSE in the last 48 hours.  Urine Culture: No results for input(s): LABURIN in the last 48 hours.  Urine Studies:   Recent Labs   Lab 03/17/23  0135   COLORU Orange*   APPEARANCEUA Cloudy*   PHUR 6.0   SPECGRAV 1.025   PROTEINUA 2+*   GLUCUA Negative   KETONESU Negative   BILIRUBINUA Negative   OCCULTUA 3+*   NITRITE Positive*   UROBILINOGEN Negative   LEUKOCYTESUR 3+*   RBCUA >100*   WBCUA >100*   BACTERIA Rare   SQUAMEPITHEL 1   HYALINECASTS 26*       Significant Imaging: I have reviewed all pertinent imaging results/findings within the past 24 hours.    X-Ray Chest 1 View    Result  Date: 3/16/2023  CLINICAL HISTORY: 78 years (1945) Male Hypotension w/ weakness TECHNIQUE: Portable AP radiograph the chest. COMPARISON: Radiograph from March 1, 2023. FINDINGS: Mildly increased central hilar interstitial opacities are seen bilaterally suggestive of either mild edema, atypical infection/pneumonia or bronchitis in the appropriate clinical setting.  There is blunting of the left costophrenic angle consistent with a trace pleural effusion. No pneumothorax is identified. The heart is mildly enlarged. Osseous structures appear within normal limits. The visualized upper abdomen is unremarkable. IMPRESSION: Cardiomegaly and findings suggestive of mild pulmonary vascular congestion/trace edema. . Electronically signed by:  Viet Matt MD  3/16/2023 4:05 PM CDT Workstation: [id number]    Echocardiogram  Summary    The left ventricle is normal in size with moderate concentric hypertrophy and normal systolic function.  The estimated ejection fraction is 70%.  Normal left ventricular diastolic function.  The sinuses of Valsalva is mildly dilated.  The ascending aorta is dilated. Recommend CT scan to evaluate entire aorta for further evaluation.  Mild tricuspid regurgitation.  Normal central venous pressure (3 mmHg).  The estimated PA systolic pressure is 61 mmHg.  There is pulmonary hypertension.  Unable to visualize right ventricle and right atrium but they both grossly appear to be dilated with RV function mildly reduced.

## 2023-03-17 NOTE — H&P
[facility] - Emergency Dept  Hospital Medicine  History & Physical    Patient Name: Hiro Rodríguez  MRN: [id number]  Patient Class: IP- Inpatient  Admission Date: 3/16/2023  Attending Physician: Shyam Vallejo MD   Primary Care Provider: Gautam Castanon III, MD         Patient information was obtained from patient and ER records.     Subjective:     Principal Problem:Sepsis    Chief Complaint:   Chief Complaint   Patient presents with    Hypotension     At clinic today and found to be hypotensive and is currently being treated for UTI        HPI: Hiro Rodríguez is a 78 y.o. male with a history as  has a past medical history of CHF (congestive heart failure) and Hypertension. who presented to the ED with a Hypotension (At clinic today and found to be hypotensive and is currently being treated for UTI)    Patient presents to the emergency room from PCPs office.  Patient tells me he was at his normal PCP checkup.  Reports they were attempting to get his vital signs and was unable to obtain a blood pressure however noted elevated heart rate.  Patient reported at that time he was feeling faint and weak as if he was going to pass out.  So he was brought here to the emergency room.      Patient further reports over last 3 months he has been in and out of the hospital for various different reasons including kidney stones, orthostatic hypotension, blood infection.  He reports his health continues to decline over these past 3 months.     Denies fever, chills, diaphoresis, SOB, dizziness, HA, chest pain, palpitations, NVD, recent trauma or any other associated symptoms.     Lab and imaging obtained and reviewed. CBC shows WBCs 13.15 MCHC 31.9 RDW 16.0 g% 79.2 l% 13.8. CMP shows glucose 154 alb 3.4. . EKG shows a-fib rate controlled on amiodarone gtt. Lactate 2.3.  On admit, afebrile HR 90 RR 18 /95 Sats 96% on RA. CXR shows cardiomegaly and findings suggestive of mild pulmonary vascular  congestion/trace edema.       Per ED provider, patient on presentation noted to be in AFib RVR, amiodarone drip initiated.  Also noted to have elevated white count with low blood pressure, sepsis protocol initiated.  Given patient recent admission with urosepsis E.Coli sensitive to rocephin, IV ABX initiated.  Will admit to ICU for management of AFib RVR in sepsis with possible urosepsis.                     Past Medical History:   Diagnosis Date    CHF (congestive heart failure)     Hypertension        Past Surgical History:   Procedure Laterality Date    CYSTOSCOPY W/ URETERAL STENT PLACEMENT N/A 3/2/2023    Procedure: CYSTOSCOPY, WITH URETERAL STENT INSERTION;  Surgeon: Yoshi Lange MD;  Location: [facility] OR;  Service: Urology;  Laterality: N/A;    FRACTURE SURGERY      INTRAMEDULLARY RODDING OF TROCHANTER OF FEMUR Left 11/10/2022    Procedure: INSERTION, ITRAMEDULLARY SANTI, FEMUR, TROCHANTER;  Surgeon: Richard Phoenix MD;  Location: [facility] OR;  Service: Orthopedics;  Laterality: Left;    REMOVAL, CALCULUS, BLADDER  3/2/2023    Procedure: REMOVAL, CALCULUS, BLADDER;  Surgeon: Yoshi Lange MD;  Location: [facility] OR;  Service: Urology;;       Review of patient's allergies indicates:  No Known Allergies    No current facility-administered medications on file prior to encounter.     Current Outpatient Medications on File Prior to Encounter   Medication Sig    acetaminophen (TYLENOL) 325 MG tablet Take 650 mg by mouth every 6 (six) hours as needed.    amiodarone (PACERONE) 100 MG Tab Take 100 mg by mouth every morning.    apixaban (ELIQUIS) 5 mg Tab Take 1 tablet (5 mg total) by mouth 2 (two) times daily.    calcium polycarbophil (FIBER-CAPS, CA POLYCARBOPHIL, ORAL) Take 1 tablet by mouth once daily.    cholecalciferol, vitamin D3, (VITAMIN D3) 50 mcg (2,000 unit) Tab Take 1 tablet by mouth once daily.    cyanocobalamin, vitamin B-12, 1,000 mcg Subl Place 1,000 mcg under the tongue 2 (two) times  a day.    cyproheptadine (PERIACTIN) 4 mg tablet Take 1 tablet (4 mg total) by mouth 3 (three) times daily.    docusate sodium (COLACE) 100 MG capsule Take 100 mg by mouth 2 (two) times daily.    DULoxetine (CYMBALTA) 30 MG capsule Take 1 capsule (30 mg total) by mouth 2 (two) times daily.    ferrous gluconate 236 mg (27 mg iron) Tab Take 240 mg by mouth once daily.    HYDROcodone-acetaminophen (NORCO)  mg per tablet Take 1 tablet by mouth 4 (four) times daily as needed.    insulin aspart U-100 (NOVOLOG) 100 unit/mL (3 mL) InPn pen Inject 1-10 Units into the skin before meals and at bedtime as needed (Hyperglycemia).    lisinopriL (PRINIVIL,ZESTRIL) 20 MG tablet Take 20 mg by mouth 2 (two) times daily.    magnesium 250 mg Tab Take 250 mg by mouth 2 (two) times a day.    metFORMIN (GLUCOPHAGE-XR) 500 MG ER 24hr tablet TAKE 1 TABLET BY MOUTH EVERY DAY (Patient taking differently: Take 500 mg by mouth 2 (two) times a day.)    metoprolol tartrate (LOPRESSOR) 25 MG tablet Take 1 tablet (25 mg total) by mouth 2 (two) times daily.    omega-3 acid ethyl esters (LOVAZA) 1 gram capsule TAKE 2 CAPSULES BY MOUTH 2 TIMES DAILY. (Patient taking differently: Take 2 g by mouth 2 (two) times daily.)    pantoprazole (PROTONIX) 40 MG tablet Take 40 mg by mouth 2 (two) times daily.    phenazopyridine (PYRIDIUM) 100 MG tablet Take 1 tablet (100 mg total) by mouth 3 (three) times daily as needed for Pain.    polyethylene glycol (GLYCOLAX) 17 gram/dose powder Take 17 g by mouth daily as needed.    potassium chloride SA (KLOR-CON) 10 MEQ TbSR Take 2 tablets (20 mEq total) by mouth once daily.    rosuvastatin (CRESTOR) 20 MG tablet TAKE 1 TABLET BY MOUTH EVERY DAY (Patient taking differently: Take 20 mg by mouth once daily. TAKE 1 TABLET BY MOUTH EVERY DAY)    semaglutide (OZEMPIC) 1 mg/dose (4 mg/3 mL) Inject 1 mg into the skin every 7 days.    tamsulosin (FLOMAX) 0.4 mg Cap Take 2 capsules (0.8 mg total) by mouth  once daily.    tolterodine (DETROL LA) 4 MG 24 hr capsule Take 1 capsule (4 mg total) by mouth once daily.    topiramate (TOPAMAX) 50 MG tablet TAKE 1 TABLET BY MOUTH EVERY DAY (Patient taking differently: Take 50 mg by mouth once daily.)    traZODone (DESYREL) 50 MG tablet TAKE 1 TABLET BY MOUTH EVERY EVENING. (Patient taking differently: Take 50 mg by mouth every evening.)     Family History    None       Tobacco Use    Smoking status: Never    Smokeless tobacco: Never   Substance and Sexual Activity    Alcohol use: Yes     Alcohol/week: 1.0 standard drink     Types: 1 Shots of liquor per week    Drug use: Not Currently    Sexual activity: Yes     Partners: Female     Review of Systems   Constitutional:  Positive for fatigue. Negative for chills, diaphoresis and fever.   HENT:  Negative for congestion, postnasal drip, sinus pressure and sore throat.    Eyes:  Negative for visual disturbance.   Respiratory:  Negative for cough, chest tightness, shortness of breath and wheezing.    Cardiovascular:  Negative for chest pain, palpitations and leg swelling.   Gastrointestinal:  Negative for abdominal distention, abdominal pain, blood in stool, constipation, diarrhea, nausea and vomiting.   Endocrine: Negative.    Genitourinary:  Negative for dysuria.   Musculoskeletal: Negative.    Skin: Negative.    Allergic/Immunologic: Negative.    Neurological:  Positive for weakness. Negative for dizziness, numbness and headaches.        Faint feeling   Hematological: Negative.    Psychiatric/Behavioral: Negative.     Objective:     Vital Signs (Most Recent):  Temp: 98.8 °F (37.1 °C) (03/16/23 1517)  Pulse: 100 (03/16/23 1900)  Resp: 15 (03/16/23 1900)  BP: 115/78 (03/16/23 1900)  SpO2: 96 % (03/16/23 1900) Vital Signs (24h Range):  Temp:  [98.8 °F (37.1 °C)] 98.8 °F (37.1 °C)  Pulse:  [] 100  Resp:  [13-24] 15  SpO2:  [93 %-96 %] 96 %  BP: ()/(40-95) 115/78     Weight: 104.3 kg (230 lb)  Body mass index is 33  kg/m².    Physical Exam  Constitutional:       General: He is not in acute distress.     Appearance: Normal appearance. He is well-developed. He is not toxic-appearing or diaphoretic.   HENT:      Head: Normocephalic and atraumatic.   Eyes:      General: Lids are normal.      Conjunctiva/sclera: Conjunctivae normal.      Pupils: Pupils are equal, round, and reactive to light.   Neck:      Thyroid: No thyroid mass or thyromegaly.      Vascular: Normal carotid pulses. No JVD.      Trachea: Trachea normal. No tracheal deviation.   Cardiovascular:      Rate and Rhythm: Normal rate and regular rhythm.      Pulses: Normal pulses.      Heart sounds: Normal heart sounds, S1 normal and S2 normal.   Pulmonary:      Effort: Pulmonary effort is normal.      Breath sounds: Normal breath sounds. No stridor.   Abdominal:      General: Bowel sounds are normal.      Palpations: Abdomen is soft.      Tenderness: There is no abdominal tenderness.   Musculoskeletal:         General: Normal range of motion.      Cervical back: Full passive range of motion without pain, normal range of motion and neck supple.   Skin:     General: Skin is warm and dry.      Nails: There is no clubbing.   Neurological:      Mental Status: He is alert and oriented to person, place, and time.      Cranial Nerves: No cranial nerve deficit.      Sensory: No sensory deficit.   Psychiatric:         Speech: Speech normal.         Behavior: Behavior normal. Behavior is cooperative.         Thought Content: Thought content normal.         Judgment: Judgment normal.         CRANIAL NERVES     CN III, IV, VI   Pupils are equal, round, and reactive to light.     Significant Labs: All pertinent labs within the past 24 hours have been reviewed.  A1C:   Recent Labs   Lab 11/09/22  0313 12/31/22  1415 03/02/23  0511   HGBA1C 7.0* 5.8 5.8     Bilirubin:   Recent Labs   Lab 03/02/23  0511 03/03/23  0621 03/04/23  0759 03/08/23  0457 03/16/23  1533   BILITOT 0.6 0.5 0.4 0.4  0.8     BMP:   Recent Labs   Lab 03/16/23  1533   *      K 3.8      CO2 28   BUN 18   CREATININE 0.9   CALCIUM 9.3   MG 1.7     CBC:   Recent Labs   Lab 03/16/23  1533   WBC 13.15*   HGB 14.4   HCT 45.1        CMP:   Recent Labs   Lab 03/16/23  1533      K 3.8      CO2 28   *   BUN 18   CREATININE 0.9   CALCIUM 9.3   PROT 6.6   ALBUMIN 3.4*   BILITOT 0.8   ALKPHOS 65   AST 23   ALT 18   ANIONGAP 8     Cardiac Markers:   Recent Labs   Lab 03/16/23  1533   *     Coagulation:   Recent Labs   Lab 03/16/23  1533   INR 1.1   APTT 26.1     Lactic Acid:   Recent Labs   Lab 03/16/23  1533   LACTATE 2.3*     Magnesium:   Recent Labs   Lab 03/16/23  1533   MG 1.7     Troponin:   Recent Labs   Lab 03/16/23  1533   TROPONINIHS 9.3     TSH:   Recent Labs   Lab 12/31/22  0232   TSH 1.870       Significant Imaging: I have reviewed all pertinent imaging results/findings within the past 24 hours.  X-Ray Chest 1 View    Result Date: 3/16/2023  CLINICAL HISTORY: 78 years (1945) Male Hypotension w/ weakness TECHNIQUE: Portable AP radiograph the chest. COMPARISON: Radiograph from March 1, 2023. FINDINGS: Mildly increased central hilar interstitial opacities are seen bilaterally suggestive of either mild edema, atypical infection/pneumonia or bronchitis in the appropriate clinical setting.  There is blunting of the left costophrenic angle consistent with a trace pleural effusion. No pneumothorax is identified. The heart is mildly enlarged. Osseous structures appear within normal limits. The visualized upper abdomen is unremarkable. IMPRESSION: Cardiomegaly and findings suggestive of mild pulmonary vascular congestion/trace edema. . Electronically signed by:  Viet Matt MD  3/16/2023 4:05 PM CDT Workstation: [id number]     Retroperitoneal Complete    Result Date: 2/23/2023  Complete retroperitoneal ultrasound HISTORY: Nephrolithiasis. The examination is limited by the  patient's inability to cooperate with positioning. By technologist report, the patient is scanned in a wheelchair. The right kidney measures 12.1 cm in sagittal dimension. The left kidney measures 12.2 cm. There is normal-appearing cortical thickness and echogenicity. There are small echogenic foci observed within both kidneys corresponding to previously demonstrated bilateral nephrolithiasis. There is no hydronephrosis. No mass is identified. The adjacent retroperitoneum appears unremarkable. The midline retroperitoneum including the majority of the vena cava and aorta are obscured. The urinary bladder is incompletely distended. There appears to be mild diffuse wall thickening. IMPRESSION: Limited examination as above. Bilateral nephrolithiasis. Incomplete distention of the urinary bladder. Electronically signed by:  Estella Winn MD  2/23/2023 4:19 PM CST Workstation: [id number]    FL Flouro Usage    Result Date: 3/2/2023  See Notes This procedure was auto-finalized.    X-Ray Chest AP Portable    Result Date: 3/1/2023  Chest single view CLINICAL DATA: Dizziness FINDINGS: Comparison to January 22. Cardiomegaly is stable. The mediastinum is unremarkable. There is a probable small left pleural effusion, similar in appearance to the prior study. Increased density in the retrocardiac region, left lung base, suggests atelectasis or infiltrate. The right lung is clear. IMPRESSION: 1. Stable cardiomegaly. 2. Left basilar volume loss or infiltrate. 3. Small left pleural effusion. Electronically signed by:  Jeramy Gardner MD  3/1/2023 3:59 PM CST Workstation: [id number]    US Bladder Post Void Residual    Result Date: 2/23/2023  Limited pelvic ultrasound (urinary bladder) HISTORY: R39.1 The urinary bladder contains a Sotelo catheter. The bladder is incompletely distended. There is diffuse prominence of the wall of the urinary bladder. The prevoid volume is 10 mL. IMPRESSION: See above. Electronically signed by:  Estella  Pa IZAGUIRRE  2/23/2023 4:21 PM CST Workstation: [id number]    CT Chest Abdomen Pelvis With Contrast (xpd)    Result Date: 3/1/2023  CT CHEST, ABDOMEN, AND PELVIS WITH INTRAVENOUS CONTRAST: 3/1/2023 6:26 PM CST HISTORY: 77 years  old Male with Aortic aneurysm, known or suspected. COMPARISON: CT of the abdomen and pelvis from 1/20/2023 TECHNIQUE: Axial contiguous images were obtained from the lung apices to the proximal femurs with intravenous intravenous contrast administered. Sagittal and coronal reconstructions were also obtained and reviewed. This exam was performed according to our departmental dose-optimization program, which includes automated exposure control, adjustment of the mA and/or KV according to the patient's size and/or use of iterative reconstruction technique. FINDINGS: CARDIOMEDIASTINAL STRUCTURES: The heart size is mildly enlarged. No pericardial effusion is seen. LYMPH NODES: No significant mediastinal, supraclavicular, or axillary lymphadenopathy is seen. AORTA: The ascending aorta is at the upper limits of normal in size. The visualized proximal subclavian, vertebral, and common carotid arterial vasculature is unremarkable. There is atherosclerotic calcification seen at the aorta. There are no findings to suggest an aortic dissection. PULMONARY ARTERY: The main pulmonary artery is normal in size. There are no findings to suggest a pulmonary embolism. The main pulmonary artery is suboptimally opacified for evaluation THYROID: The thyroid gland is heterogeneous. TRACHEA: The central tracheobronchial tree is patent. PARENCHYMA: There are bibasilar space opacities. PLEURA: Trace bilateral pleural fluid is seen. There is no evidence of a pneumothorax. LIVER: The visualized hepatic parenchyma demonstrates no focal abnormality.  The main portal vein is well opacified with contrast. GALLBLADDER: The gallbladder demonstrates no evidence of calcified gallstones. SPLEEN: The spleen is normal in size.  PANCREAS: The pancreas is unremarkable. ADRENAL GLANDS: The bilateral adrenal glands are unremarkable. KIDNEYS: There is moderate left hydroureteronephrosis, secondary to at least 2 calculi at the midportion of the left ureter measuring 8 to 9 mm and 6 to 7 mm in size. No hydronephrosis is seen on the right side.. There are punctate bilateral renal calculi present. There is stranding seen around the left kidney. PELVIS: The urinary bladder is decompressed by Sotelo catheter. There is stranding seen around the urinary bladder with bladder wall thickening suspected cystitis. No obvious bladder calculi are seen. STOMACH: The stomach is not well distended. BOWEL: The small bowel loops appear unremarkable. No pericolonic inflammatory stranding is seen. There are multiple colonic diverticula without evidence to suggest acute diverticulitis. There is a moderate amount of stool seen throughout the colon. APPENDIX: The appendix is unremarkable. PERITONEUM: There is no evidence of pneumoperitoneum or free fluid. VESSELS: The IVC is unremarkable. The abdominal aorta is within normal limits of size. LYMPH NODES: No significantly enlarged lymph nodes are seen in the abdomen or pelvis. BONES AND SOFT TISSUES: No acute osseous abnormality is seen. There is incidental bilateral gynecomastia present.  There is an intramedullary kelsey and interlocking screw seen at the left hip. There are multilevel degenerative changes seen at the spine. IMPRESSION: The urinary bladder is decompressed by Sotelo catheter. There is stranding seen around the urinary bladder with bladder wall thickening suspected cystitis. No obvious bladder calculi are seen. There is moderate left hydroureteronephrosis, secondary to at least 2 calculi at the midportion of the left ureter measuring 8 to 9 mm and 6 to 7 mm in size. There is stranding seen around the left kidney. Superimposed infection is not excluded. There are small bilateral pleural effusions with  bibasilar airspace opacities which can be seen with atelectasis and/or infection. The abdominal aorta is within normal limits of size. No evidence is seen to suggest aortic dissection. Hepatic steatosis Electronically signed by:  Shanta Yu DO  3/1/2023 6:30 PM CST Workstation: [id number]       Assessment/Plan:     Active Hospital Problems    Diagnosis  POA    *Sepsis [A41.9]  Yes     Priority: 1 - High    Paroxysmal atrial fibrillation with RVR [I48.0]  Yes     Priority: 2     GERD (gastroesophageal reflux disease) [K21.9]  Yes    Obesity (BMI 30.0-34.9) [E66.9]  Yes    Pressure injury of buttock, stage 1 [L89.301]  Yes    Benign prostatic hyperplasia with urinary retention [N40.1, R33.8]  Yes    Hearing loss [H91.90]  Yes    Type 2 diabetes mellitus with diabetic polyneuropathy, without long-term current use of insulin [E11.42]  Yes    Hypertension, essential [I10]  Yes    Anticoagulated [Z79.01]  Not Applicable      Resolved Hospital Problems   No resolved problems to display.     Plan:  Admit to Inpatient - ICU - PAF w/RVR; Sepsis (possible Urosource)   Continuous cardiac monitor  Pulse oximetry O2 PRN - keep sats >93%, pulse ox q4 with vital signs  Continue amiodarone gtt, (restart home oral amiodarone and BB)  Continue IV antibiotics  Blood/Urine cultures pending  Cardiology consult appreciated   Strict I/O and daily weight  POC glucose checks AC and HS  Correction scale and adjust accordingly  Hypoglycemia Protocol  Daily labs  Electrolytes sliding scale repletion  Continue chronic home medications  Further plan as per clinical course    VTE Risk Mitigation (From admission, onward)         Ordered     Place KAZ hose  Until discontinued         03/16/23 1916     Reason for No Pharmacological VTE Prophylaxis  Once        Question:  Reasons:  Answer:  Already adequately anticoagulated on oral Anticoagulants    03/16/23 1916     IP VTE HIGH RISK PATIENT  Once         03/16/23 1915     Place  sequential compression device  Until discontinued         03/16/23 1915                     On 03/16/2023, patient should be placed in hospital observation services under my care in collaboration with Dr. Vallejo.      ALEX Beltran  Department of Hospital Medicine  Formerly [facility] - Emergency Dept

## 2023-03-17 NOTE — PLAN OF CARE
Met with patient at bedside to complete initial assessment. Patient / family reports patient DOES NOT have a living will and NO ONE is medical POA. Patient is active on home health service with [facility] , verified with  [phone] that patient has skilled nursing, PT and OT services.  Provided community resource booklet to patient as he requested information on sitters and transport companies.     [facility]  Initial Discharge Assessment       Primary Care Provider: Gautam Castanon III, MD    Admission Diagnosis: Atrial fibrillation with rapid ventricular response [I48.91]    Admission Date: 3/16/2023  Expected Discharge Date: 3/18/2023    Discharge Barriers Identified: (P) None    Payor: [place] [facility] MCARE / Plan: [employer] GROUP MEDICARE ADVANTAGE / Product Type: Medicare Advantage /     Extended Emergency Contact Information  Primary Emergency Contact: Galina Rodríguez  Address: [address] [place] [address] [id number] [place] [address]  Home Phone: [phone]  Mobile Phone: [phone]  Relation: Spouse  Preferred language: English   needed? No    Discharge Plan A: (P) Home Health  Discharge Plan B: (P) Home Health      [id number] - [place], [place] - [address]  [address]  [facility] [address]  Phone: [phone] Fax: [phone]    [facility] - [address]  [address] [facility]  [address] [place] [address]  Phone: [phone] Fax: [phone]    [facility] [place]  [address]  [facility] [address]  Phone: [phone] Fax: [phone]      Initial Assessment (most recent)       Adult Discharge Assessment - 03/17/23 1423          Discharge Assessment    Assessment Type Discharge Planning Assessment (P)      Confirmed/corrected address, phone number and insurance Yes (P)      Confirmed Demographics Correct on Facesheet (P)       Source of Information patient (P)      Communicated BRENNAN with patient/caregiver No (P)      Reason For Admission sepsis (P)      People in Home spouse (P)      Facility Arrived From: MD office / home (P)      Do you expect to return to your current living situation? Yes (P)      Do you have help at home or someone to help you manage your care at home? Yes (P)      Who are your caregiver(s) and their phone number(s)? Galina Rodríguez (Spouse)   [phone] (Mobile) (P)      Current cognitive status: Alert/Oriented (P)      Walking or Climbing Stairs ambulation difficulty, requires equipment;ambulation difficulty, assistance 1 person (P)      Dressing/Bathing bathing difficulty, assistance 1 person;dressing difficulty, assistance 1 person (P)      Equipment Currently Used at Home bedside commode;hospital bed;walker, rolling;wheelchair (P)      Readmission within 30 days? No (P)      Patient currently being followed by outpatient case management? No (P)      Do you currently have service(s) that help you manage your care at home? Yes (P)      Name and Contact number of agency [facility] [phone] (P)      Is the pt/caregiver preference to resume services with current agency Yes (P)      Do you take prescription medications? Yes (P)      Do you have prescription coverage? Yes (P)      Coverage [facility] managed medicare (P)      Do you have any problems affording any of your prescribed medications? No (P)      Who is going to help you get home at discharge? TBD. Probably will need transportation assistance. (P)      Are you on dialysis? No (P)      Do you take coumadin? No (P)      Discharge Plan A Home Health (P)      Discharge Plan B Home Health (P)      DME Needed Upon Discharge  none (P)      Discharge Plan discussed with: Patient (P)      Discharge Barriers Identified None (P)         OTHER    Name(s) of People in Home Galina Rodríguez (Spouse)   [phone] (Mobile) (P)

## 2023-03-17 NOTE — PROGRESS NOTES
[facility] Medicine  Progress Note    Patient Name: Hiro Rodríguez  MRN: [id number]  Patient Class: IP- Inpatient   Admission Date: 3/16/2023  Length of Stay: 1 days  Attending Physician: Georgia Molina MD  Primary Care Provider: Gautam Castanon III, MD        Subjective:     Principal Problem:Hypotension        HPI:  Hiro Rodríguez is a 78 y.o. male with a history as  has a past medical history of CHF (congestive heart failure) and Hypertension. who presented to the ED with a Hypotension (At clinic today and found to be hypotensive and is currently being treated for UTI)    Patient presents to the emergency room from PCPs office.  Patient tells me he was at his normal PCP checkup.  Reports they were attempting to get his vital signs and was unable to obtain a blood pressure however noted elevated heart rate.  Patient reported at that time he was feeling faint and weak as if he was going to pass out.  So he was brought here to the emergency room.      Patient further reports over last 3 months he has been in and out of the hospital for various different reasons including kidney stones, orthostatic hypotension, blood infection.  He reports his health continues to decline over these past 3 months.     Denies fever, chills, diaphoresis, SOB, dizziness, HA, chest pain, palpitations, NVD, recent trauma or any other associated symptoms.     Lab and imaging obtained and reviewed. CBC shows WBCs 13.15 MCHC 31.9 RDW 16.0 g% 79.2 l% 13.8. CMP shows glucose 154 alb 3.4. . EKG shows a-fib rate controlled on amiodarone gtt. Lactate 2.3.  On admit, afebrile HR 90 RR 18 /95 Sats 96% on RA. CXR shows cardiomegaly and findings suggestive of mild pulmonary vascular congestion/trace edema.       Per ED provider, patient on presentation noted to be in AFib RVR, amiodarone drip initiated.  Also noted to have elevated white count with low blood pressure, sepsis protocol initiated.  Given  patient recent admission with urosepsis E.Coli sensitive to rocephin, IV ABX initiated.  Will admit to ICU for management of AFib RVR in sepsis with possible urosepsis.                     Overview/Hospital Course:  Assumed care of this patient on 3/17/23.  Patient with a known history of paroxysmal atrial fibrillation, diabetes, pulmonary hypertension, history of CHF and hypertension, recent [facility] admission 3/1 to 3/8, initially referred due to AFib with RVR with hypotension from cardiology office, found to have E coli in urine and blood culture, moderate left hydroureteronephrosis status post cystoscopy with placement of left ureteral stent, he was discharged to complete 2 days of Bactrim to complete course of antibiotics with outpatient follow-up with Dr. Lange, inpatient admission complicated by orthostatic hypotension treated with IV fluids and compression.  He states at home blood pressure was dropping intermittently on standing, he was not using compression stockings.  He was seen by PCP office yesterday, blood pressure 60/40, repeat 80/40, referred to the ED. in the ED mild leukocytosis of 13, UA grossly positive, Sotelo was placed, AFib on EKG, intermittent RVR, admitted to the ICU with cardiology consultation.  On 03/17 on lying down blood pressure trend is improved, lisinopril discontinued, DC amiodarone drip and increase oral amiodarone, monitoring blood pressure and heart rate, discontinue Sotelo catheter, repeat CT renal protocol given report of sacral pain, await Cardiology recommendations.      Interval History:  See hospital course.  Patient states at home his blood pressure was intermittently low mostly on standing.  Did not have Sotelo catheter at home and was voiding spontaneously.  Has not yet followed up with outpatient Neurology.  States last night he had sacral pain, states oral pain medication more effective than IV morphine.  Patient is hard of hearing.  Denies any nausea, vomiting,  shortness of breath, chest pain.  Telemetry atrial fibrillation.  Blood pressure trend better.  T-max 98.8°.  Labs with WBC 11, hemoglobin 12.2, BUN/creatinine 21/0.6, UA positive.  Previous echo with EF of 70%.  Discussed with patient.  No visitors at bedside.  Discussed with nursing.    Review of Systems   Constitutional:  Negative for fever.   HENT:          Hard of hearing   Respiratory:  Negative for shortness of breath.    Cardiovascular:  Negative for chest pain.   Genitourinary:         Has messina   Psychiatric/Behavioral:  Negative for confusion.    Objective:     Vital Signs (Most Recent):  Temp: 97.6 °F (36.4 °C) (03/17/23 0701)  Pulse: 95 (03/17/23 0807)  Resp: 18 (03/17/23 0807)  BP: 134/71 (03/17/23 0701)  SpO2: (!) 94 % (03/17/23 0807)   Vital Signs (24h Range):  Temp:  [97.6 °F (36.4 °C)-98.8 °F (37.1 °C)] 97.6 °F (36.4 °C)  Pulse:  [] 95  Resp:  [13-24] 18  SpO2:  [82 %-97 %] 94 %  BP: ()/(40-95) 134/71     Weight: 103.1 kg (227 lb 4.7 oz)  Body mass index is 32.61 kg/m².    Intake/Output Summary (Last 24 hours) at 3/17/2023 0903  Last data filed at 3/17/2023 0633  Gross per 24 hour   Intake 830 ml   Output 115 ml   Net 715 ml      Physical Exam  Vitals and nursing note reviewed.   Constitutional:       Comments: Lying in bed, cooperative   HENT:      Head: Normocephalic and atraumatic.      Ears:      Comments: Hard of hearing     Mouth/Throat:      Mouth: Mucous membranes are moist.   Cardiovascular:      Rate and Rhythm: Normal rate. Rhythm irregular.      Comments: Trace LE edema  Pulmonary:      Comments: On RA, no wheeze or accessory muscle use  Abdominal:      General: Bowel sounds are normal. There is no distension.      Palpations: Abdomen is soft.      Tenderness: There is no abdominal tenderness.   Genitourinary:     Comments: Messina catheter in place with dark urine draining  Skin:     General: Skin is warm.   Neurological:      Mental Status: He is alert and oriented to  person, place, and time.   Psychiatric:         Mood and Affect: Mood normal.       Significant Labs: BMP:   Recent Labs   Lab 03/17/23  0446         K 3.6      CO2 25   BUN 21   CREATININE 0.6   CALCIUM 8.9   MG 1.6     CBC:   Recent Labs   Lab 03/16/23  1533 03/17/23  0446   WBC 13.15* 11.70   HGB 14.4 12.2*   HCT 45.1 38.0*    273     CMP:   Recent Labs   Lab 03/16/23  1533 03/17/23  0446    141   K 3.8 3.6    103   CO2 28 25   * 107   BUN 18 21   CREATININE 0.9 0.6   CALCIUM 9.3 8.9   PROT 6.6 5.8*   ALBUMIN 3.4* 2.9*   BILITOT 0.8 0.3   ALKPHOS 65 57   AST 23 14   ALT 18 14   ANIONGAP 8 13     Cardiac Markers:   Recent Labs   Lab 03/16/23  1533   *     Lactic Acid:   Recent Labs   Lab 03/16/23  2057 03/16/23  2345 03/17/23  0805   LACTATE 1.6 1.4 1.4     Magnesium:   Recent Labs   Lab 03/16/23  1533 03/17/23  0446   MG 1.7 1.6     POCT Glucose: No results for input(s): POCTGLUCOSE in the last 48 hours.  Urine Culture: No results for input(s): LABURIN in the last 48 hours.  Urine Studies:   Recent Labs   Lab 03/17/23  0135   COLORU Orange*   APPEARANCEUA Cloudy*   PHUR 6.0   SPECGRAV 1.025   PROTEINUA 2+*   GLUCUA Negative   KETONESU Negative   BILIRUBINUA Negative   OCCULTUA 3+*   NITRITE Positive*   UROBILINOGEN Negative   LEUKOCYTESUR 3+*   RBCUA >100*   WBCUA >100*   BACTERIA Rare   SQUAMEPITHEL 1   HYALINECASTS 26*       Significant Imaging: I have reviewed all pertinent imaging results/findings within the past 24 hours.    X-Ray Chest 1 View    Result Date: 3/16/2023  CLINICAL HISTORY: 78 years (1945) Male Hypotension w/ weakness TECHNIQUE: Portable AP radiograph the chest. COMPARISON: Radiograph from March 1, 2023. FINDINGS: Mildly increased central hilar interstitial opacities are seen bilaterally suggestive of either mild edema, atypical infection/pneumonia or bronchitis in the appropriate clinical setting.  There is blunting of the left  costophrenic angle consistent with a trace pleural effusion. No pneumothorax is identified. The heart is mildly enlarged. Osseous structures appear within normal limits. The visualized upper abdomen is unremarkable. IMPRESSION: Cardiomegaly and findings suggestive of mild pulmonary vascular congestion/trace edema. . Electronically signed by:  Viet Matt MD  3/16/2023 4:05 PM CDT Workstation: [id number]    Echocardiogram  Summary    The left ventricle is normal in size with moderate concentric hypertrophy and normal systolic function.  The estimated ejection fraction is 70%.  Normal left ventricular diastolic function.  The sinuses of Valsalva is mildly dilated.  The ascending aorta is dilated. Recommend CT scan to evaluate entire aorta for further evaluation.  Mild tricuspid regurgitation.  Normal central venous pressure (3 mmHg).  The estimated PA systolic pressure is 61 mmHg.  There is pulmonary hypertension.  Unable to visualize right ventricle and right atrium but they both grossly appear to be dilated with RV function mildly reduced.        Assessment/Plan:      Active Hospital Problems    Diagnosis    *Hypotension    Orthostatic hypotension    Pulmonary hypertension, PASP 61    GERD (gastroesophageal reflux disease)    Obesity (BMI 30.0-34.9)    Pressure injury of buttock, stage 1    Uses walker    Hearing loss    Chronic heart failure with preserved ejection fraction    Type 2 diabetes mellitus with diabetic polyneuropathy, without long-term current use of insulin, HbA1c 5.8%    Benign essential tremor    Hyperlipidemia    Paroxysmal atrial fibrillation with RVR    Hypertension, essential    Anticoagulated    Morbid obesity     Plan:  Transfer to telemetry floor with continuous cardiac monitoring   Discontinue continuous amiodarone infusion, increase home p.o. amiodarone 200 mg daily, continue metoprolol 25 mg daily, Eliquis for stroke prophylaxis, monitoring heart rate   Home lisinopril discontinued    Lower extremity compression stockings and orthostatic precautions, he remains on tamsulosin due to urinary issues (can cause orthostasis)   Check TSH and a.m. cortisol level  Previous echo with EF of 70% with pulmonary hypertension with PASP 61   P.r.n. diuresis   Continue 2 g Rocephin Q 24 hours and follow-up repeat urine culture results  Discontinue Sotelo catheter and monitoring post procedure for retention as per protocol  Repeat CT renal protocol given positive UA and recent treated hydronephrosis with pain   P.r.n. pain control with Walker 10, patient states this works better than IV morphine   Electrolytes sliding scale repletion  Fall and delirium precautions  Increase activity, out of bed to chair, PT  Repeat orthostatic blood pressure this morning   A.m. labs ordered  Await Cardiology input   Further plan as per hospital course      VTE Risk Mitigation (From admission, onward)           Ordered     apixaban tablet 5 mg  2 times daily         03/17/23 0026     Place KAZ hose  Until discontinued         03/16/23 1916     Reason for No Pharmacological VTE Prophylaxis  Once        Question:  Reasons:  Answer:  Already adequately anticoagulated on oral Anticoagulants    03/16/23 1916     IP VTE HIGH RISK PATIENT  Once         03/16/23 1915     Place sequential compression device  Until discontinued         03/16/23 1915                    Discharge Planning   BRENNAN:      Code Status: Full Code   Is the patient medically ready for discharge?:     Reason for patient still in hospital (select all that apply): Consult recommendations                     Georgia Molina MD  Department of Hospital Medicine   [facility]

## 2023-03-17 NOTE — CONSULTS
[facility]  Department of Cardiology  Consult Note      PATIENT NAME: iHro Rodríguez  MRN: [id number]  TODAY'S DATE: 03/17/2023  ADMIT DATE: 3/16/2023                          CONSULT REQUESTED BY: Georgia Molina MD    SUBJECTIVE     PRINCIPAL PROBLEM: Sepsis      REASON FOR CONSULT:    AFIB RVR    HPI:    Patient is a 78-year-old male with past medical history of CHF, BPH, hypertension, paroxysmal atrial fibrillation on Eliquis, diabetes mellitus, obesity, ORIF of right hip, decubitus ulcer, urosepsis, renal calculi, who presented to the ED from PCPs office for hypotension and AFib RVR.  Patient presented to the ED on 03/01/2023 for the same reason and was discharged on 03/08.  Patient denies chest pain, palpitations, shortness of breath, lightheadedness, dizziness, edema or bleeding.  Patient was started on amiodarone drip and patient was placed on sepsis protocol with initiation of IV antibiotics.    In ED:  WBC 13.15, H&H 14.4/45.1, K 3.8, creatinine 0.9, magnesium 1.7, , lactate 2.3, troponin HS 9.3, repeat 15.3.  UA positive for blood, nitrites, leukocytes, RBC, WBC.  Chest x-ray shows cardiomegaly with findings suggestive of mild pulmonary vascular congestion/trace edema.  EKG atrial fibrillation with incomplete right bundle-branch block, ST-T wave abnormality, consider inferior lateral ischemia.    On 03/01/2023 patient was seen by me in the cardiology office as a hospital follow-up and was found to be in AFib RVR with hypotension as well.  I sent him to the ED where he was found to have a UTI with moderate left hydroureter nephrosis secondary to at least 2 calculi in the midportion of the last ureter.  Patient had a cystoscopy with a left your ureteral stent placement and removal of bladder stone.  Patient was sent home on antibiotics.     Patient was also recently discharged on 01/26/2023 where he was diagnosed with urosepsis, and altered mental status.  Patient was also found  to be hypotensive with tachycardia then as well.  Patient was discharged to a SNF.    During examination, patient was alert and oriented, no acute distress, rate controlled atrial fibrillation on telemetry with systolic BP in 90s to 100s.  Patient is on an amiodarone drip.    ECHO 11/11/22  The left ventricle is normal in size with moderate concentric hypertrophy and normal systolic function.  The estimated ejection fraction is 70%.  Normal left ventricular diastolic function.  The sinuses of Valsalva is mildly dilated.  The ascending aorta is dilated. Recommend CT scan to evaluate entire aorta for further evaluation.  Mild tricuspid regurgitation.  Normal central venous pressure (3 mmHg).  The estimated PA systolic pressure is 61 mmHg.  There is pulmonary hypertension.  Unable to visualize right ventricle and right atrium but they both grossly appear to be dilated with RV function mildly reduced.    FROM H&P:    HPI: Hiro Rodríguez is a 78 y.o. male with a history as  has a past medical history of CHF (congestive heart failure) and Hypertension. who presented to the ED with a Hypotension (At clinic today and found to be hypotensive and is currently being treated for UTI)     Patient presents to the emergency room from PCPs office.  Patient tells me he was at his normal PCP checkup.  Reports they were attempting to get his vital signs and was unable to obtain a blood pressure however noted elevated heart rate.  Patient reported at that time he was feeling faint and weak as if he was going to pass out.  So he was brought here to the emergency room.       Patient further reports over last 3 months he has been in and out of the hospital for various different reasons including kidney stones, orthostatic hypotension, blood infection.  He reports his health continues to decline over these past 3 months.      Denies fever, chills, diaphoresis, SOB, dizziness, HA, chest pain, palpitations, NVD, recent trauma or any  other associated symptoms.      Lab and imaging obtained and reviewed. CBC shows WBCs 13.15 MCHC 31.9 RDW 16.0 g% 79.2 l% 13.8. CMP shows glucose 154 alb 3.4. . EKG shows a-fib rate controlled on amiodarone gtt. Lactate 2.3.  On admit, afebrile HR 90 RR 18 /95 Sats 96% on RA. CXR shows cardiomegaly and findings suggestive of mild pulmonary vascular congestion/trace edema.        Per ED provider, patient on presentation noted to be in AFib RVR, amiodarone drip initiated.  Also noted to have elevated white count with low blood pressure, sepsis protocol initiated.  Given patient recent admission with urosepsis E.Coli sensitive to rocephin, IV ABX initiated.  Will admit to ICU for management of AFib RVR in sepsis with possible urosepsis.            Review of patient's allergies indicates:  No Known Allergies    Past Medical History:   Diagnosis Date    CHF (congestive heart failure)     Hypertension      Past Surgical History:   Procedure Laterality Date    CYSTOSCOPY W/ URETERAL STENT PLACEMENT N/A 3/2/2023    Procedure: CYSTOSCOPY, WITH URETERAL STENT INSERTION;  Surgeon: Yoshi Lange MD;  Location: [facility] OR;  Service: Urology;  Laterality: N/A;    FRACTURE SURGERY      INTRAMEDULLARY RODDING OF TROCHANTER OF FEMUR Left 11/10/2022    Procedure: INSERTION, ITRAMEDULLARY SANTI, FEMUR, TROCHANTER;  Surgeon: Richard Phoenix MD;  Location: [facility] OR;  Service: Orthopedics;  Laterality: Left;    REMOVAL, CALCULUS, BLADDER  3/2/2023    Procedure: REMOVAL, CALCULUS, BLADDER;  Surgeon: Yoshi Lange MD;  Location: [facility] OR;  Service: Urology;;     Social History     Tobacco Use    Smoking status: Never    Smokeless tobacco: Never   Substance Use Topics    Alcohol use: Yes     Alcohol/week: 1.0 standard drink     Types: 1 Shots of liquor per week    Drug use: Not Currently        Review of Systems     Constitutional:  Positive fatigue and generalized weakness Negative for chills, and fever.   Eyes: No  double vision, No blurred vision  Neuro: No headaches, No dizziness  Respiratory: Negative for cough, shortness of breath and wheezing.    Cardiovascular: Negative for chest pain. Negative for palpitations and leg swelling.   Gastrointestinal: Negative for abdominal pain, No melena, diarrhea, nausea and vomiting.   Genitourinary: Negative for dysuria and frequency, Negative for hematuria  Skin: Negative for bruising, Negative for edema or discoloration noted.      OBJECTIVE     VITAL SIGNS (Most Recent)  Temp: 97.6 °F (36.4 °C) (03/17/23 0701)  Pulse: 95 (03/17/23 0807)  Resp: 18 (03/17/23 0807)  BP: 134/71 (03/17/23 0701)  SpO2: (!) 94 % (03/17/23 0807)    VENTILATION STATUS  Resp: 18 (03/17/23 0807)  SpO2: (!) 94 % (03/17/23 0807)       I & O (Last 24H):  Intake/Output Summary (Last 24 hours) at 3/17/2023 0814  Last data filed at 3/17/2023 0633  Gross per 24 hour   Intake 830 ml   Output 115 ml   Net 715 ml       WEIGHTS  Wt Readings from Last 3 Encounters:   03/17/23 0500 103.1 kg (227 lb 4.7 oz)   03/16/23 2345 103.1 kg (227 lb 4.7 oz)   03/16/23 1517 104.3 kg (230 lb)   03/16/23 1333 101.8 kg (224 lb 6.9 oz)   03/01/23 2318 107.5 kg (236 lb 15.9 oz)   03/01/23 1437 113.6 kg (250 lb 7.1 oz)       PHYSICAL EXAM  GENERAL: obese chronically ill appearing male in no apparent distress alert and oriented.   HEENT: Normocephalic. Pupils normal and conjunctivae normal.    NECK: No JVD. No bruit..   THYROID: Thyroid not examined  CARDIAC: Irregular rate and rhythm. S1 is normal.S2 is normal.No gallops, clicks or murmurs noted at this time.  CHEST ANATOMY: normal.   LUNGS: Clear to auscultation.   ABDOMEN: Soft.  Nontender.  Normal bowel sounds.   URINARY: messina catheter- concentrated yellow UOP  EXTREMITIES: 1+ nonpitting edema BLE. No cyanosis, clubbing noted at this time.  PERIPHERAL VASCULAR SYSTEM: Good palpable distal pulses.   CENTRAL NERVOUS SYSTEM: No focal motor or sensory deficits noted.   SKIN: Skin dry,  intact, well perfused.   MUSCLE STRENGTH & TONE: No noteable weakness, atrophy or abnormal movement.     HOME MEDICATIONS:  No current facility-administered medications on file prior to encounter.     Current Outpatient Medications on File Prior to Encounter   Medication Sig Dispense Refill    amiodarone (PACERONE) 100 MG Tab Take 100 mg by mouth every morning.      apixaban (ELIQUIS) 5 mg Tab Take 1 tablet (5 mg total) by mouth 2 (two) times daily. 180 tablet 1    calcium polycarbophil (FIBER-CAPS, CA POLYCARBOPHIL, ORAL) Take 1 tablet by mouth once daily.      cholecalciferol, vitamin D3, (VITAMIN D3) 50 mcg (2,000 unit) Tab Take 1 tablet by mouth once daily.      cyanocobalamin, vitamin B-12, 1,000 mcg Subl Place 1,000 mcg under the tongue 2 (two) times a day.      docusate sodium (COLACE) 100 MG capsule Take 100 mg by mouth 2 (two) times daily.      DULoxetine (CYMBALTA) 30 MG capsule Take 1 capsule (30 mg total) by mouth 2 (two) times daily. 90 capsule 1    HYDROcodone-acetaminophen (NORCO)  mg per tablet Take 1 tablet by mouth 4 (four) times daily as needed.      metFORMIN (GLUCOPHAGE-XR) 500 MG ER 24hr tablet TAKE 1 TABLET BY MOUTH EVERY DAY (Patient taking differently: Take 500 mg by mouth 2 (two) times a day.) 90 tablet 1    metoprolol tartrate (LOPRESSOR) 25 MG tablet Take 1 tablet (25 mg total) by mouth 2 (two) times daily. 180 tablet 1    omega-3 acid ethyl esters (LOVAZA) 1 gram capsule TAKE 2 CAPSULES BY MOUTH 2 TIMES DAILY. (Patient taking differently: Take 2 g by mouth 2 (two) times daily.) 360 capsule 1    polyethylene glycol (GLYCOLAX) 17 gram/dose powder Take 17 g by mouth daily as needed.      potassium chloride SA (KLOR-CON) 10 MEQ TbSR Take 2 tablets (20 mEq total) by mouth once daily.      rosuvastatin (CRESTOR) 20 MG tablet TAKE 1 TABLET BY MOUTH EVERY DAY (Patient taking differently: Take 20 mg by mouth once daily. TAKE 1 TABLET BY MOUTH EVERY DAY) 90 tablet 1    tamsulosin (FLOMAX)  0.4 mg Cap Take 2 capsules (0.8 mg total) by mouth once daily. 60 capsule 11    tolterodine (DETROL LA) 4 MG 24 hr capsule Take 1 capsule (4 mg total) by mouth once daily. 90 capsule 1    topiramate (TOPAMAX) 50 MG tablet TAKE 1 TABLET BY MOUTH EVERY DAY (Patient taking differently: Take 50 mg by mouth once daily.) 90 tablet 1    traZODone (DESYREL) 50 MG tablet TAKE 1 TABLET BY MOUTH EVERY EVENING. (Patient taking differently: Take 50 mg by mouth every evening.) 90 tablet 1    semaglutide (OZEMPIC) 1 mg/dose (4 mg/3 mL) Inject 1 mg into the skin every 7 days. (Patient taking differently: Inject 1 mg into the skin every 7 days. FRIDAYS) 4 pen 5       SCHEDULED MEDS:   amiodarone  100 mg Oral QAM    apixaban  5 mg Oral BID    cefTRIAXone (ROCEPHIN) IVPB  1 g Intravenous Q24H    cholecalciferol (vitamin D3)  1 tablet Oral Daily    DULoxetine  30 mg Oral BID    metoprolol tartrate  25 mg Oral BID    pantoprazole  40 mg Oral Before breakfast    pravastatin  80 mg Oral QHS    tamsulosin  0.8 mg Oral Daily    topiramate  50 mg Oral Daily       CONTINUOUS INFUSIONS:    PRN MEDS:acetaminophen, dextrose 10%, dextrose 10%, glucagon (human recombinant), glucose, glucose, HYDROcodone-acetaminophen, insulin aspart U-100, magnesium oxide, magnesium oxide, melatonin, morphine, naloxone, potassium bicarbonate, potassium bicarbonate, potassium bicarbonate, potassium, sodium phosphates, potassium, sodium phosphates, potassium, sodium phosphates, sodium chloride 0.9%    LABS AND DIAGNOSTICS     CBC LAST 3 DAYS  Recent Labs   Lab 03/16/23  1533 03/17/23  0446   WBC 13.15* 11.70   RBC 4.82 4.10*   HGB 14.4 12.2*   HCT 45.1 38.0*   MCV 94 93   MCH 29.9 29.8   MCHC 31.9* 32.1   RDW 16.0* 16.1*    273   MPV 9.5 9.8   GRAN 79.2*  10.4* 69.1  8.1*   LYMPH 13.8*  1.8 21.5  2.5   MONO 5.6  0.7 7.7  0.9   BASO 0.05 0.05   NRBC 0 0       COAGULATION LAST 3 DAYS  Recent Labs   Lab 03/16/23  1533 03/17/23  0446   INR 1.1 1.1   APTT  26.1 28.6       CHEMISTRY LAST 3 DAYS  Recent Labs   Lab 03/16/23  1533 03/17/23  0446    141   K 3.8 3.6    103   CO2 28 25   ANIONGAP 8 13   BUN 18 21   CREATININE 0.9 0.6   * 107   CALCIUM 9.3 8.9   MG 1.7 1.6   ALBUMIN 3.4* 2.9*   PROT 6.6 5.8*   ALKPHOS 65 57   ALT 18 14   AST 23 14   BILITOT 0.8 0.3       CARDIAC PROFILE LAST 3 DAYS  Recent Labs   Lab 03/16/23  1533 03/16/23 2000 03/17/23  0126   *  --   --    TROPONINIHS 9.3 15.3* 11.2       ENDOCRINE LAST 3 DAYS  Recent Labs   Lab 03/16/23 2000   PROCAL <0.05       LAST ARTERIAL BLOOD GAS  ABG  No results for input(s): PH, PO2, PCO2, HCO3, BE in the last 168 hours.    LAST 7 DAYS MICROBIOLOGY   Microbiology Results (last 7 days)       Procedure Component Value Units Date/Time    Blood culture [[id number]] Collected: 03/16/23 2345    Order Status: Completed Specimen: Blood from Peripheral, Antecubital, Right Updated: 03/17/23 0717     Blood Culture, Routine No Growth to date    Narrative:      Please draw 15 minutes apart from different sites    Blood culture [[id number]] Collected: 03/16/23 2000    Order Status: Completed Specimen: Blood from Peripheral, Antecubital, Right Updated: 03/17/23 0317     Blood Culture, Routine No Growth to date    Narrative:      Please draw 15 minutes apart from different sites    Urine culture [[id number]] Collected: 03/17/23 0135    Order Status: No result Specimen: Urine Updated: 03/17/23 0231            MOST RECENT IMAGING  X-Ray Chest 1 View  CLINICAL HISTORY:  78 years (1945) Male Hypotension w/ weakness    TECHNIQUE:  Portable AP radiograph the chest.    COMPARISON:  Radiograph from March 1, 2023.    FINDINGS:  Mildly increased central hilar interstitial opacities are seen bilaterally suggestive of either mild edema, atypical infection/pneumonia or bronchitis in the appropriate clinical setting.  There is blunting of the left costophrenic angle consistent with a trace pleural effusion. No  pneumothorax is identified. The heart is mildly enlarged. Osseous structures appear within normal limits. The visualized upper abdomen is unremarkable.    IMPRESSION:  Cardiomegaly and findings suggestive of mild pulmonary vascular congestion/trace edema.    .    Electronically signed by:  Viet Matt MD  3/16/2023 4:05 PM CDT Workstation: [id number]      ECHOCARDIOGRAM RESULTS (last 5)  Results for orders placed during the hospital encounter of 11/08/22    Echo    Interpretation Summary  · The left ventricle is normal in size with moderate concentric hypertrophy and normal systolic function.  · The estimated ejection fraction is 70%.  · Normal left ventricular diastolic function.  · The sinuses of Valsalva is mildly dilated.  · The ascending aorta is dilated. Recommend CT scan to evaluate entire aorta for further evaluation.  · Mild tricuspid regurgitation.  · Normal central venous pressure (3 mmHg).  · The estimated PA systolic pressure is 61 mmHg.  · There is pulmonary hypertension.  · Unable to visualize right ventricle and right atrium but they both grossly appear to be dilated with RV function mildly reduced.      CURRENT/PREVIOUS VISIT EKG  Results for orders placed or performed during the hospital encounter of 03/16/23   EKG 12-lead    Collection Time: 03/16/23  3:25 PM    Narrative    Test Reason : R53.1,    Vent. Rate : 097 BPM     Atrial Rate : 104 BPM     P-R Int : 000 ms          QRS Dur : 094 ms      QT Int : 342 ms       P-R-T Axes : 000 033 194 degrees     QTc Int : 434 ms    Atrial fibrillation  Incomplete right bundle branch block  ST and T wave abnormality, consider inferolateral ischemia  Abnormal ECG  When compared with ECG of 01-MAR-2023 15:27,  Incomplete right bundle branch block is now Present  Criteria for Septal infarct are no longer Present    Referred By: [email or web address]   SELF           Confirmed By:            ASSESSMENT/PLAN:     Active Hospital Problems    Diagnosis    *Sepsis     GERD (gastroesophageal reflux disease)    Obesity (BMI 30.0-34.9)    Pressure injury of buttock, stage 1    Benign prostatic hyperplasia with urinary retention    Hearing loss    Type 2 diabetes mellitus with diabetic polyneuropathy, without long-term current use of insulin    Paroxysmal atrial fibrillation with RVR    Hypertension, essential    Anticoagulated       ASSESSMENT & PLAN:     Urosepsis  Atrial fibrillation with rapid ventricular rate- rate controlled at this time  Hypotension  Chronic CHF with preserved EF- 11/22 EF 70%        RECOMMENDATIONS:    Patient presents with recurrent urosepsis in atrial fibrillation RVR.  Patient was placed on amiodarone drip in ED. Patient is rate controlled during examination.  Discontinue amiodarone PO and gtt.    Patient has history of atrial fibrillation over 7 years.  Recommend discontinuing amiodarone and starting on diltiazem 180 mg daily.   Patient echocardiogram in November of 2022 that showed EF of 70% with normal diastolic function, dilated ascending aorta, and pulmonary hypertension.    Continue Eliquis 5 mg b.i.d..  Continue to monitor for bleeding.  Serial H&H.  Continue to check and replace potassium and magnesium. Goal for potassium is 4.0, and goal for magnesium is 2.0.    Thank you for the consultation.  We will continue to follow.      Jaja Farrell NP  Department of Cardiology  Date of Service: 03/17/2023      78-year-old gentleman with history of chronic atrial fibrillation admitted with weakness and recurrent episodes of hypotension patient has been taking metoprolol and lisinopril at home has postural hypotension has recently discharged from the hospital.  Patient was started on intravenous amiodarone therapy however patient has been in atrial fibrillation for the last 9 years.  Although patient was taking amiodarone at home it did not maintain sinus rhythm.  Last echocardiogram shows preserved LV function ejection fraction 70%.  I have personally  interviewed and examined the patient, I have reviewed the Nurse Practitioner's history and physical, assessment, and plan. I agree with the findings and plan.  Patient has no clinical evidence of heart failure or deep acute decompensation at this time.  Recommend the following from a cardiac perspective  1. Maintain on Cardizem CD at 180 mg daily increase this upwards as tolerated  2. Use short-acting metoprolol tartrate 25 mg p.o. b.i.d. hold for heart rate below 60  3. Discontinue ACE inhibitors and ARB ease for the time being.  4. Maintain on Eliquis 5 mg p.o. b.i.d.   5. Maintain on postural precautions, including using support stockings adequate hydration and taking time with change of posture.  6. Physical therapy to monitor ambulation and stability.    7. Maintain on magnesium oxide 400 mg daily  8. Defer to hospitalist for other medical problems.  Thank you for the consultation.  Discussed the clinical condition and plan of action with the patient and his wife in detail  Justus Mendez M.D.  Department of Cardiology  Date of Service: 03/17/2023  8:14 AM

## 2023-03-17 NOTE — HOSPITAL COURSE
78  M Patient with a known history of paroxysmal atrial fibrillation, diabetes, pulmonary hypertension, history of CHF and hypertension, recent [facility] admission 3/1 to 3/8, initially referred due to AFib with RVR with hypotension from cardiology office, found to have E coli in urine and blood culture, moderate left hydroureteronephrosis status post cystoscopy with placement of left ureteral stent, he was discharged to complete 2 days of Bactrim to complete course of antibiotics with outpatient follow-up with Dr. Lange, inpatient admission complicated by orthostatic hypotension treated with IV fluids and compression.  He states at home blood pressure was dropping intermittently on standing, he was not using compression stockings.  He was seen by PCP office and  blood pressure 60/40, repeat 80/40, referred to the ED.   in the ED mild leukocytosis of 13, UA grossly positive, Sotelo was placed, AFib on EKG, intermittent RVR, admitted to the ICU with cardiology consultation.     blood pressure trend is improved, lisinopril discontinued, DC amiodarone drip and increase oral amiodarone, monitoring blood pressure and heart rate, discontinue Sotelo catheter, repeat CT renal protocol given report of sacral pain.Patient was given 180 mg of diltiazem by Cardiology and blood pressure dropped with systolic in the 70s, discontinued, 250 bolus, midodrine 5 mg, cardiology subsequently increase Lopressor to 25 mg t.i.d..  On 03/18, preliminary blood and urine culture with Pseudomonas, repeat CT renal protocol with bilateral nephrocalcinosis, ureteral stent in place, no acute abnormalities.  Later infectious Disease consulted because of Pseudomonas bacteremia and also UTI with Pseudomonas.  Urology was consulted but did not recommend stent exchange since the last and was recently replaced as per Urology.  Later repeat blood culture is negative.  He needed urinary catheter and later having gross hematuria and Eliquis was held and  hematuria improved and Eliquis lower dose 2.5 mg b.i.d. was restarted as per cardiology recommendation .  Patient still have renal stones and seen by Urology again for with a chest treatment at later date but patient wants to go to [place] [facility] with his son and he will see Dr. Valencia when he comes back  PO levaquin given at the discharged to complete the course.

## 2023-03-17 NOTE — PT/OT/SLP PROGRESS
Physical Therapy      Patient Name:  Hiro Rodríguez   MRN:  [id number]    Patient not seen today secondary to Patient unwilling to participate, Other (Comment) (in A-Fib and BP low (97/56).). Will follow-up 3/18/23.

## 2023-03-17 NOTE — NURSING
[facility]  Wound Care    Patient Name:  Hiro Rodríguez   MRN:  [id number]  Date: 3/17/2023  Diagnosis: Hypotension    History:     Past Medical History:   Diagnosis Date    CHF (congestive heart failure)     Hypertension              Precautions:     Allergies as of 03/16/2023    (No Known Allergies)       [facility] Assessment Details/Treatment   78 yr old male   Sacral buttock stage 2     Right elbow abrasion     Right heel     Recommendation:   Buttock sacral  Clean area with chlorhexidine/ns. Pat dry. Apply Triad and place on an air flow pad daily    Turn reposition q2 as pt's condition will allow.  Bilat heel pillows   Float and elevate heels of bed with pillows.  air mattress    Bilat heels  Clean area with chlorhexidine/ns  Pat dry  Apply venelex and cover with mepilex.    03/17/2023

## 2023-03-17 NOTE — PLAN OF CARE
Problem: Malnutrition  Goal: Improved Nutritional Intake  Outcome: Ongoing, Progressing  Intervention: Promote and Optimize Oral Intake  Flowsheets (Taken 3/17/2023 1138)  Oral Nutrition Promotion: calorie-dense liquids provided

## 2023-03-17 NOTE — CONSULTS
[facility]  Adult Nutrition   Consult Note (Initial Assessment)     SUMMARY     Recommendations  Recommendation/Intervention:   Recommend that a medical diagnosis of severe malnutrition be added to patient's problem list if physician agrees with dietitian's Nutrition Focused Physical Exam (NFPE) findings that support medical diagnosis per ASPEN guidelines.   RD added ONS, Ensure Plus High Protein (to provide 1050 kcal/day and 60 g/day protein)    Continue current diet and encourage adequate PO intake of meals and supplements.  Goals:  Patient to meet at least 75% of estimated energy and protein needs.   Further weigh loss to be prevented.  Nutrition Goal Status: new  Communication of RD Recs: reviewed with RN    Dietitian Rounds Brief  Patient assessed 2' consult. HPI: In ICU for management of Afib RVR in sepsis with possible urosepsis. Patient has been in and out of the hospital over the past 3 months for various reasons including including kidney stones, orthostatic hypotension, blood infection. And health continues to decline over the past 3 months.   Patient is [place], but RD able to communicate without difficulty. Hears better on right side. Pt on diet, poor PO intake and poor appetite. Patient attributes weight loss and poor appetite to medication - semaglutide (Ozempic). NFPE performed. Patient was not able to sit himself up in bed, so not all areas assessed. Muscle and fat wasting noted. Offered ONS, patient accepted. BG controlled added Ensure Plus HP to better meet kcal and protein needs.     Nutritional Diagnosis (PES Statement)   Malnutrition in the context of chronic disease related to inadequate protein and energy intake due to decreased appetite from medication side effect as evidence by severe weight loss of >7.5% in < 3 months (actual weight loss of 13.9 kg (12.01%) in 3 months from 12/16/22 to 3/16/23) and moderate muscle wasting (temples and hands), severe fat wasting (triceps).  "    Reason for Assessment  Reason For Assessment: consult  Diagnosis: cardiac disease  Relevant Medical History: CHF; HTN; type 2 DM; pressure injury of buttocks stage 1; sepsis; GERD    Nutrition Risk Screen  Nutrition Risk Screen: no indicators present     MST Score: 3  Have you recently lost weight without trying?: Yes: 14-23 lbs  Weight loss score: 2  Have you been eating poorly because of a decreased appetite?: Yes  Appetite score: 1       Nutrition/Diet History  Patient Reported Diet/Restrictions/Preferences: diabetic diet  Food Allergies: NKFA  Factors Affecting Nutritional Intake: decreased appetite    Anthropometrics  Temp: 97.8 °F (36.6 °C)  Height Method: Stated  Height: 5' 10" (177.8 cm)  Height (inches): 70 in  Weight Method: Bed Scale  Weight: 103.1 kg (227 lb 4.7 oz)  Weight (lb): 227.3 lb  Ideal Body Weight (IBW), Male: 166 lb  % Ideal Body Weight, Male (lb): 136.93 %  BMI (Calculated): 32.6  BMI Grade: 30 - 34.9- obesity - grade I  Weight Loss: unintentional  Usual Body Weight (UBW), k.7 kg  % Usual Body Weight: 89.3  % Weight Change From Usual Weight: -10.89 %       Weight History:  Wt Readings from Last 10 Encounters:   23 103.1 kg (227 lb 4.7 oz)   23 101.8 kg (224 lb 6.9 oz)   23 107.5 kg (236 lb 15.9 oz)   23 114.8 kg (253 lb)   23 114.8 kg (253 lb)   23 117.3 kg (258 lb 9.6 oz)   22 115.7 kg (255 lb)   22 115.7 kg (255 lb)   12/15/22 115.7 kg (255 lb)   22 126 kg (277 lb 12.8 oz)     RD interpretation of weight history: Weight loss of 13.9 kg (12.01%) in 3 months from 22 to 3/16/23. Severe weight loss.     Lab/Procedures/Meds: Pertinent Labs Reviewed    Clinical Chemistry:  Recent Labs   Lab 23  0446      K 3.6      CO2 25      BUN 21   CREATININE 0.6   CALCIUM 8.9   PROT 5.8*   ALBUMIN 2.9*   BILITOT 0.3   ALKPHOS 57   AST 14   ALT 14   ANIONGAP 13   MG 1.6   PHOS 3.9       CBC:   Recent Labs   Lab " 03/17/23  0446   WBC 11.70   RBC 4.10*   HGB 12.2*   HCT 38.0*      MCV 93   MCH 29.8   MCHC 32.1       Cardiac Profile:  Recent Labs   Lab 03/16/23  1533   *       Inflammatory Labs:  Recent Labs   Lab 03/16/23 2000   CRP 0.86*       Vitamins:  Recent Labs   Lab 03/17/23 0446   [id number] >1500*         Medications: Pertinent Medications reviewed    Scheduled Meds:   amiodarone  200 mg Oral QAM    apixaban  5 mg Oral BID    cefTRIAXone (ROCEPHIN) IVPB  2 g Intravenous Q24H    cholecalciferol (vitamin D3)  1 tablet Oral Daily    DULoxetine  30 mg Oral BID    folic acid  1 mg Oral Daily    metoprolol tartrate  25 mg Oral BID    pantoprazole  40 mg Oral Before breakfast    pravastatin  80 mg Oral QHS    senna-docusate 8.6-50 mg  2 tablet Oral BID    tamsulosin  0.8 mg Oral Daily    topiramate  50 mg Oral Daily       PRN Meds:.acetaminophen, dextrose 10%, dextrose 10%, glucagon (human recombinant), glucose, glucose, HYDROcodone-acetaminophen, insulin aspart U-100, magnesium oxide, magnesium oxide, melatonin, naloxone, potassium bicarbonate, potassium bicarbonate, potassium bicarbonate, potassium, sodium phosphates, potassium, sodium phosphates, potassium, sodium phosphates, sodium chloride 0.9%    Estimated/Assessed Needs  Weight Used For Calorie Calculations: 103.1 kg (227 lb 4.7 oz)  Energy Calorie Requirements (kcal): 2062 - 2578 (20 - 25 kcal/kg)  Energy Need Method: Kcal/kg  Protein Requirements: 113 - 150 (1.5 - 2 g/kg IBW)  Weight Used For Protein Calculations: 75 kg (165 lb 5.5 oz) (IBW)  Fluid Requirements (mL): 1875 (25 ml/kg IBW) or per MD  Estimated Fluid Requirement Method: other (see comments)  RDA Method (mL): 2062       Nutrition Prescription Ordered  Current Diet Order: Diabetic 1800 calorie; Cardiac    Evaluation of Received Nutrient/Fluid Intake  Energy Calories Required: not meeting needs  Protein Required: not meeting needs  Fluid Required: meeting needs  Tolerance: tolerating      Intake/Output Summary (Last 24 hours) at 3/17/2023 1138  Last data filed at 3/17/2023 0633  Gross per 24 hour   Intake 830 ml   Output 115 ml   Net 715 ml      % Intake of Estimated Energy Needs: 0 - 25 %  % Meal Intake: 0 - 25 %    Nutrition Risk  Level of Risk/Frequency of Follow-up: high     Monitor and Evaluation  Food and Nutrient Intake: energy intake, food and beverage intake  Food and Nutrient Adminstration: diet order  Physical Activity and Function: nutrition-related ADLs and IADLs, factors affecting access to physical activity  Anthropometric Measurements: weight, weight change, body mass index  Biochemical Data, Medical Tests and Procedures: electrolyte and renal panel, inflammatory profile, gastrointestinal profile, lipid profile, glucose/endocrine profile  Nutrition-Focused Physical Findings: overall appearance     Nutrition Follow-Up  RD Follow-up?: Yes    Corine Roberson RD 03/17/2023 11:41 AM

## 2023-03-17 NOTE — SUBJECTIVE & OBJECTIVE
Past Medical History:   Diagnosis Date    CHF (congestive heart failure)     Hypertension        Past Surgical History:   Procedure Laterality Date    CYSTOSCOPY W/ URETERAL STENT PLACEMENT N/A 3/2/2023    Procedure: CYSTOSCOPY, WITH URETERAL STENT INSERTION;  Surgeon: Yoshi Lange MD;  Location: [facility];  Service: Urology;  Laterality: N/A;    FRACTURE SURGERY      INTRAMEDULLARY RODDING OF TROCHANTER OF FEMUR Left 11/10/2022    Procedure: INSERTION, ITRAMEDULLARY SANTI, FEMUR, TROCHANTER;  Surgeon: Richard Phoenix MD;  Location: [facility] OR;  Service: Orthopedics;  Laterality: Left;    REMOVAL, CALCULUS, BLADDER  3/2/2023    Procedure: REMOVAL, CALCULUS, BLADDER;  Surgeon: Yoshi Lange MD;  Location: [facility];  Service: Urology;;       Review of patient's allergies indicates:  No Known Allergies    No current facility-administered medications on file prior to encounter.     Current Outpatient Medications on File Prior to Encounter   Medication Sig    acetaminophen (TYLENOL) 325 MG tablet Take 650 mg by mouth every 6 (six) hours as needed.    amiodarone (PACERONE) 100 MG Tab Take 100 mg by mouth every morning.    apixaban (ELIQUIS) 5 mg Tab Take 1 tablet (5 mg total) by mouth 2 (two) times daily.    calcium polycarbophil (FIBER-CAPS, CA POLYCARBOPHIL, ORAL) Take 1 tablet by mouth once daily.    cholecalciferol, vitamin D3, (VITAMIN D3) 50 mcg (2,000 unit) Tab Take 1 tablet by mouth once daily.    cyanocobalamin, vitamin B-12, 1,000 mcg Subl Place 1,000 mcg under the tongue 2 (two) times a day.    cyproheptadine (PERIACTIN) 4 mg tablet Take 1 tablet (4 mg total) by mouth 3 (three) times daily.    docusate sodium (COLACE) 100 MG capsule Take 100 mg by mouth 2 (two) times daily.    DULoxetine (CYMBALTA) 30 MG capsule Take 1 capsule (30 mg total) by mouth 2 (two) times daily.    ferrous gluconate 236 mg (27 mg iron) Tab Take 240 mg by mouth once daily.    HYDROcodone-acetaminophen (NORCO)  mg per  tablet Take 1 tablet by mouth 4 (four) times daily as needed.    insulin aspart U-100 (NOVOLOG) 100 unit/mL (3 mL) InPn pen Inject 1-10 Units into the skin before meals and at bedtime as needed (Hyperglycemia).    lisinopriL (PRINIVIL,ZESTRIL) 20 MG tablet Take 20 mg by mouth 2 (two) times daily.    magnesium 250 mg Tab Take 250 mg by mouth 2 (two) times a day.    metFORMIN (GLUCOPHAGE-XR) 500 MG ER 24hr tablet TAKE 1 TABLET BY MOUTH EVERY DAY (Patient taking differently: Take 500 mg by mouth 2 (two) times a day.)    metoprolol tartrate (LOPRESSOR) 25 MG tablet Take 1 tablet (25 mg total) by mouth 2 (two) times daily.    omega-3 acid ethyl esters (LOVAZA) 1 gram capsule TAKE 2 CAPSULES BY MOUTH 2 TIMES DAILY. (Patient taking differently: Take 2 g by mouth 2 (two) times daily.)    pantoprazole (PROTONIX) 40 MG tablet Take 40 mg by mouth 2 (two) times daily.    phenazopyridine (PYRIDIUM) 100 MG tablet Take 1 tablet (100 mg total) by mouth 3 (three) times daily as needed for Pain.    polyethylene glycol (GLYCOLAX) 17 gram/dose powder Take 17 g by mouth daily as needed.    potassium chloride SA (KLOR-CON) 10 MEQ TbSR Take 2 tablets (20 mEq total) by mouth once daily.    rosuvastatin (CRESTOR) 20 MG tablet TAKE 1 TABLET BY MOUTH EVERY DAY (Patient taking differently: Take 20 mg by mouth once daily. TAKE 1 TABLET BY MOUTH EVERY DAY)    semaglutide (OZEMPIC) 1 mg/dose (4 mg/3 mL) Inject 1 mg into the skin every 7 days.    tamsulosin (FLOMAX) 0.4 mg Cap Take 2 capsules (0.8 mg total) by mouth once daily.    tolterodine (DETROL LA) 4 MG 24 hr capsule Take 1 capsule (4 mg total) by mouth once daily.    topiramate (TOPAMAX) 50 MG tablet TAKE 1 TABLET BY MOUTH EVERY DAY (Patient taking differently: Take 50 mg by mouth once daily.)    traZODone (DESYREL) 50 MG tablet TAKE 1 TABLET BY MOUTH EVERY EVENING. (Patient taking differently: Take 50 mg by mouth every evening.)     Family History    None       Tobacco Use    Smoking  status: Never    Smokeless tobacco: Never   Substance and Sexual Activity    Alcohol use: Yes     Alcohol/week: 1.0 standard drink     Types: 1 Shots of liquor per week    Drug use: Not Currently    Sexual activity: Yes     Partners: Female     Review of Systems   Constitutional:  Positive for fatigue. Negative for chills, diaphoresis and fever.   HENT:  Negative for congestion, postnasal drip, sinus pressure and sore throat.    Eyes:  Negative for visual disturbance.   Respiratory:  Negative for cough, chest tightness, shortness of breath and wheezing.    Cardiovascular:  Negative for chest pain, palpitations and leg swelling.   Gastrointestinal:  Negative for abdominal distention, abdominal pain, blood in stool, constipation, diarrhea, nausea and vomiting.   Endocrine: Negative.    Genitourinary:  Negative for dysuria.   Musculoskeletal: Negative.    Skin: Negative.    Allergic/Immunologic: Negative.    Neurological:  Positive for weakness. Negative for dizziness, numbness and headaches.        Faint feeling   Hematological: Negative.    Psychiatric/Behavioral: Negative.     Objective:     Vital Signs (Most Recent):  Temp: 98.8 °F (37.1 °C) (03/16/23 1517)  Pulse: 100 (03/16/23 1900)  Resp: 15 (03/16/23 1900)  BP: 115/78 (03/16/23 1900)  SpO2: 96 % (03/16/23 1900) Vital Signs (24h Range):  Temp:  [98.8 °F (37.1 °C)] 98.8 °F (37.1 °C)  Pulse:  [] 100  Resp:  [13-24] 15  SpO2:  [93 %-96 %] 96 %  BP: ()/(40-95) 115/78     Weight: 104.3 kg (230 lb)  Body mass index is 33 kg/m².    Physical Exam  Constitutional:       General: He is not in acute distress.     Appearance: Normal appearance. He is well-developed. He is not toxic-appearing or diaphoretic.   HENT:      Head: Normocephalic and atraumatic.   Eyes:      General: Lids are normal.      Conjunctiva/sclera: Conjunctivae normal.      Pupils: Pupils are equal, round, and reactive to light.   Neck:      Thyroid: No thyroid mass or thyromegaly.       Vascular: Normal carotid pulses. No JVD.      Trachea: Trachea normal. No tracheal deviation.   Cardiovascular:      Rate and Rhythm: Normal rate and regular rhythm.      Pulses: Normal pulses.      Heart sounds: Normal heart sounds, S1 normal and S2 normal.   Pulmonary:      Effort: Pulmonary effort is normal.      Breath sounds: Normal breath sounds. No stridor.   Abdominal:      General: Bowel sounds are normal.      Palpations: Abdomen is soft.      Tenderness: There is no abdominal tenderness.   Musculoskeletal:         General: Normal range of motion.      Cervical back: Full passive range of motion without pain, normal range of motion and neck supple.   Skin:     General: Skin is warm and dry.      Nails: There is no clubbing.   Neurological:      Mental Status: He is alert and oriented to person, place, and time.      Cranial Nerves: No cranial nerve deficit.      Sensory: No sensory deficit.   Psychiatric:         Speech: Speech normal.         Behavior: Behavior normal. Behavior is cooperative.         Thought Content: Thought content normal.         Judgment: Judgment normal.         CRANIAL NERVES     CN III, IV, VI   Pupils are equal, round, and reactive to light.     Significant Labs: All pertinent labs within the past 24 hours have been reviewed.  A1C:   Recent Labs   Lab 11/09/22  0313 12/31/22  1415 03/02/23  0511   HGBA1C 7.0* 5.8 5.8     Bilirubin:   Recent Labs   Lab 03/02/23  0511 03/03/23  0621 03/04/23  0759 03/08/23  0457 03/16/23  1533   BILITOT 0.6 0.5 0.4 0.4 0.8     BMP:   Recent Labs   Lab 03/16/23  1533   *      K 3.8      CO2 28   BUN 18   CREATININE 0.9   CALCIUM 9.3   MG 1.7     CBC:   Recent Labs   Lab 03/16/23  1533   WBC 13.15*   HGB 14.4   HCT 45.1        CMP:   Recent Labs   Lab 03/16/23  1533      K 3.8      CO2 28   *   BUN 18   CREATININE 0.9   CALCIUM 9.3   PROT 6.6   ALBUMIN 3.4*   BILITOT 0.8   ALKPHOS 65   AST 23   ALT 18    ANIONGAP 8     Cardiac Markers:   Recent Labs   Lab 03/16/23  1533   *     Coagulation:   Recent Labs   Lab 03/16/23  1533   INR 1.1   APTT 26.1     Lactic Acid:   Recent Labs   Lab 03/16/23  1533   LACTATE 2.3*     Magnesium:   Recent Labs   Lab 03/16/23  1533   MG 1.7     Troponin:   Recent Labs   Lab 03/16/23  1533   TROPONINIHS 9.3     TSH:   Recent Labs   Lab 12/31/22  0232   TSH 1.870       Significant Imaging: I have reviewed all pertinent imaging results/findings within the past 24 hours.  X-Ray Chest 1 View    Result Date: 3/16/2023  CLINICAL HISTORY: 78 years (1945) Male Hypotension w/ weakness TECHNIQUE: Portable AP radiograph the chest. COMPARISON: Radiograph from March 1, 2023. FINDINGS: Mildly increased central hilar interstitial opacities are seen bilaterally suggestive of either mild edema, atypical infection/pneumonia or bronchitis in the appropriate clinical setting.  There is blunting of the left costophrenic angle consistent with a trace pleural effusion. No pneumothorax is identified. The heart is mildly enlarged. Osseous structures appear within normal limits. The visualized upper abdomen is unremarkable. IMPRESSION: Cardiomegaly and findings suggestive of mild pulmonary vascular congestion/trace edema. . Electronically signed by:  Viet Matt MD  3/16/2023 4:05 PM CDT Workstation: [id number]     Retroperitoneal Complete    Result Date: 2/23/2023  Complete retroperitoneal ultrasound HISTORY: Nephrolithiasis. The examination is limited by the patient's inability to cooperate with positioning. By technologist report, the patient is scanned in a wheelchair. The right kidney measures 12.1 cm in sagittal dimension. The left kidney measures 12.2 cm. There is normal-appearing cortical thickness and echogenicity. There are small echogenic foci observed within both kidneys corresponding to previously demonstrated bilateral nephrolithiasis. There is no hydronephrosis. No mass is  identified. The adjacent retroperitoneum appears unremarkable. The midline retroperitoneum including the majority of the vena cava and aorta are obscured. The urinary bladder is incompletely distended. There appears to be mild diffuse wall thickening. IMPRESSION: Limited examination as above. Bilateral nephrolithiasis. Incomplete distention of the urinary bladder. Electronically signed by:  Estella Winn MD  2/23/2023 4:19 PM CST Workstation: [id number]    FL Flouro Usage    Result Date: 3/2/2023  See Notes This procedure was auto-finalized.    X-Ray Chest AP Portable    Result Date: 3/1/2023  Chest single view CLINICAL DATA: Dizziness FINDINGS: Comparison to January 22. Cardiomegaly is stable. The mediastinum is unremarkable. There is a probable small left pleural effusion, similar in appearance to the prior study. Increased density in the retrocardiac region, left lung base, suggests atelectasis or infiltrate. The right lung is clear. IMPRESSION: 1. Stable cardiomegaly. 2. Left basilar volume loss or infiltrate. 3. Small left pleural effusion. Electronically signed by:  Jeramy Gardner MD  3/1/2023 3:59 PM CST Workstation: [id number]    US Bladder Post Void Residual    Result Date: 2/23/2023  Limited pelvic ultrasound (urinary bladder) HISTORY: R39.1 The urinary bladder contains a Sotelo catheter. The bladder is incompletely distended. There is diffuse prominence of the wall of the urinary bladder. The prevoid volume is 10 mL. IMPRESSION: See above. Electronically signed by:  Estella Winn MD  2/23/2023 4:21 PM CST Workstation: [id number]    CT Chest Abdomen Pelvis With Contrast (xpd)    Result Date: 3/1/2023  CT CHEST, ABDOMEN, AND PELVIS WITH INTRAVENOUS CONTRAST: 3/1/2023 6:26 PM CST HISTORY: 77 years  old Male with Aortic aneurysm, known or suspected. COMPARISON: CT of the abdomen and pelvis from 1/20/2023 TECHNIQUE: Axial contiguous images were obtained from the lung apices to the proximal femurs with  intravenous intravenous contrast administered. Sagittal and coronal reconstructions were also obtained and reviewed. This exam was performed according to our departmental dose-optimization program, which includes automated exposure control, adjustment of the mA and/or KV according to the patient's size and/or use of iterative reconstruction technique. FINDINGS: CARDIOMEDIASTINAL STRUCTURES: The heart size is mildly enlarged. No pericardial effusion is seen. LYMPH NODES: No significant mediastinal, supraclavicular, or axillary lymphadenopathy is seen. AORTA: The ascending aorta is at the upper limits of normal in size. The visualized proximal subclavian, vertebral, and common carotid arterial vasculature is unremarkable. There is atherosclerotic calcification seen at the aorta. There are no findings to suggest an aortic dissection. PULMONARY ARTERY: The main pulmonary artery is normal in size. There are no findings to suggest a pulmonary embolism. The main pulmonary artery is suboptimally opacified for evaluation THYROID: The thyroid gland is heterogeneous. TRACHEA: The central tracheobronchial tree is patent. PARENCHYMA: There are bibasilar space opacities. PLEURA: Trace bilateral pleural fluid is seen. There is no evidence of a pneumothorax. LIVER: The visualized hepatic parenchyma demonstrates no focal abnormality.  The main portal vein is well opacified with contrast. GALLBLADDER: The gallbladder demonstrates no evidence of calcified gallstones. SPLEEN: The spleen is normal in size. PANCREAS: The pancreas is unremarkable. ADRENAL GLANDS: The bilateral adrenal glands are unremarkable. KIDNEYS: There is moderate left hydroureteronephrosis, secondary to at least 2 calculi at the midportion of the left ureter measuring 8 to 9 mm and 6 to 7 mm in size. No hydronephrosis is seen on the right side.. There are punctate bilateral renal calculi present. There is stranding seen around the left kidney. PELVIS: The urinary  bladder is decompressed by Sotelo catheter. There is stranding seen around the urinary bladder with bladder wall thickening suspected cystitis. No obvious bladder calculi are seen. STOMACH: The stomach is not well distended. BOWEL: The small bowel loops appear unremarkable. No pericolonic inflammatory stranding is seen. There are multiple colonic diverticula without evidence to suggest acute diverticulitis. There is a moderate amount of stool seen throughout the colon. APPENDIX: The appendix is unremarkable. PERITONEUM: There is no evidence of pneumoperitoneum or free fluid. VESSELS: The IVC is unremarkable. The abdominal aorta is within normal limits of size. LYMPH NODES: No significantly enlarged lymph nodes are seen in the abdomen or pelvis. BONES AND SOFT TISSUES: No acute osseous abnormality is seen. There is incidental bilateral gynecomastia present.  There is an intramedullary kelsey and interlocking screw seen at the left hip. There are multilevel degenerative changes seen at the spine. IMPRESSION: The urinary bladder is decompressed by Sotelo catheter. There is stranding seen around the urinary bladder with bladder wall thickening suspected cystitis. No obvious bladder calculi are seen. There is moderate left hydroureteronephrosis, secondary to at least 2 calculi at the midportion of the left ureter measuring 8 to 9 mm and 6 to 7 mm in size. There is stranding seen around the left kidney. Superimposed infection is not excluded. There are small bilateral pleural effusions with bibasilar airspace opacities which can be seen with atelectasis and/or infection. The abdominal aorta is within normal limits of size. No evidence is seen to suggest aortic dissection. Hepatic steatosis Electronically signed by:  Shanta Yu DO  3/1/2023 6:30 PM [facility] Workstation: [id number]

## 2023-03-18 PROBLEM — N39.0 COMPLICATED UTI (URINARY TRACT INFECTION): Status: ACTIVE | Noted: 2023-03-18

## 2023-03-18 PROBLEM — R32 URINARY INCONTINENCE: Chronic | Status: ACTIVE | Noted: 2023-03-18

## 2023-03-18 PROBLEM — E83.59 NEPHROCALCINOSIS: Chronic | Status: ACTIVE | Noted: 2023-03-18

## 2023-03-18 PROBLEM — N29 NEPHROCALCINOSIS: Chronic | Status: ACTIVE | Noted: 2023-03-18

## 2023-03-18 PROBLEM — R78.81 BACTEREMIA: Status: ACTIVE | Noted: 2023-03-18

## 2023-03-18 PROBLEM — E83.42 HYPOMAGNESEMIA: Status: ACTIVE | Noted: 2023-03-18

## 2023-03-18 LAB
ACINETOBACTER CALCOACETICUS/BAUMANNII COMPLEX: NOT DETECTED
ALBUMIN SERPL BCP-MCNC: 2.7 G/DL (ref 3.5–5.2)
ALP SERPL-CCNC: 55 U/L (ref 55–135)
ALT SERPL W/O P-5'-P-CCNC: 12 U/L (ref 10–44)
ANION GAP SERPL CALC-SCNC: 7 MMOL/L (ref 8–16)
AST SERPL-CCNC: 13 U/L (ref 10–40)
BACTEROIDES FRAGILIS: NOT DETECTED
BASOPHILS # BLD AUTO: 0.02 K/UL (ref 0–0.2)
BASOPHILS NFR BLD: 0.2 % (ref 0–1.9)
BILIRUB SERPL-MCNC: 0.5 MG/DL (ref 0.1–1)
BUN SERPL-MCNC: 17 MG/DL (ref 8–23)
CALCIUM SERPL-MCNC: 8.5 MG/DL (ref 8.7–10.5)
CANDIDA ALBICANS: NOT DETECTED
CANDIDA AURIS: NOT DETECTED
CANDIDA GLABRATA: NOT DETECTED
CANDIDA KRUSEI: NOT DETECTED
CANDIDA PARAPSILOSIS: NOT DETECTED
CANDIDA TROPICALIS: NOT DETECTED
CHLORIDE SERPL-SCNC: 105 MMOL/L (ref 95–110)
CO2 SERPL-SCNC: 29 MMOL/L (ref 23–29)
CORTIS SERPL-MCNC: 14.1 UG/DL (ref 4.3–22.4)
CREAT SERPL-MCNC: 0.5 MG/DL (ref 0.5–1.4)
CRYPTOCOCCUS NEOFORMANS/GATTII: NOT DETECTED
CTX-M GENE: NOT DETECTED
DIFFERENTIAL METHOD: ABNORMAL
ENTEROBACTER CLOACAE COMPLEX: NOT DETECTED
ENTEROBACTERALES: NOT DETECTED
ENTEROCOCCUS FAECALIS: NOT DETECTED
ENTEROCOCCUS FAECIUM: NOT DETECTED
EOSINOPHIL # BLD AUTO: 0.1 K/UL (ref 0–0.5)
EOSINOPHIL NFR BLD: 0.9 % (ref 0–8)
ERYTHROCYTE [DISTWIDTH] IN BLOOD BY AUTOMATED COUNT: 16.1 % (ref 11.5–14.5)
ESCHERICHIA COLI: NOT DETECTED
EST. GFR  (NO RACE VARIABLE): >60 ML/MIN/1.73 M^2
GLUCOSE SERPL-MCNC: 108 MG/DL (ref 70–110)
GLUCOSE SERPL-MCNC: 122 MG/DL (ref 70–110)
GLUCOSE SERPL-MCNC: 136 MG/DL (ref 70–110)
HAEMOPHILUS INFLUENZAE: NOT DETECTED
HCT VFR BLD AUTO: 36.6 % (ref 40–54)
HGB BLD-MCNC: 11.6 G/DL (ref 14–18)
IMM GRANULOCYTES # BLD AUTO: 0.04 K/UL (ref 0–0.04)
IMM GRANULOCYTES NFR BLD AUTO: 0.4 % (ref 0–0.5)
IMP GENE: NOT DETECTED
KLEBSIELLA AEROGENES: NOT DETECTED
KLEBSIELLA OXYTOCA: NOT DETECTED
KLEBSIELLA PNEUMONIAE GROUP: NOT DETECTED
KPC: NOT DETECTED
LISTERIA MONOCYTOGENES: NOT DETECTED
LYMPHOCYTES # BLD AUTO: 1.6 K/UL (ref 1–4.8)
LYMPHOCYTES NFR BLD: 17.5 % (ref 18–48)
MAGNESIUM SERPL-MCNC: 1.5 MG/DL (ref 1.6–2.6)
MCH RBC QN AUTO: 29.8 PG (ref 27–31)
MCHC RBC AUTO-ENTMCNC: 31.7 G/DL (ref 32–36)
MCR-1: ABNORMAL
MCV RBC AUTO: 94 FL (ref 82–98)
MEC A/C AND MREJ (MRSA): ABNORMAL
MEC A/C: ABNORMAL
MONOCYTES # BLD AUTO: 0.8 K/UL (ref 0.3–1)
MONOCYTES NFR BLD: 8.8 % (ref 4–15)
NDM: NOT DETECTED
NEISSERIA MENINGITIDIS: NOT DETECTED
NEUTROPHILS # BLD AUTO: 6.5 K/UL (ref 1.8–7.7)
NEUTROPHILS NFR BLD: 72.2 % (ref 38–73)
NRBC BLD-RTO: 0 /100 WBC
OXA-48-LIKE: ABNORMAL
PHOSPHATE SERPL-MCNC: 4.3 MG/DL (ref 2.7–4.5)
PLATELET # BLD AUTO: 233 K/UL (ref 150–450)
PMV BLD AUTO: 9.9 FL (ref 9.2–12.9)
POTASSIUM SERPL-SCNC: 4.8 MMOL/L (ref 3.5–5.1)
PROT SERPL-MCNC: 5.7 G/DL (ref 6–8.4)
PROTEUS SPECIES: NOT DETECTED
PSEUDOMONAS AERUGINOSA: DETECTED
RBC # BLD AUTO: 3.89 M/UL (ref 4.6–6.2)
SALMONELLA SP: NOT DETECTED
SERRATIA MARCESCENS: NOT DETECTED
SODIUM SERPL-SCNC: 141 MMOL/L (ref 136–145)
STAPHYLOCOCCUS AUREUS: NOT DETECTED
STAPHYLOCOCCUS EPIDERMIDIS: NOT DETECTED
STAPHYLOCOCCUS LUGDUNESIS: NOT DETECTED
STAPHYLOCOCCUS SPECIES: NOT DETECTED
STENOTROPHOMONAS MALTOPHILIA: NOT DETECTED
STREPTOCOCCUS AGALACTIAE: NOT DETECTED
STREPTOCOCCUS PNEUMONIAE: NOT DETECTED
STREPTOCOCCUS PYOGENES: NOT DETECTED
STREPTOCOCCUS SPECIES: NOT DETECTED
TSH SERPL DL<=0.005 MIU/L-ACNC: 2.55 UIU/ML (ref 0.34–5.6)
VAN A/B: ABNORMAL
VIM: NOT DETECTED
WBC # BLD AUTO: 9.07 K/UL (ref 3.9–12.7)

## 2023-03-18 PROCEDURE — 99223 PR INITIAL HOSPITAL CARE,LEVL III: ICD-10-PCS | Mod: ,,, | Performed by: UROLOGY

## 2023-03-18 PROCEDURE — 85025 COMPLETE CBC W/AUTO DIFF WBC: CPT | Performed by: NURSE PRACTITIONER

## 2023-03-18 PROCEDURE — 80053 COMPREHEN METABOLIC PANEL: CPT | Performed by: NURSE PRACTITIONER

## 2023-03-18 PROCEDURE — 82533 TOTAL CORTISOL: CPT | Performed by: INTERNAL MEDICINE

## 2023-03-18 PROCEDURE — 99900031 HC PATIENT EDUCATION (STAT)

## 2023-03-18 PROCEDURE — 25000003 PHARM REV CODE 250

## 2023-03-18 PROCEDURE — 99223 PR INITIAL HOSPITAL CARE,LEVL III: ICD-10-PCS | Mod: ,,, | Performed by: INTERNAL MEDICINE

## 2023-03-18 PROCEDURE — 99232 SBSQ HOSP IP/OBS MODERATE 35: CPT | Mod: ,,, | Performed by: SPECIALIST

## 2023-03-18 PROCEDURE — 51702 INSERT TEMP BLADDER CATH: CPT

## 2023-03-18 PROCEDURE — 94761 N-INVAS EAR/PLS OXIMETRY MLT: CPT

## 2023-03-18 PROCEDURE — 99223 1ST HOSP IP/OBS HIGH 75: CPT | Mod: ,,, | Performed by: UROLOGY

## 2023-03-18 PROCEDURE — 21400001 HC TELEMETRY ROOM

## 2023-03-18 PROCEDURE — 25000003 PHARM REV CODE 250: Performed by: INTERNAL MEDICINE

## 2023-03-18 PROCEDURE — 99900035 HC TECH TIME PER 15 MIN (STAT)

## 2023-03-18 PROCEDURE — 25000003 PHARM REV CODE 250: Performed by: UROLOGY

## 2023-03-18 PROCEDURE — 84443 ASSAY THYROID STIM HORMONE: CPT | Performed by: INTERNAL MEDICINE

## 2023-03-18 PROCEDURE — 36415 COLL VENOUS BLD VENIPUNCTURE: CPT | Performed by: INTERNAL MEDICINE

## 2023-03-18 PROCEDURE — 99223 1ST HOSP IP/OBS HIGH 75: CPT | Mod: ,,, | Performed by: INTERNAL MEDICINE

## 2023-03-18 PROCEDURE — 63600175 PHARM REV CODE 636 W HCPCS: Mod: TB,JG | Performed by: INTERNAL MEDICINE

## 2023-03-18 PROCEDURE — 25000003 PHARM REV CODE 250: Performed by: NURSE PRACTITIONER

## 2023-03-18 PROCEDURE — 87040 BLOOD CULTURE FOR BACTERIA: CPT | Performed by: INTERNAL MEDICINE

## 2023-03-18 PROCEDURE — 83735 ASSAY OF MAGNESIUM: CPT | Performed by: NURSE PRACTITIONER

## 2023-03-18 PROCEDURE — 99232 PR SUBSEQUENT HOSPITAL CARE,LEVL II: ICD-10-PCS | Mod: ,,, | Performed by: SPECIALIST

## 2023-03-18 PROCEDURE — 84100 ASSAY OF PHOSPHORUS: CPT | Performed by: NURSE PRACTITIONER

## 2023-03-18 RX ORDER — LEVOFLOXACIN 5 MG/ML
750 INJECTION, SOLUTION INTRAVENOUS
Status: DISCONTINUED | OUTPATIENT
Start: 2023-03-18 | End: 2023-03-18

## 2023-03-18 RX ORDER — MUPIROCIN 20 MG/G
OINTMENT TOPICAL 2 TIMES DAILY
Status: DISCONTINUED | OUTPATIENT
Start: 2023-03-18 | End: 2023-03-18

## 2023-03-18 RX ORDER — TAMSULOSIN HYDROCHLORIDE 0.4 MG/1
0.8 CAPSULE ORAL NIGHTLY
Status: DISCONTINUED | OUTPATIENT
Start: 2023-03-18 | End: 2023-03-19

## 2023-03-18 RX ORDER — CHLORHEXIDINE GLUCONATE ORAL RINSE 1.2 MG/ML
15 SOLUTION DENTAL 2 TIMES DAILY
Status: DISCONTINUED | OUTPATIENT
Start: 2023-03-18 | End: 2023-03-18

## 2023-03-18 RX ORDER — CEFEPIME HYDROCHLORIDE 1 G/50ML
2 INJECTION, SOLUTION INTRAVENOUS
Status: DISCONTINUED | OUTPATIENT
Start: 2023-03-18 | End: 2023-03-19

## 2023-03-18 RX ORDER — TAMSULOSIN HYDROCHLORIDE 0.4 MG/1
0.4 CAPSULE ORAL DAILY
Status: DISCONTINUED | OUTPATIENT
Start: 2023-03-19 | End: 2023-03-18

## 2023-03-18 RX ADMIN — METOPROLOL TARTRATE 25 MG: 25 TABLET, FILM COATED ORAL at 02:03

## 2023-03-18 RX ADMIN — MUPIROCIN 1 G: 20 OINTMENT TOPICAL at 11:03

## 2023-03-18 RX ADMIN — TOPIRAMATE 50 MG: 25 TABLET, FILM COATED ORAL at 08:03

## 2023-03-18 RX ADMIN — METOPROLOL TARTRATE 25 MG: 25 TABLET, FILM COATED ORAL at 08:03

## 2023-03-18 RX ADMIN — CEFEPIME HYDROCHLORIDE 2 G: 2 INJECTION, SOLUTION INTRAVENOUS at 02:03

## 2023-03-18 RX ADMIN — SENNOSIDES AND DOCUSATE SODIUM 2 TABLET: 50; 8.6 TABLET ORAL at 08:03

## 2023-03-18 RX ADMIN — LEVOFLOXACIN 750 MG: 5 INJECTION, SOLUTION INTRAVENOUS at 10:03

## 2023-03-18 RX ADMIN — FOLIC ACID 1 MG: 1 TABLET ORAL at 08:03

## 2023-03-18 RX ADMIN — CHLORHEXIDINE GLUCONATE 15 ML: 1.2 RINSE ORAL at 11:03

## 2023-03-18 RX ADMIN — APIXABAN 5 MG: 5 TABLET, FILM COATED ORAL at 08:03

## 2023-03-18 RX ADMIN — DULOXETINE 30 MG: 30 CAPSULE, DELAYED RELEASE ORAL at 08:03

## 2023-03-18 RX ADMIN — HYDROCODONE BITARTRATE AND ACETAMINOPHEN 1 TABLET: 10; 325 TABLET ORAL at 09:03

## 2023-03-18 RX ADMIN — TAMSULOSIN HYDROCHLORIDE 0.8 MG: 0.4 CAPSULE ORAL at 08:03

## 2023-03-18 RX ADMIN — PRAVASTATIN SODIUM 80 MG: 40 TABLET ORAL at 08:03

## 2023-03-18 RX ADMIN — Medication 2000 UNITS: at 08:03

## 2023-03-18 RX ADMIN — HYDROCODONE BITARTRATE AND ACETAMINOPHEN 1 TABLET: 10; 325 TABLET ORAL at 05:03

## 2023-03-18 RX ADMIN — PANTOPRAZOLE SODIUM 40 MG: 40 TABLET, DELAYED RELEASE ORAL at 05:03

## 2023-03-18 RX ADMIN — CASTOR OIL AND BALSAM, PERU: 788; 87 OINTMENT TOPICAL at 09:03

## 2023-03-18 RX ADMIN — HYDROCODONE BITARTRATE AND ACETAMINOPHEN 1 TABLET: 10; 325 TABLET ORAL at 08:03

## 2023-03-18 RX ADMIN — MAGNESIUM OXIDE 400 MG (241.3 MG MAGNESIUM) TABLET 800 MG: at 05:03

## 2023-03-18 RX ADMIN — CEFEPIME HYDROCHLORIDE 2 G: 2 INJECTION, SOLUTION INTRAVENOUS at 10:03

## 2023-03-18 NOTE — CONSULTS
[facility] Consult Note - [place] [facility]  Staff: MD Jeremy  Group:Jeremy/Ilya/Norris/Lorena/Oksana  Referring provider and please cc:  Joe  PCP: Gautam Castanon III, MD  Date of Service: 03/18/2023        Subjective:      Initial consult by me in hospital on 3/18/23:  Hiro Rodríguez is a 78 y.o. male patient of Dr. Hughes.    Patient initially saw him as a consult the hospital on January 6 for urinary retention and UTI.      When he came to the ER for hypotension and tachycardia on 01/22 he was found to be in urinary retention again with over a L drained when a Sotelo was placed.  A CT also showed a proximal left ureteral stone with mild hydro and bilateral renal stones.  Culture ended up growing Pseudomonas.  He was treated for sepsis and did not any flank pain was discharge back [facility].      He returned on March 1st dizziness and tachycardia.  Abnormal urine suspicious for UTI.  A CT renal stone study done that day showed the stone was now significantly obstructing causing moderate left hydro and there was another left ureteral stone seen as well.  Dr. Hughes place a left ureteral stent with plans to have him return 2 weeks with an x-ray to schedule outpatient stone extraction.      Presented to the ER again yesterday on 03/16 2 weeks after his procedure with hypotension.  Urine again suspicious for UTI however he also has a stent in place.  Culture growing presumptive Pseudomonas.  No Sotelo in place.  He would a CT renal stone done again.  Independent review of the CT showed that the left stent was in good position, no obstructing stone on the right and has distended bladder.  He has no hydronephrosis of either kidney.    Urology consulted for further recommendations because of his UTI recurrence.            Ctrss 3/17/23        LABS REVIEW:  Recent labs:   Recent Labs   Lab 03/16/23  1533 03/17/23  0446 03/18/23  0443   WBC 13.15 H 11.70 9.07   Hemoglobin 14.4 12.2  L 11.6 L   Hematocrit 45.1 38.0 L 36.6 L   Platelets 386 273 233   ]  Recent Labs   Lab 01/09/23  0530 01/22/23  2040 03/16/23  1533 03/17/23  0446 03/18/23  0443   Sodium 139   < > 137 141 141   Potassium 4.2   < > 3.8 3.6 4.8   Chloride 107   < > 101 103 105   CO2 26   < > 28 25 29   BUN 14   < > 18 21 17   Creatinine 0.7   < > 0.9 0.6 0.5   Glucose 118 H   < > 154 H 107 136 H   Calcium 7.8 L   < > 9.3 8.9 8.5 L   Magnesium 1.8   < > 1.7 1.6 1.5 L   Phosphorus 3.4  --   --  3.9 4.3   Alkaline Phosphatase  --    < > 65 57 55   Total Protein  --    < > 6.6 5.8 L 5.7 L   Albumin  --    < > 3.4 L 2.9 L 2.7 L   Total Bilirubin  --    < > 0.8 0.3 0.5   AST  --    < > 23 14 13   ALT  --    < > 18 14 12    < > = values in this interval not displayed.   ]    Urine history:     PSA history:    Lab Results   Component Value Date    PSA 0.85 10/26/2021    PSA 0.91 08/20/2020         Current REVIEW OF SYSTEMS:  Negative for the remaining 12 points of ROS except for as stated above       PMHx:  Past Medical History:   Diagnosis Date    CHF (congestive heart failure)     Hypertension        PSHx:  Past Surgical History:   Procedure Laterality Date    CYSTOSCOPY W/ URETERAL STENT PLACEMENT N/A 3/2/2023    Procedure: CYSTOSCOPY, WITH URETERAL STENT INSERTION;  Surgeon: Yoshi Lange MD;  Location: [facility];  Service: Urology;  Laterality: N/A;    FRACTURE SURGERY      INTRAMEDULLARY RODDING OF TROCHANTER OF FEMUR Left 11/10/2022    Procedure: INSERTION, ITRAMEDULLARY SANTI, FEMUR, TROCHANTER;  Surgeon: Richard Phoenix MD;  Location: [facility] OR;  Service: Orthopedics;  Laterality: Left;    REMOVAL, CALCULUS, BLADDER  3/2/2023    Procedure: REMOVAL, CALCULUS, BLADDER;  Surgeon: Yoshi Lange MD;  Location: [facility] OR;  Service: Urology;;       Fam Hx:  Reviewed- pertinent family hx as above    Soc Hx:  Social History     Tobacco Use    Smoking status: Never    Smokeless tobacco: Never   Substance Use Topics    Alcohol use:  Yes     Alcohol/week: 1.0 standard drink     Types: 1 Shots of liquor per week    Drug use: Not Currently       Allergies:  Patient has no known allergies.    Anticoagulation/Aspirin:  Eliquis    Objective:     Vitals:    03/18/23 1600   BP: 105/60   Pulse: 78   Resp: 16   Temp: 98.3 °F (36.8 °C)         General:wdwn  in NAD  Neurologic: CN grossly normal. Normal sensation.   Psychiatric: awake, alert and oriented x 3. Mood and affect Normal. Cooperative.  Eyes: PERRLA, normal conjunctiva  Respiratory: no increased work on breathing. No wheezing.   Cardiovascular: No obvious extremity edema. Warm and well perfused.  GI: no obvious stomach distension  Musculoskeletal: decreased  range of motion of bilateral upper extremities. Decreased muscle strength and tone.  Skin: no obvious rashes or lesions. No tightening of skin noted.    Pertinent  exam in HPI        Pertinent urologic PATHOLOGY AND RADIOLOGY REVIEW:  See hpi for recent      Assessment:     Hiro Rodríguez is a 78 y.o. male with   1. Atrial fibrillation with rapid ventricular response    2. Weakness    3. Sepsis, due to unspecified organism, unspecified whether acute organ dysfunction present    4. Hypotension, unspecified hypotension type    5. Chest pain    6. Orthostatic hypotension    7. Bacteremia        urology consulted for:  Recurrent UTI with stent in place for stones placed by Dr. Hughes with history of incomplete emptying.  Bladder looks full on CT.  Has diverticula and thin-walled.  Recommend that we check a bladder scan/postvoid residual.  If he is not emptying would leave a Sotelo catheter.  Could offer intermittent catheterization but unlikely to happen.  If residual greater than 500 would leave Sotelo in place and have him follow-up with Dr. Hughes to schedule both stone procedure and outpatient follow-up management for his incomplete emptying.    Would continue his Flomax 0.8 mg nightly and discontinue the Detrol which could  cause incomplete emptying.    Do not recommend changing a stent at this time.  He just had a stent placed on March 1st.  When a patient has a UTI and a recently placed stent then they need a Sotelo and antibiotics.  He has UTI probably did not clear because he is not emptying his bladder which is why he either needs to have a Sotelo this stays in or discontinue the Detrol and see if the residual decreases.    Plan:     Check bladder scan.  If residual greater than 300 place Sotelo and leave in.  Offer patient clean intermittent catheterization or leaving Sotelo in place  Discontinue Detrol  Continue Flomax 0.8  Can follow-up Dr. Hughes  Also he is on Topamax.  This will significantly increase his risk of stones.    If patient is still here on Monday then they can call Dr. Hughes to see if he can add him on for Thursday as an outpatient since he will be on antibiotics already.      Ijeoma Luna MD

## 2023-03-18 NOTE — SUBJECTIVE & OBJECTIVE
Interval History:  Patient reports onset of right lateral lower abdominal/side discomfort, states last night on transferring to CT scan, trauma to the area.  States he does have chronic urinary incontinence.  Denies any shortness of breath.  Remains in atrial fibrillation on telemetry.  Yesterday after diltiazem 180 by Cardiology, blood pressure dropped into the 70s, given fluid bolus and midodrine 5 mg, diltiazem discontinued.  T-max 98.6°.  Labs with a.m. cortisol 14.6, TSH within normal.  Preliminary blood and urine culture with Pseudomonas.  Repeat CT renal protocol reviewed.  Discussed with patient.  No visitors at bedside.  Discussed with nursing.    Review of Systems   Constitutional:  Negative for fever.   Respiratory:  Negative for shortness of breath.    Genitourinary:         Urinary incontinence   Neurological:  Positive for tremors.   Objective:     Vital Signs (Most Recent):  Temp: 98.2 °F (36.8 °C) (03/18/23 1200)  Pulse: 94 (03/18/23 1200)  Resp: (!) 24 (03/18/23 1200)  BP: 115/85 (03/18/23 1200)  SpO2: 95 % (03/18/23 1200)   Vital Signs (24h Range):  Temp:  [98 °F (36.7 °C)-98.6 °F (37 °C)] 98.2 °F (36.8 °C)  Pulse:  [74-95] 94  Resp:  [11-33] 24  SpO2:  [87 %-98 %] 95 %  BP: ()/(43-85) 115/85     Weight: 103.1 kg (227 lb 4.7 oz)  Body mass index is 32.61 kg/m².    Intake/Output Summary (Last 24 hours) at 3/18/2023 1307  Last data filed at 3/18/2023 0901  Gross per 24 hour   Intake 2528 ml   Output 125 ml   Net 2403 ml      Physical Exam  Vitals and nursing note reviewed.   Constitutional:       Comments: Lying in bed, cooperative   HENT:      Head: Normocephalic and atraumatic.      Ears:      Comments: Hard of hearing     Mouth/Throat:      Mouth: Mucous membranes are moist.   Cardiovascular:      Rate and Rhythm: Normal rate. Rhythm irregular.      Comments: Trace LE edema  Pulmonary:      Comments: On RA, no wheeze or accessory muscle use  Abdominal:      General: Bowel sounds are normal.  There is no distension.      Palpations: Abdomen is soft.      Tenderness: There is no abdominal tenderness.   Genitourinary:     Comments: Condom catheter in place with dark urine draining  Skin:     General: Skin is warm.   Neurological:      Mental Status: He is alert and oriented to person, place, and time.      Comments: Intermittent tremors to upper extremities   Psychiatric:         Mood and Affect: Mood normal.       Significant Labs: Blood Culture:   Recent Labs   Lab 03/16/23 2000 03/16/23  2345   LABBLOO No Growth to date  No Growth to date Gram stain aer bottle: Gram negative rods  Results called to and read back by:Alma Amaya [id number];  03/18/2023  10:04 CJD     BMP:   Recent Labs   Lab 03/18/23 0443   *      K 4.8      CO2 29   BUN 17   CREATININE 0.5   CALCIUM 8.5*   MG 1.5*     CBC:   Recent Labs   Lab 03/16/23 1533 03/17/23 0446 03/18/23 0443   WBC 13.15* 11.70 9.07   HGB 14.4 12.2* 11.6*   HCT 45.1 38.0* 36.6*    273 233     CMP:   Recent Labs   Lab 03/16/23 1533 03/17/23 0446 03/18/23  0443    141 141   K 3.8 3.6 4.8    103 105   CO2 28 25 29   * 107 136*   BUN 18 21 17   CREATININE 0.9 0.6 0.5   CALCIUM 9.3 8.9 8.5*   PROT 6.6 5.8* 5.7*   ALBUMIN 3.4* 2.9* 2.7*   BILITOT 0.8 0.3 0.5   ALKPHOS 65 57 55   AST 23 14 13   ALT 18 14 12   ANIONGAP 8 13 7*     Lactic Acid:   Recent Labs   Lab 03/16/23 2057 03/16/23  2345 03/17/23  0805   LACTATE 1.6 1.4 1.4     Magnesium:   Recent Labs   Lab 03/16/23 1533 03/17/23 0446 03/18/23  0443   MG 1.7 1.6 1.5*       Significant Imaging: I have reviewed all pertinent imaging results/findings within the past 24 hours.    CT Renal Stone Study Abd Pelvis WO    Result Date: 3/17/2023  EXAM DESCRIPTION: CT RENAL STONE STUDY ABD PELVIS WO CLINICAL HISTORY: 78 years Male, History of hydronephrosis, recurrent back pain COMPARISON: March 1, 2023. TECHNIQUE: Images of the abdomen and pelvis were obtained without  the administration of intravenous contrast. All CT scans at this facility utilized dose modulation, iterative reconstruction, and/or weightbase dosing when appropriate to reduce the radiation dose to his low as reasonably achievable. FINDINGS: Bilateral nephrocalcinosis is again noted. A ureteral stent has been placed on the left. No hydronephrosis is seen. Bilateral nephrocalcinosis is again noted. There are small bilateral pleural effusions. Both the liver and spleen are normal in size, and no focal abnormalities are seen. The pancreas is normal in size, as are the adrenal glands and the abdominal aorta. Small bowel is normal in size. The appendix is visualized and is normal. Colonic diverticula are present without evidence of diverticulitis. Postoperative changes to the left hip are again noted. Degenerative changes are noted in the spine. IMPRESSION: 1. Bilateral nephrocalcinosis. 2. A ureteral stent has been put into place on the left since the previous examination. No hydronephrosis is seen. 3. Diverticulosis without evidence of diverticulitis. 4. Bilateral pleural effusions. Electronically signed by:  Shay Horan MD  3/17/2023 7:01 PM CDT Workstation: [id number]

## 2023-03-18 NOTE — CONSULTS
[facility]  Cardiology  Progress Note    Patient Name: Hiro Rodríguez  MRN: [id number]  Admission Date: 3/16/2023  Hospital Length of Stay: 2 days  Code Status: Full Code   Attending Physician: Georgia Molina MD   Primary Care Physician: Gautam Castanon III, MD  Expected Discharge Date: 3/18/2023  Principal Problem:Hypotension    Subjective:     Hospital Course:  Is feeling little bit better  Has a history of hypotension especially since he has been on 0.8 of Flomax      Interval History:  1.  Hypotension especially on Flomax  2. Chronic persistent atrial fibrillation at least for 10 years-cardioversion did not work 10 years ago  This question as to whether he has been on p.o. amiodarone at home  Denies any chest pain or heart failure or revascularization procedures     ROS  Objective:     Vital Signs (Most Recent):  Temp: 98.2 °F (36.8 °C) (03/18/23 1200)  Pulse: 94 (03/18/23 1200)  Resp: (!) 24 (03/18/23 1200)  BP: 115/85 (03/18/23 1200)  SpO2: 95 % (03/18/23 1200)   Vital Signs (24h Range):  Temp:  [98 °F (36.7 °C)-98.6 °F (37 °C)] 98.2 °F (36.8 °C)  Pulse:  [] 94  Resp:  [11-36] 24  SpO2:  [87 %-98 %] 95 %  BP: ()/(43-85) 115/85     Weight: 103.1 kg (227 lb 4.7 oz)  Body mass index is 32.61 kg/m².    SpO2: 95 %         Intake/Output Summary (Last 24 hours) at 3/18/2023 1221  Last data filed at 3/18/2023 0901  Gross per 24 hour   Intake 2528 ml   Output 125 ml   Net 2403 ml       Lines/Drains/Airways       Drain  Duration             Male External Urinary Catheter 03/16/23 Small 2 days              Peripheral Intravenous Line  Duration                  Peripheral IV - Single Lumen 03/16/23 Anterior;Distal;Left Upper Arm 2 days         Peripheral IV - Single Lumen 03/16/23 2002 20 G Right Forearm 1 day                    Physical Exam blood pressure is 110/80 sitting  Pulse rates 85  Lungs few crackles  Cardiac irregular  Extremities mild edema  Old knee surgery scar    Significant  Labs: CMP   Recent Labs   Lab 03/16/23  1533 03/17/23  0446 03/18/23  0443    141 141   K 3.8 3.6 4.8    103 105   CO2 28 25 29   * 107 136*   BUN 18 21 17   CREATININE 0.9 0.6 0.5   CALCIUM 9.3 8.9 8.5*   PROT 6.6 5.8* 5.7*   ALBUMIN 3.4* 2.9* 2.7*   BILITOT 0.8 0.3 0.5   ALKPHOS 65 57 55   AST 23 14 13   ALT 18 14 12   ANIONGAP 8 13 7*    and CBC   Recent Labs   Lab 03/16/23  1533 03/17/23  0446 03/18/23  0443   WBC 13.15* 11.70 9.07   HGB 14.4 12.2* 11.6*   HCT 45.1 38.0* 36.6*    273 233       Significant Imaging:   Assessment and Plan:   Chronic persistent atrial fib  Continue metoprolol 25 t.i.d.  Hypotension exacerbated by large dose of Flomax-reduced Flomax 0.4 day and if he does not need it., stop it     Brief HPI:     Active Diagnoses:    Diagnosis Date Noted POA    PRINCIPAL PROBLEM:  Hypotension [I95.9] 03/17/2023 Yes    Orthostatic hypotension [I95.1] 03/17/2023 Yes    Pulmonary hypertension, PASP 61 [I27.20] 03/17/2023 Yes     Chronic    GERD (gastroesophageal reflux disease) [K21.9] 03/16/2023 Yes    Obesity (BMI 30.0-34.9) [E66.9] 03/16/2023 Yes    Pressure injury of buttock, stage 1 [L89.301] 01/23/2023 Yes    Uses walker [Z99.89] 12/15/2022 Not Applicable    Hearing loss [H91.90] 07/18/2021 Yes    Chronic heart failure with preserved ejection fraction [I50.32] 04/21/2021 Yes    Type 2 diabetes mellitus with diabetic polyneuropathy, without long-term current use of insulin, HbA1c 5.8% [E11.42] 01/06/2021 Yes     Chronic    Benign essential tremor [G25.0] 01/06/2021 Yes    Hyperlipidemia [E78.5] 01/06/2021 Yes    Paroxysmal atrial fibrillation with RVR [I48.0] 08/31/2020 Yes    Hypertension, essential [I10] 08/31/2020 Yes    Anticoagulated [Z79.01] 08/31/2020 Not Applicable    Morbid obesity [E66.01] 08/31/2020 Yes      Problems Resolved During this Admission:       VTE Risk Mitigation (From admission, onward)           Ordered     apixaban tablet 5 mg  2 times daily          03/17/23 0026     Place KAZ hose  Until discontinued         03/16/23 1916     Reason for No Pharmacological VTE Prophylaxis  Once        Question:  Reasons:  Answer:  Already adequately anticoagulated on oral Anticoagulants    03/16/23 1916     IP VTE HIGH RISK PATIENT  Once         03/16/23 1915     Place sequential compression device  Until discontinued         03/16/23 1915                    Reed Harper MD  Cardiology  [facility]

## 2023-03-18 NOTE — PLAN OF CARE
Problem: Adult Inpatient Plan of Care  Goal: Plan of Care Review  Outcome: Ongoing, Progressing  Goal: Optimal Comfort and Wellbeing  Outcome: Ongoing, Progressing     Problem: Diabetes Comorbidity  Goal: Blood Glucose Level Within Targeted Range  Outcome: Ongoing, Progressing     Problem: Bleeding (Sepsis/Septic Shock)  Goal: Absence of Bleeding  Outcome: Ongoing, Progressing     Problem: Nutrition Impaired (Sepsis/Septic Shock)  Goal: Optimal Nutrition Intake  Outcome: Ongoing, Progressing     Problem: Skin Injury Risk Increased  Goal: Skin Health and Integrity  Outcome: Ongoing, Progressing     Problem: Dysrhythmia  Goal: Normalized Cardiac Rhythm  Outcome: Ongoing, Progressing     Problem: Malnutrition  Goal: Improved Nutritional Intake  Outcome: Ongoing, Progressing     Problem: Infection  Goal: Absence of Infection Signs and Symptoms  Outcome: Ongoing, Progressing

## 2023-03-18 NOTE — PT/OT/SLP PROGRESS
Physical Therapy      Patient Name:  Hiro Rodríguez   MRN:  [id number]    Patient not seen today secondary to Patient unwilling to participate. Will follow-up 3/19/23.

## 2023-03-18 NOTE — PROGRESS NOTES
[facility] Medicine  Progress Note    Patient Name: Hiro Rodríguez  MRN: [id number]  Patient Class: IP- Inpatient   Admission Date: 3/16/2023  Length of Stay: 2 days  Attending Physician: Georgia Molina MD  Primary Care Provider: Gautam Castanon III, MD        Subjective:     Principal Problem:Bacteremia        HPI:  Hiro Rodríguez is a 78 y.o. male with a history as  has a past medical history of CHF (congestive heart failure) and Hypertension. who presented to the ED with a Hypotension (At clinic today and found to be hypotensive and is currently being treated for UTI)    Patient presents to the emergency room from PCPs office.  Patient tells me he was at his normal PCP checkup.  Reports they were attempting to get his vital signs and was unable to obtain a blood pressure however noted elevated heart rate.  Patient reported at that time he was feeling faint and weak as if he was going to pass out.  So he was brought here to the emergency room.      Patient further reports over last 3 months he has been in and out of the hospital for various different reasons including kidney stones, orthostatic hypotension, blood infection.  He reports his health continues to decline over these past 3 months.     Denies fever, chills, diaphoresis, SOB, dizziness, HA, chest pain, palpitations, NVD, recent trauma or any other associated symptoms.     Lab and imaging obtained and reviewed. CBC shows WBCs 13.15 MCHC 31.9 RDW 16.0 g% 79.2 l% 13.8. CMP shows glucose 154 alb 3.4. . EKG shows a-fib rate controlled on amiodarone gtt. Lactate 2.3.  On admit, afebrile HR 90 RR 18 /95 Sats 96% on RA. CXR shows cardiomegaly and findings suggestive of mild pulmonary vascular congestion/trace edema.       Per ED provider, patient on presentation noted to be in AFib RVR, amiodarone drip initiated.  Also noted to have elevated white count with low blood pressure, sepsis protocol initiated.  Given  patient recent admission with urosepsis E.Coli sensitive to rocephin, IV ABX initiated.  Will admit to ICU for management of AFib RVR in sepsis with possible urosepsis.                     Overview/Hospital Course:  Assumed care of this patient on 3/17/23.  Patient with a known history of paroxysmal atrial fibrillation, diabetes, pulmonary hypertension, history of CHF and hypertension, recent [facility] admission 3/1 to 3/8, initially referred due to AFib with RVR with hypotension from cardiology office, found to have E coli in urine and blood culture, moderate left hydroureteronephrosis status post cystoscopy with placement of left ureteral stent, he was discharged to complete 2 days of Bactrim to complete course of antibiotics with outpatient follow-up with Dr. Lange, inpatient admission complicated by orthostatic hypotension treated with IV fluids and compression.  He states at home blood pressure was dropping intermittently on standing, he was not using compression stockings.  He was seen by PCP office yesterday, blood pressure 60/40, repeat 80/40, referred to the ED. in the ED mild leukocytosis of 13, UA grossly positive, Sotelo was placed, AFib on EKG, intermittent RVR, admitted to the ICU with cardiology consultation.  On 03/17 on lying down blood pressure trend is improved, lisinopril discontinued, DC amiodarone drip and increase oral amiodarone, monitoring blood pressure and heart rate, discontinue Sotelo catheter, repeat CT renal protocol given report of sacral pain, await Cardiology recommendations.  Patient was given 180 mg of diltiazem by Cardiology and blood pressure dropped with systolic in the 70s, discontinued, 250 bolus, midodrine 5 mg, cardiology subsequently increase Lopressor to 25 mg t.i.d..  On 03/18, preliminary blood and urine culture with Pseudomonas, repeat CT renal protocol with bilateral nephrocalcinosis, ureteral stent in place, no acute abnormalities.  Infectious Disease  consulted.      Interval History:  Patient reports onset of right lateral lower abdominal/side discomfort, states last night on transferring to CT scan, trauma to the area.  States he does have chronic urinary incontinence.  Denies any shortness of breath.  Remains in atrial fibrillation on telemetry.  Yesterday after diltiazem 180 by Cardiology, blood pressure dropped into the 70s, given fluid bolus and midodrine 5 mg, diltiazem discontinued.  T-max 98.6°.  Labs with a.m. cortisol 14.6, TSH within normal.  Preliminary blood and urine culture with Pseudomonas.  Repeat CT renal protocol reviewed.  Discussed with patient.  No visitors at bedside.  Discussed with nursing.    Review of Systems   Constitutional:  Negative for fever.   Respiratory:  Negative for shortness of breath.    Genitourinary:         Urinary incontinence   Neurological:  Positive for tremors.   Objective:     Vital Signs (Most Recent):  Temp: 98.2 °F (36.8 °C) (03/18/23 1200)  Pulse: 94 (03/18/23 1200)  Resp: (!) 24 (03/18/23 1200)  BP: 115/85 (03/18/23 1200)  SpO2: 95 % (03/18/23 1200)   Vital Signs (24h Range):  Temp:  [98 °F (36.7 °C)-98.6 °F (37 °C)] 98.2 °F (36.8 °C)  Pulse:  [74-95] 94  Resp:  [11-33] 24  SpO2:  [87 %-98 %] 95 %  BP: ()/(43-85) 115/85     Weight: 103.1 kg (227 lb 4.7 oz)  Body mass index is 32.61 kg/m².    Intake/Output Summary (Last 24 hours) at 3/18/2023 1307  Last data filed at 3/18/2023 0901  Gross per 24 hour   Intake 2528 ml   Output 125 ml   Net 2403 ml      Physical Exam  Vitals and nursing note reviewed.   Constitutional:       Comments: Lying in bed, cooperative   HENT:      Head: Normocephalic and atraumatic.      Ears:      Comments: Hard of hearing     Mouth/Throat:      Mouth: Mucous membranes are moist.   Cardiovascular:      Rate and Rhythm: Normal rate. Rhythm irregular.      Comments: Trace LE edema  Pulmonary:      Comments: On RA, no wheeze or accessory muscle use  Abdominal:      General: Bowel  sounds are normal. There is no distension.      Palpations: Abdomen is soft.      Tenderness: There is no abdominal tenderness.   Genitourinary:     Comments: Condom catheter in place with dark urine draining  Skin:     General: Skin is warm.   Neurological:      Mental Status: He is alert and oriented to person, place, and time.      Comments: Intermittent tremors to upper extremities   Psychiatric:         Mood and Affect: Mood normal.       Significant Labs: Blood Culture:   Recent Labs   Lab 03/16/23 2000 03/16/23  2345   LABBLOO No Growth to date  No Growth to date Gram stain aer bottle: Gram negative rods  Results called to and read back by:Alma Amaya RN-2AICU;  03/18/2023  10:04 CJD     BMP:   Recent Labs   Lab 03/18/23 0443   *      K 4.8      CO2 29   BUN 17   CREATININE 0.5   CALCIUM 8.5*   MG 1.5*     CBC:   Recent Labs   Lab 03/16/23 1533 03/17/23 0446 03/18/23 0443   WBC 13.15* 11.70 9.07   HGB 14.4 12.2* 11.6*   HCT 45.1 38.0* 36.6*    273 233     CMP:   Recent Labs   Lab 03/16/23 1533 03/17/23 0446 03/18/23  0443    141 141   K 3.8 3.6 4.8    103 105   CO2 28 25 29   * 107 136*   BUN 18 21 17   CREATININE 0.9 0.6 0.5   CALCIUM 9.3 8.9 8.5*   PROT 6.6 5.8* 5.7*   ALBUMIN 3.4* 2.9* 2.7*   BILITOT 0.8 0.3 0.5   ALKPHOS 65 57 55   AST 23 14 13   ALT 18 14 12   ANIONGAP 8 13 7*     Lactic Acid:   Recent Labs   Lab 03/16/23 2057 03/16/23  2345 03/17/23  0805   LACTATE 1.6 1.4 1.4     Magnesium:   Recent Labs   Lab 03/16/23 1533 03/17/23 0446 03/18/23  0443   MG 1.7 1.6 1.5*       Significant Imaging: I have reviewed all pertinent imaging results/findings within the past 24 hours.    CT Renal Stone Study Abd Pelvis WO    Result Date: 3/17/2023  EXAM DESCRIPTION: CT RENAL STONE STUDY ABD PELVIS WO CLINICAL HISTORY: 78 years Male, History of hydronephrosis, recurrent back pain COMPARISON: March 1, 2023. TECHNIQUE: Images of the abdomen and pelvis  were obtained without the administration of intravenous contrast. All CT scans at this facility utilized dose modulation, iterative reconstruction, and/or weightbase dosing when appropriate to reduce the radiation dose to his low as reasonably achievable. FINDINGS: Bilateral nephrocalcinosis is again noted. A ureteral stent has been placed on the left. No hydronephrosis is seen. Bilateral nephrocalcinosis is again noted. There are small bilateral pleural effusions. Both the liver and spleen are normal in size, and no focal abnormalities are seen. The pancreas is normal in size, as are the adrenal glands and the abdominal aorta. Small bowel is normal in size. The appendix is visualized and is normal. Colonic diverticula are present without evidence of diverticulitis. Postoperative changes to the left hip are again noted. Degenerative changes are noted in the spine. IMPRESSION: 1. Bilateral nephrocalcinosis. 2. A ureteral stent has been put into place on the left since the previous examination. No hydronephrosis is seen. 3. Diverticulosis without evidence of diverticulitis. 4. Bilateral pleural effusions. Electronically signed by:  Shay Horan MD  3/17/2023 7:01 PM CDT Workstation: [id number]      Assessment/Plan:      Active Hospital Problems    Diagnosis    *Bacteremia-Pseudomonas    Hypotension    Complicated UTI (urinary tract infection)    Orthostatic hypotension    Nephrocalcinosis    Hypomagnesemia    Urinary incontinence    Pulmonary hypertension, PASP 61    GERD (gastroesophageal reflux disease)    Obesity (BMI 30.0-34.9)    Pressure injury of buttock, stage 1    Uses walker    Hearing loss    Chronic heart failure with preserved ejection fraction    Type 2 diabetes mellitus with diabetic polyneuropathy, without long-term current use of insulin, HbA1c 5.8%    Benign essential tremor    Hyperlipidemia    Paroxysmal atrial fibrillation with RVR    Anticoagulated    Morbid obesity      Plan:  Continue care on telemetry floor with continuous cardiac monitoring  On 03/17, diltiazem 180 was started by Cardiology, subsequent hypotension with SBP 70s, given fluid bolus and midodrine, diltiazem discontinued, home amiodarone discontinued by Cardiology, now on Lopressor 25 mg t.i.d., monitoring heart rate and blood pressure   Flomax decreased to 0.4 mg and monitoring closely for retention, due to orthostatic hypotension   Magnesium replacement as per sliding scale   Initial blood and urine culture with Pseudomonas, adjust antibiotics to cefepime, repeat blood culture ordered   Repeat CT renal protocol reviewed   P.r.n. pain control with [place] 10, patient states this works better than IV morphine   Electrolytes sliding scale repletion  Fall and delirium precautions  Increase activity, out of bed to chair, PT  A.m. labs ordered  Appreciate cardiology input   Consult infectious disease  Further plan as per hospital course       Wound care recommendations   Buttock sacral  Clean area with chlorhexidine/ns. Pat dry. Apply Triad and place on an air flow pad daily    Turn reposition q2 as pt's condition will allow.  Bilat heel pillows   Float and elevate heels of bed with pillows.  air mattress     Bilat heels  Clean area with chlorhexidine/ns  Pat dry  Apply venelex and cover with mepilex.    03/17/2023    VTE Risk Mitigation (From admission, onward)         Ordered     apixaban tablet 5 mg  2 times daily         03/17/23 0026     Place KAZ hose  Until discontinued         03/16/23 1916     Reason for No Pharmacological VTE Prophylaxis  Once        Question:  Reasons:  Answer:  Already adequately anticoagulated on oral Anticoagulants    03/16/23 1916     IP VTE HIGH RISK PATIENT  Once         03/16/23 1915     Place sequential compression device  Until discontinued         03/16/23 1915                Discharge Planning   BRENNAN: 3/18/2023     Code Status: Full Code   Is the patient medically ready for  discharge?:     Reason for patient still in hospital (select all that apply): Treatment  Discharge Plan A: Home Health                  Georgia Molina MD  Department of Hospital Medicine   [facility]

## 2023-03-18 NOTE — CONSULTS
Consult Note  Infectious Disease    Reason for Consult:  Bacteremia and UTI due to Pseudomonas    HPI: Hiro Rodríguez is a 78 y.o. male with past medical history of  CHF, HTN, D LP, AFib, pulm htn 60mmhg, DM x2, neuropathy, obesity BMI 32.6, chronic lower back pain, deconditioning, protein malnutrition, GERD, BPH, frequent UTIs, most recent from due to E coli, s/p moderate left hydroureteronephrosis status post cystoscopy with placement of left ureteral stent,     Patient was [facility] at [facility] on 03/01/2023--03/08/2023, treated for AFib with RVR and E coli sepsis of urinary origin.  Initially he was treated with cefepime x2 d then [id number],  Then Bactrim x 2 days.  Dr. Palomino with Urology performed on 03/02/2023 removal of left ureteral stone and left stent placement.    Patient was referred to ED because of low blood pressure.  He was found to be septic and bacteremia and UTI due to Pseudomonas.  ID consult was called.    I came and saw patient.  He has no fever no chills.  He feels comfortable at this time.  He did have some discomfort on the right flank.  His receiving cefepime and tolerating it well.    Antibiotics (From admission, onward)      Start     Stop Route Frequency Ordered    03/18/23 1400  cefepime in dextrose 5 % IVPB 2 g         -- IV Every 8 hours (non-standard times) 03/18/23 1238          Antifungals (From admission, onward)      None          Antivirals (From admission, onward)      None              Review of patient's allergies indicates:  No Known Allergies  Past Medical History:   Diagnosis Date    CHF (congestive heart failure)     Hypertension      Past Surgical History:   Procedure Laterality Date    CYSTOSCOPY W/ URETERAL STENT PLACEMENT N/A 3/2/2023    Procedure: CYSTOSCOPY, WITH URETERAL STENT INSERTION;  Surgeon: Yoshi Lange MD;  Location: [facility] OR;  Service: Urology;  Laterality: N/A;    FRACTURE SURGERY      INTRAMEDULLARY RODDING OF TROCHANTER OF FEMUR Left  11/10/2022    Procedure: INSERTION, ITRAMEDULLARY SANTI, FEMUR, TROCHANTER;  Surgeon: Richard Phoenix MD;  Location: [facility];  Service: Orthopedics;  Laterality: Left;    REMOVAL, CALCULUS, BLADDER  3/2/2023    Procedure: REMOVAL, CALCULUS, BLADDER;  Surgeon: Yoshi Lange MD;  Location: [facility];  Service: Urology;;     Social History     Socioeconomic History    Marital status:    Occupational History    Occupation: RETIRED   Tobacco Use    Smoking status: Never    Smokeless tobacco: Never   Substance and Sexual Activity    Alcohol use: Yes     Alcohol/week: 1.0 standard drink     Types: 1 Shots of liquor per week    Drug use: Not Currently    Sexual activity: Yes     Partners: Female     Social Determinants of Health     Financial Resource Strain: Low Risk     Difficulty of Paying Living Expenses: Not hard at all   Food Insecurity: No Food Insecurity    Worried About Running Out of Food in the Last Year: Never true    Ran Out of Food in the Last Year: Never true   Transportation Needs: No Transportation Needs    Lack of Transportation (Medical): No    Lack of Transportation (Non-Medical): No   Physical Activity: Inactive    Days of Exercise per Week: 0 days    Minutes of Exercise per Session: 0 min   Stress: Stress Concern Present    Feeling of Stress : To some extent   Social Connections: Moderately Isolated    Frequency of Communication with Friends and Family: More than three times a week    Frequency of Social Gatherings with Friends and Family: Once a week    Attends Confucianism Services: Never    Active Member of Clubs or Organizations: No    Attends Club or Organization Meetings: Never    Marital Status:    Housing Stability: Low Risk     Unable to Pay for Housing in the Last Year: No    Number of Places Lived in the Last Year: 1    Unstable Housing in the Last Year: No     No family history on file.      Review of Systems:     No chills, fever, sweats, weight  loss  No change in vision, loss of vision or diplopia  No sinus congestion, purulent nasal discharge, post nasal drip or facial pain  No pain in mouth or throat. No problems with teeth, gums.  No chest pain, palpitations, syncope  No cough, sputum production, shortness of breath, dyspnea on exertion, pleurisy, hemoptysis  No nausea, vomiting, diarrhea, constipation, blood in stool, or focal abd pain,  No dysphagia, odynophagia  No dysuria, hematuria, strangury, retention, incontinence, nocturia, prostatism,   No vaginal/penile discharge, genital ulcers, risk for STD  No swelling of joints, redness of joints, injuries, or new focal pain  No unusual headaches, dizziness, vertigo, numbness, paresthesias, neuropathy, falls  No anxiety, depression, substance abuse, sleep disturbance  Positive for diabetes,  No bleeding, lymphadenopathy, anemia, malignancy, unusual bruising  No new rashes, lesions, or wounds  No TB exposure  No recent/prior steroids  Outdoor activities:  Travel:  No  Implants:   Antibiotic History:  As above    EXAM & DIAGNOSTICS REVIEWED:   Vitals:     Temp:  [98 °F (36.7 °C)-98.6 °F (37 °C)]   Temp: 98.2 °F (36.8 °C) (03/18/23 1200)  Pulse: 94 (03/18/23 1200)  Resp: (!) 24 (03/18/23 1200)  BP: 115/85 (03/18/23 1200)  SpO2: 95 % (03/18/23 1200)    Intake/Output Summary (Last 24 hours) at 3/18/2023 1259  Last data filed at 3/18/2023 0901  Gross per 24 hour   Intake 2528 ml   Output 125 ml   Net 2403 ml       General:  In NAD. Alert and attentive, cooperative, comfortable  Eyes:  Anicteric, PERRL, EOMI  ENT:  No ulcers, exudates, thrush, nares patent missing teeth.  Neck:  supple, no masses or adenopathy appreciated  Lungs: Clear, no consolidation, rales, wheezes, rub  Heart:  RRR, no gallop/murmur/rub noted  Abd:  Soft, NT, ND, normal BS, no masses or organomegaly appreciated.  : Sotelo, urine is slightly dark, no flank tenderness  Musc:  Chronic arthritic changes appropriate for age.  Otherwise no  swelling, erythema, synovitis, muscle wasting.   Skin:  As below.  No palmar or plantar lesions. No subungual petechiae  Neuro:             Alert, attentive, speech fluent, face symmetric, moves all extremities, no focal weakness.  Minimally ambulatory.  Psych: Calm, cooperative  Lymphatic:     No cervical, supraclavicular, axillary, or inguinal nodes  Extrem: No edema, erythema, phlebitis, cellulitis, warm and well perfused  VAD:       Isolation:    Wound:                Lines/Tubes/Drains:    General Labs reviewed:  Recent Labs   Lab 03/16/23  1533 03/17/23 0446 03/18/23 0443   WBC 13.15* 11.70 9.07   HGB 14.4 12.2* 11.6*   HCT 45.1 38.0* 36.6*    273 233       Recent Labs   Lab 03/16/23  1533 03/17/23 0446 03/18/23 0443    141 141   K 3.8 3.6 4.8    103 105   CO2 28 25 29   BUN 18 21 17   CREATININE 0.9 0.6 0.5   CALCIUM 9.3 8.9 8.5*   PROT 6.6 5.8* 5.7*   BILITOT 0.8 0.3 0.5   ALKPHOS 65 57 55   ALT 18 14 12   AST 23 14 13     Recent Labs   Lab 03/16/23 2000   CRP 0.86*     Micro:  Microbiology Results (last 7 days)       Procedure Component Value Units Date/Time    Blood culture [[id number]]     Order Status: Sent Specimen: Blood     Rapid Organism ID by PCR (from Blood culture) [[id number]]  (Abnormal) Collected: 03/16/23 2345    Order Status: Completed Updated: 03/18/23 1116     Enterococcus faecials Not Detected     Enterococcus faecium Not Detected     Listeria Monocytogenes Not Detected     Staphylococcus spp. Not Detected     Staphylococcus aureus Not Detected     Staphylococcus epidermidis Not Detected     Staphylococcus lugdunensis Not Detected     Streptococcus species Not Detected     Streptococcus agalactiae Not Detected     Streptococcus pneumoniae Not Detected     Streptococcus pyogenes Not Detected     Acinetobacter calcoaceticus/baumannii complex Not Detected     Bacteroides fragilis Not Detected     Enterobacerales Not Detected     Enterobacter cloacae complex Not Detected      Escherichia Not Detected     Klebsiella aerogenes Not Detected     Klebsiella oxytoca Not Detected     Klebsiella pneumoniae group Not Detected     Proteus Not Detected     Salmonella sp Not Detected     Serratia marcescens Not Detected     Haemophilus influenzae Not Detected     Neisseria meningtidis Not Detected     Pseudomonas aeruginosa Detected     Stenotrophomonas maltophilia Not Detected     Candida albicans Not Detected     Candida auris Not Detected     Candida glabrata Not Detected     Candida krusei Not Detected     Candida Parapsilosis Not Detected     Candida Tropicalis Not Detected     Cryptococcus neoformans/gattii Not Detected     CTX-M Gene Not Detected     IMP Gene Not Detected     KPC  Not Detected     mcr-1  Test not applicable     mec A/C Test not applicable     mec A/C and MREJ (MRSA) Test not applicable     NDM Not Detected     OXA-48-like Test not applicable     van A/B Test not applicable     VIM Not Detected    Narrative:      Please draw 15 minutes apart from different sites    Blood culture [[id number]] Collected: 03/16/23 2345    Order Status: Completed Specimen: Blood from Peripheral, Antecubital, Right Updated: 03/18/23 1005     Blood Culture, Routine Gram stain aer bottle: Gram negative rods      Results called to and read back by:Alma Amaya RN-2AICU;  03/18/2023      10:04 CJD    Narrative:      Please draw 15 minutes apart from different sites    Urine culture [[id number]]  (Abnormal) Collected: 03/17/23 0135    Order Status: Completed Specimen: Urine Updated: 03/18/23 0755     Urine Culture, Routine PRESUMPTIVE PSEUDOMONAS SPECIES  >100,000 cfu/ml  Identification and susceptibility pending      Narrative:      Specimen Source->Urine    Blood culture [[id number]] Collected: 03/16/23 2000    Order Status: Completed Specimen: Blood from Peripheral, Antecubital, Right Updated: 03/17/23 2232     Blood Culture, Routine No Growth to date      No Growth to date    Narrative:       Please draw 15 minutes apart from different sites           Escherichia coli    CULTURE, BLOOD   Amp/Sulbactam >16/8 mcg/mL Resistant   Ampicillin >16 mcg/mL Resistant   Cefazolin <=2 mcg/mL Sensitive   Cefepime <=2 mcg/mL Sensitive   Ceftriaxone <=1 mcg/mL Sensitive   Ciprofloxacin >2 mcg/mL Resistant   Ertapenem <=0.5 mcg/mL Sensitive   Gentamicin <=4 mcg/mL Sensitive   Levofloxacin >4 mcg/mL Resistant   Meropenem <=1 mcg/mL Sensitive   Piperacillin/Tazo <=16 mcg/mL Sensitive   Tetracycline <=4 mcg/mL Sensitive   Tobramycin <=4 mcg/mL Sensitive   Trimeth/Sulfa <=2/38 mcg/mL Sensitive       Imaging Reviewed:   CXR   CT  1. Bilateral nephrocalcinosis.   2. A ureteral stent has been put into place on the left since the previous examination. No hydronephrosis is seen.   3. Diverticulosis without evidence of diverticulitis.   4. Bilateral pleural effusions.     Cardiology:    IMPRESSION & PLAN        Pseudomonas bacteremia due to UTI, 03/18/2022    UTI due to Pseudomonas 03/18/2022.    Right flank pain.     Recent UTI due to E coli 03/01/2023.    Recent left ureteral stone removed and left ureteral stent.  03/02/2023    Constipation     PMHx CHF, HTN, DLP, AFib, pulm htn 60mmhg, DM x2, neuropathy, obesity BMI 32.6, chronic lower back pain, deconditioning, hearing loss, protein malnutrition, GERD, BPH, frequent UTIs,     Social issue.  Patient has a hard time going to urologist office because of transportation. He has a hard time moving from his wheelchair to the car and vice versa.  His wife had hip surgery and is not able to assist him much      Recommendations:  --Urology evaluation.  Will you please evaluate for left stent exchange and any  other help you can give with stone removal/treatment, so that he does not keep coming back with UTIs     -- Need aggressive treatment of constipation.  Hospitalist is already working on it    -- out of bed to chair.  PT OT.      Medical Decision Making during this encounter  was  [_] Low Complexity  [_] Moderate Complexity  [  xx] High Complexity

## 2023-03-19 LAB
ALBUMIN SERPL BCP-MCNC: 2.6 G/DL (ref 3.5–5.2)
ALP SERPL-CCNC: 53 U/L (ref 55–135)
ALT SERPL W/O P-5'-P-CCNC: 12 U/L (ref 10–44)
ANION GAP SERPL CALC-SCNC: 8 MMOL/L (ref 8–16)
AST SERPL-CCNC: 14 U/L (ref 10–40)
BACTERIA UR CULT: ABNORMAL
BASOPHILS # BLD AUTO: 0.03 K/UL (ref 0–0.2)
BASOPHILS NFR BLD: 0.4 % (ref 0–1.9)
BILIRUB SERPL-MCNC: 0.3 MG/DL (ref 0.1–1)
BUN SERPL-MCNC: 18 MG/DL (ref 8–23)
CALCIUM SERPL-MCNC: 8.3 MG/DL (ref 8.7–10.5)
CHLORIDE SERPL-SCNC: 105 MMOL/L (ref 95–110)
CO2 SERPL-SCNC: 27 MMOL/L (ref 23–29)
CREAT SERPL-MCNC: 0.5 MG/DL (ref 0.5–1.4)
DIFFERENTIAL METHOD: ABNORMAL
EOSINOPHIL # BLD AUTO: 0.1 K/UL (ref 0–0.5)
EOSINOPHIL NFR BLD: 2 % (ref 0–8)
ERYTHROCYTE [DISTWIDTH] IN BLOOD BY AUTOMATED COUNT: 16.3 % (ref 11.5–14.5)
EST. GFR  (NO RACE VARIABLE): >60 ML/MIN/1.73 M^2
GLUCOSE SERPL-MCNC: 110 MG/DL (ref 70–110)
GLUCOSE SERPL-MCNC: 129 MG/DL (ref 70–110)
GLUCOSE SERPL-MCNC: 131 MG/DL (ref 70–110)
GLUCOSE SERPL-MCNC: 255 MG/DL (ref 70–110)
HCT VFR BLD AUTO: 36 % (ref 40–54)
HGB BLD-MCNC: 11.3 G/DL (ref 14–18)
IMM GRANULOCYTES # BLD AUTO: 0.03 K/UL (ref 0–0.04)
IMM GRANULOCYTES NFR BLD AUTO: 0.4 % (ref 0–0.5)
LYMPHOCYTES # BLD AUTO: 1.7 K/UL (ref 1–4.8)
LYMPHOCYTES NFR BLD: 24.4 % (ref 18–48)
MAGNESIUM SERPL-MCNC: 1.6 MG/DL (ref 1.6–2.6)
MCH RBC QN AUTO: 29.8 PG (ref 27–31)
MCHC RBC AUTO-ENTMCNC: 31.4 G/DL (ref 32–36)
MCV RBC AUTO: 95 FL (ref 82–98)
MONOCYTES # BLD AUTO: 0.7 K/UL (ref 0.3–1)
MONOCYTES NFR BLD: 9.9 % (ref 4–15)
NEUTROPHILS # BLD AUTO: 4.4 K/UL (ref 1.8–7.7)
NEUTROPHILS NFR BLD: 62.9 % (ref 38–73)
NRBC BLD-RTO: 0 /100 WBC
PHOSPHATE SERPL-MCNC: 4.2 MG/DL (ref 2.7–4.5)
PLATELET # BLD AUTO: 211 K/UL (ref 150–450)
PMV BLD AUTO: 9.7 FL (ref 9.2–12.9)
POTASSIUM SERPL-SCNC: 3.8 MMOL/L (ref 3.5–5.1)
PROT SERPL-MCNC: 5.4 G/DL (ref 6–8.4)
RBC # BLD AUTO: 3.79 M/UL (ref 4.6–6.2)
SODIUM SERPL-SCNC: 140 MMOL/L (ref 136–145)
WBC # BLD AUTO: 7 K/UL (ref 3.9–12.7)

## 2023-03-19 PROCEDURE — 97116 GAIT TRAINING THERAPY: CPT

## 2023-03-19 PROCEDURE — 80053 COMPREHEN METABOLIC PANEL: CPT | Performed by: NURSE PRACTITIONER

## 2023-03-19 PROCEDURE — 25000003 PHARM REV CODE 250: Performed by: NURSE PRACTITIONER

## 2023-03-19 PROCEDURE — 99232 SBSQ HOSP IP/OBS MODERATE 35: CPT | Mod: ,,, | Performed by: SPECIALIST

## 2023-03-19 PROCEDURE — 21400001 HC TELEMETRY ROOM

## 2023-03-19 PROCEDURE — 85025 COMPLETE CBC W/AUTO DIFF WBC: CPT | Performed by: NURSE PRACTITIONER

## 2023-03-19 PROCEDURE — 63600175 PHARM REV CODE 636 W HCPCS: Performed by: INTERNAL MEDICINE

## 2023-03-19 PROCEDURE — 25000003 PHARM REV CODE 250: Performed by: INTERNAL MEDICINE

## 2023-03-19 PROCEDURE — 63600175 PHARM REV CODE 636 W HCPCS: Mod: TB,JG | Performed by: INTERNAL MEDICINE

## 2023-03-19 PROCEDURE — 84100 ASSAY OF PHOSPHORUS: CPT | Performed by: NURSE PRACTITIONER

## 2023-03-19 PROCEDURE — 97162 PT EVAL MOD COMPLEX 30 MIN: CPT

## 2023-03-19 PROCEDURE — 99233 SBSQ HOSP IP/OBS HIGH 50: CPT | Mod: ,,, | Performed by: INTERNAL MEDICINE

## 2023-03-19 PROCEDURE — 99233 PR SUBSEQUENT HOSPITAL CARE,LEVL III: ICD-10-PCS | Mod: ,,, | Performed by: INTERNAL MEDICINE

## 2023-03-19 PROCEDURE — 99232 PR SUBSEQUENT HOSPITAL CARE,LEVL II: ICD-10-PCS | Mod: ,,, | Performed by: SPECIALIST

## 2023-03-19 PROCEDURE — 36415 COLL VENOUS BLD VENIPUNCTURE: CPT | Performed by: NURSE PRACTITIONER

## 2023-03-19 PROCEDURE — 83735 ASSAY OF MAGNESIUM: CPT | Performed by: NURSE PRACTITIONER

## 2023-03-19 RX ORDER — TAMSULOSIN HYDROCHLORIDE 0.4 MG/1
0.4 CAPSULE ORAL DAILY
Status: DISCONTINUED | OUTPATIENT
Start: 2023-03-20 | End: 2023-03-20

## 2023-03-19 RX ORDER — LEVOFLOXACIN 5 MG/ML
500 INJECTION, SOLUTION INTRAVENOUS
Status: DISCONTINUED | OUTPATIENT
Start: 2023-03-19 | End: 2023-03-24 | Stop reason: HOSPADM

## 2023-03-19 RX ADMIN — HYDROCODONE BITARTRATE AND ACETAMINOPHEN 1 TABLET: 10; 325 TABLET ORAL at 01:03

## 2023-03-19 RX ADMIN — METOPROLOL TARTRATE 25 MG: 25 TABLET, FILM COATED ORAL at 02:03

## 2023-03-19 RX ADMIN — CASTOR OIL AND BALSAM, PERU: 788; 87 OINTMENT TOPICAL at 09:03

## 2023-03-19 RX ADMIN — PRAVASTATIN SODIUM 80 MG: 40 TABLET ORAL at 08:03

## 2023-03-19 RX ADMIN — METOPROLOL TARTRATE 25 MG: 25 TABLET, FILM COATED ORAL at 09:03

## 2023-03-19 RX ADMIN — LEVOFLOXACIN 500 MG: 500 INJECTION, SOLUTION INTRAVENOUS at 06:03

## 2023-03-19 RX ADMIN — DULOXETINE 30 MG: 30 CAPSULE, DELAYED RELEASE ORAL at 09:03

## 2023-03-19 RX ADMIN — FOLIC ACID 1 MG: 1 TABLET ORAL at 09:03

## 2023-03-19 RX ADMIN — SENNOSIDES AND DOCUSATE SODIUM 2 TABLET: 50; 8.6 TABLET ORAL at 09:03

## 2023-03-19 RX ADMIN — APIXABAN 2.5 MG: 2.5 TABLET, FILM COATED ORAL at 08:03

## 2023-03-19 RX ADMIN — TOPIRAMATE 50 MG: 25 TABLET, FILM COATED ORAL at 09:03

## 2023-03-19 RX ADMIN — APIXABAN 5 MG: 5 TABLET, FILM COATED ORAL at 09:03

## 2023-03-19 RX ADMIN — DULOXETINE 30 MG: 30 CAPSULE, DELAYED RELEASE ORAL at 08:03

## 2023-03-19 RX ADMIN — SENNOSIDES AND DOCUSATE SODIUM 2 TABLET: 50; 8.6 TABLET ORAL at 08:03

## 2023-03-19 RX ADMIN — CEFEPIME HYDROCHLORIDE 2 G: 2 INJECTION, SOLUTION INTRAVENOUS at 05:03

## 2023-03-19 RX ADMIN — MAGNESIUM OXIDE 400 MG (241.3 MG MAGNESIUM) TABLET 800 MG: at 09:03

## 2023-03-19 RX ADMIN — CEFEPIME HYDROCHLORIDE 2 G: 2 INJECTION, SOLUTION INTRAVENOUS at 02:03

## 2023-03-19 RX ADMIN — Medication 2000 UNITS: at 09:03

## 2023-03-19 RX ADMIN — POTASSIUM BICARBONATE 50 MEQ: 977.5 TABLET, EFFERVESCENT ORAL at 09:03

## 2023-03-19 RX ADMIN — PANTOPRAZOLE SODIUM 40 MG: 40 TABLET, DELAYED RELEASE ORAL at 05:03

## 2023-03-19 RX ADMIN — METOPROLOL TARTRATE 25 MG: 25 TABLET, FILM COATED ORAL at 08:03

## 2023-03-19 RX ADMIN — MAGNESIUM OXIDE 400 MG (241.3 MG MAGNESIUM) TABLET 800 MG: at 02:03

## 2023-03-19 NOTE — CARE UPDATE
03/18/23 2026   PRE-TX-O2   Device (Oxygen Therapy) room air   SpO2 96 %   Pulse Oximetry Type Continuous   $ Pulse Oximetry - Multiple Charge Pulse Oximetry - Multiple   Pulse 81   Resp (!) 21   Education   $ Education 15 min   Respiratory Evaluation   $ Care Plan Tech Time 15 min

## 2023-03-19 NOTE — SUBJECTIVE & OBJECTIVE
Interval History:     Review of Systems  Objective:     Vital Signs (Most Recent):  Temp: 98 °F (36.7 °C) (03/19/23 1448)  Pulse: 87 (03/19/23 1600)  Resp: 20 (03/19/23 1600)  BP: 109/61 (03/19/23 1600)  SpO2: 96 % (03/19/23 1600) Vital Signs (24h Range):  Temp:  [97.3 °F (36.3 °C)-98 °F (36.7 °C)] 98 °F (36.7 °C)  Pulse:  [72-87] 87  Resp:  [16-23] 20  SpO2:  [94 %-98 %] 96 %  BP: ()/(55-86) 109/61     Weight: 104.2 kg (229 lb 11.5 oz)  Body mass index is 32.96 kg/m².    Intake/Output Summary (Last 24 hours) at 3/19/2023 1729  Last data filed at 3/19/2023 1701  Gross per 24 hour   Intake 580 ml   Output 870 ml   Net -290 ml      Physical Exam  Constitutional:       General: He is not in acute distress.     Appearance: He is well-developed.   HENT:      Head: Normocephalic.   Eyes:      Pupils: Pupils are equal, round, and reactive to light.   Cardiovascular:      Rate and Rhythm: Normal rate and regular rhythm.   Pulmonary:      Effort: Pulmonary effort is normal. No respiratory distress.   Abdominal:      General: Abdomen is flat. There is no distension.      Tenderness: There is no abdominal tenderness.   Musculoskeletal:         General: Normal range of motion.      Cervical back: Neck supple.   Skin:     General: Skin is warm.      Findings: No rash.   Neurological:      Mental Status: He is alert and oriented to person, place, and time.   Psychiatric:         Mood and Affect: Mood normal.       Significant Labs: All pertinent labs within the past 24 hours have been reviewed.  CBC:   Recent Labs   Lab 03/18/23 0443 03/19/23  0446   WBC 9.07 7.00   HGB 11.6* 11.3*   HCT 36.6* 36.0*    211     CMP:   Recent Labs   Lab 03/18/23 0443 03/19/23  0446    140   K 4.8 3.8    105   CO2 29 27   * 131*   BUN 17 18   CREATININE 0.5 0.5   CALCIUM 8.5* 8.3*   PROT 5.7* 5.4*   ALBUMIN 2.7* 2.6*   BILITOT 0.5 0.3   ALKPHOS 55 53*   AST 13 14   ALT 12 12   ANIONGAP 7* 8     Cardiac Markers: No  results for input(s): CKMB, MYOGLOBIN, BNP, TROPISTAT in the last 48 hours.    Significant Imaging: I have reviewed all pertinent imaging results/findings within the past 24 hours.

## 2023-03-19 NOTE — PROGRESS NOTES
[facility] Medicine  Progress Note    Patient Name: Hiro Rodríguez  MRN: [id number]  Patient Class: IP- Inpatient   Admission Date: 3/16/2023  Length of Stay: 3 days  Attending Physician: Vipin Shepherd MD  Primary Care Provider: Gautam Castanon III, MD        Subjective:     Principal Problem:Bacteremia        HPI:  Hiro Rodríguez is a 78 y.o. male with a history as  has a past medical history of CHF (congestive heart failure) and Hypertension. who presented to the ED with a Hypotension (At clinic today and found to be hypotensive and is currently being treated for UTI)    Patient presents to the emergency room from PCPs office.  Patient tells me he was at his normal PCP checkup.  Reports they were attempting to get his vital signs and was unable to obtain a blood pressure however noted elevated heart rate.  Patient reported at that time he was feeling faint and weak as if he was going to pass out.  So he was brought here to the emergency room.      Patient further reports over last 3 months he has been in and out of the hospital for various different reasons including kidney stones, orthostatic hypotension, blood infection.  He reports his health continues to decline over these past 3 months.     Denies fever, chills, diaphoresis, SOB, dizziness, HA, chest pain, palpitations, NVD, recent trauma or any other associated symptoms.     Lab and imaging obtained and reviewed. CBC shows WBCs 13.15 MCHC 31.9 RDW 16.0 g% 79.2 l% 13.8. CMP shows glucose 154 alb 3.4. . EKG shows a-fib rate controlled on amiodarone gtt. Lactate 2.3.  On admit, afebrile HR 90 RR 18 /95 Sats 96% on RA. CXR shows cardiomegaly and findings suggestive of mild pulmonary vascular congestion/trace edema.       Per ED provider, patient on presentation noted to be in AFib RVR, amiodarone drip initiated.  Also noted to have elevated white count with low blood pressure, sepsis protocol initiated.  Given patient  recent admission with urosepsis E.Coli sensitive to rocephin, IV ABX initiated.  Will admit to ICU for management of AFib RVR in sepsis with possible urosepsis.                     Overview/Hospital Course:  Assumed care of this patient on 3/17/23.  Patient with a known history of paroxysmal atrial fibrillation, diabetes, pulmonary hypertension, history of CHF and hypertension, recent [facility] admission 3/1 to 3/8, initially referred due to AFib with RVR with hypotension from cardiology office, found to have E coli in urine and blood culture, moderate left hydroureteronephrosis status post cystoscopy with placement of left ureteral stent, he was discharged to complete 2 days of Bactrim to complete course of antibiotics with outpatient follow-up with Dr. Lange, inpatient admission complicated by orthostatic hypotension treated with IV fluids and compression.  He states at home blood pressure was dropping intermittently on standing, he was not using compression stockings.  He was seen by PCP office yesterday, blood pressure 60/40, repeat 80/40, referred to the ED. in the ED mild leukocytosis of 13, UA grossly positive, Sotelo was placed, AFib on EKG, intermittent RVR, admitted to the ICU with cardiology consultation.  On 03/17 on lying down blood pressure trend is improved, lisinopril discontinued, DC amiodarone drip and increase oral amiodarone, monitoring blood pressure and heart rate, discontinue Sotelo catheter, repeat CT renal protocol given report of sacral pain, await Cardiology recommendations.  Patient was given 180 mg of diltiazem by Cardiology and blood pressure dropped with systolic in the 70s, discontinued, 250 bolus, midodrine 5 mg, cardiology subsequently increase Lopressor to 25 mg t.i.d..  On 03/18, preliminary blood and urine culture with Pseudomonas, repeat CT renal protocol with bilateral nephrocalcinosis, ureteral stent in place, no acute abnormalities.  Infectious Disease  consulted.    3/19  Assumed care   Still hematuria.sitting in the chair . Hb stable . Renal function stable . UC and BC with pseudomonas . Repeat BC so far neg      Interval History:     Review of Systems  Objective:     Vital Signs (Most Recent):  Temp: 98 °F (36.7 °C) (03/19/23 1448)  Pulse: 87 (03/19/23 1600)  Resp: 20 (03/19/23 1600)  BP: 109/61 (03/19/23 1600)  SpO2: 96 % (03/19/23 1600) Vital Signs (24h Range):  Temp:  [97.3 °F (36.3 °C)-98 °F (36.7 °C)] 98 °F (36.7 °C)  Pulse:  [72-87] 87  Resp:  [16-23] 20  SpO2:  [94 %-98 %] 96 %  BP: ()/(55-86) 109/61     Weight: 104.2 kg (229 lb 11.5 oz)  Body mass index is 32.96 kg/m².    Intake/Output Summary (Last 24 hours) at 3/19/2023 1729  Last data filed at 3/19/2023 1701  Gross per 24 hour   Intake 580 ml   Output 870 ml   Net -290 ml      Physical Exam  Constitutional:       General: He is not in acute distress.     Appearance: He is well-developed.   HENT:      Head: Normocephalic.   Eyes:      Pupils: Pupils are equal, round, and reactive to light.   Cardiovascular:      Rate and Rhythm: Normal rate and regular rhythm.   Pulmonary:      Effort: Pulmonary effort is normal. No respiratory distress.   Abdominal:      General: Abdomen is flat. There is no distension.      Tenderness: There is no abdominal tenderness.   Musculoskeletal:         General: Normal range of motion.      Cervical back: Neck supple.   Skin:     General: Skin is warm.      Findings: No rash.   Neurological:      Mental Status: He is alert and oriented to person, place, and time.   Psychiatric:         Mood and Affect: Mood normal.       Significant Labs: All pertinent labs within the past 24 hours have been reviewed.  CBC:   Recent Labs   Lab 03/18/23 0443 03/19/23 0446   WBC 9.07 7.00   HGB 11.6* 11.3*   HCT 36.6* 36.0*    211     CMP:   Recent Labs   Lab 03/18/23 0443 03/19/23 0446    140   K 4.8 3.8    105   CO2 29 27   * 131*   BUN 17 18   CREATININE  0.5 0.5   CALCIUM 8.5* 8.3*   PROT 5.7* 5.4*   ALBUMIN 2.7* 2.6*   BILITOT 0.5 0.3   ALKPHOS 55 53*   AST 13 14   ALT 12 12   ANIONGAP 7* 8     Cardiac Markers: No results for input(s): CKMB, MYOGLOBIN, BNP, TROPISTAT in the last 48 hours.    Significant Imaging: I have reviewed all pertinent imaging results/findings within the past 24 hours.      Assessment/Plan:      Active Hospital Problems    Diagnosis    *Bacteremia-Pseudomonas    Complicated UTI (urinary tract infection)    Nephrocalcinosis    Hypomagnesemia    Urinary incontinence    Hypotension    Orthostatic hypotension    Pulmonary hypertension, PASP 61    GERD (gastroesophageal reflux disease)    Obesity (BMI 30.0-34.9)    Pressure injury of buttock, stage 1    Uses walker    Hearing loss    Chronic heart failure with preserved ejection fraction    Type 2 diabetes mellitus with diabetic polyneuropathy, without long-term current use of insulin, HbA1c 5.8%    Benign essential tremor    Hyperlipidemia    Paroxysmal atrial fibrillation with RVR    Anticoagulated    Morbid obesity       Plan:  diltiazem discontinued because of hypotension and decreased flomax dose   Decreased eliquis dose to 2.5 mg bid . If needed may DC for now   home amiodarone discontinued by Cardiology, now on Lopressor 25 mg t.i.d.,  repeat blood culture so far neg. UC sensitive to levaquin and BC sensitivity awaited .  Changed to levaquin   Ambulate   DC when hematuria better and Hb stable       VTE Risk Mitigation (From admission, onward)         Ordered     apixaban tablet 2.5 mg  2 times daily         03/19/23 1729     Place KAZ hose  Until discontinued         03/16/23 1916     Reason for No Pharmacological VTE Prophylaxis  Once        Question:  Reasons:  Answer:  Already adequately anticoagulated on oral Anticoagulants    03/16/23 1916     IP VTE HIGH RISK PATIENT  Once         03/16/23 1915     Place sequential compression device  Until discontinued          03/16/23 1915                Discharge Planning   BRENNAN: 3/18/2023     Code Status: Full Code   Is the patient medically ready for discharge?:     Reason for patient still in hospital (select all that apply): Treatment  Discharge Plan A: Home Health                  Vipin Shepherd MD  Department of Hospital Medicine   [facility]

## 2023-03-19 NOTE — CONSULTS
[facility]  Cardiology  Progress Note    Patient Name: Hiro Rodríguez  MRN: [id number]  Admission Date: 3/16/2023  Hospital Length of Stay: 3 days  Code Status: Full Code   Attending Physician: Vipin Shepherd MD   Primary Care Physician: Gautam Castanon III, MD  Expected Discharge Date: 3/18/2023  Principal Problem:Bacteremia    Subjective:     Hospital Course:  Patient feeling little bit better    Interval History:  Problem 1 he is being treated for UTI renal stone and now has hematuria with a Sotelo catheter in place and he is on Eliquis  2. Chronic persistent atrial fibrillation with rate control  3. Hypotensive episodes-he was on Flomax 0.8 mg which may be contributing to hypotension     ROS  Objective:     Vital Signs (Most Recent):  Temp: 97.9 °F (36.6 °C) (03/19/23 0755)  Pulse: 81 (03/19/23 0755)  Resp: 18 (03/19/23 0755)  BP: 124/70 (03/19/23 0755)  SpO2: 98 % (03/19/23 0755) Vital Signs (24h Range):  Temp:  [97.3 °F (36.3 °C)-98.3 °F (36.8 °C)] 97.9 °F (36.6 °C)  Pulse:  [] 81  Resp:  [16-23] 18  SpO2:  [91 %-98 %] 98 %  BP: ()/(55-86) 124/70     Weight: 104.2 kg (229 lb 11.5 oz)  Body mass index is 32.96 kg/m².    SpO2: 98 %         Intake/Output Summary (Last 24 hours) at 3/19/2023 1239  Last data filed at 3/19/2023 0543  Gross per 24 hour   Intake 150 ml   Output 420 ml   Net -270 ml       Lines/Drains/Airways       Drain  Duration             Male External Urinary Catheter 03/16/23 Small 3 days         Urethral Catheter 03/18/23 1904 Coude 16 Fr. <1 day              Peripheral Intravenous Line  Duration                  Peripheral IV - Single Lumen 03/16/23 Anterior;Distal;Left Upper Arm 3 days         Peripheral IV - Single Lumen 03/16/23 2002 20 G Right Forearm 2 days                    Physical Exam lungs are clear  Cardiac irregular  Urine bag has blood in it    Significant Labs: CMP   Recent Labs   Lab 03/18/23  0443 03/19/23  0446    140   K 4.8 3.8    105    CO2 29 27   * 131*   BUN 17 18   CREATININE 0.5 0.5   CALCIUM 8.5* 8.3*   PROT 5.7* 5.4*   ALBUMIN 2.7* 2.6*   BILITOT 0.5 0.3   ALKPHOS 55 53*   AST 13 14   ALT 12 12   ANIONGAP 7* 8       Significant Imaging:   Assessment and Plan:   1. Hematuria-consider reducing Eliquis 2.5 b.i.d. for awhile  2. Reduced Flomax 0.4 and if he can do without it.  It as this can cause hypotension and dizziness   Brief HPI:     Active Diagnoses:    Diagnosis Date Noted POA    PRINCIPAL PROBLEM:  Bacteremia-Pseudomonas [R78.81] 03/18/2023 Yes    Complicated UTI (urinary tract infection) [N39.0] 03/18/2023 Yes    Nephrocalcinosis [E83.59, N29] 03/18/2023 Yes     Chronic    Hypomagnesemia [E83.42] 03/18/2023 No    Urinary incontinence [R32] 03/18/2023 Yes     Chronic    Hypotension [I95.9] 03/17/2023 Yes    Orthostatic hypotension [I95.1] 03/17/2023 Yes    Pulmonary hypertension, PASP 61 [I27.20] 03/17/2023 Yes     Chronic    GERD (gastroesophageal reflux disease) [K21.9] 03/16/2023 Yes    Obesity (BMI 30.0-34.9) [E66.9] 03/16/2023 Yes    Pressure injury of buttock, stage 1 [L89.301] 01/23/2023 Yes    Uses walker [Z99.89] 12/15/2022 Not Applicable    Hearing loss [H91.90] 07/18/2021 Yes    Chronic heart failure with preserved ejection fraction [I50.32] 04/21/2021 Yes    Type 2 diabetes mellitus with diabetic polyneuropathy, without long-term current use of insulin, HbA1c 5.8% [E11.42] 01/06/2021 Yes     Chronic    Benign essential tremor [G25.0] 01/06/2021 Yes    Hyperlipidemia [E78.5] 01/06/2021 Yes    Paroxysmal atrial fibrillation with RVR [I48.0] 08/31/2020 Yes    Anticoagulated [Z79.01] 08/31/2020 Not Applicable    Morbid obesity [E66.01] 08/31/2020 Yes      Problems Resolved During this Admission:       VTE Risk Mitigation (From admission, onward)           Ordered     apixaban tablet 5 mg  2 times daily         03/17/23 0026     Place KAZ hose  Until discontinued         03/16/23 1916     Reason for No Pharmacological  VTE Prophylaxis  Once        Question:  Reasons:  Answer:  Already adequately anticoagulated on oral Anticoagulants    03/16/23 1916     IP VTE HIGH RISK PATIENT  Once         03/16/23 1915     Place sequential compression device  Until discontinued         03/16/23 1915                    Reed Harper MD  Cardiology  [facility]

## 2023-03-19 NOTE — PLAN OF CARE
Problem: Adult Inpatient Plan of Care  Goal: Plan of Care Review  Outcome: Ongoing, Progressing  Goal: Patient-Specific Goal (Individualized)  Outcome: Ongoing, Progressing  Goal: Absence of Hospital-Acquired Illness or Injury  Outcome: Ongoing, Progressing  Goal: Optimal Comfort and Wellbeing  Outcome: Ongoing, Progressing  Goal: Readiness for Transition of Care  Outcome: Ongoing, Progressing     Problem: Diabetes Comorbidity  Goal: Blood Glucose Level Within Targeted Range  Outcome: Ongoing, Progressing     Problem: Adjustment to Illness (Sepsis/Septic Shock)  Goal: Optimal Coping  Outcome: Ongoing, Progressing     Problem: Bleeding (Sepsis/Septic Shock)  Goal: Absence of Bleeding  Outcome: Ongoing, Progressing     Problem: Glycemic Control Impaired (Sepsis/Septic Shock)  Goal: Blood Glucose Level Within Desired Range  Outcome: Ongoing, Progressing     Problem: Infection Progression (Sepsis/Septic Shock)  Goal: Absence of Infection Signs and Symptoms  Outcome: Ongoing, Progressing     Problem: Nutrition Impaired (Sepsis/Septic Shock)  Goal: Optimal Nutrition Intake  Outcome: Ongoing, Progressing     Problem: Impaired Wound Healing  Goal: Optimal Wound Healing  Outcome: Ongoing, Progressing     Problem: Skin Injury Risk Increased  Goal: Skin Health and Integrity  Outcome: Ongoing, Progressing     Problem: Dysrhythmia  Goal: Normalized Cardiac Rhythm  Outcome: Ongoing, Progressing     Problem: Malnutrition  Goal: Improved Nutritional Intake  Outcome: Ongoing, Progressing     Problem: Infection  Goal: Absence of Infection Signs and Symptoms  Outcome: Ongoing, Progressing

## 2023-03-19 NOTE — PROGRESS NOTES
Consult Note  Infectious Disease    Reason for Consult:  Bacteremia and UTI due to Pseudomonas    HPI: Hiro Rodríguez is a 78 y.o. male with past medical history of  CHF, HTN, D LP, AFib, pulm htn 60mmhg, DM x2, neuropathy, obesity BMI 32.6, chronic lower back pain, deconditioning, protein malnutrition, GERD, BPH, frequent UTIs, most recent from due to E coli, s/p moderate left hydroureteronephrosis status post cystoscopy with placement of left ureteral stent,     Patient was here at [facility] on 03/01/2023--03/08/2023, treated for AFib with RVR and E coli sepsis of urinary origin.  Initially he was treated with cefepime x2 d then [id number],  Then Bactrim x 2 days.  Dr. Palomino with Urology performed on 03/02/2023 removal of left ureteral stone and left stent placement.    Patient was referred to ED because of low blood pressure.  He was found to be septic and bacteremia and UTI due to Pseudomonas.  ID consult was called.    I came and saw patient.  He has no fever no chills.  He feels comfortable at this time.  He did have some discomfort on the right flank.  His receiving cefepime and tolerating it well.  03/19/2023.  No new complaints.  Afebrile.  WBC 13--11.7--9--7.  Urine culture grew Pseudomonas pansensitive.  Blood cultures of 316 again had shown Pseudomonas, susceptibility is pending.  Hospitalist has already discontinued cefepime and started levofloxacin.  Patient is happy that he worked with PT. I extensively discussed with him the need for Urology follow-up but he insists that can not go to urologist because he is afraid of falls.  Will discuss with  tomorrow what can be done to help patient with transportation.      Antibiotics (From admission, onward)      Start     Stop Route Frequency Ordered    03/19/23 1830  levoFLOXacin 500 mg/100 mL IVPB 500 mg        Note to Pharmacy: Estimated Creatinine Clearance: 147.3 mL/min (based on SCr of 0.5 mg/dL).    -- IV Every 24 hours (non-standard  times) 03/19/23 1728          Antifungals (From admission, onward)      None          Antivirals (From admission, onward)      None              Review of patient's allergies indicates:  No Known Allergies  Past Medical History:   Diagnosis Date    CHF (congestive heart failure)     Hypertension      Past Surgical History:   Procedure Laterality Date    CYSTOSCOPY W/ URETERAL STENT PLACEMENT N/A 3/2/2023    Procedure: CYSTOSCOPY, WITH URETERAL STENT INSERTION;  Surgeon: Yoshi Lange MD;  Location: [facility];  Service: Urology;  Laterality: N/A;    FRACTURE SURGERY      INTRAMEDULLARY RODDING OF TROCHANTER OF FEMUR Left 11/10/2022    Procedure: INSERTION, ITRAMEDULLARY SANTI, FEMUR, TROCHANTER;  Surgeon: Richard Phoenix MD;  Location: [facility] OR;  Service: Orthopedics;  Laterality: Left;    REMOVAL, CALCULUS, BLADDER  3/2/2023    Procedure: REMOVAL, CALCULUS, BLADDER;  Surgeon: Yoshi Lange MD;  Location: [facility];  Service: Urology;;       Review of Systems:     No chills, fever, sweats, weight loss  No change in vision, loss of vision or diplopia  No sinus congestion, purulent nasal discharge, post nasal drip or facial pain  No pain in mouth or throat. No problems with teeth, gums.  No chest pain, palpitations, syncope  No cough, sputum production, shortness of breath, dyspnea on exertion, pleurisy, hemoptysis  No nausea, vomiting, diarrhea, constipation, blood in stool, or focal abd pain,  No dysphagia, odynophagia  No dysuria, hematuria, strangury, retention, incontinence, nocturia, prostatism,   No vaginal/penile discharge, genital ulcers, risk for STD  No swelling of joints, redness of joints, injuries, or new focal pain  No unusual headaches, dizziness, vertigo, numbness, paresthesias, neuropathy, falls  No anxiety, depression, substance abuse, sleep disturbance  Positive for diabetes,  No bleeding, lymphadenopathy, anemia, malignancy, unusual bruising  No new rashes, lesions, or wounds  No TB  exposure  No recent/prior steroids  Outdoor activities:  Travel:  No  Implants:   Antibiotic History:  As above    EXAM & DIAGNOSTICS REVIEWED:   Vitals:     Temp:  [97.3 °F (36.3 °C)-98 °F (36.7 °C)]   Temp: 98 °F (36.7 °C) (03/19/23 1448)  Pulse: 87 (03/19/23 1600)  Resp: 20 (03/19/23 1600)  BP: 109/61 (03/19/23 1600)  SpO2: 96 % (03/19/23 1600)    Intake/Output Summary (Last 24 hours) at 3/19/2023 1836  Last data filed at 3/19/2023 1701  Gross per 24 hour   Intake 580 ml   Output 750 ml   Net -170 ml       General:  In NAD. Alert and attentive, cooperative, comfortable  Eyes:  Anicteric,  EOMI  ENT:  No ulcers, exudates, thrush, nares patent missing teeth.  Neck:  supple, no masses or adenopathy appreciated  Lungs: Clear, no consolidation, rales, wheezes, rub  Heart:  RRR, no gallop/murmur/rub noted  Abd:  Soft, NT, ND, normal BS, no masses or organomegaly appreciated.  : Sotelo, urine is dark in the bag but getting lighter in the tube.  no flank tenderness  Musc:  Chronic arthritic changes appropriate for age.  Otherwise no swelling, erythema, synovitis, muscle wasting.   Skin:  As below.  No palmar or plantar lesions. No subungual petechiae  Neuro:             Alert, attentive, speech fluent, face symmetric, moves all extremities, no focal weakness.  Minimally ambulatory.  Psych: Calm, cooperative  Lymphatic:     No cervical, supraclavicular, axillary, or inguinal nodes  Extrem: No edema, erythema, phlebitis, cellulitis, warm and well perfused  VAD:       Isolation:    Wound:                Lines/Tubes/Drains:    General Labs reviewed:  Recent Labs   Lab 03/17/23 0446 03/18/23 0443 03/19/23 0446   WBC 11.70 9.07 7.00   HGB 12.2* 11.6* 11.3*   HCT 38.0* 36.6* 36.0*    233 211       Recent Labs   Lab 03/17/23 0446 03/18/23 0443 03/19/23 0446    141 140   K 3.6 4.8 3.8    105 105   CO2 25 29 27   BUN 21 17 18   CREATININE 0.6 0.5 0.5   CALCIUM 8.9 8.5* 8.3*   PROT 5.8* 5.7* 5.4*   BILITOT  0.3 0.5 0.3   ALKPHOS 57 55 53*   ALT 14 12 12   AST 14 13 14     Recent Labs   Lab 03/16/23 2000   CRP 0.86*     Micro:  Microbiology Results (last 7 days)       Procedure Component Value Units Date/Time    Blood culture [[id number]] Collected: 03/18/23 1325    Order Status: Completed Specimen: Blood from Antecubital, Right Updated: 03/19/23 1432     Blood Culture, Routine No Growth to date      No Growth to date    Urine culture [[id number]]  (Abnormal)  (Susceptibility) Collected: 03/17/23 0135    Order Status: Completed Specimen: Urine Updated: 03/19/23 0938     Urine Culture, Routine PSEUDOMONAS AERUGINOSA  >100,000 cfu/ml      Narrative:      Specimen Source->Urine    Blood culture [[id number]]  (Abnormal) Collected: 03/16/23 2345    Order Status: Completed Specimen: Blood from Peripheral, Antecubital, Right Updated: 03/19/23 0656     Blood Culture, Routine Gram stain aer bottle: Gram negative rods      Results called to and read back by:Alma Amaya RN-[id number];  03/18/2023      10:04 CJD      PRESUMPTIVE PSEUDOMONAS SPECIES  Identification and susceptibility pending      Narrative:      Please draw 15 minutes apart from different sites    Blood culture [[id number]] Collected: 03/16/23 2000    Order Status: Completed Specimen: Blood from Peripheral, Antecubital, Right Updated: 03/18/23 2232     Blood Culture, Routine No Growth to date      No Growth to date      No Growth to date    Narrative:      Please draw 15 minutes apart from different sites    Rapid Organism ID by PCR (from Blood culture) [[id number]]  (Abnormal) Collected: 03/16/23 2345    Order Status: Completed Updated: 03/18/23 1116     Enterococcus faecials Not Detected     Enterococcus faecium Not Detected     Listeria Monocytogenes Not Detected     Staphylococcus spp. Not Detected     Staphylococcus aureus Not Detected     Staphylococcus epidermidis Not Detected     Staphylococcus lugdunensis Not Detected     Streptococcus species Not Detected      Streptococcus agalactiae Not Detected     Streptococcus pneumoniae Not Detected     Streptococcus pyogenes Not Detected     Acinetobacter calcoaceticus/baumannii complex Not Detected     Bacteroides fragilis Not Detected     Enterobacerales Not Detected     Enterobacter cloacae complex Not Detected     Escherichia Not Detected     Klebsiella aerogenes Not Detected     Klebsiella oxytoca Not Detected     Klebsiella pneumoniae group Not Detected     Proteus Not Detected     Salmonella sp Not Detected     Serratia marcescens Not Detected     Haemophilus influenzae Not Detected     Neisseria meningtidis Not Detected     Pseudomonas aeruginosa Detected     Stenotrophomonas maltophilia Not Detected     Candida albicans Not Detected     Candida auris Not Detected     Candida glabrata Not Detected     Candida krusei Not Detected     Candida Parapsilosis Not Detected     Candida Tropicalis Not Detected     Cryptococcus neoformans/gattii Not Detected     CTX-M Gene Not Detected     IMP Gene Not Detected     KPC  Not Detected     mcr-1  Test not applicable     mec A/C Test not applicable     mec A/C and MREJ (MRSA) Test not applicable     NDM Not Detected     OXA-48-like Test not applicable     van A/B Test not applicable     VIM Not Detected    Narrative:      Please draw 15 minutes apart from different sites          Urine 03/17/2023 Pseudomonas aeruginosa    CULTURE, URINE   Cefepime 8 mcg/mL Sensitive   Ciprofloxacin <=1 mcg/mL Sensitive   Gentamicin <=4 mcg/mL Sensitive   Levofloxacin <=2 mcg/mL Sensitive   Meropenem <=1 mcg/mL Sensitive   Piperacillin/Tazo <=16 mcg/mL Sensitive   Tobramycin <=4 mcg/mL Sensitive     03/01/2023 blood cultures Escherichia coli    CULTURE, BLOOD   Amp/Sulbactam >16/8 mcg/mL Resistant   Ampicillin >16 mcg/mL Resistant   Cefazolin <=2 mcg/mL Sensitive   Cefepime <=2 mcg/mL Sensitive   Ceftriaxone <=1 mcg/mL Sensitive   Ciprofloxacin >2 mcg/mL Resistant   Ertapenem <=0.5 mcg/mL Sensitive    Gentamicin <=4 mcg/mL Sensitive   Levofloxacin >4 mcg/mL Resistant   Meropenem <=1 mcg/mL Sensitive   Piperacillin/Tazo <=16 mcg/mL Sensitive   Tetracycline <=4 mcg/mL Sensitive   Tobramycin <=4 mcg/mL Sensitive   Trimeth/Sulfa <=2/38 mcg/mL Sensitive       Imaging Reviewed:   CXR   CT  1. Bilateral nephrocalcinosis.   2. A ureteral stent has been put into place on the left since the previous examination. No hydronephrosis is seen.   3. Diverticulosis without evidence of diverticulitis.   4. Bilateral pleural effusions.     Cardiology:    IMPRESSION & PLAN        Pseudomonas bacteremia due to UTI, 03/18/2022.  Final susceptibilities of Pseudomonas are pending, it will probably be the same as the urine but will see.    UTI due to Pseudomonas 03/18/2022.    Right flank pain, markedly improved.     Recent UTI due to E coli 03/01/2023.    Recent left ureteral stone removed and left ureteral stent.  03/02/2023  Now he has a Sotelo catheter.    Constipation     PMHx CHF, HTN, DLP, AFib, pulm htn 60mmhg, DM x2, neuropathy, obesity BMI 32.6, chronic lower back pain, deconditioning, hearing loss, protein malnutrition, GERD, BPH, frequent UTIs,     Social issue.  Patient has a hard time going to urologist office because of transportation. He has a hard time moving from his wheelchair to the car and vice versa.  His wife had hip surgery and is not able to assist him much      Recommendations:  -- Patient has received 4 days of IV antibiotics.  Two of those days offered anti Pseudomonas coverage.  Complete 10 days of treatment.  When ready to discharge may change levofloxacin to p.o..    --Urology evaluation is greatly appreciated.  Dr. Luna is  recommending Flomax 0.8 mg p.o. q.h.s., discontinue Detrol place Sotelo catheter and follow-up with Dr. Lange.    -- continue treatment of constipation.    -- out of bed to chair.  PT OT.  Patient is happy he is moving up and about with PT today.      -- please discuss with  / in a.m. what health can we/insurance offer to patient for transportation        Thank you   Will sign off  Medical Decision Making during this encounter was  [_] Low Complexity  [_] Moderate Complexity  [  xx] High Complexity

## 2023-03-19 NOTE — PT/OT/SLP EVAL
"Physical Therapy Evaluation    Patient Name:  Hiro Rodríguez   MRN:  [id number]    Recommendations:     Discharge Recommendations: nursing facility, skilled   Discharge Equipment Recommendations: none   Barriers to discharge:  increased asssit needed    Assessment:     Hiro Rodríguez is a 78 y.o. male admitted with a medical diagnosis of Bacteremia.  He presents with the following impairments/functional limitations: weakness, impaired endurance, impaired self care skills, impaired functional mobility, gait instability, impaired balance, impaired cognition, decreased upper extremity function, decreased lower extremity function, impaired skin, impaired cardiopulmonary response to activity.    Pt found in room lying supine with HOB slightly elevated, no family present to assist with history, mainly taken from chart.  Pt is Alert and oriented to self, he is agreeable to PT but states does not want to leave his room.  Pt requires Gilson and use of RW with mobility for safety. He would benefit from SNF prior to DC home in order to maximize functional status and decrease fall risk for safe DC home.  Continue to progress as able.    Rehab Prognosis: Fair; patient would benefit from acute skilled PT services to address these deficits and reach maximum level of function.    Recent Surgery: * No surgery found *      Plan:     During this hospitalization, patient to be seen 5 x/week to address the identified rehab impairments via gait training, therapeutic activities, therapeutic exercises and progress toward the following goals:    Plan of Care Expires:       Subjective     Chief Complaint: "I need to get stronger so I can get up from the floor"  Patient/Family Comments/goals: pt requesting theraband for UE strengthening.  Pain/Comfort:  Pain Rating 1: 0/10    Patients cultural, spiritual, [employer] conflicts given the current situation:      Living Environment:  Pt lives with wife in  home with no steps to enter, she " is able to give very limited assist.  Prior to admission, patients level of function was modified independent with RW, however frequent falls.  Equipment used at home: bedside commode, hospital bed, walker, rolling, wheelchair.  DME owned (not currently used): none.  Upon discharge, patient will have assistance from wife.    Objective:     Communicated with RNEvita prior to session.  Patient found HOB elevated with telemetry, messina catheter, peripheral IV  upon PT entry to room.    General Precautions: Standard, fall  Orthopedic Precautions:    Braces:    Respiratory Status: Room air    Exams:  Cognitive Exam:  Patient is oriented to Person  RLE ROM: WFL  RLE Strength: 4-/5  LLE ROM: WFL  LLE Strength: 4-/5    Functional Mobility:  Bed Mobility:     Rolling Left:  minimum assistance  Rolling Right: minimum assistance  Supine to Sit: minimum assistance  Transfers:     Sit to Stand:  minimum assistance with rolling walker  Gait: x20' w/RW and Gilson, Vcs for improved foot clearance and heel strike.      AM-PAC 6 CLICK MOBILITY  Total Score:17       Treatment & Education:  Pt was educated on the following: call light use, importance of OOB activity and functional mobility to negate the negative effects of prolonged bed rest during this hospitalization, safe transfers/ambulation and discharge planning recommendations/options.     Patient left up in chair with all lines intact, call button in reach, chair alarm on, and RN notified.    GOALS:   Multidisciplinary Problems       Physical Therapy Goals          Problem: Physical Therapy    Goal Priority Disciplines Outcome Goal Variances Interventions   Physical Therapy Goal     PT, PT/OT Ongoing, Progressing     Description: All PT goals to be met by discharge:    1. Supine to sit with Stand-by Assistance  2. Sit to stand transfer with Stand-by Assistance  3.. Bed to chair transfer with Supervision using Rolling Walker  4. Gait  x 100 feet with Minimal Assistance using  Rolling Walker.                          History:     Past Medical History:   Diagnosis Date    CHF (congestive heart failure)     Hypertension        Past Surgical History:   Procedure Laterality Date    CYSTOSCOPY W/ URETERAL STENT PLACEMENT N/A 3/2/2023    Procedure: CYSTOSCOPY, WITH URETERAL STENT INSERTION;  Surgeon: Yoshi Lange MD;  Location: [facility];  Service: Urology;  Laterality: N/A;    FRACTURE SURGERY      INTRAMEDULLARY RODDING OF TROCHANTER OF FEMUR Left 11/10/2022    Procedure: INSERTION, ITRAMEDULLARY SANTI, FEMUR, TROCHANTER;  Surgeon: Richard Phoenix MD;  Location: [facility];  Service: Orthopedics;  Laterality: Left;    REMOVAL, CALCULUS, BLADDER  3/2/2023    Procedure: REMOVAL, CALCULUS, BLADDER;  Surgeon: Yoshi Lange MD;  Location: [facility];  Service: Urology;;       Time Tracking:     PT Received On: 03/19/23  PT Start Time: 1120     PT Stop Time: 1140  PT Total Time (min): 20 min     Billable Minutes: Evaluation 8 and Gait Training 12      03/19/2023

## 2023-03-20 ENCOUNTER — TELEPHONE (OUTPATIENT)
Dept: UROLOGY | Facility: CLINIC | Age: 78
End: 2023-03-20
Payer: MEDICARE

## 2023-03-20 LAB
ALBUMIN SERPL BCP-MCNC: 2.6 G/DL (ref 3.5–5.2)
ALP SERPL-CCNC: 55 U/L (ref 55–135)
ALT SERPL W/O P-5'-P-CCNC: 13 U/L (ref 10–44)
ANION GAP SERPL CALC-SCNC: 7 MMOL/L (ref 8–16)
AST SERPL-CCNC: 13 U/L (ref 10–40)
BACTERIA BLD CULT: ABNORMAL
BASOPHILS # BLD AUTO: 0.02 K/UL (ref 0–0.2)
BASOPHILS NFR BLD: 0.3 % (ref 0–1.9)
BILIRUB SERPL-MCNC: 0.2 MG/DL (ref 0.1–1)
BUN SERPL-MCNC: 17 MG/DL (ref 8–23)
CALCIUM SERPL-MCNC: 8.1 MG/DL (ref 8.7–10.5)
CHLORIDE SERPL-SCNC: 106 MMOL/L (ref 95–110)
CO2 SERPL-SCNC: 27 MMOL/L (ref 23–29)
CREAT SERPL-MCNC: 0.4 MG/DL (ref 0.5–1.4)
DIFFERENTIAL METHOD: ABNORMAL
EOSINOPHIL # BLD AUTO: 0.2 K/UL (ref 0–0.5)
EOSINOPHIL NFR BLD: 1.9 % (ref 0–8)
ERYTHROCYTE [DISTWIDTH] IN BLOOD BY AUTOMATED COUNT: 16.3 % (ref 11.5–14.5)
EST. GFR  (NO RACE VARIABLE): >60 ML/MIN/1.73 M^2
GLUCOSE SERPL-MCNC: 119 MG/DL (ref 70–110)
GLUCOSE SERPL-MCNC: 121 MG/DL (ref 70–110)
GLUCOSE SERPL-MCNC: 126 MG/DL (ref 70–110)
GLUCOSE SERPL-MCNC: 141 MG/DL (ref 70–110)
GLUCOSE SERPL-MCNC: 151 MG/DL (ref 70–110)
HCT VFR BLD AUTO: 36 % (ref 40–54)
HGB BLD-MCNC: 11.1 G/DL (ref 14–18)
IMM GRANULOCYTES # BLD AUTO: 0.03 K/UL (ref 0–0.04)
IMM GRANULOCYTES NFR BLD AUTO: 0.4 % (ref 0–0.5)
LYMPHOCYTES # BLD AUTO: 1.5 K/UL (ref 1–4.8)
LYMPHOCYTES NFR BLD: 19.7 % (ref 18–48)
MAGNESIUM SERPL-MCNC: 1.8 MG/DL (ref 1.6–2.6)
MCH RBC QN AUTO: 29.2 PG (ref 27–31)
MCHC RBC AUTO-ENTMCNC: 30.8 G/DL (ref 32–36)
MCV RBC AUTO: 95 FL (ref 82–98)
MONOCYTES # BLD AUTO: 0.7 K/UL (ref 0.3–1)
MONOCYTES NFR BLD: 9.3 % (ref 4–15)
NEUTROPHILS # BLD AUTO: 5.3 K/UL (ref 1.8–7.7)
NEUTROPHILS NFR BLD: 68.4 % (ref 38–73)
NRBC BLD-RTO: 0 /100 WBC
PHOSPHATE SERPL-MCNC: 3.8 MG/DL (ref 2.7–4.5)
PLATELET # BLD AUTO: 219 K/UL (ref 150–450)
PMV BLD AUTO: 9.9 FL (ref 9.2–12.9)
POTASSIUM SERPL-SCNC: 3.9 MMOL/L (ref 3.5–5.1)
PROT SERPL-MCNC: 5.6 G/DL (ref 6–8.4)
RBC # BLD AUTO: 3.8 M/UL (ref 4.6–6.2)
SODIUM SERPL-SCNC: 140 MMOL/L (ref 136–145)
WBC # BLD AUTO: 7.71 K/UL (ref 3.9–12.7)

## 2023-03-20 PROCEDURE — 80053 COMPREHEN METABOLIC PANEL: CPT | Performed by: NURSE PRACTITIONER

## 2023-03-20 PROCEDURE — 97110 THERAPEUTIC EXERCISES: CPT

## 2023-03-20 PROCEDURE — 25000003 PHARM REV CODE 250

## 2023-03-20 PROCEDURE — 99233 SBSQ HOSP IP/OBS HIGH 50: CPT | Mod: ,,, | Performed by: GENERAL PRACTICE

## 2023-03-20 PROCEDURE — 25000003 PHARM REV CODE 250: Performed by: INTERNAL MEDICINE

## 2023-03-20 PROCEDURE — 12000002 HC ACUTE/MED SURGE SEMI-PRIVATE ROOM

## 2023-03-20 PROCEDURE — 36415 COLL VENOUS BLD VENIPUNCTURE: CPT | Performed by: NURSE PRACTITIONER

## 2023-03-20 PROCEDURE — 25000003 PHARM REV CODE 250: Performed by: NURSE PRACTITIONER

## 2023-03-20 PROCEDURE — 85025 COMPLETE CBC W/AUTO DIFF WBC: CPT | Performed by: NURSE PRACTITIONER

## 2023-03-20 PROCEDURE — 25000003 PHARM REV CODE 250: Performed by: SPECIALIST

## 2023-03-20 PROCEDURE — 63600175 PHARM REV CODE 636 W HCPCS: Performed by: INTERNAL MEDICINE

## 2023-03-20 PROCEDURE — 99233 PR SUBSEQUENT HOSPITAL CARE,LEVL III: ICD-10-PCS | Mod: ,,, | Performed by: GENERAL PRACTICE

## 2023-03-20 PROCEDURE — 83735 ASSAY OF MAGNESIUM: CPT | Performed by: NURSE PRACTITIONER

## 2023-03-20 PROCEDURE — 84100 ASSAY OF PHOSPHORUS: CPT | Performed by: NURSE PRACTITIONER

## 2023-03-20 RX ORDER — FINASTERIDE 5 MG/1
5 TABLET, FILM COATED ORAL DAILY
Status: DISCONTINUED | OUTPATIENT
Start: 2023-03-20 | End: 2023-03-24 | Stop reason: HOSPADM

## 2023-03-20 RX ORDER — TAMSULOSIN HYDROCHLORIDE 0.4 MG/1
0.4 CAPSULE ORAL NIGHTLY
Status: DISCONTINUED | OUTPATIENT
Start: 2023-03-21 | End: 2023-03-24 | Stop reason: HOSPADM

## 2023-03-20 RX ADMIN — FOLIC ACID 1 MG: 1 TABLET ORAL at 10:03

## 2023-03-20 RX ADMIN — METOPROLOL TARTRATE 25 MG: 25 TABLET, FILM COATED ORAL at 09:03

## 2023-03-20 RX ADMIN — DULOXETINE 30 MG: 30 CAPSULE, DELAYED RELEASE ORAL at 10:03

## 2023-03-20 RX ADMIN — LEVOFLOXACIN 500 MG: 500 INJECTION, SOLUTION INTRAVENOUS at 05:03

## 2023-03-20 RX ADMIN — TOPIRAMATE 50 MG: 25 TABLET, FILM COATED ORAL at 10:03

## 2023-03-20 RX ADMIN — PRAVASTATIN SODIUM 80 MG: 40 TABLET ORAL at 09:03

## 2023-03-20 RX ADMIN — PANTOPRAZOLE SODIUM 40 MG: 40 TABLET, DELAYED RELEASE ORAL at 05:03

## 2023-03-20 RX ADMIN — METOPROLOL TARTRATE 25 MG: 25 TABLET, FILM COATED ORAL at 02:03

## 2023-03-20 RX ADMIN — DULOXETINE 30 MG: 30 CAPSULE, DELAYED RELEASE ORAL at 09:03

## 2023-03-20 RX ADMIN — MAGNESIUM OXIDE 400 MG (241.3 MG MAGNESIUM) TABLET 200 MG: at 10:03

## 2023-03-20 RX ADMIN — Medication 6 MG: at 02:03

## 2023-03-20 RX ADMIN — FINASTERIDE 5 MG: 5 TABLET, FILM COATED ORAL at 04:03

## 2023-03-20 RX ADMIN — SENNOSIDES AND DOCUSATE SODIUM 2 TABLET: 50; 8.6 TABLET ORAL at 10:03

## 2023-03-20 RX ADMIN — Medication 2000 UNITS: at 10:03

## 2023-03-20 RX ADMIN — METOPROLOL TARTRATE 25 MG: 25 TABLET, FILM COATED ORAL at 10:03

## 2023-03-20 RX ADMIN — Medication 6 MG: at 09:03

## 2023-03-20 RX ADMIN — CASTOR OIL AND BALSAM, PERU: 788; 87 OINTMENT TOPICAL at 09:03

## 2023-03-20 RX ADMIN — TAMSULOSIN HYDROCHLORIDE 0.4 MG: 0.4 CAPSULE ORAL at 10:03

## 2023-03-20 RX ADMIN — APIXABAN 2.5 MG: 2.5 TABLET, FILM COATED ORAL at 10:03

## 2023-03-20 NOTE — PLAN OF CARE
Noted recommendation for SNF from therapy. Met with patient at bedside to discuss disposition. Patient is in agreement with SNF but requests we speak with his son Bubba Rodríguez [phone] who works for a practice in [place], [place] and wants to try and get patient to [facility] for SNF services, to be close to his family there. Bubba provided information for [facility], [facility] (admissions/intake) office [phone], fax# [phone]. Left voicemail message requesting call back from [facility] to discuss SNF there. Spoke with Bubba about transportation of patient to [place], [place] and he states the plan is he can fly in to [place], rent a vehicle and drive patient to [place], but can't get to [place] until Thurday 3/23/2023. Will continue to follow and update as appropriate.              03/20/23 1604   Post-Acute Status   Post-Acute Authorization Placement   Post-Acute Placement Status Discharge Plan Changed   Coverage [facility] managed medicare   Discharge Delays None known at this time   Discharge Plan   Discharge Plan A Skilled Nursing Facility   Discharge Plan B Skilled Nursing Facility

## 2023-03-20 NOTE — PLAN OF CARE
Noted recommendation for SNF from therapy. Met with patient at bedside to discuss disposition. Patient is in agreement with SNF but requests we speak with his son Bubba Rodríguez [phone] who works for a practice in [place], [place] and wants to try and get patient to [facility] for SNF services, to be close to his family there. Bubba provided information for [facility], [facility] (admissions/intake) office [phone], fax# [phone]. Left voicemail message requesting call back from [facility] to discuss SNF there. Spoke with Bubba about transportation of patient to [place], [place] and he states the plan is he can fly in to [place], rent a vehicle and drive patient to [place], but can't get to [place] until Thurday 3/23/2023. Will continue to follow and update as appropriate.

## 2023-03-20 NOTE — PROGRESS NOTES
[facility] Medicine  Progress Note    Patient Name: Hiro Rodríguez  MRN: [id number]  Patient Class: IP- Inpatient   Admission Date: 3/16/2023  Length of Stay: 4 days  Attending Physician: Vipin Shepherd MD  Primary Care Provider: Gautam Castanon III, MD        Subjective:     Principal Problem:Bacteremia        HPI:  Hiro Rodríguez is a 78 y.o. male with a history as  has a past medical history of CHF (congestive heart failure) and Hypertension. who presented to the ED with a Hypotension (At clinic today and found to be hypotensive and is currently being treated for UTI)    Patient presents to the emergency room from PCPs office.  Patient tells me he was at his normal PCP checkup.  Reports they were attempting to get his vital signs and was unable to obtain a blood pressure however noted elevated heart rate.  Patient reported at that time he was feeling faint and weak as if he was going to pass out.  So he was brought here to the emergency room.      Patient further reports over last 3 months he has been in and out of the hospital for various different reasons including kidney stones, orthostatic hypotension, blood infection.  He reports his health continues to decline over these past 3 months.     Denies fever, chills, diaphoresis, SOB, dizziness, HA, chest pain, palpitations, NVD, recent trauma or any other associated symptoms.     Lab and imaging obtained and reviewed. CBC shows WBCs 13.15 MCHC 31.9 RDW 16.0 g% 79.2 l% 13.8. CMP shows glucose 154 alb 3.4. . EKG shows a-fib rate controlled on amiodarone gtt. Lactate 2.3.  On admit, afebrile HR 90 RR 18 /95 Sats 96% on RA. CXR shows cardiomegaly and findings suggestive of mild pulmonary vascular congestion/trace edema.       Per ED provider, patient on presentation noted to be in AFib RVR, amiodarone drip initiated.  Also noted to have elevated white count with low blood pressure, sepsis protocol initiated.  Given patient  recent admission with urosepsis E.Coli sensitive to rocephin, IV ABX initiated.  Will admit to ICU for management of AFib RVR in sepsis with possible urosepsis.                     Overview/Hospital Course:  Assumed care of this patient on 3/17/23.  Patient with a known history of paroxysmal atrial fibrillation, diabetes, pulmonary hypertension, history of CHF and hypertension, recent [facility] admission 3/1 to 3/8, initially referred due to AFib with RVR with hypotension from cardiology office, found to have E coli in urine and blood culture, moderate left hydroureteronephrosis status post cystoscopy with placement of left ureteral stent, he was discharged to complete 2 days of Bactrim to complete course of antibiotics with outpatient follow-up with Dr. Lange, inpatient admission complicated by orthostatic hypotension treated with IV fluids and compression.  He states at home blood pressure was dropping intermittently on standing, he was not using compression stockings.  He was seen by PCP office yesterday, blood pressure 60/40, repeat 80/40, referred to the ED. in the ED mild leukocytosis of 13, UA grossly positive, Sotelo was placed, AFib on EKG, intermittent RVR, admitted to the ICU with cardiology consultation.  On 03/17 on lying down blood pressure trend is improved, lisinopril discontinued, DC amiodarone drip and increase oral amiodarone, monitoring blood pressure and heart rate, discontinue Sotelo catheter, repeat CT renal protocol given report of sacral pain, await Cardiology recommendations.  Patient was given 180 mg of diltiazem by Cardiology and blood pressure dropped with systolic in the 70s, discontinued, 250 bolus, midodrine 5 mg, cardiology subsequently increase Lopressor to 25 mg t.i.d..  On 03/18, preliminary blood and urine culture with Pseudomonas, repeat CT renal protocol with bilateral nephrocalcinosis, ureteral stent in place, no acute abnormalities.  Infectious Disease  consulted.    3/19  Assumed care   Still hematuria.sitting in the chair . Hb stable . Renal function stable . UC and BC with pseudomonas . Repeat BC so far neg    3/20  Hematuria is resolving.  Appear infection is controlled.  May need urology reviewed again.  Repeat blood culture so far negative  Hemoglobin stable      Interval History:     Review of Systems  Objective:     Vital Signs (Most Recent):  Temp: 97 °F (36.1 °C) (03/20/23 1143)  Pulse: 78 (03/20/23 1143)  Resp: 17 (03/20/23 0725)  BP: 131/68 (03/20/23 1143)  SpO2: 97 % (03/20/23 1143) Vital Signs (24h Range):  Temp:  [97 °F (36.1 °C)-97.9 °F (36.6 °C)] 97 °F (36.1 °C)  Pulse:  [68-87] 78  Resp:  [17-20] 17  SpO2:  [96 %-98 %] 97 %  BP: (107-131)/(61-72) 131/68     Weight: 105.7 kg (233 lb 0.4 oz)  Body mass index is 33.44 kg/m².    Intake/Output Summary (Last 24 hours) at 3/20/2023 1502  Last data filed at 3/20/2023 1034  Gross per 24 hour   Intake --   Output 1400 ml   Net -1400 ml      Physical Exam  Constitutional:       General: He is not in acute distress.     Appearance: He is well-developed.   HENT:      Head: Normocephalic.   Eyes:      Pupils: Pupils are equal, round, and reactive to light.   Cardiovascular:      Rate and Rhythm: Normal rate and regular rhythm.   Pulmonary:      Effort: Pulmonary effort is normal. No respiratory distress.   Abdominal:      General: There is no distension.      Tenderness: There is no abdominal tenderness.   Musculoskeletal:      Cervical back: Neck supple.   Skin:     General: Skin is warm.      Findings: No rash.   Neurological:      Mental Status: He is alert and oriented to person, place, and time.   Psychiatric:         Mood and Affect: Mood normal.       Significant Labs: All pertinent labs within the past 24 hours have been reviewed.  CMP:   Recent Labs   Lab 03/19/23  0446 03/20/23  0412    140   K 3.8 3.9    106   CO2 27 27   * 126*   BUN 18 17   CREATININE 0.5 0.4*   CALCIUM 8.3* 8.1*    PROT 5.4* 5.6*   ALBUMIN 2.6* 2.6*   BILITOT 0.3 0.2   ALKPHOS 53* 55   AST 14 13   ALT 12 13   ANIONGAP 8 7*     Cardiac Markers: No results for input(s): CKMB, MYOGLOBIN, BNP, TROPISTAT in the last 48 hours.    Significant Imaging: I have reviewed all pertinent imaging results/findings within the past 24 hours.      Assessment/Plan:      Active Hospital Problems    Diagnosis    *Bacteremia-Pseudomonas    Complicated UTI (urinary tract infection)    Nephrocalcinosis    Hypomagnesemia    Urinary incontinence    Hypotension    Orthostatic hypotension    Pulmonary hypertension, PASP 61    GERD (gastroesophageal reflux disease)    Obesity (BMI 30.0-34.9)    Pressure injury of buttock, stage 1    Uses walker    Hearing loss    Chronic heart failure with preserved ejection fraction    Type 2 diabetes mellitus with diabetic polyneuropathy, without long-term current use of insulin, HbA1c 5.8%    Benign essential tremor    Hyperlipidemia    Paroxysmal atrial fibrillation with RVR    Anticoagulated    Morbid obesity       Plan:  diltiazem discontinued because of hypotension and decreased flomax dose and HR controlled   At this point totally discontinue the Eliquis at this time for unsettling hematuria  home amiodarone discontinued by Cardiology, now on Lopressor 25 mg t.i.d.,  repeat blood culture so far neg. UC/BC  sensitive to levaquin   Ambulate   DC when hematuria better and Hb stable  Downgrade  Urologist dr Chandler Do not recommend changing a stent at this time  Re consult dr Valencia        VTE Risk Mitigation (From admission, onward)           Ordered     Place KAZ hose  Until discontinued         03/16/23 1916     Reason for No Pharmacological VTE Prophylaxis  Once        Question:  Reasons:  Answer:  Already adequately anticoagulated on oral Anticoagulants    03/16/23 1916     IP VTE HIGH RISK PATIENT  Once         03/16/23 1915     Place sequential compression device  Until discontinued         03/16/23 1915                     Discharge Planning   BRENNAN: 3/22/2023     Code Status: Full Code   Is the patient medically ready for discharge?:     Reason for patient still in hospital (select all that apply): Treatment  Discharge Plan A: Home Health                  Vipin Shepherd MD  Department of Hospital Medicine   formerly [facility]

## 2023-03-20 NOTE — CONSULTS
Patient is known to me  Recently had a ureteral stone removed  But had a stent placed for multiple mid ureteral stones as well    Patient was tentatively planning for lithotripsy  He is had a complex urinary tract infection, with urinary retention  Has a Sotelo catheter in place  And has been treated with antibiotics    Will need to be scheduled for lithotripsy when patient is available

## 2023-03-20 NOTE — PROGRESS NOTES
[facility]  Department of Cardiology  Consult Note      PATIENT NAME: Hiro Rodríguez  MRN: [id number]  TODAY'S DATE: 03/20/2023  ADMIT DATE: 3/16/2023                          CONSULT REQUESTED BY: Vipin Shepherd MD    SUBJECTIVE     PRINCIPAL PROBLEM: Bacteremia      REASON FOR CONSULT:    AFIB RVR    INTERVAL HISTORY:    3/20/23    Patient is resting quietly during examination. NAD. VSS. AF on telemetry. No events.  Patient is working with physical therapy.     3/19/23   Problem 1 he is being treated for UTI renal stone and now has hematuria with a Sotelo catheter in place and he is on Eliquis  2. Chronic persistent atrial fibrillation with rate control  3. Hypotensive episodes-he was on Flomax 0.8 mg which may be contributing to hypotension   Hematuria-consider reducing Eliquis 2.5 b.i.d. for awhile  2. Reduced Flomax 0.4 and if he can do without it.  It as this can cause hypotension and dizziness     3/18/23   1.  Hypotension especially on Flomax  2. Chronic persistent atrial fibrillation at least for 10 years-cardioversion did not work 10 years ago  This question as to whether he has been on p.o. amiodarone at home  Denies any chest pain or heart failure or revascularization procedures   Chronic persistent atrial fib  Continue metoprolol 25 t.i.d.  Hypotension exacerbated by large dose of Flomax-reduced Flomax 0.4 day and if he does not need it., stop it       HPI:    Patient is a 78-year-old male with past medical history of CHF, BPH, hypertension, paroxysmal atrial fibrillation on Eliquis, diabetes mellitus, obesity, ORIF of right hip, decubitus ulcer, urosepsis, renal calculi, who presented to the ED from PCPs office for hypotension and AFib RVR.  Patient presented to the ED on 03/01/2023 for the same reason and was discharged on 03/08.  Patient denies chest pain, palpitations, shortness of breath, lightheadedness, dizziness, edema or bleeding.  Patient was started on amiodarone drip and  patient was placed on sepsis protocol with initiation of IV antibiotics.    In ED:  WBC 13.15, H&H 14.4/45.1, K 3.8, creatinine 0.9, magnesium 1.7, , lactate 2.3, troponin HS 9.3, repeat 15.3.  UA positive for blood, nitrites, leukocytes, RBC, WBC.  Chest x-ray shows cardiomegaly with findings suggestive of mild pulmonary vascular congestion/trace edema.  EKG atrial fibrillation with incomplete right bundle-branch block, ST-T wave abnormality, consider inferior lateral ischemia.    On 03/01/2023 patient was seen by me in the cardiology office as a hospital follow-up and was found to be in AFib RVR with hypotension as well.  I sent him to the ED where he was found to have a UTI with moderate left hydroureter nephrosis secondary to at least 2 calculi in the midportion of the last ureter.  Patient had a cystoscopy with a left your ureteral stent placement and removal of bladder stone.  Patient was sent home on antibiotics.     Patient was also recently discharged on 01/26/2023 where he was diagnosed with urosepsis, and altered mental status.  Patient was also found to be hypotensive with tachycardia then as well.  Patient was discharged to a SNF.    During examination, patient was alert and oriented, no acute distress, rate controlled atrial fibrillation on telemetry with systolic BP in 90s to 100s.  Patient is on an amiodarone drip.    ECHO 11/11/22  The left ventricle is normal in size with moderate concentric hypertrophy and normal systolic function.  The estimated ejection fraction is 70%.  Normal left ventricular diastolic function.  The sinuses of Valsalva is mildly dilated.  The ascending aorta is dilated. Recommend CT scan to evaluate entire aorta for further evaluation.  Mild tricuspid regurgitation.  Normal central venous pressure (3 mmHg).  The estimated PA systolic pressure is 61 mmHg.  There is pulmonary hypertension.  Unable to visualize right ventricle and right atrium but they both grossly appear  to be dilated with RV function mildly reduced.    FROM H&P:    HPI: Hiro Rodríguez is a 78 y.o. male with a history as  has a past medical history of CHF (congestive heart failure) and Hypertension. who presented to the ED with a Hypotension (At clinic today and found to be hypotensive and is currently being treated for UTI)     Patient presents to the emergency room from PCPs office.  Patient tells me he was at his normal PCP checkup.  Reports they were attempting to get his vital signs and was unable to obtain a blood pressure however noted elevated heart rate.  Patient reported at that time he was feeling faint and weak as if he was going to pass out.  So he was brought here to the emergency room.       Patient further reports over last 3 months he has been in and out of the hospital for various different reasons including kidney stones, orthostatic hypotension, blood infection.  He reports his health continues to decline over these past 3 months.      Denies fever, chills, diaphoresis, SOB, dizziness, HA, chest pain, palpitations, NVD, recent trauma or any other associated symptoms.      Lab and imaging obtained and reviewed. CBC shows WBCs 13.15 MCHC 31.9 RDW 16.0 g% 79.2 l% 13.8. CMP shows glucose 154 alb 3.4. . EKG shows a-fib rate controlled on amiodarone gtt. Lactate 2.3.  On admit, afebrile HR 90 RR 18 /95 Sats 96% on RA. CXR shows cardiomegaly and findings suggestive of mild pulmonary vascular congestion/trace edema.        Per ED provider, patient on presentation noted to be in AFib RVR, amiodarone drip initiated.  Also noted to have elevated white count with low blood pressure, sepsis protocol initiated.  Given patient recent admission with urosepsis E.Coli sensitive to rocephin, IV ABX initiated.  Will admit to ICU for management of AFib RVR in sepsis with possible urosepsis.            Review of patient's allergies indicates:  No Known Allergies    Past Medical History:   Diagnosis  Date    CHF (congestive heart failure)     Hypertension      Past Surgical History:   Procedure Laterality Date    CYSTOSCOPY W/ URETERAL STENT PLACEMENT N/A 3/2/2023    Procedure: CYSTOSCOPY, WITH URETERAL STENT INSERTION;  Surgeon: Yoshi Lange MD;  Location: [facility];  Service: Urology;  Laterality: N/A;    FRACTURE SURGERY      INTRAMEDULLARY RODDING OF TROCHANTER OF FEMUR Left 11/10/2022    Procedure: INSERTION, ITRAMEDULLARY SANTI, FEMUR, TROCHANTER;  Surgeon: Richard Phoenix MD;  Location: [facility] OR;  Service: Orthopedics;  Laterality: Left;    REMOVAL, CALCULUS, BLADDER  3/2/2023    Procedure: REMOVAL, CALCULUS, BLADDER;  Surgeon: Yoshi Lange MD;  Location: [facility] OR;  Service: Urology;;     Social History     Tobacco Use    Smoking status: Never    Smokeless tobacco: Never   Substance Use Topics    Alcohol use: Yes     Alcohol/week: 1.0 standard drink     Types: 1 Shots of liquor per week    Drug use: Not Currently        Review of Systems     Constitutional:  Positive fatigue and generalized weakness Negative for chills, and fever.   Eyes: No double vision, No blurred vision  Neuro: No headaches, No dizziness  Respiratory: Negative for cough, shortness of breath and wheezing.    Cardiovascular: Negative for chest pain. Negative for palpitations and leg swelling.   Gastrointestinal: Negative for abdominal pain, No melena, diarrhea, nausea and vomiting.   Genitourinary: Negative for dysuria and frequency, Negative for hematuria  Skin: Negative for bruising, Negative for edema or discoloration noted.      OBJECTIVE     VITAL SIGNS (Most Recent)  Temp: 97.3 °F (36.3 °C) (03/20/23 0725)  Pulse: 86 (03/20/23 0725)  Resp: 17 (03/20/23 0725)  BP: 121/72 (03/20/23 0725)  SpO2: 97 % (03/20/23 0725)    VENTILATION STATUS  Resp: 17 (03/20/23 0725)  SpO2: 97 % (03/20/23 0725)       I & O (Last 24H):  Intake/Output Summary (Last 24 hours) at 3/20/2023 0930  Last data filed at 3/20/2023 0332  Gross per 24  hour   Intake 240 ml   Output 850 ml   Net -610 ml       WEIGHTS  Wt Readings from Last 3 Encounters:   03/20/23 0332 105.7 kg (233 lb 0.4 oz)   03/18/23 2259 104.2 kg (229 lb 11.5 oz)   03/17/23 0836 103.1 kg (227 lb 4.7 oz)   03/17/23 0500 103.1 kg (227 lb 4.7 oz)   03/16/23 2345 103.1 kg (227 lb 4.7 oz)   03/16/23 1517 104.3 kg (230 lb)   03/16/23 1333 101.8 kg (224 lb 6.9 oz)   03/01/23 2318 107.5 kg (236 lb 15.9 oz)   03/01/23 1437 113.6 kg (250 lb 7.1 oz)       PHYSICAL EXAM  GENERAL: obese chronically ill appearing male in no apparent distress alert and oriented.   HEENT: Normocephalic. Pupils normal and conjunctivae normal.    NECK: No JVD. No bruit..   THYROID: Thyroid not examined  CARDIAC: Irregular rate and rhythm. S1 is normal.S2 is normal.No gallops, clicks or murmurs noted at this time.  CHEST ANATOMY: normal.   LUNGS: Clear to auscultation.   ABDOMEN: Soft.  Nontender.  Normal bowel sounds.   URINARY: messina catheter- concentrated beatrice UOP   EXTREMITIES: 1+ nonpitting edema BLE. No cyanosis, clubbing noted at this time.  PERIPHERAL VASCULAR SYSTEM: Good palpable distal pulses.   CENTRAL NERVOUS SYSTEM: No focal motor or sensory deficits noted.   SKIN: Skin dry, intact, well perfused.   MUSCLE STRENGTH & TONE: No noteable weakness, atrophy or abnormal movement.     HOME MEDICATIONS:  No current facility-administered medications on file prior to encounter.     Current Outpatient Medications on File Prior to Encounter   Medication Sig Dispense Refill    amiodarone (PACERONE) 100 MG Tab Take 100 mg by mouth every morning.      apixaban (ELIQUIS) 5 mg Tab Take 1 tablet (5 mg total) by mouth 2 (two) times daily. 180 tablet 1    calcium polycarbophil (FIBER-CAPS, CA POLYCARBOPHIL, ORAL) Take 1 tablet by mouth once daily.      cholecalciferol, vitamin D3, (VITAMIN D3) 50 mcg (2,000 unit) Tab Take 1 tablet by mouth once daily.      cyanocobalamin, vitamin B-12, 1,000 mcg Subl Place 1,000 mcg under the tongue  2 (two) times a day.      docusate sodium (COLACE) 100 MG capsule Take 100 mg by mouth 2 (two) times daily.      DULoxetine (CYMBALTA) 30 MG capsule Take 1 capsule (30 mg total) by mouth 2 (two) times daily. 90 capsule 1    HYDROcodone-acetaminophen (NORCO)  mg per tablet Take 1 tablet by mouth 4 (four) times daily as needed.      metFORMIN (GLUCOPHAGE-XR) 500 MG ER 24hr tablet TAKE 1 TABLET BY MOUTH EVERY DAY (Patient taking differently: Take 500 mg by mouth 2 (two) times a day.) 90 tablet 1    metoprolol tartrate (LOPRESSOR) 25 MG tablet Take 1 tablet (25 mg total) by mouth 2 (two) times daily. 180 tablet 1    omega-3 acid ethyl esters (LOVAZA) 1 gram capsule TAKE 2 CAPSULES BY MOUTH 2 TIMES DAILY. (Patient taking differently: Take 2 g by mouth 2 (two) times daily.) 360 capsule 1    polyethylene glycol (GLYCOLAX) 17 gram/dose powder Take 17 g by mouth daily as needed.      potassium chloride SA (KLOR-CON) 10 MEQ TbSR Take 2 tablets (20 mEq total) by mouth once daily.      rosuvastatin (CRESTOR) 20 MG tablet TAKE 1 TABLET BY MOUTH EVERY DAY (Patient taking differently: Take 20 mg by mouth once daily. TAKE 1 TABLET BY MOUTH EVERY DAY) 90 tablet 1    tamsulosin (FLOMAX) 0.4 mg Cap Take 2 capsules (0.8 mg total) by mouth once daily. 60 capsule 11    topiramate (TOPAMAX) 50 MG tablet TAKE 1 TABLET BY MOUTH EVERY DAY (Patient taking differently: Take 50 mg by mouth once daily.) 90 tablet 1    traZODone (DESYREL) 50 MG tablet TAKE 1 TABLET BY MOUTH EVERY EVENING. (Patient taking differently: Take 50 mg by mouth every evening.) 90 tablet 1    semaglutide (OZEMPIC) 1 mg/dose (4 mg/3 mL) Inject 1 mg into the skin every 7 days. (Patient taking differently: Inject 1 mg into the skin every 7 days. FRIDAYS) 4 pen 5       SCHEDULED MEDS:   apixaban  2.5 mg Oral BID    balsam peru-castor oiL   Topical (Top) Daily    cholecalciferol (vitamin D3)  1 tablet Oral Daily    DULoxetine  30 mg Oral BID    folic acid  1 mg Oral  Daily    levoFLOXacin  500 mg Intravenous Q24H    magnesium oxide  200 mg Oral Daily    metoprolol tartrate  25 mg Oral TID    pantoprazole  40 mg Oral Before breakfast    pravastatin  80 mg Oral QHS    senna-docusate 8.6-50 mg  2 tablet Oral BID    tamsulosin  0.4 mg Oral Daily    topiramate  50 mg Oral Daily       CONTINUOUS INFUSIONS:    PRN MEDS:acetaminophen, dextrose 10%, dextrose 10%, glucagon (human recombinant), glucose, glucose, HYDROcodone-acetaminophen, insulin aspart U-100, magnesium oxide, magnesium oxide, melatonin, naloxone, potassium bicarbonate, potassium bicarbonate, potassium bicarbonate, potassium, sodium phosphates, potassium, sodium phosphates, potassium, sodium phosphates, sodium chloride 0.9%    LABS AND DIAGNOSTICS     CBC LAST 3 DAYS  Recent Labs   Lab 03/18/23 0443 03/19/23 0446 03/20/23 0412   WBC 9.07 7.00 7.71   RBC 3.89* 3.79* 3.80*   HGB 11.6* 11.3* 11.1*   HCT 36.6* 36.0* 36.0*   MCV 94 95 95   MCH 29.8 29.8 29.2   MCHC 31.7* 31.4* 30.8*   RDW 16.1* 16.3* 16.3*    211 219   MPV 9.9 9.7 9.9   GRAN 72.2  6.5 62.9  4.4 68.4  5.3   LYMPH 17.5*  1.6 24.4  1.7 19.7  1.5   MONO 8.8  0.8 9.9  0.7 9.3  0.7   BASO 0.02 0.03 0.02   NRBC 0 0 0       COAGULATION LAST 3 DAYS  Recent Labs   Lab 03/16/23  1533 03/17/23 0446   INR 1.1 1.1   APTT 26.1 28.6       CHEMISTRY LAST 3 DAYS  Recent Labs   Lab 03/18/23  0443 03/19/23  0446 03/20/23  0412    140 140   K 4.8 3.8 3.9    105 106   CO2 29 27 27   ANIONGAP 7* 8 7*   BUN 17 18 17   CREATININE 0.5 0.5 0.4*   * 131* 126*   CALCIUM 8.5* 8.3* 8.1*   MG 1.5* 1.6 1.8   ALBUMIN 2.7* 2.6* 2.6*   PROT 5.7* 5.4* 5.6*   ALKPHOS 55 53* 55   ALT 12 12 13   AST 13 14 13   BILITOT 0.5 0.3 0.2       CARDIAC PROFILE LAST 3 DAYS  Recent Labs   Lab 03/16/23  1533 03/16/23 2000 03/17/23  0126   *  --   --    TROPONINIHS 9.3 15.3* 11.2       ENDOCRINE LAST 3 DAYS  Recent Labs   Lab 03/16/23 2000 03/18/23  0443   TSH  --   2.550   PROCAL <0.05  --        LAST ARTERIAL BLOOD GAS  ABG  No results for input(s): PH, PO2, PCO2, HCO3, BE in the last 168 hours.    LAST 7 DAYS MICROBIOLOGY   Microbiology Results (last 7 days)       Procedure Component Value Units Date/Time    Blood culture [[id number]]  (Abnormal)  (Susceptibility) Collected: 03/16/23 2345    Order Status: Completed Specimen: Blood from Peripheral, Antecubital, Right Updated: 03/20/23 0728     Blood Culture, Routine Gram stain aer bottle: Gram negative rods      Results called to and read back by:Alma Amaya RN-[id number];  03/18/2023      10:04 CJD      PSEUDOMONAS AERUGINOSA    Narrative:      Please draw 15 minutes apart from different sites    Blood culture [[id number]] Collected: 03/16/23 2000    Order Status: Completed Specimen: Blood from Peripheral, Antecubital, Right Updated: 03/19/23 2232     Blood Culture, Routine No Growth to date      No Growth to date      No Growth to date      No Growth to date    Narrative:      Please draw 15 minutes apart from different sites    Blood culture [[id number]] Collected: 03/18/23 1325    Order Status: Completed Specimen: Blood from Antecubital, Right Updated: 03/19/23 1432     Blood Culture, Routine No Growth to date      No Growth to date    Urine culture [[id number]]  (Abnormal)  (Susceptibility) Collected: 03/17/23 0135    Order Status: Completed Specimen: Urine Updated: 03/19/23 0938     Urine Culture, Routine PSEUDOMONAS AERUGINOSA  >100,000 cfu/ml      Narrative:      Specimen Source->Urine    Rapid Organism ID by PCR (from Blood culture) [[id number]]  (Abnormal) Collected: 03/16/23 2345    Order Status: Completed Updated: 03/18/23 1116     Enterococcus faecials Not Detected     Enterococcus faecium Not Detected     Listeria Monocytogenes Not Detected     Staphylococcus spp. Not Detected     Staphylococcus aureus Not Detected     Staphylococcus epidermidis Not Detected     Staphylococcus lugdunensis Not Detected     Streptococcus  species Not Detected     Streptococcus agalactiae Not Detected     Streptococcus pneumoniae Not Detected     Streptococcus pyogenes Not Detected     Acinetobacter calcoaceticus/baumannii complex Not Detected     Bacteroides fragilis Not Detected     Enterobacerales Not Detected     Enterobacter cloacae complex Not Detected     Escherichia Not Detected     Klebsiella aerogenes Not Detected     Klebsiella oxytoca Not Detected     Klebsiella pneumoniae group Not Detected     Proteus Not Detected     Salmonella sp Not Detected     Serratia marcescens Not Detected     Haemophilus influenzae Not Detected     Neisseria meningtidis Not Detected     Pseudomonas aeruginosa Detected     Stenotrophomonas maltophilia Not Detected     Candida albicans Not Detected     Candida auris Not Detected     Candida glabrata Not Detected     Candida krusei Not Detected     Candida Parapsilosis Not Detected     Candida Tropicalis Not Detected     Cryptococcus neoformans/gattii Not Detected     CTX-M Gene Not Detected     IMP Gene Not Detected     KPC  Not Detected     mcr-1  Test not applicable     mec A/C Test not applicable     mec A/C and MREJ (MRSA) Test not applicable     NDM Not Detected     OXA-48-like Test not applicable     van A/B Test not applicable     VIM Not Detected    Narrative:      Please draw 15 minutes apart from different sites            MOST RECENT IMAGING  CT Renal Stone Study Abd Pelvis WO  EXAM DESCRIPTION: CT RENAL STONE STUDY ABD PELVIS WO    CLINICAL HISTORY: 78 years Male, History of hydronephrosis, recurrent back pain    COMPARISON: March 1, 2023.    TECHNIQUE: Images of the abdomen and pelvis were obtained without the administration of intravenous contrast. All CT scans at this facility utilized dose modulation, iterative reconstruction, and/or weightbase dosing when appropriate to reduce the radiation dose to his low as reasonably achievable.    FINDINGS: Bilateral nephrocalcinosis is again noted. A ureteral  stent has been placed on the left. No hydronephrosis is seen. Bilateral nephrocalcinosis is again noted.    There are small bilateral pleural effusions. Both the liver and spleen are normal in size, and no focal abnormalities are seen. The pancreas is normal in size, as are the adrenal glands and the abdominal aorta. Small bowel is normal in size. The appendix is visualized and is normal. Colonic diverticula are present without evidence of diverticulitis. Postoperative changes to the left hip are again noted. Degenerative changes are noted in the spine.    IMPRESSION:    1. Bilateral nephrocalcinosis.  2. A ureteral stent has been put into place on the left since the previous examination. No hydronephrosis is seen.  3. Diverticulosis without evidence of diverticulitis.  4. Bilateral pleural effusions.    Electronically signed by:  Shay Horan MD  3/17/2023 7:01 PM CDT Workstation: [id number]      ECHOCARDIOGRAM RESULTS (last 5)  Results for orders placed during the hospital encounter of 11/08/22    Echo    Interpretation Summary  · The left ventricle is normal in size with moderate concentric hypertrophy and normal systolic function.  · The estimated ejection fraction is 70%.  · Normal left ventricular diastolic function.  · The sinuses of Valsalva is mildly dilated.  · The ascending aorta is dilated. Recommend CT scan to evaluate entire aorta for further evaluation.  · Mild tricuspid regurgitation.  · Normal central venous pressure (3 mmHg).  · The estimated PA systolic pressure is 61 mmHg.  · There is pulmonary hypertension.  · Unable to visualize right ventricle and right atrium but they both grossly appear to be dilated with RV function mildly reduced.      CURRENT/PREVIOUS VISIT EKG  Results for orders placed or performed during the hospital encounter of 03/16/23   EKG 12-lead    Collection Time: 03/16/23  3:25 PM    Narrative    Test Reason : R53.1,    Vent. Rate : 097 BPM     Atrial Rate : 104 BPM     P-R Int :  000 ms          QRS Dur : 094 ms      QT Int : 342 ms       P-R-T Axes : 000 033 194 degrees     QTc Int : 434 ms    Atrial fibrillation  Incomplete right bundle branch block  ST and T wave abnormality, consider inferolateral ischemia  Abnormal ECG  When compared with ECG of 01-MAR-2023 15:27,  Incomplete right bundle branch block is now Present  Criteria for Septal infarct are no longer Present  Confirmed by Justus Mendez MD ([id number]) on 3/17/2023 9:01:58 PM    Referred By: STEF   SELF           Confirmed By:Justus Mendez MD           ASSESSMENT/PLAN:     Active Hospital Problems    Diagnosis    *Bacteremia-Pseudomonas    Complicated UTI (urinary tract infection)    Nephrocalcinosis    Hypomagnesemia    Urinary incontinence    Hypotension    Orthostatic hypotension    Pulmonary hypertension, PASP 61    GERD (gastroesophageal reflux disease)    Obesity (BMI 30.0-34.9)    Pressure injury of buttock, stage 1    Uses walker    Hearing loss    Chronic heart failure with preserved ejection fraction    Type 2 diabetes mellitus with diabetic polyneuropathy, without long-term current use of insulin, HbA1c 5.8%    Benign essential tremor    Hyperlipidemia    Paroxysmal atrial fibrillation with RVR    Anticoagulated    Morbid obesity       ASSESSMENT & PLAN:     Urosepsis  Atrial fibrillation with rapid ventricular rate- rate controlled at this time  Hypotension  Chronic CHF with preserved EF- 11/22 EF 70%        RECOMMENDATIONS:    Patient presented with recurrent urosepsis in atrial fibrillation RVR. Rate controlled. Continue metoprolol 25mg TID.     Patient has worsening hematuria. Eliquis on hold.   Blood pressure stable today.   Patient has history of BPH. Changed Flomax to nightly. Started Proscar 5mg daily.   Continue Physical therapy.  Increase activity as tolerated.  Patient echocardiogram in November of 2022 that showed EF of 70% with normal diastolic function, dilated ascending aorta, and pulmonary  hypertension.    Continue to check and replace potassium and magnesium. Goal for potassium is 4.0, and goal for magnesium is 2.0.    Thank you for the consultation.  We will continue to follow prn.       Jaja Farrell NP  Department of Cardiology  Date of Service: 03/20/2023      I have personally interviewed and examined the patient, I have reviewed the Nurse Practitioner's history and physical, assessment, and plan. I agree with the findings and plan.  Patient remains in sinus rhythm.  His Eliquis was held because of hematuria..  He had been taking large dose of Flomax in the daytime and reports that he is too weak to get up and transfer and is not able to follow-up with his outpatient doctor visits.  We did place the patient on finasteride and hopefully we can stop the Flomax.  Recommend to check orthostatics to see if he needs any further treatment.  Aggressive physical therapy.    Sukumar Reese M.D.  Department of Cardiology  Date of Service: 03/20/2023  8:14 AM

## 2023-03-20 NOTE — TELEPHONE ENCOUNTER
----- Message from Ijeoma Luna MD sent at 3/18/2023  5:24 PM CDT -----  Please fax this note to Dr. Hughes office.  Put a big front cover seeing patient needs outpatient follow-up for kidney stones.  Or see patient if patient is still hospital to schedule for stone procedure since patient restarted on antibiotics

## 2023-03-20 NOTE — PT/OT/SLP PROGRESS
Physical Therapy Treatment    Patient Name:  Hiro Rodríguez   MRN:  [id number]    Recommendations:     Discharge Recommendations: nursing facility, skilled  Discharge Equipment Recommendations: none  Barriers to discharge:  increased asssist needed    Assessment:     Hiro Rodríguez is a 78 y.o. male admitted with a medical diagnosis of Bacteremia.  He presents with the following impairments/functional limitations: weakness, impaired endurance, impaired self care skills, impaired functional mobility, gait instability, impaired balance, impaired cognition, decreased upper extremity function, decreased lower extremity function, impaired skin, impaired cardiopulmonary response to activity.    Pt lying supine in bed holding onto theraband tied to bedrail.  Initially pt declined PT and stated did not want to walk, sit in chair, or sit on EOB for therex.  With encouragement he agreed to supine therex.  Vcs visual demonstration and Gilson provided for proper form and facilitation.  Will progress as able.    Rehab Prognosis: Fair; patient would benefit from acute skilled PT services to address these deficits and reach maximum level of function.    Recent Surgery: * No surgery found *      Plan:     During this hospitalization, patient to be seen 5 x/week to address the identified rehab impairments via gait training, therapeutic activities, therapeutic exercises and progress toward the following goals:    Plan of Care Expires:  04/19/23    Subjective     Chief Complaint: I just want to stay in bed  Patient/Family Comments/goals: get stronger  Pain/Comfort:  Pain Rating 1: 0/10      Objective:     Communicated with RN prior to session.  Patient found HOB elevated with telemetry, messina catheter, peripheral IV upon PT entry to room.     General Precautions: Standard, fall  Orthopedic Precautions:    Braces:    Respiratory Status: Room air     Functional Mobility:  Bed Mobility:     Scooting: minimum assistance      AM-PAC 6  CLICK MOBILITY  Turning over in bed (including adjusting bedclothes, sheets and blankets)?: 3  Sitting down on and standing up from a chair with arms (e.g., wheelchair, bedside commode, etc.): 3  Moving from lying on back to sitting on the side of the bed?: 3  Moving to and from a bed to a chair (including a wheelchair)?: 2  Need to walk in hospital room?: 2  Climbing 3-5 steps with a railing?: 1  Basic Mobility Total Score: 14       Treatment & Education:  Pt was educated on the following: call light use, importance of OOB activity and functional mobility to negate the negative effects of prolonged bed rest during this hospitalization, safe transfers/ambulation and discharge planning recommendations/options. Pt performed bilat LE there ex supine x 10 reps each with vc for proper execution: ap, hs, qs, TKE, hip abd/add & slr.     Patient left HOB elevated with all lines intact, call button in reach, bed alarm on, and RN notified..    GOALS:   Multidisciplinary Problems       Physical Therapy Goals          Problem: Physical Therapy    Goal Priority Disciplines Outcome Goal Variances Interventions   Physical Therapy Goal     PT, PT/OT Ongoing, Progressing     Description: All PT goals to be met by discharge:    1. Supine to sit with Stand-by Assistance  2. Sit to stand transfer with Stand-by Assistance  3.. Bed to chair transfer with Supervision using Rolling Walker  4. Gait  x 100 feet with Minimal Assistance using Rolling Walker.                          Time Tracking:     PT Received On: 03/20/23  PT Start Time: 0956     PT Stop Time: 1010  PT Total Time (min): 14 min     Billable Minutes: Therapeutic Exercise 14    Treatment Type: Treatment  PT/PTA: PT           03/20/2023

## 2023-03-20 NOTE — SUBJECTIVE & OBJECTIVE
Interval History:     Review of Systems  Objective:     Vital Signs (Most Recent):  Temp: 97 °F (36.1 °C) (03/20/23 1143)  Pulse: 78 (03/20/23 1143)  Resp: 17 (03/20/23 0725)  BP: 131/68 (03/20/23 1143)  SpO2: 97 % (03/20/23 1143) Vital Signs (24h Range):  Temp:  [97 °F (36.1 °C)-97.9 °F (36.6 °C)] 97 °F (36.1 °C)  Pulse:  [68-87] 78  Resp:  [17-20] 17  SpO2:  [96 %-98 %] 97 %  BP: (107-131)/(61-72) 131/68     Weight: 105.7 kg (233 lb 0.4 oz)  Body mass index is 33.44 kg/m².    Intake/Output Summary (Last 24 hours) at 3/20/2023 1502  Last data filed at 3/20/2023 1034  Gross per 24 hour   Intake --   Output 1400 ml   Net -1400 ml      Physical Exam  Constitutional:       General: He is not in acute distress.     Appearance: He is well-developed.   HENT:      Head: Normocephalic.   Eyes:      Pupils: Pupils are equal, round, and reactive to light.   Cardiovascular:      Rate and Rhythm: Normal rate and regular rhythm.   Pulmonary:      Effort: Pulmonary effort is normal. No respiratory distress.   Abdominal:      General: There is no distension.      Tenderness: There is no abdominal tenderness.   Musculoskeletal:      Cervical back: Neck supple.   Skin:     General: Skin is warm.      Findings: No rash.   Neurological:      Mental Status: He is alert and oriented to person, place, and time.   Psychiatric:         Mood and Affect: Mood normal.       Significant Labs: All pertinent labs within the past 24 hours have been reviewed.  CMP:   Recent Labs   Lab 03/19/23  0446 03/20/23  0412    140   K 3.8 3.9    106   CO2 27 27   * 126*   BUN 18 17   CREATININE 0.5 0.4*   CALCIUM 8.3* 8.1*   PROT 5.4* 5.6*   ALBUMIN 2.6* 2.6*   BILITOT 0.3 0.2   ALKPHOS 53* 55   AST 14 13   ALT 12 13   ANIONGAP 8 7*     Cardiac Markers: No results for input(s): CKMB, MYOGLOBIN, BNP, TROPISTAT in the last 48 hours.    Significant Imaging: I have reviewed all pertinent imaging results/findings within the past 24 hours.

## 2023-03-21 LAB
BACTERIA BLD CULT: NORMAL
GLUCOSE SERPL-MCNC: 133 MG/DL (ref 70–110)

## 2023-03-21 PROCEDURE — 99232 SBSQ HOSP IP/OBS MODERATE 35: CPT | Mod: ,,, | Performed by: GENERAL PRACTICE

## 2023-03-21 PROCEDURE — 99232 PR SUBSEQUENT HOSPITAL CARE,LEVL II: ICD-10-PCS | Mod: ,,, | Performed by: GENERAL PRACTICE

## 2023-03-21 PROCEDURE — 25000003 PHARM REV CODE 250

## 2023-03-21 PROCEDURE — 25000003 PHARM REV CODE 250: Performed by: NURSE PRACTITIONER

## 2023-03-21 PROCEDURE — 63600175 PHARM REV CODE 636 W HCPCS: Performed by: INTERNAL MEDICINE

## 2023-03-21 PROCEDURE — 82962 GLUCOSE BLOOD TEST: CPT

## 2023-03-21 PROCEDURE — 25000003 PHARM REV CODE 250: Performed by: SPECIALIST

## 2023-03-21 PROCEDURE — 97110 THERAPEUTIC EXERCISES: CPT | Mod: CQ

## 2023-03-21 PROCEDURE — 12000002 HC ACUTE/MED SURGE SEMI-PRIVATE ROOM

## 2023-03-21 PROCEDURE — 25000003 PHARM REV CODE 250: Performed by: INTERNAL MEDICINE

## 2023-03-21 RX ORDER — LEVOFLOXACIN 500 MG/1
500 TABLET, FILM COATED ORAL DAILY
Qty: 5 TABLET | Refills: 0 | Status: SHIPPED | OUTPATIENT
Start: 2023-03-21 | End: 2023-04-28 | Stop reason: ALTCHOICE

## 2023-03-21 RX ORDER — FINASTERIDE 5 MG/1
5 TABLET, FILM COATED ORAL DAILY
Qty: 90 TABLET | Refills: 0 | Status: SHIPPED | OUTPATIENT
Start: 2023-03-22 | End: 2023-07-10 | Stop reason: SDUPTHER

## 2023-03-21 RX ADMIN — MAGNESIUM OXIDE 400 MG (241.3 MG MAGNESIUM) TABLET 200 MG: at 09:03

## 2023-03-21 RX ADMIN — FINASTERIDE 5 MG: 5 TABLET, FILM COATED ORAL at 09:03

## 2023-03-21 RX ADMIN — METOPROLOL TARTRATE 25 MG: 25 TABLET, FILM COATED ORAL at 08:03

## 2023-03-21 RX ADMIN — TOPIRAMATE 50 MG: 25 TABLET, FILM COATED ORAL at 09:03

## 2023-03-21 RX ADMIN — FOLIC ACID 1 MG: 1 TABLET ORAL at 09:03

## 2023-03-21 RX ADMIN — PANTOPRAZOLE SODIUM 40 MG: 40 TABLET, DELAYED RELEASE ORAL at 06:03

## 2023-03-21 RX ADMIN — LEVOFLOXACIN 500 MG: 500 INJECTION, SOLUTION INTRAVENOUS at 05:03

## 2023-03-21 RX ADMIN — Medication 6 MG: at 08:03

## 2023-03-21 RX ADMIN — PRAVASTATIN SODIUM 80 MG: 40 TABLET ORAL at 08:03

## 2023-03-21 RX ADMIN — Medication 2000 UNITS: at 09:03

## 2023-03-21 RX ADMIN — SENNOSIDES AND DOCUSATE SODIUM 2 TABLET: 50; 8.6 TABLET ORAL at 09:03

## 2023-03-21 RX ADMIN — DULOXETINE 30 MG: 30 CAPSULE, DELAYED RELEASE ORAL at 09:03

## 2023-03-21 RX ADMIN — METOPROLOL TARTRATE 25 MG: 25 TABLET, FILM COATED ORAL at 02:03

## 2023-03-21 RX ADMIN — TAMSULOSIN HYDROCHLORIDE 0.4 MG: 0.4 CAPSULE ORAL at 08:03

## 2023-03-21 RX ADMIN — CASTOR OIL AND BALSAM, PERU: 788; 87 OINTMENT TOPICAL at 09:03

## 2023-03-21 RX ADMIN — METOPROLOL TARTRATE 25 MG: 25 TABLET, FILM COATED ORAL at 09:03

## 2023-03-21 RX ADMIN — DULOXETINE 30 MG: 30 CAPSULE, DELAYED RELEASE ORAL at 08:03

## 2023-03-21 NOTE — PLAN OF CARE
03/21/23 1219   Post-Acute Status   Post-Acute Authorization Placement   Post-Acute Placement Status Referrals Sent   Coverage [facility]   Discharge Delays (!) Post-Acute Set-up   Discharge Plan   Discharge Plan A Skilled Nursing Facility   Discharge Plan B Skilled Nursing Facility     Referral sent to [facility] via [employer]. Facility selected by Bubba (son). LAURA called Bubba [phone] and explained an attempt was made to reach Meredith at facility. LAURA also explained that referral was sent to facility and asked Bubba if he had any other facility in mind as a back up. Bubba explained he does not want a referral sent to any other facility. He stated he works close with [facility] and recently spoke with Meredith. Bubba stated Meredith will call LAURA shortly, she was leading tours at the time of original phone call. Bubba also stated he can be of assistance to escalate things if needed. LAURA thanked Bubba for that information and notified RN. LAURA waiting on call return from Meredith to discuss anything else needed for safe discharge to [facility].

## 2023-03-21 NOTE — PT/OT/SLP PROGRESS
Physical Therapy Treatment    Patient Name:  Hiro Rodríguez   MRN:  [id number]    Recommendations:     Discharge Recommendations: nursing facility, skilled  Discharge Equipment Recommendations: none  Barriers to discharge:  decreased activity tolerance, increased assist, decreased caregiver support    Assessment:     Hiro Rodríguez is a 78 y.o. male admitted with a medical diagnosis of Bacteremia.  He presents with the following impairments/functional limitations: weakness, impaired endurance, impaired self care skills, impaired functional mobility, gait instability, impaired balance, impaired cognition, decreased upper extremity function, decreased lower extremity function, impaired skin, impaired cardiopulmonary response to activity.    Pt agreeable to visit. Pt wanting to work on exercises for lower extremity strengthening as who would like to build up strength and endurance. Pt performed supine lower extremity therex x 10 each. Pt given handout with each exercise that pt did today so that he would have a reference to perform exercise on  his own.    Rehab Prognosis: Fair; patient would benefit from acute skilled PT services to address these deficits and reach maximum level of function.    Recent Surgery: * No surgery found *      Plan:     During this hospitalization, patient to be seen 5 x/week to address the identified rehab impairments via gait training, therapeutic activities, therapeutic exercises and progress toward the following goals:    Plan of Care Expires:  04/19/23    Subjective     Chief Complaint: pt remarked on the noted difference in his RLE vs LLE especially with hip ROM  Patient/Family Comments/goals: to get stronger  Pain/Comfort:  Pain Rating 1: 0/10      Objective:     Communicated with RN prior to session.  Patient found HOB elevated with telemetry, messina catheter, peripheral IV upon PT entry to room.     General Precautions: Standard, fall  Orthopedic Precautions:    Braces:     Respiratory Status: Room air     Functional Mobility:        AM-PAC 6 CLICK MOBILITY          Treatment & Education:  Pt educated on importance of time OOB, importance of intermittent mobility, safe techniques for transfers/ambulation, discharge recommendations/options, and use of call light for assistance and fall prevention.  Pt tolerated supine LE TE x 10 each including SLR, SAQ, hip abd/add, bridging, heel slides, and ankle DF/PF with verbal cuing for proper form and pacing for optimal strengthening.      Patient left HOB elevated with all lines intact, call button in reach, bed alarm on, and RN notified..    GOALS:   Multidisciplinary Problems       Physical Therapy Goals          Problem: Physical Therapy    Goal Priority Disciplines Outcome Goal Variances Interventions   Physical Therapy Goal     PT, PT/OT Ongoing, Progressing     Description: All PT goals to be met by discharge:    1. Supine to sit with Stand-by Assistance  2. Sit to stand transfer with Stand-by Assistance  3.. Bed to chair transfer with Supervision using Rolling Walker  4. Gait  x 100 feet with Minimal Assistance using Rolling Walker.                          Time Tracking:     PT Received On: 03/21/23  PT Start Time: 1134     PT Stop Time: 1150  PT Total Time (min): 16 min     Billable Minutes: Therapeutic Exercise 16    Treatment Type: Treatment  PT/PTA: PTA     Number of PTA visits since last PT visit: 1 03/21/2023

## 2023-03-21 NOTE — CARE UPDATE
SW called Meredith to discuss SNF, Meredith unavailable. SW left  for call return and marked message as urgent. SW to continue following.

## 2023-03-21 NOTE — SUBJECTIVE & OBJECTIVE
Interval History:     Review of Systems  Objective:     Vital Signs (Most Recent):  Temp: 97.7 °F (36.5 °C) (03/21/23 0742)  Pulse: 74 (03/21/23 1444)  Resp: 18 (03/21/23 0742)  BP: (!) 142/70 (03/21/23 1444)  SpO2: 98 % (03/21/23 0742)   Vital Signs (24h Range):  Temp:  [97.6 °F (36.4 °C)-98.7 °F (37.1 °C)] 97.7 °F (36.5 °C)  Pulse:  [74-77] 74  Resp:  [18] 18  SpO2:  [97 %-98 %] 98 %  BP: (118-142)/(69-78) 142/70     Weight: 105.7 kg (233 lb 0.4 oz)  Body mass index is 33.44 kg/m².    Intake/Output Summary (Last 24 hours) at 3/21/2023 1729  Last data filed at 3/21/2023 0508  Gross per 24 hour   Intake 136.67 ml   Output 550 ml   Net -413.33 ml      Physical Exam  Constitutional:       General: He is not in acute distress.     Appearance: He is well-developed.   HENT:      Head: Normocephalic.   Eyes:      Pupils: Pupils are equal, round, and reactive to light.   Cardiovascular:      Rate and Rhythm: Normal rate and regular rhythm.   Pulmonary:      Effort: Pulmonary effort is normal. No respiratory distress.   Abdominal:      General: Abdomen is flat. There is no distension.      Tenderness: There is no abdominal tenderness.   Musculoskeletal:         General: Normal range of motion.      Cervical back: Neck supple.   Skin:     Findings: No rash.   Neurological:      Mental Status: He is alert and oriented to person, place, and time.   Psychiatric:         Mood and Affect: Mood normal.       Significant Labs: All pertinent labs within the past 24 hours have been reviewed.  CBC:   Recent Labs   Lab 03/20/23  0412   WBC 7.71   HGB 11.1*   HCT 36.0*        CMP:   Recent Labs   Lab 03/20/23  0412      K 3.9      CO2 27   *   BUN 17   CREATININE 0.4*   CALCIUM 8.1*   PROT 5.6*   ALBUMIN 2.6*   BILITOT 0.2   ALKPHOS 55   AST 13   ALT 13   ANIONGAP 7*     Cardiac Markers: No results for input(s): CKMB, MYOGLOBIN, BNP, TROPISTAT in the last 48 hours.    Significant Imaging: I have reviewed all  pertinent imaging results/findings within the past 24 hours.

## 2023-03-21 NOTE — PROGRESS NOTES
Critical access hospital Medicine  Progress Note    Patient Name: Hiro Rodríguez  MRN: [id number]  Patient Class: IP- Inpatient   Admission Date: 3/16/2023  Length of Stay: 5 days  Attending Physician: Vipin Shepherd MD  Primary Care Provider: Gautam Castanon III, MD        Subjective:     Principal Problem:Bacteremia        HPI:  Hiro Rodríguez is a 78 y.o. male with a history as  has a past medical history of CHF (congestive heart failure) and Hypertension. who presented to the ED with a Hypotension (At clinic today and found to be hypotensive and is currently being treated for UTI)    Patient presents to the emergency room from PCPs office.  Patient tells me he was at his normal PCP checkup.  Reports they were attempting to get his vital signs and was unable to obtain a blood pressure however noted elevated heart rate.  Patient reported at that time he was feeling faint and weak as if he was going to pass out.  So he was brought here to the emergency room.      Patient further reports over last 3 months he has been in and out of the hospital for various different reasons including kidney stones, orthostatic hypotension, blood infection.  He reports his health continues to decline over these past 3 months.     Denies fever, chills, diaphoresis, SOB, dizziness, HA, chest pain, palpitations, NVD, recent trauma or any other associated symptoms.     Lab and imaging obtained and reviewed. CBC shows WBCs 13.15 MCHC 31.9 RDW 16.0 g% 79.2 l% 13.8. CMP shows glucose 154 alb 3.4. . EKG shows a-fib rate controlled on amiodarone gtt. Lactate 2.3.  On admit, afebrile HR 90 RR 18 /95 Sats 96% on RA. CXR shows cardiomegaly and findings suggestive of mild pulmonary vascular congestion/trace edema.       Per ED provider, patient on presentation noted to be in AFib RVR, amiodarone drip initiated.  Also noted to have elevated white count with low blood pressure, sepsis protocol initiated.  Given patient  recent admission with urosepsis E.Coli sensitive to rocephin, IV ABX initiated.  Will admit to ICU for management of AFib RVR in sepsis with possible urosepsis.                     Overview/Hospital Course:  Assumed care of this patient on 3/17/23.  Patient with a known history of paroxysmal atrial fibrillation, diabetes, pulmonary hypertension, history of CHF and hypertension, recent [facility] admission 3/1 to 3/8, initially referred due to AFib with RVR with hypotension from cardiology office, found to have E coli in urine and blood culture, moderate left hydroureteronephrosis status post cystoscopy with placement of left ureteral stent, he was discharged to complete 2 days of Bactrim to complete course of antibiotics with outpatient follow-up with Dr. Lange, inpatient admission complicated by orthostatic hypotension treated with IV fluids and compression.  He states at home blood pressure was dropping intermittently on standing, he was not using compression stockings.  He was seen by PCP office yesterday, blood pressure 60/40, repeat 80/40, referred to the ED. in the ED mild leukocytosis of 13, UA grossly positive, Sotelo was placed, AFib on EKG, intermittent RVR, admitted to the ICU with cardiology consultation.  On 03/17 on lying down blood pressure trend is improved, lisinopril discontinued, DC amiodarone drip and increase oral amiodarone, monitoring blood pressure and heart rate, discontinue Sotelo catheter, repeat CT renal protocol given report of sacral pain, await Cardiology recommendations.  Patient was given 180 mg of diltiazem by Cardiology and blood pressure dropped with systolic in the 70s, discontinued, 250 bolus, midodrine 5 mg, cardiology subsequently increase Lopressor to 25 mg t.i.d..  On 03/18, preliminary blood and urine culture with Pseudomonas, repeat CT renal protocol with bilateral nephrocalcinosis, ureteral stent in place, no acute abnormalities.  Infectious Disease  consulted.    3/19  Assumed care   Still hematuria.sitting in the chair . Hb stable . Renal function stable . UC and BC with pseudomonas . Repeat BC so far neg    3/20  Hematuria is resolving.  Appear infection is controlled.  May need urology reviewed again.  Repeat blood culture so far negative  Hemoglobin stable    3/21  Hematuria continued to get better and clearing in the tube  Hb stable . Patient wants to go home and stay at home one day and sone will come the next as per him      Interval History:     Review of Systems  Objective:     Vital Signs (Most Recent):  Temp: 97.7 °F (36.5 °C) (03/21/23 0742)  Pulse: 74 (03/21/23 1444)  Resp: 18 (03/21/23 0742)  BP: (!) 142/70 (03/21/23 1444)  SpO2: 98 % (03/21/23 0742)   Vital Signs (24h Range):  Temp:  [97.6 °F (36.4 °C)-98.7 °F (37.1 °C)] 97.7 °F (36.5 °C)  Pulse:  [74-77] 74  Resp:  [18] 18  SpO2:  [97 %-98 %] 98 %  BP: (118-142)/(69-78) 142/70     Weight: 105.7 kg (233 lb 0.4 oz)  Body mass index is 33.44 kg/m².    Intake/Output Summary (Last 24 hours) at 3/21/2023 1729  Last data filed at 3/21/2023 0508  Gross per 24 hour   Intake 136.67 ml   Output 550 ml   Net -413.33 ml      Physical Exam  Constitutional:       General: He is not in acute distress.     Appearance: He is well-developed.   HENT:      Head: Normocephalic.   Eyes:      Pupils: Pupils are equal, round, and reactive to light.   Cardiovascular:      Rate and Rhythm: Normal rate and regular rhythm.   Pulmonary:      Effort: Pulmonary effort is normal. No respiratory distress.   Abdominal:      General: Abdomen is flat. There is no distension.      Tenderness: There is no abdominal tenderness.   Musculoskeletal:         General: Normal range of motion.      Cervical back: Neck supple.   Skin:     Findings: No rash.   Neurological:      Mental Status: He is alert and oriented to person, place, and time.   Psychiatric:         Mood and Affect: Mood normal.       Significant Labs: All pertinent labs  within the past 24 hours have been reviewed.  CBC:   Recent Labs   Lab 03/20/23 0412   WBC 7.71   HGB 11.1*   HCT 36.0*        CMP:   Recent Labs   Lab 03/20/23 0412      K 3.9      CO2 27   *   BUN 17   CREATININE 0.4*   CALCIUM 8.1*   PROT 5.6*   ALBUMIN 2.6*   BILITOT 0.2   ALKPHOS 55   AST 13   ALT 13   ANIONGAP 7*     Cardiac Markers: No results for input(s): CKMB, MYOGLOBIN, BNP, TROPISTAT in the last 48 hours.    Significant Imaging: I have reviewed all pertinent imaging results/findings within the past 24 hours.      Assessment/Plan:      Active Hospital Problems    Diagnosis    *Bacteremia-Pseudomonas    Complicated UTI (urinary tract infection)    Nephrocalcinosis    Hypomagnesemia    Urinary incontinence    Hypotension    Orthostatic hypotension    Pulmonary hypertension, PASP 61    GERD (gastroesophageal reflux disease)    Obesity (BMI 30.0-34.9)    Pressure injury of buttock, stage 1    Uses walker    Hearing loss    Chronic heart failure with preserved ejection fraction    Type 2 diabetes mellitus with diabetic polyneuropathy, without long-term current use of insulin, HbA1c 5.8%    Benign essential tremor    Hyperlipidemia    Paroxysmal atrial fibrillation with RVR    Anticoagulated    Morbid obesity     Plan:  diltiazem discontinued because of hypotension and decreased flomax dose and HR controlled   At this point totally discontinue the Eliquis at this time for unsettling hematuria but better today after stopping   home amiodarone discontinued by Cardiology, now on Lopressor 25 mg t.i.d.,  repeat blood culture so far neg. UC/BC  sensitive to levaquin   Ambulate   DC when hematuria better and Hb stable  Downgrade  Urologist dr Chandler Do not recommend changing a stent at this time  Patient will go to SNF in [place] and will back and see dr Valencia as OP   possible DC today Vs tomorrow     VTE Risk Mitigation (From admission, onward)         Ordered      Place KAZ hose  Until discontinued         03/16/23 1916     Reason for No Pharmacological VTE Prophylaxis  Once        Question:  Reasons:  Answer:  Already adequately anticoagulated on oral Anticoagulants    03/16/23 1916     IP VTE HIGH RISK PATIENT  Once         03/16/23 1915     Place sequential compression device  Until discontinued         03/16/23 1915                Discharge Planning   BRENNAN: 3/21/2023     Code Status: Full Code   Is the patient medically ready for discharge?:     Reason for patient still in hospital (select all that apply): Treatment  Discharge Plan A: Skilled Nursing Facility   Discharge Delays: (!) Other              Vipin Shepherd MD  Department of Hospital Medicine   [facility]

## 2023-03-21 NOTE — CARE UPDATE
Presently, Hiro Rodríguez meets  InterQual® criteria for SNF placement. Please note, if there is a change in the patient's clinical status or treatments needed, a new review will be necessary. These review findings are based on national InterQual® guidelines and information documented in the patient's Epic EMR. They do not substitute the provider's judgment for medical necessity. Thank you!   Per Eusebia Darby RN

## 2023-03-21 NOTE — PROGRESS NOTES
[facility]  Department of Cardiology  Consult Note      PATIENT NAME: Hiro Rodríguez  MRN: [id number]  TODAY'S DATE: 03/21/2023  ADMIT DATE: 3/16/2023                          CONSULT REQUESTED BY: Vipin Shepherd MD    SUBJECTIVE     PRINCIPAL PROBLEM: Bacteremia      REASON FOR CONSULT:    AFIB RVR    INTERVAL HISTORY:    3/21/23  Patient resting comfortably in bed during exam. VSS. Patient had orthostatics performed.  Lying /69 HR 89 and Sitting /72.  Patient could not stand because he needs 2x assist.  NAD. A&Ox4. Rate controlled AF    3/20/23    Patient is resting quietly during examination. NAD. VSS. AF on telemetry. No events.  Patient is working with physical therapy.     3/19/23   Problem 1 he is being treated for UTI renal stone and now has hematuria with a Sotelo catheter in place and he is on Eliquis  2. Chronic persistent atrial fibrillation with rate control  3. Hypotensive episodes-he was on Flomax 0.8 mg which may be contributing to hypotension   Hematuria-consider reducing Eliquis 2.5 b.i.d. for awhile  2. Reduced Flomax 0.4 and if he can do without it.  It as this can cause hypotension and dizziness     3/18/23   1.  Hypotension especially on Flomax  2. Chronic persistent atrial fibrillation at least for 10 years-cardioversion did not work 10 years ago  This question as to whether he has been on p.o. amiodarone at home  Denies any chest pain or heart failure or revascularization procedures   Chronic persistent atrial fib  Continue metoprolol 25 t.i.d.  Hypotension exacerbated by large dose of Flomax-reduced Flomax 0.4 day and if he does not need it., stop it       HPI:    Patient is a 78-year-old male with past medical history of CHF, BPH, hypertension, paroxysmal atrial fibrillation on Eliquis, diabetes mellitus, obesity, ORIF of right hip, decubitus ulcer, urosepsis, renal calculi, who presented to the ED from PCPs office for hypotension and AFib RVR.  Patient presented  to the ED on 03/01/2023 for the same reason and was discharged on 03/08.  Patient denies chest pain, palpitations, shortness of breath, lightheadedness, dizziness, edema or bleeding.  Patient was started on amiodarone drip and patient was placed on sepsis protocol with initiation of IV antibiotics.    In ED:  WBC 13.15, H&H 14.4/45.1, K 3.8, creatinine 0.9, magnesium 1.7, , lactate 2.3, troponin HS 9.3, repeat 15.3.  UA positive for blood, nitrites, leukocytes, RBC, WBC.  Chest x-ray shows cardiomegaly with findings suggestive of mild pulmonary vascular congestion/trace edema.  EKG atrial fibrillation with incomplete right bundle-branch block, ST-T wave abnormality, consider inferior lateral ischemia.    On 03/01/2023 patient was seen by me in the cardiology office as a hospital follow-up and was found to be in AFib RVR with hypotension as well.  I sent him to the ED where he was found to have a UTI with moderate left hydroureter nephrosis secondary to at least 2 calculi in the midportion of the last ureter.  Patient had a cystoscopy with a left your ureteral stent placement and removal of bladder stone.  Patient was sent home on antibiotics.     Patient was also recently discharged on 01/26/2023 where he was diagnosed with urosepsis, and altered mental status.  Patient was also found to be hypotensive with tachycardia then as well.  Patient was discharged to a SNF.    During examination, patient was alert and oriented, no acute distress, rate controlled atrial fibrillation on telemetry with systolic BP in 90s to 100s.  Patient is on an amiodarone drip.    ECHO 11/11/22  The left ventricle is normal in size with moderate concentric hypertrophy and normal systolic function.  The estimated ejection fraction is 70%.  Normal left ventricular diastolic function.  The sinuses of Valsalva is mildly dilated.  The ascending aorta is dilated. Recommend CT scan to evaluate entire aorta for further evaluation.  Mild  tricuspid regurgitation.  Normal central venous pressure (3 mmHg).  The estimated PA systolic pressure is 61 mmHg.  There is pulmonary hypertension.  Unable to visualize right ventricle and right atrium but they both grossly appear to be dilated with RV function mildly reduced.    FROM H&P:    HPI: Hiro Rodríguez is a 78 y.o. male with a history as  has a past medical history of CHF (congestive heart failure) and Hypertension. who presented to the ED with a Hypotension (At clinic today and found to be hypotensive and is currently being treated for UTI)     Patient presents to the emergency room from PCPs office.  Patient tells me he was at his normal PCP checkup.  Reports they were attempting to get his vital signs and was unable to obtain a blood pressure however noted elevated heart rate.  Patient reported at that time he was feeling faint and weak as if he was going to pass out.  So he was brought here to the emergency room.       Patient further reports over last 3 months he has been in and out of the hospital for various different reasons including kidney stones, orthostatic hypotension, blood infection.  He reports his health continues to decline over these past 3 months.      Denies fever, chills, diaphoresis, SOB, dizziness, HA, chest pain, palpitations, NVD, recent trauma or any other associated symptoms.      Lab and imaging obtained and reviewed. CBC shows WBCs 13.15 MCHC 31.9 RDW 16.0 g% 79.2 l% 13.8. CMP shows glucose 154 alb 3.4. . EKG shows a-fib rate controlled on amiodarone gtt. Lactate 2.3.  On admit, afebrile HR 90 RR 18 /95 Sats 96% on RA. CXR shows cardiomegaly and findings suggestive of mild pulmonary vascular congestion/trace edema.        Per ED provider, patient on presentation noted to be in AFib RVR, amiodarone drip initiated.  Also noted to have elevated white count with low blood pressure, sepsis protocol initiated.  Given patient recent admission with urosepsis E.Coli  sensitive to rocephin, IV ABX initiated.  Will admit to ICU for management of AFib RVR in sepsis with possible urosepsis.            Review of patient's allergies indicates:  No Known Allergies    Past Medical History:   Diagnosis Date    CHF (congestive heart failure)     Hypertension      Past Surgical History:   Procedure Laterality Date    CYSTOSCOPY W/ URETERAL STENT PLACEMENT N/A 3/2/2023    Procedure: CYSTOSCOPY, WITH URETERAL STENT INSERTION;  Surgeon: Yoshi Lange MD;  Location: [facility];  Service: Urology;  Laterality: N/A;    FRACTURE SURGERY      INTRAMEDULLARY RODDING OF TROCHANTER OF FEMUR Left 11/10/2022    Procedure: INSERTION, ITRAMEDULLARY SANTI, FEMUR, TROCHANTER;  Surgeon: Richard Phoenix MD;  Location: [facility] OR;  Service: Orthopedics;  Laterality: Left;    REMOVAL, CALCULUS, BLADDER  3/2/2023    Procedure: REMOVAL, CALCULUS, BLADDER;  Surgeon: Yoshi Lange MD;  Location: [facility] OR;  Service: Urology;;     Social History     Tobacco Use    Smoking status: Never    Smokeless tobacco: Never   Substance Use Topics    Alcohol use: Yes     Alcohol/week: 1.0 standard drink     Types: 1 Shots of liquor per week    Drug use: Not Currently        Review of Systems     Constitutional:  Positive fatigue and generalized weakness Negative for chills, and fever.   Eyes: No double vision, No blurred vision  Neuro: No headaches, No dizziness  Respiratory: Negative for cough, shortness of breath and wheezing.    Cardiovascular: Negative for chest pain. Negative for palpitations and leg swelling.   Gastrointestinal: Negative for abdominal pain, No melena, diarrhea, nausea and vomiting.   Genitourinary: Negative for dysuria and frequency, Negative for hematuria  Skin: Negative for bruising, Negative for edema or discoloration noted.      OBJECTIVE     VITAL SIGNS (Most Recent)  Temp: 97.7 °F (36.5 °C) (03/21/23 0742)  Pulse: 74 (03/21/23 1444)  Resp: 18 (03/21/23 0742)  BP: (!) 142/70 (03/21/23  1444)  SpO2: 98 % (03/21/23 0742)    VENTILATION STATUS  Resp: 18 (03/21/23 0742)  SpO2: 98 % (03/21/23 0742)       I & O (Last 24H):  Intake/Output Summary (Last 24 hours) at 3/21/2023 1510  Last data filed at 3/21/2023 0508  Gross per 24 hour   Intake 136.67 ml   Output 800 ml   Net -663.33 ml       WEIGHTS  Wt Readings from Last 3 Encounters:   03/20/23 0332 105.7 kg (233 lb 0.4 oz)   03/18/23 2259 104.2 kg (229 lb 11.5 oz)   03/17/23 0836 103.1 kg (227 lb 4.7 oz)   03/17/23 0500 103.1 kg (227 lb 4.7 oz)   03/16/23 2345 103.1 kg (227 lb 4.7 oz)   03/16/23 1517 104.3 kg (230 lb)   03/16/23 1333 101.8 kg (224 lb 6.9 oz)   03/01/23 2318 107.5 kg (236 lb 15.9 oz)   03/01/23 1437 113.6 kg (250 lb 7.1 oz)       PHYSICAL EXAM  GENERAL: obese chronically ill appearing male in no apparent distress alert and oriented.   HEENT: Normocephalic. Pupils normal and conjunctivae normal.    NECK: No JVD. No bruit..   THYROID: Thyroid not examined  CARDIAC: Irregular rate and rhythm. S1 is normal.S2 is normal.No gallops, clicks or murmurs noted at this time.  CHEST ANATOMY: normal.   LUNGS: Clear to auscultation.   ABDOMEN: Soft.  Nontender.  Normal bowel sounds.   URINARY: messina catheter  EXTREMITIES: 1+ nonpitting edema BLE. No cyanosis, clubbing noted at this time.  PERIPHERAL VASCULAR SYSTEM: Good palpable distal pulses.   CENTRAL NERVOUS SYSTEM: No focal motor or sensory deficits noted.   SKIN: Skin dry, intact, well perfused.   MUSCLE STRENGTH & TONE: No noteable weakness, atrophy or abnormal movement.     HOME MEDICATIONS:  No current facility-administered medications on file prior to encounter.     Current Outpatient Medications on File Prior to Encounter   Medication Sig Dispense Refill    amiodarone (PACERONE) 100 MG Tab Take 100 mg by mouth every morning.      calcium polycarbophil (FIBER-CAPS, CA POLYCARBOPHIL, ORAL) Take 1 tablet by mouth once daily.      cholecalciferol, vitamin D3, (VITAMIN D3) 50 mcg (2,000 unit)  Tab Take 1 tablet by mouth once daily.      cyanocobalamin, vitamin B-12, 1,000 mcg Subl Place 1,000 mcg under the tongue 2 (two) times a day.      docusate sodium (COLACE) 100 MG capsule Take 100 mg by mouth 2 (two) times daily.      DULoxetine (CYMBALTA) 30 MG capsule Take 1 capsule (30 mg total) by mouth 2 (two) times daily. 90 capsule 1    HYDROcodone-acetaminophen (NORCO)  mg per tablet Take 1 tablet by mouth 4 (four) times daily as needed.      metFORMIN (GLUCOPHAGE-XR) 500 MG ER 24hr tablet TAKE 1 TABLET BY MOUTH EVERY DAY (Patient taking differently: Take 500 mg by mouth 2 (two) times a day.) 90 tablet 1    metoprolol tartrate (LOPRESSOR) 25 MG tablet Take 1 tablet (25 mg total) by mouth 2 (two) times daily. 180 tablet 1    omega-3 acid ethyl esters (LOVAZA) 1 gram capsule TAKE 2 CAPSULES BY MOUTH 2 TIMES DAILY. (Patient taking differently: Take 2 g by mouth 2 (two) times daily.) 360 capsule 1    polyethylene glycol (GLYCOLAX) 17 gram/dose powder Take 17 g by mouth daily as needed.      potassium chloride SA (KLOR-CON) 10 MEQ TbSR Take 2 tablets (20 mEq total) by mouth once daily.      rosuvastatin (CRESTOR) 20 MG tablet TAKE 1 TABLET BY MOUTH EVERY DAY (Patient taking differently: Take 20 mg by mouth once daily. TAKE 1 TABLET BY MOUTH EVERY DAY) 90 tablet 1    tamsulosin (FLOMAX) 0.4 mg Cap Take 2 capsules (0.8 mg total) by mouth once daily. 60 capsule 11    topiramate (TOPAMAX) 50 MG tablet TAKE 1 TABLET BY MOUTH EVERY DAY (Patient taking differently: Take 50 mg by mouth once daily.) 90 tablet 1    traZODone (DESYREL) 50 MG tablet TAKE 1 TABLET BY MOUTH EVERY EVENING. (Patient taking differently: Take 50 mg by mouth every evening.) 90 tablet 1    [DISCONTINUED] apixaban (ELIQUIS) 5 mg Tab Take 1 tablet (5 mg total) by mouth 2 (two) times daily. 180 tablet 1    semaglutide (OZEMPIC) 1 mg/dose (4 mg/3 mL) Inject 1 mg into the skin every 7 days. (Patient taking differently: Inject 1 mg into the skin  every 7 days. FRIDAYS) 4 pen 5       SCHEDULED MEDS:   balsam peru-castor oiL   Topical (Top) Daily    cholecalciferol (vitamin D3)  1 tablet Oral Daily    DULoxetine  30 mg Oral BID    finasteride  5 mg Oral Daily    folic acid  1 mg Oral Daily    levoFLOXacin  500 mg Intravenous Q24H    magnesium oxide  200 mg Oral Daily    metoprolol tartrate  25 mg Oral TID    pantoprazole  40 mg Oral Before breakfast    pravastatin  80 mg Oral QHS    senna-docusate 8.6-50 mg  2 tablet Oral BID    tamsulosin  0.4 mg Oral QHS    topiramate  50 mg Oral Daily       CONTINUOUS INFUSIONS:    PRN MEDS:acetaminophen, dextrose 10%, dextrose 10%, glucagon (human recombinant), glucose, glucose, HYDROcodone-acetaminophen, insulin aspart U-100, magnesium oxide, magnesium oxide, melatonin, naloxone, potassium bicarbonate, potassium bicarbonate, potassium bicarbonate, potassium, sodium phosphates, potassium, sodium phosphates, potassium, sodium phosphates, sodium chloride 0.9%    LABS AND DIAGNOSTICS     CBC LAST 3 DAYS  Recent Labs   Lab 03/18/23 0443 03/19/23 0446 03/20/23  0412   WBC 9.07 7.00 7.71   RBC 3.89* 3.79* 3.80*   HGB 11.6* 11.3* 11.1*   HCT 36.6* 36.0* 36.0*   MCV 94 95 95   MCH 29.8 29.8 29.2   MCHC 31.7* 31.4* 30.8*   RDW 16.1* 16.3* 16.3*    211 219   MPV 9.9 9.7 9.9   GRAN 72.2  6.5 62.9  4.4 68.4  5.3   LYMPH 17.5*  1.6 24.4  1.7 19.7  1.5   MONO 8.8  0.8 9.9  0.7 9.3  0.7   BASO 0.02 0.03 0.02   NRBC 0 0 0       COAGULATION LAST 3 DAYS  Recent Labs   Lab 03/16/23  1533 03/17/23  0446   INR 1.1 1.1   APTT 26.1 28.6       CHEMISTRY LAST 3 DAYS  Recent Labs   Lab 03/18/23  0443 03/19/23  0446 03/20/23  0412    140 140   K 4.8 3.8 3.9    105 106   CO2 29 27 27   ANIONGAP 7* 8 7*   BUN 17 18 17   CREATININE 0.5 0.5 0.4*   * 131* 126*   CALCIUM 8.5* 8.3* 8.1*   MG 1.5* 1.6 1.8   ALBUMIN 2.7* 2.6* 2.6*   PROT 5.7* 5.4* 5.6*   ALKPHOS 55 53* 55   ALT 12 12 13   AST 13 14 13   BILITOT 0.5 0.3  0.2       CARDIAC PROFILE LAST 3 DAYS  Recent Labs   Lab 03/16/23  1533 03/16/23 2000 03/17/23  0126   *  --   --    TROPONINIHS 9.3 15.3* 11.2       ENDOCRINE LAST 3 DAYS  Recent Labs   Lab 03/16/23 2000 03/18/23  0443   TSH  --  2.550   PROCAL <0.05  --        LAST ARTERIAL BLOOD GAS  ABG  No results for input(s): PH, PO2, PCO2, HCO3, BE in the last 168 hours.    LAST 7 DAYS MICROBIOLOGY   Microbiology Results (last 7 days)       Procedure Component Value Units Date/Time    Blood culture [[id number]] Collected: 03/18/23 1325    Order Status: Completed Specimen: Blood from Antecubital, Right Updated: 03/21/23 1432     Blood Culture, Routine No Growth to date      No Growth to date      No Growth to date      No Growth to date    Blood culture [[id number]] Collected: 03/16/23 2000    Order Status: Completed Specimen: Blood from Peripheral, Antecubital, Right Updated: 03/20/23 2232     Blood Culture, Routine No Growth to date      No Growth to date      No Growth to date      No Growth to date      No Growth to date    Narrative:      Please draw 15 minutes apart from different sites    Blood culture [[id number]]  (Abnormal)  (Susceptibility) Collected: 03/16/23 2345    Order Status: Completed Specimen: Blood from Peripheral, Antecubital, Right Updated: 03/20/23 0728     Blood Culture, Routine Gram stain aer bottle: Gram negative rods      Results called to and read back by:Alma Amaya RN-[id number];  03/18/2023      10:04 HARSHAD      PSEUDOMONAS AERUGINOSA    Narrative:      Please draw 15 minutes apart from different sites    Urine culture [[id number]]  (Abnormal)  (Susceptibility) Collected: 03/17/23 0135    Order Status: Completed Specimen: Urine Updated: 03/19/23 0938     Urine Culture, Routine PSEUDOMONAS AERUGINOSA  >100,000 cfu/ml      Narrative:      Specimen Source->Urine    Rapid Organism ID by PCR (from Blood culture) [[id number]]  (Abnormal) Collected: 03/16/23 2345    Order Status: Completed Updated:  03/18/23 1116     Enterococcus faecials Not Detected     Enterococcus faecium Not Detected     Listeria Monocytogenes Not Detected     Staphylococcus spp. Not Detected     Staphylococcus aureus Not Detected     Staphylococcus epidermidis Not Detected     Staphylococcus lugdunensis Not Detected     Streptococcus species Not Detected     Streptococcus agalactiae Not Detected     Streptococcus pneumoniae Not Detected     Streptococcus pyogenes Not Detected     Acinetobacter calcoaceticus/baumannii complex Not Detected     Bacteroides fragilis Not Detected     Enterobacerales Not Detected     Enterobacter cloacae complex Not Detected     Escherichia Not Detected     Klebsiella aerogenes Not Detected     Klebsiella oxytoca Not Detected     Klebsiella pneumoniae group Not Detected     Proteus Not Detected     Salmonella sp Not Detected     Serratia marcescens Not Detected     Haemophilus influenzae Not Detected     Neisseria meningtidis Not Detected     Pseudomonas aeruginosa Detected     Stenotrophomonas maltophilia Not Detected     Candida albicans Not Detected     Candida auris Not Detected     Candida glabrata Not Detected     Candida krusei Not Detected     Candida Parapsilosis Not Detected     Candida Tropicalis Not Detected     Cryptococcus neoformans/gattii Not Detected     CTX-M Gene Not Detected     IMP Gene Not Detected     KPC  Not Detected     mcr-1  Test not applicable     mec A/C Test not applicable     mec A/C and MREJ (MRSA) Test not applicable     NDM Not Detected     OXA-48-like Test not applicable     van A/B Test not applicable     VIM Not Detected    Narrative:      Please draw 15 minutes apart from different sites            MOST RECENT IMAGING  CT Renal Stone Study Abd Pelvis WO  EXAM DESCRIPTION: CT RENAL STONE STUDY ABD PELVIS WO    CLINICAL HISTORY: 78 years Male, History of hydronephrosis, recurrent back pain    COMPARISON: March 1, 2023.    TECHNIQUE: Images of the abdomen and pelvis were  obtained without the administration of intravenous contrast. All CT scans at this facility utilized dose modulation, iterative reconstruction, and/or weightbase dosing when appropriate to reduce the radiation dose to his low as reasonably achievable.    FINDINGS: Bilateral nephrocalcinosis is again noted. A ureteral stent has been placed on the left. No hydronephrosis is seen. Bilateral nephrocalcinosis is again noted.    There are small bilateral pleural effusions. Both the liver and spleen are normal in size, and no focal abnormalities are seen. The pancreas is normal in size, as are the adrenal glands and the abdominal aorta. Small bowel is normal in size. The appendix is visualized and is normal. Colonic diverticula are present without evidence of diverticulitis. Postoperative changes to the left hip are again noted. Degenerative changes are noted in the spine.    IMPRESSION:    1. Bilateral nephrocalcinosis.  2. A ureteral stent has been put into place on the left since the previous examination. No hydronephrosis is seen.  3. Diverticulosis without evidence of diverticulitis.  4. Bilateral pleural effusions.    Electronically signed by:  Shay Horan MD  3/17/2023 7:01 PM CDT Workstation: [id number]      ECHOCARDIOGRAM RESULTS (last 5)  Results for orders placed during the hospital encounter of 11/08/22    Echo    Interpretation Summary  · The left ventricle is normal in size with moderate concentric hypertrophy and normal systolic function.  · The estimated ejection fraction is 70%.  · Normal left ventricular diastolic function.  · The sinuses of Valsalva is mildly dilated.  · The ascending aorta is dilated. Recommend CT scan to evaluate entire aorta for further evaluation.  · Mild tricuspid regurgitation.  · Normal central venous pressure (3 mmHg).  · The estimated PA systolic pressure is 61 mmHg.  · There is pulmonary hypertension.  · Unable to visualize right ventricle and right atrium but they both grossly  appear to be dilated with RV function mildly reduced.      CURRENT/PREVIOUS VISIT EKG  Results for orders placed or performed during the hospital encounter of 03/16/23   EKG 12-lead    Collection Time: 03/16/23  3:25 PM    Narrative    Test Reason : R53.1,    Vent. Rate : 097 BPM     Atrial Rate : 104 BPM     P-R Int : 000 ms          QRS Dur : 094 ms      QT Int : 342 ms       P-R-T Axes : 000 033 194 degrees     QTc Int : 434 ms    Atrial fibrillation  Incomplete right bundle branch block  ST and T wave abnormality, consider inferolateral ischemia  Abnormal ECG  When compared with ECG of 01-MAR-2023 15:27,  Incomplete right bundle branch block is now Present  Criteria for Septal infarct are no longer Present  Confirmed by Justus Mendez MD ([id number]) on 3/17/2023 9:01:58 PM    Referred By: STEF   SELF           Confirmed By:Justus Mendez MD           ASSESSMENT/PLAN:     Active Hospital Problems    Diagnosis    *Bacteremia-Pseudomonas    Complicated UTI (urinary tract infection)    Nephrocalcinosis    Hypomagnesemia    Urinary incontinence    Hypotension    Orthostatic hypotension    Pulmonary hypertension, PASP 61    GERD (gastroesophageal reflux disease)    Obesity (BMI 30.0-34.9)    Pressure injury of buttock, stage 1    Uses walker    Hearing loss    Chronic heart failure with preserved ejection fraction    Type 2 diabetes mellitus with diabetic polyneuropathy, without long-term current use of insulin, HbA1c 5.8%    Benign essential tremor    Hyperlipidemia    Paroxysmal atrial fibrillation with RVR    Anticoagulated    Morbid obesity       ASSESSMENT & PLAN:     Urosepsis  Atrial fibrillation with rapid ventricular rate- rate controlled at this time  Hypotension  Chronic CHF with preserved EF- 11/22 EF 70%  Hematuria  Orthostatic Hypotension        RECOMMENDATIONS:    Patient presented with recurrent urosepsis in atrial fibrillation RVR. Rate controlled. Continue metoprolol 25mg TID.     Recommend  orthostatics q shift. Patient had a 10 mmHg BP drop from lying to sitting.  Patient will need assist x 2 to stand.  Patient has worsening hematuria. Eliquis on hold.   Blood pressure remains stable today.   Patient has history of BPH. Continue Proscar 5mg daily.   Continue physical therapy.  Increase activity as tolerated.  Patient echocardiogram in November of 2022 that showed EF of 70% with normal diastolic function, dilated ascending aorta, and pulmonary hypertension.    Continue to check and replace potassium and magnesium. Goal for potassium is 4.0, and goal for magnesium is 2.0.    Thank you for the consultation.  We will continue to follow.       Jaja Farrell NP  Department of Cardiology  Date of Service: 03/21/2023      I have personally interviewed and examined the patient, I have reviewed the Nurse Practitioner's history and physical, assessment, and plan. I agree with the findings and plan.    Would continue to monitor the orthostatic vital signs.  Continue current medications.    Sukumar Reese M.D.  Department of Cardiology  Date of Service: 03/21/2023  8:14 AM

## 2023-03-21 NOTE — CARE UPDATE
LAURA made 2nd attempt to contact Meredith [phone] () at [facility]. Meredith answered and stated she never received a referral and asked what program is used. LAURA explained that [employer] is used, however LAURA sent referral via regular fax because LAURA was unsure if facility uses [facility]. Meredith confirmed they do utilize [employer]. LAURA encouraged Meredith to check for referral again, Meredith located referral.    Meredith informed LAURA she will review documents and if anything else is needed she will contact LAURA, if nothing else is needed she will submit for authorization. Meredith stated she is hoping for insurance authorization by 3/22/23.

## 2023-03-22 LAB
ERYTHROCYTE [DISTWIDTH] IN BLOOD BY AUTOMATED COUNT: 16.2 % (ref 11.5–14.5)
GLUCOSE SERPL-MCNC: 113 MG/DL (ref 70–110)
GLUCOSE SERPL-MCNC: 116 MG/DL (ref 70–110)
GLUCOSE SERPL-MCNC: 131 MG/DL (ref 70–110)
GLUCOSE SERPL-MCNC: 147 MG/DL (ref 70–110)
HCT VFR BLD AUTO: 37.7 % (ref 40–54)
HGB BLD-MCNC: 11.8 G/DL (ref 14–18)
MCH RBC QN AUTO: 29.8 PG (ref 27–31)
MCHC RBC AUTO-ENTMCNC: 31.3 G/DL (ref 32–36)
MCV RBC AUTO: 95 FL (ref 82–98)
PLATELET # BLD AUTO: 229 K/UL (ref 150–450)
PMV BLD AUTO: 9.7 FL (ref 9.2–12.9)
RBC # BLD AUTO: 3.96 M/UL (ref 4.6–6.2)
WBC # BLD AUTO: 7.05 K/UL (ref 3.9–12.7)

## 2023-03-22 PROCEDURE — 63600175 PHARM REV CODE 636 W HCPCS: Performed by: INTERNAL MEDICINE

## 2023-03-22 PROCEDURE — 25000003 PHARM REV CODE 250: Performed by: INTERNAL MEDICINE

## 2023-03-22 PROCEDURE — 25000003 PHARM REV CODE 250

## 2023-03-22 PROCEDURE — 85027 COMPLETE CBC AUTOMATED: CPT | Performed by: INTERNAL MEDICINE

## 2023-03-22 PROCEDURE — 25000003 PHARM REV CODE 250: Performed by: NURSE PRACTITIONER

## 2023-03-22 PROCEDURE — 82962 GLUCOSE BLOOD TEST: CPT

## 2023-03-22 PROCEDURE — 97530 THERAPEUTIC ACTIVITIES: CPT

## 2023-03-22 PROCEDURE — 99231 PR SUBSEQUENT HOSPITAL CARE,LEVL I: ICD-10-PCS | Mod: ,,, | Performed by: GENERAL PRACTICE

## 2023-03-22 PROCEDURE — 25000003 PHARM REV CODE 250: Performed by: SPECIALIST

## 2023-03-22 PROCEDURE — 97110 THERAPEUTIC EXERCISES: CPT

## 2023-03-22 PROCEDURE — 99231 SBSQ HOSP IP/OBS SF/LOW 25: CPT | Mod: ,,, | Performed by: GENERAL PRACTICE

## 2023-03-22 PROCEDURE — 36415 COLL VENOUS BLD VENIPUNCTURE: CPT | Performed by: INTERNAL MEDICINE

## 2023-03-22 PROCEDURE — 12000002 HC ACUTE/MED SURGE SEMI-PRIVATE ROOM

## 2023-03-22 RX ADMIN — FINASTERIDE 5 MG: 5 TABLET, FILM COATED ORAL at 09:03

## 2023-03-22 RX ADMIN — LEVOFLOXACIN 500 MG: 500 INJECTION, SOLUTION INTRAVENOUS at 05:03

## 2023-03-22 RX ADMIN — SENNOSIDES AND DOCUSATE SODIUM 2 TABLET: 50; 8.6 TABLET ORAL at 09:03

## 2023-03-22 RX ADMIN — Medication 2000 UNITS: at 09:03

## 2023-03-22 RX ADMIN — FOLIC ACID 1 MG: 1 TABLET ORAL at 09:03

## 2023-03-22 RX ADMIN — DULOXETINE 30 MG: 30 CAPSULE, DELAYED RELEASE ORAL at 09:03

## 2023-03-22 RX ADMIN — CASTOR OIL AND BALSAM, PERU: 788; 87 OINTMENT TOPICAL at 09:03

## 2023-03-22 RX ADMIN — METOPROLOL TARTRATE 25 MG: 25 TABLET, FILM COATED ORAL at 08:03

## 2023-03-22 RX ADMIN — METOPROLOL TARTRATE 25 MG: 25 TABLET, FILM COATED ORAL at 03:03

## 2023-03-22 RX ADMIN — HYDROCODONE BITARTRATE AND ACETAMINOPHEN 1 TABLET: 10; 325 TABLET ORAL at 04:03

## 2023-03-22 RX ADMIN — TAMSULOSIN HYDROCHLORIDE 0.4 MG: 0.4 CAPSULE ORAL at 08:03

## 2023-03-22 RX ADMIN — DULOXETINE 30 MG: 30 CAPSULE, DELAYED RELEASE ORAL at 08:03

## 2023-03-22 RX ADMIN — Medication 6 MG: at 08:03

## 2023-03-22 RX ADMIN — MAGNESIUM OXIDE 400 MG (241.3 MG MAGNESIUM) TABLET 200 MG: at 09:03

## 2023-03-22 RX ADMIN — PANTOPRAZOLE SODIUM 40 MG: 40 TABLET, DELAYED RELEASE ORAL at 05:03

## 2023-03-22 RX ADMIN — TOPIRAMATE 50 MG: 25 TABLET, FILM COATED ORAL at 09:03

## 2023-03-22 RX ADMIN — PRAVASTATIN SODIUM 80 MG: 40 TABLET ORAL at 08:03

## 2023-03-22 RX ADMIN — METOPROLOL TARTRATE 25 MG: 25 TABLET, FILM COATED ORAL at 09:03

## 2023-03-22 NOTE — PROGRESS NOTES
[facility]  Progress Note    Patient Name: Hiro Rodríguez  MRN: [id number]  Patient Class: IP- Inpatient   Admission Date: 3/16/2023  Length of Stay: 6 days  Attending Physician: Vipin Shepherd MD  Primary Care Provider: Gautam Castanon III, MD        Subjective:     Principal Problem:Bacteremia        HPI:  Hiro Rodríguez is a 78 y.o. male with a history as  has a past medical history of CHF (congestive heart failure) and Hypertension. who presented to the ED with a Hypotension (At clinic today and found to be hypotensive and is currently being treated for UTI)    Patient presents to the emergency room from PCPs office.  Patient tells me he was at his normal PCP checkup.  Reports they were attempting to get his vital signs and was unable to obtain a blood pressure however noted elevated heart rate.  Patient reported at that time he was feeling faint and weak as if he was going to pass out.  So he was brought here to the emergency room.      Patient further reports over last 3 months he has been in and out of the hospital for various different reasons including kidney stones, orthostatic hypotension, blood infection.  He reports his health continues to decline over these past 3 months.     Denies fever, chills, diaphoresis, SOB, dizziness, HA, chest pain, palpitations, NVD, recent trauma or any other associated symptoms.     Lab and imaging obtained and reviewed. CBC shows WBCs 13.15 MCHC 31.9 RDW 16.0 g% 79.2 l% 13.8. CMP shows glucose 154 alb 3.4. . EKG shows a-fib rate controlled on amiodarone gtt. Lactate 2.3.  On admit, afebrile HR 90 RR 18 /95 Sats 96% on RA. CXR shows cardiomegaly and findings suggestive of mild pulmonary vascular congestion/trace edema.       Per ED provider, patient on presentation noted to be in AFib RVR, amiodarone drip initiated.  Also noted to have elevated white count with low blood pressure, sepsis protocol initiated.  Given patient  recent admission with urosepsis E.Coli sensitive to rocephin, IV ABX initiated.  Will admit to ICU for management of AFib RVR in sepsis with possible urosepsis.                     Overview/Hospital Course:  Assumed care of this patient on 3/17/23.  Patient with a known history of paroxysmal atrial fibrillation, diabetes, pulmonary hypertension, history of CHF and hypertension, recent [facility] admission 3/1 to 3/8, initially referred due to AFib with RVR with hypotension from cardiology office, found to have E coli in urine and blood culture, moderate left hydroureteronephrosis status post cystoscopy with placement of left ureteral stent, he was discharged to complete 2 days of Bactrim to complete course of antibiotics with outpatient follow-up with Dr. Lange, inpatient admission complicated by orthostatic hypotension treated with IV fluids and compression.  He states at home blood pressure was dropping intermittently on standing, he was not using compression stockings.  He was seen by PCP office yesterday, blood pressure 60/40, repeat 80/40, referred to the ED. in the ED mild leukocytosis of 13, UA grossly positive, Sotelo was placed, AFib on EKG, intermittent RVR, admitted to the ICU with cardiology consultation.  On 03/17 on lying down blood pressure trend is improved, lisinopril discontinued, DC amiodarone drip and increase oral amiodarone, monitoring blood pressure and heart rate, discontinue Sotelo catheter, repeat CT renal protocol given report of sacral pain, await Cardiology recommendations.  Patient was given 180 mg of diltiazem by Cardiology and blood pressure dropped with systolic in the 70s, discontinued, 250 bolus, midodrine 5 mg, cardiology subsequently increase Lopressor to 25 mg t.i.d..  On 03/18, preliminary blood and urine culture with Pseudomonas, repeat CT renal protocol with bilateral nephrocalcinosis, ureteral stent in place, no acute abnormalities.  Infectious Disease  consulted.    3/19  Assumed care   Still hematuria.sitting in the chair . Hb stable . Renal function stable . UC and BC with pseudomonas . Repeat BC so far neg    3/20  Hematuria is resolving.  Appear infection is controlled.  May need urology reviewed again.  Repeat blood culture so far negative  Hemoglobin stable    3/21  Hematuria continued to get better and clearing in the tube  Hb stable . Patient wants to go home and stay at home one day and sone will come the next as per him    3/22  Patient is doing well and urine is getting much clear.  Examined the back and the stage I sacral ulcer  Case management discussion and as per case management the facility from [place] is not accepting this patient.  I advised the patient to speak with his son.  We may not be able to fulfill the wishes. May  need to find the facility here      Interval History:     Review of Systems  Objective:     Vital Signs (Most Recent):  Temp: 97.2 °F (36.2 °C) (03/22/23 1135)  Pulse: 88 (03/22/23 1509)  Resp: 18 (03/22/23 1135)  BP: 135/89 (03/22/23 1509)  SpO2: 97 % (03/22/23 1135) Vital Signs (24h Range):  Temp:  [97.2 °F (36.2 °C)-98.2 °F (36.8 °C)] 97.2 °F (36.2 °C)  Pulse:  [69-88] 88  Resp:  [16-18] 18  SpO2:  [97 %-98 %] 97 %  BP: (106-141)/(72-89) 135/89     Weight: 105.7 kg (233 lb 0.4 oz)  Body mass index is 33.44 kg/m².    Intake/Output Summary (Last 24 hours) at 3/22/2023 1512  Last data filed at 3/22/2023 0552  Gross per 24 hour   Intake 131.67 ml   Output 2000 ml   Net -1868.33 ml      Physical Exam  Constitutional:       General: He is not in acute distress.     Appearance: He is well-developed.   HENT:      Head: Normocephalic.   Eyes:      Pupils: Pupils are equal, round, and reactive to light.   Cardiovascular:      Rate and Rhythm: Normal rate and regular rhythm.   Pulmonary:      Effort: Pulmonary effort is normal. No respiratory distress.   Abdominal:      General: There is no distension.      Tenderness: There is no  abdominal tenderness.   Musculoskeletal:      Cervical back: Neck supple.   Skin:     Findings: No rash.   Neurological:      Mental Status: He is alert and oriented to person, place, and time.   Psychiatric:         Mood and Affect: Mood normal.       Significant Labs: All pertinent labs within the past 24 hours have been reviewed.  CBC:   Recent Labs   Lab 03/22/23  0751   WBC 7.05   HGB 11.8*   HCT 37.7*        CMP: No results for input(s): NA, K, CL, CO2, GLU, BUN, CREATININE, CALCIUM, PROT, ALBUMIN, BILITOT, ALKPHOS, AST, ALT, ANIONGAP, EGFRNONAA in the last 48 hours.    Invalid input(s): ESTGFAFRICA  Cardiac Markers: No results for input(s): CKMB, MYOGLOBIN, BNP, TROPISTAT in the last 48 hours.    Significant Imaging: I have reviewed all pertinent imaging results/findings within the past 24 hours.      Assessment/Plan:      Active Hospital Problems    Diagnosis    *Bacteremia-Pseudomonas    Complicated UTI (urinary tract infection)    Nephrocalcinosis    Hypomagnesemia    Urinary incontinence    Hypotension    Orthostatic hypotension    Pulmonary hypertension, PASP 61    GERD (gastroesophageal reflux disease)    Obesity (BMI 30.0-34.9)    Pressure injury of buttock, stage 1    Uses walker    Hearing loss    Chronic heart failure with preserved ejection fraction    Type 2 diabetes mellitus with diabetic polyneuropathy, without long-term current use of insulin, HbA1c 5.8%    Benign essential tremor    Hyperlipidemia    Paroxysmal atrial fibrillation with RVR    Anticoagulated    Morbid obesity        Plan:  diltiazem discontinued because of hypotension and decreased flomax dose and HR controlled   At this point totally discontinue the Eliquis at this time for unsettling hematuria but better today after stopping   home amiodarone discontinued by Cardiology, now on Lopressor 25 mg t.i.d.,  repeat blood culture so far neg. UC/BC  sensitive to levaquin   Ambulate   DC when hematuria better  and Hb stable  Downgrade  Urologist dr Chandler Do not recommend changing a stent at this time  Patient will go to SNF in [place] and will back and see dr Valencia as OP  Facility in [place] is not accepting pt . Patient to speak with his son       VTE Risk Mitigation (From admission, onward)         Ordered     Place KAZ hose  Until discontinued         03/16/23 1916     Reason for No Pharmacological VTE Prophylaxis  Once        Question:  Reasons:  Answer:  Already adequately anticoagulated on oral Anticoagulants    03/16/23 1916     IP VTE HIGH RISK PATIENT  Once         03/16/23 1915     Place sequential compression device  Until discontinued         03/16/23 1915                Discharge Planning   BRENNAN: 3/24/2023     Code Status: Full Code   Is the patient medically ready for discharge?:     Reason for patient still in hospital (select all that apply): Treatment  Discharge Plan A: Skilled Nursing Facility   Discharge Delays: (!) Other              Vipin Shepherd MD  Department of Hospital Medicine   [facility]

## 2023-03-22 NOTE — PLAN OF CARE
received voicemail from patient son/Bubba [phone], regarding families questions and concerns on placement. SW left voicemail.

## 2023-03-22 NOTE — PROGRESS NOTES
[facility]  Department of Cardiology  Consult Note      PATIENT NAME: Hiro Rodríguez  MRN: [id number]  TODAY'S DATE: 03/22/2023  ADMIT DATE: 3/16/2023                          CONSULT REQUESTED BY: Vipin Shepherd MD    SUBJECTIVE     PRINCIPAL PROBLEM: Bacteremia      REASON FOR CONSULT:    AFIB RVR    INTERVAL HISTORY:      3/22/23    Patient is resting comfortably in bed during examination.  No acute distress.  Vital signs stable.  Atrial fibrillation, rate controlled on telemetry.  Orthostatics negative so far.  Orthostatics to be done q.shift.  Improving hematuria.       3/21/23  Patient resting comfortably in bed during exam. VSS. Patient had orthostatics performed.  Lying /69 HR 89 and Sitting /72.  Patient could not stand because he needs 2x assist.  NAD. A&Ox4. Rate controlled AF    3/20/23    Patient is resting quietly during examination. NAD. VSS. AF on telemetry. No events.  Patient is working with physical therapy.     3/19/23   Problem 1 he is being treated for UTI renal stone and now has hematuria with a Sotelo catheter in place and he is on Eliquis  2. Chronic persistent atrial fibrillation with rate control  3. Hypotensive episodes-he was on Flomax 0.8 mg which may be contributing to hypotension   Hematuria-consider reducing Eliquis 2.5 b.i.d. for awhile  2. Reduced Flomax 0.4 and if he can do without it.  It as this can cause hypotension and dizziness     3/18/23   1.  Hypotension especially on Flomax  2. Chronic persistent atrial fibrillation at least for 10 years-cardioversion did not work 10 years ago  This question as to whether he has been on p.o. amiodarone at home  Denies any chest pain or heart failure or revascularization procedures   Chronic persistent atrial fib  Continue metoprolol 25 t.i.d.  Hypotension exacerbated by large dose of Flomax-reduced Flomax 0.4 day and if he does not need it., stop it       HPI:    Patient is a 78-year-old male with past medical  history of CHF, BPH, hypertension, paroxysmal atrial fibrillation on Eliquis, diabetes mellitus, obesity, ORIF of right hip, decubitus ulcer, urosepsis, renal calculi, who presented to the ED from PCPs office for hypotension and AFib RVR.  Patient presented to the ED on 03/01/2023 for the same reason and was discharged on 03/08.  Patient denies chest pain, palpitations, shortness of breath, lightheadedness, dizziness, edema or bleeding.  Patient was started on amiodarone drip and patient was placed on sepsis protocol with initiation of IV antibiotics.    In ED:  WBC 13.15, H&H 14.4/45.1, K 3.8, creatinine 0.9, magnesium 1.7, , lactate 2.3, troponin HS 9.3, repeat 15.3.  UA positive for blood, nitrites, leukocytes, RBC, WBC.  Chest x-ray shows cardiomegaly with findings suggestive of mild pulmonary vascular congestion/trace edema.  EKG atrial fibrillation with incomplete right bundle-branch block, ST-T wave abnormality, consider inferior lateral ischemia.    On 03/01/2023 patient was seen by me in the cardiology office as a hospital follow-up and was found to be in AFib RVR with hypotension as well.  I sent him to the ED where he was found to have a UTI with moderate left hydroureter nephrosis secondary to at least 2 calculi in the midportion of the last ureter.  Patient had a cystoscopy with a left your ureteral stent placement and removal of bladder stone.  Patient was sent home on antibiotics.     Patient was also recently discharged on 01/26/2023 where he was diagnosed with urosepsis, and altered mental status.  Patient was also found to be hypotensive with tachycardia then as well.  Patient was discharged to a SNF.    During examination, patient was alert and oriented, no acute distress, rate controlled atrial fibrillation on telemetry with systolic BP in 90s to 100s.  Patient is on an amiodarone drip.    ECHO 11/11/22  The left ventricle is normal in size with moderate concentric hypertrophy and normal  systolic function.  The estimated ejection fraction is 70%.  Normal left ventricular diastolic function.  The sinuses of Valsalva is mildly dilated.  The ascending aorta is dilated. Recommend CT scan to evaluate entire aorta for further evaluation.  Mild tricuspid regurgitation.  Normal central venous pressure (3 mmHg).  The estimated PA systolic pressure is 61 mmHg.  There is pulmonary hypertension.  Unable to visualize right ventricle and right atrium but they both grossly appear to be dilated with RV function mildly reduced.    FROM H&P:    HPI: Hiro Rodríguez is a 78 y.o. male with a history as  has a past medical history of CHF (congestive heart failure) and Hypertension. who presented to the ED with a Hypotension (At clinic today and found to be hypotensive and is currently being treated for UTI)     Patient presents to the emergency room from PCPs office.  Patient tells me he was at his normal PCP checkup.  Reports they were attempting to get his vital signs and was unable to obtain a blood pressure however noted elevated heart rate.  Patient reported at that time he was feeling faint and weak as if he was going to pass out.  So he was brought here to the emergency room.       Patient further reports over last 3 months he has been in and out of the hospital for various different reasons including kidney stones, orthostatic hypotension, blood infection.  He reports his health continues to decline over these past 3 months.      Denies fever, chills, diaphoresis, SOB, dizziness, HA, chest pain, palpitations, NVD, recent trauma or any other associated symptoms.      Lab and imaging obtained and reviewed. CBC shows WBCs 13.15 MCHC 31.9 RDW 16.0 g% 79.2 l% 13.8. CMP shows glucose 154 alb 3.4. . EKG shows a-fib rate controlled on amiodarone gtt. Lactate 2.3.  On admit, afebrile HR 90 RR 18 /95 Sats 96% on RA. CXR shows cardiomegaly and findings suggestive of mild pulmonary vascular congestion/trace  edema.        Per ED provider, patient on presentation noted to be in AFib RVR, amiodarone drip initiated.  Also noted to have elevated white count with low blood pressure, sepsis protocol initiated.  Given patient recent admission with urosepsis E.Coli sensitive to rocephin, IV ABX initiated.  Will admit to ICU for management of AFib RVR in sepsis with possible urosepsis.            Review of patient's allergies indicates:  No Known Allergies    Past Medical History:   Diagnosis Date    CHF (congestive heart failure)     Hypertension      Past Surgical History:   Procedure Laterality Date    CYSTOSCOPY W/ URETERAL STENT PLACEMENT N/A 3/2/2023    Procedure: CYSTOSCOPY, WITH URETERAL STENT INSERTION;  Surgeon: Yoshi Lange MD;  Location: [facility] OR;  Service: Urology;  Laterality: N/A;    FRACTURE SURGERY      INTRAMEDULLARY RODDING OF TROCHANTER OF FEMUR Left 11/10/2022    Procedure: INSERTION, ITRAMEDULLARY SANTI, FEMUR, TROCHANTER;  Surgeon: Richard Phoenix MD;  Location: [facility] OR;  Service: Orthopedics;  Laterality: Left;    REMOVAL, CALCULUS, BLADDER  3/2/2023    Procedure: REMOVAL, CALCULUS, BLADDER;  Surgeon: Yoshi Lange MD;  Location: [facility] OR;  Service: Urology;;     Social History     Tobacco Use    Smoking status: Never    Smokeless tobacco: Never   Substance Use Topics    Alcohol use: Yes     Alcohol/week: 1.0 standard drink     Types: 1 Shots of liquor per week    Drug use: Not Currently        Review of Systems     Constitutional:  Positive fatigue and generalized weakness Negative for chills, and fever.   Eyes: No double vision, No blurred vision  Neuro: No headaches, No dizziness  Respiratory: Negative for cough, shortness of breath and wheezing.    Cardiovascular: Negative for chest pain. Negative for palpitations and leg swelling.   Gastrointestinal: Negative for abdominal pain, No melena, diarrhea, nausea and vomiting.   Genitourinary: Negative for dysuria and frequency, Negative for  hematuria  Skin: Negative for bruising, Negative for edema or discoloration noted.      OBJECTIVE     VITAL SIGNS (Most Recent)  Temp: 97.3 °F (36.3 °C) (03/22/23 0731)  Pulse: 75 (03/22/23 0731)  Resp: 18 (03/22/23 0731)  BP: 120/78 (03/22/23 0731)  SpO2: 97 % (03/22/23 0731)    VENTILATION STATUS  Resp: 18 (03/22/23 0731)  SpO2: 97 % (03/22/23 0731)       I & O (Last 24H):  Intake/Output Summary (Last 24 hours) at 3/22/2023 0842  Last data filed at 3/22/2023 0552  Gross per 24 hour   Intake 131.67 ml   Output 2000 ml   Net -1868.33 ml       WEIGHTS  Wt Readings from Last 3 Encounters:   03/20/23 0332 105.7 kg (233 lb 0.4 oz)   03/18/23 2259 104.2 kg (229 lb 11.5 oz)   03/17/23 0836 103.1 kg (227 lb 4.7 oz)   03/17/23 0500 103.1 kg (227 lb 4.7 oz)   03/16/23 2345 103.1 kg (227 lb 4.7 oz)   03/16/23 1517 104.3 kg (230 lb)   03/16/23 1333 101.8 kg (224 lb 6.9 oz)   03/01/23 2318 107.5 kg (236 lb 15.9 oz)   03/01/23 1437 113.6 kg (250 lb 7.1 oz)       PHYSICAL EXAM  GENERAL: obese chronically ill appearing male in no apparent distress alert and oriented.   HEENT: Normocephalic. Pupils normal and conjunctivae normal.    NECK: No JVD.   THYROID: Thyroid not examined  CARDIAC: Rate controlled AF on tele.   CHEST ANATOMY: normal.   LUNGS: Nonlabored  ABDOMEN: Nondistended  URINARY: messina catheter  EXTREMITIES: No cyanosis, clubbing noted at this time.  CENTRAL NERVOUS SYSTEM: No focal motor or sensory deficits noted.   SKIN: Skin dry, intact, well perfused.   MUSCLE STRENGTH & TONE: No noteable weakness, atrophy or abnormal movement.     HOME MEDICATIONS:  No current facility-administered medications on file prior to encounter.     Current Outpatient Medications on File Prior to Encounter   Medication Sig Dispense Refill    amiodarone (PACERONE) 100 MG Tab Take 100 mg by mouth every morning.      calcium polycarbophil (FIBER-CAPS, CA POLYCARBOPHIL, ORAL) Take 1 tablet by mouth once daily.      cholecalciferol, vitamin D3,  (VITAMIN D3) 50 mcg (2,000 unit) Tab Take 1 tablet by mouth once daily.      cyanocobalamin, vitamin B-12, 1,000 mcg Subl Place 1,000 mcg under the tongue 2 (two) times a day.      docusate sodium (COLACE) 100 MG capsule Take 100 mg by mouth 2 (two) times daily.      DULoxetine (CYMBALTA) 30 MG capsule Take 1 capsule (30 mg total) by mouth 2 (two) times daily. 90 capsule 1    HYDROcodone-acetaminophen (NORCO)  mg per tablet Take 1 tablet by mouth 4 (four) times daily as needed.      metFORMIN (GLUCOPHAGE-XR) 500 MG ER 24hr tablet TAKE 1 TABLET BY MOUTH EVERY DAY (Patient taking differently: Take 500 mg by mouth 2 (two) times a day.) 90 tablet 1    metoprolol tartrate (LOPRESSOR) 25 MG tablet Take 1 tablet (25 mg total) by mouth 2 (two) times daily. 180 tablet 1    omega-3 acid ethyl esters (LOVAZA) 1 gram capsule TAKE 2 CAPSULES BY MOUTH 2 TIMES DAILY. (Patient taking differently: Take 2 g by mouth 2 (two) times daily.) 360 capsule 1    polyethylene glycol (GLYCOLAX) 17 gram/dose powder Take 17 g by mouth daily as needed.      potassium chloride SA (KLOR-CON) 10 MEQ TbSR Take 2 tablets (20 mEq total) by mouth once daily.      rosuvastatin (CRESTOR) 20 MG tablet TAKE 1 TABLET BY MOUTH EVERY DAY (Patient taking differently: Take 20 mg by mouth once daily. TAKE 1 TABLET BY MOUTH EVERY DAY) 90 tablet 1    tamsulosin (FLOMAX) 0.4 mg Cap Take 2 capsules (0.8 mg total) by mouth once daily. 60 capsule 11    topiramate (TOPAMAX) 50 MG tablet TAKE 1 TABLET BY MOUTH EVERY DAY (Patient taking differently: Take 50 mg by mouth once daily.) 90 tablet 1    traZODone (DESYREL) 50 MG tablet TAKE 1 TABLET BY MOUTH EVERY EVENING. (Patient taking differently: Take 50 mg by mouth every evening.) 90 tablet 1    semaglutide (OZEMPIC) 1 mg/dose (4 mg/3 mL) Inject 1 mg into the skin every 7 days. (Patient taking differently: Inject 1 mg into the skin every 7 days. FRIDAYS) 4 pen 5       SCHEDULED MEDS:   balsam peru-castor oiL    Topical (Top) Daily    cholecalciferol (vitamin D3)  1 tablet Oral Daily    DULoxetine  30 mg Oral BID    finasteride  5 mg Oral Daily    folic acid  1 mg Oral Daily    levoFLOXacin  500 mg Intravenous Q24H    magnesium oxide  200 mg Oral Daily    metoprolol tartrate  25 mg Oral TID    pantoprazole  40 mg Oral Before breakfast    pravastatin  80 mg Oral QHS    senna-docusate 8.6-50 mg  2 tablet Oral BID    tamsulosin  0.4 mg Oral QHS    topiramate  50 mg Oral Daily       CONTINUOUS INFUSIONS:    PRN MEDS:acetaminophen, dextrose 10%, dextrose 10%, glucagon (human recombinant), glucose, glucose, HYDROcodone-acetaminophen, insulin aspart U-100, magnesium oxide, magnesium oxide, melatonin, naloxone, potassium bicarbonate, potassium bicarbonate, potassium bicarbonate, potassium, sodium phosphates, potassium, sodium phosphates, potassium, sodium phosphates, sodium chloride 0.9%    LABS AND DIAGNOSTICS     CBC LAST 3 DAYS  Recent Labs   Lab 03/18/23  0443 03/19/23 0446 03/20/23  0412 03/22/23  0751   WBC 9.07 7.00 7.71 7.05   RBC 3.89* 3.79* 3.80* 3.96*   HGB 11.6* 11.3* 11.1* 11.8*   HCT 36.6* 36.0* 36.0* 37.7*   MCV 94 95 95 95   MCH 29.8 29.8 29.2 29.8   MCHC 31.7* 31.4* 30.8* 31.3*   RDW 16.1* 16.3* 16.3* 16.2*    211 219 229   MPV 9.9 9.7 9.9 9.7   GRAN 72.2  6.5 62.9  4.4 68.4  5.3  --    LYMPH 17.5*  1.6 24.4  1.7 19.7  1.5  --    MONO 8.8  0.8 9.9  0.7 9.3  0.7  --    BASO 0.02 0.03 0.02  --    NRBC 0 0 0  --        COAGULATION LAST 3 DAYS  Recent Labs   Lab 03/16/23  1533 03/17/23 0446   INR 1.1 1.1   APTT 26.1 28.6       CHEMISTRY LAST 3 DAYS  Recent Labs   Lab 03/18/23  0443 03/19/23  0446 03/20/23  0412    140 140   K 4.8 3.8 3.9    105 106   CO2 29 27 27   ANIONGAP 7* 8 7*   BUN 17 18 17   CREATININE 0.5 0.5 0.4*   * 131* 126*   CALCIUM 8.5* 8.3* 8.1*   MG 1.5* 1.6 1.8   ALBUMIN 2.7* 2.6* 2.6*   PROT 5.7* 5.4* 5.6*   ALKPHOS 55 53* 55   ALT 12 12 13   AST 13 14 13   BILITOT  0.5 0.3 0.2       CARDIAC PROFILE LAST 3 DAYS  Recent Labs   Lab 03/16/23  1533 03/16/23 2000 03/17/23  0126   *  --   --    TROPONINIHS 9.3 15.3* 11.2       ENDOCRINE LAST 3 DAYS  Recent Labs   Lab 03/16/23 2000 03/18/23  0443   TSH  --  2.550   PROCAL <0.05  --        LAST ARTERIAL BLOOD GAS  ABG  No results for input(s): PH, PO2, PCO2, HCO3, BE in the last 168 hours.    LAST 7 DAYS MICROBIOLOGY   Microbiology Results (last 7 days)       Procedure Component Value Units Date/Time    Blood culture [[id number]] Collected: 03/16/23 2000    Order Status: Completed Specimen: Blood from Peripheral, Antecubital, Right Updated: 03/21/23 2232     Blood Culture, Routine No growth after 5 days.    Narrative:      Please draw 15 minutes apart from different sites    Blood culture [[id number]] Collected: 03/18/23 1325    Order Status: Completed Specimen: Blood from Antecubital, Right Updated: 03/21/23 1432     Blood Culture, Routine No Growth to date      No Growth to date      No Growth to date      No Growth to date    Blood culture [[id number]]  (Abnormal)  (Susceptibility) Collected: 03/16/23 2345    Order Status: Completed Specimen: Blood from Peripheral, Antecubital, Right Updated: 03/20/23 0728     Blood Culture, Routine Gram stain aer bottle: Gram negative rods      Results called to and read back by:Alma Amaya RN-[id number];  03/18/2023      10:04 CJD      PSEUDOMONAS AERUGINOSA    Narrative:      Please draw 15 minutes apart from different sites    Urine culture [[id number]]  (Abnormal)  (Susceptibility) Collected: 03/17/23 0135    Order Status: Completed Specimen: Urine Updated: 03/19/23 0938     Urine Culture, Routine PSEUDOMONAS AERUGINOSA  >100,000 cfu/ml      Narrative:      Specimen Source->Urine    Rapid Organism ID by PCR (from Blood culture) [[id number]]  (Abnormal) Collected: 03/16/23 2345    Order Status: Completed Updated: 03/18/23 1116     Enterococcus faecials Not Detected     Enterococcus faecium Not  Detected     Listeria Monocytogenes Not Detected     Staphylococcus spp. Not Detected     Staphylococcus aureus Not Detected     Staphylococcus epidermidis Not Detected     Staphylococcus lugdunensis Not Detected     Streptococcus species Not Detected     Streptococcus agalactiae Not Detected     Streptococcus pneumoniae Not Detected     Streptococcus pyogenes Not Detected     Acinetobacter calcoaceticus/baumannii complex Not Detected     Bacteroides fragilis Not Detected     Enterobacerales Not Detected     Enterobacter cloacae complex Not Detected     Escherichia Not Detected     Klebsiella aerogenes Not Detected     Klebsiella oxytoca Not Detected     Klebsiella pneumoniae group Not Detected     Proteus Not Detected     Salmonella sp Not Detected     Serratia marcescens Not Detected     Haemophilus influenzae Not Detected     Neisseria meningtidis Not Detected     Pseudomonas aeruginosa Detected     Stenotrophomonas maltophilia Not Detected     Candida albicans Not Detected     Candida auris Not Detected     Candida glabrata Not Detected     Candida krusei Not Detected     Candida Parapsilosis Not Detected     Candida Tropicalis Not Detected     Cryptococcus neoformans/gattii Not Detected     CTX-M Gene Not Detected     IMP Gene Not Detected     KPC  Not Detected     mcr-1  Test not applicable     mec A/C Test not applicable     mec A/C and MREJ (MRSA) Test not applicable     NDM Not Detected     OXA-48-like Test not applicable     van A/B Test not applicable     VIM Not Detected    Narrative:      Please draw 15 minutes apart from different sites            MOST RECENT IMAGING  CT Renal Stone Study Abd Pelvis WO  EXAM DESCRIPTION: CT RENAL STONE STUDY ABD PELVIS WO    CLINICAL HISTORY: 78 years Male, History of hydronephrosis, recurrent back pain    COMPARISON: March 1, 2023.    TECHNIQUE: Images of the abdomen and pelvis were obtained without the administration of intravenous contrast. All CT scans at this  facility utilized dose modulation, iterative reconstruction, and/or weightbase dosing when appropriate to reduce the radiation dose to his low as reasonably achievable.    FINDINGS: Bilateral nephrocalcinosis is again noted. A ureteral stent has been placed on the left. No hydronephrosis is seen. Bilateral nephrocalcinosis is again noted.    There are small bilateral pleural effusions. Both the liver and spleen are normal in size, and no focal abnormalities are seen. The pancreas is normal in size, as are the adrenal glands and the abdominal aorta. Small bowel is normal in size. The appendix is visualized and is normal. Colonic diverticula are present without evidence of diverticulitis. Postoperative changes to the left hip are again noted. Degenerative changes are noted in the spine.    IMPRESSION:    1. Bilateral nephrocalcinosis.  2. A ureteral stent has been put into place on the left since the previous examination. No hydronephrosis is seen.  3. Diverticulosis without evidence of diverticulitis.  4. Bilateral pleural effusions.    Electronically signed by:  Shay Horan MD  3/17/2023 7:01 PM CDT Workstation: [id number]      ECHOCARDIOGRAM RESULTS (last 5)  Results for orders placed during the hospital encounter of 11/08/22    Echo    Interpretation Summary  · The left ventricle is normal in size with moderate concentric hypertrophy and normal systolic function.  · The estimated ejection fraction is 70%.  · Normal left ventricular diastolic function.  · The sinuses of Valsalva is mildly dilated.  · The ascending aorta is dilated. Recommend CT scan to evaluate entire aorta for further evaluation.  · Mild tricuspid regurgitation.  · Normal central venous pressure (3 mmHg).  · The estimated PA systolic pressure is 61 mmHg.  · There is pulmonary hypertension.  · Unable to visualize right ventricle and right atrium but they both grossly appear to be dilated with RV function mildly reduced.      CURRENT/PREVIOUS VISIT  EKG  Results for orders placed or performed during the hospital encounter of 03/16/23   EKG 12-lead    Collection Time: 03/16/23  3:25 PM    Narrative    Test Reason : R53.1,    Vent. Rate : 097 BPM     Atrial Rate : 104 BPM     P-R Int : 000 ms          QRS Dur : 094 ms      QT Int : 342 ms       P-R-T Axes : 000 033 194 degrees     QTc Int : 434 ms    Atrial fibrillation  Incomplete right bundle branch block  ST and T wave abnormality, consider inferolateral ischemia  Abnormal ECG  When compared with ECG of 01-MAR-2023 15:27,  Incomplete right bundle branch block is now Present  Criteria for Septal infarct are no longer Present  Confirmed by Justus Mendez MD ([id number]) on 3/17/2023 9:01:58 PM    Referred By: STEF   SELF           Confirmed By:Justus Mendez MD           ASSESSMENT/PLAN:     Active Hospital Problems    Diagnosis    *Bacteremia-Pseudomonas    Complicated UTI (urinary tract infection)    Nephrocalcinosis    Hypomagnesemia    Urinary incontinence    Hypotension    Orthostatic hypotension    Pulmonary hypertension, PASP 61    GERD (gastroesophageal reflux disease)    Obesity (BMI 30.0-34.9)    Pressure injury of buttock, stage 1    Uses walker    Hearing loss    Chronic heart failure with preserved ejection fraction    Type 2 diabetes mellitus with diabetic polyneuropathy, without long-term current use of insulin, HbA1c 5.8%    Benign essential tremor    Hyperlipidemia    Paroxysmal atrial fibrillation with RVR    Anticoagulated    Morbid obesity       ASSESSMENT & PLAN:     Urosepsis  Atrial fibrillation with rapid ventricular rate- rate controlled at this time  Hypotension  Chronic CHF with preserved EF- 11/22 EF 70%  Hematuria  Orthostatic Hypotension        RECOMMENDATIONS:    Patient presented with recurrent urosepsis in atrial fibrillation RVR. Rate controlled. Continue metoprolol 25mg TID.     Orthostatics are negative so far.   Eliquis remains on hold due to hematuria. Patient has  ChadsVasc Score of 5 moderate high risk - recommend anticoagulation when not contraindicated. Will defer to urology.   Blood pressure remains stable today.   Patient has history of BPH. Continue Proscar 5mg daily. Continue Flomax at night.   Continue physical therapy.  Increase activity as tolerated.  Patient echocardiogram in November of 2022 that showed EF of 70% with normal diastolic function, dilated ascending aorta, and pulmonary hypertension.    Continue to check and replace potassium and magnesium. Goal for potassium is 4.0, and goal for magnesium is 2.0.    Thank you for the consultation.  We will sign off.       Jaja Farrell NP  Department of Cardiology  Date of Service: 03/22/2023      I have personally interviewed and examined the patient, I have reviewed the Nurse Practitioner's history and physical, assessment, and plan. I agree with the findings and plan.    PATIENT DESCRIBES CHRONIC WEAKNESS AND FALLING WHEN GETTING UP BUT HIS ORTHOSTATICS ARE NEGATIVE.  WILL SIGN OFF THIS NOTHING FURTHER TO ADD THANK YOU FOR CONSULT    Sukumar Reese M.D.  Department of Cardiology  Date of Service: 03/22/2023  8:14 AM

## 2023-03-22 NOTE — PROGRESS NOTES
[facility]  Adult Nutrition   Progress Note (Follow-Up)    SUMMARY      Recommendations:   1. Recommend liberalizing diet to Regular. Patient requesting dong and orange juice with breakfast. Okay to order per MD. RD changed  2. Continue Ensure +HP with meals.  3. Recommend that a medical diagnosis of severe malnutrition be added to patient's problem list      Goals:   Goals: 1. Patient to meet at least 75% of estimated energy and protein needs. 2. Further weigh loss to be prevented. Progressing    Dietitian Rounds Brief  Patient eating well for lunch and dinner but not breakfast. Pt states he wants dong and OJ and he is not diabetic.    Diet order: Diabetic 1800 kcal Cardiac diet    Oral Supplement: Ensure + HP with meals    % Intake of Estimated Energy Needs: 50 - 75 %  % Meal Intake: 50 - 75 %    Estimated/Assessed Needs  Weight Used For Calorie Calculations: 103.1 kg (227 lb 4.7 oz)  Energy Calorie Requirements (kcal): 2062 - 2578 (20 - 25 kcal/kg)  Energy Need Method: Kcal/kg  Protein Requirements: 113 - 150 (1.5 - 2 g/kg IBW)  Weight Used For Protein Calculations: 75 kg (165 lb 5.5 oz) (IBW)  Fluid Requirements (mL): 1875 (25 ml/kg IBW) or per MD  Estimated Fluid Requirement Method: other (see comments)  RDA Method (mL): 2062     Weight History:  Wt Readings from Last 5 Encounters:   03/20/23 105.7 kg (233 lb 0.4 oz)   03/16/23 101.8 kg (224 lb 6.9 oz)   03/01/23 107.5 kg (236 lb 15.9 oz)   03/01/23 114.8 kg (253 lb)   01/25/23 114.8 kg (253 lb)      Reason for Assessment  Reason For Assessment: consult  Diagnosis: cardiac disease  Relevant Medical History: CHF; HTN; type 2 DM; pressure injury of buttocks stage 1; sepsis; GERD    Medications:Pertinent Medications Reviewed  Scheduled Meds:   balsam peru-castor oiL   Topical (Top) Daily    cholecalciferol (vitamin D3)  1 tablet Oral Daily    DULoxetine  30 mg Oral BID    finasteride  5 mg Oral Daily    folic acid  1 mg Oral Daily    levoFLOXacin   500 mg Intravenous Q24H    magnesium oxide  200 mg Oral Daily    metoprolol tartrate  25 mg Oral TID    pantoprazole  40 mg Oral Before breakfast    pravastatin  80 mg Oral QHS    senna-docusate 8.6-50 mg  2 tablet Oral BID    tamsulosin  0.4 mg Oral QHS    topiramate  50 mg Oral Daily     Continuous Infusions:  PRN Meds:.acetaminophen, dextrose 10%, dextrose 10%, glucagon (human recombinant), glucose, glucose, HYDROcodone-acetaminophen, insulin aspart U-100, magnesium oxide, magnesium oxide, melatonin, naloxone, potassium bicarbonate, potassium bicarbonate, potassium bicarbonate, potassium, sodium phosphates, potassium, sodium phosphates, potassium, sodium phosphates, sodium chloride 0.9%    Labs: Pertinent Labs Reviewed  Clinical Chemistry:  Recent Labs   Lab 03/18/23  0443 03/19/23  0446 03/20/23  0412    140 140   K 4.8 3.8 3.9    105 106   CO2 29 27 27   * 131* 126*   BUN 17 18 17   CREATININE 0.5 0.5 0.4*   CALCIUM 8.5* 8.3* 8.1*   PROT 5.7* 5.4* 5.6*   ALBUMIN 2.7* 2.6* 2.6*   BILITOT 0.5 0.3 0.2   ALKPHOS 55 53* 55   AST 13 14 13   ALT 12 12 13   ANIONGAP 7* 8 7*   MG 1.5* 1.6 1.8   PHOS 4.3 4.2 3.8     CBC:   Recent Labs   Lab 03/22/23  0751   WBC 7.05   RBC 3.96*   HGB 11.8*   HCT 37.7*      MCV 95   MCH 29.8   MCHC 31.3*     Cardiac Profile:  Recent Labs   Lab 03/16/23  1533   *     Inflammatory Labs:  Recent Labs   Lab 03/16/23 2000   CRP 0.86*     Thyroid & Parathyroid:  Recent Labs   Lab 03/18/23 0443   TSH 2.550     Monitor and Evaluation  Food and Nutrient Intake: energy intake, food and beverage intake  Food and Nutrient Adminstration: diet order  Physical Activity and Function: nutrition-related ADLs and IADLs, factors affecting access to physical activity  Anthropometric Measurements: weight, weight change, body mass index  Biochemical Data, Medical Tests and Procedures: electrolyte and renal panel, inflammatory profile, gastrointestinal profile, lipid profile,  glucose/endocrine profile  Nutrition-Focused Physical Findings: overall appearance     Nutrition Risk  Level of Risk/Frequency of Follow-up: moderate - high     Nutrition Follow-Up  RD Follow-up?: Yes    Lara Carbone RD 03/22/2023 9:33 AM

## 2023-03-22 NOTE — SUBJECTIVE & OBJECTIVE
Interval History:     Review of Systems  Objective:     Vital Signs (Most Recent):  Temp: 97.2 °F (36.2 °C) (03/22/23 1135)  Pulse: 88 (03/22/23 1509)  Resp: 18 (03/22/23 1135)  BP: 135/89 (03/22/23 1509)  SpO2: 97 % (03/22/23 1135) Vital Signs (24h Range):  Temp:  [97.2 °F (36.2 °C)-98.2 °F (36.8 °C)] 97.2 °F (36.2 °C)  Pulse:  [69-88] 88  Resp:  [16-18] 18  SpO2:  [97 %-98 %] 97 %  BP: (106-141)/(72-89) 135/89     Weight: 105.7 kg (233 lb 0.4 oz)  Body mass index is 33.44 kg/m².    Intake/Output Summary (Last 24 hours) at 3/22/2023 1512  Last data filed at 3/22/2023 0552  Gross per 24 hour   Intake 131.67 ml   Output 2000 ml   Net -1868.33 ml      Physical Exam  Constitutional:       General: He is not in acute distress.     Appearance: He is well-developed.   HENT:      Head: Normocephalic.   Eyes:      Pupils: Pupils are equal, round, and reactive to light.   Cardiovascular:      Rate and Rhythm: Normal rate and regular rhythm.   Pulmonary:      Effort: Pulmonary effort is normal. No respiratory distress.   Abdominal:      General: There is no distension.      Tenderness: There is no abdominal tenderness.   Musculoskeletal:      Cervical back: Neck supple.   Skin:     Findings: No rash.   Neurological:      Mental Status: He is alert and oriented to person, place, and time.   Psychiatric:         Mood and Affect: Mood normal.       Significant Labs: All pertinent labs within the past 24 hours have been reviewed.  CBC:   Recent Labs   Lab 03/22/23  0751   WBC 7.05   HGB 11.8*   HCT 37.7*        CMP: No results for input(s): NA, K, CL, CO2, GLU, BUN, CREATININE, CALCIUM, PROT, ALBUMIN, BILITOT, ALKPHOS, AST, ALT, ANIONGAP, EGFRNONAA in the last 48 hours.    Invalid input(s): ESTGFAFR[place]  Cardiac Markers: No results for input(s): CKMB, MYOGLOBIN, BNP, TROPISTAT in the last 48 hours.    Significant Imaging: I have reviewed all pertinent imaging results/findings within the past 24 hours.

## 2023-03-22 NOTE — PLAN OF CARE
LAURA spoke to Meredith with [facility] at [phone].  She reports that authorization has been submitted and she is awaiting response.  Plan is for son to arrive to the [facility] tomorrow 3/23/2023.  Will await response.     03/22/23 1451   Discharge Reassessment   Assessment Type Discharge Planning Reassessment   Discharge Plan A Skilled Nursing Facility   Discharge Plan B Skilled Nursing Facility   Post-Acute Status   Post-Acute Authorization Placement   Post-Acute Placement Status Pending payor review/awaiting authorization (if required)

## 2023-03-22 NOTE — PLAN OF CARE
left voicemail for liaison Meredith [phone] at [facility] to follow up on patients SNF referral.

## 2023-03-22 NOTE — PT/OT/SLP PROGRESS
"Physical Therapy Treatment    Patient Name:  Hiro Rodríguez   MRN:  [id number]    Recommendations:     Discharge Recommendations: nursing facility, skilled  Discharge Equipment Recommendations: none  Barriers to discharge: Decreased caregiver support and symptomatic Orthostatic Hypotension    Assessment:     Hiro Rodríguez is a 78 y.o. male admitted with a medical diagnosis of Bacteremia.  He presents with the following impairments/functional limitations: weakness, impaired endurance, impaired self care skills, impaired functional mobility, gait instability, impaired balance, impaired cognition, decreased safety awareness, impaired cardiopulmonary response to activity.    Pt found HOB elevated and pt agreeable to PT session and asked for blood pressures "to help the nurses because they couldn't stand me up." Pt tolerated session only fairly with increasing positional dizziness and a significant drop in BP from HOB 30 degrees 133/84 to standing 106/72.    Rehab Prognosis: Fair; patient would benefit from acute skilled PT services to address these deficits and reach maximum level of function.    Recent Surgery: * No surgery found *      Plan:     During this hospitalization, patient to be seen 5 x/week to address the identified rehab impairments via gait training, therapeutic activities, therapeutic exercises, neuromuscular re-education and progress toward the following goals:    Plan of Care Expires:  04/19/23    Subjective     Chief Complaint: pt reported increasing dizziness with position changes from lying in bed to standing.  Patient/Family Comments/goals: SNF  Pain/Comfort:  Pain Rating 1: 0/10  Pain Rating Post-Intervention 1: 0/10      Objective:     Communicated with VERONICA Laguerre prior to and after session.  Patient found HOB elevated with bed alarm, messina catheter, peripheral IV, telemetry upon PT entry to room.     General Precautions: Standard, fall, hearing impaired  Orthopedic Precautions: N/A  Braces: " N/A  Respiratory Status: Room air     Functional Mobility:  Bed Mobility:   HOB 30 degrees /84, HR 74  Scooting: moderate assistance while sitting  Supine to Sit: moderate assistance and of 2 persons; sitting EOB /85, HR 85  Sit to Supine: moderate assistance and of 2 persons  Transfers:     Sit to Stand:  moderate assistance and of 1-2 persons with rolling walker; standing at bedside w/ RW /72,  w/ pt needing to sit when BP reading registered due to increasing dizziness.      AM-PAC 6 CLICK MOBILITY          Treatment & Education:  Pt performed B LE there ex sitting x 10 reps with vc for proper execution and joint protection: ap, laq, marching, hip abd/add, gs/qs.      Pt was educated on the following: call light use, importance of OOB activity and functional mobility to negate the negative effects of prolonged bed rest during this hospitalization, safe transfers/ambulation and discharge planning recommendations/options.      Patient left HOB elevated with all lines intact, call button in reach, bed alarm on, and RN notified..    GOALS:   Multidisciplinary Problems       Physical Therapy Goals          Problem: Physical Therapy    Goal Priority Disciplines Outcome Goal Variances Interventions   Physical Therapy Goal     PT, PT/OT Ongoing, Progressing     Description: All PT goals to be met by discharge:    1. Supine to sit with Stand-by Assistance  2. Sit to stand transfer with Stand-by Assistance  3.. Bed to chair transfer with Supervision using Rolling Walker  4. Gait  x 100 feet with Minimal Assistance using Rolling Walker.                          Time Tracking:     PT Received On: 03/22/23  PT Start Time: 1036     PT Stop Time: 1059  PT Total Time (min): 23 min     Billable Minutes: Therapeutic Activity 13 and Therapeutic Exercise 10    Treatment Type: Treatment  PT/PTA: PT     Number of PTA visits since last PT visit: 1 03/22/2023

## 2023-03-23 LAB
BACTERIA BLD CULT: NORMAL
GLUCOSE SERPL-MCNC: 112 MG/DL (ref 70–110)
GLUCOSE SERPL-MCNC: 137 MG/DL (ref 70–110)

## 2023-03-23 PROCEDURE — 25000003 PHARM REV CODE 250: Performed by: INTERNAL MEDICINE

## 2023-03-23 PROCEDURE — 12000002 HC ACUTE/MED SURGE SEMI-PRIVATE ROOM

## 2023-03-23 PROCEDURE — 25000003 PHARM REV CODE 250

## 2023-03-23 PROCEDURE — 63600175 PHARM REV CODE 636 W HCPCS: Performed by: INTERNAL MEDICINE

## 2023-03-23 PROCEDURE — 97530 THERAPEUTIC ACTIVITIES: CPT

## 2023-03-23 PROCEDURE — 86580 TB INTRADERMAL TEST: CPT | Performed by: INTERNAL MEDICINE

## 2023-03-23 PROCEDURE — 25000003 PHARM REV CODE 250: Performed by: SPECIALIST

## 2023-03-23 PROCEDURE — 97110 THERAPEUTIC EXERCISES: CPT

## 2023-03-23 PROCEDURE — 25000003 PHARM REV CODE 250: Performed by: NURSE PRACTITIONER

## 2023-03-23 PROCEDURE — 30200315 PPD INTRADERMAL TEST REV CODE 302: Performed by: INTERNAL MEDICINE

## 2023-03-23 RX ADMIN — PANTOPRAZOLE SODIUM 40 MG: 40 TABLET, DELAYED RELEASE ORAL at 06:03

## 2023-03-23 RX ADMIN — PRAVASTATIN SODIUM 80 MG: 40 TABLET ORAL at 08:03

## 2023-03-23 RX ADMIN — METOPROLOL TARTRATE 25 MG: 25 TABLET, FILM COATED ORAL at 09:03

## 2023-03-23 RX ADMIN — DULOXETINE 30 MG: 30 CAPSULE, DELAYED RELEASE ORAL at 08:03

## 2023-03-23 RX ADMIN — MAGNESIUM OXIDE 400 MG (241.3 MG MAGNESIUM) TABLET 200 MG: at 09:03

## 2023-03-23 RX ADMIN — SENNOSIDES AND DOCUSATE SODIUM 2 TABLET: 50; 8.6 TABLET ORAL at 08:03

## 2023-03-23 RX ADMIN — METOPROLOL TARTRATE 25 MG: 25 TABLET, FILM COATED ORAL at 08:03

## 2023-03-23 RX ADMIN — APIXABAN 2.5 MG: 2.5 TABLET, FILM COATED ORAL at 11:03

## 2023-03-23 RX ADMIN — TAMSULOSIN HYDROCHLORIDE 0.4 MG: 0.4 CAPSULE ORAL at 08:03

## 2023-03-23 RX ADMIN — FOLIC ACID 1 MG: 1 TABLET ORAL at 09:03

## 2023-03-23 RX ADMIN — Medication 2000 UNITS: at 09:03

## 2023-03-23 RX ADMIN — Medication 6 MG: at 08:03

## 2023-03-23 RX ADMIN — DULOXETINE 30 MG: 30 CAPSULE, DELAYED RELEASE ORAL at 09:03

## 2023-03-23 RX ADMIN — FINASTERIDE 5 MG: 5 TABLET, FILM COATED ORAL at 09:03

## 2023-03-23 RX ADMIN — METOPROLOL TARTRATE 25 MG: 25 TABLET, FILM COATED ORAL at 03:03

## 2023-03-23 RX ADMIN — LEVOFLOXACIN 500 MG: 500 INJECTION, SOLUTION INTRAVENOUS at 06:03

## 2023-03-23 RX ADMIN — TOPIRAMATE 50 MG: 25 TABLET, FILM COATED ORAL at 09:03

## 2023-03-23 RX ADMIN — TUBERCULIN PURIFIED PROTEIN DERIVATIVE 5 UNITS: 5 INJECTION, SOLUTION INTRADERMAL at 04:03

## 2023-03-23 RX ADMIN — APIXABAN 2.5 MG: 2.5 TABLET, FILM COATED ORAL at 08:03

## 2023-03-23 RX ADMIN — HYDROCODONE BITARTRATE AND ACETAMINOPHEN 1 TABLET: 10; 325 TABLET ORAL at 08:03

## 2023-03-23 RX ADMIN — SENNOSIDES AND DOCUSATE SODIUM 2 TABLET: 50; 8.6 TABLET ORAL at 09:03

## 2023-03-23 RX ADMIN — CASTOR OIL AND BALSAM, PERU: 788; 87 OINTMENT TOPICAL at 09:03

## 2023-03-23 NOTE — PLAN OF CARE
DC orders, AVS, 3008 form, 142, and Pasrr sent via [employer] to [facility].    Hard Scripts requested via secure to Dr. Shepherd.

## 2023-03-23 NOTE — PLAN OF CARE
Per patients Son/Bubba, Son is requesting for patient to be cleared to discharge at 7AM on 3/24/23 due to having to travel 10 hours to transport patient to [facility].    Dr. Shepherd notified via secure chat.

## 2023-03-23 NOTE — PROGRESS NOTES
[facility] Medicine  Progress Note    Patient Name: Hiro Rodríguez  MRN: [id number]  Patient Class: IP- Inpatient   Admission Date: 3/16/2023  Length of Stay: 7 days  Attending Physician: Vipin Shepherd MD  Primary Care Provider: Gautam Castanon III, MD        Subjective:     Principal Problem:Bacteremia        HPI:  Hiro Rodríguez is a 78 y.o. male with a history as  has a past medical history of CHF (congestive heart failure) and Hypertension. who presented to the ED with a Hypotension (At clinic today and found to be hypotensive and is currently being treated for UTI)    Patient presents to the emergency room from PCPs office.  Patient tells me he was at his normal PCP checkup.  Reports they were attempting to get his vital signs and was unable to obtain a blood pressure however noted elevated heart rate.  Patient reported at that time he was feeling faint and weak as if he was going to pass out.  So he was brought here to the emergency room.      Patient further reports over last 3 months he has been in and out of the hospital for various different reasons including kidney stones, orthostatic hypotension, blood infection.  He reports his health continues to decline over these past 3 months.     Denies fever, chills, diaphoresis, SOB, dizziness, HA, chest pain, palpitations, NVD, recent trauma or any other associated symptoms.     Lab and imaging obtained and reviewed. CBC shows WBCs 13.15 MCHC 31.9 RDW 16.0 g% 79.2 l% 13.8. CMP shows glucose 154 alb 3.4. . EKG shows a-fib rate controlled on amiodarone gtt. Lactate 2.3.  On admit, afebrile HR 90 RR 18 /95 Sats 96% on RA. CXR shows cardiomegaly and findings suggestive of mild pulmonary vascular congestion/trace edema.       Per ED provider, patient on presentation noted to be in AFib RVR, amiodarone drip initiated.  Also noted to have elevated white count with low blood pressure, sepsis protocol initiated.  Given patient  recent admission with urosepsis E.Coli sensitive to rocephin, IV ABX initiated.  Will admit to ICU for management of AFib RVR in sepsis with possible urosepsis.                     Overview/Hospital Course:  Assumed care of this patient on 3/17/23.  Patient with a known history of paroxysmal atrial fibrillation, diabetes, pulmonary hypertension, history of CHF and hypertension, recent [facility] admission 3/1 to 3/8, initially referred due to AFib with RVR with hypotension from cardiology office, found to have E coli in urine and blood culture, moderate left hydroureteronephrosis status post cystoscopy with placement of left ureteral stent, he was discharged to complete 2 days of Bactrim to complete course of antibiotics with outpatient follow-up with Dr. Lange, inpatient admission complicated by orthostatic hypotension treated with IV fluids and compression.  He states at home blood pressure was dropping intermittently on standing, he was not using compression stockings.  He was seen by PCP office yesterday, blood pressure 60/40, repeat 80/40, referred to the ED. in the ED mild leukocytosis of 13, UA grossly positive, Sotelo was placed, AFib on EKG, intermittent RVR, admitted to the ICU with cardiology consultation.  On 03/17 on lying down blood pressure trend is improved, lisinopril discontinued, DC amiodarone drip and increase oral amiodarone, monitoring blood pressure and heart rate, discontinue Sotelo catheter, repeat CT renal protocol given report of sacral pain, await Cardiology recommendations.  Patient was given 180 mg of diltiazem by Cardiology and blood pressure dropped with systolic in the 70s, discontinued, 250 bolus, midodrine 5 mg, cardiology subsequently increase Lopressor to 25 mg t.i.d..  On 03/18, preliminary blood and urine culture with Pseudomonas, repeat CT renal protocol with bilateral nephrocalcinosis, ureteral stent in place, no acute abnormalities.  Infectious Disease  consulted.    3/19  Assumed care   Still hematuria.sitting in the chair . Hb stable . Renal function stable . UC and BC with pseudomonas . Repeat BC so far neg    3/20  Hematuria is resolving.  Appear infection is controlled.  May need urology reviewed again.  Repeat blood culture so far negative  Hemoglobin stable    3/21  Hematuria continued to get better and clearing in the tube  Hb stable . Patient wants to go home and stay at home one day and sone will come the next as per him    3/22  Patient is doing well and urine is getting much clear.  Examined the back and the stage I sacral ulcer  Case management discussion and as per case management the facility from [place] is not accepting this patient.  I advised the patient to speak with his son.  We may not be able to fulfill the wishes. May  need to find the facility here    3/23  Urine totally cleared and restarted blood thinner   No CP or SOB      Interval History:     Review of Systems  Objective:     Vital Signs (Most Recent):  Temp: (P) 98 °F (36.7 °C) (03/23/23 1038)  Pulse: (P) 81 (03/23/23 1038)  Resp: (P) 17 (03/23/23 1038)  BP: (!) (P) 141/74 (03/23/23 1038)  SpO2: (P) 97 % (03/23/23 1038)   Vital Signs (24h Range):  Temp:  [97.5 °F (36.4 °C)-98.4 °F (36.9 °C)] (P) 98 °F (36.7 °C)  Pulse:  [75-88] (P) 81  Resp:  [17-19] (P) 17  SpO2:  [95 %-98 %] (P) 97 %  BP: (121-138)/(74-89) (P) 141/74     Weight: 105.7 kg (233 lb 0.4 oz)  Body mass index is 33.44 kg/m².    Intake/Output Summary (Last 24 hours) at 3/23/2023 1310  Last data filed at 3/23/2023 0437  Gross per 24 hour   Intake 100 ml   Output 900 ml   Net -800 ml      Physical Exam  Constitutional:       General: He is not in acute distress.     Appearance: He is well-developed.   HENT:      Head: Normocephalic.   Eyes:      Pupils: Pupils are equal, round, and reactive to light.   Cardiovascular:      Rate and Rhythm: Normal rate and regular rhythm.   Pulmonary:      Effort: Pulmonary effort is normal.  No respiratory distress.   Abdominal:      General: There is no distension.      Tenderness: There is no abdominal tenderness.   Musculoskeletal:      Cervical back: Neck supple.   Skin:     Findings: No rash.   Neurological:      Mental Status: He is alert and oriented to person, place, and time.   Psychiatric:         Mood and Affect: Mood normal.       Significant Labs: All pertinent labs within the past 24 hours have been reviewed.  CMP: No results for input(s): NA, K, CL, CO2, GLU, BUN, CREATININE, CALCIUM, PROT, ALBUMIN, BILITOT, ALKPHOS, AST, ALT, ANIONGAP, EGFRNONAA in the last 48 hours.    Invalid input(s): ESTGFAFR[place]  Cardiac Markers: No results for input(s): CKMB, MYOGLOBIN, BNP, TROPISTAT in the last 48 hours.    Significant Imaging: I have reviewed all pertinent imaging results/findings within the past 24 hours.      Assessment/Plan:      Active Hospital Problems    Diagnosis    *Bacteremia-Pseudomonas    Complicated UTI (urinary tract infection)    Nephrocalcinosis    Hypomagnesemia    Urinary incontinence    Hypotension    Orthostatic hypotension    Pulmonary hypertension, PASP 61    GERD (gastroesophageal reflux disease)    Obesity (BMI 30.0-34.9)    Pressure injury of buttock, stage 1    Uses walker    Hearing loss    Chronic heart failure with preserved ejection fraction    Type 2 diabetes mellitus with diabetic polyneuropathy, without long-term current use of insulin, HbA1c 5.8%    Benign essential tremor    Hyperlipidemia    Paroxysmal atrial fibrillation with RVR    Anticoagulated    Morbid obesity        Plan:  diltiazem discontinued because of hypotension and decreased flomax dose and HR controlled   At this point totally discontinue the Eliquis at this time for unsettling hematuria but better today after stopping   home amiodarone discontinued by Cardiology, now on Lopressor 25 mg t.i.d.,  repeat blood culture so far neg. UC/BC  sensitive to levaquin   Ambulate   DC when  hematuria better and Hb stable  Downgrade  Urologist dr Chandler Do not recommend changing a stent at this time  Possible DC tomorrow and son will drive him back to [place]      VTE Risk Mitigation (From admission, onward)         Ordered     apixaban tablet 2.5 mg  2 times daily         03/23/23 1055     Place KAZ hose  Until discontinued         03/16/23 1916     Reason for No Pharmacological VTE Prophylaxis  Once        Question:  Reasons:  Answer:  Already adequately anticoagulated on oral Anticoagulants    03/16/23 1916     IP VTE HIGH RISK PATIENT  Once         03/16/23 1915     Place sequential compression device  Until discontinued         03/16/23 1915                Discharge Planning   BRENNAN: 3/24/2023     Code Status: Full Code   Is the patient medically ready for discharge?:     Reason for patient still in hospital (select all that apply): Treatment  Discharge Plan A: Skilled Nursing Facility   Discharge Delays: (!) Other              Vipin Shepherd MD  Department of Hospital Medicine   [facility]

## 2023-03-23 NOTE — PHYSICIAN QUERY
PT Name: Hiro Rodríguez  MR #: [id number]    DOCUMENTATION CLARIFICATION     CDS/: Susan Conklin Pe               Contact information: [phone]    This form is a permanent document in the medical record.     Query Date: March 23, 2023    By submitting this query, we are merely seeking further clarification of documentation.. Please utilize your independent clinical judgment when addressing the question(s) below.    The medical record contains the following:   Indicators  Supporting Clinical Findings Location in Medical Record    Weight, BMI, Usual Body Weight BMI 32.6  Per RD consult     Delayed Wound Healing  stage I pressure ulcer to buttock  Per Chart review    Registered Dietician Diagnosis  3/22 -  Recommend that a medical diagnosis of severe malnutrition be added to patient's problem list -   Malnutrition in the context of chronic disease R/T  inadequate protein and energy intake due to decreased appetite from medication side effect   AEB severe weight loss of >7.5% in < 3 months (actual weight loss of 13.9 kg (12.01%) in 3 months from 12/16/22 to 3/16/23) and moderate muscle wasting (temples and hands), severe fat wasting (triceps). 3/22 Reg Dietician consult note    Treatment Ensure + HP with meals  Per RD      Academy of Nutrition and Dietetics (Academy) and the American Society for Parenteral and Enteral Nutrition (A.S.P.E.N.) Clinical Characteristics to support Malnutrition   Malnutrition in the Context of Acute Illness or Injury Malnutrition in the Context of Chronic Illness or Injury Malnutrition in the Context of Social or Environmental Circumstances   Malnutrition Level Moderate Severe Moderate Severe   Moderate   Severe   Energy Intake <75%                   >7 days <50%                 >5 days <75%           >1 month <75%                      >1 month   <75% for >3 months   <50% for >1 month   Weight Loss   1-2% in 1 week >2% in 1 week 5% in 1 month >5% in 1 month 5% in 1 month >5% in 1  month    5% in 1 month >5% in 1 month 7.5% in 3 months >7.5% in 3 months 7.5% in 3 months >7.5% in 3 months    7.5% in 3 months >7.5% in 3 months 10% in 6 months >10% in 6 months 10% in 6 months >10% in 6 months        20% in 1 year                    >20% in 1 year                                                                  20% in 1 year                            >20% in 1 year                                                  Subcutaneous Fat Loss Mild  Moderate  Mild  Severe    Mild   Severe   Muscle Loss Mild  Moderate  Mild  Severe    Mild   Severe   Edema/Fluid Accumulation Mild Moderate to severe  Mild  Severe   Mild   Severe   Reduced  Strength         (based on standards supplied by  of dynamometer) N/A Measurably reduced N/A Measurably reduced N/A Measurably reduced     Criteria for mild malnutrition is defined as 1 characteristic outlined above within the established moderate or severe parameters.  A minimum of 2 out of the 6 characteristics noted above are recommended for a diagnosis of moderate or severe malnutrition.  Chronic illness/injury is a disease/condition lasting 3 months or longer.    The noted clinical guidelines are only system guidelines and do not replace the providers clinical judgment.    Provider, please specify if you are in agreement with Reg Dietician's recommendation of the diagnosis of Severe protein calorie malnutrition associated with above clinical findings.    [ x ] Severe Malnutrition - a minimum of 2 of the 6 severe malnutrition characteristics noted above    [  ] Other Nutritional Diagnosis (please specify): _______     Present on admission (POA) status:  [  x] Yes (Y)   [  ] No (N)     Please document in your progress notes daily for the duration of treatment until resolved and  include in your discharge summary.      References:    ANAY Yeboah, & RITA Flores. (2022, April). Assessment and management of anorexia and cachexia in palliative care. Retrieved May  23, 2022, from [email or web address]-[email or web address]-[email or web address]?[email or web address]_link     GORDO Ghosh, PhD, RD, ARMEN, SEBASTIAN Nuñez, PhD, RN, KVNG Valenzuela MD, PhD, Wilmer HUANG A., MS, RD, [facility], NANCY Oliva, MS, RD, The Academy Malnutrition Work Group, The A.S.P.E.N. Board of Directors. (2012). Consensus Statement: Academy of Nutrition and Dietetics and American Society for Parenteral and Enteral Nutrition: Characteristics Recommended for the Identification and Documentation of Adult Malnutrition (Undernutrition). Journal of Parenteral and Enteral Nutrition, 36(3), 275-283. doi:10.1177/0134298243852043     Form No. [id number]

## 2023-03-23 NOTE — SUBJECTIVE & OBJECTIVE
Interval History:     Review of Systems  Objective:     Vital Signs (Most Recent):  Temp: (P) 98 °F (36.7 °C) (03/23/23 1038)  Pulse: (P) 81 (03/23/23 1038)  Resp: (P) 17 (03/23/23 1038)  BP: (!) (P) 141/74 (03/23/23 1038)  SpO2: (P) 97 % (03/23/23 1038)   Vital Signs (24h Range):  Temp:  [97.5 °F (36.4 °C)-98.4 °F (36.9 °C)] (P) 98 °F (36.7 °C)  Pulse:  [75-88] (P) 81  Resp:  [17-19] (P) 17  SpO2:  [95 %-98 %] (P) 97 %  BP: (121-138)/(74-89) (P) 141/74     Weight: 105.7 kg (233 lb 0.4 oz)  Body mass index is 33.44 kg/m².    Intake/Output Summary (Last 24 hours) at 3/23/2023 1310  Last data filed at 3/23/2023 0437  Gross per 24 hour   Intake 100 ml   Output 900 ml   Net -800 ml      Physical Exam  Constitutional:       General: He is not in acute distress.     Appearance: He is well-developed.   HENT:      Head: Normocephalic.   Eyes:      Pupils: Pupils are equal, round, and reactive to light.   Cardiovascular:      Rate and Rhythm: Normal rate and regular rhythm.   Pulmonary:      Effort: Pulmonary effort is normal. No respiratory distress.   Abdominal:      General: There is no distension.      Tenderness: There is no abdominal tenderness.   Musculoskeletal:      Cervical back: Neck supple.   Skin:     Findings: No rash.   Neurological:      Mental Status: He is alert and oriented to person, place, and time.   Psychiatric:         Mood and Affect: Mood normal.       Significant Labs: All pertinent labs within the past 24 hours have been reviewed.  CMP: No results for input(s): NA, K, CL, CO2, GLU, BUN, CREATININE, CALCIUM, PROT, ALBUMIN, BILITOT, ALKPHOS, AST, ALT, ANIONGAP, EGFRNONAA in the last 48 hours.    Invalid input(s): ESTGFAFRICA  Cardiac Markers: No results for input(s): CKMB, MYOGLOBIN, BNP, TROPISTAT in the last 48 hours.    Significant Imaging: I have reviewed all pertinent imaging results/findings within the past 24 hours.

## 2023-03-23 NOTE — PLAN OF CARE
Patient to transfer to SNF services at [facility] in [place], [place] and will transport via private vehicle with his son Bubba.  Discharge orders and chart reviewed with no further post-acute discharge needs identified at this time.  At this time, patient is cleared for discharge from Case Management standpoint.        03/23/23 1626   Final Note   Assessment Type Final Discharge Note   Anticipated Discharge Disposition SNF   Post-Acute Status   Post-Acute Authorization Placement   Post-Acute Placement Status Set-up Complete/Auth obtained   Coverage [facility]   Discharge Delays None known at this time

## 2023-03-23 NOTE — PT/OT/SLP PROGRESS
Physical Therapy Treatment    Patient Name:  Hiro Rodríguez   MRN:  [id number]    Recommendations:     Discharge Recommendations: nursing facility, skilled  Discharge Equipment Recommendations: none  Barriers to discharge: Decreased caregiver support and pt requiring A of 1-2 persons for mobility and exhibiting OH    Assessment:     Hiro Rodríguez is a 78 y.o. male admitted with a medical diagnosis of Bacteremia.  He presents with the following impairments/functional limitations: weakness, impaired endurance, impaired self care skills, impaired functional mobility, gait instability, impaired balance, impaired cognition, decreased safety awareness, impaired cardiopulmonary response to activity.    Pt found HOB slightly elevated and agreeable to PT session. PT took orthostatic BPs again today with BP steady from supine to sitting today, but still dropping significantly upon standing with pt experiencing increasing dizziness, making gait unsafe.    Rehab Prognosis: Fair; patient would benefit from acute skilled PT services to address these deficits and reach maximum level of function.    Recent Surgery: * No surgery found *      Plan:     During this hospitalization, patient to be seen 6 x/week to address the identified rehab impairments via gait training, therapeutic activities, therapeutic exercises, neuromuscular re-education and progress toward the following goals:    Plan of Care Expires:  04/19/23    Subjective     Chief Complaint: dizziness with standing.  Patient/Family Comments/goals: SNF  Pain/Comfort:  Pain Rating 1: 0/10  Pain Rating Post-Intervention 1: 0/10      Objective:     Communicated with VERONICA Oliva prior to and after session.  Patient found HOB elevated with bed alarm, messina catheter, peripheral IV, telemetry upon PT entry to room.     General Precautions: Standard, fall, hearing impaired  Orthopedic Precautions: N/A  Braces: N/A  Respiratory Status: Room air     Functional Mobility:  Bed  Mobility:   HOB 20 degrees, /77  Scooting: moderate assistance  Supine to Sit: moderate assistance and of 1-2 persons; sitting EOB /75  Sit to Supine: moderate assistance and of 2 persons  Transfers:     Sit to Stand:  moderate assistance with rolling walker; Standing /79 w/ dizziness so pt sat EOB for there ex w/ BP increasing to 152/73; PT had pt stand again at RW w/ BP dropping again to 99/76 with significant dizziness. Pt returned to bed as noted and RN informed.      AM-PAC 6 CLICK MOBILITY          Treatment & Education:  Pt performed B LE there ex sitting x 15 reps with vc for proper execution and joint protection: ap, laq, marching, hip abd/add.    Pt was educated on the following: call light use, importance of OOB activity and functional mobility to negate the negative effects of prolonged bed rest during this hospitalization, safe transfers/ambulation and discharge planning recommendations/options.      Patient left HOB elevated with all lines intact, call button in reach, bed alarm on, and RN notified.    GOALS:   Multidisciplinary Problems       Physical Therapy Goals          Problem: Physical Therapy    Goal Priority Disciplines Outcome Goal Variances Interventions   Physical Therapy Goal     PT, PT/OT Ongoing, Progressing     Description: All PT goals to be met by discharge:    1. Supine to sit with Stand-by Assistance  2. Sit to stand transfer with Stand-by Assistance  3.. Bed to chair transfer with Supervision using Rolling Walker  4. Gait  x 100 feet with Minimal Assistance using Rolling Walker.                          Time Tracking:     PT Received On: 03/23/23  PT Start Time: 1057     PT Stop Time: 1122  PT Total Time (min): 25 min     Billable Minutes: Therapeutic Activity 15 and Therapeutic Exercise 10    Treatment Type: Treatment  PT/PTA: PT     Number of PTA visits since last PT visit: 1 03/23/2023

## 2023-03-23 NOTE — PLAN OF CARE
Problem: Adult Inpatient Plan of Care  Goal: Plan of Care Review  Outcome: Met  Goal: Patient-Specific Goal (Individualized)  Outcome: Met  Goal: Absence of Hospital-Acquired Illness or Injury  Outcome: Met  Goal: Optimal Comfort and Wellbeing  Outcome: Met  Goal: Readiness for Transition of Care  Outcome: Met     Problem: Diabetes Comorbidity  Goal: Blood Glucose Level Within Targeted Range  Outcome: Met     Problem: Adjustment to Illness (Sepsis/Septic Shock)  Goal: Optimal Coping  Outcome: Met     Problem: Bleeding (Sepsis/Septic Shock)  Goal: Absence of Bleeding  Outcome: Met     Problem: Glycemic Control Impaired (Sepsis/Septic Shock)  Goal: Blood Glucose Level Within Desired Range  Outcome: Met     Problem: Infection Progression (Sepsis/Septic Shock)  Goal: Absence of Infection Signs and Symptoms  Outcome: Met     Problem: Nutrition Impaired (Sepsis/Septic Shock)  Goal: Optimal Nutrition Intake  Outcome: Met     Problem: Impaired Wound Healing  Goal: Optimal Wound Healing  Outcome: Met     Problem: Skin Injury Risk Increased  Goal: Skin Health and Integrity  Outcome: Met     Problem: Dysrhythmia  Goal: Normalized Cardiac Rhythm  Outcome: Met     Problem: Malnutrition  Goal: Improved Nutritional Intake  Outcome: Met     Problem: Infection  Goal: Absence of Infection Signs and Symptoms  Outcome: Met     Problem: Fall Injury Risk  Goal: Absence of Fall and Fall-Related Injury  Outcome: Met

## 2023-03-24 VITALS
HEIGHT: 70 IN | OXYGEN SATURATION: 96 % | WEIGHT: 233 LBS | TEMPERATURE: 98 F | SYSTOLIC BLOOD PRESSURE: 127 MMHG | HEART RATE: 84 BPM | RESPIRATION RATE: 15 BRPM | DIASTOLIC BLOOD PRESSURE: 74 MMHG | BODY MASS INDEX: 33.36 KG/M2

## 2023-03-24 LAB — GLUCOSE SERPL-MCNC: 127 MG/DL (ref 70–110)

## 2023-03-24 PROCEDURE — 25000003 PHARM REV CODE 250: Performed by: NURSE PRACTITIONER

## 2023-03-24 RX ADMIN — PANTOPRAZOLE SODIUM 40 MG: 40 TABLET, DELAYED RELEASE ORAL at 05:03

## 2023-03-24 NOTE — DISCHARGE SUMMARY
[facility]  Discharge Summary      Patient Name: Hiro Rodríguez  MRN: [id number]  RADHA: [id number]  Patient Class: IP- Inpatient  Admission Date: 3/16/2023  Hospital Length of Stay: 8 days  Discharge Date and Time:  03/24/2023 12:18 PM  Attending Physician: Smitha att. providers found   Discharging Provider: Vipin Shepherd MD  Primary Care Provider: Gautam Castanon III, MD    Primary Care Team: Networked reference to record PCT     HPI:   Hiro Rodríguez is a 78 y.o. male with a history as  has a past medical history of CHF (congestive heart failure) and Hypertension. who presented to the ED with a Hypotension (At clinic today and found to be hypotensive and is currently being treated for UTI)    Patient presents to the emergency room from PCPs office.  Patient tells me he was at his normal PCP checkup.  Reports they were attempting to get his vital signs and was unable to obtain a blood pressure however noted elevated heart rate.  Patient reported at that time he was feeling faint and weak as if he was going to pass out.  So he was brought here to the emergency room.      Patient further reports over last 3 months he has been in and out of the hospital for various different reasons including kidney stones, orthostatic hypotension, blood infection.  He reports his health continues to decline over these past 3 months.     Denies fever, chills, diaphoresis, SOB, dizziness, HA, chest pain, palpitations, NVD, recent trauma or any other associated symptoms.     Lab and imaging obtained and reviewed. CBC shows WBCs 13.15 MCHC 31.9 RDW 16.0 g% 79.2 l% 13.8. CMP shows glucose 154 alb 3.4. . EKG shows a-fib rate controlled on amiodarone gtt. Lactate 2.3.  On admit, afebrile HR 90 RR 18 /95 Sats 96% on RA. CXR shows cardiomegaly and findings suggestive of mild pulmonary vascular congestion/trace edema.       Per ED provider, patient on presentation noted to be in AFib RVR,  amiodarone drip initiated.  Also noted to have elevated white count with low blood pressure, sepsis protocol initiated.  Given patient recent admission with urosepsis E.Coli sensitive to rocephin, IV ABX initiated.  Will admit to ICU for management of AFib RVR in sepsis with possible urosepsis.                     * No surgery found *      Hospital Course:   78  M Patient with a known history of paroxysmal atrial fibrillation, diabetes, pulmonary hypertension, history of CHF and hypertension, recent [facility] admission 3/1 to 3/8, initially referred due to AFib with RVR with hypotension from cardiology office, found to have E coli in urine and blood culture, moderate left hydroureteronephrosis status post cystoscopy with placement of left ureteral stent, he was discharged to complete 2 days of Bactrim to complete course of antibiotics with outpatient follow-up with Dr. Lange, inpatient admission complicated by orthostatic hypotension treated with IV fluids and compression.  He states at home blood pressure was dropping intermittently on standing, he was not using compression stockings.  He was seen by PCP office and  blood pressure 60/40, repeat 80/40, referred to the ED.   in the ED mild leukocytosis of 13, UA grossly positive, Sotelo was placed, AFib on EKG, intermittent RVR, admitted to the ICU with cardiology consultation.     blood pressure trend is improved, lisinopril discontinued, DC amiodarone drip and increase oral amiodarone, monitoring blood pressure and heart rate, discontinue Sotelo catheter, repeat CT renal protocol given report of sacral pain.Patient was given 180 mg of diltiazem by Cardiology and blood pressure dropped with systolic in the 70s, discontinued, 250 bolus, midodrine 5 mg, cardiology subsequently increase Lopressor to 25 mg t.i.d..  On 03/18, preliminary blood and urine culture with Pseudomonas, repeat CT renal protocol with bilateral nephrocalcinosis, ureteral stent in place, no acute  abnormalities.  Later infectious Disease consulted because of Pseudomonas bacteremia and also UTI with Pseudomonas.  Urology was consulted but did not recommend stent exchange since the last and was recently replaced as per Urology.  Later repeat blood culture is negative.  He needed urinary catheter and later having gross hematuria and Eliquis was held and hematuria improved and Eliquis lower dose 2.5 mg b.i.d. was restarted as per cardiology recommendation .  Patient still have renal stones and seen by Urology again for with a chest treatment at later date but patient wants to go to [facility] with his son and he will see Dr. Valencia when he comes back  PO levaquin given at the discharged to complete the course.       Goals of Care Treatment Preferences:  Code Status: Full Code      Consults:   Consults (From admission, onward)        Status Ordering Provider     Inpatient consult to Urology  Once        Provider:  Yoshi Lange MD    Completed MARY ELLEN MCDOWELL     Inpatient consult to Social Work/Case Management  Once        Provider:  (Not yet assigned)    Completed COBY RED     Inpatient consult to Urology  Once        Provider:  Ijeoma Luna MD    Completed COBY RED     Inpatient consult to Infectious Diseases  Once        Provider:  Coby Red MD    Completed ANGY MARQUEZ     IP consult to case management  Once        Provider:  (Not yet assigned)    Completed BEAU HALLMAN     Inpatient consult to Registered Dietitian/Nutritionist  Once        Provider:  (Not yet assigned)    Completed BEAU HALLMAN     Inpatient consult to Cardiology  Once        Provider:  Austen Davidson MD    Completed EL DELA CRUZ          No new Assessment & Plan notes have been filed under this hospital service since the last note was generated.  Service: Hospital Medicine    Final Active Diagnoses:    Diagnosis Date Noted POA    PRINCIPAL PROBLEM:  Bacteremia-Pseudomonas [R78.81]  03/18/2023 Yes    Complicated UTI (urinary tract infection) [N39.0] 03/18/2023 Yes    Nephrocalcinosis [E83.59, N29] 03/18/2023 Yes     Chronic    Hypomagnesemia [E83.42] 03/18/2023 No    Urinary incontinence [R32] 03/18/2023 Yes     Chronic    Hypotension [I95.9] 03/17/2023 Yes    Orthostatic hypotension [I95.1] 03/17/2023 Yes    Pulmonary hypertension, PASP 61 [I27.20] 03/17/2023 Yes     Chronic    GERD (gastroesophageal reflux disease) [K21.9] 03/16/2023 Yes    Obesity (BMI 30.0-34.9) [E66.9] 03/16/2023 Yes    Pressure injury of buttock, stage 1 [L89.301] 01/23/2023 Yes    Uses walker [Z99.89] 12/15/2022 Not Applicable    Hearing loss [H91.90] 07/18/2021 Yes    Chronic heart failure with preserved ejection fraction [I50.32] 04/21/2021 Yes    Type 2 diabetes mellitus with diabetic polyneuropathy, without long-term current use of insulin, HbA1c 5.8% [E11.42] 01/06/2021 Yes     Chronic    Benign essential tremor [G25.0] 01/06/2021 Yes    Hyperlipidemia [E78.5] 01/06/2021 Yes    Paroxysmal atrial fibrillation with RVR [I48.0] 08/31/2020 Yes    Anticoagulated [Z79.01] 08/31/2020 Not Applicable    Morbid obesity [E66.01] 08/31/2020 Yes      Problems Resolved During this Admission:       Discharged Condition: good    Disposition: Skilled Nursing Facility    Follow Up:   Follow-up Information     Gautam Castanon III, MD Follow up in 1 month(s).    Specialty: Family Medicine  Contact information:  [address]  [address]  [place] [address] [phone]             Yoshi Lange MD Follow up in 1 month(s).    Specialty: Urology  Contact information:  [address]  [address]  [place] [address]  [phone]                       Patient Instructions:      Diet Cardiac     Diet Adult Regular     Activity as tolerated     Activity as tolerated       Significant Diagnostic Studies: Labs: BMP: No results for input(s): GLU, NA, K, CL, CO2, BUN, CREATININE, CALCIUM, MG in the last 48 hours. and CMP  No results for input(s): NA, K, CL, CO2, GLU, BUN, CREATININE, CALCIUM, PROT, ALBUMIN, BILITOT, ALKPHOS, AST, ALT, ANIONGAP, ESTGFRAFRICA, EGFRNONAA in the last 48 hours.    Pending Diagnostic Studies:     None         Medications:  Reconciled Home Medications:      Medication List      START taking these medications    finasteride 5 mg tablet  Commonly known as: PROSCAR  Take 1 tablet (5 mg total) by mouth once daily.     levoFLOXacin 500 MG tablet  Commonly known as: LEVAQUIN  Take 1 tablet (500 mg total) by mouth once daily.        CHANGE how you take these medications    apixaban 2.5 mg Tab  Commonly known as: ELIQUIS  Take 1 tablet (2.5 mg total) by mouth 2 (two) times daily.  Start taking on: March 25, 2023  What changed:   · medication strength  · how much to take     metFORMIN 500 MG ER 24hr tablet  Commonly known as: GLUCOPHAGE-XR  TAKE 1 TABLET BY MOUTH EVERY DAY  What changed: when to take this     rosuvastatin 20 MG tablet  Commonly known as: CRESTOR  TAKE 1 TABLET BY MOUTH EVERY DAY  What changed:   · when to take this  · additional instructions     topiramate 50 MG tablet  Commonly known as: TOPAMAX  TAKE 1 TABLET BY MOUTH EVERY DAY  What changed: when to take this        CONTINUE taking these medications    amiodarone 100 MG Tab  Commonly known as: PACERONE  Take 100 mg by mouth every morning.     cholecalciferol (vitamin D3) 50 mcg (2,000 unit) Tab  Commonly known as: VITAMIN D3  Take 1 tablet by mouth once daily.     cyanocobalamin (vitamin B-12) 1,000 mcg Subl  Place 1,000 mcg under the tongue 2 (two) times a day.     docusate sodium 100 MG capsule  Commonly known as: COLACE  Take 100 mg by mouth 2 (two) times daily.     DULoxetine 30 MG capsule  Commonly known as: CYMBALTA  Take 1 capsule (30 mg total) by mouth 2 (two) times daily.     FIBER-CAPS (CA POLYCARBOPHIL) ORAL  Take 1 tablet by mouth once daily.     HYDROcodone-acetaminophen  mg per tablet  Commonly known as: NORCO  Take 1 tablet  by mouth 4 (four) times daily as needed.     metoprolol tartrate 25 MG tablet  Commonly known as: LOPRESSOR  Take 1 tablet (25 mg total) by mouth 2 (two) times daily.     omega-3 acid ethyl esters 1 gram capsule  Commonly known as: LOVAZA  TAKE 2 CAPSULES BY MOUTH 2 TIMES DAILY.     polyethylene glycol 17 gram/dose powder  Commonly known as: GLYCOLAX  Take 17 g by mouth daily as needed.     potassium chloride 10 MEQ Tbsr  Commonly known as: KLOR-CON  Take 2 tablets (20 mEq total) by mouth once daily.     tamsulosin 0.4 mg Cap  Commonly known as: FLOMAX  Take 2 capsules (0.8 mg total) by mouth once daily.     traZODone 50 MG tablet  Commonly known as: DESYREL  TAKE 1 TABLET BY MOUTH EVERY EVENING.        STOP taking these medications    tolterodine 4 MG 24 hr capsule  Commonly known as: DETROL [place]        ASK your doctor about these medications    OZEMPIC 1 mg/dose (4 mg/3 mL)  Generic drug: semaglutide  Inject 1 mg into the skin every 7 days.            Indwelling Lines/Drains at time of discharge:   Lines/Drains/Airways     Drain  Duration                Urethral Catheter 03/18/23 1904 Coude 16 Fr. 5 days            General: Patient resting comfortably in no acute distress. Appears as stated age. Calm  Eyes: EOM intact. No conjunctivae injection. No scleral icterus.  ENT: Hearing grossly intact. No discharge from ears. No nasal discharge.   CVS: RRR. No LE edema BL.  Lungs: CTA BL, no wheezing or crackles. Good breath sounds. No accessory muscle use. No acute respiratory distress  Neuro: non focal , Follows commands. Responds appropriately    Time spent on the discharge of patient: 33 minutes         Vipin Shepherd MD  Department of Hospital Medicine  [facility]

## 2023-03-24 NOTE — NURSING
Discharge orders in place at start of shift. IV and telemetry monitor removed. AVS reviewed with patient and all questions answered. Paper prescription provided to patient/family and placed in folder with AVS. Pt to be transferred to SNF facility in [place] by madelyn Tan at bedside.

## 2023-04-10 PROBLEM — R65.20 SEVERE SEPSIS: Status: RESOLVED | Noted: 2022-12-31 | Resolved: 2023-04-10

## 2023-04-10 PROBLEM — A41.9 SEVERE SEPSIS: Status: RESOLVED | Noted: 2022-12-31 | Resolved: 2023-04-10

## 2023-04-12 ENCOUNTER — PATIENT OUTREACH (OUTPATIENT)
Dept: ADMINISTRATIVE | Facility: OTHER | Age: 78
End: 2023-04-12
Payer: MEDICARE

## 2023-04-12 DIAGNOSIS — N20.0 URIC ACID NEPHROLITHIASIS: Primary | ICD-10-CM

## 2023-04-12 DIAGNOSIS — N20.1 CALCULUS OF URETER: ICD-10-CM

## 2023-04-12 NOTE — PROGRESS NOTES
CHW - Case Closure    This Community Health Worker spoke to patient via telephone today.     Pt/Caregiver reported: Pt declined needing community resources at this time.    Pt/Caregiver denied any additional needs at this time and agrees with episode closure at this time.  Provided patient with Community Health Worker's contact information and encouraged him/her to contact this Community Health Worker if additional needs arise.

## 2023-04-14 ENCOUNTER — TELEPHONE (OUTPATIENT)
Dept: FAMILY MEDICINE | Facility: CLINIC | Age: 78
End: 2023-04-14
Payer: MEDICARE

## 2023-04-14 NOTE — TELEPHONE ENCOUNTER
----- Message from [facility] sent at 4/14/2023 11:43 AM CDT -----  Type:  Needs Medical Advice    Who Called: Pt   Would the patient rather a call back or a response via [employer]? Callback   Best Call Back Number: [phone]  Additional Information: pt wife requesting callback from office to discuss pt being discharged from rehab center in [place] tomorrow. Would like a Physical Therapy order to be sent in.

## 2023-04-18 ENCOUNTER — HOSPITAL ENCOUNTER (OUTPATIENT)
Dept: RADIOLOGY | Facility: HOSPITAL | Age: 78
Discharge: HOME | End: 2023-04-18
Attending: SPECIALIST
Payer: MEDICARE

## 2023-04-18 DIAGNOSIS — N20.1 CALCULUS OF URETER: ICD-10-CM

## 2023-04-18 DIAGNOSIS — N20.0 URIC ACID NEPHROLITHIASIS: ICD-10-CM

## 2023-04-18 PROCEDURE — 74018 RADEX ABDOMEN 1 VIEW: CPT | Mod: TC

## 2023-04-19 ENCOUNTER — TELEPHONE (OUTPATIENT)
Dept: CARDIOLOGY | Facility: CLINIC | Age: 78
End: 2023-04-19
Payer: MEDICARE

## 2023-04-19 ENCOUNTER — HOSPITAL ENCOUNTER (OUTPATIENT)
Dept: PREADMISSION TESTING | Facility: HOSPITAL | Age: 78
Discharge: HOME | End: 2023-04-19
Attending: SPECIALIST
Payer: MEDICARE

## 2023-04-19 ENCOUNTER — HOSPITAL ENCOUNTER (OUTPATIENT)
Dept: RADIOLOGY | Facility: HOSPITAL | Age: 78
Discharge: HOME | End: 2023-04-19
Attending: SPECIALIST
Payer: MEDICARE

## 2023-04-19 VITALS
BODY MASS INDEX: 34.36 KG/M2 | TEMPERATURE: 98 F | WEIGHT: 240 LBS | DIASTOLIC BLOOD PRESSURE: 75 MMHG | HEIGHT: 70 IN | OXYGEN SATURATION: 96 % | SYSTOLIC BLOOD PRESSURE: 120 MMHG | RESPIRATION RATE: 18 BRPM | HEART RATE: 77 BPM

## 2023-04-19 DIAGNOSIS — R93.89 ABNORMAL CXR: ICD-10-CM

## 2023-04-19 DIAGNOSIS — Z51.81 MONITORING FOR LONG-TERM ANTICOAGULANT USE: ICD-10-CM

## 2023-04-19 DIAGNOSIS — Z79.01 MONITORING FOR LONG-TERM ANTICOAGULANT USE: ICD-10-CM

## 2023-04-19 DIAGNOSIS — Z01.818 PRE-OP TESTING: Primary | ICD-10-CM

## 2023-04-19 DIAGNOSIS — R93.89 ABNORMAL CXR: Primary | ICD-10-CM

## 2023-04-19 PROCEDURE — 71046 X-RAY EXAM CHEST 2 VIEWS: CPT | Mod: TC

## 2023-04-19 RX ORDER — CEFAZOLIN SODIUM 2 G/50ML
2 SOLUTION INTRAVENOUS ONCE
Status: CANCELLED | OUTPATIENT
Start: 2023-04-20

## 2023-04-19 NOTE — DISCHARGE INSTRUCTIONS
To confirm, Your doctor has instructed you that surgery is scheduled for:   Thursday, April 20, 2023    Pre-Op will call the afternoon prior to surgery between 4:00 and 6:00 PM with the final arrival time.      Please report to [facility] via [facility] entrance. Check in at registration desk.    Do not eat or drink anything after midnight the night before your surgery - THIS INCLUDES  WATER, GUM, MINTS AND CANDY.  YOU MAY BRUSH YOUR TEETH BUT DO NOT SWALLOW     TAKE ONLY THESE MEDICATIONS WITH A SMALL SIP OF WATER THE MORNING OF YOUR PROCEDURE:  AMIODARONE/METOPROLOL      ONLY if you are diabetic, check your sugar in the morning before your procedure.       Do not take any diabetic medicines or insulin the morning of surgery .     PLEASE NOTE:  The surgery schedule has many variables which may affect the time of your surgery case.  Family members should be available if your surgery time changes.  Plan to be here the day of your procedure between 4-6 hours.      DO NOT TAKE THESE MEDICATIONS 5-7 DAYS PRIOR to your procedure or per your surgeon's request: ASPIRIN, ALEVE, ADVIL, IBUPROFEN,  ADALBERTO SELTZER, BC , FISH OIL , VITAMIN E, HERBALS  (May take Tylenol)      ONLY if you are prescribed any types of blood thinners such as:  Aspirin, Coumadin, Plavix, Pradaxa, Xarelto, Aggrenox, Effient, Eliquis, Savasya, Brilinta, or any other, ask your surgeon whether you should stop taking them and how long before surgery you should stop.  You may also need to verify with the prescribing physician if it is ok to stop your medication.                                                        IMPORTANT INSTRUCTIONS    Shower the night before AND the morning of your procedure with a Chlorhexidine wash such as Hibiclens or Dial antibacterial soap from the neck down. Do not apply any deodorants, lotions or powders after each shower.  Do not get it on your face or in your eyes.  You may use your own shampoo and face wash. This  helps your skin to be as bacteria free as possible.  DO NOT remove hair from the surgery site.  Do not shave the incision site unless you are given specific instructions to do so.    Sleep in a bed with clean sheets.  Do not sleep with a pet in the bed.   If you wear contact lenses, dentures, hearing aids or glasses, bring a container to put them in during surgery and give to a family member for safe keeping.    Please leave all jewelry, piercing's and valuables at home.     Make arrangements in advance for transportation home by a responsible adult.    You must make arrangements for transportation, TAXI'S, UBER'S OR LYFTS ARE NOT ALLOWED.      If you have any questions about these instructions, call Pre-Op Admit  Nursing at [phone] or the Pre-Op Day Surgery Unit at [phone].

## 2023-04-19 NOTE — CARE UPDATE
Secure chat to LAURA Tafoya, discussed recommendation to stop eliquis 3 days prior to procedure. Informed patient has not held eliquis, final decision as per Dr De Paz. Per Dr Quintanilla-ok to proceed.

## 2023-04-19 NOTE — CARE UPDATE
Spoke to Mariia at Dr Hdez's office to follow up on clearance status, states will call cardiologist's office again for request and will call preadmit to follow up.

## 2023-04-20 ENCOUNTER — ANESTHESIA EVENT (OUTPATIENT)
Dept: SURGERY | Facility: HOSPITAL | Age: 78
End: 2023-04-20
Payer: MEDICARE

## 2023-04-20 ENCOUNTER — HOSPITAL ENCOUNTER (OUTPATIENT)
Facility: HOSPITAL | Age: 78
Discharge: HOME | End: 2023-04-20
Attending: SPECIALIST | Admitting: SPECIALIST
Payer: MEDICARE

## 2023-04-20 ENCOUNTER — ANESTHESIA (OUTPATIENT)
Dept: SURGERY | Facility: HOSPITAL | Age: 78
End: 2023-04-20
Payer: MEDICARE

## 2023-04-20 VITALS
DIASTOLIC BLOOD PRESSURE: 73 MMHG | BODY MASS INDEX: 34.36 KG/M2 | WEIGHT: 240 LBS | SYSTOLIC BLOOD PRESSURE: 132 MMHG | RESPIRATION RATE: 16 BRPM | OXYGEN SATURATION: 95 % | TEMPERATURE: 98 F | HEIGHT: 70 IN | HEART RATE: 71 BPM

## 2023-04-20 DIAGNOSIS — N20.0 KIDNEY STONE: ICD-10-CM

## 2023-04-20 DIAGNOSIS — N20.1 LEFT URETERAL STONE: Primary | ICD-10-CM

## 2023-04-20 DIAGNOSIS — Z01.818 PRE-OP TESTING: ICD-10-CM

## 2023-04-20 PROCEDURE — 25000003 PHARM REV CODE 250: Performed by: NURSE ANESTHETIST, CERTIFIED REGISTERED

## 2023-04-20 PROCEDURE — 63600175 PHARM REV CODE 636 W HCPCS: Performed by: NURSE ANESTHETIST, CERTIFIED REGISTERED

## 2023-04-20 PROCEDURE — C1769 GUIDE WIRE: HCPCS | Performed by: SPECIALIST

## 2023-04-20 PROCEDURE — C2617 STENT, NON-COR, TEM W/O DEL: HCPCS | Performed by: SPECIALIST

## 2023-04-20 PROCEDURE — 37000009 HC ANESTHESIA EA ADD 15 MINS: Performed by: SPECIALIST

## 2023-04-20 PROCEDURE — D9220A PRA ANESTHESIA: ICD-10-PCS | Mod: ANES,,, | Performed by: ANESTHESIOLOGY

## 2023-04-20 PROCEDURE — D9220A PRA ANESTHESIA: ICD-10-PCS | Mod: CRNA,,, | Performed by: NURSE ANESTHETIST, CERTIFIED REGISTERED

## 2023-04-20 PROCEDURE — 88300 SURGICAL PATH GROSS: CPT | Mod: TC

## 2023-04-20 PROCEDURE — 25000003 PHARM REV CODE 250: Performed by: SPECIALIST

## 2023-04-20 PROCEDURE — 82360 CALCULUS ASSAY QUANT: CPT | Performed by: SPECIALIST

## 2023-04-20 PROCEDURE — 71000015 HC POSTOP RECOV 1ST HR: Performed by: SPECIALIST

## 2023-04-20 PROCEDURE — 36000706: Performed by: SPECIALIST

## 2023-04-20 PROCEDURE — D9220A PRA ANESTHESIA: Mod: CRNA,,, | Performed by: NURSE ANESTHETIST, CERTIFIED REGISTERED

## 2023-04-20 PROCEDURE — 25500020 PHARM REV CODE 255: Performed by: SPECIALIST

## 2023-04-20 PROCEDURE — 27201423 OPTIME MED/SURG SUP & DEVICES STERILE SUPPLY: Performed by: SPECIALIST

## 2023-04-20 PROCEDURE — 37000008 HC ANESTHESIA 1ST 15 MINUTES: Performed by: SPECIALIST

## 2023-04-20 PROCEDURE — 36000707: Performed by: SPECIALIST

## 2023-04-20 PROCEDURE — 71000033 HC RECOVERY, INTIAL HOUR: Performed by: SPECIALIST

## 2023-04-20 PROCEDURE — D9220A PRA ANESTHESIA: Mod: ANES,,, | Performed by: ANESTHESIOLOGY

## 2023-04-20 DEVICE — STENT URETERAL FIRM 4.8FX26 ASCERTA: Type: IMPLANTABLE DEVICE | Site: URETER | Status: FUNCTIONAL

## 2023-04-20 RX ORDER — ONDANSETRON 2 MG/ML
INJECTION INTRAMUSCULAR; INTRAVENOUS
Status: DISCONTINUED | OUTPATIENT
Start: 2023-04-20 | End: 2023-04-20

## 2023-04-20 RX ORDER — CEFAZOLIN SODIUM 2 G/50ML
2 SOLUTION INTRAVENOUS ONCE
Status: DISCONTINUED | OUTPATIENT
Start: 2023-04-20 | End: 2023-04-20 | Stop reason: HOSPADM

## 2023-04-20 RX ORDER — SULFAMETHOXAZOLE AND TRIMETHOPRIM 800; 160 MG/1; MG/1
1 TABLET ORAL DAILY
Qty: 7 TABLET | Refills: 0 | Status: ON HOLD
Start: 2023-04-20 | End: 2023-04-30 | Stop reason: HOSPADM

## 2023-04-20 RX ORDER — LIDOCAINE HYDROCHLORIDE 20 MG/ML
JELLY TOPICAL
Status: DISCONTINUED | OUTPATIENT
Start: 2023-04-20 | End: 2023-04-20 | Stop reason: HOSPADM

## 2023-04-20 RX ORDER — PROPOFOL 10 MG/ML
VIAL (ML) INTRAVENOUS
Status: DISCONTINUED | OUTPATIENT
Start: 2023-04-20 | End: 2023-04-20

## 2023-04-20 RX ORDER — HYDROCODONE BITARTRATE AND ACETAMINOPHEN 5; 325 MG/1; MG/1
1 TABLET ORAL EVERY 6 HOURS PRN
Qty: 12 TABLET | Refills: 0
Start: 2023-04-20 | End: 2023-11-07

## 2023-04-20 RX ORDER — FAMOTIDINE 10 MG/ML
INJECTION INTRAVENOUS
Status: DISCONTINUED | OUTPATIENT
Start: 2023-04-20 | End: 2023-04-20

## 2023-04-20 RX ADMIN — ONDANSETRON 4 MG: 2 INJECTION INTRAMUSCULAR; INTRAVENOUS at 02:04

## 2023-04-20 RX ADMIN — SODIUM CHLORIDE, SODIUM LACTATE, POTASSIUM CHLORIDE, AND CALCIUM CHLORIDE: .6; .31; .03; .02 INJECTION, SOLUTION INTRAVENOUS at 02:04

## 2023-04-20 RX ADMIN — FAMOTIDINE 20 MG: 10 INJECTION, SOLUTION INTRAVENOUS at 02:04

## 2023-04-20 RX ADMIN — PROPOFOL 150 MG: 10 INJECTION, EMULSION INTRAVENOUS at 02:04

## 2023-04-20 NOTE — TRANSFER OF CARE
"Anesthesia Transfer of Care Note    Patient: Hiro Rodríguez    Procedure(s) Performed: Procedure(s) (LRB):  CYSTOURETEROSCOPY, WITH HOLMIUM LASER LITHOTRIPSY OF URETERAL CALCULUS AND STENT INSERTION (Left)  PYELOGRAM, RETROGRADE  REMOVAL, CALCULUS, URETER (Left)    Patient location: PACU    Anesthesia Type: general    Transport from OR: Transported from OR on room air with adequate spontaneous ventilation. Continuous CVP monitoring in transport    Post pain: adequate analgesia    Post assessment: no apparent anesthetic complications    Post vital signs: stable    Level of consciousness: awake    Nausea/Vomiting: no nausea/vomiting    Complications: none    Transfer of care protocol was followed      Last vitals:   Visit Vitals  BP (!) 135/95   Pulse 71   Temp 36.9 °C (98.4 °F) (Oral)   Resp 16   Ht 5' 10" (1.778 m)   Wt 108.9 kg (240 lb)   SpO2 95%   BMI 34.44 kg/m²     "

## 2023-04-20 NOTE — ANESTHESIA POSTPROCEDURE EVALUATION
Anesthesia Post Evaluation    Patient: Hiro Rodríguez    Procedure(s) Performed: Procedure(s) (LRB):  CYSTOURETEROSCOPY, WITH HOLMIUM LASER LITHOTRIPSY OF URETERAL CALCULUS AND STENT INSERTION (Left)  PYELOGRAM, RETROGRADE  REMOVAL, CALCULUS, URETER (Left)    Final Anesthesia Type: general      Patient location during evaluation: PACU  Patient participation: Yes- Able to Participate  Level of consciousness: awake and alert and oriented  Post-procedure vital signs: reviewed and stable  Pain management: adequate  Airway patency: patent    PONV status at discharge: No PONV  Anesthetic complications: no      Cardiovascular status: blood pressure returned to baseline, hemodynamically stable and stable  Respiratory status: unassisted, spontaneous ventilation and room air  Hydration status: euvolemic  Follow-up not needed.          Vitals Value Taken Time   /73 04/20/23 1614   Temp 36.7 °C (98 °F) 04/20/23 1614   Pulse 71 04/20/23 1614   Resp 16 04/20/23 1614   SpO2 95 % 04/20/23 1614         Event Time   Out of Recovery 16:11:22         Pain/Hossein Score: Hossein Score: 10 (4/20/2023  4:14 PM)

## 2023-04-20 NOTE — OP NOTE
Operative Note         SUMMARY     Surgery Date:  4/20/2023     Assistant:     Indications:  78-year-old male with left ureteral stone  Here for stone extraction      Pre-op Diagnosis:   Left ureteral stone    Post-op Diagnosis:  Same    Procedure:  Ureteroscopy with holmium laser lithotripsy  Stone basket and stent replacement    Anesthesia: General    Description of Procedure:   Patient brought to cystoscopy suite.  Placed in the cystoscopy position.  Patient is prepped and draped.  Anesthesia is given.  We enter the urinary bladder with the 21 fr cystoscope.   No lesions found in the bladder  We removed the double-J stent  We insert a Glidewire  We then introduced a long ureteral scope up to the UPJ  The stone is identified at the UPJ  I am able to secured the stone with a 3 wire basket  It is too large to pass  I used a holmium laser 300 micron fiber at 1 joule of energy  Fracture the stone into multiple pieces  Pieces were then removed with a basket  Once all the stone material was removed we removed the telescope  A double-J stent is placed over the wire  Taken recovery in good condition    Findings/Key Components:          Estimated Blood Loss:     Minimal

## 2023-04-20 NOTE — ANESTHESIA PREPROCEDURE EVALUATION
04/20/2023  Hiro Rodríguez is a 78 y.o., male.      Patient Active Problem List   Diagnosis    Type 2 diabetes mellitus with diabetic nephropathy, without long-term current use of insulin    Morbid obesity    Anticoagulated    Paroxysmal atrial fibrillation with RVR    Aspirin long-term use    Hyperlipidemia    Type 2 diabetes mellitus with diabetic polyneuropathy, without long-term current use of insulin, HbA1c 5.8%    Benign essential tremor    Chronic heart failure with preserved ejection fraction    Hearing loss    Insomnia    Skin cancer    Status post knee replacement    BMI 38.0-38.9,adult    Subdural hematoma caused by concussion    Subarachnoid bleed    Closed fracture of left hip    Prerenal azotemia    Subdural hemorrhage following injury without open intracranial wound and with no loss of consciousness    Uses walker    Pressure injury of buttock, stage 1    Urinary retention    Urinary tract infection associated with indwelling urethral catheter    Discharge planning issues    Hypokalemia    Hard to intubate    Bacteremia due to Escherichia coli    Hydronephrosis of left kidney    Pyelonephritis of left kidney    GERD (gastroesophageal reflux disease)    Obesity (BMI 30.0-34.9)    Hypotension    Orthostatic hypotension    Pulmonary hypertension, PASP 61    Bacteremia-Pseudomonas    Complicated UTI (urinary tract infection)    Nephrocalcinosis    Hypomagnesemia    Urinary incontinence       Past Surgical History:   Procedure Laterality Date    CYSTOSCOPY W/ URETERAL STENT PLACEMENT N/A 03/02/2023    Procedure: CYSTOSCOPY, WITH URETERAL STENT INSERTION;  Surgeon: Yoshi Lange MD;  Location: [facility];  Service: Urology;  Laterality: N/A;    FRACTURE SURGERY      INTRAMEDULLARY RODDING OF TROCHANTER OF FEMUR Left 11/10/2022    Procedure: INSERTION,  ITRAMEDULLARY SANTI, FEMUR, TROCHANTER;  Surgeon: Richard Phoenix MD;  Location: [facility] OR;  Service: Orthopedics;  Laterality: Left;    LUMBAR DISCECTOMY  1980    L4-5    REMOVAL, CALCULUS, BLADDER  03/02/2023    Procedure: REMOVAL, CALCULUS, BLADDER;  Surgeon: Yoshi Lange MD;  Location: [facility] OR;  Service: Urology;;    TOTAL KNEE ARTHROPLASTY Right 2017    TOTAL KNEE ARTHROPLASTY Left 2018        Tobacco Use:  The patient  reports that he has never smoked. He has never used smokeless tobacco.     Results for orders placed or performed during the hospital encounter of 03/16/23   EKG 12-lead    Collection Time: 03/16/23  3:25 PM    Narrative    Test Reason : R53.1,    Vent. Rate : 097 BPM     Atrial Rate : 104 BPM     P-R Int : 000 ms          QRS Dur : 094 ms      QT Int : 342 ms       P-R-T Axes : 000 033 194 degrees     QTc Int : 434 ms    Atrial fibrillation  Incomplete right bundle branch block  ST and T wave abnormality, consider inferolateral ischemia  Abnormal ECG  When compared with ECG of 01-MAR-2023 15:27,  Incomplete right bundle branch block is now Present  Criteria for Septal infarct are no longer Present  Confirmed by Justus Mendez MD ([id number]) on 3/17/2023 9:01:58 PM    Referred By: STEF   SELF           Confirmed By:Justus Mendez MD             Lab Results   Component Value Date    WBC 11.35 04/19/2023    HGB 10.5 (L) 04/19/2023    HCT 33.8 (L) 04/19/2023    MCV 98 04/19/2023     04/19/2023     BMP  Lab Results   Component Value Date     04/19/2023    K 4.9 04/19/2023     04/19/2023    CO2 29 04/19/2023    BUN 21 04/19/2023    CREATININE 0.7 04/19/2023    CALCIUM 8.6 (L) 04/19/2023    ANIONGAP 8 04/19/2023     (H) 04/19/2023     (H) 03/20/2023     (H) 03/19/2023       Results for orders placed during the hospital encounter of 11/08/22    Echo    Interpretation Summary  · The left ventricle is normal in size with moderate concentric  "hypertrophy and normal systolic function.  · The estimated ejection fraction is 70%.  · Normal left ventricular diastolic function.  · The sinuses of Valsalva is mildly dilated.  · The ascending aorta is dilated. Recommend CT scan to evaluate entire aorta for further evaluation.  · Mild tricuspid regurgitation.  · Normal central venous pressure (3 mmHg).  · The estimated PA systolic pressure is 61 mmHg.  · There is pulmonary hypertension.  · Unable to visualize right ventricle and right atrium but they both grossly appear to be dilated with RV function mildly reduced.              Pre-op Assessment    I have reviewed the Patient Summary Reports.     I have reviewed the Nursing Notes. I have reviewed the NPO Status.   I have reviewed the Medications.     Review of Systems  Anesthesia Hx:  No problems with previous Anesthesia  Denies Family Hx of Anesthesia complications.  Personal Hx of Anesthesia complications  Difficult Intubation ("hard to intubate" noted in chart, pt has been intubated twice in the last few years with a video laryngoscope with no difficulty), according to patient history, glidescope used successfully   Social:  Non-Smoker    Hematology/Oncology:  Hematology Normal        Cardiovascular:   Hypertension, well controlled Dysrhythmias (chronic A fib, rate controlled, on Eliquis) CHF (hx CHF, EF , no recent exacerbations)    Pulmonary:  Pulmonary Normal    Renal/:   Chronic Renal Disease (diabetic nephropathy), CKD renal calculi BPH    Hepatic/GI:   GERD, well controlled    Musculoskeletal:  Musculoskeletal Normal    Neurological:   Neuromuscular Disease, (diabetic neuropathy) Remote hx of subdural hematoma   Endocrine:   Diabetes, well controlled, type 2        Physical Exam  General: Well nourished, Cooperative, Alert and Oriented    Airway:  Mallampati: III / II  Mouth Opening: Normal  TM Distance: Normal  Tongue: Normal  Neck ROM: Normal ROM    Dental:  Dentures  Upper full " dentures  Chest/Lungs:  Clear to auscultation    Heart:  Rate: Normal  Rhythm: Irregularly Irregular  Sounds: Normal    Abdomen:  Normal, Soft, Nontender        Anesthesia Plan  Type of Anesthesia, risks & benefits discussed:    Anesthesia Type: Gen Supraglottic Airway  Intra-op Monitoring Plan: Standard ASA Monitors  Post Op Pain Control Plan: multimodal analgesia and IV/PO Opioids PRN  Induction:  IV  Airway Plan: Video, Post-Induction  Informed Consent: Informed consent signed with the Patient and all parties understand the risks and agree with anesthesia plan.  All questions answered.   ASA Score: 3  Anesthesia Plan Notes:   General LMA - have video laryngoscope available  IV tylenol  Zofran     Ready For Surgery From Anesthesia Perspective.     .

## 2023-04-20 NOTE — DISCHARGE SUMMARY
Patient comes in for stone removal via ureteroscopy  And stone removal    Procedure is done w no complication    After a brief stay in recovery, patient is discharged in good condition    Meds given:  Lortab Bactrim    F/u office:  1 week with LITTLE

## 2023-04-20 NOTE — H&P
Counts include 234 beds at the [facility]  History & Physical    SUBJECTIVE:     Chief Complaint/Reason for Admission:     History of Present Illness:  Patient is a 78 y.o. male presents with negative ureteral stone  Here for extraction    PTA Medications   Medication Sig    amiodarone (PACERONE) 100 MG Tab Take 100 mg by mouth every morning.    apixaban (ELIQUIS) 2.5 mg Tab Take 1 tablet (2.5 mg total) by mouth 2 (two) times daily.    calcium polycarbophil (FIBER-CAPS, CA POLYCARBOPHIL, ORAL) Take 1 tablet by mouth once daily.    cholecalciferol, vitamin D3, (VITAMIN D3) 50 mcg (2,000 unit) Tab Take 1 tablet by mouth once daily.    cyanocobalamin, vitamin B-12, 1,000 mcg Subl Place 1,000 mcg under the tongue 2 (two) times a day.    docusate sodium (COLACE) 100 MG capsule Take 100 mg by mouth 2 (two) times daily.    DULoxetine (CYMBALTA) 30 MG capsule Take 1 capsule (30 mg total) by mouth 2 (two) times daily.    finasteride (PROSCAR) 5 mg tablet Take 1 tablet (5 mg total) by mouth once daily.    HYDROcodone-acetaminophen (NORCO)  mg per tablet Take 1 tablet by mouth 4 (four) times daily as needed.    levoFLOXacin (LEVAQUIN) 500 MG tablet Take 1 tablet (500 mg total) by mouth once daily.    metFORMIN (GLUCOPHAGE-XR) 500 MG ER 24hr tablet TAKE 1 TABLET BY MOUTH EVERY DAY (Patient taking differently: Take 500 mg by mouth Daily.)    metoprolol tartrate (LOPRESSOR) 25 MG tablet Take 1 tablet (25 mg total) by mouth 2 (two) times daily.    omega-3 acid ethyl esters (LOVAZA) 1 gram capsule TAKE 2 CAPSULES BY MOUTH 2 TIMES DAILY. (Patient taking differently: Take 2 g by mouth 2 (two) times daily.)    polyethylene glycol (GLYCOLAX) 17 gram/dose powder Take 17 g by mouth daily as needed.    potassium chloride SA (KLOR-CON) 10 MEQ TbSR Take 2 tablets (20 mEq total) by mouth once daily.    rosuvastatin (CRESTOR) 20 MG tablet TAKE 1 TABLET BY MOUTH EVERY DAY (Patient taking differently: Take 20 mg by mouth once daily. TAKE 1 TABLET BY  MOUTH EVERY DAY)    tamsulosin (FLOMAX) 0.4 mg Cap Take 2 capsules (0.8 mg total) by mouth once daily.    topiramate (TOPAMAX) 50 MG tablet TAKE 1 TABLET BY MOUTH EVERY DAY (Patient taking differently: Take 50 mg by mouth once daily.)    traZODone (DESYREL) 50 MG tablet TAKE 1 TABLET BY MOUTH EVERY EVENING. (Patient taking differently: Take 50 mg by mouth every evening.)       Review of patient's allergies indicates:  No Known Allergies    Past Medical History:   Diagnosis Date    CHF (congestive heart failure)     Hypertension     Mixed hyperlipidemia     Paroxysmal atrial fibrillation 2013     Past Surgical History:   Procedure Laterality Date    CYSTOSCOPY W/ URETERAL STENT PLACEMENT N/A 03/02/2023    Procedure: CYSTOSCOPY, WITH URETERAL STENT INSERTION;  Surgeon: Yoshi Lange MD;  Location: [facility];  Service: Urology;  Laterality: N/A;    FRACTURE SURGERY      INTRAMEDULLARY RODDING OF TROCHANTER OF FEMUR Left 11/10/2022    Procedure: INSERTION, ITRAMEDULLARY SANTI, FEMUR, TROCHANTER;  Surgeon: Richard Phoenix MD;  Location: [facility] OR;  Service: Orthopedics;  Laterality: Left;    LUMBAR DISCECTOMY  1980    L4-5    REMOVAL, CALCULUS, BLADDER  03/02/2023    Procedure: REMOVAL, CALCULUS, BLADDER;  Surgeon: Yoshi Lange MD;  Location: [facility];  Service: Urology;;    TOTAL KNEE ARTHROPLASTY Right 2017    TOTAL KNEE ARTHROPLASTY Left 2018     History reviewed. No pertinent family history.  Social History     Tobacco Use    Smoking status: Never    Smokeless tobacco: Never   Substance Use Topics    Alcohol use: Yes     Alcohol/week: 1.0 standard drink     Types: 1 Shots of liquor per week     Comment: rarely    Drug use: Not Currently        Review of Systems:  Negative    OBJECTIVE:     Vital Signs (Most Recent):  Temp: 98.4 °F (36.9 °C) (04/20/23 1031)  Pulse: 71 (04/20/23 1031)  Resp: 16 (04/20/23 1031)  BP: (!) 135/95 (04/20/23 1033)  SpO2: 95 % (04/20/23 1031)    Inpatient Medications:  Current  Facility-Administered Medications   Medication Dose Route Frequency Provider Last Rate Last Admin    cefazolin (ANCEF) 2 gram in dextrose 5% 50 mL IVPB (premix)  2 g Intravenous Once Paper Order              ASSESSMENT/PLAN:   Ureteral stone      Here for ureteroscopy and stone extraction

## 2023-04-20 NOTE — PLAN OF CARE
1614- pt is alert and oriented breathing even unlabored with no complaints of pain. Pt is currently sitting up drinking fluids. 1645- pt voided and tolerated po intake with no n/v. Pt wheeled to his vehicle with his friend ruddy transporting home.

## 2023-04-25 DIAGNOSIS — M54.9 BACK PAIN, UNSPECIFIED BACK LOCATION, UNSPECIFIED BACK PAIN LATERALITY, UNSPECIFIED CHRONICITY: Primary | ICD-10-CM

## 2023-04-28 ENCOUNTER — TELEPHONE (OUTPATIENT)
Dept: FAMILY MEDICINE | Facility: CLINIC | Age: 78
End: 2023-04-28

## 2023-04-28 ENCOUNTER — OFFICE VISIT (OUTPATIENT)
Dept: FAMILY MEDICINE | Facility: CLINIC | Age: 78
End: 2023-04-28
Payer: MEDICARE

## 2023-04-28 ENCOUNTER — HOSPITAL ENCOUNTER (OUTPATIENT)
Facility: HOSPITAL | Age: 78
Discharge: HOME HEALTH | End: 2023-04-30
Attending: EMERGENCY MEDICINE | Admitting: INTERNAL MEDICINE
Payer: MEDICARE

## 2023-04-28 VITALS
SYSTOLIC BLOOD PRESSURE: 138 MMHG | HEIGHT: 70 IN | DIASTOLIC BLOOD PRESSURE: 78 MMHG | TEMPERATURE: 98 F | BODY MASS INDEX: 36.31 KG/M2 | WEIGHT: 253.63 LBS | OXYGEN SATURATION: 98 % | HEART RATE: 73 BPM | RESPIRATION RATE: 14 BRPM

## 2023-04-28 DIAGNOSIS — I50.32 CHRONIC HEART FAILURE WITH PRESERVED EJECTION FRACTION: ICD-10-CM

## 2023-04-28 DIAGNOSIS — I10 HYPERTENSION, ESSENTIAL: ICD-10-CM

## 2023-04-28 DIAGNOSIS — R06.00 PND (PAROXYSMAL NOCTURNAL DYSPNEA): ICD-10-CM

## 2023-04-28 DIAGNOSIS — I50.33 ACUTE ON CHRONIC HEART FAILURE WITH PRESERVED EJECTION FRACTION: Primary | ICD-10-CM

## 2023-04-28 DIAGNOSIS — I50.9 ACUTE ON CHRONIC CONGESTIVE HEART FAILURE, UNSPECIFIED HEART FAILURE TYPE: ICD-10-CM

## 2023-04-28 DIAGNOSIS — R07.9 CHEST PAIN, UNSPECIFIED TYPE: ICD-10-CM

## 2023-04-28 DIAGNOSIS — R78.81 BACTEREMIA DUE TO ESCHERICHIA COLI: ICD-10-CM

## 2023-04-28 DIAGNOSIS — I50.9 CONGESTIVE HEART FAILURE, UNSPECIFIED HF CHRONICITY, UNSPECIFIED HEART FAILURE TYPE: ICD-10-CM

## 2023-04-28 DIAGNOSIS — R06.02 SHORTNESS OF BREATH: ICD-10-CM

## 2023-04-28 DIAGNOSIS — B96.20 BACTEREMIA DUE TO ESCHERICHIA COLI: ICD-10-CM

## 2023-04-28 DIAGNOSIS — Z09 HOSPITAL DISCHARGE FOLLOW-UP: Primary | ICD-10-CM

## 2023-04-28 DIAGNOSIS — I48.0 PAROXYSMAL ATRIAL FIBRILLATION WITH RVR: ICD-10-CM

## 2023-04-28 DIAGNOSIS — R53.81 PHYSICAL DECONDITIONING: ICD-10-CM

## 2023-04-28 LAB
ALBUMIN SERPL BCP-MCNC: 2.8 G/DL (ref 3.5–5.2)
ALP SERPL-CCNC: 76 U/L (ref 55–135)
ALT SERPL W/O P-5'-P-CCNC: 17 U/L (ref 10–44)
ANION GAP SERPL CALC-SCNC: 8 MMOL/L (ref 8–16)
AST SERPL-CCNC: 20 U/L (ref 10–40)
BASOPHILS # BLD AUTO: 0.02 K/UL (ref 0–0.2)
BASOPHILS NFR BLD: 0.2 % (ref 0–1.9)
BILIRUB SERPL-MCNC: 0.5 MG/DL (ref 0.1–1)
BNP SERPL-MCNC: 289 PG/ML (ref 0–99)
BUN SERPL-MCNC: 14 MG/DL (ref 8–23)
CALCIUM OXALATE DIHYDRATE MFR STONE IR: 30 %
CALCIUM SERPL-MCNC: 8 MG/DL (ref 8.7–10.5)
CALCULI COMPOSITION: NORMAL
CALCULI DISCLAIMER: NORMAL
CALCULI, PLEASE NOTE: NORMAL
CHLORIDE SERPL-SCNC: 106 MMOL/L (ref 95–110)
CO2 SERPL-SCNC: 26 MMOL/L (ref 23–29)
COLOR STONE: NORMAL
COM MFR STONE: 70 %
CREAT SERPL-MCNC: 0.8 MG/DL (ref 0.5–1.4)
DIFFERENTIAL METHOD: ABNORMAL
EOSINOPHIL # BLD AUTO: 0 K/UL (ref 0–0.5)
EOSINOPHIL NFR BLD: 0.2 % (ref 0–8)
ERYTHROCYTE [DISTWIDTH] IN BLOOD BY AUTOMATED COUNT: 15.4 % (ref 11.5–14.5)
EST. GFR  (NO RACE VARIABLE): >60 ML/MIN/1.73 M^2
GLUCOSE SERPL-MCNC: 230 MG/DL (ref 70–110)
HCT VFR BLD AUTO: 32.3 % (ref 40–54)
HGB BLD-MCNC: 10 G/DL (ref 14–18)
IMM GRANULOCYTES # BLD AUTO: 0.13 K/UL (ref 0–0.04)
IMM GRANULOCYTES NFR BLD AUTO: 1.1 % (ref 0–0.5)
LABORATORY COMMENT REPORT: NORMAL
LABORATORY COMMENT REPORT: NORMAL
LYMPHOCYTES # BLD AUTO: 1.5 K/UL (ref 1–4.8)
LYMPHOCYTES NFR BLD: 13 % (ref 18–48)
MAGNESIUM SERPL-MCNC: 1.6 MG/DL (ref 1.6–2.6)
MCH RBC QN AUTO: 29.8 PG (ref 27–31)
MCHC RBC AUTO-ENTMCNC: 31 G/DL (ref 32–36)
MCV RBC AUTO: 96 FL (ref 82–98)
MONOCYTES # BLD AUTO: 0.8 K/UL (ref 0.3–1)
MONOCYTES NFR BLD: 6.7 % (ref 4–15)
NEUTROPHILS # BLD AUTO: 9.2 K/UL (ref 1.8–7.7)
NEUTROPHILS NFR BLD: 78.8 % (ref 38–73)
NRBC BLD-RTO: 0 /100 WBC
PHOTO: NORMAL
PLATELET # BLD AUTO: 300 K/UL (ref 150–450)
PMV BLD AUTO: 10.1 FL (ref 9.2–12.9)
POTASSIUM SERPL-SCNC: 4.1 MMOL/L (ref 3.5–5.1)
PROT SERPL-MCNC: 6.3 G/DL (ref 6–8.4)
RBC # BLD AUTO: 3.36 M/UL (ref 4.6–6.2)
SIZE STONE: NORMAL MM
SODIUM SERPL-SCNC: 140 MMOL/L (ref 136–145)
TROPONIN I SERPL HS-MCNC: 6.4 PG/ML (ref 0–14.9)
WBC # BLD AUTO: 11.66 K/UL (ref 3.9–12.7)
WT STONE: 189 MG

## 2023-04-28 PROCEDURE — 3288F PR FALLS RISK ASSESSMENT DOCUMENTED: ICD-10-PCS | Mod: CPTII,S$GLB,,

## 2023-04-28 PROCEDURE — 83735 ASSAY OF MAGNESIUM: CPT | Performed by: EMERGENCY MEDICINE

## 2023-04-28 PROCEDURE — G0378 HOSPITAL OBSERVATION PER HR: HCPCS

## 2023-04-28 PROCEDURE — 1100F PR PT FALLS ASSESS DOC 2+ FALLS/FALL W/INJURY/YR: ICD-10-PCS | Mod: CPTII,S$GLB,,

## 2023-04-28 PROCEDURE — 1126F AMNT PAIN NOTED NONE PRSNT: CPT | Mod: CPTII,S$GLB,,

## 2023-04-28 PROCEDURE — 3075F SYST BP GE 130 - 139MM HG: CPT | Mod: CPTII,S$GLB,,

## 2023-04-28 PROCEDURE — 1126F PR PAIN SEVERITY QUANTIFIED, NO PAIN PRESENT: ICD-10-PCS | Mod: CPTII,S$GLB,,

## 2023-04-28 PROCEDURE — 96374 THER/PROPH/DIAG INJ IV PUSH: CPT | Mod: 59

## 2023-04-28 PROCEDURE — 93010 ELECTROCARDIOGRAM REPORT: CPT | Mod: S$GLB,,, | Performed by: INTERNAL MEDICINE

## 2023-04-28 PROCEDURE — 93010 EKG 12-LEAD: ICD-10-PCS | Mod: S$GLB,,, | Performed by: INTERNAL MEDICINE

## 2023-04-28 PROCEDURE — 93005 EKG 12-LEAD: ICD-10-PCS | Mod: S$GLB,,,

## 2023-04-28 PROCEDURE — 3078F DIAST BP <80 MM HG: CPT | Mod: CPTII,S$GLB,,

## 2023-04-28 PROCEDURE — 25000003 PHARM REV CODE 250: Performed by: NURSE PRACTITIONER

## 2023-04-28 PROCEDURE — 93005 ELECTROCARDIOGRAM TRACING: CPT | Mod: S$GLB,,,

## 2023-04-28 PROCEDURE — 84484 ASSAY OF TROPONIN QUANT: CPT | Performed by: EMERGENCY MEDICINE

## 2023-04-28 PROCEDURE — 63600175 PHARM REV CODE 636 W HCPCS: Performed by: EMERGENCY MEDICINE

## 2023-04-28 PROCEDURE — 99214 OFFICE O/P EST MOD 30 MIN: CPT | Mod: S$GLB,,,

## 2023-04-28 PROCEDURE — 1100F PTFALLS ASSESS-DOCD GE2>/YR: CPT | Mod: CPTII,S$GLB,,

## 2023-04-28 PROCEDURE — 85025 COMPLETE CBC W/AUTO DIFF WBC: CPT | Mod: 91 | Performed by: EMERGENCY MEDICINE

## 2023-04-28 PROCEDURE — 83880 ASSAY OF NATRIURETIC PEPTIDE: CPT | Mod: 91 | Performed by: EMERGENCY MEDICINE

## 2023-04-28 PROCEDURE — 93005 ELECTROCARDIOGRAM TRACING: CPT | Performed by: GENERAL PRACTICE

## 2023-04-28 PROCEDURE — 3075F PR MOST RECENT SYSTOLIC BLOOD PRESS GE 130-139MM HG: ICD-10-PCS | Mod: CPTII,S$GLB,,

## 2023-04-28 PROCEDURE — 3078F PR MOST RECENT DIASTOLIC BLOOD PRESSURE < 80 MM HG: ICD-10-PCS | Mod: CPTII,S$GLB,,

## 2023-04-28 PROCEDURE — 99285 EMERGENCY DEPT VISIT HI MDM: CPT | Mod: 25

## 2023-04-28 PROCEDURE — 96376 TX/PRO/DX INJ SAME DRUG ADON: CPT | Mod: 59

## 2023-04-28 PROCEDURE — 93010 ELECTROCARDIOGRAM REPORT: CPT | Mod: ,,, | Performed by: GENERAL PRACTICE

## 2023-04-28 PROCEDURE — 25500020 PHARM REV CODE 255: Performed by: INTERNAL MEDICINE

## 2023-04-28 PROCEDURE — 99214 PR OFFICE/OUTPT VISIT, EST, LEVL IV, 30-39 MIN: ICD-10-PCS | Mod: S$GLB,,,

## 2023-04-28 PROCEDURE — 80053 COMPREHEN METABOLIC PANEL: CPT | Mod: 91 | Performed by: EMERGENCY MEDICINE

## 2023-04-28 PROCEDURE — 3288F FALL RISK ASSESSMENT DOCD: CPT | Mod: CPTII,S$GLB,,

## 2023-04-28 PROCEDURE — 93010 EKG 12-LEAD: ICD-10-PCS | Mod: ,,, | Performed by: GENERAL PRACTICE

## 2023-04-28 PROCEDURE — 63600175 PHARM REV CODE 636 W HCPCS: Performed by: NURSE PRACTITIONER

## 2023-04-28 RX ORDER — HYDROCODONE BITARTRATE AND ACETAMINOPHEN 10; 325 MG/1; MG/1
1 TABLET ORAL EVERY 4 HOURS PRN
Status: DISCONTINUED | OUTPATIENT
Start: 2023-04-28 | End: 2023-04-30 | Stop reason: HOSPADM

## 2023-04-28 RX ORDER — POLYETHYLENE GLYCOL 3350 17 G/17G
17 POWDER, FOR SOLUTION ORAL DAILY
Status: DISCONTINUED | OUTPATIENT
Start: 2023-04-29 | End: 2023-04-30 | Stop reason: HOSPADM

## 2023-04-28 RX ORDER — FUROSEMIDE 10 MG/ML
40 INJECTION INTRAMUSCULAR; INTRAVENOUS
Status: DISCONTINUED | OUTPATIENT
Start: 2023-04-28 | End: 2023-04-29

## 2023-04-28 RX ORDER — CHOLECALCIFEROL (VITAMIN D3) 50 MCG
1 TABLET ORAL DAILY
Status: DISCONTINUED | OUTPATIENT
Start: 2023-04-29 | End: 2023-04-30 | Stop reason: HOSPADM

## 2023-04-28 RX ORDER — HYDROCODONE BITARTRATE AND ACETAMINOPHEN 5; 325 MG/1; MG/1
1 TABLET ORAL EVERY 4 HOURS PRN
Status: DISCONTINUED | OUTPATIENT
Start: 2023-04-28 | End: 2023-04-30 | Stop reason: HOSPADM

## 2023-04-28 RX ORDER — TRAZODONE HYDROCHLORIDE 50 MG/1
50 TABLET ORAL NIGHTLY
Status: DISCONTINUED | OUTPATIENT
Start: 2023-04-28 | End: 2023-04-30 | Stop reason: HOSPADM

## 2023-04-28 RX ORDER — AMITRIPTYLINE HYDROCHLORIDE 10 MG/1
20 TABLET, FILM COATED ORAL NIGHTLY
COMMUNITY
Start: 2023-04-25 | End: 2023-06-02

## 2023-04-28 RX ORDER — PROPRANOLOL HYDROCHLORIDE 20 MG/5ML
1 SOLUTION ORAL 2 TIMES DAILY
COMMUNITY
Start: 2022-12-08 | End: 2023-04-28

## 2023-04-28 RX ORDER — FUROSEMIDE 10 MG/ML
40 INJECTION INTRAMUSCULAR; INTRAVENOUS
Status: COMPLETED | OUTPATIENT
Start: 2023-04-28 | End: 2023-04-28

## 2023-04-28 RX ORDER — ATORVASTATIN CALCIUM 40 MG/1
80 TABLET, FILM COATED ORAL NIGHTLY
Status: DISCONTINUED | OUTPATIENT
Start: 2023-04-28 | End: 2023-04-30 | Stop reason: HOSPADM

## 2023-04-28 RX ORDER — POTASSIUM CHLORIDE 20 MEQ/1
20 TABLET, EXTENDED RELEASE ORAL DAILY
Status: DISCONTINUED | OUTPATIENT
Start: 2023-04-29 | End: 2023-04-30 | Stop reason: HOSPADM

## 2023-04-28 RX ORDER — TOPIRAMATE 25 MG/1
50 TABLET ORAL DAILY
Status: DISCONTINUED | OUTPATIENT
Start: 2023-04-29 | End: 2023-04-30 | Stop reason: HOSPADM

## 2023-04-28 RX ORDER — AMIODARONE HYDROCHLORIDE 100 MG/1
1 TABLET ORAL DAILY
COMMUNITY
Start: 2022-12-08 | End: 2023-04-28

## 2023-04-28 RX ORDER — AMITRIPTYLINE HYDROCHLORIDE 10 MG/1
20 TABLET, FILM COATED ORAL NIGHTLY
Status: DISCONTINUED | OUTPATIENT
Start: 2023-04-28 | End: 2023-04-30 | Stop reason: HOSPADM

## 2023-04-28 RX ORDER — FINASTERIDE 5 MG/1
5 TABLET, FILM COATED ORAL DAILY
Status: DISCONTINUED | OUTPATIENT
Start: 2023-04-29 | End: 2023-04-30 | Stop reason: HOSPADM

## 2023-04-28 RX ORDER — TALC
6 POWDER (GRAM) TOPICAL NIGHTLY PRN
Status: DISCONTINUED | OUTPATIENT
Start: 2023-04-28 | End: 2023-04-30 | Stop reason: HOSPADM

## 2023-04-28 RX ORDER — PANTOPRAZOLE SODIUM 40 MG/1
40 TABLET, DELAYED RELEASE ORAL DAILY
COMMUNITY
End: 2024-03-22

## 2023-04-28 RX ORDER — METOPROLOL TARTRATE 25 MG/1
25 TABLET, FILM COATED ORAL 2 TIMES DAILY
Status: DISCONTINUED | OUTPATIENT
Start: 2023-04-28 | End: 2023-04-30 | Stop reason: HOSPADM

## 2023-04-28 RX ORDER — LISINOPRIL 20 MG/1
20 TABLET ORAL 2 TIMES DAILY
Status: DISCONTINUED | OUTPATIENT
Start: 2023-04-28 | End: 2023-04-30 | Stop reason: HOSPADM

## 2023-04-28 RX ORDER — LISINOPRIL 20 MG/1
20 TABLET ORAL 2 TIMES DAILY
COMMUNITY
Start: 2023-03-26 | End: 2023-05-25 | Stop reason: SDUPTHER

## 2023-04-28 RX ORDER — TAMSULOSIN HYDROCHLORIDE 0.4 MG/1
0.8 CAPSULE ORAL DAILY
Status: DISCONTINUED | OUTPATIENT
Start: 2023-04-29 | End: 2023-04-30 | Stop reason: HOSPADM

## 2023-04-28 RX ORDER — DULOXETIN HYDROCHLORIDE 30 MG/1
30 CAPSULE, DELAYED RELEASE ORAL 2 TIMES DAILY
Status: DISCONTINUED | OUTPATIENT
Start: 2023-04-28 | End: 2023-04-30 | Stop reason: HOSPADM

## 2023-04-28 RX ORDER — GUAIFENESIN 1200 MG
650 TABLET, EXTENDED RELEASE 12 HR ORAL EVERY 6 HOURS PRN
COMMUNITY
Start: 2022-12-08

## 2023-04-28 RX ORDER — DOCUSATE SODIUM 100 MG/1
100 CAPSULE, LIQUID FILLED ORAL 2 TIMES DAILY
Status: DISCONTINUED | OUTPATIENT
Start: 2023-04-28 | End: 2023-04-30 | Stop reason: HOSPADM

## 2023-04-28 RX ORDER — AMIODARONE HYDROCHLORIDE 100 MG/1
100 TABLET ORAL EVERY MORNING
Status: DISCONTINUED | OUTPATIENT
Start: 2023-04-29 | End: 2023-04-30 | Stop reason: HOSPADM

## 2023-04-28 RX ORDER — PANTOPRAZOLE SODIUM 40 MG/1
40 TABLET, DELAYED RELEASE ORAL
Status: DISCONTINUED | OUTPATIENT
Start: 2023-04-29 | End: 2023-04-30 | Stop reason: HOSPADM

## 2023-04-28 RX ORDER — SODIUM CHLORIDE 0.9 % (FLUSH) 0.9 %
10 SYRINGE (ML) INJECTION
Status: DISCONTINUED | OUTPATIENT
Start: 2023-04-28 | End: 2023-04-30 | Stop reason: HOSPADM

## 2023-04-28 RX ADMIN — DULOXETINE 30 MG: 30 CAPSULE, DELAYED RELEASE ORAL at 11:04

## 2023-04-28 RX ADMIN — LISINOPRIL 20 MG: 20 TABLET ORAL at 11:04

## 2023-04-28 RX ADMIN — AMITRIPTYLINE HYDROCHLORIDE 20 MG: 10 TABLET, FILM COATED ORAL at 11:04

## 2023-04-28 RX ADMIN — TRAZODONE HYDROCHLORIDE 50 MG: 50 TABLET ORAL at 11:04

## 2023-04-28 RX ADMIN — ATORVASTATIN CALCIUM 80 MG: 40 TABLET, FILM COATED ORAL at 11:04

## 2023-04-28 RX ADMIN — FUROSEMIDE 40 MG: 10 INJECTION, SOLUTION INTRAMUSCULAR; INTRAVENOUS at 09:04

## 2023-04-28 RX ADMIN — FUROSEMIDE 40 MG: 10 INJECTION, SOLUTION INTRAMUSCULAR; INTRAVENOUS at 02:04

## 2023-04-28 RX ADMIN — METOPROLOL TARTRATE 25 MG: 25 TABLET, FILM COATED ORAL at 11:04

## 2023-04-28 RX ADMIN — DOCUSATE SODIUM 100 MG: 100 CAPSULE, LIQUID FILLED ORAL at 11:04

## 2023-04-28 RX ADMIN — APIXABAN 2.5 MG: 2.5 TABLET, FILM COATED ORAL at 11:04

## 2023-04-28 RX ADMIN — IOHEXOL 100 ML: 350 INJECTION, SOLUTION INTRAVENOUS at 09:04

## 2023-04-28 NOTE — TELEPHONE ENCOUNTER
----- Message from Ayana Garza sent at 4/28/2023 11:03 AM CDT -----  Contact: [facility]  Type: Needs Medical Advice         Who Called: [facility]  Best Call Back Number:[phone]  Additional Information: Requesting a call back regarding Critical lab results needs call back please   Please Advise- Thank you

## 2023-04-28 NOTE — CONSULTS
met with patient at bedside in room ED12 to discuss patients options for therapy, at patients request. Patient is requesting resources for outpatient therapy in [place]. Patient has previously completed IPR and he feels it was helpful and wants to continue outpatient therapy.  provided list of all outpatient therapies in [place] and confirmed patient drives himself so he has reliable transportation.

## 2023-04-28 NOTE — ED PROVIDER NOTES
Encounter Date: 4/28/2023       History     Chief Complaint   Patient presents with    Shortness of Breath     X6 days. Sent from  For concerns of fluid retention      78-year-old male with history of non-insulin-dependent diabetes, hypertension, hyperlipidemia, atrial fibrillation, congestive heart failure, recent femur/hip fracture.  Patient has been in and out rehab over last 4 months.  Patient presents emergency department with one-week history of increased PND, orthopnea, bilateral lower extremity swelling with fluid retention.  Patient states that he had a proximally a 26 lb weight gain over last 1-2 weeks.  Patient feels more shortness of breath with exertion.  He denies chest pain, no fever, no other constitutional symptoms.  Patient states that he is not been on his diuretics.  He feels like he has problems with his fluid in his ill fluid overloaded.  Patient also is requesting that he may need to go back into rehab facility due to the fact that he is having problems with mobility and having difficulty obtaining home care.    Review of patient's allergies indicates:  No Known Allergies  Past Medical History:   Diagnosis Date    CHF (congestive heart failure)     Hypertension     Mixed hyperlipidemia     Paroxysmal atrial fibrillation 2013     Past Surgical History:   Procedure Laterality Date    CYSTOSCOPY W/ URETERAL STENT PLACEMENT N/A 03/02/2023    Procedure: CYSTOSCOPY, WITH URETERAL STENT INSERTION;  Surgeon: Yoshi Lange MD;  Location: [facility] OR;  Service: Urology;  Laterality: N/A;    CYSTOURETEROSCOPY, WITH HOLMIUM LASER LITHOTRIPSY OF URETERAL CALCULUS AND STENT INSERTION Left 4/20/2023    Procedure: CYSTOURETEROSCOPY, WITH HOLMIUM LASER LITHOTRIPSY OF URETERAL CALCULUS AND STENT INSERTION;  Surgeon: Yoshi Lange MD;  Location: [facility] OR;  Service: Urology;  Laterality: Left;    FRACTURE SURGERY      INTRAMEDULLARY RODDING OF TROCHANTER OF FEMUR Left 11/10/2022    Procedure: INSERTION,  ITRAMEDULLARY SANTI, FEMUR, TROCHANTER;  Surgeon: Richard Phoenix MD;  Location: [facility] OR;  Service: Orthopedics;  Laterality: Left;    LUMBAR DISCECTOMY  1980    L4-5    REMOVAL OF URETERAL CALCULUS Left 4/20/2023    Procedure: REMOVAL, CALCULUS, URETER;  Surgeon: Yoshi Lange MD;  Location: [facility] OR;  Service: Urology;  Laterality: Left;    REMOVAL, CALCULUS, BLADDER  03/02/2023    Procedure: REMOVAL, CALCULUS, BLADDER;  Surgeon: Yoshi Lange MD;  Location: [facility] OR;  Service: Urology;;    RETROGRADE PYELOGRAPHY  4/20/2023    Procedure: PYELOGRAM, RETROGRADE;  Surgeon: Yoshi Lange MD;  Location: [facility] OR;  Service: Urology;;    TOTAL KNEE ARTHROPLASTY Right 2017    TOTAL KNEE ARTHROPLASTY Left 2018     No family history on file.  Social History     Tobacco Use    Smoking status: Never    Smokeless tobacco: Never   Substance Use Topics    Alcohol use: Yes     Alcohol/week: 1.0 standard drink     Types: 1 Shots of liquor per week     Comment: rarely    Drug use: Not Currently     Review of Systems   Constitutional:  Negative for fever.   HENT:  Negative for sore throat.    Respiratory:  Positive for shortness of breath.    Cardiovascular:  Positive for leg swelling. Negative for chest pain.   Gastrointestinal:  Negative for nausea and vomiting.   Genitourinary:  Negative for dysuria.   Musculoskeletal:  Negative for back pain.   Skin:  Negative for rash.   Neurological:  Positive for weakness.   Hematological:  Does not bruise/bleed easily.     Physical Exam     Initial Vitals [04/28/23 1320]   BP Pulse Resp Temp SpO2   (!) 149/61 106 18 97.6 °F (36.4 °C) (!) 94 %      MAP       --         Physical Exam    Nursing note and vitals reviewed.  Constitutional: He appears well-developed and well-nourished.   HENT:   Head: Normocephalic and atraumatic.   Nose: Nose normal.   Mouth/Throat: Oropharynx is clear and moist.   Eyes: Conjunctivae and EOM are normal. Pupils are equal, round, and reactive to  light. No scleral icterus.   Neck: Neck supple.   Normal range of motion.  Cardiovascular:  Normal rate, regular rhythm, normal heart sounds and intact distal pulses.     Exam reveals no gallop and no friction rub.       No murmur heard.  Pulmonary/Chest: No stridor. No respiratory distress.   Course bilateral breath sound with diminished breath sounds bibasilar region   Abdominal: Abdomen is soft. Bowel sounds are normal. He exhibits no mass. There is no abdominal tenderness. There is no rebound and no guarding.   Musculoskeletal:         General: Edema (2 to 3+ edema to bilateral lower extremity) present. Normal range of motion.      Cervical back: Normal range of motion and neck supple.     Lymphadenopathy:     He has no cervical adenopathy.   Neurological: He is alert and oriented to person, place, and time. He has normal strength and normal reflexes. No cranial nerve deficit or sensory deficit. GCS score is 15. GCS eye subscore is 4. GCS verbal subscore is 5. GCS motor subscore is 6.   Skin: Skin is warm and dry. Capillary refill takes less than 2 seconds. No rash noted.   Psychiatric: He has a normal mood and affect. His behavior is normal. Judgment and thought content normal.       ED Course   Procedures  Labs Reviewed   CBC W/ AUTO DIFFERENTIAL - Abnormal; Notable for the following components:       Result Value    RBC 3.36 (*)     Hemoglobin 10.0 (*)     Hematocrit 32.3 (*)     MCHC 31.0 (*)     RDW 15.4 (*)     Immature Granulocytes 1.1 (*)     Gran # (ANC) 9.2 (*)     Immature Grans (Abs) 0.13 (*)     Gran % 78.8 (*)     Lymph % 13.0 (*)     All other components within normal limits   COMPREHENSIVE METABOLIC PANEL - Abnormal; Notable for the following components:    Glucose 230 (*)     Calcium 8.0 (*)     Albumin 2.8 (*)     All other components within normal limits   B-TYPE NATRIURETIC PEPTIDE - Abnormal; Notable for the following components:     (*)     All other components within normal limits    TROPONIN I HIGH SENSITIVITY   MAGNESIUM        ECG Results              EKG 12-lead (In process)  Result time 04/28/23 13:57:54      In process by Interface, Lab In [facility] (04/28/23 13:57:54)                   Narrative:    Test Reason : R06.02,    Vent. Rate : 101 BPM     Atrial Rate : 115 BPM     P-R Int : 000 ms          QRS Dur : 088 ms      QT Int : 302 ms       P-R-T Axes : 000 068 270 degrees     QTc Int : 391 ms    Atrial fibrillation with rapid ventricular response  Anteroseptal infarct (cited on or before 01-MAR-2023)  Abnormal ECG  When compared with ECG of 28-APR-2023 10:01,  Questionable change in initial forces of Anterior leads    Referred By: [email or web address]   SELF           Confirmed By:                                   Imaging Results              X-Ray Chest AP Portable (Final result)  Result time 04/28/23 14:29:55      Final result by Madeleine Griffin MD (04/28/23 14:29:55)                   Narrative:    Portable chest x-ray at 1:58 PM is compared to prior study dated 4/19/2023    Clinical history shortness of breath    The heart is enlarged. There are faint groundglass opacities in the lung bases with tiny pleural effusions. Findings are suggestive of pulmonary edema.. The upper lobes are clear. There are no acute osseous abnormalities.    IMPRESSION: Cardiomegaly with faint groundglass opacities in lung bases and tiny pleural effusions suggestive of pulmonary edema    Electronically signed by:  Madeleine Griffin MD  4/28/2023 2:29 PM CDT Workstation: [id number]                                     Medications   furosemide injection 40 mg (40 mg Intravenous Given 4/28/23 1334)     Medical Decision Making:   Initial Assessment:   78-year-old male with history of hypertension, hyperlipidemia, atrial fibrillation, congestive heart failure, recent femur/hip fracture.  Patient has been in and out rehab over last 4 months.  Patient presents emergency department with one-week history of increased  PND, orthopnea, bilateral lower extremity swelling with fluid retention.  Patient states that he had a proximally a 26 lb weight gain over last 1-2 weeks.  Patient feels more shortness of breath with exertion.  He denies chest pain, no fever, no other constitutional symptoms.  Patient states that he is not been on his diuretics.  He feels like he has problems with his fluid in his ill fluid overloaded.  Patient also is requesting that he may need to go back into rehab facility due to the fact that he is having problems with mobility and having difficulty obtaining home care.    Differential Diagnosis:   Congestive heart failure, COPD, pneumonitis, viral syndrome, pneumonia, chronic deconditioning, electrolyte abnormality  Clinical Tests:   Lab Tests: Ordered and Reviewed  Radiological Study: Ordered and Reviewed  Medical Tests: Ordered and Reviewed           ED Course as of 04/28/23 1815 Fri Apr 28, 2023 1814 Patient seen evaluated emergency department.  Patient here with worsening PND, orthopnea and weakness.  Patient found with pulmonary cephalization and 2+ pedal edema on examination.  With BNP of 289.  Findings consistent with congestive heart failure.  At this time patient will be admitted for diuresis.  Also will need to undergo repeat echo.  Consult placed to case management  to evaluate patient for possible long-term inpatient rehab facility.  Patient remained hemodynamically adequate awaiting admission. [RM]      ED Course User Index  [RM] Cornell Cloud MD                 Clinical Impression:   Final diagnoses:  [R06.02] Shortness of breath  [I50.9] Congestive heart failure, unspecified HF chronicity, unspecified heart failure type (Primary)        ED Disposition Condition    Admit Stable                Cornell Cloud MD  04/28/23 1815

## 2023-04-28 NOTE — PROGRESS NOTES
Subjective:       Patient ID: Hiro Rodríguez is a 78 y.o. male.    Chief Complaint: Follow-up (A-fib and rapid ventricular responses )    Patient presents to clinic for hospital follow up. He was being seen in clinic for a hospital follow up on 3/16/23 and was noted to be hypotensive with BP 60/40 and repeated BP 80/40.  Due to recent UTI from E. Coli and hydroureteronephrosis status post cystoscopy with placement of left ureteral stent there was concern of urosepsis and was sent to ER.  In ER he was noted to be in Afib with RVR and was started on amiodarone drip. He was also noted to have UTI. He was admitted with urosepsis and afib with RVR.  Urine culture resulted with Pseudomonas.  He improved with treatment and was discharged on 3/24/23 with PO levaquin and Rehab in [place] near his son. He returned home and is to follow up with outpatient physical therapy here. He has an appointment on 5/10/23.    Prior to this visit he was hospitalized 3/1-3/8 for AFib with RVR with hypotension from cardiology office, found to have E coli in urine and blood culture, moderate left hydroureteronephrosis status post cystoscopy with placement of left ureteral stent, he was discharged to complete 2 days of Bactrim to complete course of antibiotics with outpatient follow-up with Dr. Lange. On 4/20/23 he had a cystoureteroscopy with stent exchange with Dr. Lange.     Today he states he is having trouble breathing especially when laying down. He has been sleeping in a recliner at home. He states when he lays down he feels like his air is cutting off. He does have some chest discomfort. He felt like he was getting a cold so was taking nyquil. He has not checked his weight but his wife states he looks like he gained weight.  He has been incontinent of urine.  He denies dysuria, hematuria, fever, or chills. He does feel fatigued.            Hospital Discharge note:     HPI:   Hiro Rodríguez is a 78 y.o. male with a  history as  has a past medical history of CHF (congestive heart failure) and Hypertension. who presented to the ED with a Hypotension (At clinic today and found to be hypotensive and is currently being treated for UTI)     Patient presents to the emergency room from PCPs office.  Patient tells me he was at his normal PCP checkup.  Reports they were attempting to get his vital signs and was unable to obtain a blood pressure however noted elevated heart rate.  Patient reported at that time he was feeling faint and weak as if he was going to pass out.  So he was brought here to the emergency room.       Patient further reports over last 3 months he has been in and out of the hospital for various different reasons including kidney stones, orthostatic hypotension, blood infection.  He reports his health continues to decline over these past 3 months.      Denies fever, chills, diaphoresis, SOB, dizziness, HA, chest pain, palpitations, NVD, recent trauma or any other associated symptoms.      Lab and imaging obtained and reviewed. CBC shows WBCs 13.15 MCHC 31.9 RDW 16.0 g% 79.2 l% 13.8. CMP shows glucose 154 alb 3.4. . EKG shows a-fib rate controlled on amiodarone gtt. Lactate 2.3.  On admit, afebrile HR 90 RR 18 /95 Sats 96% on RA. CXR shows cardiomegaly and findings suggestive of mild pulmonary vascular congestion/trace edema.        Per ED provider, patient on presentation noted to be in AFib RVR, amiodarone drip initiated.  Also noted to have elevated white count with low blood pressure, sepsis protocol initiated.  Given patient recent admission with urosepsis E.Coli sensitive to rocephin, IV ABX initiated.  Will admit to ICU for management of AFib RVR in sepsis with possible urosepsis.              * No surgery found *       Hospital Course:   78  M Patient with a known history of paroxysmal atrial fibrillation, diabetes, pulmonary hypertension, history of CHF and hypertension, recent [facility] admission 3/1 to  3/8, initially referred due to AFib with RVR with hypotension from cardiology office, found to have E coli in urine and blood culture, moderate left hydroureteronephrosis status post cystoscopy with placement of left ureteral stent, he was discharged to complete 2 days of Bactrim to complete course of antibiotics with outpatient follow-up with Dr. Lange, inpatient admission complicated by orthostatic hypotension treated with IV fluids and compression.  He states at home blood pressure was dropping intermittently on standing, he was not using compression stockings.  He was seen by PCP office and  blood pressure 60/40, repeat 80/40, referred to the ED.   in the ED mild leukocytosis of 13, UA grossly positive, Sotelo was placed, AFib on EKG, intermittent RVR, admitted to the ICU with cardiology consultation.     blood pressure trend is improved, lisinopril discontinued, DC amiodarone drip and increase oral amiodarone, monitoring blood pressure and heart rate, discontinue Sotelo catheter, repeat CT renal protocol given report of sacral pain.Patient was given 180 mg of diltiazem by Cardiology and blood pressure dropped with systolic in the 70s, discontinued, 250 bolus, midodrine 5 mg, cardiology subsequently increase Lopressor to 25 mg t.i.d..  On 03/18, preliminary blood and urine culture with Pseudomonas, repeat CT renal protocol with bilateral nephrocalcinosis, ureteral stent in place, no acute abnormalities.  Later infectious Disease consulted because of Pseudomonas bacteremia and also UTI with Pseudomonas.  Urology was consulted but did not recommend stent exchange since the last and was recently replaced as per Urology.  Later repeat blood culture is negative.  He needed urinary catheter and later having gross hematuria and Eliquis was held and hematuria improved and Eliquis lower dose 2.5 mg b.i.d. was restarted as per cardiology recommendation .  Patient still have renal stones and seen by Urology again for  with a chest treatment at later date but patient wants to go to [place] rehab with his son and he will see Dr. Valencia when he comes back  PO levaquin given at the discharged to complete the course.        Goals of Care Treatment Preferences:  Code Status: Full Code    Review of patient's allergies indicates:  No Known Allergies  Social Determinants of Health     Tobacco Use: Low Risk     Smoking Tobacco Use: Never    Smokeless Tobacco Use: Never    Passive Exposure: Not on file   Alcohol Use: Not At Risk    Frequency of Alcohol Consumption: Never    Average Number of Drinks: Patient does not drink    Frequency of Binge Drinking: Never   Financial Resource Strain: Low Risk     Difficulty of Paying Living Expenses: Not hard at all   Food Insecurity: No Food Insecurity    Worried About Running Out of Food in the Last Year: Never true    Ran Out of Food in the Last Year: Never true   Transportation Needs: No Transportation Needs    Lack of Transportation (Medical): No    Lack of Transportation (Non-Medical): No   Physical Activity: Inactive    Days of Exercise per Week: 0 days    Minutes of Exercise per Session: 0 min   Stress: Stress Concern Present    Feeling of Stress : To some extent   Social Connections: Moderately Isolated    Frequency of Communication with Friends and Family: More than three times a week    Frequency of Social Gatherings with Friends and Family: Once a week    Attends Latter-day Services: Never    Active Member of Clubs or Organizations: No    Attends Club or Organization Meetings: Never    Marital Status:    Housing Stability: Low Risk     Unable to Pay for Housing in the Last Year: No    Number of Places Lived in the Last Year: 1    Unstable Housing in the Last Year: No   Depression: Low Risk     Last PHQ Score: 0      Past Medical History:   Diagnosis Date    CHF (congestive heart failure)     Hypertension     Mixed hyperlipidemia     Paroxysmal atrial fibrillation 2013      Past  Surgical History:   Procedure Laterality Date    CYSTOSCOPY W/ URETERAL STENT PLACEMENT N/A 03/02/2023    Procedure: CYSTOSCOPY, WITH URETERAL STENT INSERTION;  Surgeon: Yoshi Lange MD;  Location: [facility];  Service: Urology;  Laterality: N/A;    CYSTOURETEROSCOPY, WITH HOLMIUM LASER LITHOTRIPSY OF URETERAL CALCULUS AND STENT INSERTION Left 4/20/2023    Procedure: CYSTOURETEROSCOPY, WITH HOLMIUM LASER LITHOTRIPSY OF URETERAL CALCULUS AND STENT INSERTION;  Surgeon: Yoshi Lange MD;  Location: [facility];  Service: Urology;  Laterality: Left;    FRACTURE SURGERY      INTRAMEDULLARY RODDING OF TROCHANTER OF FEMUR Left 11/10/2022    Procedure: INSERTION, ITRAMEDULLARY SANTI, FEMUR, TROCHANTER;  Surgeon: Richard Phoenix MD;  Location: [facility];  Service: Orthopedics;  Laterality: Left;    LUMBAR DISCECTOMY  1980    L4-5    REMOVAL OF URETERAL CALCULUS Left 4/20/2023    Procedure: REMOVAL, CALCULUS, URETER;  Surgeon: Yoshi Lange MD;  Location: [facility];  Service: Urology;  Laterality: Left;    REMOVAL, CALCULUS, BLADDER  03/02/2023    Procedure: REMOVAL, CALCULUS, BLADDER;  Surgeon: Yoshi Lange MD;  Location: [facility];  Service: Urology;;    RETROGRADE PYELOGRAPHY  4/20/2023    Procedure: PYELOGRAM, RETROGRADE;  Surgeon: Yoshi Lange MD;  Location: [facility];  Service: Urology;;    TOTAL KNEE ARTHROPLASTY Right 2017    TOTAL KNEE ARTHROPLASTY Left 2018      Social History     Socioeconomic History    Marital status:        No current facility-administered medications for this visit.    Current Outpatient Medications:     acetaminophen 325 mg Cap, Take 650 mg by mouth every 6 (six) hours as needed., Disp: , Rfl:     amiodarone (PACERONE) 100 MG Tab, Take 100 mg by mouth every morning., Disp: , Rfl:     calcium polycarbophil (FIBER-CAPS, CA POLYCARBOPHIL, ORAL), Take 1 tablet by mouth once daily., Disp: , Rfl:     cholecalciferol, vitamin D3, (VITAMIN D3) 50 mcg (2,000 unit) Tab, Take  1 tablet by mouth once daily., Disp: , Rfl:     cyanocobalamin, vitamin B-12, 1,000 mcg Subl, Place 1,000 mcg under the tongue 2 (two) times a day., Disp: , Rfl:     docusate sodium (COLACE) 100 MG capsule, Take 100 mg by mouth 2 (two) times daily., Disp: , Rfl:     DULoxetine (CYMBALTA) 30 MG capsule, Take 1 capsule (30 mg total) by mouth 2 (two) times daily., Disp: 90 capsule, Rfl: 1    finasteride (PROSCAR) 5 mg tablet, Take 1 tablet (5 mg total) by mouth once daily., Disp: 90 tablet, Rfl: 0    HYDROcodone-acetaminophen (NORCO) 5-325 mg per tablet, Take 1 tablet by mouth every 6 (six) hours as needed for Pain., Disp: 12 tablet, Rfl: 0    metFORMIN (GLUCOPHAGE-XR) 500 MG ER 24hr tablet, TAKE 1 TABLET BY MOUTH EVERY DAY (Patient taking differently: Take 500 mg by mouth Daily.), Disp: 90 tablet, Rfl: 1    metoprolol tartrate (LOPRESSOR) 25 MG tablet, Take 1 tablet (25 mg total) by mouth 2 (two) times daily., Disp: 180 tablet, Rfl: 1    omega-3 acid ethyl esters (LOVAZA) 1 gram capsule, TAKE 2 CAPSULES BY MOUTH 2 TIMES DAILY. (Patient taking differently: Take 2 g by mouth 2 (two) times daily.), Disp: 360 capsule, Rfl: 1    polyethylene glycol (GLYCOLAX) 17 gram/dose powder, Take 17 g by mouth daily as needed., Disp: , Rfl:     potassium chloride SA (KLOR-CON) 10 MEQ TbSR, Take 2 tablets (20 mEq total) by mouth once daily., Disp: , Rfl:     rosuvastatin (CRESTOR) 20 MG tablet, TAKE 1 TABLET BY MOUTH EVERY DAY (Patient taking differently: Take 20 mg by mouth once daily. TAKE 1 TABLET BY MOUTH EVERY DAY), Disp: 90 tablet, Rfl: 1    sulfamethoxazole-trimethoprim 800-160mg (BACTRIM DS) 800-160 mg Tab, Take 1 tablet by mouth once daily., Disp: 7 tablet, Rfl: 0    tamsulosin (FLOMAX) 0.4 mg Cap, Take 2 capsules (0.8 mg total) by mouth once daily., Disp: 60 capsule, Rfl: 11    topiramate (TOPAMAX) 50 MG tablet, TAKE 1 TABLET BY MOUTH EVERY DAY (Patient taking differently: Take 50 mg by mouth once daily.), Disp: 90 tablet,  Rfl: 1    traZODone (DESYREL) 50 MG tablet, TAKE 1 TABLET BY MOUTH EVERY EVENING. (Patient taking differently: Take 50 mg by mouth every evening.), Disp: 90 tablet, Rfl: 1    amitriptyline (ELAVIL) 10 MG tablet, Take 20 mg by mouth every evening., Disp: , Rfl:     apixaban (ELIQUIS) 2.5 mg Tab, Take 2.5 mg by mouth 2 (two) times daily., Disp: , Rfl:     lisinopriL (PRINIVIL,ZESTRIL) 20 MG tablet, Take 20 mg by mouth 2 (two) times daily., Disp: , Rfl:     pantoprazole (PROTONIX) 40 MG tablet, Take 40 mg by mouth once daily., Disp: , Rfl:     Lab Results   Component Value Date    WBC 11.66 04/28/2023    HGB 10.0 (L) 04/28/2023    HCT 32.3 (L) 04/28/2023     04/28/2023    CHOL 176 07/07/2022    TRIG 200 (H) 07/07/2022    HDL 36 (L) 07/07/2022    ALT 17 04/28/2023    AST 20 04/28/2023     04/28/2023    K 4.1 04/28/2023     04/28/2023    CREATININE 0.8 04/28/2023    BUN 14 04/28/2023    CO2 26 04/28/2023    TSH 2.550 03/18/2023    PSA 0.85 10/26/2021    INR 1.0 04/19/2023    HGBA1C 5.8 03/02/2023    MICROALBUR 3.2 02/13/2020       Review of Systems   Constitutional:  Positive for fatigue. Negative for chills and fever.   Respiratory:  Positive for cough, chest tightness and shortness of breath.    Cardiovascular:  Positive for leg swelling. Negative for chest pain and palpitations.   Genitourinary:  Positive for bladder incontinence. Negative for frequency and hematuria.     Objective:      Physical Exam  Vitals reviewed.   Constitutional:       Appearance: He is ill-appearing.   Cardiovascular:      Rate and Rhythm: Normal rate. Rhythm irregular.      Pulses:           Radial pulses are 2+ on the right side and 2+ on the left side.        Posterior tibial pulses are 1+ on the right side and 1+ on the left side.      Heart sounds: Normal heart sounds.   Pulmonary:      Effort: Pulmonary effort is normal. No respiratory distress.      Breath sounds: Examination of the right-lower field reveals decreased  breath sounds. Examination of the left-lower field reveals decreased breath sounds. Decreased breath sounds present.   Musculoskeletal:      Right lower leg: Edema present.      Left lower leg: Edema present.   Skin:     General: Skin is warm and dry.      Capillary Refill: Capillary refill takes less than 2 seconds.   Neurological:      Mental Status: He is alert and oriented to person, place, and time.   Psychiatric:         Mood and Affect: Mood normal.         Behavior: Behavior normal.         Thought Content: Thought content normal.         Judgment: Judgment normal.       Assessment:       1. Hospital discharge follow-up    2. Acute on chronic congestive heart failure, unspecified heart failure type    3. Shortness of breath    4. Chest pain, unspecified type    5. PND (paroxysmal nocturnal dyspnea)    6. Chronic heart failure with preserved ejection fraction    7. Hypertension, essential    8. Bacteremia due to Escherichia coli    9. Paroxysmal atrial fibrillation with RVR        Plan:       Hiro was seen today for follow-up.    Diagnoses and all orders for this visit:    Hospital discharge follow-up    Acute on chronic congestive heart failure, unspecified heart failure type    Shortness of breath  -     Comprehensive Metabolic Panel; Future  -     BNP; Future  -     CBC Auto Differential; Future  -     D-DIMER, QUANTITATIVE; Future  -     IN OFFICE EKG 12-LEAD (to Muse)    Chest pain, unspecified type  -     Comprehensive Metabolic Panel; Future  -     BNP; Future  -     CBC Auto Differential; Future  -     D-DIMER, QUANTITATIVE; Future  -     IN OFFICE EKG 12-LEAD (to Muse)    PND (paroxysmal nocturnal dyspnea)  -     BNP; Future  -     IN OFFICE EKG 12-LEAD (to [place])    Chronic heart failure with preserved ejection fraction    Hypertension, essential    Bacteremia due to Escherichia coli    Paroxysmal atrial fibrillation with RVR    EKG in office today Afib, v rate 96 low voltage QRS, septal infarct  age undetermined. Sent patient for stat labs. D dimer is elevated at 1.04, .  Patient was sent to ER for further evaluation for CHF.

## 2023-04-29 PROBLEM — Z75.8 DISCHARGE PLANNING ISSUES: Status: RESOLVED | Noted: 2023-01-25 | Resolved: 2023-04-29

## 2023-04-29 PROBLEM — N39.0 COMPLICATED UTI (URINARY TRACT INFECTION): Status: RESOLVED | Noted: 2023-03-18 | Resolved: 2023-04-29

## 2023-04-29 PROBLEM — I95.9 HYPOTENSION: Status: RESOLVED | Noted: 2023-03-17 | Resolved: 2023-04-29

## 2023-04-29 PROBLEM — R78.81 BACTEREMIA DUE TO ESCHERICHIA COLI: Status: RESOLVED | Noted: 2023-03-02 | Resolved: 2023-04-29

## 2023-04-29 PROBLEM — R78.81 BACTEREMIA: Status: RESOLVED | Noted: 2023-03-18 | Resolved: 2023-04-29

## 2023-04-29 PROBLEM — I48.0 PAROXYSMAL ATRIAL FIBRILLATION: Chronic | Status: ACTIVE | Noted: 2020-08-31

## 2023-04-29 PROBLEM — Z02.9 DISCHARGE PLANNING ISSUES: Status: RESOLVED | Noted: 2023-01-25 | Resolved: 2023-04-29

## 2023-04-29 PROBLEM — E83.42 HYPOMAGNESEMIA: Status: RESOLVED | Noted: 2023-03-18 | Resolved: 2023-04-29

## 2023-04-29 PROBLEM — B96.20 BACTEREMIA DUE TO ESCHERICHIA COLI: Status: RESOLVED | Noted: 2023-03-02 | Resolved: 2023-04-29

## 2023-04-29 PROBLEM — I50.9 ACUTE ON CHRONIC CONGESTIVE HEART FAILURE: Status: RESOLVED | Noted: 2023-04-28 | Resolved: 2023-04-29

## 2023-04-29 PROBLEM — N39.0 URINARY TRACT INFECTION ASSOCIATED WITH INDWELLING URETHRAL CATHETER: Status: RESOLVED | Noted: 2023-01-23 | Resolved: 2023-04-29

## 2023-04-29 PROBLEM — I10 HYPERTENSION, ESSENTIAL: Chronic | Status: ACTIVE | Noted: 2023-04-28

## 2023-04-29 PROBLEM — T83.511A URINARY TRACT INFECTION ASSOCIATED WITH INDWELLING URETHRAL CATHETER: Status: RESOLVED | Noted: 2023-01-23 | Resolved: 2023-04-29

## 2023-04-29 PROBLEM — N40.0 BENIGN PROSTATIC HYPERPLASIA WITHOUT LOWER URINARY TRACT SYMPTOMS: Chronic | Status: ACTIVE | Noted: 2023-04-29

## 2023-04-29 PROBLEM — E87.6 HYPOKALEMIA: Status: RESOLVED | Noted: 2023-03-01 | Resolved: 2023-04-29

## 2023-04-29 PROBLEM — N40.0 BENIGN PROSTATIC HYPERPLASIA WITHOUT LOWER URINARY TRACT SYMPTOMS: Status: ACTIVE | Noted: 2023-04-29

## 2023-04-29 PROBLEM — E78.5 HYPERLIPIDEMIA: Chronic | Status: ACTIVE | Noted: 2021-01-06

## 2023-04-29 PROBLEM — I50.33 ACUTE ON CHRONIC HEART FAILURE WITH PRESERVED EJECTION FRACTION: Status: ACTIVE | Noted: 2023-04-28

## 2023-04-29 LAB
ANION GAP SERPL CALC-SCNC: 6 MMOL/L (ref 8–16)
BUN SERPL-MCNC: 12 MG/DL (ref 8–23)
CALCIUM SERPL-MCNC: 8.1 MG/DL (ref 8.7–10.5)
CHLORIDE SERPL-SCNC: 102 MMOL/L (ref 95–110)
CO2 SERPL-SCNC: 31 MMOL/L (ref 23–29)
CREAT SERPL-MCNC: 0.6 MG/DL (ref 0.5–1.4)
EST. GFR  (NO RACE VARIABLE): >60 ML/MIN/1.73 M^2
GLUCOSE SERPL-MCNC: 118 MG/DL (ref 70–110)
MAGNESIUM SERPL-MCNC: 1.8 MG/DL (ref 1.6–2.6)
POTASSIUM SERPL-SCNC: 4.2 MMOL/L (ref 3.5–5.1)
SODIUM SERPL-SCNC: 139 MMOL/L (ref 136–145)

## 2023-04-29 PROCEDURE — 97116 GAIT TRAINING THERAPY: CPT

## 2023-04-29 PROCEDURE — 63600175 PHARM REV CODE 636 W HCPCS: Performed by: STUDENT IN AN ORGANIZED HEALTH CARE EDUCATION/TRAINING PROGRAM

## 2023-04-29 PROCEDURE — 63600175 PHARM REV CODE 636 W HCPCS: Performed by: NURSE PRACTITIONER

## 2023-04-29 PROCEDURE — 97161 PT EVAL LOW COMPLEX 20 MIN: CPT

## 2023-04-29 PROCEDURE — 83735 ASSAY OF MAGNESIUM: CPT | Performed by: STUDENT IN AN ORGANIZED HEALTH CARE EDUCATION/TRAINING PROGRAM

## 2023-04-29 PROCEDURE — 25000242 PHARM REV CODE 250 ALT 637 W/ HCPCS: Performed by: NURSE PRACTITIONER

## 2023-04-29 PROCEDURE — 96376 TX/PRO/DX INJ SAME DRUG ADON: CPT

## 2023-04-29 PROCEDURE — 36415 COLL VENOUS BLD VENIPUNCTURE: CPT | Performed by: STUDENT IN AN ORGANIZED HEALTH CARE EDUCATION/TRAINING PROGRAM

## 2023-04-29 PROCEDURE — G0378 HOSPITAL OBSERVATION PER HR: HCPCS

## 2023-04-29 PROCEDURE — 25000003 PHARM REV CODE 250: Performed by: NURSE PRACTITIONER

## 2023-04-29 PROCEDURE — 80048 BASIC METABOLIC PNL TOTAL CA: CPT | Performed by: STUDENT IN AN ORGANIZED HEALTH CARE EDUCATION/TRAINING PROGRAM

## 2023-04-29 RX ORDER — FUROSEMIDE 10 MG/ML
40 INJECTION INTRAMUSCULAR; INTRAVENOUS
Status: COMPLETED | OUTPATIENT
Start: 2023-04-29 | End: 2023-04-29

## 2023-04-29 RX ORDER — FUROSEMIDE 20 MG/1
20 TABLET ORAL DAILY
Status: DISCONTINUED | OUTPATIENT
Start: 2023-04-30 | End: 2023-04-30 | Stop reason: HOSPADM

## 2023-04-29 RX ADMIN — DULOXETINE 30 MG: 30 CAPSULE, DELAYED RELEASE ORAL at 09:04

## 2023-04-29 RX ADMIN — FINASTERIDE 5 MG: 5 TABLET, FILM COATED ORAL at 09:04

## 2023-04-29 RX ADMIN — HYDROCODONE BITARTRATE AND ACETAMINOPHEN 1 TABLET: 10; 325 TABLET ORAL at 08:04

## 2023-04-29 RX ADMIN — PANTOPRAZOLE SODIUM 40 MG: 40 TABLET, DELAYED RELEASE ORAL at 06:04

## 2023-04-29 RX ADMIN — AMIODARONE HYDROCHLORIDE 100 MG: 100 TABLET ORAL at 06:04

## 2023-04-29 RX ADMIN — LISINOPRIL 20 MG: 20 TABLET ORAL at 09:04

## 2023-04-29 RX ADMIN — TAMSULOSIN HYDROCHLORIDE 0.8 MG: 0.4 CAPSULE ORAL at 09:04

## 2023-04-29 RX ADMIN — POTASSIUM CHLORIDE 20 MEQ: 1500 TABLET, EXTENDED RELEASE ORAL at 09:04

## 2023-04-29 RX ADMIN — METOPROLOL TARTRATE 25 MG: 25 TABLET, FILM COATED ORAL at 08:04

## 2023-04-29 RX ADMIN — METOPROLOL TARTRATE 25 MG: 25 TABLET, FILM COATED ORAL at 09:04

## 2023-04-29 RX ADMIN — FUROSEMIDE 40 MG: 10 INJECTION, SOLUTION INTRAMUSCULAR; INTRAVENOUS at 09:04

## 2023-04-29 RX ADMIN — DOCUSATE SODIUM 100 MG: 100 CAPSULE, LIQUID FILLED ORAL at 08:04

## 2023-04-29 RX ADMIN — APIXABAN 2.5 MG: 2.5 TABLET, FILM COATED ORAL at 09:04

## 2023-04-29 RX ADMIN — ATORVASTATIN CALCIUM 80 MG: 40 TABLET, FILM COATED ORAL at 08:04

## 2023-04-29 RX ADMIN — MELATONIN 6 MG: at 08:04

## 2023-04-29 RX ADMIN — LISINOPRIL 20 MG: 20 TABLET ORAL at 08:04

## 2023-04-29 RX ADMIN — HYDROCODONE BITARTRATE AND ACETAMINOPHEN 1 TABLET: 10; 325 TABLET ORAL at 02:04

## 2023-04-29 RX ADMIN — TRAZODONE HYDROCHLORIDE 50 MG: 50 TABLET ORAL at 08:04

## 2023-04-29 RX ADMIN — PSYLLIUM HUSK 1 PACKET: 3.4 POWDER ORAL at 09:04

## 2023-04-29 RX ADMIN — Medication 2000 UNITS: at 09:04

## 2023-04-29 RX ADMIN — FUROSEMIDE 40 MG: 10 INJECTION, SOLUTION INTRAVENOUS at 09:04

## 2023-04-29 RX ADMIN — APIXABAN 2.5 MG: 2.5 TABLET, FILM COATED ORAL at 08:04

## 2023-04-29 RX ADMIN — HYDROCODONE BITARTRATE AND ACETAMINOPHEN 1 TABLET: 10; 325 TABLET ORAL at 10:04

## 2023-04-29 RX ADMIN — AMITRIPTYLINE HYDROCHLORIDE 20 MG: 10 TABLET, FILM COATED ORAL at 08:04

## 2023-04-29 RX ADMIN — DULOXETINE 30 MG: 30 CAPSULE, DELAYED RELEASE ORAL at 08:04

## 2023-04-29 RX ADMIN — TOPIRAMATE 50 MG: 25 TABLET, FILM COATED ORAL at 09:04

## 2023-04-29 NOTE — PT/OT/SLP EVAL
Physical Therapy Evaluation    Patient Name:  Hiro Rodríguez   MRN:  [id number]    Recommendations:     Discharge Recommendations: rehabilitation facility versus home health PT pending progress (recent SNF admission in [place] per patient). Patient prefers D/C home with HHPT  Discharge Equipment Recommendations: none   Barriers to discharge:  2 person assist for safe transfers    Assessment:     Hiro Rodríguez is a 78 y.o. male admitted with a medical diagnosis of Shortness of breath.  He presents with the following impairments/functional limitations: weakness, impaired endurance, impaired self care skills, impaired functional mobility, gait instability, impaired balance, decreased upper extremity function, decreased lower extremity function, decreased safety awareness, impaired cardiopulmonary response to activity, edema. Patient is agreeable to participation with PT evaluation. He reports recent SNF admission in [place] and also endorses inpatient rehab stay earlier this year. He typically ambulates with RW, but notes he has been limited secondary to shortness of breath. He also endorses a history of orthostatic hypotension with standing. Orthostatics (-) today. Supine: 111/76, sittin/72, standin/65 (asymptomatic). He requires ModA for supine to sit and ModA for sit to stand with RW and VC for hand placement. SpO2 of 90-94%. RN present stating sats reading low on monitor and requests 2L applied. Patient found with linens saturated with urine and agrees to sit up in chair for linens to be changed. He ambulated 5' to chair with RW and Brittany. Linens and gown changed. He requires MaxA x2 for sit to stand transfer from low chair. He performed step transfer from chair back to bed with RW and Brittany. He returned to bed with RN present to apply condom catheter. Discussed D/C planning with patient. He reports stays at inpatient rehab and SNF this year. Based on evaluation, recommend IRF to maximize  functional potential with transfers and gait. He requests OPPT. Discussed that if he discharges home HHPT is more appropriate until he gets stronger.     Rehab Prognosis: Good; patient would benefit from acute skilled PT services to address these deficits and reach maximum level of function.    Recent Surgery: * No surgery found *      Plan:     During this hospitalization, patient to be seen daily to address the identified rehab impairments via gait training, therapeutic activities, therapeutic exercises and progress toward the following goals:    Plan of Care Expires:  05/29/23    Subjective     Chief Complaint: linens wet  Patient/Family Comments/goals: Home upon D/C  Pain/Comfort:  Pain Rating 1: 0/10    Patients cultural, spiritual, Anabaptist conflicts given the current situation:      Living Environment:  Patient lives with spouse in a SSH with no HOLDEN.   Prior to admission, patients level of function was ambulatory with RW, but decreased activity the past few days due to SOB. He endorses recent SNF stay in [place] for 2.5 weeks and IRF stay earlier this year.He reports impaired balance at baseline due to dizziness. History of L hip fracture. Denies any recent falls. Equipment used at home: walker, rolling, bedside commode, hospital bed, wheelchair.  DME owned (not currently used): none.  Upon discharge, patient will have assistance from IRF versus HHPT.    Objective:     Communicated with VERONICA Alcantar prior to session.  Patient found HOB elevated with bed alarm, pulse ox (continuous), telemetry  upon PT entry to room.    General Precautions: Standard, fall  Orthopedic Precautions:N/A   Braces: N/A  Respiratory Status: Room air    Exams:  RLE Strength: 3-/5  LLE Strength: 3-/5    Functional Mobility:  Bed Mobility:     Supine to Sit: moderate assistance  Sit to Supine: moderate assistance and of 2 persons  Transfers:     Sit to Stand:  moderate assistance with rolling walker  Gait: 5' x2 to/from chair RW,  Brittany      AM-PAC 6 CLICK MOBILITY  Total Score:12       Treatment & Education:  Patient was educated on the importance of OOB activity and functional mobility to negate negative effects of prolonged bed rest during hospitalization, safe transfers and ambulation, IRF versus HHPT,  and D/C planning     Patient left HOB elevated with all lines intact, call button in reach, bed alarm on, and RN present.    GOALS:   Multidisciplinary Problems       Physical Therapy Goals          Problem: Physical Therapy    Goal Priority Disciplines Outcome Goal Variances Interventions   Physical Therapy Goal     PT, PT/OT      Description: Goals to be met by: 23     Patient will increase functional independence with mobility by performin. Supine to sit with Stand-by Assistance  2. Sit to stand transfer with Stand-by Assistance  3. Bed to chair transfer with Stand-by Assistance using Rolling Walker  4. Gait  x 100 feet with Stand-by Assistance using Rolling Walker.   5. Lower extremity exercise program x20 reps per handout, with supervision                         History:     Past Medical History:   Diagnosis Date    CHF (congestive heart failure)     Hypertension     Mixed hyperlipidemia     Paroxysmal atrial fibrillation        Past Surgical History:   Procedure Laterality Date    CYSTOSCOPY W/ URETERAL STENT PLACEMENT N/A 2023    Procedure: CYSTOSCOPY, WITH URETERAL STENT INSERTION;  Surgeon: Yoshi Lange MD;  Location: [facility];  Service: Urology;  Laterality: N/A;    CYSTOURETEROSCOPY, WITH HOLMIUM LASER LITHOTRIPSY OF URETERAL CALCULUS AND STENT INSERTION Left 2023    Procedure: CYSTOURETEROSCOPY, WITH HOLMIUM LASER LITHOTRIPSY OF URETERAL CALCULUS AND STENT INSERTION;  Surgeon: Yoshi Lange MD;  [facility]: [facility];  Service: Urology;  Laterality: Left;    FRACTURE SURGERY      INTRAMEDULLARY RODDING OF TROCHANTER OF FEMUR Left 11/10/2022    Procedure: INSERTION, ITRAMEDULLARY SANTI, FEMUR,  TROCHANTER;  Surgeon: Richard Phoenix MD;  Location: [facility];  Service: Orthopedics;  Laterality: Left;    LUMBAR DISCECTOMY  1980    L4-5    REMOVAL OF URETERAL CALCULUS Left 4/20/2023    Procedure: REMOVAL, CALCULUS, URETER;  Surgeon: Yoshi Lange MD;  Location: [facility];  Service: Urology;  Laterality: Left;    REMOVAL, CALCULUS, BLADDER  03/02/2023    Procedure: REMOVAL, CALCULUS, BLADDER;  Surgeon: Yoshi Lange MD;  Location: [facility];  Service: Urology;;    RETROGRADE PYELOGRAPHY  4/20/2023    Procedure: PYELOGRAM, RETROGRADE;  Surgeon: Yoshi Lange MD;  Location: [facility];  Service: Urology;;    TOTAL KNEE ARTHROPLASTY Right 2017    TOTAL KNEE ARTHROPLASTY Left 2018       Time Tracking:     PT Received On: 04/29/23  PT Start Time: 1146     PT Stop Time: 1216  PT Total Time (min): 30 min     Billable Minutes: Evaluation 10 and Gait Training 20 04/29/2023

## 2023-04-29 NOTE — PLAN OF CARE
[facility]  Initial Discharge Assessment       Primary Care Provider: Gautam Castanon III, MD    Admission Diagnosis: Congestive heart failure, unspecified HF chronicity, unspecified heart failure type [I50.9]    Admission Date: 4/28/2023  Expected Discharge Date:     Assessment was completed via telephone with wife (Galina Rodríguez) [phone] due to the Pt being hard of hearing. The Pt has [facility]. DME verified below. CM will continue to monitor.    Discharge Barriers Identified: None    Payor: [employer] [facility] MA[employer]GED [employer] / Plan: Mobile Shareholder MEDICARE ADVANTAGE / Product Type: Medicare Advantage /     Extended Emergency Contact Information  Primary Emergency Contact: Liz Rodrígueznda  Address: [address]           [place] [address] [facility]  Home Phone: [phone]  Mobile Phone: [phone]  Relation: Spouse  Preferred language: English   needed? No  Secondary Emergency Contact: MarianaBubba prescott  Mobile Phone: [phone]  Relation: Son    Discharge Plan A: Home with family  Discharge Plan B: Home Health      [id number] [place] - [address]  [address]  [facility] [address]  Phone: [phone] Fax: [phone]    [facility] - [address] [address]  Phone: [phone] Fax: [phone]    [facility]  [address]  [facility] [address]  Phone: [phone] Fax: [phone]      Initial Assessment (most recent)       Adult Discharge Assessment - 04/29/23 1119          Discharge Assessment    Confirmed/corrected address, phone number and insurance Yes     Confirmed Demographics Correct on Facesheet     Source of Information family     If unable to respond/provide information was family/caregiver contacted? Yes     Contact Name/Number Galina Rodríguez/ Wife [phone]     When  was your last doctors appointment? 04/28/23     Does patient/caregiver understand observation status Yes     Communicated BRENNAN with patient/caregiver Date not available/Unable to determine     Reason For Admission Shortness of breath     People in Home spouse     Facility Arrived From: Home     Do you expect to return to your current living situation? Yes     Do you have help at home or someone to help you manage your care at home? No     Walking or Climbing Stairs ambulation difficulty, requires equipment     Dressing/Bathing bathing difficulty, requires equipment     Home Accessibility wheelchair accessible     Equipment Currently Used at Home wheelchair;walker, rolling;hospital bed     Readmission within 30 days? Yes     Patient currently being followed by outpatient case management? No     Do you currently have service(s) that help you manage your care at home? Yes     Name and Contact number of agency [facility]     Is the pt/caregiver preference to resume services with current agency Yes     Do you take prescription medications? Yes     Do you have prescription coverage? Yes     Coverage Payor:  [facility] MEDICARE ADVANTAGE     Do you have any problems affording any of your prescribed medications? No     Is the patient taking medications as prescribed? yes     How do you get to doctors appointments? family or friend will provide     Are you on dialysis? No     Do you take coumadin? No     Discharge Plan A Home with family     Discharge Plan B Home Health     DME Needed Upon Discharge  hospital bed;walker, rolling;wheelchair     Discharge Plan discussed with: Spouse/sig other     Discharge Barriers Identified None

## 2023-04-29 NOTE — PLAN OF CARE
04/29/23 1144   MATA Message   Medicare Outpatient and Observation Notification regarding financial responsibility Given to patient/caregiver;Signed/date by patient/caregiver;Explained to patient/caregiver   Date MATA was signed 04/29/23   Time MATA was signed 1010     Completed with wife (Galina Rodríguez) via telephone due to the Pt being hard of hearing.

## 2023-04-29 NOTE — H&P
[facility] - Emergency Dept  Hospital Medicine  History & Physical    Patient Name: Hiro Rodríguez  MRN: [id number]  Patient Class: OP- Observation  Admission Date: 4/28/2023  Attending Physician: Shyam Vallejo MD   Primary Care Provider: Gautam Castanon III, MD         Patient information was obtained from patient and ER records.     Subjective:     Principal Problem:Shortness of breath    Chief Complaint:   Chief Complaint   Patient presents with    Shortness of Breath     X6 days. Sent from  For concerns of fluid retention         HPI: 77 yo male presents with c/o shortness of breath which is worse with exertion X 1 week. Associated symptoms PND, orthopnea, bilateral lower extremities. Patient reports states that he had a proximally a 26 lb weight gain over last 1-2 weeks. He denies chest pain, no fever, no other constitutional symptoms. Patient reports he has been in and out rehab over last 4 months. Patient also is requesting that he may need to go back into rehab facility due to the fact that he is having problems with mobility and having difficulty obtaining home care.Patient reports he has been in and out rehab over last 4 months. Hx of NIDDM, HTN, HLD, atrial fibrillation, congestive heart failure, and femur/hip fracture.       Past Medical History:   Diagnosis Date    CHF (congestive heart failure)     Hypertension     Mixed hyperlipidemia     Paroxysmal atrial fibrillation 2013       Past Surgical History:   Procedure Laterality Date    CYSTOSCOPY W/ URETERAL STENT PLACEMENT N/A 03/02/2023    Procedure: CYSTOSCOPY, WITH URETERAL STENT INSERTION;  Surgeon: Yoshi Lange MD;  [facility];  Service: Urology;  Laterality: N/A;    CYSTOURETEROSCOPY, WITH HOLMIUM LASER LITHOTRIPSY OF URETERAL CALCULUS AND STENT INSERTION Left 4/20/2023    Procedure: CYSTOURETEROSCOPY, WITH HOLMIUM LASER LITHOTRIPSY OF URETERAL CALCULUS AND STENT INSERTION;  Surgeon: Yoshi Lange  MD;  Location: [facility] OR;  Service: Urology;  Laterality: Left;    FRACTURE SURGERY      INTRAMEDULLARY RODDING OF TROCHANTER OF FEMUR Left 11/10/2022    Procedure: INSERTION, ITRAMEDULLARY SANTI, FEMUR, TROCHANTER;  Surgeon: Richard Phoenix MD;  Location: [facility] OR;  Service: Orthopedics;  Laterality: Left;    LUMBAR DISCECTOMY  1980    L4-5    REMOVAL OF URETERAL CALCULUS Left 4/20/2023    Procedure: REMOVAL, CALCULUS, URETER;  Surgeon: Yoshi Lange MD;  Location: [facility] OR;  Service: Urology;  Laterality: Left;    REMOVAL, CALCULUS, BLADDER  03/02/2023    Procedure: REMOVAL, CALCULUS, BLADDER;  Surgeon: Yoshi Lange MD;  Location: [facility] OR;  Service: Urology;;    RETROGRADE PYELOGRAPHY  4/20/2023    Procedure: PYELOGRAM, RETROGRADE;  Surgeon: Yoshi Lange MD;  Location: [facility];  Service: Urology;;    TOTAL KNEE ARTHROPLASTY Right 2017    TOTAL KNEE ARTHROPLASTY Left 2018       Review of patient's allergies indicates:  No Known Allergies    No current facility-administered medications on file prior to encounter.     Current Outpatient Medications on File Prior to Encounter   Medication Sig    acetaminophen 325 mg Cap Take 650 mg by mouth every 6 (six) hours as needed.    amiodarone (PACERONE) 100 MG Tab Take 100 mg by mouth every morning.    amitriptyline (ELAVIL) 10 MG tablet Take 20 mg by mouth every evening.    apixaban (ELIQUIS) 2.5 mg Tab Take 2.5 mg by mouth 2 (two) times daily.    calcium polycarbophil (FIBER-CAPS, CA POLYCARBOPHIL, ORAL) Take 1 tablet by mouth once daily.    cholecalciferol, vitamin D3, (VITAMIN D3) 50 mcg (2,000 unit) Tab Take 1 tablet by mouth once daily.    cyanocobalamin, vitamin B-12, 1,000 mcg Subl Place 1,000 mcg under the tongue 2 (two) times a day.    docusate sodium (COLACE) 100 MG capsule Take 100 mg by mouth 2 (two) times daily.    DULoxetine (CYMBALTA) 30 MG capsule Take 1 capsule (30 mg total) by mouth 2 (two) times daily.    finasteride  (PROSCAR) 5 mg tablet Take 1 tablet (5 mg total) by mouth once daily.    HYDROcodone-acetaminophen (NORCO) 5-325 mg per tablet Take 1 tablet by mouth every 6 (six) hours as needed for Pain.    lisinopriL (PRINIVIL,ZESTRIL) 20 MG tablet Take 20 mg by mouth 2 (two) times daily.    metFORMIN (GLUCOPHAGE-XR) 500 MG ER 24hr tablet TAKE 1 TABLET BY MOUTH EVERY DAY (Patient taking differently: Take 500 mg by mouth Daily.)    metoprolol tartrate (LOPRESSOR) 25 MG tablet Take 1 tablet (25 mg total) by mouth 2 (two) times daily.    omega-3 acid ethyl esters (LOVAZA) 1 gram capsule TAKE 2 CAPSULES BY MOUTH 2 TIMES DAILY. (Patient taking differently: Take 2 g by mouth 2 (two) times daily.)    pantoprazole (PROTONIX) 40 MG tablet Take 40 mg by mouth once daily.    polyethylene glycol (GLYCOLAX) 17 gram/dose powder Take 17 g by mouth daily as needed.    potassium chloride SA (KLOR-CON) 10 MEQ TbSR Take 2 tablets (20 mEq total) by mouth once daily.    rosuvastatin (CRESTOR) 20 MG tablet TAKE 1 TABLET BY MOUTH EVERY DAY (Patient taking differently: Take 20 mg by mouth once daily. TAKE 1 TABLET BY MOUTH EVERY DAY)    sulfamethoxazole-trimethoprim 800-160mg (BACTRIM DS) 800-160 mg Tab Take 1 tablet by mouth once daily.    tamsulosin (FLOMAX) 0.4 mg Cap Take 2 capsules (0.8 mg total) by mouth once daily.    topiramate (TOPAMAX) 50 MG tablet TAKE 1 TABLET BY MOUTH EVERY DAY (Patient taking differently: Take 50 mg by mouth once daily.)    traZODone (DESYREL) 50 MG tablet TAKE 1 TABLET BY MOUTH EVERY EVENING. (Patient taking differently: Take 50 mg by mouth every evening.)    [DISCONTINUED] amiodarone (PACERONE) 100 MG Tab Take 1 tablet by mouth once daily.    [DISCONTINUED] levoFLOXacin (LEVAQUIN) 500 MG tablet Take 1 tablet (500 mg total) by mouth once daily.    [DISCONTINUED] propranoloL (INDERAL) 20 mg/5 mL (4 mg/mL) Soln Take 1 tablet by mouth 2 (two) times daily.     Family History    None       Tobacco Use     Smoking status: Never    Smokeless tobacco: Never   Substance and Sexual Activity    Alcohol use: Yes     Alcohol/week: 1.0 standard drink     Types: 1 Shots of liquor per week     Comment: rarely    Drug use: Not Currently    Sexual activity: Yes     Partners: Female     Review of Systems   HENT:  Negative for congestion and sore throat.    Eyes:  Negative for pain, discharge, redness and itching.   Respiratory:  Positive for shortness of breath. Negative for cough, chest tightness and wheezing.    Cardiovascular:  Positive for leg swelling. Negative for chest pain and palpitations.   Gastrointestinal:  Negative for abdominal distention and abdominal pain.   Genitourinary:  Negative for decreased urine volume, difficulty urinating, dysuria, flank pain, frequency, hematuria and urgency.   Musculoskeletal:  Negative for back pain, gait problem, joint swelling, myalgias and neck pain.   Skin:  Negative for rash.   Neurological:  Negative for dizziness, syncope, speech difficulty, weakness, light-headedness, numbness and headaches.   Hematological:  Does not bruise/bleed easily.   Psychiatric/Behavioral:  Negative for agitation, confusion, hallucinations, self-injury, sleep disturbance and suicidal ideas. The patient is not nervous/anxious and is not hyperactive.    Objective:     Vital Signs (Most Recent):  Temp: 97.6 °F (36.4 °C) (04/28/23 1320)  Pulse: 88 (04/28/23 2000)  Resp: 16 (04/28/23 1425)  BP: (!) 161/83 (04/28/23 2000)  SpO2: 98 % (04/28/23 2000)   Vital Signs (24h Range):  Temp:  [97.6 °F (36.4 °C)] 97.6 °F (36.4 °C)  Pulse:  [] 88  Resp:  [14-18] 16  SpO2:  [94 %-100 %] 98 %  BP: (137-179)/(61-98) 161/83     Weight: 114.3 kg (252 lb)  Body mass index is 36.16 kg/m².    Physical Exam  Vitals and nursing note reviewed.   Constitutional:       General: He is not in acute distress.     Appearance: Normal appearance. He is normal weight. He is not toxic-appearing.   HENT:      Head: Normocephalic.       Right Ear: Tympanic membrane, ear canal and external ear normal. There is no impacted cerumen.      Left Ear: Tympanic membrane, ear canal and external ear normal. There is no impacted cerumen.      Nose: Nose normal. No congestion or rhinorrhea.      Mouth/Throat:      Mouth: Mucous membranes are moist.      Pharynx: Oropharynx is clear. No oropharyngeal exudate or posterior oropharyngeal erythema.   Eyes:      General: No scleral icterus.        Right eye: No discharge.         Left eye: No discharge.      Extraocular Movements: Extraocular movements intact.      Conjunctiva/sclera: Conjunctivae normal.      Pupils: Pupils are equal, round, and reactive to light.   Neck:      Vascular: No carotid bruit.   Cardiovascular:      Rate and Rhythm: Normal rate and regular rhythm.      Pulses: Normal pulses.      Heart sounds: No murmur heard.    No friction rub. No gallop.   Pulmonary:      Effort: Pulmonary effort is normal. No respiratory distress.      Breath sounds: Normal breath sounds. No stridor. No wheezing, rhonchi or rales.   Chest:      Chest wall: No tenderness.   Abdominal:      General: Abdomen is flat. Bowel sounds are normal. There is no distension.      Palpations: Abdomen is soft. There is no mass.      Tenderness: There is no abdominal tenderness. There is no right CVA tenderness, left CVA tenderness, guarding or rebound.      Hernia: No hernia is present.   Genitourinary:     Rectum: Normal.   Musculoskeletal:         General: No swelling, tenderness, deformity or signs of injury. Normal range of motion.      Cervical back: Normal range of motion and neck supple. No rigidity or tenderness.      Right lower leg: Edema (1-2+ pitting edema) present.      Left lower leg: Edema (1-2 + pitting edema) present.   Lymphadenopathy:      Cervical: No cervical adenopathy.   Skin:     General: Skin is warm and dry.      Capillary Refill: Capillary refill takes 2 to 3 seconds.      Coloration: Skin is not  jaundiced or pale.      Findings: No bruising, erythema, lesion or rash.   Neurological:      General: No focal deficit present.      Mental Status: He is alert and oriented to person, place, and time. Mental status is at baseline.      Cranial Nerves: No cranial nerve deficit.      Sensory: No sensory deficit.      Motor: No weakness.   Psychiatric:         Mood and Affect: Mood normal.         Behavior: Behavior normal.         Thought Content: Thought content normal.         Judgment: Judgment normal.         CRANIAL NERVES     CN III, IV, VI   Pupils are equal, round, and reactive to light.     Significant Labs: All pertinent labs within the past 24 hours have been reviewed.    Significant Imaging: I have reviewed all pertinent imaging results/findings within the past 24 hours.    Assessment/Plan:     * Shortness of breath  O2 @2 LBNC  Lasix IVP  O2 sat  CTA        VTE Risk Mitigation (From admission, onward)    None               On 04/28/2023, patient should be placed in hospital observation services under my care in collaboration with Michelle.      ALEX SOTO-C  Department of Hospital Medicine  [facility] - Emergency Dept

## 2023-04-29 NOTE — SUBJECTIVE & OBJECTIVE
Past Medical History:   Diagnosis Date    CHF (congestive heart failure)     Hypertension     Mixed hyperlipidemia     Paroxysmal atrial fibrillation 2013       Past Surgical History:   Procedure Laterality Date    CYSTOSCOPY W/ URETERAL STENT PLACEMENT N/A 03/02/2023    Procedure: CYSTOSCOPY, WITH URETERAL STENT INSERTION;  Surgeon: Yoshi Lange MD;  Location: [facility];  Service: Urology;  Laterality: N/A;    CYSTOURETEROSCOPY, WITH HOLMIUM LASER LITHOTRIPSY OF URETERAL CALCULUS AND STENT INSERTION Left 4/20/2023    Procedure: CYSTOURETEROSCOPY, WITH HOLMIUM LASER LITHOTRIPSY OF URETERAL CALCULUS AND STENT INSERTION;  Surgeon: Yoshi Lange MD;  Location: [facility];  Service: Urology;  Laterality: Left;    FRACTURE SURGERY      INTRAMEDULLARY RODDING OF TROCHANTER OF FEMUR Left 11/10/2022    Procedure: INSERTION, ITRAMEDULLARY SANTI, FEMUR, TROCHANTER;  Surgeon: Richard Phoenix MD;  Location: [facility];  Service: Orthopedics;  Laterality: Left;    LUMBAR DISCECTOMY  1980    L4-5    REMOVAL OF URETERAL CALCULUS Left 4/20/2023    Procedure: REMOVAL, CALCULUS, URETER;  Surgeon: Yoshi Lange MD;  Location: [facility];  Service: Urology;  Laterality: Left;    REMOVAL, CALCULUS, BLADDER  03/02/2023    Procedure: REMOVAL, CALCULUS, BLADDER;  Surgeon: Yoshi Lange MD;  Location: [facility];  Service: Urology;;    RETROGRADE PYELOGRAPHY  4/20/2023    Procedure: PYELOGRAM, RETROGRADE;  Surgeon: Yoshi Lange MD;  Location: [facility];  Service: Urology;;    TOTAL KNEE ARTHROPLASTY Right 2017    TOTAL KNEE ARTHROPLASTY Left 2018       Review of patient's allergies indicates:  No Known Allergies    No current facility-administered medications on file prior to encounter.     Current Outpatient Medications on File Prior to Encounter   Medication Sig    acetaminophen 325 mg Cap Take 650 mg by mouth every 6 (six) hours as needed.    amiodarone (PACERONE) 100 MG Tab Take 100 mg by mouth every morning.     amitriptyline (ELAVIL) 10 MG tablet Take 20 mg by mouth every evening.    apixaban (ELIQUIS) 2.5 mg Tab Take 2.5 mg by mouth 2 (two) times daily.    calcium polycarbophil (FIBER-CAPS, CA POLYCARBOPHIL, ORAL) Take 1 tablet by mouth once daily.    cholecalciferol, vitamin D3, (VITAMIN D3) 50 mcg (2,000 unit) Tab Take 1 tablet by mouth once daily.    cyanocobalamin, vitamin B-12, 1,000 mcg Subl Place 1,000 mcg under the tongue 2 (two) times a day.    docusate sodium (COLACE) 100 MG capsule Take 100 mg by mouth 2 (two) times daily.    DULoxetine (CYMBALTA) 30 MG capsule Take 1 capsule (30 mg total) by mouth 2 (two) times daily.    finasteride (PROSCAR) 5 mg tablet Take 1 tablet (5 mg total) by mouth once daily.    HYDROcodone-acetaminophen (NORCO) 5-325 mg per tablet Take 1 tablet by mouth every 6 (six) hours as needed for Pain.    lisinopriL (PRINIVIL,ZESTRIL) 20 MG tablet Take 20 mg by mouth 2 (two) times daily.    metFORMIN (GLUCOPHAGE-XR) 500 MG ER 24hr tablet TAKE 1 TABLET BY MOUTH EVERY DAY (Patient taking differently: Take 500 mg by mouth Daily.)    metoprolol tartrate (LOPRESSOR) 25 MG tablet Take 1 tablet (25 mg total) by mouth 2 (two) times daily.    omega-3 acid ethyl esters (LOVAZA) 1 gram capsule TAKE 2 CAPSULES BY MOUTH 2 TIMES DAILY. (Patient taking differently: Take 2 g by mouth 2 (two) times daily.)    pantoprazole (PROTONIX) 40 MG tablet Take 40 mg by mouth once daily.    polyethylene glycol (GLYCOLAX) 17 gram/dose powder Take 17 g by mouth daily as needed.    potassium chloride SA (KLOR-CON) 10 MEQ TbSR Take 2 tablets (20 mEq total) by mouth once daily.    rosuvastatin (CRESTOR) 20 MG tablet TAKE 1 TABLET BY MOUTH EVERY DAY (Patient taking differently: Take 20 mg by mouth once daily. TAKE 1 TABLET BY MOUTH EVERY DAY)    sulfamethoxazole-trimethoprim 800-160mg (BACTRIM DS) 800-160 mg Tab Take 1 tablet by mouth once daily.    tamsulosin (FLOMAX) 0.4 mg Cap Take 2 capsules (0.8 mg total) by mouth once  daily.    topiramate (TOPAMAX) 50 MG tablet TAKE 1 TABLET BY MOUTH EVERY DAY (Patient taking differently: Take 50 mg by mouth once daily.)    traZODone (DESYREL) 50 MG tablet TAKE 1 TABLET BY MOUTH EVERY EVENING. (Patient taking differently: Take 50 mg by mouth every evening.)    [DISCONTINUED] amiodarone (PACERONE) 100 MG Tab Take 1 tablet by mouth once daily.    [DISCONTINUED] levoFLOXacin (LEVAQUIN) 500 MG tablet Take 1 tablet (500 mg total) by mouth once daily.    [DISCONTINUED] propranoloL (INDERAL) 20 mg/5 mL (4 mg/mL) Soln Take 1 tablet by mouth 2 (two) times daily.     Family History    None       Tobacco Use    Smoking status: Never    Smokeless tobacco: Never   Substance and Sexual Activity    Alcohol use: Yes     Alcohol/week: 1.0 standard drink     Types: 1 Shots of liquor per week     Comment: rarely    Drug use: Not Currently    Sexual activity: Yes     Partners: Female     Review of Systems   HENT:  Negative for congestion and sore throat.    Eyes:  Negative for pain, discharge, redness and itching.   Respiratory:  Positive for shortness of breath. Negative for cough, chest tightness and wheezing.    Cardiovascular:  Positive for leg swelling. Negative for chest pain and palpitations.   Gastrointestinal:  Negative for abdominal distention and abdominal pain.   Genitourinary:  Negative for decreased urine volume, difficulty urinating, dysuria, flank pain, frequency, hematuria and urgency.   Musculoskeletal:  Negative for back pain, gait problem, joint swelling, myalgias and neck pain.   Skin:  Negative for rash.   Neurological:  Negative for dizziness, syncope, speech difficulty, weakness, light-headedness, numbness and headaches.   Hematological:  Does not bruise/bleed easily.   Psychiatric/Behavioral:  Negative for agitation, confusion, hallucinations, self-injury, sleep disturbance and suicidal ideas. The patient is not nervous/anxious and is not hyperactive.    Objective:     Vital Signs (Most  Recent):  Temp: 97.6 °F (36.4 °C) (04/28/23 1320)  Pulse: 88 (04/28/23 2000)  Resp: 16 (04/28/23 1425)  BP: (!) 161/83 (04/28/23 2000)  SpO2: 98 % (04/28/23 2000)   Vital Signs (24h Range):  Temp:  [97.6 °F (36.4 °C)] 97.6 °F (36.4 °C)  Pulse:  [] 88  Resp:  [14-18] 16  SpO2:  [94 %-100 %] 98 %  BP: (137-179)/(61-98) 161/83     Weight: 114.3 kg (252 lb)  Body mass index is 36.16 kg/m².    Physical Exam  Vitals and nursing note reviewed.   Constitutional:       General: He is not in acute distress.     Appearance: Normal appearance. He is normal weight. He is not toxic-appearing.   HENT:      Head: Normocephalic.      Right Ear: Tympanic membrane, ear canal and external ear normal. There is no impacted cerumen.      Left Ear: Tympanic membrane, ear canal and external ear normal. There is no impacted cerumen.      Nose: Nose normal. No congestion or rhinorrhea.      Mouth/Throat:      Mouth: Mucous membranes are moist.      Pharynx: Oropharynx is clear. No oropharyngeal exudate or posterior oropharyngeal erythema.   Eyes:      General: No scleral icterus.        Right eye: No discharge.         Left eye: No discharge.      Extraocular Movements: Extraocular movements intact.      Conjunctiva/sclera: Conjunctivae normal.      Pupils: Pupils are equal, round, and reactive to light.   Neck:      Vascular: No carotid bruit.   Cardiovascular:      Rate and Rhythm: Normal rate and regular rhythm.      Pulses: Normal pulses.      Heart sounds: No murmur heard.    No friction rub. No gallop.   Pulmonary:      Effort: Pulmonary effort is normal. No respiratory distress.      Breath sounds: Normal breath sounds. No stridor. No wheezing, rhonchi or rales.   Chest:      Chest wall: No tenderness.   Abdominal:      General: Abdomen is flat. Bowel sounds are normal. There is no distension.      Palpations: Abdomen is soft. There is no mass.      Tenderness: There is no abdominal tenderness. There is no right CVA tenderness,  left CVA tenderness, guarding or rebound.      Hernia: No hernia is present.   Genitourinary:     Rectum: Normal.   Musculoskeletal:         General: No swelling, tenderness, deformity or signs of injury. Normal range of motion.      Cervical back: Normal range of motion and neck supple. No rigidity or tenderness.      Right lower leg: Edema (1-2+ pitting edema) present.      Left lower leg: Edema (1-2 + pitting edema) present.   Lymphadenopathy:      Cervical: No cervical adenopathy.   Skin:     General: Skin is warm and dry.      Capillary Refill: Capillary refill takes 2 to 3 seconds.      Coloration: Skin is not jaundiced or pale.      Findings: No bruising, erythema, lesion or rash.   Neurological:      General: No focal deficit present.      Mental Status: He is alert and oriented to person, place, and time. Mental status is at baseline.      Cranial Nerves: No cranial nerve deficit.      Sensory: No sensory deficit.      Motor: No weakness.   Psychiatric:         Mood and Affect: Mood normal.         Behavior: Behavior normal.         Thought Content: Thought content normal.         Judgment: Judgment normal.         CRANIAL NERVES     CN III, IV, VI   Pupils are equal, round, and reactive to light.     Significant Labs: All pertinent labs within the past 24 hours have been reviewed.    Significant Imaging: I have reviewed all pertinent imaging results/findings within the past 24 hours.

## 2023-04-29 NOTE — PHARMACY MED REC
"Admission Medication History     The home medication history was taken by Viji Guerrero.    You may go to "Admission" then "Reconcile Home Medications" tabs to review and/or act upon these items.     The home medication list has been updated by the Pharmacy department.   Please read ALL comments highlighted in yellow.   Please address this information as you see fit.    Feel free to contact us if you have any questions or require assistance.      Medications listed below were obtained from: Patient/family  Prior to Admission medications    Medication Sig Start Date End Date Taking? Authorizing Provider   acetaminophen 325 mg Cap Take 650 mg by mouth every 6 (six) hours as needed. 12/8/22  Yes Historical Provider   amiodarone (PACERONE) 100 MG Tab Take 100 mg by mouth every morning. 12/7/22  Yes Historical Provider   amitriptyline (ELAVIL) 10 MG tablet Take 20 mg by mouth every evening. 4/25/23  Yes Historical Provider   apixaban (ELIQUIS) 2.5 mg Tab Take 2.5 mg by mouth 2 (two) times daily.   Yes Historical Provider   calcium polycarbophil (FIBER-CAPS, CA POLYCARBOPHIL, ORAL) Take 1 tablet by mouth once daily.   Yes Historical Provider   cholecalciferol, vitamin D3, (VITAMIN D3) 50 mcg (2,000 unit) Tab Take 1 tablet by mouth once daily.   Yes Historical Provider   cyanocobalamin, vitamin B-12, 1,000 mcg Subl Place 1,000 mcg under the tongue 2 (two) times a day.   Yes Historical Provider   docusate sodium (COLACE) 100 MG capsule Take 100 mg by mouth 2 (two) times daily as needed.   Yes Historical Provider   DULoxetine (CYMBALTA) 30 MG capsule Take 1 capsule (30 mg total) by mouth 2 (two) times daily. 1/9/23  Yes Jony López MD   finasteride (PROSCAR) 5 mg tablet Take 1 tablet (5 mg total) by mouth once daily. 3/22/23 6/20/23 Yes Vipin Shepherd MD   HYDROcodone-acetaminophen (NORCO) 5-325 mg per tablet Take 1 tablet by mouth every 6 (six) hours as needed for Pain. 4/20/23  Yes Yoshi Lange MD "   lisinopriL (PRINIVIL,ZESTRIL) 20 MG tablet Take 20 mg by mouth 2 (two) times daily. 3/26/23  Yes Historical Provider   metFORMIN (GLUCOPHAGE-XR) 500 MG ER 24hr tablet TAKE 1 TABLET BY MOUTH EVERY DAY  Patient taking differently: Take 500 mg by mouth Daily. 4/10/23  Yes Gautam Castanon III, MD   metoprolol tartrate (LOPRESSOR) 25 MG tablet Take 1 tablet (25 mg total) by mouth 2 (two) times daily. 4/12/22  Yes Gautam Castanon III, MD   omega-3 acid ethyl esters (LOVAZA) 1 gram capsule TAKE 2 CAPSULES BY MOUTH 2 TIMES DAILY.  Patient taking differently: Take 2 g by mouth 2 (two) times daily. 1/5/23  Yes Gautam Castanon III, MD   pantoprazole (PROTONIX) 40 MG tablet Take 40 mg by mouth once daily.   Yes Historical Provider   polyethylene glycol (GLYCOLAX) 17 gram/dose powder Take 17 g by mouth daily as needed. 12/7/22  Yes Historical Provider   potassium chloride SA (KLOR-CON) 10 MEQ TbSR Take 2 tablets (20 mEq total) by mouth once daily. 1/10/23  Yes Jony López MD   rosuvastatin (CRESTOR) 20 MG tablet TAKE 1 TABLET BY MOUTH EVERY DAY  Patient taking differently: Take 20 mg by mouth once daily. TAKE 1 TABLET BY MOUTH EVERY DAY 11/27/22  Yes Gautam Castanon III, MD   topiramate (TOPAMAX) 50 MG tablet TAKE 1 TABLET BY MOUTH EVERY DAY  Patient taking differently: Take 50 mg by mouth once daily. 1/5/23  Yes Gautam Castanon III, MD   traZODone (DESYREL) 50 MG tablet TAKE 1 TABLET BY MOUTH EVERY EVENING.  Patient taking differently: Take 50 mg by mouth every evening. 12/15/22  Yes Gautam Castanon III, MD   sulfamethoxazole-trimethoprim 800-160mg (BACTRIM DS) 800-160 mg Tab Take 1 tablet by mouth once daily.  Patient not taking: Reported on 4/28/2023 4/20/23   Yoshi Lange MD   tamsulosin (FLOMAX) 0.4 mg Cap Take 2 capsules (0.8 mg total) by mouth once daily.  Patient not taking: Reported on 4/28/2023 1/10/23 1/10/24  Jony López MD           levoFLOXacin (LEVAQUIN) 500 MG tablet Take 1 tablet (500  mg total) by mouth once daily. 3/21/23 4/28/23  Vipin Shepherd MD   propranoloL (INDERAL) 20 mg/5 mL (4 mg/mL) Soln Take 1 tablet by mouth 2 (two) times daily. 12/8/22 4/28/23  Historical Provider       Potential issues to be addressed PRIOR TO DISCHARGE  Patient reported not taking the following medications: (Bactrim & Flomax). These medications remain on the home medication list. Please address accordingly.     Viji Guerrero  EXT 1924                  .

## 2023-04-29 NOTE — ASSESSMENT & PLAN NOTE
Nearing euvolemia now  Echo reviewed from 11/22 moreso with R heart failure  Pleural effusions attributed to this  - continue iv lasix today and change to po tmrw  - should have oral diuretic at home prescribed on discharge  - could consider reimaging outpatient to follow effusions

## 2023-04-29 NOTE — PROGRESS NOTES
[facility], [facility] Medicine  Progress Note    Patient Name: Hiro Rodríguez  MRN: [id number]  Patient Class: OP- Observation   Admission Date: 4/28/2023  Length of Stay: 0 days  Attending Physician: Vini Presley MD  Primary Care Provider: Gautam Castanon III, MD        Subjective:     Principal Problem:Acute on chronic heart failure with preserved ejection fraction        HPI:  77 yo male presents with c/o shortness of breath which is worse with exertion X 1 week. Associated symptoms PND, orthopnea, bilateral lower extremities. Patient reports states that he had a proximally a 26 lb weight gain over last 1-2 weeks. He denies chest pain, no fever, no other constitutional symptoms. Patient reports he has been in and out rehab over last 4 months. Patient also is requesting that he may need to go back into rehab facility due to the fact that he is having problems with mobility and having difficulty obtaining home care.Patient reports he has been in and out rehab over last 4 months. Hx of NIDDM, HTN, HLD, atrial fibrillation, congestive heart failure, and femur/hip fracture.       Overview/Hospital Course:  Admitted with SOB, weight gain, and peripheral edema found to have acute heart failure exacerbation. Did require up to 3.5L NC quickly weaned to room air after diuresis. IV lasix utilized with good UOP and symptom improvement. CTA chest did not show pulmonary emboli but did show bilateral pleural effusions. Echo from 11/22 reviewed and showed normal LVEF but mentioned right ventricular dysfunction. PT consulted as he had recent rehab stay.      Interval History: Patient seen and examined. NAEON. Breathing near baseline. He is interested in outpatient PT.      Objective:     Vital Signs (Most Recent):  Temp: 97.5 °F (36.4 °C) (04/29/23 1100)  Pulse: 75 (04/29/23 1100)  Resp: 18 (04/29/23 1100)  BP: 115/79 (04/29/23 1100)  SpO2: 95 % (04/29/23 1100) Vital Signs (24h Range):  Temp:  [97.5 °F (36.4  °C)-97.8 °F (36.6 °C)] 97.5 °F (36.4 °C)  Pulse:  [75-94] 75  Resp:  [17-21] 18  SpO2:  [95 %-100 %] 95 %  BP: (115-164)/() 115/79     Weight: 114.7 kg (252 lb 12.8 oz)  Body mass index is 36.27 kg/m².    Intake/Output Summary (Last 24 hours) at 4/29/2023 1601  Last data filed at 4/29/2023 1253  Gross per 24 hour   Intake 631 ml   Output 2125 ml   Net -1494 ml      Physical Exam  Vitals reviewed.   Constitutional:       General: He is not in acute distress.     Appearance: He is obese.      Comments: Difficulty hearing   HENT:      Head: Normocephalic and atraumatic.   Cardiovascular:      Rate and Rhythm: Normal rate and regular rhythm.   Pulmonary:      Effort: Pulmonary effort is normal. No respiratory distress.      Breath sounds: Normal breath sounds. No wheezing, rhonchi or rales.   Musculoskeletal:      Right lower leg: Edema present.      Left lower leg: Edema present.   Skin:     General: Skin is warm and dry.   Neurological:      General: No focal deficit present.      Mental Status: He is alert and oriented to person, place, and time. Mental status is at baseline.   Psychiatric:         Mood and Affect: Affect normal.         Behavior: Behavior normal.       Significant Labs: All pertinent labs within the past 24 hours have been reviewed.    Significant Imaging: I have reviewed all pertinent imaging results/findings within the past 24 hours.      Assessment/Plan:      * Acute on chronic heart failure with preserved ejection fraction  Nearing euvolemia now  Echo reviewed from 11/22 more with R heart failure  Pleural effusions attributed to this  - continue iv lasix today and change to po tmrw  - should have oral diuretic at home prescribed on discharge  - could consider reimaging outpatient to follow effusions    Benign prostatic hyperplasia without lower urinary tract symptoms  Chronic, stable  - continue flomax and finasteride    Hypertension, essential  Chronic, controlled  - continue home  lisinopril and metoprolol    GERD (gastroesophageal reflux disease)  Chronic, stable  - continue ppi    Type 2 diabetes mellitus with diabetic polyneuropathy, without long-term current use of insulin, HbA1c 5.8%  Well-controlled  - hold metformin  - check glucose on am labs and address if needed    Hyperlipidemia  Chronic,  - continue home statin    Paroxysmal atrial fibrillation  Chronic, rate-controlled  - continue home eliquis, metoprolol, and amiodarone      VTE Risk Mitigation (From admission, onward)         Ordered     apixaban tablet 2.5 mg  2 times daily         04/28/23 2319     IP VTE HIGH RISK PATIENT  Once         04/28/23 2318                Discharge Planning   BRENNAN:  4/30-5/1    Code Status: Full Code   Is the patient medically ready for discharge?:     Reason for patient still in hospital (select all that apply): Patient trending condition and PT / OT recommendations  Discharge Plan A: Home with family                  Vini Presley MD  Department of Hospital Medicine   [facility] access hospital

## 2023-04-29 NOTE — HOSPITAL COURSE
Admitted with SOB, weight gain, and peripheral edema found to have acute heart failure exacerbation. Did require up to 3.5L NC quickly weaned to room air after diuresis. IV lasix utilized with good UOP and symptom improvement. CTA chest did not show pulmonary emboli but did show bilateral pleural effusions. Echo from 11/22 reviewed and showed normal LVEF but mentioned right ventricular dysfunction. PT consulted as he had recent rehab stay and did recommend home health with continued therapies. PO lasix added to his regimen. He had a run of asymptomatic nonsustained ventricular tachycardia while inpatient for which his electrolytes were reviewed and within normal limits and it was noted he was already on amiodarone and a beta blocker. To f/u with his PCP and consider reimaging for resolution of pleural effusions if clinically indicated. By time of discharge the patient was tolerating a regular diet with resolved admission symptoms. Patient seen and examined on day of discharge.    Physical exam on day of discharge:  General - Patient alert and oriented x3 in NAD  CV - Regular rate and irregular rhythm  Resp - Lungs CTA Bilaterally, No increased WOB  GI - BS normoactive, soft, non-tender/non-distended, no HSM  Extrem-  trace pitting edema BLE  Skin -  No masses, rashes or lesions noted on cursory skin exam.

## 2023-04-29 NOTE — SUBJECTIVE & OBJECTIVE
Interval History: Patient seen and examined. OILVA. Breathing near baseline. He is interested in outpatient PT.      Objective:     Vital Signs (Most Recent):  Temp: 97.5 °F (36.4 °C) (04/29/23 1100)  Pulse: 75 (04/29/23 1100)  Resp: 18 (04/29/23 1100)  BP: 115/79 (04/29/23 1100)  SpO2: 95 % (04/29/23 1100) Vital Signs (24h Range):  Temp:  [97.5 °F (36.4 °C)-97.8 °F (36.6 °C)] 97.5 °F (36.4 °C)  Pulse:  [75-94] 75  Resp:  [17-21] 18  SpO2:  [95 %-100 %] 95 %  BP: (115-164)/() 115/79     Weight: 114.7 kg (252 lb 12.8 oz)  Body mass index is 36.27 kg/m².    Intake/Output Summary (Last 24 hours) at 4/29/2023 1601  Last data filed at 4/29/2023 1253  Gross per 24 hour   Intake 631 ml   Output 2125 ml   Net -1494 ml      Physical Exam  Vitals reviewed.   Constitutional:       General: He is not in acute distress.     Appearance: He is obese.      Comments: Difficulty hearing   HENT:      Head: Normocephalic and atraumatic.   Cardiovascular:      Rate and Rhythm: Normal rate and regular rhythm.   Pulmonary:      Effort: Pulmonary effort is normal. No respiratory distress.      Breath sounds: Normal breath sounds. No wheezing, rhonchi or rales.   Musculoskeletal:      Right lower leg: Edema present.      Left lower leg: Edema present.   Skin:     General: Skin is warm and dry.   Neurological:      General: No focal deficit present.      Mental Status: He is alert and oriented to person, place, and time. Mental status is at baseline.   Psychiatric:         Mood and Affect: Affect normal.         Behavior: Behavior normal.       Significant Labs: All pertinent labs within the past 24 hours have been reviewed.    Significant Imaging: I have reviewed all pertinent imaging results/findings within the past 24 hours.

## 2023-04-29 NOTE — HPI
79 yo male presents with c/o shortness of breath which is worse with exertion X 1 week. Associated symptoms PND, orthopnea, bilateral lower extremities. Patient reports states that he had a proximally a 26 lb weight gain over last 1-2 weeks. He denies chest pain, no fever, no other constitutional symptoms. Patient reports he has been in and out rehab over last 4 months. Patient also is requesting that he may need to go back into rehab facility due to the fact that he is having problems with mobility and having difficulty obtaining home care.Patient reports he has been in and out rehab over last 4 months. Hx of NIDDM, HTN, HLD, atrial fibrillation, congestive heart failure, and femur/hip fracture.

## 2023-04-30 VITALS
SYSTOLIC BLOOD PRESSURE: 112 MMHG | RESPIRATION RATE: 18 BRPM | DIASTOLIC BLOOD PRESSURE: 59 MMHG | OXYGEN SATURATION: 95 % | TEMPERATURE: 98 F | HEART RATE: 65 BPM | WEIGHT: 252.81 LBS | HEIGHT: 70 IN | BODY MASS INDEX: 36.19 KG/M2

## 2023-04-30 LAB
ANION GAP SERPL CALC-SCNC: 8 MMOL/L (ref 8–16)
BUN SERPL-MCNC: 18 MG/DL (ref 8–23)
CALCIUM SERPL-MCNC: 7.9 MG/DL (ref 8.7–10.5)
CHLORIDE SERPL-SCNC: 101 MMOL/L (ref 95–110)
CO2 SERPL-SCNC: 33 MMOL/L (ref 23–29)
CREAT SERPL-MCNC: 0.8 MG/DL (ref 0.5–1.4)
EST. GFR  (NO RACE VARIABLE): >60 ML/MIN/1.73 M^2
GLUCOSE SERPL-MCNC: 102 MG/DL (ref 70–110)
MAGNESIUM SERPL-MCNC: 1.8 MG/DL (ref 1.6–2.6)
POTASSIUM SERPL-SCNC: 4.2 MMOL/L (ref 3.5–5.1)
SODIUM SERPL-SCNC: 142 MMOL/L (ref 136–145)

## 2023-04-30 PROCEDURE — 25000003 PHARM REV CODE 250: Performed by: NURSE PRACTITIONER

## 2023-04-30 PROCEDURE — 94761 N-INVAS EAR/PLS OXIMETRY MLT: CPT

## 2023-04-30 PROCEDURE — 25000242 PHARM REV CODE 250 ALT 637 W/ HCPCS: Performed by: NURSE PRACTITIONER

## 2023-04-30 PROCEDURE — 80048 BASIC METABOLIC PNL TOTAL CA: CPT | Performed by: STUDENT IN AN ORGANIZED HEALTH CARE EDUCATION/TRAINING PROGRAM

## 2023-04-30 PROCEDURE — 27000221 HC OXYGEN, UP TO 24 HOURS

## 2023-04-30 PROCEDURE — 36415 COLL VENOUS BLD VENIPUNCTURE: CPT | Performed by: STUDENT IN AN ORGANIZED HEALTH CARE EDUCATION/TRAINING PROGRAM

## 2023-04-30 PROCEDURE — 97530 THERAPEUTIC ACTIVITIES: CPT

## 2023-04-30 PROCEDURE — G0378 HOSPITAL OBSERVATION PER HR: HCPCS

## 2023-04-30 PROCEDURE — 83735 ASSAY OF MAGNESIUM: CPT | Performed by: STUDENT IN AN ORGANIZED HEALTH CARE EDUCATION/TRAINING PROGRAM

## 2023-04-30 PROCEDURE — 25000003 PHARM REV CODE 250: Performed by: STUDENT IN AN ORGANIZED HEALTH CARE EDUCATION/TRAINING PROGRAM

## 2023-04-30 PROCEDURE — 99900031 HC PATIENT EDUCATION (STAT)

## 2023-04-30 RX ORDER — FUROSEMIDE 20 MG/1
20 TABLET ORAL DAILY
Qty: 90 TABLET | Refills: 1 | Status: SHIPPED | OUTPATIENT
Start: 2023-05-01 | End: 2023-11-10 | Stop reason: SDUPTHER

## 2023-04-30 RX ADMIN — DOCUSATE SODIUM 100 MG: 100 CAPSULE, LIQUID FILLED ORAL at 09:04

## 2023-04-30 RX ADMIN — Medication 2000 UNITS: at 09:04

## 2023-04-30 RX ADMIN — TOPIRAMATE 50 MG: 25 TABLET, FILM COATED ORAL at 09:04

## 2023-04-30 RX ADMIN — HYDROCODONE BITARTRATE AND ACETAMINOPHEN 1 TABLET: 5; 325 TABLET ORAL at 06:04

## 2023-04-30 RX ADMIN — APIXABAN 2.5 MG: 2.5 TABLET, FILM COATED ORAL at 09:04

## 2023-04-30 RX ADMIN — FINASTERIDE 5 MG: 5 TABLET, FILM COATED ORAL at 09:04

## 2023-04-30 RX ADMIN — HYDROCODONE BITARTRATE AND ACETAMINOPHEN 1 TABLET: 10; 325 TABLET ORAL at 02:04

## 2023-04-30 RX ADMIN — TAMSULOSIN HYDROCHLORIDE 0.8 MG: 0.4 CAPSULE ORAL at 09:04

## 2023-04-30 RX ADMIN — FUROSEMIDE 20 MG: 20 TABLET ORAL at 09:04

## 2023-04-30 RX ADMIN — AMIODARONE HYDROCHLORIDE 100 MG: 100 TABLET ORAL at 06:04

## 2023-04-30 RX ADMIN — DULOXETINE 30 MG: 30 CAPSULE, DELAYED RELEASE ORAL at 09:04

## 2023-04-30 RX ADMIN — PANTOPRAZOLE SODIUM 40 MG: 40 TABLET, DELAYED RELEASE ORAL at 06:04

## 2023-04-30 RX ADMIN — POLYETHYLENE GLYCOL 3350 17 G: 17 POWDER, FOR SOLUTION ORAL at 09:04

## 2023-04-30 RX ADMIN — PSYLLIUM HUSK 1 PACKET: 3.4 POWDER ORAL at 09:04

## 2023-04-30 NOTE — PLAN OF CARE
04/30/23 0740   Patient Assessment/Suction   Level of Consciousness (AVPU) alert   Respiratory Effort Normal;Unlabored   Expansion/Accessory Muscles/Retractions no use of accessory muscles;no retractions   PRE-TX-O2   Device (Oxygen Therapy) nasal cannula   $ Is the patient on Low Flow Oxygen? Yes   Flow (L/min) 2   SpO2 98 %   Pulse Oximetry Type Intermittent   $ Pulse Oximetry - Multiple Charge Pulse Oximetry - Multiple   Pulse 80   Resp 16   Education   $ Education 15 min  (oxygen)

## 2023-04-30 NOTE — PLAN OF CARE
04/30/23 1309   Final Note   Assessment Type Final Discharge Note   Anticipated Discharge Disposition Home-Health   What phone number can be called within the next 1-3 days to see how you are doing after discharge? [phone]   Post-Acute Status   Post-Acute Authorization Home [facility]   Home Health Status Set-up Complete/Auth obtained   Discharge Delays None known at this time     Resumption order sent to [facility].  Patient cleared for discharge from case management standpoint.    Chart and discharge orders reviewed.  Patient discharged home with no further case management needs.

## 2023-04-30 NOTE — DISCHARGE SUMMARY
[facility] Medicine  Discharge Summary      Patient Name: Hiro Rodríguez  MRN: [id number]  RADHA: [id number]  Patient Class: OP- Observation  Admission Date: 4/28/2023  Hospital Length of Stay: 0 days  Discharge Date and Time:  04/30/2023 2:06 PM  Attending Physician: Vini Presley MD   Discharging Provider: Vini Presley MD  Primary Care Provider: Gautam Castanon III, MD    Primary Care Team: Networked reference to record PCT     HPI:   77 yo male presents with c/o shortness of breath which is worse with exertion X 1 week. Associated symptoms PND, orthopnea, bilateral lower extremities. Patient reports states that he had a proximally a 26 lb weight gain over last 1-2 weeks. He denies chest pain, no fever, no other constitutional symptoms. Patient reports he has been in and out rehab over last 4 months. Patient also is requesting that he may need to go back into rehab facility due to the fact that he is having problems with mobility and having difficulty obtaining home care.Patient reports he has been in and out rehab over last 4 months. Hx of NIDDM, HTN, HLD, atrial fibrillation, congestive heart failure, and femur/hip fracture.       * No surgery found *      Hospital Course:   Admitted with SOB, weight gain, and peripheral edema found to have acute heart failure exacerbation. Did require up to 3.5L NC quickly weaned to room air after diuresis. IV lasix utilized with good UOP and symptom improvement. CTA chest did not show pulmonary emboli but did show bilateral pleural effusions. Echo from 11/22 reviewed and showed normal LVEF but mentioned right ventricular dysfunction. PT consulted as he had recent rehab stay and did recommend home health with continued therapies. PO lasix added to his regimen. He had a run of asymptomatic nonsustained ventricular tachycardia while inpatient for which his electrolytes were reviewed and within normal limits and it was noted he was already on  amiodarone and a beta blocker. To f/u with his PCP and consider reimaging for resolution of pleural effusions if clinically indicated. By time of discharge the patient was tolerating a regular diet with resolved admission symptoms. Patient seen and examined on day of discharge.    Physical exam on day of discharge:  General - Patient alert and oriented x3 in NAD  CV - Regular rate and irregular rhythm  Resp - Lungs CTA Bilaterally, No increased WOB  GI - BS normoactive, soft, non-tender/non-distended, no HSM  Extrem-  trace pitting edema BLE  Skin -  No masses, rashes or lesions noted on cursory skin exam.         Goals of Care Treatment Preferences:  Code Status: Full Code      Consults:   Consults (From admission, onward)        Status Ordering Provider     Inpatient consult to   Once        Provider:  (Not yet assigned)    Completed JOSE CARLOS ROSARIO          No new Assessment & Plan notes have been filed under this hospital service since the last note was generated.  Service: Hospital Medicine    Final Active Diagnoses:    Diagnosis Date Noted POA    PRINCIPAL PROBLEM:  Acute on chronic heart failure with preserved ejection fraction [I50.33] 04/28/2023 Yes    Benign prostatic hyperplasia without lower urinary tract symptoms [N40.0] 04/29/2023 Yes     Chronic    Hypertension, essential [I10] 04/28/2023 Yes     Chronic    GERD (gastroesophageal reflux disease) [K21.9] 03/16/2023 Yes    Type 2 diabetes mellitus with diabetic polyneuropathy, without long-term current use of insulin, HbA1c 5.8% [E11.42] 01/06/2021 Yes     Chronic    Hyperlipidemia [E78.5] 01/06/2021 Yes     Chronic    Paroxysmal atrial fibrillation [I48.0] 08/31/2020 Yes     Chronic      Problems Resolved During this Admission:       Discharged Condition: good    Disposition: Home-Health Care [facility]    Follow Up:   Follow-up Information     Gautam Castanon III, MD. Schedule an appointment as soon as possible for a visit in 1  week(s).    Specialty: Family Medicine  Contact information:  [address] [facility]  [id number]  [place] [id number]  [phone]                       Patient Instructions:      Ambulatory referral/consult to Home Health   Standing Status: Future   Referral Priority: Routine Referral Type: Home Health   Referral Reason: Specialty Services Required   Requested Specialty: Home Health Services   Number of Visits Requested: 1     Diet Cardiac     Diet diabetic     Notify your health care provider if you experience any of the following:  temperature >100.4     Notify your health care provider if you experience any of the following:  persistent nausea and vomiting or diarrhea     Notify your health care provider if you experience any of the following:  severe uncontrolled pain     Notify your health care provider if you experience any of the following:  redness, tenderness, or signs of infection (pain, swelling, redness, odor or green/yellow discharge around incision site)     Notify your health care provider if you experience any of the following:  difficulty breathing or increased cough     Notify your health care provider if you experience any of the following:  severe persistent headache     Notify your health care provider if you experience any of the following:  worsening rash     Notify your health care provider if you experience any of the following:  persistent dizziness, light-headedness, or visual disturbances     Notify your health care provider if you experience any of the following:  increased confusion or weakness     Activity as tolerated       Significant Diagnostic Studies:     Admission on 04/28/2023   Component Date Value Ref Range Status    WBC 04/28/2023 11.66  3.90 - 12.70 K/uL Final    RBC 04/28/2023 3.36 (L)  4.60 - 6.20 M/uL Final    Hemoglobin 04/28/2023 10.0 (L)  14.0 - 18.0 g/dL Final    Hematocrit 04/28/2023 32.3 (L)  40.0 - 54.0 % Final    MCV 04/28/2023 96  82 - 98 fL Final    MCH 04/28/2023  29.8  27.0 - 31.0 pg Final    MCHC 04/28/2023 31.0 (L)  32.0 - 36.0 g/dL Final    RDW 04/28/2023 15.4 (H)  11.5 - 14.5 % Final    Platelets 04/28/2023 300  150 - 450 K/uL Final    MPV 04/28/2023 10.1  9.2 - 12.9 fL Final    Immature Granulocytes 04/28/2023 1.1 (H)  0.0 - 0.5 % Final    Gran # (ANC) 04/28/2023 9.2 (H)  1.8 - 7.7 K/uL Final    Immature Grans (Abs) 04/28/2023 0.13 (H)  0.00 - 0.04 K/uL Final    Comment: Mild elevation in immature granulocytes is non specific and   can be seen in a variety of conditions including stress response,   acute inflammation, trauma and pregnancy. Correlation with other   laboratory and clinical findings is essential.      Lymph # 04/28/2023 1.5  1.0 - 4.8 K/uL Final    Mono # 04/28/2023 0.8  0.3 - 1.0 K/uL Final    Eos # 04/28/2023 0.0  0.0 - 0.5 K/uL Final    Baso # 04/28/2023 0.02  0.00 - 0.20 K/uL Final    nRBC 04/28/2023 0  0 /100 WBC Final    Gran % 04/28/2023 78.8 (H)  38.0 - 73.0 % Final    Lymph % 04/28/2023 13.0 (L)  18.0 - 48.0 % Final    Mono % 04/28/2023 6.7  4.0 - 15.0 % Final    Eosinophil % 04/28/2023 0.2  0.0 - 8.0 % Final    Basophil % 04/28/2023 0.2  0.0 - 1.9 % Final    Differential Method 04/28/2023 Automated   Final    Sodium 04/28/2023 140  136 - 145 mmol/L Final    Potassium 04/28/2023 4.1  3.5 - 5.1 mmol/L Final    Chloride 04/28/2023 106  95 - 110 mmol/L Final    CO2 04/28/2023 26  23 - 29 mmol/L Final    Glucose 04/28/2023 230 (H)  70 - 110 mg/dL Final    BUN 04/28/2023 14  8 - 23 mg/dL Final    Creatinine 04/28/2023 0.8  0.5 - 1.4 mg/dL Final    Calcium 04/28/2023 8.0 (L)  8.7 - 10.5 mg/dL Final    Total Protein 04/28/2023 6.3  6.0 - 8.4 g/dL Final    Albumin 04/28/2023 2.8 (L)  3.5 - 5.2 g/dL Final    Total Bilirubin 04/28/2023 0.5  0.1 - 1.0 mg/dL Final    Comment: For infants and newborns, interpretation of results should be based  on gestational age, weight and in agreement with clinical  observations.    Premature  Infant recommended reference ranges:  Up to 24 hours.............<8.0 mg/dL  Up to 48 hours............<12.0 mg/dL  3-5 days..................<15.0 mg/dL  6-29 days.................<15.0 mg/dL      Alkaline Phosphatase 04/28/2023 76  55 - 135 U/L Final    AST 04/28/2023 20  10 - 40 U/L Final    ALT 04/28/2023 17  10 - 44 U/L Final    Anion Gap 04/28/2023 8  8 - 16 mmol/L Final    eGFR 04/28/2023 >60.0  >60 mL/min/1.73 m^2 Final    Troponin I High Sensitivity 04/28/2023 6.4  0.0 - 14.9 pg/mL Final    Comment: Troponin results differ between methods. Do not use   results between Troponin methods interchangeably.    Alkaline Phospatase levels above 400 U/L may   cause false positive results.    Access hsTnI should not be used for patients taking   Asfotase jakub (Strensiq).      BNP 04/28/2023 289 (H)  0 - 99 pg/mL Final    Values of less than 100 pg/ml are consistent with non-CHF populations.    Magnesium 04/28/2023 1.6  1.6 - 2.6 mg/dL Final    Sodium 04/29/2023 139  136 - 145 mmol/L Final    Potassium 04/29/2023 4.2  3.5 - 5.1 mmol/L Final    Chloride 04/29/2023 102  95 - 110 mmol/L Final    CO2 04/29/2023 31 (H)  23 - 29 mmol/L Final    Glucose 04/29/2023 118 (H)  70 - 110 mg/dL Final    BUN 04/29/2023 12  8 - 23 mg/dL Final    Creatinine 04/29/2023 0.6  0.5 - 1.4 mg/dL Final    Calcium 04/29/2023 8.1 (L)  8.7 - 10.5 mg/dL Final    Anion Gap 04/29/2023 6 (L)  8 - 16 mmol/L Final    eGFR 04/29/2023 >60.0  >60 mL/min/1.73 m^2 Final    Magnesium 04/29/2023 1.8  1.6 - 2.6 mg/dL Final    Sodium 04/30/2023 142  136 - 145 mmol/L Final    Potassium 04/30/2023 4.2  3.5 - 5.1 mmol/L Final    Chloride 04/30/2023 101  95 - 110 mmol/L Final    CO2 04/30/2023 33 (H)  23 - 29 mmol/L Final    Glucose 04/30/2023 102  70 - 110 mg/dL Final    BUN 04/30/2023 18  8 - 23 mg/dL Final    Creatinine 04/30/2023 0.8  0.5 - 1.4 mg/dL Final    Calcium 04/30/2023 7.9 (L)  8.7 - 10.5 mg/dL Final    Anion Gap 04/30/2023  8  8 - 16 mmol/L Final    eGFR 04/30/2023 >60.0  >60 mL/min/1.73 m^2 Final    Magnesium 04/30/2023 1.8  1.6 - 2.6 mg/dL Final   Lab Visit on 04/28/2023   Component Date Value Ref Range Status    Sodium 04/28/2023 141  136 - 145 mmol/L Final    Potassium 04/28/2023 4.4  3.5 - 5.1 mmol/L Final    Chloride 04/28/2023 105  95 - 110 mmol/L Final    CO2 04/28/2023 29  23 - 29 mmol/L Final    Glucose 04/28/2023 164 (H)  70 - 110 mg/dL Final    BUN 04/28/2023 14  8 - 23 mg/dL Final    Creatinine 04/28/2023 0.7  0.5 - 1.4 mg/dL Final    Calcium 04/28/2023 8.6 (L)  8.7 - 10.5 mg/dL Final    Total Protein 04/28/2023 6.6  6.0 - 8.4 g/dL Final    Albumin 04/28/2023 3.1 (L)  3.5 - 5.2 g/dL Final    Total Bilirubin 04/28/2023 0.7  0.1 - 1.0 mg/dL Final    Comment: For infants and newborns, interpretation of results should be based  on gestational age, weight and in agreement with clinical  observations.    Premature Infant recommended reference ranges:  Up to 24 hours.............<8.0 mg/dL  Up to 48 hours............<12.0 mg/dL  3-5 days..................<15.0 mg/dL  6-29 days.................<15.0 mg/dL      Alkaline Phosphatase 04/28/2023 83  55 - 135 U/L Final    AST 04/28/2023 19  10 - 40 U/L Final    ALT 04/28/2023 17  10 - 44 U/L Final    Anion Gap 04/28/2023 7 (L)  8 - 16 mmol/L Final    eGFR 04/28/2023 >60.0  >60 mL/min/1.73 m^2 Final    BNP 04/28/2023 372 (H)  0 - 99 pg/mL Final    Values of less than 100 pg/ml are consistent with non-CHF populations.    WBC 04/28/2023 12.56  3.90 - 12.70 K/uL Final    RBC 04/28/2023 3.55 (L)  4.60 - 6.20 M/uL Final    Hemoglobin 04/28/2023 10.5 (L)  14.0 - 18.0 g/dL Final    Hematocrit 04/28/2023 34.5 (L)  40.0 - 54.0 % Final    MCV 04/28/2023 97  82 - 98 fL Final    MCH 04/28/2023 29.6  27.0 - 31.0 pg Final    MCHC 04/28/2023 30.4 (L)  32.0 - 36.0 g/dL Final    RDW 04/28/2023 15.3 (H)  11.5 - 14.5 % Final    Platelets 04/28/2023 336  150 - 450 K/uL Final     MPV 04/28/2023 9.9  9.2 - 12.9 fL Final    Immature Granulocytes 04/28/2023 1.0 (H)  0.0 - 0.5 % Final    Gran # (ANC) 04/28/2023 9.7 (H)  1.8 - 7.7 K/uL Final    Immature Grans (Abs) 04/28/2023 0.12 (H)  0.00 - 0.04 K/uL Final    Comment: Mild elevation in immature granulocytes is non specific and   can be seen in a variety of conditions including stress response,   acute inflammation, trauma and pregnancy. Correlation with other   laboratory and clinical findings is essential.      Lymph # 04/28/2023 1.8  1.0 - 4.8 K/uL Final    Mono # 04/28/2023 0.9  0.3 - 1.0 K/uL Final    Eos # 04/28/2023 0.1  0.0 - 0.5 K/uL Final    Baso # 04/28/2023 0.03  0.00 - 0.20 K/uL Final    nRBC 04/28/2023 0  0 /100 WBC Final    Gran % 04/28/2023 76.8 (H)  38.0 - 73.0 % Final    Lymph % 04/28/2023 14.1 (L)  18.0 - 48.0 % Final    Mono % 04/28/2023 7.5  4.0 - 15.0 % Final    Eosinophil % 04/28/2023 0.4  0.0 - 8.0 % Final    Basophil % 04/28/2023 0.2  0.0 - 1.9 % Final    Differential Method 04/28/2023 Automated   Final    D-Dimer 04/28/2023 1.04 (HH)  <0.50 mg/L FEU Final    Comment: The quantitative D-dimer assay should be used as an aid in   the diagnosis of deep vein thrombosis and pulmonary embolism  in patients with the appropriate presentation and clinical  history. The upper limit of the reference interval and the clinical   cut off   point are identical. Causes of a positive (>0.50 mg/L FEU) D-Dimer   test  include, but are not limited to: DVT, PE, DIC, thrombolytic   therapy, anticoagulant therapy, recent surgery, trauma, or   pregnancy, disseminated malignancy, aortic aneurysm, cirrhosis,  and severe infection. False negative results may occur in   patients with distal DVT.  Ddimer critical result(s) called and verbal readback obtained from   Mita Zhang by [email or web address] 04/28/2023 11:26           Imaging Results          CTA Chest Non-Coronary (PE Studies) (Final result)  Result time 04/28/23 22:16:09    Final result by  Laura Galvin MD (04/28/23 22:16:09)                 Narrative:    EXAM:  CT Angiography Chest With Intravenous Contrast    CLINICAL HISTORY:  Pulmonary embolism (PE) suspected, positive D-dimer    TECHNIQUE:  Axial computed tomographic angiography images of the chest with intravenous contrast.  Sagittal and coronal reformatted images were created and reviewed.  This CT exam was performed using one or more of the following dose reduction techniques:  automated exposure control, adjustment of the mA and/or kV according to patient size, and/or use of iterative reconstruction technique.  MIP reconstructed images were created and reviewed.    COMPARISON:  March 1, 2023    FINDINGS:  Pulmonary arteries:  No pulmonary embolism.  Aorta:  No acute findings.  No aortic aneurysm or dissection.  Lungs:  No mass.  No acute infiltrate.  Pleural space:  Moderate right-sided and small to moderate left-sided pleural effusions.  No pneumothorax.  Heart:  Stable mild global cardiomegaly.  No pericardial effusion.  Bones/joints:  No acute fracture.  No dislocation.  Soft tissues: Edema of the subcutaneous fat of the lower back and sides.  Lymph nodes:  No enlarged lymph nodes.    IMPRESSION:  1.  No pulmonary embolism.  2.  Moderate right-sided and small to moderate left-sided pleural effusions.  3.  Stable mild global cardiomegaly.  4.  Edema of the subcutaneous fat of the lower back and sides.    Electronically signed by:  Laura Kearney MD  4/28/2023 10:16 PM CDT Workstation: [id number]                             X-Ray Chest AP Portable (Final result)  Result time 04/28/23 14:29:55    Final result by Madeleine Griffin MD (04/28/23 14:29:55)                 Narrative:    Portable chest x-ray at 1:58 PM is compared to prior study dated 4/19/2023    Clinical history shortness of breath    The heart is enlarged. There are faint groundglass opacities in the lung bases with tiny pleural effusions. Findings are suggestive of  pulmonary edema.. The upper lobes are clear. There are no acute osseous abnormalities.    IMPRESSION: Cardiomegaly with faint groundglass opacities in lung bases and tiny pleural effusions suggestive of pulmonary edema    Electronically signed by:  Madeleine Griffin MD  4/28/2023 2:29 PM CDT Workstation: [id number]                                Pending Diagnostic Studies:     None         Medications:  Reconciled Home Medications:      Medication List      START taking these medications    furosemide 20 MG tablet  Commonly known as: LASIX  Take 1 tablet (20 mg total) by mouth once daily.  Start taking on: May 1, 2023        CHANGE how you take these medications    metFORMIN 500 MG ER 24hr tablet  Commonly known as: GLUCOPHAGE-XR  TAKE 1 TABLET BY MOUTH EVERY DAY  What changed: when to take this     rosuvastatin 20 MG tablet  Commonly known as: CRESTOR  TAKE 1 TABLET BY MOUTH EVERY DAY  What changed:   · when to take this  · additional instructions     topiramate 50 MG tablet  Commonly known as: TOPAMAX  TAKE 1 TABLET BY MOUTH EVERY DAY  What changed: when to take this        CONTINUE taking these medications    acetaminophen 325 mg Cap  Take 650 mg by mouth every 6 (six) hours as needed.     amiodarone 100 MG Tab  Commonly known as: PACERONE  Take 100 mg by mouth every morning.     amitriptyline 10 MG tablet  Commonly known as: ELAVIL  Take 20 mg by mouth every evening.     apixaban 2.5 mg Tab  Commonly known as: ELIQUIS  Take 2.5 mg by mouth 2 (two) times daily.     cholecalciferol (vitamin D3) 50 mcg (2,000 unit) Tab  Commonly known as: VITAMIN D3  Take 1 tablet by mouth once daily.     cyanocobalamin (vitamin B-12) 1,000 mcg Subl  Place 1,000 mcg under the tongue 2 (two) times a day.     docusate sodium 100 MG capsule  Commonly known as: COLACE  Take 100 mg by mouth 2 (two) times daily as needed.     DULoxetine 30 MG capsule  Commonly known as: CYMBALTA  Take 1 capsule (30 mg total) by mouth 2 (two) times  daily.     FIBER-CAPS (CA POLYCARBOPHIL) ORAL  Take 1 tablet by mouth once daily.     finasteride 5 mg tablet  Commonly known as: PROSCAR  Take 1 tablet (5 mg total) by mouth once daily.     HYDROcodone-acetaminophen 5-325 mg per tablet  Commonly known as: NORCO  Take 1 tablet by mouth every 6 (six) hours as needed for Pain.     lisinopriL 20 MG tablet  Commonly known as: PRINIVIL,ZESTRIL  Take 20 mg by mouth 2 (two) times daily.     metoprolol tartrate 25 MG tablet  Commonly known as: LOPRESSOR  Take 1 tablet (25 mg total) by mouth 2 (two) times daily.     omega-3 acid ethyl esters 1 gram capsule  Commonly known as: LOVAZA  TAKE 2 CAPSULES BY MOUTH 2 TIMES DAILY.     pantoprazole 40 MG tablet  Commonly known as: PROTONIX  Take 40 mg by mouth once daily.     polyethylene glycol 17 gram/dose powder  Commonly known as: GLYCOLAX  Take 17 g by mouth daily as needed.     potassium chloride 10 MEQ Tbsr  Commonly known as: KLOR-CON  Take 2 tablets (20 mEq total) by mouth once daily.     traZODone 50 MG tablet  Commonly known as: DESYREL  TAKE 1 TABLET BY MOUTH EVERY EVENING.        STOP taking these medications    sulfamethoxazole-trimethoprim 800-160mg 800-160 mg Tab  Commonly known as: BACTRIM DS        ASK your doctor about these medications    tamsulosin 0.4 mg Cap  Commonly known as: FLOMAX  Take 2 capsules (0.8 mg total) by mouth once daily.            Indwelling Lines/Drains at time of discharge:   none       Time spent on the discharge of patient: 36 minutes         Vini Presley MD  Department of Hospital Medicine  [facility]

## 2023-04-30 NOTE — PT/OT/SLP PROGRESS
Physical Therapy Treatment    Patient Name:  Hiro Rodríguez   MRN:  [id number]    Recommendations:     Discharge Recommendations: home health PT  Discharge Equipment Recommendations: none  Barriers to discharge:  high risk for falls, multiple hospitalizations    Assessment:     Hiro Rodríguez is a 78 y.o. male admitted with a medical diagnosis of Acute on chronic heart failure with preserved ejection fraction.  He presents with the following impairments/functional limitations: weakness, impaired endurance, impaired self care skills, impaired functional mobility, gait instability, impaired balance, decreased lower extremity function, decreased safety awareness, pain, impaired cardiopulmonary response to activity.    Rehab Prognosis: Fair; patient would benefit from acute skilled PT services to address these deficits and reach maximum level of function.    Recent Surgery: * No surgery found *      Plan:     During this hospitalization, patient to be seen 6 x/week to address the identified rehab impairments via gait training, therapeutic activities, therapeutic exercises, neuromuscular re-education and progress toward the following goals:    Plan of Care Expires:  05/30/23    Subjective     Chief Complaint: none stated, eager to mobilize  Patient/Family Comments/goals: HHPT with transition to OPPT  Pain/Comfort:  Pain Rating 1: 0/10      Objective:     Communicated with Rn prior to session.  Patient found HOB elevated with peripheral IV, telemetry, pulse ox (continuous) upon PT entry to room.     General Precautions: Standard, fall  Orthopedic Precautions: N/A  Braces: N/A  Respiratory Status: Nasal cannula, flow 2 L/min     Functional Mobility:  Bed Mobility:     Supine to Sit: minimum assistance  Transfers:     Sit to Stand:  minimum assistance with no AD  Bed to Chair: minimum assistance with  no AD  using  Stand Pivot      AM-PAC 6 CLICK MOBILITY  Turning over in bed (including adjusting bedclothes, sheets  and blankets)?: 3  Sitting down on and standing up from a chair with arms (e.g., wheelchair, bedside commode, etc.): 3  Moving from lying on back to sitting on the side of the bed?: 3  Moving to and from a bed to a chair (including a wheelchair)?: 3  Need to walk in hospital room?: 2  Climbing 3-5 steps with a railing?: 1  Basic Mobility Total Score: 15       Treatment & Education:  Pt educated on POC, discharge recommendation, importance of time OOB, proper form with mobility, need for assist with mobility, use of call bell to seek assistance as needed and fall prevention      Patient left up in chair with all lines intact, call button in reach, chair alarm on, and RN notified..    GOALS:   Multidisciplinary Problems       Physical Therapy Goals          Problem: Physical Therapy    Goal Priority Disciplines Outcome Goal Variances Interventions   Physical Therapy Goal     PT, PT/OT      Description: Goals to be met by: 23     Patient will increase functional independence with mobility by performin. Supine to sit with Stand-by Assistance  2. Sit to stand transfer with Stand-by Assistance  3. Bed to chair transfer with Stand-by Assistance using Rolling Walker  4. Gait  x 100 feet with Stand-by Assistance using Rolling Walker.   5. Lower extremity exercise program x20 reps per handout, with supervision                         Time Tracking:     PT Received On: 23  PT Start Time: 1016     PT Stop Time: 1024  PT Total Time (min): 8 min     Billable Minutes: Therapeutic Activity 8    Treatment Type: Treatment  PT/PTA: PT     Number of PTA visits since last PT visit: 0     2023

## 2023-05-02 PROCEDURE — G0180 MD CERTIFICATION HHA PATIENT: HCPCS | Mod: ,,, | Performed by: FAMILY MEDICINE

## 2023-05-02 PROCEDURE — G0180 PR HOME HEALTH MD CERTIFICATION: ICD-10-PCS | Mod: ,,, | Performed by: FAMILY MEDICINE

## 2023-05-18 ENCOUNTER — NURSE TRIAGE (OUTPATIENT)
Dept: ADMINISTRATIVE | Facility: CLINIC | Age: 78
End: 2023-05-18
Payer: MEDICARE

## 2023-05-18 NOTE — TELEPHONE ENCOUNTER
Wife calling on behalf of pt. States recently admitted. Sent home on new meds but did not get much of a supply and no refills available. States pt is out of meds that he was on while in the hospital. States if pt is to continue meds, he needs RX.     Meds needed:    KCL 10 meq  2 tab q am  Lisinipril 2.5 mg one tab twice daily   eloquis (does change) 2.5mg twice daily (was taking 5 mg daily and has some of that dose left)  Lipitor 10 mg daily.   Lasix 20 mg daily     Pharmacy: [facility] (pharmacy on file)    No additional concerns or questions at this time. Advised to call back with concerns or worsening symptoms. Verbalized understanding.     Reason for Disposition   Prescription refill request for ESSENTIAL medicine (i.e., likelihood of harm to patient if not taken) and triager unable to refill per department policy    Protocols used: Medication Refill and Renewal Call-A-OH

## 2023-05-19 ENCOUNTER — HOSPITAL ENCOUNTER (EMERGENCY)
Facility: HOSPITAL | Age: 78
Discharge: HOME | End: 2023-05-19
Attending: EMERGENCY MEDICINE
Payer: MEDICARE

## 2023-05-19 VITALS
HEART RATE: 105 BPM | DIASTOLIC BLOOD PRESSURE: 97 MMHG | HEIGHT: 70 IN | WEIGHT: 242 LBS | SYSTOLIC BLOOD PRESSURE: 178 MMHG | TEMPERATURE: 98 F | BODY MASS INDEX: 34.65 KG/M2 | RESPIRATION RATE: 22 BRPM | OXYGEN SATURATION: 97 %

## 2023-05-19 DIAGNOSIS — R06.02 SOB (SHORTNESS OF BREATH): ICD-10-CM

## 2023-05-19 DIAGNOSIS — N20.1 CALCULUS OF URETER: Primary | ICD-10-CM

## 2023-05-19 DIAGNOSIS — I50.9 CHF (CONGESTIVE HEART FAILURE): ICD-10-CM

## 2023-05-19 LAB
ALBUMIN SERPL BCP-MCNC: 3.4 G/DL (ref 3.5–5.2)
ALP SERPL-CCNC: 71 U/L (ref 55–135)
ALT SERPL W/O P-5'-P-CCNC: 16 U/L (ref 10–44)
ANION GAP SERPL CALC-SCNC: 8 MMOL/L (ref 8–16)
AST SERPL-CCNC: 15 U/L (ref 10–40)
BASOPHILS # BLD AUTO: 0.03 K/UL (ref 0–0.2)
BASOPHILS NFR BLD: 0.3 % (ref 0–1.9)
BILIRUB SERPL-MCNC: 0.8 MG/DL (ref 0.1–1)
BNP SERPL-MCNC: 345 PG/ML (ref 0–99)
BUN SERPL-MCNC: 15 MG/DL (ref 8–23)
CALCIUM SERPL-MCNC: 9.1 MG/DL (ref 8.7–10.5)
CHLORIDE SERPL-SCNC: 100 MMOL/L (ref 95–110)
CO2 SERPL-SCNC: 31 MMOL/L (ref 23–29)
CREAT SERPL-MCNC: 0.7 MG/DL (ref 0.5–1.4)
DIFFERENTIAL METHOD: ABNORMAL
EOSINOPHIL # BLD AUTO: 0.1 K/UL (ref 0–0.5)
EOSINOPHIL NFR BLD: 1.3 % (ref 0–8)
ERYTHROCYTE [DISTWIDTH] IN BLOOD BY AUTOMATED COUNT: 14.8 % (ref 11.5–14.5)
EST. GFR  (NO RACE VARIABLE): >60 ML/MIN/1.73 M^2
GLUCOSE SERPL-MCNC: 134 MG/DL (ref 70–110)
HCT VFR BLD AUTO: 38.7 % (ref 40–54)
HGB BLD-MCNC: 11.7 G/DL (ref 14–18)
IMM GRANULOCYTES # BLD AUTO: 0.03 K/UL (ref 0–0.04)
IMM GRANULOCYTES NFR BLD AUTO: 0.3 % (ref 0–0.5)
LYMPHOCYTES # BLD AUTO: 2.2 K/UL (ref 1–4.8)
LYMPHOCYTES NFR BLD: 20.7 % (ref 18–48)
MAGNESIUM SERPL-MCNC: 1.8 MG/DL (ref 1.6–2.6)
MCH RBC QN AUTO: 29.3 PG (ref 27–31)
MCHC RBC AUTO-ENTMCNC: 30.2 G/DL (ref 32–36)
MCV RBC AUTO: 97 FL (ref 82–98)
MONOCYTES # BLD AUTO: 0.9 K/UL (ref 0.3–1)
MONOCYTES NFR BLD: 8.5 % (ref 4–15)
NEUTROPHILS # BLD AUTO: 7.2 K/UL (ref 1.8–7.7)
NEUTROPHILS NFR BLD: 68.9 % (ref 38–73)
NRBC BLD-RTO: 0 /100 WBC
PLATELET # BLD AUTO: 311 K/UL (ref 150–450)
PMV BLD AUTO: 9.7 FL (ref 9.2–12.9)
POTASSIUM SERPL-SCNC: 4.3 MMOL/L (ref 3.5–5.1)
PROT SERPL-MCNC: 6.8 G/DL (ref 6–8.4)
RBC # BLD AUTO: 3.99 M/UL (ref 4.6–6.2)
SODIUM SERPL-SCNC: 139 MMOL/L (ref 136–145)
TROPONIN I SERPL HS-MCNC: 10.3 PG/ML (ref 0–14.9)
WBC # BLD AUTO: 10.41 K/UL (ref 3.9–12.7)

## 2023-05-19 PROCEDURE — 85025 COMPLETE CBC W/AUTO DIFF WBC: CPT | Performed by: EMERGENCY MEDICINE

## 2023-05-19 PROCEDURE — 84484 ASSAY OF TROPONIN QUANT: CPT | Performed by: EMERGENCY MEDICINE

## 2023-05-19 PROCEDURE — 80053 COMPREHEN METABOLIC PANEL: CPT | Performed by: EMERGENCY MEDICINE

## 2023-05-19 PROCEDURE — 93010 ELECTROCARDIOGRAM REPORT: CPT | Mod: ,,, | Performed by: INTERNAL MEDICINE

## 2023-05-19 PROCEDURE — 99284 EMERGENCY DEPT VISIT MOD MDM: CPT | Mod: 25

## 2023-05-19 PROCEDURE — 96374 THER/PROPH/DIAG INJ IV PUSH: CPT

## 2023-05-19 PROCEDURE — 93005 ELECTROCARDIOGRAM TRACING: CPT | Performed by: INTERNAL MEDICINE

## 2023-05-19 PROCEDURE — 93010 EKG 12-LEAD: ICD-10-PCS | Mod: ,,, | Performed by: INTERNAL MEDICINE

## 2023-05-19 PROCEDURE — 83735 ASSAY OF MAGNESIUM: CPT | Performed by: EMERGENCY MEDICINE

## 2023-05-19 PROCEDURE — 63600175 PHARM REV CODE 636 W HCPCS: Performed by: EMERGENCY MEDICINE

## 2023-05-19 PROCEDURE — 83880 ASSAY OF NATRIURETIC PEPTIDE: CPT | Performed by: EMERGENCY MEDICINE

## 2023-05-19 RX ORDER — FUROSEMIDE 10 MG/ML
40 INJECTION INTRAMUSCULAR; INTRAVENOUS
Status: COMPLETED | OUTPATIENT
Start: 2023-05-19 | End: 2023-05-19

## 2023-05-19 RX ORDER — LISINOPRIL 2.5 MG/1
2.5 TABLET ORAL DAILY
COMMUNITY
End: 2023-06-02

## 2023-05-19 RX ORDER — FUROSEMIDE 40 MG/1
40 TABLET ORAL DAILY
Qty: 4 TABLET | Refills: 0 | Status: SHIPPED | OUTPATIENT
Start: 2023-05-19 | End: 2023-05-23

## 2023-05-19 RX ORDER — HYDROCODONE BITARTRATE AND ACETAMINOPHEN 10; 325 MG/1; MG/1
1 TABLET ORAL EVERY 6 HOURS PRN
Status: ON HOLD | COMMUNITY
Start: 2023-04-29 | End: 2024-02-12

## 2023-05-19 RX ORDER — ATORVASTATIN CALCIUM 10 MG/1
10 TABLET, FILM COATED ORAL DAILY
COMMUNITY
End: 2023-06-02

## 2023-05-19 RX ORDER — SEMAGLUTIDE 1.34 MG/ML
1 INJECTION, SOLUTION SUBCUTANEOUS
Qty: 4 EACH | Refills: 5 | Status: SHIPPED | OUTPATIENT
Start: 2023-05-19 | End: 2023-12-08

## 2023-05-19 RX ADMIN — FUROSEMIDE 40 MG: 10 INJECTION, SOLUTION INTRAMUSCULAR; INTRAVENOUS at 12:05

## 2023-05-19 NOTE — TELEPHONE ENCOUNTER
No care due was identified.  [facility] Embedded Care Due Messages. Reference number: [id number].   5/19/2023 2:13:31 PM CDT

## 2023-05-19 NOTE — ED PROVIDER NOTES
Encounter Date: 5/19/2023       History     Chief Complaint   Patient presents with    Shortness of Breath     Hx of CHF. Worsening for 2 weeks.     Hypertension     Home health nurse checked BP today several times and it was high.      Patient presents complaining of high blood pressure.  Patient has a history of congestive heart failure and paroxysmal AFib.  Patient's blood pressure has been elevated last couple of days.  Patient has generalized weakness and has been chronically short of breath.  Patient had recent admission for congestive heart failure.  At the worst symptoms are mild-to-moderate.    Review of patient's allergies indicates:  No Known Allergies  Past Medical History:   Diagnosis Date    CHF (congestive heart failure)     Hypertension     Mixed hyperlipidemia     Paroxysmal atrial fibrillation 2013     Past Surgical History:   Procedure Laterality Date    CYSTOSCOPY W/ URETERAL STENT PLACEMENT N/A 03/02/2023    Procedure: CYSTOSCOPY, WITH URETERAL STENT INSERTION;  Surgeon: Yoshi Lange MD;  Location: [facility];  Service: Urology;  Laterality: N/A;    CYSTOURETEROSCOPY, WITH HOLMIUM LASER LITHOTRIPSY OF URETERAL CALCULUS AND STENT INSERTION Left 4/20/2023    Procedure: CYSTOURETEROSCOPY, WITH HOLMIUM LASER LITHOTRIPSY OF URETERAL CALCULUS AND STENT INSERTION;  Surgeon: Yoshi Lange MD;  Location: [facility];  Service: Urology;  Laterality: Left;    FRACTURE SURGERY      INTRAMEDULLARY RODDING OF TROCHANTER OF FEMUR Left 11/10/2022    Procedure: INSERTION, ITRAMEDULLARY SANTI, FEMUR, TROCHANTER;  Surgeon: Richard Phoenix MD;  Location: [facility];  Service: Orthopedics;  Laterality: Left;    LUMBAR DISCECTOMY  1980    L4-5    REMOVAL OF URETERAL CALCULUS Left 4/20/2023    Procedure: REMOVAL, CALCULUS, URETER;  Surgeon: Yoshi Lange MD;  Location: [facility];  Service: Urology;  Laterality: Left;    REMOVAL, CALCULUS, BLADDER  03/02/2023    Procedure: REMOVAL, CALCULUS, BLADDER;   Surgeon: Yoshi Lange MD;  Location: [facility];  Service: Urology;;    RETROGRADE PYELOGRAPHY  4/20/2023    Procedure: PYELOGRAM, RETROGRADE;  Surgeon: Yoshi Lange MD;  Location: [facility];  Service: Urology;;    TOTAL KNEE ARTHROPLASTY Right 2017    TOTAL KNEE ARTHROPLASTY Left 2018     No family history on file.  Social History     Tobacco Use    Smoking status: Never    Smokeless tobacco: Never   Substance Use Topics    Alcohol use: Yes     Alcohol/week: 1.0 standard drink     Types: 1 Shots of liquor per week     Comment: rarely    Drug use: Not Currently     Review of Systems   All other systems reviewed and are negative.    Physical Exam     Initial Vitals [05/19/23 1148]   BP Pulse Resp Temp SpO2   (!) 177/84 82 18 98.3 °F (36.8 °C) 96 %      MAP       --         Physical Exam    Nursing note and vitals reviewed.  Constitutional: He appears well-developed and well-nourished. He is not diaphoretic. No distress.   Pleasant, polite.   HENT:   Head: Normocephalic and atraumatic.   Eyes: EOM are normal.   Neck: Neck supple.   Normal range of motion.  Cardiovascular:  Intact distal pulses.           Irregularly irregular   Pulmonary/Chest: Breath sounds normal. No respiratory distress.   Abdominal: Abdomen is soft.   Musculoskeletal:         General: Edema present.      Cervical back: Normal range of motion and neck supple.      Comments: There is 1+ pitting edema to bilateral lower extremities     Neurological: He is alert.   Skin: Skin is warm and dry.   Psychiatric: He has a normal mood and affect. His behavior is normal. Judgment and thought content normal.       ED Course   Procedures  Labs Reviewed   CBC W/ AUTO DIFFERENTIAL - Abnormal; Notable for the following components:       Result Value    RBC 3.99 (*)     Hemoglobin 11.7 (*)     Hematocrit 38.7 (*)     MCHC 30.2 (*)     RDW 14.8 (*)     All other components within normal limits   COMPREHENSIVE METABOLIC PANEL - Abnormal; Notable for the  following components:    CO2 31 (*)     Glucose 134 (*)     Albumin 3.4 (*)     All other components within normal limits   B-TYPE NATRIURETIC PEPTIDE - Abnormal; Notable for the following components:     (*)     All other components within normal limits   TROPONIN I HIGH SENSITIVITY   MAGNESIUM        ECG Results              EKG 12-lead (In process)  Result time 05/19/23 15:33:51      In process by Interface, Lab In [facility] (05/19/23 15:33:51)                   Narrative:    Test Reason : I50.9,    Vent. Rate : 072 BPM     Atrial Rate : 000 BPM     P-R Int : 000 ms          QRS Dur : 090 ms      QT Int : 356 ms       P-R-T Axes : 000 099 216 degrees     QTc Int : 389 ms    Atrial fibrillation  Rightward axis  Anteroseptal infarct (cited on or before 01-MAR-2023)  Abnormal ECG  When compared with ECG of 28-APR-2023 13:27,  No significant change was found    Referred By: [email or web address]   SELF           Confirmed By:                                   Imaging Results              X-Ray Chest AP Portable (Final result)  Result time 05/19/23 12:58:53      Final result by Jeramy Gardner MD (05/19/23 12:58:53)                   Narrative:    Chest single view    CLINICAL DATA: Shortness of breath    FINDINGS: Comparison to April 28. The heart and mediastinum are unchanged. Pulmonary vasculature is prominent, without evidence of pulmonary edema. There is mild atelectasis or infiltrate formation at the lung bases, decreased on the right compared to the prior exam. No pneumothorax is identified. Osseous structures are unremarkable.    IMPRESSION:  1. Decreasing atelectasis or infiltrate at the right lung base compared April 28. No other significant radiographic abnormalities.    Electronically signed by:  Jeramy Gardner MD  5/19/2023 12:58 PM CDT Workstation: [id number]                                     Medications   furosemide injection 40 mg (40 mg Intravenous Given 5/19/23 4241)     Medical Decision Making:    History:   Old Medical Records: I decided to obtain old medical records.  Old Records Summarized: records from previous admission(s).  Initial Assessment:   No apparent distress  Differential Diagnosis:   Volume overload, acute kidney injury, dehydration, congestive heart failure  Clinical Tests:   Lab Tests: Reviewed and Ordered  Radiological Study: Ordered and Reviewed  Medical Tests: Reviewed and Ordered  ED Management:  MDM    Patient presents for emergent evaluation of acute congestive heart failure that poses a threat to life and/or bodily function.    In the ED patient found to have acute congestive heart failure.    I ordered labs and personally reviewed them.  Labs significant for mildly elevated BNP, normal kidney function.  I ordered X-rays and personally reviewed them and reviewed the radiologist interpretation.  Xray significant for cardiomegaly.    I ordered EKG and personally reviewed it.  EKG significant for irregularly irregular without ST elevation.        Discharge MDM    Patient was managed in the ED with IV Lasix.    The response to treatment was improved.  Patient clinically does have evidence of volume overload.  He is in no respiratory distress.  His chest x-ray is improved compared to previous.  His BNP is mildly elevated.  He has a normal kidney function test.  He did urinate significantly in the emergency department although we could not quantify as he unfortunately urinated in the bed.  Patient was advised to increase his Lasix therapy to twice a day for the next 3 days and follow up with his primary care doctor next week.  He was given detailed return precautions.  He was discharged in stable condition.  Patient was discharged in stable condition.  Detailed return precautions discussed.                        Clinical Impression:   Final diagnoses:  [R06.02] SOB (shortness of breath)  [I50.9] CHF (congestive heart failure)        ED Disposition Condition    Discharge Stable          ED  Prescriptions       Medication Sig Dispense Start Date End Date Auth. Provider    furosemide (LASIX) 40 MG tablet Take 1 tablet (40 mg total) by mouth once daily. for 4 days 4 tablet 5/19/2023 5/23/2023 Lupillo Lynn MD          Follow-up Information       Follow up With Specialties Details Why Contact Info    Gautam Castanon III, MD Family Medicine   [address] [place] [address]  [address] [place] [id number]  [phone]               Lupillo Lynn MD  05/19/23 2837

## 2023-05-19 NOTE — PHARMACY MED REC
"          Admission Medication History     The home medication history was taken by Florencio Peacock.    You may go to "Admission" then "Reconcile Home Medications" tabs to review and/or act upon these items.     The home medication list has been updated by the Pharmacy department.   Please read ALL comments highlighted in yellow.   Please address this information as you see fit.    Feel free to contact us if you have any questions or require assistance.        Medications listed below were obtained from: Patient/family and Analytic software- [employer]  No current facility-administered medications on file prior to encounter.     Current Outpatient Medications on File Prior to Encounter   Medication Sig Dispense Refill    amiodarone (PACERONE) 100 MG Tab Take 100 mg by mouth every morning.      amitriptyline (ELAVIL) 10 MG tablet Take 20 mg by mouth every evening.      apixaban (ELIQUIS) 2.5 mg Tab Take 2.5 mg by mouth 2 (two) times daily.      atorvastatin (LIPITOR) 10 MG tablet Take 10 mg by mouth once daily.      calcium polycarbophil (FIBER-CAPS, CA POLYCARBOPHIL, ORAL) Take 1 tablet by mouth 2 (two) times a day.      cholecalciferol, vitamin D3, (VITAMIN D3) 50 mcg (2,000 unit) Tab Take 2 tablets by mouth once daily.      cyanocobalamin, vitamin B-12, 1,000 mcg Subl Place 1,000 mcg under the tongue 2 (two) times a day.      docusate sodium (COLACE) 100 MG capsule Take 100 mg by mouth 2 (two) times daily as needed.      DULoxetine (CYMBALTA) 30 MG capsule Take 1 capsule (30 mg total) by mouth 2 (two) times daily. 90 capsule 1    finasteride (PROSCAR) 5 mg tablet Take 1 tablet (5 mg total) by mouth once daily. 90 tablet 0    furosemide (LASIX) 20 MG tablet Take 1 tablet (20 mg total) by mouth once daily. 90 tablet 1    HYDROcodone-acetaminophen (NORCO) 5-325 mg per tablet Take 1 tablet by mouth every 6 (six) hours as needed for Pain. 12 tablet 0    lisinopriL (PRINIVIL,ZESTRIL) 2.5 MG tablet Take 2.5 mg by mouth once " daily.      metFORMIN (GLUCOPHAGE-XR) 500 MG ER 24hr tablet TAKE 1 TABLET BY MOUTH EVERY DAY (Patient taking differently: Take 500 mg by mouth Daily.) 90 tablet 1    metoprolol tartrate (LOPRESSOR) 25 MG tablet Take 1 tablet (25 mg total) by mouth 2 (two) times daily. 180 tablet 1    omega-3 acid ethyl esters (LOVAZA) 1 gram capsule TAKE 2 CAPSULES BY MOUTH 2 TIMES DAILY. (Patient taking differently: Take 2 g by mouth 2 (two) times daily.) 360 capsule 1    polyethylene glycol (GLYCOLAX) 17 gram/dose powder Take 17 g by mouth daily as needed.      potassium chloride SA (KLOR-CON) 10 MEQ TbSR Take 2 tablets (20 mEq total) by mouth once daily.      tamsulosin (FLOMAX) 0.4 mg Cap Take 2 capsules (0.8 mg total) by mouth once daily. (Patient taking differently: Take 0.4 mg by mouth once daily.) 60 capsule 11    traZODone (DESYREL) 50 MG tablet TAKE 1 TABLET BY MOUTH EVERY EVENING. (Patient taking differently: Take 100 mg by mouth every evening.) 90 tablet 1    acetaminophen 325 mg Cap Take 650 mg by mouth every 6 (six) hours as needed.      HYDROcodone-acetaminophen (NORCO)  mg per tablet Take 1 tablet by mouth every 6 (six) hours as needed.      lisinopriL (PRINIVIL,ZESTRIL) 20 MG tablet Take 20 mg by mouth 2 (two) times daily.      pantoprazole (PROTONIX) 40 MG tablet Take 40 mg by mouth once daily.      rosuvastatin (CRESTOR) 20 MG tablet TAKE 1 TABLET BY MOUTH EVERY DAY (Patient taking differently: Take 20 mg by mouth once daily. TAKE 1 TABLET BY MOUTH EVERY DAY) 90 tablet 1    topiramate (TOPAMAX) 50 MG tablet TAKE 1 TABLET BY MOUTH EVERY DAY (Patient taking differently: Take 50 mg by mouth once daily.) 90 tablet 1       Potential issues to be addressed PRIOR TO DISCHARGE  Patient reported not taking the following medications: (Topirmate 50, Rosuvastatin 20, Lisinopril 20, Hydrocodone-Acetaminophen 5-325,). These medications remain on the home medication list. Please address accordingly.       Florencio  Ayush  [id number]       .

## 2023-05-22 ENCOUNTER — TELEPHONE (OUTPATIENT)
Dept: FAMILY MEDICINE | Facility: CLINIC | Age: 78
End: 2023-05-22
Payer: MEDICARE

## 2023-05-22 ENCOUNTER — TELEPHONE (OUTPATIENT)
Dept: CARDIOLOGY | Facility: CLINIC | Age: 78
End: 2023-05-22
Payer: MEDICARE

## 2023-05-22 NOTE — TELEPHONE ENCOUNTER
----- Message from Perry Nuñez sent at 5/22/2023 12:01 PM CDT -----  Type:  Sooner Appointment Request    Caller is requesting a sooner appointment.  Caller declined first available appointment listed below.  Caller will not accept being placed on the waitlist and is requesting a message be sent to doctor.    Name of Caller:  pt wife, Galina  When is the first available appointment?  6/13--said he need to be seen sooner--please call and advise  Symptoms:  ER f/u elevated BP/CHF  Best Call Back Number:  [phone]  Additional Information:  thank you

## 2023-05-22 NOTE — TELEPHONE ENCOUNTER
----- Message from Perry Nuñez sent at 5/22/2023 11:59 AM CDT -----  Type:  Sooner Appointment Request    Caller is requesting a sooner appointment.  Caller declined first available appointment listed below.  Caller will not accept being placed on the waitlist and is requesting a message be sent to doctor.    Name of Caller:  pt wife Galina  When is the first available appointment?  7/25--said he need to be seen this week--please call and advise  Symptoms:  ER f/u elevated BP/CHF  Best Call Back Number: [phone]    Additional Information:  thank you

## 2023-05-23 ENCOUNTER — EXTERNAL HOME HEALTH (OUTPATIENT)
Dept: HOME HEALTH SERVICES | Facility: HOSPITAL | Age: 78
End: 2023-05-23
Payer: MEDICARE

## 2023-05-25 ENCOUNTER — OFFICE VISIT (OUTPATIENT)
Dept: CARDIOLOGY | Facility: CLINIC | Age: 78
End: 2023-05-25
Payer: MEDICARE

## 2023-05-25 VITALS
OXYGEN SATURATION: 98 % | RESPIRATION RATE: 16 BRPM | HEART RATE: 79 BPM | BODY MASS INDEX: 31.78 KG/M2 | WEIGHT: 222 LBS | SYSTOLIC BLOOD PRESSURE: 118 MMHG | HEIGHT: 70 IN | DIASTOLIC BLOOD PRESSURE: 70 MMHG

## 2023-05-25 DIAGNOSIS — R06.02 SHORTNESS OF BREATH: ICD-10-CM

## 2023-05-25 DIAGNOSIS — Z79.01 ANTICOAGULATED: ICD-10-CM

## 2023-05-25 DIAGNOSIS — I10 HYPERTENSION, ESSENTIAL: Chronic | ICD-10-CM

## 2023-05-25 DIAGNOSIS — I50.32 CHRONIC HEART FAILURE WITH PRESERVED EJECTION FRACTION: ICD-10-CM

## 2023-05-25 DIAGNOSIS — E78.5 HYPERLIPIDEMIA, UNSPECIFIED HYPERLIPIDEMIA TYPE: Primary | ICD-10-CM

## 2023-05-25 DIAGNOSIS — E66.9 OBESITY (BMI 30.0-34.9): ICD-10-CM

## 2023-05-25 PROCEDURE — 3288F PR FALLS RISK ASSESSMENT DOCUMENTED: ICD-10-PCS | Mod: CPTII,S$GLB,,

## 2023-05-25 PROCEDURE — 3078F PR MOST RECENT DIASTOLIC BLOOD PRESSURE < 80 MM HG: ICD-10-PCS | Mod: CPTII,S$GLB,,

## 2023-05-25 PROCEDURE — 3078F DIAST BP <80 MM HG: CPT | Mod: CPTII,S$GLB,,

## 2023-05-25 PROCEDURE — 1160F RVW MEDS BY RX/DR IN RCRD: CPT | Mod: CPTII,S$GLB,,

## 2023-05-25 PROCEDURE — 1126F AMNT PAIN NOTED NONE PRSNT: CPT | Mod: CPTII,S$GLB,,

## 2023-05-25 PROCEDURE — 99999 PR PBB SHADOW E&M-EST. PATIENT-LVL V: ICD-10-PCS | Mod: PBBFAC,,,

## 2023-05-25 PROCEDURE — 99214 OFFICE O/P EST MOD 30 MIN: CPT | Mod: S$GLB,,,

## 2023-05-25 PROCEDURE — 1101F PR PT FALLS ASSESS DOC 0-1 FALLS W/OUT INJ PAST YR: ICD-10-PCS | Mod: CPTII,S$GLB,,

## 2023-05-25 PROCEDURE — 3074F SYST BP LT 130 MM HG: CPT | Mod: CPTII,S$GLB,,

## 2023-05-25 PROCEDURE — 1101F PT FALLS ASSESS-DOCD LE1/YR: CPT | Mod: CPTII,S$GLB,,

## 2023-05-25 PROCEDURE — 1159F PR MEDICATION LIST DOCUMENTED IN MEDICAL RECORD: ICD-10-PCS | Mod: CPTII,S$GLB,,

## 2023-05-25 PROCEDURE — 1159F MED LIST DOCD IN RCRD: CPT | Mod: CPTII,S$GLB,,

## 2023-05-25 PROCEDURE — 1126F PR PAIN SEVERITY QUANTIFIED, NO PAIN PRESENT: ICD-10-PCS | Mod: CPTII,S$GLB,,

## 2023-05-25 PROCEDURE — 1160F PR REVIEW ALL MEDS BY PRESCRIBER/CLIN PHARMACIST DOCUMENTED: ICD-10-PCS | Mod: CPTII,S$GLB,,

## 2023-05-25 PROCEDURE — 3288F FALL RISK ASSESSMENT DOCD: CPT | Mod: CPTII,S$GLB,,

## 2023-05-25 PROCEDURE — 3074F PR MOST RECENT SYSTOLIC BLOOD PRESSURE < 130 MM HG: ICD-10-PCS | Mod: CPTII,S$GLB,,

## 2023-05-25 PROCEDURE — 99214 PR OFFICE/OUTPT VISIT, EST, LEVL IV, 30-39 MIN: ICD-10-PCS | Mod: S$GLB,,,

## 2023-05-25 PROCEDURE — 99999 PR PBB SHADOW E&M-EST. PATIENT-LVL V: CPT | Mod: PBBFAC,,,

## 2023-05-25 RX ORDER — LISINOPRIL 2.5 MG/1
2.5 TABLET ORAL 2 TIMES DAILY
Qty: 180 TABLET | Refills: 3 | Status: SHIPPED | OUTPATIENT
Start: 2023-05-25

## 2023-05-25 RX ORDER — AMIODARONE HYDROCHLORIDE 100 MG/1
100 TABLET ORAL EVERY MORNING
Qty: 90 TABLET | Refills: 3 | Status: SHIPPED | OUTPATIENT
Start: 2023-05-25

## 2023-05-25 RX ORDER — POTASSIUM CHLORIDE 750 MG/1
20 TABLET, EXTENDED RELEASE ORAL DAILY
Qty: 180 TABLET | Refills: 3
Start: 2023-05-25 | End: 2023-06-02 | Stop reason: SDUPTHER

## 2023-05-25 NOTE — ASSESSMENT & PLAN NOTE
EF 70%.  Normal diastolic function.  Recent hospitalization for CHF exacerbation.  Euvolemic today in office. Denies shortness of breath and edema.  Continue lasix 20 mg daily.  Discussed with patient that if he notices increased shortness of breath, edema, and weight gain to double the dose for 1-2 days then daily thereafter to prevent CHF exacerbation and hospitalization.     Recommend low sodium heart healthy diet. 2g salt restriction. 1.5 L fluid restriction.   Maintain /80 or less.   Pt transferred from rm 3 to rm 9 w/o difficulty. Respirations even and unlabored, appears in no acute distress. Remains on continuous pulse ox, BP cycling q 30. Aware meal tray coming. Denies any other needs at this time.

## 2023-05-25 NOTE — PROGRESS NOTES
Subjective:    Patient ID:  Hiro Rodríguez is a 78 y.o. male patient here for evaluation No chief complaint on file.      History of Present Illness:     Patient was recently seen in the ER for complaints of high blood pressure and shortness of breath.  BNP was elevated.  Blood pressure is 177/84.  EKG showed atrial fibrillation.  Chronic AF. No acute STT wave changes.  Chest x-ray significant for cardiomegaly but improved compared to previous.  Normal kidney function.  He was advised to increase Lasix to twice daily for the next 3 days then daily thereafter.  Blood pressure today in office is 118/70.  No adjustments were made on blood pressure medication.  BP and breathing is stable since discharge.  No further shortness of breath.  Patient is weighting himself daily. Denies CP, shortness of breath, palpitations, lightheadedness, dizziness, jaw/neck/arm pain, edema, or bleeding.     ED VISIT 5/19/23       Patient presents complaining of high blood pressure.  Patient has a history of congestive heart failure and paroxysmal AFib.  Patient's blood pressure has been elevated last couple of days.  Patient has generalized weakness and has been chronically short of breath.  Patient had recent admission for congestive heart failure.  At the worst symptoms are mild-to-moderate.            Initial Vitals [05/19/23 1148]   BP Pulse Resp Temp SpO2   (!) 177/84 82 18 98.3 °F (36.8 °C) 96 %        Patient presents for emergent evaluation of acute congestive heart failure that poses a threat to life and/or bodily function.    In the ED patient found to have acute congestive heart failure.    I ordered labs and personally reviewed them.  Labs significant for mildly elevated BNP, normal kidney function.  I ordered X-rays and personally reviewed them and reviewed the radiologist interpretation.  Xray significant for cardiomegaly.    I ordered EKG and personally reviewed it.  EKG significant for irregularly irregular without ST  elevation.          Discharge MDM     Patient was managed in the ED with IV Lasix.    The response to treatment was improved.  Patient clinically does have evidence of volume overload.  He is in no respiratory distress.  His chest x-ray is improved compared to previous.  His BNP is mildly elevated.  He has a normal kidney function test.  He did urinate significantly in the emergency department although we could not quantify as he unfortunately urinated in the bed.  Patient was advised to increase his Lasix therapy to twice a day for the next 3 days and follow up with his primary care doctor next week.  He was given detailed return precautions.  He was discharged in stable condition.  Patient was discharged in stable condition.  Detailed return precautions discussed.     Review of patient's allergies indicates:  No Known Allergies    Past Medical History:   Diagnosis Date    CHF (congestive heart failure)     Hypertension     Mixed hyperlipidemia     Paroxysmal atrial fibrillation 2013     Past Surgical History:   Procedure Laterality Date    CYSTOSCOPY W/ URETERAL STENT PLACEMENT N/A 03/02/2023    Procedure: CYSTOSCOPY, WITH URETERAL STENT INSERTION;  Surgeon: Yoshi Lange MD;  Location: [facility] OR;  Service: Urology;  Laterality: N/A;    CYSTOURETEROSCOPY, WITH HOLMIUM LASER LITHOTRIPSY OF URETERAL CALCULUS AND STENT INSERTION Left 4/20/2023    Procedure: CYSTOURETEROSCOPY, WITH HOLMIUM LASER LITHOTRIPSY OF URETERAL CALCULUS AND STENT INSERTION;  Surgeon: Yoshi Lange MD;  Location: [facility] OR;  Service: Urology;  Laterality: Left;    FRACTURE SURGERY      INTRAMEDULLARY RODDING OF TROCHANTER OF FEMUR Left 11/10/2022    Procedure: INSERTION, ITRAMEDULLARY SANTI, FEMUR, TROCHANTER;  Surgeon: Richard Phoenix MD;  Location: [facility] OR;  Service: Orthopedics;  Laterality: Left;    LUMBAR DISCECTOMY  1980    L4-5    REMOVAL OF URETERAL CALCULUS Left 4/20/2023    Procedure: REMOVAL, CALCULUS, URETER;  Surgeon:  Yoshi Lange MD;  Location: [facility];  Service: Urology;  Laterality: Left;    REMOVAL, CALCULUS, BLADDER  03/02/2023    Procedure: REMOVAL, CALCULUS, BLADDER;  Surgeon: Yoshi Lange MD;  Location: [facility] OR;  Service: Urology;;    RETROGRADE PYELOGRAPHY  4/20/2023    Procedure: PYELOGRAM, RETROGRADE;  Surgeon: Yoshi Lange MD;  Location: [facility];  Service: Urology;;    TOTAL KNEE ARTHROPLASTY Right 2017    TOTAL KNEE ARTHROPLASTY Left 2018     Social History     Tobacco Use    Smoking status: Never    Smokeless tobacco: Never   Substance Use Topics    Alcohol use: Yes     Alcohol/week: 1.0 standard drink     Types: 1 Shots of liquor per week     Comment: rarely    Drug use: Not Currently        Review of Systems:    As noted in HPI in addition      REVIEW OF SYSTEMS  CARDIOVASCULAR: No recent chest pain, palpitations, arm, neck, or jaw pain  RESPIRATORY: No recent fever, cough chills, SOB or congestion  : No blood in the urine  GI: No Nausea, vomiting, constipation, diarrhea, blood, or reflux.  MUSCULOSKELETAL: No myalgias  NEURO: No lightheadedness or dizziness  EYES: No Double vision, blurry, vision or headache              Objective        Vitals:    05/25/23 1522   BP: 118/70   Pulse: 79   Resp: 16       LIPIDS - LAST 2   Lab Results   Component Value Date    CHOL 176 07/07/2022    CHOL 115 (L) 04/08/2022    HDL 36 (L) 07/07/2022    HDL 38 (L) 04/08/2022    LDLCALC 100.0 07/07/2022    LDLCALC 53.4 (L) 04/08/2022    TRIG 200 (H) 07/07/2022    TRIG 118 04/08/2022    CHOLHDL 20.5 07/07/2022    CHOLHDL 33.0 04/08/2022       CBC - LAST 2  Lab Results   Component Value Date    WBC 10.41 05/19/2023    WBC 11.66 04/28/2023    RBC 3.99 (L) 05/19/2023    RBC 3.36 (L) 04/28/2023    HGB 11.7 (L) 05/19/2023    HGB 10.0 (L) 04/28/2023    HCT 38.7 (L) 05/19/2023    HCT 32.3 (L) 04/28/2023    MCV 97 05/19/2023    MCV 96 04/28/2023    MCH 29.3 05/19/2023    MCH 29.8 04/28/2023    MCHC 30.2 (L) 05/19/2023     MCHC 31.0 (L) 04/28/2023    RDW 14.8 (H) 05/19/2023    RDW 15.4 (H) 04/28/2023     05/19/2023     04/28/2023    MPV 9.7 05/19/2023    MPV 10.1 04/28/2023    GRAN 7.2 05/19/2023    GRAN 68.9 05/19/2023    LYMPH 2.2 05/19/2023    LYMPH 20.7 05/19/2023    MONO 0.9 05/19/2023    MONO 8.5 05/19/2023    BASO 0.03 05/19/2023    BASO 0.02 04/28/2023    NRBC 0 05/19/2023    NRBC 0 04/28/2023       CHEMISTRY & LIVER FUNCTION - LAST 2  Lab Results   Component Value Date     05/19/2023     04/30/2023    K 4.3 05/19/2023    K 4.2 04/30/2023     05/19/2023     04/30/2023    CO2 31 (H) 05/19/2023    CO2 33 (H) 04/30/2023    ANIONGAP 8 05/19/2023    ANIONGAP 8 04/30/2023    BUN 15 05/19/2023    BUN 18 04/30/2023    CREATININE 0.7 05/19/2023    CREATININE 0.8 04/30/2023     (H) 05/19/2023     04/30/2023    CALCIUM 9.1 05/19/2023    CALCIUM 7.9 (L) 04/30/2023    PH 7.384 03/02/2023    MG 1.8 05/19/2023    MG 1.8 04/30/2023    ALBUMIN 3.4 (L) 05/19/2023    ALBUMIN 2.8 (L) 04/28/2023    PROT 6.8 05/19/2023    PROT 6.3 04/28/2023    ALKPHOS 71 05/19/2023    ALKPHOS 76 04/28/2023    ALT 16 05/19/2023    ALT 17 04/28/2023    AST 15 05/19/2023    AST 20 04/28/2023    BILITOT 0.8 05/19/2023    BILITOT 0.5 04/28/2023        CARDIAC PROFILE - LAST 2  Lab Results   Component Value Date     (H) 05/19/2023     (H) 04/28/2023    TROPONINI <0.030 11/08/2022    TROPONINIHS 10.3 05/19/2023    TROPONINIHS 6.4 04/28/2023        COAGULATION - LAST 2  Lab Results   Component Value Date    LABPT 14.2 (H) 11/10/2022    LABPT 13.2 11/09/2022    INR 1.0 04/19/2023    INR 1.1 03/17/2023    APTT 27.8 04/19/2023    APTT 28.6 03/17/2023       ENDOCRINE & PSA - LAST 2  Lab Results   Component Value Date    HGBA1C 5.8 03/02/2023    HGBA1C 5.8 12/31/2022    MICROALBUR 3.2 02/13/2020    TSH 2.550 03/18/2023    TSH 1.870 12/31/2022    PROCAL <0.05 03/16/2023    PROCAL <0.05 01/07/2023    PSA 0.85  10/26/2021    PSA 0.91 08/20/2020        ECHOCARDIOGRAM RESULTS  Results for orders placed during the hospital encounter of 11/08/22    Echo    Interpretation Summary  · The left ventricle is normal in size with moderate concentric hypertrophy and normal systolic function.  · The estimated ejection fraction is 70%.  · Normal left ventricular diastolic function.  · The sinuses of Valsalva is mildly dilated.  · The ascending aorta is dilated. Recommend CT scan to evaluate entire aorta for further evaluation.  · Mild tricuspid regurgitation.  · Normal central venous pressure (3 mmHg).  · The estimated PA systolic pressure is 61 mmHg.  · There is pulmonary hypertension.  · Unable to visualize right ventricle and right atrium but they both grossly appear to be dilated with RV function mildly reduced.      CURRENT/PREVIOUS VISIT EKG  Results for orders placed or performed during the hospital encounter of 05/19/23   EKG 12-lead    Collection Time: 05/19/23  2:32 PM    Narrative    Test Reason : I50.9,    Vent. Rate : 072 BPM     Atrial Rate : 000 BPM     P-R Int : 000 ms          QRS Dur : 090 ms      QT Int : 356 ms       P-R-T Axes : 000 099 216 degrees     QTc Int : 389 ms    Atrial fibrillation  Rightward axis  Anteroseptal infarct (cited on or before 01-MAR-2023)  Abnormal ECG  When compared with ECG of 28-APR-2023 13:27,  No significant change was found  Confirmed by Justus Mendez MD ([id number]) on 5/19/2023 6:12:12 PM    Referred By: [email or web address]   SELF           Confirmed By:Justus Mendez MD     No valid procedures specified.   No results found for this or any previous visit.    No valid procedures specified.    PHYSICAL EXAM  CONSTITUTIONAL: chronically ill appearing male  in no apparent distress  NECK: no carotid bruit, no JVD  LUNGS: CTA  CHEST WALL: no tenderness  HEART: Irregular rate and rhythm, S1, S2 normal, no murmur, click, rub or gallop   ABDOMEN: soft, non-tender; bowel sounds normal  EXTREMITIES:  Extremities normal, no edema, no calf tenderness noted  NEURO: AAO X 3    I HAVE REVIEWED :    The vital signs, nurses notes, and all the pertinent radiology and labs.        Current Outpatient Medications   Medication Instructions    acetaminophen 650 mg, Oral, Every 6 hours PRN    amiodarone (PACERONE) 100 mg, Oral, Every morning    amitriptyline (ELAVIL) 20 mg, Oral, Nightly    apixaban (ELIQUIS) 2.5 mg, Oral, 2 times daily    atorvastatin (LIPITOR) 10 mg, Oral, Daily    calcium polycarbophil (FIBER-CAPS, CA POLYCARBOPHIL, ORAL) 1 tablet, Oral, 2 times daily    cholecalciferol, vitamin D3, (VITAMIN D3) 50 mcg (2,000 unit) Tab 2 tablets, Oral, Daily    cyanocobalamin (vitamin B-12) 1,000 mcg, Sublingual, 2 times daily    docusate sodium (COLACE) 100 mg, Oral, 2 times daily PRN    DULoxetine (CYMBALTA) 30 mg, Oral, 2 times daily    finasteride (PROSCAR) 5 mg, Oral, Daily    furosemide (LASIX) 20 mg, Oral, Daily    HYDROcodone-acetaminophen (NORCO)  mg per tablet 1 tablet, Oral, Every 6 hours PRN    HYDROcodone-acetaminophen (NORCO) 5-325 mg per tablet 1 tablet, Oral, Every 6 hours PRN    lisinopriL (PRINIVIL,ZESTRIL) 2.5 mg, Oral, Daily    lisinopriL (PRINIVIL,ZESTRIL) 2.5 mg, Oral, 2 times daily    metFORMIN (GLUCOPHAGE-XR) 500 MG ER 24hr tablet TAKE 1 TABLET BY MOUTH EVERY DAY    metoprolol tartrate (LOPRESSOR) 25 mg, Oral, 2 times daily    omega-3 acid ethyl esters (LOVAZA) 1 gram capsule TAKE 2 CAPSULES BY MOUTH 2 TIMES DAILY.    OZEMPIC 1 mg, Subcutaneous, Every 7 days    pantoprazole (PROTONIX) 40 mg, Oral, Daily    polyethylene glycol (GLYCOLAX) 17 g, Oral, Daily PRN    potassium chloride (KLOR-CON) 10 MEQ TbSR 20 mEq, Oral, Daily    rosuvastatin (CRESTOR) 20 MG tablet TAKE 1 TABLET BY MOUTH EVERY DAY    tamsulosin (FLOMAX) 0.8 mg, Oral, Daily    topiramate (TOPAMAX) 50 MG tablet TAKE 1 TABLET BY MOUTH EVERY DAY    traZODone (DESYREL) 50 MG tablet TAKE 1 TABLET BY MOUTH EVERY EVENING.           Assessment & Plan     Chronic heart failure with preserved ejection fraction  EF 70%.  Normal diastolic function.  Recent hospitalization for CHF exacerbation.  Euvolemic today in office. Denies shortness of breath and edema.  Continue lasix 20 mg daily.  Discussed with patient that if he notices increased shortness of breath, edema, and weight gain to double the dose for 1-2 days then daily thereafter to prevent CHF exacerbation and hospitalization.     Recommend low sodium heart healthy diet. 2g salt restriction. 1.5 L fluid restriction.   Maintain /80 or less.    Hyperlipidemia  Repeat lipid panel.  Continue omega 3 fatty acids.  Continue Lipitor 10 mg daily. Continue low sodium heart healthy diet.     Hypertension, essential  /70.  BP controlled since ED visit. Continue current medication regimen. Continue low sodium heart healthy diet.     Anticoagulated  No bleeding.  H&H stable. Continue Eliquis 2.5 mg BID.    Obesity (BMI 30.0-34.9)  BMI 31.85.  Continue weight loss. Increase activity as tolerated.    Shortness of breath  Stable. Recent CHF exacerbation.  Euvolemic today in office. Continue lasix. Continue dietary restriction.    Discussed with patient having nuclear stress test to rule out coronary blockages as possible cause of QUACH.  Patient refused stress test at this time.  He verbalizes understanding to go to ED if shortness of breath continues to worsen or CP occurs.    Patient would like to regain his strength before stress test.           Follow up in about 3 months (around 8/25/2023).

## 2023-05-25 NOTE — ASSESSMENT & PLAN NOTE
Repeat lipid panel.  Continue omega 3 fatty acids.  Continue Lipitor 10 mg daily. Continue low sodium heart healthy diet.

## 2023-05-25 NOTE — ASSESSMENT & PLAN NOTE
Stable. Recent CHF exacerbation.  Euvolemic today in office. Continue lasix. Continue dietary restriction.    Discussed with patient having nuclear stress test to rule out coronary blockages as possible cause of QUACH.  Patient refused stress test at this time.  He verbalizes understanding to go to ED if shortness of breath continues to worsen or CP occurs.    Patient would like to regain his strength before stress test.

## 2023-05-25 NOTE — ASSESSMENT & PLAN NOTE
/70.  BP controlled since ED visit. Continue current medication regimen. Continue low sodium heart healthy diet.

## 2023-06-01 ENCOUNTER — HOSPITAL ENCOUNTER (OUTPATIENT)
Dept: RADIOLOGY | Facility: HOSPITAL | Age: 78
Discharge: HOME | End: 2023-06-01
Attending: SPECIALIST
Payer: MEDICARE

## 2023-06-01 DIAGNOSIS — N20.1 CALCULUS OF URETER: ICD-10-CM

## 2023-06-01 PROCEDURE — 74018 RADEX ABDOMEN 1 VIEW: CPT | Mod: TC,PO

## 2023-06-02 ENCOUNTER — OFFICE VISIT (OUTPATIENT)
Dept: FAMILY MEDICINE | Facility: CLINIC | Age: 78
End: 2023-06-02
Payer: MEDICARE

## 2023-06-02 VITALS
BODY MASS INDEX: 32.62 KG/M2 | SYSTOLIC BLOOD PRESSURE: 100 MMHG | DIASTOLIC BLOOD PRESSURE: 64 MMHG | TEMPERATURE: 98 F | WEIGHT: 227.88 LBS | HEART RATE: 81 BPM | HEIGHT: 70 IN | OXYGEN SATURATION: 97 %

## 2023-06-02 DIAGNOSIS — I48.0 PAROXYSMAL ATRIAL FIBRILLATION WITH RVR: ICD-10-CM
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
## 2023-06-02 DIAGNOSIS — Z79.01 ANTICOAGULATED: ICD-10-CM

## 2023-06-02 DIAGNOSIS — E11.42 TYPE 2 DIABETES MELLITUS WITH DIABETIC POLYNEUROPATHY, WITHOUT LONG-TERM CURRENT USE OF INSULIN: Chronic | ICD-10-CM

## 2023-06-02 DIAGNOSIS — I50.32 CHRONIC HEART FAILURE WITH PRESERVED EJECTION FRACTION: Primary | ICD-10-CM

## 2023-06-02 DIAGNOSIS — S06.5XAA SUBDURAL HEMATOMA: ICD-10-CM

## 2023-06-02 DIAGNOSIS — G89.29 OTHER CHRONIC PAIN: ICD-10-CM

## 2023-06-02 DIAGNOSIS — I10 HYPERTENSION, ESSENTIAL: Chronic | ICD-10-CM

## 2023-06-02 DIAGNOSIS — E78.5 HYPERLIPIDEMIA, UNSPECIFIED HYPERLIPIDEMIA TYPE: Chronic | ICD-10-CM

## 2023-06-02 DIAGNOSIS — S72.90XA CLOSED FRACTURE OF FEMUR, UNSPECIFIED FRACTURE MORPHOLOGY, UNSPECIFIED LATERALITY, UNSPECIFIED PORTION OF FEMUR, INITIAL ENCOUNTER: ICD-10-CM

## 2023-06-02 PROCEDURE — 1126F AMNT PAIN NOTED NONE PRSNT: CPT | Mod: CPTII,S$GLB,, | Performed by: FAMILY MEDICINE

## 2023-06-02 PROCEDURE — 99214 PR OFFICE/OUTPT VISIT, EST, LEVL IV, 30-39 MIN: ICD-10-PCS | Mod: S$GLB,,, | Performed by: FAMILY MEDICINE

## 2023-06-02 PROCEDURE — 1160F RVW MEDS BY RX/DR IN RCRD: CPT | Mod: CPTII,S$GLB,, | Performed by: FAMILY MEDICINE

## 2023-06-02 PROCEDURE — 1101F PR PT FALLS ASSESS DOC 0-1 FALLS W/OUT INJ PAST YR: ICD-10-PCS | Mod: CPTII,S$GLB,, | Performed by: FAMILY MEDICINE

## 2023-06-02 PROCEDURE — 1126F PR PAIN SEVERITY QUANTIFIED, NO PAIN PRESENT: ICD-10-PCS | Mod: CPTII,S$GLB,, | Performed by: FAMILY MEDICINE

## 2023-06-02 PROCEDURE — 3074F SYST BP LT 130 MM HG: CPT | Mod: CPTII,S$GLB,, | Performed by: FAMILY MEDICINE

## 2023-06-02 PROCEDURE — 3288F PR FALLS RISK ASSESSMENT DOCUMENTED: ICD-10-PCS | Mod: CPTII,S$GLB,, | Performed by: FAMILY MEDICINE

## 2023-06-02 PROCEDURE — 3078F DIAST BP <80 MM HG: CPT | Mod: CPTII,S$GLB,, | Performed by: FAMILY MEDICINE

## 2023-06-02 PROCEDURE — 3074F PR MOST RECENT SYSTOLIC BLOOD PRESSURE < 130 MM HG: ICD-10-PCS | Mod: CPTII,S$GLB,, | Performed by: FAMILY MEDICINE

## 2023-06-02 PROCEDURE — 1101F PT FALLS ASSESS-DOCD LE1/YR: CPT | Mod: CPTII,S$GLB,, | Performed by: FAMILY MEDICINE

## 2023-06-02 PROCEDURE — 3078F PR MOST RECENT DIASTOLIC BLOOD PRESSURE < 80 MM HG: ICD-10-PCS | Mod: CPTII,S$GLB,, | Performed by: FAMILY MEDICINE

## 2023-06-02 PROCEDURE — 1160F PR REVIEW ALL MEDS BY PRESCRIBER/CLIN PHARMACIST DOCUMENTED: ICD-10-PCS | Mod: CPTII,S$GLB,, | Performed by: FAMILY MEDICINE

## 2023-06-02 PROCEDURE — 99214 OFFICE O/P EST MOD 30 MIN: CPT | Mod: S$GLB,,, | Performed by: FAMILY MEDICINE

## 2023-06-02 PROCEDURE — 1159F PR MEDICATION LIST DOCUMENTED IN MEDICAL RECORD: ICD-10-PCS | Mod: CPTII,S$GLB,, | Performed by: FAMILY MEDICINE

## 2023-06-02 PROCEDURE — 3288F FALL RISK ASSESSMENT DOCD: CPT | Mod: CPTII,S$GLB,, | Performed by: FAMILY MEDICINE

## 2023-06-02 PROCEDURE — 1159F MED LIST DOCD IN RCRD: CPT | Mod: CPTII,S$GLB,, | Performed by: FAMILY MEDICINE

## 2023-06-02 RX ORDER — POTASSIUM CHLORIDE 750 MG/1
20 TABLET, EXTENDED RELEASE ORAL DAILY
Qty: 180 TABLET | Refills: 3
Start: 2023-06-02 | End: 2023-06-20 | Stop reason: SDUPTHER

## 2023-06-02 RX ORDER — SEMAGLUTIDE 2.68 MG/ML
2 INJECTION, SOLUTION SUBCUTANEOUS
Qty: 4 ML | Refills: 1 | Status: SHIPPED | OUTPATIENT
Start: 2023-06-02 | End: 2024-03-22 | Stop reason: SDUPTHER

## 2023-06-02 RX ORDER — ROSUVASTATIN CALCIUM 20 MG/1
20 TABLET, COATED ORAL DAILY
Qty: 90 TABLET | Refills: 1 | Status: SHIPPED | OUTPATIENT
Start: 2023-06-02 | End: 2024-03-21

## 2023-06-02 RX ORDER — TOPIRAMATE 50 MG/1
50 TABLET, FILM COATED ORAL DAILY
Qty: 90 TABLET | Refills: 1 | Status: CANCELLED | OUTPATIENT
Start: 2023-06-02

## 2023-06-02 RX ORDER — METOPROLOL TARTRATE 25 MG/1
25 TABLET, FILM COATED ORAL 2 TIMES DAILY
Qty: 180 TABLET | Refills: 1 | Status: SHIPPED | OUTPATIENT
Start: 2023-06-02 | End: 2024-03-22

## 2023-06-05 PROBLEM — S06.5XAA SUBDURAL HEMATOMA: Status: ACTIVE | Noted: 2023-06-05

## 2023-06-06 ENCOUNTER — PATIENT OUTREACH (OUTPATIENT)
Dept: ADMINISTRATIVE | Facility: OTHER | Age: 78
End: 2023-06-06
Payer: MEDICARE

## 2023-06-06 NOTE — PROGRESS NOTES
Subjective:       Patient ID: Hiro Rodríguez is a 78 y.o. male.    Chief Complaint: Hospital Follow Up    Hospital follow-up.  Fell and fractured his left femur.  Then had subdural hematoma.  Is now home.  Paroxysmal atrial fibrillation.  Heart failure preserved ejection fraction.  Doing daily weights.  Fluid restricted down 22 lb.  BNP is 345 CMP is okay and hemoglobin is 11.7.  Anticoagulated on Eliquis 2.5 b.i.d..  Diabetes mellitus type 2 on Ozempic.  1 mg per week.  Had a concussion.  As well as the right-sided subdural hematoma.  Chronic pain.    Physical examination.  Vital signs noted.  Neck is without adenopathy.  Alert oriented.  Chest clear.  Heart regular rate rhythm today.  Abdomen bowel sounds are positive soft and nontender.  Extremities without significant edema.      Objective:        Assessment:       1. Chronic heart failure with preserved ejection fraction    2. Hyperlipidemia, unspecified hyperlipidemia type    3. Hypertension, essential    4. Fall, sequela    5. Closed fracture of femur, unspecified fracture morphology, unspecified laterality, unspecified portion of femur, initial encounter    6. Paroxysmal atrial fibrillation with RVR    7. Anticoagulated    8. Type 2 diabetes mellitus with diabetic polyneuropathy, without long-term current use of insulin, HbA1c 5.8%    9. Subdural hematoma    10. Other chronic pain        Plan:       Chronic heart failure with preserved ejection fraction    Hyperlipidemia, unspecified hyperlipidemia type    Hypertension, essential  -     Basic Metabolic Panel; Future; Expected date: 07/02/2023  -     BNP; Future; Expected date: 07/02/2023    Fall, sequela    Closed fracture of femur, unspecified fracture morphology, unspecified laterality, unspecified portion of femur, initial encounter    Paroxysmal atrial fibrillation with RVR  -     Basic Metabolic Panel; Future; Expected date: 07/02/2023  -     BNP; Future; Expected date:  07/02/2023    Anticoagulated    Type 2 diabetes mellitus with diabetic polyneuropathy, without long-term current use of insulin, HbA1c 5.8%  -     Microalbumin/Creatinine Ratio, Urine; Future; Expected date: 06/02/2023    Subdural hematoma    Other chronic pain    Other orders  -     rosuvastatin (CRESTOR) 20 MG tablet; Take 1 tablet (20 mg total) by mouth once daily.  Dispense: 90 tablet; Refill: 1  -     metoprolol tartrate (LOPRESSOR) 25 MG tablet; Take 1 tablet (25 mg total) by mouth 2 (two) times daily.  Dispense: 180 tablet; Refill: 1  -     potassium chloride (KLOR-CON) 10 MEQ TbSR; Take 2 tablets (20 mEq total) by mouth once daily.  Dispense: 180 tablet; Refill: 3  -     semaglutide (OZEMPIC) 2 mg/dose (8 mg/3 mL) PnIj; Inject 2 mg into the skin every 7 days.  Dispense: 4 mL; Refill: 1    Refill medications potassium Lopressor Crestor Topamax.  Ozempic is expensive so they would like to discuss alternatives.  Will give them the 8 mg pen and they will dilated up to just 1 mg per week.  Go for eye exam.  Microalbumin.  Decrease sodium and decrease fluid intake due to the heart failure.  Home health will continue to see him.  Then outpatient physical therapy will be ordered.  Follow-up here in 6 weeks with a BMP and a BNP.

## 2023-06-06 NOTE — PROGRESS NOTES
CHW - Initial Contact    This Community Health Worker completed the Social Determinant of Health questionnaire with patient via telephone today.    Pt identified barriers of most importance are: aquatic exercise  Referrals to community agencies completed with patient/caregiver consent outside of [facility] include: [facility]  Referrals were put through [facility] - N/A  Other information discussed the patient needs / wants help with: we agreed to follow up in a week or once I found the appropriate resource for patient    Follow-up Outreach - Due: 6/14/2023    no known mental health issues.

## 2023-06-08 ENCOUNTER — TELEPHONE (OUTPATIENT)
Dept: FAMILY MEDICINE | Facility: CLINIC | Age: 78
End: 2023-06-08
Payer: MEDICARE

## 2023-06-08 NOTE — TELEPHONE ENCOUNTER
Ret call to [facility] abeba carroll on  nurse vm to please call back with which pt orders and a fax number.

## 2023-06-12 PROBLEM — N12 PYELONEPHRITIS OF LEFT KIDNEY: Status: RESOLVED | Noted: 2023-03-02 | Resolved: 2023-06-12

## 2023-06-14 ENCOUNTER — LAB VISIT (OUTPATIENT)
Dept: LAB | Facility: HOSPITAL | Age: 78
End: 2023-06-14
Attending: FAMILY MEDICINE
Payer: MEDICARE

## 2023-06-14 ENCOUNTER — DOCUMENT SCAN (OUTPATIENT)
Dept: HOME HEALTH SERVICES | Facility: HOSPITAL | Age: 78
End: 2023-06-14
Payer: MEDICARE

## 2023-06-14 DIAGNOSIS — I48.0 PAROXYSMAL ATRIAL FIBRILLATION WITH RVR: ICD-10-CM

## 2023-06-14 DIAGNOSIS — I10 HYPERTENSION, ESSENTIAL: Chronic | ICD-10-CM

## 2023-06-14 LAB
ANION GAP SERPL CALC-SCNC: 6 MMOL/L (ref 8–16)
BNP SERPL-MCNC: 124 PG/ML (ref 0–99)
BUN SERPL-MCNC: 18 MG/DL (ref 8–23)
CALCIUM SERPL-MCNC: 9.2 MG/DL (ref 8.7–10.5)
CHLORIDE SERPL-SCNC: 103 MMOL/L (ref 95–110)
CO2 SERPL-SCNC: 31 MMOL/L (ref 23–29)
CREAT SERPL-MCNC: 0.8 MG/DL (ref 0.5–1.4)
EST. GFR  (NO RACE VARIABLE): >60 ML/MIN/1.73 M^2
GLUCOSE SERPL-MCNC: 97 MG/DL (ref 70–110)
POTASSIUM SERPL-SCNC: 4.5 MMOL/L (ref 3.5–5.1)
SODIUM SERPL-SCNC: 140 MMOL/L (ref 136–145)

## 2023-06-14 PROCEDURE — 36415 COLL VENOUS BLD VENIPUNCTURE: CPT | Performed by: FAMILY MEDICINE

## 2023-06-14 PROCEDURE — 83880 ASSAY OF NATRIURETIC PEPTIDE: CPT | Performed by: FAMILY MEDICINE

## 2023-06-14 PROCEDURE — 80048 BASIC METABOLIC PNL TOTAL CA: CPT | Performed by: FAMILY MEDICINE

## 2023-06-16 RX ORDER — TRAZODONE HYDROCHLORIDE 50 MG/1
TABLET ORAL
Qty: 90 TABLET | Refills: 1 | OUTPATIENT
Start: 2023-06-16

## 2023-06-16 NOTE — TELEPHONE ENCOUNTER
Refill Decision Note   Hiro Rodríguez  is requesting a refill authorization.  Brief Assessment and Rationale for Refill:  Quick Discontinue     Medication Therapy Plan:  D/C 6/2/23    Medication Reconciliation Completed: No   Comments:     Provider Staff:     Action is required for this patient.   Please see care gap opportunities below in Care Due Message.     Thanks!  [facility]     Appointments      Date Provider   Last Visit   6/2/2023 Gautam Castanon III, MD   Next Visit   7/10/2023 Gautam Castanon III, MD     Note composed:3:58 AM 06/16/2023           Note composed:3:58 AM 06/16/2023

## 2023-06-16 NOTE — TELEPHONE ENCOUNTER
Care Due:                  Date            Visit Type   Department     Provider  --------------------------------------------------------------------------------                                [facility]  [facility] Visit: 06-      [facility]ARE ([facility])   [facility]  Kina                              EP -                              PRIMARY      [facility] CELINASBEN  Next Visit: 07-      CARE ([facility])   [facility]  Kina                                                            Last  Test          Frequency    Reason                     Performed    Due Date  --------------------------------------------------------------------------------    HBA1C.......  6 months...  metFORMIN, semaglutide...  03- 08-    Lipid Panel.  12 months..  omega-3, rosuvastatin....  07- 07-    [facility] Embedded Care Due Messages. Reference number: [id number].   6/16/2023 12:18:33 AM CDT

## 2023-06-19 ENCOUNTER — PATIENT OUTREACH (OUTPATIENT)
Dept: ADMINISTRATIVE | Facility: OTHER | Age: 78
End: 2023-06-19
Payer: MEDICARE

## 2023-06-19 NOTE — PROGRESS NOTES
CHW - Follow Up    This Community Health Worker completed a follow up visit with patient via telephone today.  Pt reported: doing fine, hasn't contacted [place] as of yet. Unable to write numbers of other resources for aquatics down.   Community Health Worker provided: assurance that I would call back to leave a detailed vm with aquatic resources in patient's area.     Follow-up Outreach - Due: 6/25/2023

## 2023-06-20 NOTE — TELEPHONE ENCOUNTER
----- Message from Matias Nathan sent at 6/20/2023 12:18 PM CDT -----  Contact: spouce/amelia  Type:  RX Refill Request    Who Called: spouse/amelia  Refill or New Rx:  refil  RX Name and Strength:  potassium chloride (KLOR-CON) 10 MEQ TbSR      How is the patient currently taking it? (ex. 1XDay):  as directed  Is this a 30 day or 90 day RX:  90  Preferred Pharmacy with phone number:    [facility]/pharmacy #[id number] - [place] - [address]  [address] [place] [address]  Phone: [phone] Fax: [phone]    Local or Mail Order:  local    Ordering Provider:  sergey Mann Call Back Number:  [phone]     Additional Information:

## 2023-06-21 RX ORDER — POTASSIUM CHLORIDE 750 MG/1
20 TABLET, EXTENDED RELEASE ORAL DAILY
Qty: 180 TABLET | Refills: 3
Start: 2023-06-21 | End: 2023-06-28 | Stop reason: SDUPTHER

## 2023-06-28 ENCOUNTER — TELEPHONE (OUTPATIENT)
Dept: FAMILY MEDICINE | Facility: CLINIC | Age: 78
End: 2023-06-28
Payer: MEDICARE

## 2023-06-28 RX ORDER — POTASSIUM CHLORIDE 750 MG/1
20 TABLET, EXTENDED RELEASE ORAL DAILY
Qty: 180 TABLET | Refills: 3 | Status: SHIPPED | OUTPATIENT
Start: 2023-06-28

## 2023-06-28 NOTE — TELEPHONE ENCOUNTER
----- Message from Emmie Ku, Patient Care Assistant sent at 6/28/2023 11:59 AM CDT -----  Contact: Shannon  Type: Needs Medical Advice    Who Called: Shannon  Best Call Back Number: [phone]  Inquiry/Question: Chen is calling to get refill on the pt's potassium chloride (KLOR-CON) 10 MEQ TbSR sent to   [facility]/pharmacy #[id number] - [place] - [address]  [address] [place] [address]  Phone: [phone] Fax: [phone]   Thank you~

## 2023-06-28 NOTE — TELEPHONE ENCOUNTER
----- Message from Emmie Ku, Patient Care Assistant sent at 6/28/2023 11:59 AM CDT -----  Contact: Shannon  Type: Needs Medical Advice    Who Called: Shannon  Best Call Back Number: [phone]  Inquiry/Question: Chen is calling to get refill on the pt's potassium chloride (KLOR-CON) 10 MEQ TbSR sent to   [facility]/pharmacy #[id number] - [place] - [address]  [address] [place] [address]  Phone: [phone] Fax: [phone]   Thank you~

## 2023-06-28 NOTE — TELEPHONE ENCOUNTER
----- Message from Emmie Ku, Patient Care Assistant sent at 6/28/2023 11:54 AM CDT -----  Contact: Shannon  Type: Needs Medical Advice    Who Called: Shannon  Best Call Back Number: [phone]  Fax: [phone]  Inquiry/Question: Chen is calling to get orders for PT. Please advise. Thank you~

## 2023-06-29 DIAGNOSIS — R26.81 UNSTEADY GAIT: Primary | ICD-10-CM

## 2023-06-29 DIAGNOSIS — R53.1 DECREASED STRENGTH: ICD-10-CM

## 2023-07-07 ENCOUNTER — TELEPHONE (OUTPATIENT)
Dept: FAMILY MEDICINE | Facility: CLINIC | Age: 78
End: 2023-07-07
Payer: MEDICARE

## 2023-07-07 DIAGNOSIS — Z12.5 PROSTATE CANCER SCREENING: ICD-10-CM

## 2023-07-07 DIAGNOSIS — E11.42 TYPE 2 DIABETES MELLITUS WITH DIABETIC POLYNEUROPATHY, WITHOUT LONG-TERM CURRENT USE OF INSULIN: Primary | ICD-10-CM

## 2023-07-07 NOTE — TELEPHONE ENCOUNTER
----- Message from Silverio Webb sent at 7/7/2023  2:44 PM CDT -----  Regarding: Return Call  [facility]  Type:  Patient Returning Call    Who Called:Deanna with  [facility]  Who Left Message for Patient:office nurse  Does the patient know what this is regarding?:patient is there and need order  Would the patient rather a call back or a response via [facility]? call  Best Call Back Number:[phone]  Additional Information: please call

## 2023-07-10 ENCOUNTER — LAB VISIT (OUTPATIENT)
Dept: LAB | Facility: HOSPITAL | Age: 78
End: 2023-07-10
Attending: FAMILY MEDICINE
Payer: MEDICARE

## 2023-07-10 ENCOUNTER — OFFICE VISIT (OUTPATIENT)
Dept: FAMILY MEDICINE | Facility: CLINIC | Age: 78
End: 2023-07-10
Payer: MEDICARE

## 2023-07-10 VITALS
WEIGHT: 227 LBS | HEART RATE: 72 BPM | BODY MASS INDEX: 32.5 KG/M2 | SYSTOLIC BLOOD PRESSURE: 124 MMHG | HEIGHT: 70 IN | DIASTOLIC BLOOD PRESSURE: 70 MMHG | TEMPERATURE: 98 F | OXYGEN SATURATION: 98 %

## 2023-07-10 DIAGNOSIS — R97.20 ELEVATED PSA: ICD-10-CM

## 2023-07-10 DIAGNOSIS — Z87.81 HISTORY OF FEMUR FRACTURE: Primary | ICD-10-CM

## 2023-07-10 DIAGNOSIS — Z12.5 SPECIAL SCREENING FOR MALIGNANT NEOPLASM OF PROSTATE: Primary | ICD-10-CM

## 2023-07-10 DIAGNOSIS — N20.0 KIDNEY STONE: ICD-10-CM

## 2023-07-10 DIAGNOSIS — Z79.01 ANTICOAGULATED: ICD-10-CM

## 2023-07-10 DIAGNOSIS — E11.42 DIABETIC POLYNEUROPATHY: ICD-10-CM

## 2023-07-10 DIAGNOSIS — S06.0XAS CONCUSSION WITH UNKNOWN LOSS OF CONSCIOUSNESS STATUS, SEQUELA: ICD-10-CM

## 2023-07-10 DIAGNOSIS — E11.42 TYPE 2 DIABETES MELLITUS WITH DIABETIC POLYNEUROPATHY, WITHOUT LONG-TERM CURRENT USE OF INSULIN: ICD-10-CM

## 2023-07-10 DIAGNOSIS — S06.5X0S: ICD-10-CM

## 2023-07-10 DIAGNOSIS — I48.0 PAROXYSMAL ATRIAL FIBRILLATION: Chronic | ICD-10-CM

## 2023-07-10 DIAGNOSIS — I50.30 HEART FAILURE WITH PRESERVED EJECTION FRACTION, UNSPECIFIED HF CHRONICITY: ICD-10-CM

## 2023-07-10 LAB
ALBUMIN/CREAT UR: 142.1 UG/MG (ref 0–30)
COMPLEXED PSA SERPL-MCNC: 0.77 NG/ML (ref 0–4)
CREAT UR-MCNC: 56 MG/DL (ref 23–375)
ERYTHROCYTE [SEDIMENTATION RATE] IN BLOOD BY WESTERGREN METHOD: 13 MM/HR (ref 0–10)
MICROALBUMIN UR DL<=1MG/L-MCNC: 79.6 UG/ML

## 2023-07-10 PROCEDURE — 1126F PR PAIN SEVERITY QUANTIFIED, NO PAIN PRESENT: ICD-10-PCS | Mod: CPTII,S$GLB,, | Performed by: FAMILY MEDICINE

## 2023-07-10 PROCEDURE — 3288F PR FALLS RISK ASSESSMENT DOCUMENTED: ICD-10-PCS | Mod: CPTII,S$GLB,, | Performed by: FAMILY MEDICINE

## 2023-07-10 PROCEDURE — 1101F PR PT FALLS ASSESS DOC 0-1 FALLS W/OUT INJ PAST YR: ICD-10-PCS | Mod: CPTII,S$GLB,, | Performed by: FAMILY MEDICINE

## 2023-07-10 PROCEDURE — 3074F PR MOST RECENT SYSTOLIC BLOOD PRESSURE < 130 MM HG: ICD-10-PCS | Mod: CPTII,S$GLB,, | Performed by: FAMILY MEDICINE

## 2023-07-10 PROCEDURE — 84153 ASSAY OF PSA TOTAL: CPT | Performed by: FAMILY MEDICINE

## 2023-07-10 PROCEDURE — 99214 PR OFFICE/OUTPT VISIT, EST, LEVL IV, 30-39 MIN: ICD-10-PCS | Mod: S$GLB,,, | Performed by: FAMILY MEDICINE

## 2023-07-10 PROCEDURE — 1101F PT FALLS ASSESS-DOCD LE1/YR: CPT | Mod: CPTII,S$GLB,, | Performed by: FAMILY MEDICINE

## 2023-07-10 PROCEDURE — 1159F PR MEDICATION LIST DOCUMENTED IN MEDICAL RECORD: ICD-10-PCS | Mod: CPTII,S$GLB,, | Performed by: FAMILY MEDICINE

## 2023-07-10 PROCEDURE — 1160F RVW MEDS BY RX/DR IN RCRD: CPT | Mod: CPTII,S$GLB,, | Performed by: FAMILY MEDICINE

## 2023-07-10 PROCEDURE — 82570 ASSAY OF URINE CREATININE: CPT | Performed by: FAMILY MEDICINE

## 2023-07-10 PROCEDURE — 3288F FALL RISK ASSESSMENT DOCD: CPT | Mod: CPTII,S$GLB,, | Performed by: FAMILY MEDICINE

## 2023-07-10 PROCEDURE — 1160F PR REVIEW ALL MEDS BY PRESCRIBER/CLIN PHARMACIST DOCUMENTED: ICD-10-PCS | Mod: CPTII,S$GLB,, | Performed by: FAMILY MEDICINE

## 2023-07-10 PROCEDURE — 1159F MED LIST DOCD IN RCRD: CPT | Mod: CPTII,S$GLB,, | Performed by: FAMILY MEDICINE

## 2023-07-10 PROCEDURE — 99214 OFFICE O/P EST MOD 30 MIN: CPT | Mod: S$GLB,,, | Performed by: FAMILY MEDICINE

## 2023-07-10 PROCEDURE — 85651 RBC SED RATE NONAUTOMATED: CPT | Performed by: FAMILY MEDICINE

## 2023-07-10 PROCEDURE — 3074F SYST BP LT 130 MM HG: CPT | Mod: CPTII,S$GLB,, | Performed by: FAMILY MEDICINE

## 2023-07-10 PROCEDURE — 1126F AMNT PAIN NOTED NONE PRSNT: CPT | Mod: CPTII,S$GLB,, | Performed by: FAMILY MEDICINE

## 2023-07-10 PROCEDURE — 3078F PR MOST RECENT DIASTOLIC BLOOD PRESSURE < 80 MM HG: ICD-10-PCS | Mod: CPTII,S$GLB,, | Performed by: FAMILY MEDICINE

## 2023-07-10 PROCEDURE — 3078F DIAST BP <80 MM HG: CPT | Mod: CPTII,S$GLB,, | Performed by: FAMILY MEDICINE

## 2023-07-10 RX ORDER — PROPRANOLOL HYDROCHLORIDE 80 MG/1
TABLET ORAL
COMMUNITY
End: 2023-08-04 | Stop reason: SDUPTHER

## 2023-07-10 RX ORDER — METAXALONE 800 MG/1
TABLET ORAL
COMMUNITY
End: 2024-02-09

## 2023-07-10 RX ORDER — PREDNISONE 20 MG/1
TABLET ORAL
COMMUNITY
End: 2023-12-08

## 2023-07-10 RX ORDER — DULOXETIN HYDROCHLORIDE 30 MG/1
30 CAPSULE, DELAYED RELEASE ORAL 2 TIMES DAILY
Qty: 90 CAPSULE | Refills: 1 | Status: SHIPPED | OUTPATIENT
Start: 2023-07-10

## 2023-07-10 RX ORDER — METFORMIN HYDROCHLORIDE 500 MG/1
500 TABLET, EXTENDED RELEASE ORAL DAILY
Qty: 90 TABLET | Refills: 1 | Status: SHIPPED | OUTPATIENT
Start: 2023-07-10 | End: 2023-12-12

## 2023-07-10 RX ORDER — OXYCODONE AND ACETAMINOPHEN 10; 325 MG/1; MG/1
TABLET ORAL
COMMUNITY
End: 2024-02-09 | Stop reason: SDUPTHER

## 2023-07-10 RX ORDER — FINASTERIDE 5 MG/1
5 TABLET, FILM COATED ORAL DAILY
Qty: 90 TABLET | Refills: 0 | Status: SHIPPED | OUTPATIENT
Start: 2023-07-10 | End: 2023-09-11 | Stop reason: SDUPTHER

## 2023-07-10 RX ORDER — VALSARTAN 160 MG/1
TABLET ORAL
COMMUNITY
End: 2023-11-07

## 2023-07-12 ENCOUNTER — CLINICAL SUPPORT (OUTPATIENT)
Dept: REHABILITATION | Facility: HOSPITAL | Age: 78
End: 2023-07-12
Payer: MEDICARE

## 2023-07-12 DIAGNOSIS — R26.81 UNSTEADY GAIT: ICD-10-CM

## 2023-07-12 DIAGNOSIS — R53.1 DECREASED STRENGTH: ICD-10-CM

## 2023-07-12 PROBLEM — S06.0XAA CONCUSSION WITH UNKNOWN LOSS OF CONSCIOUSNESS STATUS: Status: ACTIVE | Noted: 2023-07-12

## 2023-07-12 PROBLEM — Z87.81 HISTORY OF FEMUR FRACTURE: Status: ACTIVE | Noted: 2023-07-12

## 2023-07-12 PROCEDURE — 97110 THERAPEUTIC EXERCISES: CPT | Mod: PN

## 2023-07-12 PROCEDURE — 97161 PT EVAL LOW COMPLEX 20 MIN: CPT | Mod: PN

## 2023-07-12 NOTE — PLAN OF CARE
"[facility]  Physical Therapy Neurological Rehabilitation Initial Evaluation     Name: Hiro Rodríguez  Clinic Number: [id number]    Therapy Diagnosis:   Encounter Diagnoses   Name Primary?    Unsteady gait     Decreased strength      Physician: Gautam Castanon III, MD    Physician Orders: PT Eval and Treat   Medical Diagnosis from Referral:   R26.81 (ICD-10-CM) - Unsteady gait   R53.1 (ICD-10-CM) - Decreased strength     Evaluation Date: 7/12/2023  Authorization Period Expiration: 12/31/23  Plan of Care Expiration: 9/13/23  Visit # / Visits authorized: 1/ 1  FOTO: 37/100    Precautions: Hard of hearing left ear, Standard and Fall    Time In: 1345  Time Out: 1430  Total Billable Time: 45 minutes    Subjective      Date of onset: 7/11/23   (referral    History of current condition - Gregorio reports: 8 months ago had a bad fall with back and head injury. Had been hospitalized and on  Rehab for a long while. Had been decommissioned and feeling weak. C/o SOB  and requires assistance from wife from reduced mobility and function.     Imaging, Chest Xray 6/1/23 :  Decreasing atelectasis or infiltrate at the right lung base compared April 28. No other significant radiographic abnormalities.    Prior Therapy: Yes.   Social History: lives with wife   Falls: none recent   DME: Manual wheelchair, Walker, Small based Quad cane, and BSC    Home Environment: one step to get into house; no stairs.   Exercise Routine / History: used to go to the gym 4-5 days a week before.   Family Present at time of Eval: wife-Galina in the [employer]   Occupation: retired Sales and marketing  Prior Level of Function: Independent 9 months ago. Had been able to use a walker 3 months now.  Current Level of Function: Needs help  getting up from chair. Needs help with most ADL and . Limited gait on RW     Pain: NONE    Patient's goals: "to get stronger and function better"    Medical History:   Past Medical History:   Diagnosis " Date    CHF (congestive heart failure)     Hypertension     Mixed hyperlipidemia     Paroxysmal atrial fibrillation 2013       Surgical History:   Hiro Rodríguez  has a past surgical history that includes Intramedullary rodding of trochanter of femur (Left, 11/10/2022); Fracture surgery; Cystoscopy w/ ureteral stent placement (N/A, 03/02/2023); removal, calculus, bladder (03/02/2023); Total knee arthroplasty (Right, 2017); Total knee arthroplasty (Left, 2018); Lumbar discectomy (1980); cystoureteroscopy, with holmium laser lithotripsy of ureteral calculus and stent insertion (Left, 4/20/2023); Retrograde pyelography (4/20/2023); and Removal of ureteral calculus (Left, 4/20/2023).    Medications:   Hiro has a current medication list which includes the following prescription(s): acetaminophen, amiodarone, apixaban, calcium polycarbophil, cholecalciferol (vitamin d3), cyanocobalamin (vitamin b-12), docusate sodium, duloxetine, finasteride, furosemide, hydrocodone-acetaminophen, hydrocodone-acetaminophen, lisinopril, metaxalone, metformin, metoprolol tartrate, omega-3 acid ethyl esters, oxycodone-acetaminophen, pantoprazole, polyethylene glycol, potassium chloride, prednisone, propranolol, rosuvastatin, ozempic, ozempic, tamsulosin, and valsartan.    Allergies:   Review of patient's allergies indicates:  No Known Allergies     Objective      Posture: anterior head with slight knee flexion stooped posture in standing; leans hard on RW  Palpation: mild ankle swelling;no ankle tenderness  Sensation: intact  Range of Motion/Strength: BUE's WFL  4-/5 gross strength   LOWER  Extremity Right  Left  Pain/Dysfunction with Movement       HIP PROM MMT PROM MMT    flexion   100  3/5  100  3/5    extension     0  3/5    0  3/5    Abduction    30  3-/5  30  3-/5      KNEE        Extension   10  3+/5  10  3+/5    Flexion    115  3/5   90   3/5       ANKLE        Plantarflexion    30  3+/5   30   3+/5    Dorsiflexion    0   3/5     0     3/5      Flexibility: tight hamstrings  Gait: With AD.  Device Used -  Rolling walker  Analysis:  UE weight bearing with stooped posture; slow vonda and low floor clearance.  Bed Mobility: minimal assist supine to sit to minimize elicit of low back pain; modified independent sit to supine  Transfers: minimal assist sit to stand  Special Tests: Unable to test balance as patient gets apprehensive when unsupported; Poor standing   Other: Unable to do TUG or sit to stand as patient unable to stand on own.    Intake Outcome Measure for FOTO Balance Survey    Therapist reviewed FOTO scores for Hiro Rodríguez on 7/12/2023.   FOTO documents entered into [employer] - see Media section.    Intake Score: 63%       Treatment     Total Treatment time separate from Evaluation: 15 minutes    Gregorio received the treatments listed below:      therapeutic exercises to develop strength, flexibility, and establish home exercises for 15 minutes including:  Bridging  Straight leg raising  Hip/knee flexion arm pull to chest     Patient Education and Home Exercises     Education provided:   - Discussed Plan of care; safety precautions and Home exercise Instructions.    Written Home Exercises Provided: yes.  Exercises were reviewed and Gregorio was able to demonstrate them prior to the end of the session.  Gregorio demonstrated fair  understanding of the education provided.     See EMR under Patient Instructions for exercises provided 7/12/2023.    Assessment     Hiro is a 78 y.o. male referred to outpatient Physical Therapy with a medical diagnosis of decreased strength and unsteady gait. Patient presents with generalized weakness; decreased functional mobility/ADL; severe apprehension with balance; decreased gait skills, limited endurance, SOB, joints stiffness to lower extremities.  Patient prognosis is Good.   Patient will benefit from skilled outpatient Physical Therapy to address the deficits stated above and in the chart below,  provide patient /family education, and to maximize patientt's level of independence.     Plan of care discussed with patient: Yes  Patient's spiritual, cultural and educational needs considered and patient is agreeable to the plan of care and goals as stated below:     Anticipated Barriers for therapy: anxiety    Medical Necessity is demonstrated by the following  History  Co-morbidities and personal factors that may impact the plan of care [x] LOW: no personal factors / co-morbidities  [] MODERATE: 1-2 personal factors / co-morbidities  [] HIGH: 3+ personal factors / co-morbidities    Moderate / High Support Documentation:   Co-morbidities affecting plan of care: CHF    Personal Factors:   age  coping style     Examination  Body Structures and Functions, activity limitations and participation restrictions that may impact the plan of care [x] LOW: addressing 1-2 elements  [] MODERATE: 3+ elements  [] HIGH: 4+ elements (please support below)    Moderate / High Support Documentation: hard of hearing     Clinical Presentation [x] LOW: stable  [] MODERATE: Evolving  [] HIGH: Unstable     Decision Making/ Complexity Score: low       Goals:  Short Term Goals: 3 weeks   Patient will report compliance to home exercises given.  Patient will be modified independent with bed mobility and sit to stand.  Patient will tolerate 10' of cardio exercises on Nustep moderate resistance.    Long Term Goals: 8 weeks   Patient will demonstrate stair maneuvers ascending descending 8 steps using rail.  Patient will demonstrate 6 or more repetitions with sit to  30 seconds.  Patient will score > 35/56 on Zheng balance scale  Patient will improve FOTO self assessement  survey 9 physical points 46/100 or better.    Plan     Plan of care Certification: 7/12/2023 to 9/13/23.    Outpatient Physical Therapy 2 times weekly for 8 weeks to include the following interventions: Gait Training, Manual Therapy, Neuromuscular Re-ed, Patient  Education, Therapeutic Activities, and Therapeutic Exercise.     Valdo Salomon, PT

## 2023-07-12 NOTE — PATIENT INSTRUCTIONS
HIP: Flexion / KNEE: Extension, Straight Leg Raise        Raise leg, keeping knee straight. Perform slowly. ___ reps per set, ___ sets per day, ___ days per week  Bridging        Lying supine with knees bent, tighten stomach muscles. Raise hips off floor, tighten buttocks. Hold ___ seconds.  Repeat ___ times. Do ___ times per day.     Copyright © [facility]. All rights reserved.      Knee to Chest        Lying supine, bend involved knee to chest ___ times. Repeat with other leg.  Do ___ times per day.    Copyright © [facility]. All rights reserved.      Mini-Squats (Standing)        Stand with support. Bend knees slightly. Tighten pelvic floor. Hold for ___ seconds. Return to straight standing. Relax for ___ seconds.  Repeat ___ times. Do ___ times a day.    Chair Push-Up        With hands pushing down on armrests, lean forward and try to lift buttocks. Return to sitting position.  Repeat ____ times. Do ____ sessions per day.

## 2023-07-12 NOTE — PROGRESS NOTES
Using his cane now.  Walker at sometimes.  Outpatient physical therapy being started this week.  Left femur fracture slightly  Hematoma doing well from this.  Paroxysmal atrial fibrillation.  Heart failure with preserved ejection fraction no PND orthopnea.  He is anticoagulated no bleeding.  Diabetes mellitus type 2 with polyneuropathy feet are not keeping him awake at night.  Did have concussion.  BMI is 32.6 down 1 lb.  Had kidney stone.  Renal stent removed.  PSA elevated to 19.3 in February.  Repeat is pending but it was only 0.85 last year so hopefully this was just prostatitis.  Blood work there was some malfunction had to be redrawn today.   down from 345 GFR is greater than 60 BMP is okay.      Physical examination.  Vital signs noted.  Elderly male no acute distress.  Neck without bruit.  Chest clear.  Heart regular rate and rhythm.  Abdomen bowel sounds are positive soft nontender no guarding or rebound.  Extremities without edema positive pedal pulses.  Neurologically intact.  Subjective:       Patient ID: Hiro Rodríguez is a 78 y.o. male.    Chief Complaint: Follow-up    HPI    Objective:        Assessment:       1. History of femur fracture    2. Subdural hematoma due to concussion, without loss of consciousness, sequela    3. Paroxysmal atrial fibrillation    4. Heart failure with preserved ejection fraction, unspecified HF chronicity    5. Anticoagulated    6. Type 2 diabetes mellitus with diabetic polyneuropathy, without long-term current use of insulin    7. Concussion with unknown loss of consciousness status, sequela    8. BMI 32.0-32.9,adult    9. Kidney stone    10. Elevated PSA        Plan:       History of femur fracture    Subdural hematoma due to concussion, without loss of consciousness, sequela    Paroxysmal atrial fibrillation    Heart failure with preserved ejection fraction, unspecified HF chronicity  -     Lipid Panel; Future; Expected date:  07/10/2023    Anticoagulated    Type 2 diabetes mellitus with diabetic polyneuropathy, without long-term current use of insulin  -     Lipid Panel; Future; Expected date: 07/10/2023  -     Microalbumin/Creatinine Ratio, Urine; Future; Expected date: 07/10/2023    Concussion with unknown loss of consciousness status, sequela  -     Lipid Panel; Future; Expected date: 07/10/2023    BMI 32.0-32.9,adult    Kidney stone    Elevated PSA    Other orders  -     DULoxetine (CYMBALTA) 30 MG capsule; Take 1 capsule (30 mg total) by mouth 2 (two) times daily.  Dispense: 90 capsule; Refill: 1  -     finasteride (PROSCAR) 5 mg tablet; Take 1 tablet (5 mg total) by mouth once daily.  Dispense: 90 tablet; Refill: 0  -     metFORMIN (GLUCOPHAGE-XR) 500 MG ER 24hr tablet; Take 1 tablet (500 mg total) by mouth once daily.  Dispense: 90 tablet; Refill: 1    Go for his diabetic eye exam.  Refill his medications.  Microalbumin ordered.  Lipids ordered.  Physical therapy as planned.  Follow-up here in 2 months.  Continue diet weight reduction.

## 2023-07-13 ENCOUNTER — TELEPHONE (OUTPATIENT)
Dept: FAMILY MEDICINE | Facility: CLINIC | Age: 78
End: 2023-07-13
Payer: MEDICARE

## 2023-07-13 NOTE — TELEPHONE ENCOUNTER
----- Message from Gautam Castanon III, MD sent at 7/10/2023  8:38 PM CDT -----  Microalbumin is slightly positive.  FOLLOW-UP AS RECOMMENDED

## 2023-07-14 RX ORDER — TOPIRAMATE 50 MG/1
TABLET, FILM COATED ORAL
Qty: 90 TABLET | Refills: 1 | Status: SHIPPED | OUTPATIENT
Start: 2023-07-14 | End: 2023-11-07

## 2023-07-14 RX ORDER — OMEGA-3-ACID ETHYL ESTERS 1 G/1
CAPSULE, LIQUID FILLED ORAL
Qty: 360 CAPSULE | Refills: 1 | Status: SHIPPED | OUTPATIENT
Start: 2023-07-14 | End: 2023-12-12

## 2023-07-14 NOTE — TELEPHONE ENCOUNTER
No care due was identified.  [facility] Embedded Care Due Messages. Reference number: [id number].   7/14/2023 12:28:40 AM CDT

## 2023-07-14 NOTE — TELEPHONE ENCOUNTER
Refill Routing Note   Medication(s) are not appropriate for processing by [facility] for the following reason(s):      Medication outside of protocol  Required labs outdated    [facility] action(s):  Defer  Route None identified          Appointments  past 12m or future 3m with PCP    Date Provider   Last Visit   7/10/2023 Gautam Castanon III, MD   Next Visit   9/11/2023 Gautam Castanon III, MD   ED visits in past 90 days: 1        Note composed:6:59 AM 07/14/2023            None

## 2023-07-17 ENCOUNTER — CLINICAL SUPPORT (OUTPATIENT)
Dept: REHABILITATION | Facility: HOSPITAL | Age: 78
End: 2023-07-17
Payer: MEDICARE

## 2023-07-17 DIAGNOSIS — R53.1 DECREASED STRENGTH: Primary | ICD-10-CM

## 2023-07-17 DIAGNOSIS — R26.81 UNSTEADY GAIT: ICD-10-CM

## 2023-07-17 PROCEDURE — 97110 THERAPEUTIC EXERCISES: CPT | Mod: PN

## 2023-07-17 RX ORDER — PROPRANOLOL HYDROCHLORIDE 40 MG/1
TABLET ORAL
Qty: 180 TABLET | Refills: 1 | OUTPATIENT
Start: 2023-07-17

## 2023-07-17 NOTE — TELEPHONE ENCOUNTER
Refill Decision Note   Hiro Rodríguez  is requesting a refill authorization.  Brief Assessment and Rationale for Refill:  Quick Discontinue     Medication Therapy Plan:  Patient currently on Lopressor 25mg BID      Comments:     Note composed:4:22 AM 07/17/2023

## 2023-07-17 NOTE — PROGRESS NOTES
[facility]   Physical Therapy Treatment Note      Name: Hiro Rodríguez  Clinic Number: [id number]    Therapy Diagnosis:   Encounter Diagnoses   Name Primary?    Decreased strength Yes    Unsteady gait      Physician: Gautam Castanon III, MD    Visit Date: 7/17/2023  Physician Orders: PT Eval and Treat   Medical Diagnosis from Referral:   R26.81 (ICD-10-CM) - Unsteady gait   R53.1 (ICD-10-CM) - Decreased strength      Evaluation Date: 7/12/2023  Authorization Period Expiration: 12/31/23  Plan of Care Expiration: 9/13/23  Visit # / Visits authorized: 1/ 1  FOTO: 37/100     Precautions: Hard of hearing left ear, Standard and Fall     Time In: 1500  Time Out: 1545  Total Billable Time: 45 minutes    Subjective     Pt reports: a fall yesterday when he step on a curb. .  He was compliant with home exercise program.  Response to previous treatment: on initial visit.  Functional change: none    Pain: 0/10  Location: none indicated      Objective      + skin bruise to left elbow.    Treatment     Gregorio received the treatments listed below:      therapeutic exercises to develop strength for 45 minutes including:  Nustep all 4's L4 10'  Gastroc/hamstring stretch  Heel raises 20  Mini squats 20  High march 10/10  Ankle dorsiflexion blue theraband 20/20  Seated hamstring curls blue theraband 20/20  Supine SAQ's 8#s 20/20  Bridging 10  SLR 10/10    Patient Education and Home Exercises       Education provided:   - to minimize weight bearing to UE's when using RW.during ambulation    Written Home Exercises Provided: Patient instructed to cont prior HEP. Exercises were reviewed and Gregorio was able to demonstrate them prior to the end of the session.  Gregorio demonstrated good  understanding of the education provided. See EMR under Patient Instructions for exercises provided during therapy sessions    Assessment     Patient is easily fatigued with repetitive activities and exercises.  Tolerate Rx  well.  Gregorio Is progressing well towards his goals.   Pt prognosis is Good.     Pt will continue to benefit from skilled outpatient physical therapy to address the deficits listed in the problem list box on initial evaluation, provide pt/family education and to maximize pt's level of independence in the home and community environment.     Pt's spiritual, cultural and educational needs considered and pt agreeable to plan of care and goals.     Anticipated barriers to physical therapy: none    Goals:   Short Term Goals:    Patient will report compliance to home exercises given.  Patient will be modified independent with bed mobility and sit to stand.  Patient will tolerate 10' of cardio exercises on Nustep moderate resistance.     Long Term Goals:    Patient will demonstrate stair maneuvers ascending descending 8 steps using rail.  Patient will demonstrate 6 or more repetitions with sit to  30 seconds.  Patient will score > 35/56 on Zheng balance scale  Patient will improve FOTO self assessement  survey 9 physical points 46/100 or better    Plan     Strength/endurance training  Continue Plan of care.    Valdo Salomon, PT

## 2023-07-18 ENCOUNTER — PATIENT OUTREACH (OUTPATIENT)
Dept: ADMINISTRATIVE | Facility: OTHER | Age: 78
End: 2023-07-18
Payer: MEDICARE

## 2023-07-19 ENCOUNTER — CLINICAL SUPPORT (OUTPATIENT)
Dept: REHABILITATION | Facility: HOSPITAL | Age: 78
End: 2023-07-19
Payer: MEDICARE

## 2023-07-19 ENCOUNTER — LAB VISIT (OUTPATIENT)
Dept: LAB | Facility: HOSPITAL | Age: 78
End: 2023-07-19
Attending: SPECIALIST
Payer: MEDICARE

## 2023-07-19 DIAGNOSIS — R53.1 DECREASED STRENGTH: Primary | ICD-10-CM

## 2023-07-19 DIAGNOSIS — R26.81 UNSTEADY GAIT: ICD-10-CM

## 2023-07-19 DIAGNOSIS — R97.20 ELEVATED PROSTATE SPECIFIC ANTIGEN (PSA): Primary | ICD-10-CM

## 2023-07-19 LAB — COMPLEXED PSA SERPL-MCNC: 0.55 NG/ML (ref 0–4)

## 2023-07-19 PROCEDURE — 97110 THERAPEUTIC EXERCISES: CPT | Mod: PN

## 2023-07-19 PROCEDURE — 36415 COLL VENOUS BLD VENIPUNCTURE: CPT | Performed by: SPECIALIST

## 2023-07-19 PROCEDURE — 84153 ASSAY OF PSA TOTAL: CPT | Performed by: SPECIALIST

## 2023-07-19 NOTE — PROGRESS NOTES
[facility]   Physical Therapy Treatment Note      Name: Hiro Rodríguez  Clinic Number: [id number]    Therapy Diagnosis:   Encounter Diagnoses   Name Primary?    Decreased strength Yes    Unsteady gait      Physician: Gautam Castanon III, MD    Visit Date: 7/19/2023  Physician Orders: PT Eval and Treat   Medical Diagnosis from Referral:   R26.81 (ICD-10-CM) - Unsteady gait   R53.1 (ICD-10-CM) - Decreased strength      Evaluation Date: 7/12/2023  Authorization Period Expiration: 12/31/23  Plan of Care Expiration: 9/13/23  Visit # / Visits authorized: 2/40    (3)  FOTO: 37/100     Precautions: Hard of hearing left ear, Standard and Fall     Time In: 1700  Time Out: 1745  Total Billable Time: 45 minutes    Subjective     Pt reports: no loss of balance. Had blood test 2 hours ago. .  He was compliant with home exercise program.  Response to previous treatment: on initial visit.  Functional change: none    Pain: 0/10  Location: none indicated      Objective      Tight compression dressing to right arm from blood sampling removed.    Treatment     Gregorio received the treatments listed below:      therapeutic exercises to develop strength for 45 minutes including:  Nustep all 4's L4 10'  Gastroc/hamstring stretch  Heel raises 20  Mini squats 20  High march 10/10  Single leg stance hip abduction 10/10  Ankle dorsiflexion manual resist 20/20  Supine SAQ's 8#s 20/20  Bridging 10  SLR 20/20    Patient Education and Home Exercises       Education provided:   -Importance of knee full extension for when weight bearing.during gait/  Rhythmic breathing with exertion    Written Home Exercises Provided: Patient instructed to cont prior HEP. Exercises were reviewed and Gregorio was able to demonstrate them prior to the end of the session.  Gregorio demonstrated good  understanding of the education provided. See EMR under Patient Instructions for exercises provided during therapy sessions    Assessment      Patient tends to hold breath when during exertion.  Tolerate Rx well.  Gregorio Is progressing well towards his goals.   Pt prognosis is Good.     Pt will continue to benefit from skilled outpatient physical therapy to address the deficits listed in the problem list box on initial evaluation, provide pt/family education and to maximize pt's level of independence in the home and community environment.     Pt's spiritual, cultural and educational needs considered and pt agreeable to plan of care and goals.     Anticipated barriers to physical therapy: none    Goals:   Short Term Goals:    Patient will report compliance to home exercises given.  Patient will be modified independent with bed mobility and sit to stand.  Patient will tolerate 10' of cardio exercises on Nustep moderate resistance.     Long Term Goals:    Patient will demonstrate stair maneuvers ascending descending 8 steps using rail.  Patient will demonstrate 6 or more repetitions with sit to  30 seconds.  Patient will score > 35/56 on Zheng balance scale  Patient will improve FOTO self assessement  survey 9 physical points 46/100 or better    Plan     Balance  Strength/endurance training  Continue Plan of care.    Valdo Salomon, PT

## 2023-07-25 ENCOUNTER — CLINICAL SUPPORT (OUTPATIENT)
Dept: REHABILITATION | Facility: HOSPITAL | Age: 78
End: 2023-07-25
Payer: MEDICARE

## 2023-07-25 DIAGNOSIS — R53.1 DECREASED STRENGTH: Primary | ICD-10-CM

## 2023-07-25 DIAGNOSIS — R26.81 UNSTEADY GAIT: ICD-10-CM

## 2023-07-25 PROCEDURE — 97110 THERAPEUTIC EXERCISES: CPT | Mod: PN,CQ

## 2023-07-25 PROCEDURE — 97112 NEUROMUSCULAR REEDUCATION: CPT | Mod: PN,CQ

## 2023-07-25 PROCEDURE — 97530 THERAPEUTIC ACTIVITIES: CPT | Mod: PN,CQ

## 2023-07-25 NOTE — PROGRESS NOTES
"[facility]   Physical Therapy Treatment Note      Name: Hiro Rodríguez  Clinic Number: [id number]    Therapy Diagnosis:   Encounter Diagnoses   Name Primary?    Decreased strength Yes    Unsteady gait      Physician: Gautam Castanon III, *    Visit Date: 7/25/2023    Physician Orders: PT Eval and Treat   Medical Diagnosis from Referral:   R26.81 (ICD-10-CM) - Unsteady gait   R53.1 (ICD-10-CM) - Decreased strength      Evaluation Date: 7/12/2023  Authorization Period Expiration: 12/31/23  Plan of Care Expiration: 9/13/23  Visit # / Visits authorized: 3/40    (4)  FOTO: 37/100     Precautions: Hard of hearing left ear, Standard and Fall     Time In: 1530  Time Out: 1612  Total Billable Time: 42 minutes    PTA Visit #: 1/5       Subjective     Pt reports: Doing okay today.  He reports was "some" compliant with home exercise program.  Response to previous treatment: no problems stated  Functional change: ongoing    Pain: 0/10  Location:  Not Applicable      Objective      Objective Measures updated at progress report unless specified.     Treatment     Gregorio received the treatments listed below:      therapeutic exercises to develop strength, endurance, range of motion, flexibility, and core stabilization for 22 minutes including:    Recumbent bike Level 3 10 minutes    Seated:  Hamstring stretch with foot on stool performing dorsiflexion/plantarflexion 1 minute Right Left     Supine:  Ball squeeze 30 times  Posterior pelvic tilt with ball squeeze 3 sets of 10   Straight leg raise 3 sets of 10 Right Left   Lateral trunk rotation 2 minutes Right Left  alternating    neuromuscular re-education activities to improve: Balance, Coordination, Proprioception, and Posture for 10 minutes. The following activities were included:    Parallel bars with verbal cues to decrease Bilateral Upper Extremity weight bear and head up:  Heel raises 20 times  March in place 10 times  Hip abduction 10 times " Right Left alternating  Hip extension 10 times Right Left alternating  Hamstring curls 10 times Right Left alternating    therapeutic activities to improve functional performance for 10  minutes, including:    Gait with rolling walker 2x130 feet and within gym area verbal cues to increase upright    Sit<>Stand 5 times with Right hand on seat    Sit<>Supine standby assistance with log roll      Patient Education and Home Exercises       Education provided:   - Patient provided with verbal and demonstrative instruction for all activities performed in today's session.    Written Home Exercises Provided: Patient instructed to cont prior HEP.     See EMR under Patient Instructions for exercises provided during therapy sessions    Assessment     Gregorio provided good participation and effort during today's session with treatment focused on lower extremity strengthening, balance and functional mobility. Gregorio tolerated activities with verbal cues for proper form with good follow through, instructed in increased upright during ambulation with rolling walker and decreased upper extremity weight bear with poor follow through.     Gregorio Is progressing towards his goals.   Pt prognosis is Fair.     Pt will continue to benefit from skilled outpatient physical therapy to address the deficits listed in the problem list box on initial evaluation, provide pt/family education and to maximize pt's level of independence in the home and community environment.     Pt's spiritual, cultural and educational needs considered and pt agreeable to plan of care and goals.     Anticipated barriers to physical therapy: none     Goals:     Short Term Goals:    Patient will report compliance to home exercises given. (Progressing)  Patient will be modified independent with bed mobility and sit to stand. (Progressing)  Patient will tolerate 10' of cardio exercises on Nustep moderate resistance. (Progressing)     Long Term Goals:    Patient will  demonstrate stair maneuvers ascending descending 8 steps using rail. (Ongoing)  Patient will demonstrate 6 or more repetitions with sit to  30 seconds. (Ongoing)  Patient will score > 35/56 on Zheng balance scale (ongoing)  Patient will improve FOTO self assessement  survey 9 physical points 46/100 or better (ongoing)       Plan     Plan of care Certification: 7/12/2023 to 9/13/23.     Outpatient Physical Therapy 2 times weekly for 8 weeks to include the following interventions: Gait Training, Manual Therapy, Neuromuscular Re-ed, Patient Education, Therapeutic Activities, and Therapeutic Exercise    Korin Tobias, PTA

## 2023-07-27 ENCOUNTER — CLINICAL SUPPORT (OUTPATIENT)
Dept: REHABILITATION | Facility: HOSPITAL | Age: 78
End: 2023-07-27
Payer: MEDICARE

## 2023-07-27 DIAGNOSIS — R53.1 DECREASED STRENGTH: Primary | ICD-10-CM

## 2023-07-27 DIAGNOSIS — R26.81 UNSTEADY GAIT: ICD-10-CM

## 2023-07-27 PROCEDURE — 97530 THERAPEUTIC ACTIVITIES: CPT | Mod: PN,CQ

## 2023-07-27 PROCEDURE — 97112 NEUROMUSCULAR REEDUCATION: CPT | Mod: PN,CQ

## 2023-07-27 NOTE — PROGRESS NOTES
"[facility]   Physical Therapy Treatment Note      Name: Hiro Rodríguez  Clinic Number: [id number]    Therapy Diagnosis:   Encounter Diagnoses   Name Primary?    Decreased strength Yes    Unsteady gait      Physician: Gautam Castanon III, *    Visit Date: 7/27/2023    Physician Orders: PT Eval and Treat   Medical Diagnosis from Referral:   R26.81 (ICD-10-CM) - Unsteady gait   R53.1 (ICD-10-CM) - Decreased strength      Evaluation Date: 7/12/2023  Authorization Period Expiration: 12/31/23  Plan of Care Expiration: 9/13/23  Visit # / Visits authorized: 4/40 (5)  FOTO: 37/100  PTA Visit #: 2/5      Time In: 1540 (late arrival)  Time Out: 1614  Total Billable Time: 34 minutes    Precautions: Hard of hearing left ear, Standard and Fall    Subjective     Pt reports: Doing okay today, "got stuck in traffic" and "I don't know why I am so tired".  He reports was "some" compliant with home exercise program.  Response to previous treatment: no problems stated  Functional change: ongoing    Pain: 0/10  Location:  Not Applicable      Objective      Objective Measures updated at progress report unless specified.     Treatment     Gregorio received the treatments listed below:      therapeutic exercises to develop strength, endurance, range of motion, flexibility, and core stabilization for 14 minutes including:    NuStep Stepper Level 5 10 minutes with Bilateral Upper Extremities     Seated:  Hamstring stretch with foot on stool performing dorsiflexion/plantarflexion 1 minute Right Left     neuromuscular re-education activities to improve: Balance, Coordination, Proprioception, and Posture for 10 minutes. The following activities were included:    Parallel bars with verbal cues to decrease Bilateral Upper Extremity weight bear and parallel bars lowered to decrease upper extremity weight bear:  Heel raises 10 times  March in place 10 times Right Left alternating  Hip abduction 10 times Right Left " alternating  Hip extension 10 times Right Left alternating  Hamstring curls 10 times Right Left alternating    (Activity time includes seated therapeutic rest)    therapeutic activities to improve functional performance for 10 minutes, including:    Gait with rolling walker 2x130 feet and within gym area     Sit<>Stand 6 times in 30 seconds with Right hand on seat    (Activity time includes skilled set-up and positioning as well as therapeutic rest)    Patient Education and Home Exercises       Education provided:   - Patient provided with verbal and demonstrative instruction for all activities performed in today's session.    Written Home Exercises Provided: Patient instructed to cont prior HEP.     See EMR under Patient Instructions for exercises provided during therapy sessions    Assessment     Gregorio provided good participation and effort during today's session with treatment focused on lower extremity strengthening, balance and functional mobility. Gregorio tolerated activities with fatigue and sit rests, decreased activities 2nd to late arrival and with improved upright during ambulation with rolling walker.    Gregorio Is progressing towards his goals.   Pt prognosis is Fair.     Pt will continue to benefit from skilled outpatient physical therapy to address the deficits listed in the problem list box on initial evaluation, provide pt/family education and to maximize pt's level of independence in the home and community environment.     Pt's spiritual, cultural and educational needs considered and pt agreeable to plan of care and goals.     Anticipated barriers to physical therapy: none     Goals:     Short Term Goals:    Patient will report compliance to home exercises given. (Progressing)  Patient will be modified independent with bed mobility and sit to stand. (Progressing)  Patient will tolerate 10' of cardio exercises on Nustep moderate resistance. (Progressing)     Long Term Goals:    Patient will demonstrate  stair maneuvers ascending descending 8 steps using rail. (Ongoing)  Patient will demonstrate 6 or more repetitions with sit to  30 seconds. (progressing)  Patient will score > 35/56 on Zheng balance scale (ongoing)  Patient will improve FOTO self assessement  survey 9 physical points 46/100 or better (ongoing)       Plan     Plan of care Certification: 7/12/2023 to 9/13/23.     Outpatient Physical Therapy 2 times weekly for 8 weeks to include the following interventions: Gait Training, Manual Therapy, Neuromuscular Re-ed, Patient Education, Therapeutic Activities, and Therapeutic Exercise    Korin Tobias, PTA

## 2023-08-01 ENCOUNTER — DOCUMENTATION ONLY (OUTPATIENT)
Dept: REHABILITATION | Facility: HOSPITAL | Age: 78
End: 2023-08-01
Payer: MEDICARE

## 2023-08-01 NOTE — PROGRESS NOTES
PT/PTA met face to face to discuss pt's treatment plan and progress towards established goals. Pt will be seen by a physical therapist minimally every 6th visit or every 30 days.    Korin Tobias PTA

## 2023-08-03 ENCOUNTER — CLINICAL SUPPORT (OUTPATIENT)
Dept: REHABILITATION | Facility: HOSPITAL | Age: 78
End: 2023-08-03
Payer: MEDICARE

## 2023-08-03 ENCOUNTER — PATIENT MESSAGE (OUTPATIENT)
Dept: FAMILY MEDICINE | Facility: CLINIC | Age: 78
End: 2023-08-03
Payer: MEDICARE

## 2023-08-03 DIAGNOSIS — R26.81 UNSTEADY GAIT: ICD-10-CM

## 2023-08-03 DIAGNOSIS — R53.1 DECREASED STRENGTH: Primary | ICD-10-CM

## 2023-08-03 PROCEDURE — 97530 THERAPEUTIC ACTIVITIES: CPT | Mod: PN

## 2023-08-03 PROCEDURE — 97110 THERAPEUTIC EXERCISES: CPT | Mod: PN

## 2023-08-03 NOTE — PROGRESS NOTES
[facility]   Physical Therapy Treatment Note      Name: Hiro Rodríguez  [facility] Number: [id number]    Therapy Diagnosis:   Encounter Diagnoses   Name Primary?    Decreased strength Yes    Unsteady gait      Physician: Gautam Castanon III, MD    Visit Date: 8/3/2023  Physician Orders: PT Eval and Treat   Medical Diagnosis from Referral:   R26.81 (ICD-10-CM) - Unsteady gait   R53.1 (ICD-10-CM) - Decreased strength      Evaluation Date: 7/12/2023  Authorization Period Expiration: 12/31/23  Plan of Care Expiration: 9/13/23  Visit # / Visits authorized: 5/40    (6)     Precautions: Hard of hearing left ear, Standard and Fall     Time In: 1430  Time Out: 1515  Total Billable Time: 45 minutes    Subjective     Pt reports: alternating from one assistive device to none. Tentative and slow to walk.  Admits being slower today from unfamiliarity with using the cane.  He was compliant with home exercise program.  Response to previous treatment: tolerated Rx.  Functional change: changing to straight cane for ambulation    Pain: 0/10  Location: none indicated      Objective      Presented to facility on straight cane.    Treatment     Gregorio received the treatments listed below:      therapeutic exercises to develop strength for 30 minutes including:  Nustep all 4's L4 10'  Gastroc/hamstring stretch  Heel raises 20  Mini squats 20  Single leg stance hip abduction 10/10  Ankle dorsiflexion manual resist 20/20    Therapeutic Activities 15 minutes  High march 10/10  Ambulated long distance including parking lot on cane.    Patient Education and Home Exercises       Education provided:   -Instructed to use the dominant side where it is more natural to carry the cane.    Written Home Exercises Provided: Patient instructed to cont prior HEP. Exercises were reviewed and Gregorio was able to demonstrate them prior to the end of the session.  Gregorio demonstrated good  understanding of the education provided. See  EMR under Patient Instructions for exercises provided during therapy sessions    Assessment     Patient is slow and tentative with ambulation. Not too efficient with cane. Easy fatiguability from standing activity  Requires rest pauses between Exercises.  Tolerate Rx marah Perkins Is progressing well towards his goals.   Pt prognosis is Good.     Pt will continue to benefit from skilled outpatient physical therapy to address the deficits listed in the problem list box on initial evaluation, provide pt/family education and to maximize pt's level of independence in the home and community environment.     Pt's spiritual, cultural and educational needs considered and pt agreeable to plan of care and goals.     Anticipated barriers to physical therapy: none    Goals:   Short Term Goals:    Patient will report compliance to home exercises given.  Patient will be modified independent with bed mobility and sit to stand.  Patient will tolerate 10' of cardio exercises on Nustep moderate resistance.     Long Term Goals:    Patient will demonstrate stair maneuvers ascending descending 8 steps using rail.  Patient will demonstrate 6 or more repetitions with sit to  30 seconds.  Patient will score > 35/56 on Zheng balance scale  Patient will improve FOTO self assessement  survey 9 physical points 46/100 or better    Plan     Balance/Gait training  Strength/endurance training  Continue Plan of care.    Valdo Salomon, PT

## 2023-08-04 RX ORDER — PROPRANOLOL HYDROCHLORIDE 80 MG/1
80 TABLET ORAL DAILY
Qty: 30 TABLET | Refills: 0 | Status: SHIPPED | OUTPATIENT
Start: 2023-08-04 | End: 2023-08-29

## 2023-08-04 NOTE — TELEPHONE ENCOUNTER
No care due was identified.  [facility] Embedded Care Due Messages. Reference number: [id number].   8/04/2023 7:40:14 AM CDT

## 2023-08-08 ENCOUNTER — CLINICAL SUPPORT (OUTPATIENT)
Dept: REHABILITATION | Facility: HOSPITAL | Age: 78
End: 2023-08-08
Payer: MEDICARE

## 2023-08-08 DIAGNOSIS — R53.1 DECREASED STRENGTH: Primary | ICD-10-CM

## 2023-08-08 DIAGNOSIS — R26.81 UNSTEADY GAIT: ICD-10-CM

## 2023-08-08 PROCEDURE — 97110 THERAPEUTIC EXERCISES: CPT | Mod: PN,CQ

## 2023-08-08 PROCEDURE — 97112 NEUROMUSCULAR REEDUCATION: CPT | Mod: PN,CQ

## 2023-08-08 NOTE — PROGRESS NOTES
"[facility]   Physical Therapy Treatment Note      Name: Hiro Rodríguez  Clinic Number: [id number]    Therapy Diagnosis:   Encounter Diagnoses   Name Primary?    Decreased strength Yes    Unsteady gait      Physician: Gautam Castanon III, *    Visit Date: 8/8/2023    Physician Orders: PT Eval and Treat   Medical Diagnosis from Referral:   R26.81 (ICD-10-CM) - Unsteady gait   R53.1 (ICD-10-CM) - Decreased strength      Evaluation Date: 7/12/2023  Authorization Period Expiration: 12/31/23  Plan of Care Expiration: 9/13/23  Visit # / Visits authorized: 4/40 (5)  FOTO: 37/100  PTA Visit #: 2/5      Time In: 1520 (early arrival)  Time Out: 1550  Total Billable Time: 38 minutes    Precautions: Hard of hearing left ear, Standard and Fall    Subjective     Pt reports: "Doing okay" reports no back pain today and "getting used to the cane".   He reports was "some" compliant with home exercise program.  Response to previous treatment: no problems stated  Functional change: ongoing    Pain: 0/10  Location:  Not Applicable      Objective      Objective Measures updated at progress report unless specified.     To clinic using standard cane with quad pad in Right hand    Treatment     Gregorio received the treatments listed below:      therapeutic exercises to develop strength, endurance, range of motion, flexibility, and core stabilization for 15 minutes including:    Scifit Level 3 10 minutes with Bilateral Upper Extremities     Seated:  Hamstring stretch with foot on stool performing dorsiflexion/plantarflexion 1 minute Right Left     neuromuscular re-education activities to improve: Balance, Coordination, Proprioception, and Posture for 23 minutes. The following activities were included:    Parallel bars:  Side step over 5 inch box 1 minute, attempted 7 inch box and reported knee pain  Weight shift on foam cushion 2 minutes with light upper extremity support: anterior/posterior , lateral Right Left "   Tandem weight shift 1 minute Right Left leading, verbal cues for increased weight    (Activity time includes seated therapeutic rest)    Patient Education and Home Exercises       Education provided:   - Patient provided with verbal and demonstrative instruction for all activities performed in today's session.    Written Home Exercises Provided: Patient instructed to cont prior HEP.     See EMR under Patient Instructions for exercises provided during therapy sessions    Assessment     Gregorio provided good participation and effort during today's session with treatment focused on lower extremity strengthening, balance and functional mobility. Gregorio with fatigue with standing activities and had good recovery with sit rest breaks.    Gregorio Is progressing towards his goals.   Pt prognosis is Fair.     Pt will continue to benefit from skilled outpatient physical therapy to address the deficits listed in the problem list box on initial evaluation, provide pt/family education and to maximize pt's level of independence in the home and community environment.     Pt's spiritual, cultural and educational needs considered and pt agreeable to plan of care and goals.     Anticipated barriers to physical therapy: none     Goals:     Short Term Goals:    Patient will report compliance to home exercises given. (Progressing)  Patient will be modified independent with bed mobility and sit to stand. (Progressing)  Patient will tolerate 10' of cardio exercises on Nustep moderate resistance. (Progressing)     Long Term Goals:    Patient will demonstrate stair maneuvers ascending descending 8 steps using rail. (Ongoing)  Patient will demonstrate 6 or more repetitions with sit to  30 seconds. (progressing)  Patient will score > 35/56 on Zheng balance scale (ongoing)  Patient will improve FOTO self assessement  survey 9 physical points 46/100 or better (ongoing)       Plan     Plan of care Certification: 7/12/2023 to 9/13/23.      Outpatient Physical Therapy 2 times weekly for 8 weeks to include the following interventions: Gait Training, Manual Therapy, Neuromuscular Re-ed, Patient Education, Therapeutic Activities, and Therapeutic Exercise    Korin Tobias, PTA

## 2023-08-09 NOTE — PROGRESS NOTES
CHW - Follow Up and Case Closure    This Community Health Worker completed a follow up visit with caregiver via telephone today.  Caregiver reported: patient is doing so much better and in outpatient rehab.   Caregiver denied any additional needs at this time and agrees with episode closure at this time.  Provided caregiver with Community Health Worker's contact information and encouraged her to contact this Community Health Worker if additional needs arise.

## 2023-08-10 ENCOUNTER — CLINICAL SUPPORT (OUTPATIENT)
Dept: REHABILITATION | Facility: HOSPITAL | Age: 78
End: 2023-08-10
Payer: MEDICARE

## 2023-08-10 DIAGNOSIS — R26.81 UNSTEADY GAIT: ICD-10-CM

## 2023-08-10 DIAGNOSIS — R53.1 DECREASED STRENGTH: Primary | ICD-10-CM

## 2023-08-10 PROCEDURE — 97110 THERAPEUTIC EXERCISES: CPT | Mod: PN,CQ

## 2023-08-10 PROCEDURE — 97530 THERAPEUTIC ACTIVITIES: CPT | Mod: PN,CQ

## 2023-08-10 PROCEDURE — 97112 NEUROMUSCULAR REEDUCATION: CPT | Mod: PN,CQ

## 2023-08-10 NOTE — PROGRESS NOTES
"[facility]   Physical Therapy Treatment Note      Name: Hiro Rodríguez  Clinic Number: [id number]    Therapy Diagnosis:   Encounter Diagnoses   Name Primary?    Decreased strength Yes    Unsteady gait        Physician: Gautam Castanon III, *    Visit Date: 8/10/2023    Physician Orders: PT Eval and Treat   Medical Diagnosis from Referral:   R26.81 (ICD-10-CM) - Unsteady gait   R53.1 (ICD-10-CM) - Decreased strength      Evaluation Date: 7/12/2023  Authorization Period Expiration: 12/31/23  Plan of Care Expiration: 9/13/23  Visit # / Visits authorized: 7/40 (8)  FOTO: 37/100  PTA Visit #: 2/5      Time In: 1526 (early arrival)  Time Out: 1610  Total Billable Time: 44 minutes    Precautions: Hard of hearing left ear, Standard and Fall    Subjective     Pt reports: Doing good, no complaints  He reports was "some" compliant with home exercise program.  Response to previous treatment: no problems stated  Functional change: ongoing    Pain: 0/10  Location:  Not Applicable      Objective      Objective Measures updated at progress report unless specified.     To clinic using standard cane with quad pad in Right hand    Treatment     Gregorio received the treatments listed below:      therapeutic exercises to develop strength, endurance, range of motion, flexibility, and core stabilization for 15 minutes including:    Scifit Level 4 10 minutes with Bilateral Upper Extremities     Seated:  Hamstring stretch with foot on stool performing dorsiflexion/plantarflexion 1 minute Right Left, verbal cues to decrease speed    neuromuscular re-education activities to improve: Balance, Coordination, Proprioception, and Posture for 21 minutes. The following activities were included:    Parallel bars:  Weight shift on foam cushion 2 minutes with light upper extremity support: anterior/posterior , lateral Right Left, verbal cues to decrease speed and increase upright  Tandem weight shift 1 minute Right Left " leading, verbal cues for decreased Bilateral upper extremity weight bear   Side step up/over 6 inch box 1 minute Right Left     (Activity time includes seated therapeutic rest)    therapeutic activities to improve functional performance for 8  minutes, including:    Sit to stand standby assistance     Gait with standard cane 2x60 feet, with pause after sit to stand to balance self, wide base of support and short step length Bilateral       Patient Education and Home Exercises       Education provided:   - Patient provided with verbal and demonstrative instruction for all activities performed in today's session.    Written Home Exercises Provided: Patient instructed to cont prior HEP.     See EMR under Patient Instructions for exercises provided during therapy sessions    Assessment     Gregorio provided good participation and effort during today's session with treatment focused on lower extremity strengthening, balance and functional mobility. Tolerated standing activities with verbal cues to decrease Bilateral upper extremity weight bear, poor follow through.  Able to increase height with step up onto box with fatigue, recovered with sit rest. Ambulating with standard cane with unsteady gait.    Gregorio Is progressing towards his goals.   Pt prognosis is Fair.     Pt will continue to benefit from skilled outpatient physical therapy to address the deficits listed in the problem list box on initial evaluation, provide pt/family education and to maximize pt's level of independence in the home and community environment.     Pt's spiritual, cultural and educational needs considered and pt agreeable to plan of care and goals.     Anticipated barriers to physical therapy: none     Goals:     Short Term Goals:    Patient will report compliance to home exercises given. (Progressing)  Patient will be modified independent with bed mobility and sit to stand. (Progressing)  Patient will tolerate 10' of cardio exercises on Nustep  moderate resistance. (Progressing)     Long Term Goals:    Patient will demonstrate stair maneuvers ascending descending 8 steps using rail. (Ongoing)  Patient will demonstrate 6 or more repetitions with sit to  30 seconds. (progressing)  Patient will score > 35/56 on Zheng balance scale (ongoing)  Patient will improve FOTO self assessement  survey 9 physical points 46/100 or better (ongoing)       Plan     Plan of care Certification: 7/12/2023 to 9/13/23.     Outpatient Physical Therapy 2 times weekly for 8 weeks to include the following interventions: Gait Training, Manual Therapy, Neuromuscular Re-ed, Patient Education, Therapeutic Activities, and Therapeutic Exercise    Korin Tobias, PTA

## 2023-08-15 ENCOUNTER — CLINICAL SUPPORT (OUTPATIENT)
Dept: REHABILITATION | Facility: HOSPITAL | Age: 78
End: 2023-08-15
Payer: MEDICARE

## 2023-08-15 DIAGNOSIS — R53.1 DECREASED STRENGTH: Primary | ICD-10-CM

## 2023-08-15 DIAGNOSIS — R26.81 UNSTEADY GAIT: ICD-10-CM

## 2023-08-15 PROCEDURE — 97110 THERAPEUTIC EXERCISES: CPT | Mod: PN,CQ

## 2023-08-15 PROCEDURE — 97112 NEUROMUSCULAR REEDUCATION: CPT | Mod: PN,CQ

## 2023-08-15 NOTE — PROGRESS NOTES
"[facility]   Physical Therapy Treatment Note      Name: Hiro Rodríguez  Clinic Number: [id number]    Therapy Diagnosis:   Encounter Diagnoses   Name Primary?    Decreased strength Yes    Unsteady gait        Physician: Gautam Castanon III, *    Visit Date: 8/15/2023    Physician Orders: PT Eval and Treat   Medical Diagnosis from Referral:   R26.81 (ICD-10-CM) - Unsteady gait   R53.1 (ICD-10-CM) - Decreased strength      Evaluation Date: 7/12/2023  Authorization Period Expiration: 12/31/23  Plan of Care Expiration: 9/13/23  Visit # / Visits authorized: 8/40 (9)  FOTO: 37/100  PTA Visit #: 2/5      Time In: 1525  Time Out: 1605  Total Billable Time: 40 minutes    Precautions: Hard of hearing left ear, Standard and Fall    Subjective     Pt reports: Doing good, no complaints, reports going to stay with son for 3 weeks.  He reports was "some" compliant with home exercise program.  Response to previous treatment: no problems stated  Functional change: ongoing    Pain: 0/10  Location:  Not Applicable      Objective      Objective Measures updated at progress report unless specified.     To clinic using standard cane with quad pad in Right hand    Treatment     Gregorio received the treatments listed below:      therapeutic exercises to develop strength, endurance, range of motion, flexibility for 15 minutes including:    Scifit Level 4 10 minutes with Bilateral Upper Extremities     Seated:  Hamstring stretch with foot on stool performing dorsiflexion/plantarflexion 1 minute Right Left, verbal cues to decrease speed    neuromuscular re-education activities to improve: Balance, Coordination, Proprioception, and Posture for 25 minutes. The following activities were included:    Parallel bars:  Bilateral Heel Raises 10 times  Bilateral Mini Squats 10 times  March in place 10 times Right Left alternating  Hip abduction 10 times Right Left alternating  Hip extension 10 times Right Left " alternating  Hamstring curls 10 times Right Left alternating  Tandem gait with Bilateral upper extremity support  Tandem gait with Right upper extremity support    Sit to stand standby assistance     Gait with standard cane 2x60 feet standby assistance     (Activity time includes seated therapeutic rest)    Patient Education and Home Exercises       Education provided:   - Patient provided with verbal and demonstrative instruction for all activities performed in today's session.  - instructed in daily balance exercises    Written Home Exercises Provided: Yes and instructed to continue prior home exercise program . Exercises were reviewed and Gregorio was able to demonstrate them prior to the end of the session and demonstrated good understanding of the education provided.     See Electronic Medical Record  under Patient Instructions for exercises provided during therapy sessions      Assessment     Gregorio provided good participation and effort during today's session with treatment focused on lower extremity strengthening, balance and functional mobility. Gregorio tolerated standing exercises with sit breaks, good follow through with instructions for proper form.    Gregorio Is progressing towards his goals.   Pt prognosis is Fair.     Pt will continue to benefit from skilled outpatient physical therapy to address the deficits listed in the problem list box on initial evaluation, provide pt/family education and to maximize pt's level of independence in the home and community environment.     Pt's spiritual, cultural and educational needs considered and pt agreeable to plan of care and goals.     Anticipated barriers to physical therapy: none     Goals:     Short Term Goals:    Patient will report compliance to home exercises given. (Progressing)  Patient will be modified independent with bed mobility and sit to stand. (Progressing)  Patient will tolerate 10' of cardio exercises on Nustep moderate resistance. (Progressing)      Long Term Goals:    Patient will demonstrate stair maneuvers ascending descending 8 steps using rail. (Ongoing)  Patient will demonstrate 6 or more repetitions with sit to  30 seconds. (progressing)  Patient will score > 35/56 on Zheng balance scale (ongoing)  Patient will improve FOTO self assessement  survey 9 physical points 46/100 or better (ongoing)       Plan     Plan of care Certification: 7/12/2023 to 9/13/23.     Outpatient Physical Therapy 2 times weekly for 8 weeks to include the following interventions: Gait Training, Manual Therapy, Neuromuscular Re-ed, Patient Education, Therapeutic Activities, and Therapeutic Exercise    Korin Tobias, PTA

## 2023-08-29 RX ORDER — PROPRANOLOL HYDROCHLORIDE 80 MG/1
80 TABLET ORAL DAILY
Qty: 30 TABLET | Refills: 0 | Status: SHIPPED | OUTPATIENT
Start: 2023-08-29 | End: 2023-09-11

## 2023-08-29 NOTE — TELEPHONE ENCOUNTER
Refill Routing Note   Medication(s) are not appropriate for processing by [facility] for the following reason(s):      New or recently adjusted medication    [facility] action(s):  Defer Care Due:  Labs due            Appointments  past 12m or future 3m with PCP    Date Provider   Last Visit   7/10/2023 Gautam Castanon III, MD   Next Visit   9/11/2023 Gautam Castanon III, MD   ED visits in past 90 days: 0        Note composed:1:02 PM 08/29/2023

## 2023-08-29 NOTE — TELEPHONE ENCOUNTER
Care Due:                  Date            Visit Type   Department     Provider  --------------------------------------------------------------------------------                                [facility]                              [facility]  [facility] Visit[facility] 07[facility]      [facility]ARE ([facility])   [facility]  [place]ina                              EP [facility]                              [facility]  Next Visit: 09-      CARE ([facility])   [facility]  Kina                                                            Last  Test          Frequency    Reason                     Performed    Due Date  --------------------------------------------------------------------------------    HBA1C.......  6 months...  metFORMIN, semaglutide...  03- 08-    Lipid Panel.  12 months..  omega-3, rosuvastatin....  07- 07-    [facility] Embedded Care Due Messages. Reference number: [id number].   8/29/2023 12:32:50 PM CDT

## 2023-09-07 ENCOUNTER — CLINICAL SUPPORT (OUTPATIENT)
Dept: REHABILITATION | Facility: HOSPITAL | Age: 78
End: 2023-09-07
Payer: MEDICARE

## 2023-09-07 DIAGNOSIS — R53.1 DECREASED STRENGTH: Primary | ICD-10-CM

## 2023-09-07 DIAGNOSIS — R26.81 UNSTEADY GAIT: ICD-10-CM

## 2023-09-07 PROCEDURE — 97110 THERAPEUTIC EXERCISES: CPT | Mod: PN

## 2023-09-07 PROCEDURE — 97530 THERAPEUTIC ACTIVITIES: CPT | Mod: PN

## 2023-09-07 NOTE — PROGRESS NOTES
[facility]   Physical Therapy Treatment Note      Name: Hiro Rodríguez  [facility] Number: [id number]    Therapy Diagnosis:   Encounter Diagnoses   Name Primary?    Decreased strength Yes    Unsteady gait        Physician: Gautam Castanon III, MD    Visit Date: 9/7/2023    Physician Orders: PT Eval and Treat   Medical Diagnosis from Referral:   R26.81 (ICD-10-CM) - Unsteady gait   R53.1 (ICD-10-CM) - Decreased strength      Evaluation Date: 7/12/2023  Authorization Period Expiration: 12/31/23  Plan of Care Expiration: 9/13/23  Visit # / Visits authorized: 9/40 (10)  FOTO: 45/100       Time In: 1533  Time Out: 1615  Total Billable Time: 42 minutes    Precautions: Hard of hearing left ear, Standard and Fall    Subjective     Pt reports: staying with son with minimal activity..  He reports was not compliant with home exercise program.  Response to previous treatment: long gap in treatment.  Functional change: consistent on cane ambulation.    Pain: 0/10  Location:  Not Applicable      Objective      + hand tremors    Treatment     Gregorio received the treatments listed below:      therapeutic exercises to develop strength, endurance, range of motion, flexibility for 17 minutes including:    Nustep l4 all 4's  Gastroc stretches  Heel raises 20  Mini squats 20    Therapeutic activities   to improve: Balance, Coordination, Proprioception, and Posture for 25 minutes. The following activities were included:  Sit to stand 10  Balancing gait with no hand hold // bars   20ft  Single leg stance hip abduction 15/15  Marching high march in place 15/15      Patient Education and Home Exercises       Education provided:   - Discussed Plan of care for next week.    Written Home Exercises Provided: Yes and instructed to continue prior home exercise program . Exercises were reviewed and Gregorio was able to demonstrate them prior to the end of the session and demonstrated good understanding of the education  provided.     See Electronic Medical Record  under Patient Instructions for exercises provided during therapy sessions      Assessment     Gregorio has low tolerance to repetitive standing activity. Requires frequent sitting down to rest in between tasks/activities.  Noted improved balance and confidence but with decreased endurance and more pronounced tremors to the extremities.  Tolerated Rx. FOTO scores is improved.    Gregorio Is progressing towards his goals.   Pt prognosis is Fair.     Pt will continue to benefit from skilled outpatient physical therapy to address the deficits listed in the problem list box on initial evaluation, provide pt/family education and to maximize pt's level of independence in the home and community environment.     Pt's spiritual, cultural and educational needs considered and pt agreeable to plan of care and goals.     Anticipated barriers to physical therapy: none     Goals:     Short Term Goals:    Patient will report compliance to home exercises given. (Progressing)  Patient will be modified independent with bed mobility and sit to stand. (Progressing)  Patient will tolerate 10' of cardio exercises on Nustep moderate resistance. (Progressing)     Long Term Goals:    Patient will demonstrate stair maneuvers ascending descending 8 steps using rail. (Ongoing)  Patient will demonstrate 6 or more repetitions with sit to  30 seconds. (progressing)  Patient will score > 35/56 on Zheng balance scale (ongoing)  Patient will improve FOTO self assessement  survey 9 physical points 46/100 or better (ongoing)       Plan     Plan of care Update next week.  Continue Strengthening, balance and endurance activities.    Valdo Salomon, PT

## 2023-09-11 ENCOUNTER — OFFICE VISIT (OUTPATIENT)
Dept: FAMILY MEDICINE | Facility: CLINIC | Age: 78
End: 2023-09-11
Payer: MEDICARE

## 2023-09-11 ENCOUNTER — CLINICAL SUPPORT (OUTPATIENT)
Dept: REHABILITATION | Facility: HOSPITAL | Age: 78
End: 2023-09-11
Payer: MEDICARE

## 2023-09-11 VITALS
DIASTOLIC BLOOD PRESSURE: 62 MMHG | SYSTOLIC BLOOD PRESSURE: 122 MMHG | WEIGHT: 232.56 LBS | BODY MASS INDEX: 33.29 KG/M2 | HEIGHT: 70 IN | OXYGEN SATURATION: 98 % | TEMPERATURE: 98 F | HEART RATE: 77 BPM

## 2023-09-11 DIAGNOSIS — Z87.81 HISTORY OF FRACTURE OF LEFT HIP: ICD-10-CM

## 2023-09-11 DIAGNOSIS — E11.42 TYPE 2 DIABETES MELLITUS WITH DIABETIC POLYNEUROPATHY, WITHOUT LONG-TERM CURRENT USE OF INSULIN: ICD-10-CM

## 2023-09-11 DIAGNOSIS — S06.5X0S: Primary | ICD-10-CM

## 2023-09-11 DIAGNOSIS — R32 URINARY INCONTINENCE, UNSPECIFIED TYPE: ICD-10-CM

## 2023-09-11 DIAGNOSIS — G25.0 BENIGN ESSENTIAL TREMOR: ICD-10-CM

## 2023-09-11 DIAGNOSIS — R26.81 UNSTEADY GAIT: ICD-10-CM

## 2023-09-11 DIAGNOSIS — R53.1 DECREASED STRENGTH: Primary | ICD-10-CM

## 2023-09-11 DIAGNOSIS — Z99.89 USE OF CANE AS AMBULATORY AID: ICD-10-CM

## 2023-09-11 DIAGNOSIS — I50.30 HEART FAILURE WITH PRESERVED EJECTION FRACTION, UNSPECIFIED HF CHRONICITY: ICD-10-CM

## 2023-09-11 DIAGNOSIS — E78.5 HYPERLIPIDEMIA, UNSPECIFIED HYPERLIPIDEMIA TYPE: Chronic | ICD-10-CM

## 2023-09-11 PROCEDURE — 3074F PR MOST RECENT SYSTOLIC BLOOD PRESSURE < 130 MM HG: ICD-10-PCS | Mod: CPTII,S$GLB,, | Performed by: FAMILY MEDICINE

## 2023-09-11 PROCEDURE — 1126F AMNT PAIN NOTED NONE PRSNT: CPT | Mod: CPTII,S$GLB,, | Performed by: FAMILY MEDICINE

## 2023-09-11 PROCEDURE — 97110 THERAPEUTIC EXERCISES: CPT | Mod: PN

## 2023-09-11 PROCEDURE — 3288F PR FALLS RISK ASSESSMENT DOCUMENTED: ICD-10-PCS | Mod: CPTII,S$GLB,, | Performed by: FAMILY MEDICINE

## 2023-09-11 PROCEDURE — 3074F SYST BP LT 130 MM HG: CPT | Mod: CPTII,S$GLB,, | Performed by: FAMILY MEDICINE

## 2023-09-11 PROCEDURE — 81003 URINALYSIS AUTO W/O SCOPE: CPT | Mod: QW,S$GLB,, | Performed by: FAMILY MEDICINE

## 2023-09-11 PROCEDURE — 1101F PT FALLS ASSESS-DOCD LE1/YR: CPT | Mod: CPTII,S$GLB,, | Performed by: FAMILY MEDICINE

## 2023-09-11 PROCEDURE — 1160F RVW MEDS BY RX/DR IN RCRD: CPT | Mod: CPTII,S$GLB,, | Performed by: FAMILY MEDICINE

## 2023-09-11 PROCEDURE — 3288F FALL RISK ASSESSMENT DOCD: CPT | Mod: CPTII,S$GLB,, | Performed by: FAMILY MEDICINE

## 2023-09-11 PROCEDURE — 1159F PR MEDICATION LIST DOCUMENTED IN MEDICAL RECORD: ICD-10-PCS | Mod: CPTII,S$GLB,, | Performed by: FAMILY MEDICINE

## 2023-09-11 PROCEDURE — 81003 POCT URINALYSIS(INSTRUMENT): ICD-10-PCS | Mod: QW,S$GLB,, | Performed by: FAMILY MEDICINE

## 2023-09-11 PROCEDURE — 87077 CULTURE AEROBIC IDENTIFY: CPT | Performed by: FAMILY MEDICINE

## 2023-09-11 PROCEDURE — 1159F MED LIST DOCD IN RCRD: CPT | Mod: CPTII,S$GLB,, | Performed by: FAMILY MEDICINE

## 2023-09-11 PROCEDURE — 87088 URINE BACTERIA CULTURE: CPT | Performed by: FAMILY MEDICINE

## 2023-09-11 PROCEDURE — 97530 THERAPEUTIC ACTIVITIES: CPT | Mod: PN

## 2023-09-11 PROCEDURE — 99214 PR OFFICE/OUTPT VISIT, EST, LEVL IV, 30-39 MIN: ICD-10-PCS | Mod: S$GLB,,, | Performed by: FAMILY MEDICINE

## 2023-09-11 PROCEDURE — 99214 OFFICE O/P EST MOD 30 MIN: CPT | Mod: S$GLB,,, | Performed by: FAMILY MEDICINE

## 2023-09-11 PROCEDURE — 3078F DIAST BP <80 MM HG: CPT | Mod: CPTII,S$GLB,, | Performed by: FAMILY MEDICINE

## 2023-09-11 PROCEDURE — 1160F PR REVIEW ALL MEDS BY PRESCRIBER/CLIN PHARMACIST DOCUMENTED: ICD-10-PCS | Mod: CPTII,S$GLB,, | Performed by: FAMILY MEDICINE

## 2023-09-11 PROCEDURE — 1101F PR PT FALLS ASSESS DOC 0-1 FALLS W/OUT INJ PAST YR: ICD-10-PCS | Mod: CPTII,S$GLB,, | Performed by: FAMILY MEDICINE

## 2023-09-11 PROCEDURE — 3078F PR MOST RECENT DIASTOLIC BLOOD PRESSURE < 80 MM HG: ICD-10-PCS | Mod: CPTII,S$GLB,, | Performed by: FAMILY MEDICINE

## 2023-09-11 PROCEDURE — 87186 SC STD MICRODIL/AGAR DIL: CPT | Performed by: FAMILY MEDICINE

## 2023-09-11 PROCEDURE — 1126F PR PAIN SEVERITY QUANTIFIED, NO PAIN PRESENT: ICD-10-PCS | Mod: CPTII,S$GLB,, | Performed by: FAMILY MEDICINE

## 2023-09-11 PROCEDURE — 87086 URINE CULTURE/COLONY COUNT: CPT | Performed by: FAMILY MEDICINE

## 2023-09-11 RX ORDER — AMITRIPTYLINE HYDROCHLORIDE 10 MG/1
TABLET, FILM COATED ORAL
COMMUNITY
Start: 2023-08-03 | End: 2023-12-08

## 2023-09-11 RX ORDER — SOLIFENACIN SUCCINATE 10 MG/1
10 TABLET, FILM COATED ORAL
COMMUNITY
Start: 2023-08-15 | End: 2023-11-07

## 2023-09-11 RX ORDER — TOLTERODINE 4 MG/1
4 CAPSULE, EXTENDED RELEASE ORAL DAILY
Status: ON HOLD | COMMUNITY
Start: 2023-07-25 | End: 2024-02-12 | Stop reason: HOSPADM

## 2023-09-11 RX ORDER — FINASTERIDE 5 MG/1
5 TABLET, FILM COATED ORAL DAILY
Qty: 90 TABLET | Refills: 1 | Status: SHIPPED | OUTPATIENT
Start: 2023-09-11 | End: 2024-03-09

## 2023-09-11 RX ORDER — PROPRANOLOL HYDROCHLORIDE 40 MG/1
40 TABLET ORAL 2 TIMES DAILY
Qty: 180 TABLET | Refills: 1 | Status: SHIPPED | OUTPATIENT
Start: 2023-09-11 | End: 2024-03-07

## 2023-09-11 RX ORDER — FINASTERIDE 5 MG/1
5 TABLET, FILM COATED ORAL DAILY
Qty: 90 TABLET | Refills: 0 | Status: SHIPPED | OUTPATIENT
Start: 2023-09-11 | End: 2023-09-11 | Stop reason: SDUPTHER

## 2023-09-11 NOTE — PROGRESS NOTES
[facility]   Physical Therapy Treatment Note      Name: Hiro Rodríguez  [facility] Number: [id number]    Therapy Diagnosis:   Encounter Diagnoses   Name Primary?    Decreased strength Yes    Unsteady gait        Physician: Gautam Castanon III, MD    Visit Date: 9/11/2023    Physician Orders: PT Eval and Treat   Medical Diagnosis from Referral:   R26.81 (ICD-10-CM) - Unsteady gait   R53.1 (ICD-10-CM) - Decreased strength      Evaluation Date: 7/12/2023  Authorization Period Expiration: 12/31/23  Plan of Care Expiration: 9/13/23  Visit # / Visits authorized: 10/40 (11)     Time In: 1030  Time Out: 1115  Total Billable Time: 45 minutes    Precautions: Hard of hearing left ear, Standard and Fall    Subjective     Pt reports: no new concerns.  He reports was not compliant with home exercise program.  Response to previous treatment: tolerated Rx..  Functional change: none reported    Pain: 0/10  Location:  Not Applicable      Objective      Presented on quad suction cane for gait.    Treatment     Gregorio received the treatments listed below:      therapeutic exercises to develop strength, endurance, range of motion, flexibility for 20 minutes including:    Nustep L4 all 4's  Gastroc stretches  Heel raises 20  Mini squats 20  Seated hamstring curls manual resist 20/20    Therapeutic activities   to improve: Balance, Coordination, Proprioception, and Posture for 25 minutes. The following activities were included:  Sit to stand 10  Rhythmic stabilization using yellow theraband on  single leg stance hip abduction 15/15; hip adduction 15/15; hip flexion 15/15; hip extension 15/15    Patient Education and Home Exercises       Education provided:   - Discussed need for more therapy..    Written Home Exercises Provided: Yes and instructed to continue prior home exercise program . Exercises were reviewed and Gregorio was able to demonstrate them prior to the end of the session and demonstrated good  understanding of the education provided.     See Electronic Medical Record  under Patient Instructions for exercises provided during therapy sessions      Assessment     Gregorio has difficulty standing straight up on initial sit to stand. Apprehensive to stand without hold., Slow in initiating task.  Requires seated rest afetr standing exercises.  Tolerated Rx.  Gregorio Is progressing towards his goals.   Pt prognosis is Fair.     Pt will continue to benefit from skilled outpatient physical therapy to address the deficits listed in the problem list box on initial evaluation, provide pt/family education and to maximize pt's level of independence in the home and community environment.     Pt's spiritual, cultural and educational needs considered and pt agreeable to plan of care and goals.     Anticipated barriers to physical therapy: none     Goals:     Short Term Goals:    Patient will report compliance to home exercises given. (Progressing)  Patient will be modified independent with bed mobility and sit to stand. (Progressing)  Patient will tolerate 10' of cardio exercises on Nustep moderate resistance. (Progressing)     Long Term Goals:    Patient will demonstrate stair maneuvers ascending descending 8 steps using rail. (Ongoing)  Patient will demonstrate 6 or more repetitions with sit to  30 seconds. (progressing)  Patient will score > 35/56 on Zheng balance scale (ongoing)  Patient will improve FOTO self assessement  survey 9 physical points 46/100 or better (ongoing)       Plan     Plan of care Update     Valdo Salomon, PT

## 2023-09-12 LAB
BILIRUBIN, UA POC OHS: NEGATIVE
BLOOD, UA POC OHS: ABNORMAL
CLARITY, UA POC OHS: ABNORMAL
COLOR, UA POC OHS: ABNORMAL
GLUCOSE, UA POC OHS: NEGATIVE
KETONES, UA POC OHS: NEGATIVE
LEUKOCYTES, UA POC OHS: ABNORMAL
NITRITE, UA POC OHS: POSITIVE
PH, UA POC OHS: 6.5
PROTEIN, UA POC OHS: ABNORMAL
SPECIFIC GRAVITY, UA POC OHS: 1.01
UROBILINOGEN, UA POC OHS: 0.2

## 2023-09-12 NOTE — PROGRESS NOTES
Subjective:       Patient ID: Hiro Rodríguez is a 78 y.o. male.    Chief Complaint: Follow-up    Benign tremor.  Needs refill of propanolol.  Needs correct dosing.  Given 80 mg instead of 40 at 1 point.  No cough or sputum no PND orthopnea.  No shortness of breath.  Using his cane.  Subdural hematoma.  Fracture of the left hip doing fairly well.  Heart failure preserved ejection fraction chronic.  Diabetes mellitus type 2 with polyneuropathy also.  Urinary incontinence issues.  Urge incontinence.  PSA is 0.55.  No dysuria.    Physical examination.  Vital signs noted.  No acute distress.  Overweight male neck without bruit.  Chest clear.  Heart regular rate rhythm.  Abdomen bowel sounds are positive soft no guarding no rebound.  Extremities are without significant edema.        Objective:        Assessment:       1. Subdural hematoma due to concussion, without loss of consciousness, sequela    2. Use of cane as ambulatory aid    3. Heart failure with preserved ejection fraction, unspecified HF chronicity    4. Type 2 diabetes mellitus with diabetic polyneuropathy, without long-term current use of insulin    5. History of fracture of left hip    6. Urinary incontinence, unspecified type    7. Hyperlipidemia, unspecified hyperlipidemia type    8. BMI 33.0-33.9,adult    9. Benign essential tremor        Plan:       Subdural hematoma due to concussion, without loss of consciousness, sequela    Use of cane as ambulatory aid    Heart failure with preserved ejection fraction, unspecified HF chronicity    Type 2 diabetes mellitus with diabetic polyneuropathy, without long-term current use of insulin  -     Hemoglobin A1C; Future; Expected date: 09/11/2023  -     Comprehensive Metabolic Panel; Future; Expected date: 09/11/2023    History of fracture of left hip    Urinary incontinence, unspecified type  -      Kidney; Future; Expected date: 09/11/2023  -     POCT Urinalysis(Instrument)  -     CULTURE,  URINE    Hyperlipidemia, unspecified hyperlipidemia type  -     Lipid Panel; Future; Expected date: 09/11/2023    BMI 33.0-33.9,adult    Benign essential tremor    Other orders  -     Discontinue: finasteride (PROSCAR) 5 mg tablet; Take 1 tablet (5 mg total) by mouth once daily.  Dispense: 90 tablet; Refill: 0  -     finasteride (PROSCAR) 5 mg tablet; Take 1 tablet (5 mg total) by mouth once daily.  Dispense: 90 tablet; Refill: 1  -     propranoloL (INDERAL) 40 MG tablet; Take 1 tablet (40 mg total) by mouth 2 (two) times daily.  Dispense: 180 tablet; Refill: 1    Propanolol 40 mg b.i.d..  One hundred eighty with a refill.  Ultrasound for residual urine then treat his incontinence issue.  Urinalysis and urine culture and sensitivity.  A1c lipid CMP.  Needs to go for diabetic eye exam.  Refill Proscar 5 mg daily 90 with a refill.

## 2023-09-13 ENCOUNTER — CLINICAL SUPPORT (OUTPATIENT)
Dept: REHABILITATION | Facility: HOSPITAL | Age: 78
End: 2023-09-13
Payer: MEDICARE

## 2023-09-13 ENCOUNTER — TELEPHONE (OUTPATIENT)
Dept: REHABILITATION | Facility: HOSPITAL | Age: 78
End: 2023-09-13

## 2023-09-13 DIAGNOSIS — R26.81 UNSTEADY GAIT: ICD-10-CM

## 2023-09-13 DIAGNOSIS — R53.1 DECREASED STRENGTH: Primary | ICD-10-CM

## 2023-09-13 PROCEDURE — 97530 THERAPEUTIC ACTIVITIES: CPT | Mod: PN

## 2023-09-13 PROCEDURE — 97110 THERAPEUTIC EXERCISES: CPT | Mod: PN

## 2023-09-13 NOTE — PROGRESS NOTES
[facility]   Physical Therapy Treatment Note      Name: Hiro Rodríguez  [facility] Number: [id number]    Therapy Diagnosis:   Encounter Diagnoses   Name Primary?    Decreased strength Yes    Unsteady gait        Physician: Gautam Castanon III, MD    Visit Date: 9/13/2023    Physician Orders: PT Eval and Treat   Medical Diagnosis from Referral:   R26.81 (ICD-10-CM) - Unsteady gait   R53.1 (ICD-10-CM) - Decreased strength      Evaluation Date: 7/12/2023  Authorization Period Expiration: 12/31/23  Plan of Care Expiration: 9/13/23  Visit # / Visits authorized: 11/40 (12)     Time In: 1700  Time Out: 1745  Total Billable Time: 45 minutes    Precautions: Hard of hearing left ear, Standard and Fall    Subjective     Pt reports: wants more therapy. C/o getting tired with continuous activity  He reports was not compliant with home exercise program.  Response to previous treatment: tolerated Rx..  Functional change: imporved overall performance compared to initial evaluation    Pain: 0/10  Location:  Not Applicable      Objective      See Updated Plan of Care    Treatment     Gregorio received the treatments listed below:      therapeutic exercises to develop strength, endurance, range of motion, flexibility for 20 minutes including:  Heel raises 20  Mini squats 20  Hip Abduction unilateral 20/20; alternate 10/10    Therapeutic activities   to improve: Balance, Coordination, Proprioception, and Posture for 25 minutes. The following activities were included:  Sit to stand 10  Time Up and Go  Zheng balance activities.  Self-paced ambulation x 2 minutes    Patient Education and Home Exercises       Education provided:   - Discussed Updated Plan of care    Written Home Exercises Provided: Yes and instructed to continue prior home exercise program . Exercises were reviewed and Gregorio was able to demonstrate them prior to the end of the session and demonstrated good understanding of the education provided.      See Electronic Medical Record  under Patient Instructions for exercises provided during therapy sessions      Assessment     Gregorio performed all tests and measures without difficulty. Stair maneuvers with using rails.  Will benefit from comtinued therapy.  Tolerated Rx.  Gregorio Is progressing towards his goals.   Pt prognosis is Fair.     Pt will continue to benefit from skilled outpatient physical therapy to address the deficits listed in the problem list box on initial evaluation, provide pt/family education and to maximize pt's level of independence in the home and community environment.     Pt's spiritual, cultural and educational needs considered and pt agreeable to plan of care and goals.     Anticipated barriers to physical therapy: none     Goals:    See Updated Plan of care.  Plan     Updated Plan of care  Extend therapy 10 more weeks.    Valdo Salomon, PT

## 2023-09-13 NOTE — PLAN OF CARE
[facility]   Plan of Care Note     Name: Hiro Rodríguez  [facility] Number: [id number]    Therapy Diagnosis:   Encounter Diagnoses   Name Primary?    Decreased strength Yes    Unsteady gait      Physician: Gautam Castanon III, MD    Visit Date: 9/13/2023    Physician Orders: Eval and Treat   Medical Diagnosis from Referral:   R26.81 (ICD-10-CM) - Unsteady gait   R53.1 (ICD-10-CM) - Decreased strength     Evaluation Date: 7/12/23  Authorization Period Expiration: 12/31/23   Plan of Care Expiration: 11/29/23    Visit # / Visits authorized: 12/ 40  FOTO: 45/100    Precautions: Hard of hearing left ear, Standard and Fall  Functional Level Prior to Evaluation:  Needs help  getting up from chair. Needs help with most ADL and . Limited gait on RW     Subjective     Update: c/o getting tired quickly. Unable to walk without cane.    Objective      Update:   Range of Motion/Strength:    LOWER  Extremity Right   Left   Pain/Dysfunction with Movement       HIP PROM MMT PROM MMT     flexion   100  3+/5  100  3+/5     extension     0  3/5    0  3/5     Abduction    30  3+/5  30  3/5       KNEE             Extension   10  3+/5  10  3+/5     Flexion    115  3+/5   90   3+/5        ANKLE             Plantarflexion    30  4/5   30   4/5     Dorsiflexion    0   3+/5    0     3+/5        Gait: with straight cane with quad suction. Short steps with slow vonda  2 minute self paced  walk  : 163 ft  Bed Mobility: modified independent  Transfers: modified independent  TUG  1. 23.67 sec  2. 19.03 sec      3. 15.83 sec   MEAN: 19.51 sec  Test: 30 second sit to stand: 8  GONZALEZ Assessment   35/56  1. Sitting to Standing   3 - able to stand independely using hands  2. Standing Unsupported   4 - able to stand safely 2 minutes without hold  3. Sitting Unsupported   4 - able to sit safely and securely 2 minutes  4. Standing to Sitting   3 - controls descent by using hands  5. Pivot Transfer   3 - able to transfer  safely with definite use of hands  6. Standing with Eyes Closed   3 - able to stand 10 seconds with supervision  7. Standing with Feet Together   4 - able to place feet together independently and stand 1 minute safely  8. Reaching Forward with Outstretched Arm   2 - can reach forward 5 cm/2 inches safely  9. Retrieving Object from Floor   3 - able to pick slipper but needs supervision  10. Turning to Look Behind   4 - looks behind from both sides and weights shifts well  11. Turning 360 Degrees   2 - able to turn 360 safely but slowly  12. Placing Alternate Foot on Step   0 - needs assist to keep from falling/unable to try  13. Standing with One Foot in Front   0 - Looses balance while stepping or standing  14. Standing on One Foot   0 - unable to try or needs assistance to prevent fall    Assessment     Update: Patient seen x 6 weeks of therapy. Made good improvement from unable to stand without hold to 35/56 on Zheng balance. From RW ambulation to cane. Able to perform modified independent transfers and bed mobility. More confident with rajat places driving.  Will benefit from continue and extended therapy to have better outcome.    Previous Short Term Goals Status:     Patient will report compliance to home exercises given. (Met)  Patient will be modified independent with bed mobility and sit to stand. (Met)  Patient will tolerate 10' of cardio exercises on Nustep moderate resistance. (Met)     Previous Long Term Goals Status   Patient will demonstrate stair maneuvers ascending descending 8 steps using rail. (Met)  Patient will demonstrate 6 or more repetitions with sit to  30 seconds. (Met-8)  Patient will score > 35/56 on Zheng balance scale  (met 35/56)  Patient will improve FOTO self assessement  survey 9 physical points 46/100 or better. (Unmet 45/100)    New Long Term Goals: modified:    Patient will improve TUG time to 15 sec or better.  Patient will improve Zheng balance score 5 points or more  40/56  Patient will improve gait speed and duration from 163 ft in 2 minutes to >600 in 6 minutes.  Patient will improve FOTO scores > 47/100  Reasons for Recertification of Therapy:   Patient is making good progress, met most goals and has potential for better outcomes.    Plan     Updated Certification Period: 9/13/23 to 11/29/23   Recommended Treatment Plan: 2 times per week for 10 weeks:  Gait Training, Manual Therapy, Neuromuscular Re-ed, Therapeutic Activities, and Therapeutic Exercise    Valdo Salomon, PT

## 2023-09-16 LAB — BACTERIA UR CULT: ABNORMAL

## 2023-09-19 ENCOUNTER — CLINICAL SUPPORT (OUTPATIENT)
Dept: REHABILITATION | Facility: HOSPITAL | Age: 78
End: 2023-09-19
Payer: MEDICARE

## 2023-09-19 DIAGNOSIS — R53.1 DECREASED STRENGTH: Primary | ICD-10-CM

## 2023-09-19 DIAGNOSIS — R26.81 UNSTEADY GAIT: ICD-10-CM

## 2023-09-19 LAB
LEFT EYE DM RETINOPATHY: NEGATIVE
RIGHT EYE DM RETINOPATHY: NEGATIVE

## 2023-09-19 PROCEDURE — 97530 THERAPEUTIC ACTIVITIES: CPT | Mod: PN,CQ

## 2023-09-19 PROCEDURE — 97112 NEUROMUSCULAR REEDUCATION: CPT | Mod: PN,CQ

## 2023-09-19 PROCEDURE — 97110 THERAPEUTIC EXERCISES: CPT | Mod: PN,CQ

## 2023-09-19 NOTE — PROGRESS NOTES
"[facility]   Physical Therapy Treatment Note      Name: Hiro Rodríguez  Clinic Number: [id number]    Therapy Diagnosis:   Encounter Diagnoses   Name Primary?    Decreased strength Yes    Unsteady gait      Physician: Gautam Castanon III, *    Visit Date: 9/19/2023    Physician Orders: Eval and Treat   Medical Diagnosis from Referral:   R26.81 (ICD-10-CM) - Unsteady gait   R53.1 (ICD-10-CM) - Decreased strength      Evaluation Date: 7/12/23  Authorization Period Expiration: 12/31/23   Plan of Care Expiration: 11/29/23     Visit # / Visits authorized: 12/40  FOTO: 45/100     Time In: 1030  Time Out: 1115  Total Billable Time: 45 minutes    Precautions: Hard of hearing left ear, Standard and Fall    PTA Visit #: 1/5     Subjective     Patient reports: doing okay, no complaints.  He was "some"compliant with home exercise program.  Response to previous treatment: no problems stated  Functional change: ongoing    Pain: 0/10  Location:  Not Applicable      Objective      Objective Measures updated at progress report unless specified.     Treatment     Gregorio received the treatments listed below:      therapeutic exercises to develop strength, endurance, and range of motion for 10 minutes including:    NuStep Level 4 10 minutes with Bilateral Upper Extremities     neuromuscular re-education activities to improve: Balance, Coordination, Proprioception, and Posture for 15 minutes. The following activities were included:    Parallel bars:  Tandem weight shifting 3x 1 minute Right Left alternating  Tandem gait with verbal cues for increased step length    therapeutic activities to improve functional performance for 20  minutes, including:    Gait with standard cane with quad tip 2x130 feet short step length and wide base of support     Sit<>stand 10 times x2 without upper extremity support and 1 cushion in seat (21 inches)     Up/down 6 4 inch steps step through with Bilateral Upper Extremities " "support    (Activity time includes skilled set-up and positioning as well as therapeutic rest)    Patient Education and Home Exercises       Education provided:   - Patient provided with verbal and demonstrative instruction for all activities performed in today's session.    Written Home Exercises Provided: Patient instructed to cont prior HEP.      See Electronic Medical Record under Patient Instructions for exercises provided during therapy sessions    Assessment     Gregorio provided good participation and effort during today's session with treatment focused on lower extremity strengthening/endurance, neuromuscular re-education and functional mobility. Gregorio tolerated standing activities with seated rest breaks and good follow through with improved posture and reported trunk "tight" after session.    Gregorio Is progressing towards his goals.   Patient prognosis is Fair.     Patient will continue to benefit from skilled outpatient physical therapy to address the deficits listed in the problem list box on initial evaluation, provide pt/family education and to maximize pt's level of independence in the home and community environment.     Patient's spiritual, cultural and educational needs considered and pt agreeable to plan of care and goals.     Anticipated barriers to physical therapy: none    Goals:     Previous Short Term Goals Status:     Patient will report compliance to home exercises given. (Met)  Patient will be modified independent with bed mobility and sit to stand. (Met)  Patient will tolerate 10' of cardio exercises on Nustep moderate resistance. (Met)      New Long Term Goals: modified:    Patient will improve TUG time to 15 sec or better.(ongoing)  Patient will improve Zheng balance score 5 points or more 40/56(ongoing)  Patient will improve gait speed and duration from 163 ft in 2 minutes to >600 in 6 minutes.(ongoing)  Patient will improve FOTO scores > 47/100(ongoing)    Plan     Updated Certification " Period: 9/13/23 to 11/29/23   Recommended Treatment Plan: 2 times per week for 10 weeks:  Gait Training, Manual Therapy, Neuromuscular Re-ed, Therapeutic Activities, and Therapeutic Exercise    Korin Tobias, PTA

## 2023-09-21 ENCOUNTER — CLINICAL SUPPORT (OUTPATIENT)
Dept: REHABILITATION | Facility: HOSPITAL | Age: 78
End: 2023-09-21
Payer: MEDICARE

## 2023-09-21 DIAGNOSIS — R26.81 UNSTEADY GAIT: ICD-10-CM

## 2023-09-21 DIAGNOSIS — R53.1 DECREASED STRENGTH: Primary | ICD-10-CM

## 2023-09-21 PROCEDURE — 97530 THERAPEUTIC ACTIVITIES: CPT | Mod: PN,CQ

## 2023-09-21 PROCEDURE — 97110 THERAPEUTIC EXERCISES: CPT | Mod: PN,CQ

## 2023-09-21 NOTE — PROGRESS NOTES
[facility]   Physical Therapy Treatment Note      Name: Hiro Rodríguez  [facility] Number: [id number]    Therapy Diagnosis:   Encounter Diagnoses   Name Primary?    Decreased strength Yes    Unsteady gait        Physician: Gautam Castanon III, *    Visit Date: 9/21/2023    Physician Orders: Eval and Treat   Medical Diagnosis from Referral:   R26.81 (ICD-10-CM) - Unsteady gait   R53.1 (ICD-10-CM) - Decreased strength      Evaluation Date: 7/12/23  Authorization Period Expiration: 12/31/23   Plan of Care Expiration: 11/29/23     Visit # / Visits authorized: 13/40(14)  FOTO: 45/100     Time In: 1428  Time Out: 1510  Total Billable Time: 42 minutes    Precautions: Hard of hearing left ear, Standard and Fall    PTA Visit #: 1/5     Subjective     Patient reports: Doing okay, reports needing to slow down after standing  He reports was compliant with home exercise program.  Response to previous treatment: no problems stated  Functional change: ongoing    Pain: 0/10  Location:  Not Applicable      Objective      Objective Measures updated at progress report unless specified.     Treatment     Gregorio received the treatments listed below:      therapeutic exercises to develop strength, endurance, and range of motion for 15 minutes including:    NuStep Level 5 10 minutes with Bilateral Upper Extremities     Parallel bars:  Bilateral heel raises 2 sets of 10 on 2 inch box with verbal cues to decrease Bilateral Upper Extremities     (Activity time includes skilled set-up and positioning as well as therapeutic rest)    therapeutic activities to improve functional performance for 27  minutes, including:    Sit to stand from chair with loss of balance and using chair arm to steady self, standby assistance     Gait with standard cane with quad tip standby assistance 1x130 feet short step length and wide base of support     Sit<>stand 10 times x2 with Right upper extremity support (19 inches), verbal cues  for increased upright once standing    Gait with 4 wheel walker 415 feet with 1 sit rest and 150 feet standby assistance     Sit<>Stand from 4 wheel walker standby assistance, standby assistance for engaging brakes    (Activity time includes skilled set-up and positioning as well as therapeutic rest)    Patient Education and Home Exercises       Education provided:   - Patient provided with verbal and demonstrative instruction for all activities performed in today's session.    Written Home Exercises Provided: Patient instructed to cont prior HEP.      See Electronic Medical Record under Patient Instructions for exercises provided during therapy sessions    Assessment     Gregorio provided good participation and effort during today's session with treatment focused on lower extremity strengthening/endurance,  and functional mobility. Gregorio with improved gait while using 4 wheel walker, longer step length with decreased base of support fair follow through with instruction to decrease Bilateral Upper Extremity support, continued unsteady gait with standard cane.    Gregorio Is progressing towards his goals.   Patient prognosis is Fair.     Patient will continue to benefit from skilled outpatient physical therapy to address the deficits listed in the problem list box on initial evaluation, provide pt/family education and to maximize pt's level of independence in the home and community environment.     Patient's spiritual, cultural and educational needs considered and pt agreeable to plan of care and goals.     Anticipated barriers to physical therapy: none    Goals:     Previous Short Term Goals Status:     Patient will report compliance to home exercises given. (Met)  Patient will be modified independent with bed mobility and sit to stand. (Met)  Patient will tolerate 10' of cardio exercises on Nustep moderate resistance. (Met)      New Long Term Goals: modified:    Patient will improve TUG time to 15 sec or  better.(ongoing)  Patient will improve Zheng balance score 5 points or more 40/56(ongoing)  Patient will improve gait speed and duration from 163 ft in 2 minutes to >600 in 6 minutes.(ongoing)  Patient will improve FOTO scores > 47/100(ongoing)    Plan     Updated Certification Period: 9/13/23 to 11/29/23   Recommended Treatment Plan: 2 times per week for 10 weeks:  Gait Training, Manual Therapy, Neuromuscular Re-ed, Therapeutic Activities, and Therapeutic Exercise    Korin Tobias, PTA

## 2023-09-25 ENCOUNTER — HOSPITAL ENCOUNTER (OUTPATIENT)
Dept: RADIOLOGY | Facility: HOSPITAL | Age: 78
Discharge: HOME | End: 2023-09-25
Attending: FAMILY MEDICINE
Payer: MEDICARE

## 2023-09-25 DIAGNOSIS — R32 URINARY INCONTINENCE, UNSPECIFIED TYPE: ICD-10-CM

## 2023-09-25 PROCEDURE — 76770 US EXAM ABDO BACK WALL COMP: CPT | Mod: TC,PO

## 2023-09-25 PROCEDURE — 76857 US EXAM PELVIC LIMITED: CPT | Mod: TC,PO

## 2023-09-26 ENCOUNTER — TELEPHONE (OUTPATIENT)
Dept: FAMILY MEDICINE | Facility: CLINIC | Age: 78
End: 2023-09-26
Payer: MEDICARE

## 2023-09-26 ENCOUNTER — LAB VISIT (OUTPATIENT)
Dept: LAB | Facility: HOSPITAL | Age: 78
End: 2023-09-26
Attending: FAMILY MEDICINE
Payer: MEDICARE

## 2023-09-26 DIAGNOSIS — R32 URINARY INCONTINENCE, UNSPECIFIED TYPE: Primary | ICD-10-CM

## 2023-09-26 DIAGNOSIS — R32 URINARY INCONTINENCE, UNSPECIFIED TYPE: ICD-10-CM

## 2023-09-26 PROCEDURE — 87086 URINE CULTURE/COLONY COUNT: CPT | Performed by: FAMILY MEDICINE

## 2023-09-26 NOTE — TELEPHONE ENCOUNTER
----- Message from Gautam Castanon III, MD sent at 9/25/2023  8:23 PM CDT -----  Flomax 0.4 daily.  Ninety pills.  And to Urology.  ABNORMAL refer to specialist for further evaluation

## 2023-09-27 ENCOUNTER — PATIENT MESSAGE (OUTPATIENT)
Dept: FAMILY MEDICINE | Facility: CLINIC | Age: 78
End: 2023-09-27
Payer: MEDICARE

## 2023-09-27 LAB
BACTERIA UR CULT: NORMAL
BACTERIA UR CULT: NORMAL

## 2023-10-03 ENCOUNTER — CLINICAL SUPPORT (OUTPATIENT)
Dept: REHABILITATION | Facility: HOSPITAL | Age: 78
End: 2023-10-03
Payer: MEDICARE

## 2023-10-03 ENCOUNTER — TELEPHONE (OUTPATIENT)
Dept: UROLOGY | Facility: CLINIC | Age: 78
End: 2023-10-03
Payer: MEDICARE

## 2023-10-03 DIAGNOSIS — R26.81 UNSTEADY GAIT: ICD-10-CM

## 2023-10-03 DIAGNOSIS — R53.1 DECREASED STRENGTH: Primary | ICD-10-CM

## 2023-10-03 PROCEDURE — 97110 THERAPEUTIC EXERCISES: CPT | Mod: PO

## 2023-10-03 PROCEDURE — 97530 THERAPEUTIC ACTIVITIES: CPT | Mod: PO

## 2023-10-03 NOTE — TELEPHONE ENCOUNTER
Pre appt call   Pt scheduled to est care  Hx of kidney stones/abnormal urine   Past urologist is Dr Ibrahim(requested)  Confirmed appt/reminded will need urine sample upon arrival for appt   Pt voiced ok

## 2023-10-03 NOTE — PROGRESS NOTES
[facility]   Physical Therapy Treatment Note      Name: Hiro Rodríguez  Clinic Number: [id number]    Therapy Diagnosis:   Encounter Diagnoses   Name Primary?    Decreased strength Yes    Unsteady gait        Physician: Gautam Castanon III, MD    Visit Date: 10/3/2023    Physician Orders: PT Eval and Treat   Medical Diagnosis from Referral:   R26.81 (ICD-10-CM) - Unsteady gait   R53.1 (ICD-10-CM) - Decreased strength      Evaluation Date: 7/12/2023  Authorization Period Expiration: 12/31/23  Plan of Care Expiration: 9/13/23  Visit # / Visits authorized: 14/40 (12)     Time In: 1700  Time Out: 1745  Total Billable Time: 45 minutes    Precautions: Hard of hearing left ear, Standard and Fall    Subjective     Pt reports: not wearing hearing aid and asked to keep conversation at a minimum,  He  was t compliant with home exercise program.  Response to previous treatment: tolerated Rx..  Functional change: decreased confidence to standing balance without hold.    Pain: 0/10  Location:  Not Applicable      Objective      /81    HR 74    Treatment     Gregorio received the treatments listed below:        therapeutic exercises to develop strength, endurance, range of motion, flexibility for 30 minutes including:  Nustep all 4's L4  10'  Gastroc stretches 2'  Heel raises 20  Mini squats 20  Hip Abduction unilateral 20/20; alternate 10/10  Seated ankle dorsiflexion purple theraband 20/20  Hamstring curls purple theraband 20/20    Therapeutic activities   to improve: Balance, Coordination, Proprioception, and Posture for 15 minutes. The following activities were included:  Dynamic standing balance reaching forward  Airex static balance eye open/eyes closed    1'/1'  Gait training in // bars without hand hold.   8' x 6  Marching in place 15/15      Patient Education and Home Exercises       Education provided:   - Safety precautions- use of cane all the time.    Written Home Exercises Provided:  Yes and instructed to continue prior home exercise program . Exercises were reviewed and Gregorio was able to demonstrate them prior to the end of the session and demonstrated good understanding of the education provided.     See Electronic Medical Record  under Patient Instructions for exercises provided during therapy sessions      Assessment     Gregorio is hesitant to walk without cane. Unable to attempt gait outside // bars. Perform all standing exercises with touch hold on //bars  Tolerated Rx.  Gregorio Is progressing towards his goals.   Pt prognosis is Fair.     Pt will continue to benefit from skilled outpatient physical therapy to address the deficits listed in the problem list box on initial evaluation, provide pt/family education and to maximize pt's level of independence in the home and community environment.     Pt's spiritual, cultural and educational needs considered and pt agreeable to plan of care and goals.     Anticipated barriers to physical therapy: none     Long Term Goals: modified:    Patient will improve TUG time to 15 sec or better.  (Ongoing)  Patient will improve Zheng balance score 5 points or more 40/56     (ongoing)  Patient will improve gait speed and duration from 163 ft in 2 minutes to >600 in 6 minutes.  (Ongoing)  Patient will improve FOTO scores > 47/100   (ongoing)    Plan     Strength/endurance training  Balance  Continue Updated Plan of care.    Valdo Salomon, PT

## 2023-10-05 ENCOUNTER — CLINICAL SUPPORT (OUTPATIENT)
Dept: REHABILITATION | Facility: HOSPITAL | Age: 78
End: 2023-10-05
Payer: MEDICARE

## 2023-10-05 DIAGNOSIS — R53.1 DECREASED STRENGTH: Primary | ICD-10-CM

## 2023-10-05 DIAGNOSIS — R26.81 UNSTEADY GAIT: ICD-10-CM

## 2023-10-05 PROCEDURE — 97530 THERAPEUTIC ACTIVITIES: CPT | Mod: PO

## 2023-10-05 PROCEDURE — 97110 THERAPEUTIC EXERCISES: CPT | Mod: PO

## 2023-10-05 NOTE — PROGRESS NOTES
[facility]   Physical Therapy Treatment Note      Name: Hiro Rodríguez  Clinic Number: [id number]    Therapy Diagnosis:   Encounter Diagnoses   Name Primary?    Decreased strength Yes    Unsteady gait        Physician: Gautam Castanon III, MD    Visit Date: 10/5/2023    Physician Orders: PT Eval and Treat   Medical Diagnosis from Referral:   R26.81 (ICD-10-CM) - Unsteady gait   R53.1 (ICD-10-CM) - Decreased strength      Evaluation Date: 7/12/2023  Authorization Period Expiration: 12/31/23  Plan of Care Expiration: 9/13/23  Visit # / Visits authorized: 15/40 (16)     Time In: 1530  Time Out: 1615  Total Billable Time: 45 minutes    Precautions: Hard of hearing left ear, Standard and Fall    Subjective     Pt reports: not wearing hearing aid and asked to keep conversation at a minimum,  He  was t compliant with home exercise program.  Response to previous treatment: tolerated Rx..  Functional change: able to do gaitbetter    Pain: 0/10  Location:  None indicated    Objective      Ambulates with cane. Slow vonda, R side list    Treatment     Gregorio received the treatments listed below:        therapeutic exercises to develop strength, endurance, range of motion, flexibility for 30 minutes including:  Nustep all 4's L4  10'  Gastroc stretches 2'  Heel raises 20  Mini squats 20  Hip Abduction unilateral 20/20; alternate 10/10  Seated ankle dorsiflexion purple theraband 20/20  Hamstring curls purple theraband 20/20    Therapeutic activities   to improve: Balance, Coordination, Proprioception, and Posture for 15 minutes. The following activities were included:  Gait better 1.0 mph 5' with virtual interaction maneuvering over obstacles  Balancing activity // bars.      Patient Education and Home Exercises       Education provided:   - Gaitbetter function    Written Home Exercises Provided: Yes and instructed to continue prior home exercise program . Exercises were reviewed and Gregorio was  able to demonstrate them prior to the end of the session and demonstrated good understanding of the education provided.     See Electronic Medical Record  under Patient Instructions for exercises provided during therapy sessions      Assessment     Gregorio has low endurance to continuous ambulation on treadmill at regulated speed. Tolerate 5 minutes only.  Tolerated Rx.  Gregorio Is progressing towards his goals.   Pt prognosis is Fair.     Pt will continue to benefit from skilled outpatient physical therapy to address the deficits listed in the problem list box on initial evaluation, provide pt/family education and to maximize pt's level of independence in the home and community environment.     Pt's spiritual, cultural and educational needs considered and pt agreeable to plan of care and goals.     Anticipated barriers to physical therapy: none     Long Term Goals: modified:    Patient will improve TUG time to 15 sec or better.  (Ongoing)  Patient will improve Zheng balance score 5 points or more 40/56     (ongoing)  Patient will improve gait speed and duration from 163 ft in 2 minutes to >600 in 6 minutes.  (Ongoing)  Patient will improve FOTO scores > 47/100   (ongoing)    Plan     Strength/endurance training  Gaitbetter  Balance  Continue Updated Plan of care.    Valdo Salomon, PT

## 2023-10-11 ENCOUNTER — CLINICAL SUPPORT (OUTPATIENT)
Dept: REHABILITATION | Facility: HOSPITAL | Age: 78
End: 2023-10-11
Payer: MEDICARE

## 2023-10-11 DIAGNOSIS — R53.1 DECREASED STRENGTH: Primary | ICD-10-CM

## 2023-10-11 DIAGNOSIS — R26.81 UNSTEADY GAIT: ICD-10-CM

## 2023-10-11 PROCEDURE — 97530 THERAPEUTIC ACTIVITIES: CPT | Mod: PO

## 2023-10-11 PROCEDURE — 97110 THERAPEUTIC EXERCISES: CPT | Mod: PO

## 2023-10-12 ENCOUNTER — CLINICAL SUPPORT (OUTPATIENT)
Dept: REHABILITATION | Facility: HOSPITAL | Age: 78
End: 2023-10-12
Payer: MEDICARE

## 2023-10-12 DIAGNOSIS — R26.81 UNSTEADY GAIT: ICD-10-CM

## 2023-10-12 DIAGNOSIS — R53.1 DECREASED STRENGTH: Primary | ICD-10-CM

## 2023-10-12 PROCEDURE — 97110 THERAPEUTIC EXERCISES: CPT | Mod: PO

## 2023-10-12 PROCEDURE — 97530 THERAPEUTIC ACTIVITIES: CPT | Mod: PO

## 2023-10-12 NOTE — PROGRESS NOTES
[facility]   Physical Therapy Treatment Note      Name: Hiro Rodríguez  [facility] Number: [id number]    Therapy Diagnosis:   Encounter Diagnoses   Name Primary?    Decreased strength Yes    Unsteady gait        Physician: Gautam Castanon III, MD    Visit Date: 10/12/2023    Physician Orders: PT Eval and Treat   Medical Diagnosis from Referral:   R26.81 (ICD-10-CM) - Unsteady gait   R53.1 (ICD-10-CM) - Decreased strength      Evaluation Date: 7/12/2023  Authorization Period Expiration: 12/31/23  Plan of Care Expiration: 9/13/23  Visit # / Visits authorized: 16/40 (17)     Time In: 1612  Time Out: 1650  Total Billable Time: 38 minutes    Precautions: Hard of hearing left ear, Standard and Fall    Subjective     Pt reports: no recurring back pain  He  was  compliant with home exercise program.  Response to previous treatment: tolerated Rx..  Functional change: more mobile and faster speed with cane ambulation.    Pain: 0/10  Location:  None indicated    Objective      Present on cane to facility.    Treatment     Gregorio received the treatments listed below:        therapeutic exercises to develop strength, endurance, range of motion, flexibility for 30 minutes including:  Nustep all 4's L4  10'  Gastroc stretches 2'  Heel raises 20  Mini squats 20  Quad sets 20/20  Seated ankle dorsiflexionmanual resist 20/20  Hamstring curls manual resist 20/20    Therapeutic activities   to improve: Balance, Coordination, Proprioception, and Posture for 8 minutes. The following activities were included:  Marching high march in place  Balancing Single leg stance hip abduction unilateral 15/15 alternate 10/10      Patient Education and Home Exercises       Education provided:   Hamstring stretches     Written Home Exercises Provided: Yes and instructed to continue prior home exercise program . Exercises were reviewed and Gregorio was able to demonstrate them prior to the end of the session and demonstrated  good understanding of the education provided.     See Electronic Medical Record  under Patient Instructions for exercises provided during therapy sessions      Assessment     Gregorio did better today than yesterday. Less rest pauses and faster paced gait.  Tolerated Rx fair.  Gregorio Is progressing towards his goals.   Pt prognosis is Fair.     Pt will continue to benefit from skilled outpatient physical therapy to address the deficits listed in the problem list box on initial evaluation, provide pt/family education and to maximize pt's level of independence in the home and community environment.     Pt's spiritual, cultural and educational needs considered and pt agreeable to plan of care and goals.     Anticipated barriers to physical therapy: none     Long Term Goals: modified:    Patient will improve TUG time to 15 sec or better.  (Ongoing)  Patient will improve Zheng balance score 5 points or more 40/56     (ongoing)  Patient will improve gait speed and duration from 163 ft in 2 minutes to >600 in 6 minutes.  (Ongoing)  Patient will improve FOTO scores > 47/100   (ongoing)    Plan     Strength/endurance training  Gaitbetter  Balance  Continue Plan of care.    Valdo Salomon, PT

## 2023-10-23 ENCOUNTER — TELEPHONE (OUTPATIENT)
Dept: UROLOGY | Facility: CLINIC | Age: 78
End: 2023-10-23
Payer: MEDICARE

## 2023-10-23 NOTE — TELEPHONE ENCOUNTER
Spoke with patient's wife was scheduled with  on 10/26 appointment rescheduled due to two appointments scheduled around the same time for two patients different patient in 1 appointment slot. Wife verbally voiced understanding on appointment on 11/2

## 2023-10-24 ENCOUNTER — CLINICAL SUPPORT (OUTPATIENT)
Dept: REHABILITATION | Facility: HOSPITAL | Age: 78
End: 2023-10-24
Payer: MEDICARE

## 2023-10-24 DIAGNOSIS — R26.81 UNSTEADY GAIT: ICD-10-CM

## 2023-10-24 DIAGNOSIS — R53.1 DECREASED STRENGTH: Primary | ICD-10-CM

## 2023-10-24 PROCEDURE — 97110 THERAPEUTIC EXERCISES: CPT | Mod: PO

## 2023-10-24 PROCEDURE — 97530 THERAPEUTIC ACTIVITIES: CPT | Mod: PO

## 2023-10-24 NOTE — PROGRESS NOTES
[facility]   Physical Therapy Treatment Note      Name: Hiro Rodríguez  Clinic Number: [id number]    Therapy Diagnosis:   Encounter Diagnoses   Name Primary?    Decreased strength Yes    Unsteady gait        Physician: Gautam Castanon III, MD    Visit Date: 10/24/2023    Physician Orders: PT Eval and Treat   Medical Diagnosis from Referral:   R26.81 (ICD-10-CM) - Unsteady gait   R53.1 (ICD-10-CM) - Decreased strength      Evaluation Date: 7/12/2023  Authorization Period Expiration: 12/31/23  Plan of Care Expiration: 9/13/23  Visit # / Visits authorized: 18/40 (19)     Time In: 1515  Time Out: 1600  Total Billable Time: 45 minutes    Precautions: Hard of hearing left ear, Standard and Fall    Subjective     Pt reports: no new concerns  He  was  compliant with home exercise program.  Response to previous treatment: tolerated Rx..  Functional change: none reported    Pain: 0/10  Location:  None indicated    Objective      /93 mmHg   HR 83  after session.    Treatment     Gregorio received the treatments listed below:        therapeutic exercises to develop strength, endurance, range of motion, flexibility for 30 minutes including:  Nustep all 4's L4  10'  Gastroc stretches 2'  Heel raises 20  Mini squats 20  Quad sets 20/20  Seated ankle dorsiflexionmanual resist 20/20  Hamstring curls manual resist 20/20    Therapeutic activities   to improve: Balance, Coordination, Proprioception, and Posture for 15minutes. The following activities were included:  Marching high march in place  Balancing Single leg stance hip abduction unilateral 15/15 alternate   Gait without hold between // bars 30 ft total  Sidestepping // bars 30 ft total R/L      Patient Education and Home Exercises       Education provided:   Hold and relax during resistive exercises to ankle dorsiflexion.    Written Home Exercises Provided: Yes and instructed to continue prior home exercise program . Exercises were reviewed  and Gregorio was able to demonstrate them prior to the end of the session and demonstrated good understanding of the education provided.     See Electronic Medical Record  under Patient Instructions for exercises provided during therapy sessions      Assessment     Gregorio performed all activities without loss of balance. Noted tachypneia when balance is challenged. Appears anxious.   Tolerated Rx fair.  Gregorio Is progressing towards his goals.   Pt prognosis is Fair.     Pt will continue to benefit from skilled outpatient physical therapy to address the deficits listed in the problem list box on initial evaluation, provide pt/family education and to maximize pt's level of independence in the home and community environment.     Pt's spiritual, cultural and educational needs considered and pt agreeable to plan of care and goals.     Anticipated barriers to physical therapy: none     Long Term Goals: modified:    Patient will improve TUG time to 15 sec or better.  (Ongoing)  Patient will improve Zheng balance score 5 points or more 40/56     (ongoing)  Patient will improve gait speed and duration from 163 ft in 2 minutes to >600 in 6 minutes.  (Ongoing)  Patient will improve FOTO scores > 47/100   (ongoing)    Plan     Strength/endurance training  Balance  Continue Plan of care.    Valdo Salomon, PT

## 2023-10-26 ENCOUNTER — CLINICAL SUPPORT (OUTPATIENT)
Dept: REHABILITATION | Facility: HOSPITAL | Age: 78
End: 2023-10-26
Payer: MEDICARE

## 2023-10-26 DIAGNOSIS — R26.81 UNSTEADY GAIT: ICD-10-CM

## 2023-10-26 DIAGNOSIS — R53.1 DECREASED STRENGTH: Primary | ICD-10-CM

## 2023-10-26 PROCEDURE — 97110 THERAPEUTIC EXERCISES: CPT | Mod: KX,PO

## 2023-10-26 NOTE — PROGRESS NOTES
[facility]   Physical Therapy Treatment Note      Name: Hiro Rodríguez  Clinic Number: [id number]    Therapy Diagnosis:   Encounter Diagnoses   Name Primary?    Decreased strength Yes    Unsteady gait        Physician: Gautam Castanon III, MD    Visit Date: 10/26/2023    Physician Orders: PT Eval and Treat   Medical Diagnosis from Referral:   R26.81 (ICD-10-CM) - Unsteady gait   R53.1 (ICD-10-CM) - Decreased strength      Evaluation Date: 7/12/2023  Authorization Period Expiration: 12/31/23  Plan of Care Expiration: 9/13/23  Visit # / Visits authorized: 19/40 (20)     Time In: 1516  Time Out: 1600  Total Billable Time: 44 minutes    Precautions: Hard of hearing left ear, Standard and Fall    Subjective     Pt reports: low back pain. Admits going to the [employer] to watch the games and had to walk and sit longer than usual.  He  was  compliant with home exercise program.  Response to previous treatment: tolerated Rx..  Functional change: decreased standing tolerance.    Pain:  7/10  Location:  low back    Objective      Tight hamstrings. Both Knee extension lag 5 deg     Treatment     Gregorio received the treatments listed below:        therapeutic exercises to develop strength, endurance, range of motion, flexibility for 44 minutes including:  Nustep all 4's L4  10'  Supine Gastroc/hamstring  stretches 3'  Ankle dorsiflexion manual resist 20/20  Quad sets 20/20  Bridging 20  Short arc quads manual resist 20/20  Crooklying hip abduction/adduction  20/20  Straight leg raising 10x 2/10x 2  Seated hamstring curls manual resists 10/10    Patient Education and Home Exercises       Education provided:   Breathing pattern during active exertion.    Written Home Exercises Provided: Yes and instructed to continue prior home exercise program . Exercises were reviewed and Gregorio was able to demonstrate them prior to the end of the session and demonstrated good understanding of the education  provided.     See Electronic Medical Record  under Patient Instructions for exercises provided during therapy sessions      Assessment     Gregorio requests to sit down after standing and walking. Supine exercies with core strengthening.Unable to follow pelvic tilt sequence.  Tolerated Rx fair.  Gregorio Is progressing towards his goals.   Pt prognosis is Fair.     Pt will continue to benefit from skilled outpatient physical therapy to address the deficits listed in the problem list box on initial evaluation, provide pt/family education and to maximize pt's level of independence in the home and community environment.     Pt's spiritual, cultural and educational needs considered and pt agreeable to plan of care and goals.     Anticipated barriers to physical therapy: none     Long Term Goals: modified:    Patient will improve TUG time to 15 sec or better.  (Ongoing)  Patient will improve Zheng balance score 5 points or more 40/56     (ongoing)  Patient will improve gait speed and duration from 163 ft in 2 minutes to >600 in 6 minutes.  (Ongoing)  Patient will improve FOTO scores > 47/100   (ongoing)    Plan     Strength/endurance training  Balance  Continue Plan of care.    Valdo Salomon, PT

## 2023-10-30 ENCOUNTER — DOCUMENTATION ONLY (OUTPATIENT)
Dept: REHABILITATION | Facility: HOSPITAL | Age: 78
End: 2023-10-30
Payer: MEDICARE

## 2023-10-31 ENCOUNTER — CLINICAL SUPPORT (OUTPATIENT)
Dept: REHABILITATION | Facility: HOSPITAL | Age: 78
End: 2023-10-31
Payer: MEDICARE

## 2023-10-31 DIAGNOSIS — R26.81 UNSTEADY GAIT: ICD-10-CM

## 2023-10-31 DIAGNOSIS — R53.1 DECREASED STRENGTH: Primary | ICD-10-CM

## 2023-10-31 PROCEDURE — 97110 THERAPEUTIC EXERCISES: CPT | Mod: PO

## 2023-10-31 NOTE — PROGRESS NOTES
[facility]   Physical Therapy Treatment Note      Name: Hiro Rodríguez  Clinic Number: [id number]    Therapy Diagnosis:   Encounter Diagnoses   Name Primary?    Decreased strength Yes    Unsteady gait        Physician: Gautam Castanon III, MD    Visit Date: 10/31/2023    Physician Orders: PT Eval and Treat   Medical Diagnosis from Referral:   R26.81 (ICD-10-CM) - Unsteady gait   R53.1 (ICD-10-CM) - Decreased strength      Evaluation Date: 7/12/2023  Authorization Period Expiration: 12/31/23  Plan of Care Expiration: 9/13/23  Visit # / Visits authorized: 20/40 (21)     Time In: 1515  Time Out: 1600  Total Billable Time: 45 minutes    Precautions: Hard of hearing left ear, Standard and Fall    Subjective     Pt reports: being tired.  He  was  compliant with home exercise program.  Response to previous treatment: tolerated Rx..  Functional change: decreased standing tolerance.    Pain:  0/10  Location:  none indicated    Objective      Needs incidental assist supine to sit to minimize aggravating low back pain.    Treatment     Gregorio received the treatments listed below:        therapeutic exercises to develop strength, endurance, range of motion, flexibility for 45 minutes including:  Nustep all 4's L4  10'  Supine Gastroc/hamstring  stretches 3'  Ankle dorsiflexion manual resist 20/20  Quad sets 20/20  Bridging 20  Short arc quads manual resist 20/20  Crooklying hip abduction/adduction  20/20  Straight leg raising 10x 2/10x 2  Seated hamstring curls manual resists 10/10    Patient Education and Home Exercises       Education provided:   Log rolling supine to sit    Written Home Exercises Provided: Yes and instructed to continue prior home exercise program . Exercises were reviewed and Gregorio was able to demonstrate them prior to the end of the session and demonstrated good understanding of the education provided.     See Electronic Medical Record  under Patient Instructions for  exercises provided during therapy sessions      Assessment     Gregorio preferred supine exercises. Standing and walking elicits fatigue.  Tolerated Rx fair.  Gregorio Is progressing towards his goals.   Pt prognosis is Fair.     Pt will continue to benefit from skilled outpatient physical therapy to address the deficits listed in the problem list box on initial evaluation, provide pt/family education and to maximize pt's level of independence in the home and community environment.     Pt's spiritual, cultural and educational needs considered and pt agreeable to plan of care and goals.     Anticipated barriers to physical therapy: none     Long Term Goals: modified:    Patient will improve TUG time to 15 sec or better.  (Ongoing)  Patient will improve Zheng balance score 5 points or more 40/56     (ongoing)  Patient will improve gait speed and duration from 163 ft in 2 minutes to >600 in 6 minutes.  (Ongoing)  Patient will improve FOTO scores > 47/100   (ongoing)    Plan     Strength/endurance training  Balance  Continue Plan of care.    Valdo Salomon, PT

## 2023-11-02 ENCOUNTER — CLINICAL SUPPORT (OUTPATIENT)
Dept: REHABILITATION | Facility: HOSPITAL | Age: 78
End: 2023-11-02
Payer: MEDICARE

## 2023-11-02 DIAGNOSIS — R53.1 DECREASED STRENGTH: Primary | ICD-10-CM

## 2023-11-02 DIAGNOSIS — R26.81 UNSTEADY GAIT: ICD-10-CM

## 2023-11-02 PROCEDURE — 97110 THERAPEUTIC EXERCISES: CPT | Mod: PO,CQ

## 2023-11-02 PROCEDURE — 97530 THERAPEUTIC ACTIVITIES: CPT | Mod: PO,CQ

## 2023-11-02 NOTE — PROGRESS NOTES
"[facility]   Physical Therapy Treatment Note      Name: Hiro Rodríguez  Clinic Number: [id number]    Therapy Diagnosis:   Encounter Diagnoses   Name Primary?    Decreased strength Yes    Unsteady gait        Physician: Gautam Castanon III, MD    Visit Date: 11/2/2023    Physician Orders: PT Eval and Treat   Medical Diagnosis from Referral:   R26.81 (ICD-10-CM) - Unsteady gait   R53.1 (ICD-10-CM) - Decreased strength      Evaluation Date: 7/12/2023  Authorization Period Expiration: 12/31/23  Plan of Care Expiration: 11/29/23  Visit # / Visits authorized: 21/40 (22)     Time In: 1100  Time Out: 1140  Total Billable Time: 40 minutes    Precautions: Hard of hearing left ear, Standard and Fall    Subjective     Pt reports: doing okay, reports ambulating "the same, no better".  He  was  "some" compliant with home exercise program, "I try"  Response to previous treatment: No problems stated  Functional change: ongoing    Pain:  0/10  Location:  Not Applicable     Objective      Objective Measures updated at progress report unless specified.     Treatment     Gregorio received the treatments listed below:        therapeutic exercises to develop strength, endurance, and range of motion  for 25 minutes including:    NuStep Level 4 10 minutes with Bilateral Upper Extremities     Seated:  Hamstring stretch with foot on stool performing dorsiflexion/plantarflexion 30 times Right Left     Shuttle:  56# Bilateral leg press 4 minutes  50# Bilateral heel raises/heel dips 15    50# Unilateral leg press Right Left 1 minute      therapeutic activities to improve functional performance for 15  minutes, including:    Gait with standard cane standby assistance with wide base of support     Parallel bars:  Tandem gait with verbal cues for increased step length, light upper extremity support 12 feet  x6  Weight shift on foam cushion 1 minute: anterior/posterior, lateral Right Left       Patient Education and " Home Exercises       Education provided:   - Patient provided with verbal and demonstrative instruction for all activities performed in today's session.    Written Home Exercises Provided:  instructed to continue prior home exercise program .     See Electronic Medical Record  under Patient Instructions for exercises provided during therapy sessions      Assessment     Gregorio provided good participation and effort during today's session with treatment focused on lower extremity strengthening/endurance and functional mobility to improve gait with decreased base of support. Gregorio tolerated standing weight shift for 1 minute only 2nd to increased back pain with increased stand recovered with forward lean on Bilateral hands.        Gregorio Is progressing towards his goals.   Pt prognosis is Fair.     Pt will continue to benefit from skilled outpatient physical therapy to address the deficits listed in the problem list box on initial evaluation, provide pt/family education and to maximize pt's level of independence in the home and community environment.     Pt's spiritual, cultural and educational needs considered and pt agreeable to plan of care and goals.     Anticipated barriers to physical therapy: none     Long Term Goals: modified:    Patient will improve TUG time to 15 sec or better.  (Ongoing)  Patient will improve Zheng balance score 5 points or more 40/56     (ongoing)  Patient will improve gait speed and duration from 163 ft in 2 minutes to >600 in 6 minutes.  (Ongoing)  Patient will improve FOTO scores > 47/100   (ongoing)    Plan     Updated Certification Period: 9/13/23 to 11/29/23   Recommended Treatment Plan: 2 times per week for 10 weeks:  Gait Training, Manual Therapy, Neuromuscular Re-ed, Therapeutic Activities, and Therapeutic Exercise    Korin Tobias, PTA

## 2023-11-07 ENCOUNTER — LAB VISIT (OUTPATIENT)
Dept: LAB | Facility: HOSPITAL | Age: 78
End: 2023-11-07
Attending: UROLOGY
Payer: MEDICARE

## 2023-11-07 ENCOUNTER — CLINICAL SUPPORT (OUTPATIENT)
Dept: REHABILITATION | Facility: HOSPITAL | Age: 78
End: 2023-11-07
Payer: MEDICARE

## 2023-11-07 ENCOUNTER — OFFICE VISIT (OUTPATIENT)
Dept: UROLOGY | Facility: CLINIC | Age: 78
End: 2023-11-07
Payer: MEDICARE

## 2023-11-07 VITALS
DIASTOLIC BLOOD PRESSURE: 64 MMHG | SYSTOLIC BLOOD PRESSURE: 101 MMHG | WEIGHT: 240 LBS | HEIGHT: 70 IN | HEART RATE: 47 BPM | BODY MASS INDEX: 34.36 KG/M2

## 2023-11-07 DIAGNOSIS — R39.198 INCREASING RESIDUAL URINE: ICD-10-CM

## 2023-11-07 DIAGNOSIS — N20.0 NEPHROLITHIASIS: ICD-10-CM

## 2023-11-07 DIAGNOSIS — R53.1 DECREASED STRENGTH: Primary | ICD-10-CM

## 2023-11-07 DIAGNOSIS — R26.81 UNSTEADY GAIT: ICD-10-CM

## 2023-11-07 DIAGNOSIS — R32 URINARY INCONTINENCE, UNSPECIFIED TYPE: Primary | ICD-10-CM

## 2023-11-07 DIAGNOSIS — R32 URINARY INCONTINENCE, UNSPECIFIED TYPE: ICD-10-CM

## 2023-11-07 LAB
ANION GAP SERPL CALC-SCNC: 11 MMOL/L (ref 8–16)
BILIRUBIN, UA POC OHS: NEGATIVE
BLOOD, UA POC OHS: ABNORMAL
BUN SERPL-MCNC: 21 MG/DL (ref 8–23)
CALCIUM SERPL-MCNC: 9 MG/DL (ref 8.7–10.5)
CHLORIDE SERPL-SCNC: 105 MMOL/L (ref 95–110)
CLARITY, UA POC OHS: CLEAR
CO2 SERPL-SCNC: 24 MMOL/L (ref 23–29)
COLOR, UA POC OHS: YELLOW
CREAT SERPL-MCNC: 0.8 MG/DL (ref 0.5–1.4)
EST. GFR  (NO RACE VARIABLE): >60 ML/MIN/1.73 M^2
GLUCOSE SERPL-MCNC: 133 MG/DL (ref 70–110)
GLUCOSE, UA POC OHS: NEGATIVE
KETONES, UA POC OHS: NEGATIVE
LEUKOCYTES, UA POC OHS: ABNORMAL
NITRITE, UA POC OHS: NEGATIVE
PH, UA POC OHS: 6
POC RESIDUAL URINE VOLUME: 420 ML (ref 0–100)
POTASSIUM SERPL-SCNC: 4.6 MMOL/L (ref 3.5–5.1)
PROTEIN, UA POC OHS: 30
SODIUM SERPL-SCNC: 140 MMOL/L (ref 136–145)
SPECIFIC GRAVITY, UA POC OHS: 1.02
UROBILINOGEN, UA POC OHS: 0.2

## 2023-11-07 PROCEDURE — 36415 COLL VENOUS BLD VENIPUNCTURE: CPT | Performed by: UROLOGY

## 2023-11-07 PROCEDURE — 3288F PR FALLS RISK ASSESSMENT DOCUMENTED: ICD-10-PCS | Mod: CPTII,S$GLB,, | Performed by: UROLOGY

## 2023-11-07 PROCEDURE — 81003 POCT URINALYSIS(INSTRUMENT): ICD-10-PCS | Mod: QW,S$GLB,, | Performed by: UROLOGY

## 2023-11-07 PROCEDURE — 3288F FALL RISK ASSESSMENT DOCD: CPT | Mod: CPTII,S$GLB,, | Performed by: UROLOGY

## 2023-11-07 PROCEDURE — 99215 PR OFFICE/OUTPT VISIT, EST, LEVL V, 40-54 MIN: ICD-10-PCS | Mod: S$GLB,,, | Performed by: UROLOGY

## 2023-11-07 PROCEDURE — 97530 THERAPEUTIC ACTIVITIES: CPT | Mod: PO,CQ

## 2023-11-07 PROCEDURE — 1126F PR PAIN SEVERITY QUANTIFIED, NO PAIN PRESENT: ICD-10-PCS | Mod: CPTII,S$GLB,, | Performed by: UROLOGY

## 2023-11-07 PROCEDURE — 1159F PR MEDICATION LIST DOCUMENTED IN MEDICAL RECORD: ICD-10-PCS | Mod: CPTII,S$GLB,, | Performed by: UROLOGY

## 2023-11-07 PROCEDURE — 97110 THERAPEUTIC EXERCISES: CPT | Mod: PO,CQ

## 2023-11-07 PROCEDURE — 51798 US URINE CAPACITY MEASURE: CPT | Mod: S$GLB,,, | Performed by: UROLOGY

## 2023-11-07 PROCEDURE — 80048 BASIC METABOLIC PNL TOTAL CA: CPT | Performed by: UROLOGY

## 2023-11-07 PROCEDURE — 1160F PR REVIEW ALL MEDS BY PRESCRIBER/CLIN PHARMACIST DOCUMENTED: ICD-10-PCS | Mod: CPTII,S$GLB,, | Performed by: UROLOGY

## 2023-11-07 PROCEDURE — 99999 PR PBB SHADOW E&M-EST. PATIENT-LVL III: CPT | Mod: PBBFAC,,, | Performed by: UROLOGY

## 2023-11-07 PROCEDURE — 99999 PR PBB SHADOW E&M-EST. PATIENT-LVL III: ICD-10-PCS | Mod: PBBFAC,,, | Performed by: UROLOGY

## 2023-11-07 PROCEDURE — 3074F PR MOST RECENT SYSTOLIC BLOOD PRESSURE < 130 MM HG: ICD-10-PCS | Mod: CPTII,S$GLB,, | Performed by: UROLOGY

## 2023-11-07 PROCEDURE — 1101F PT FALLS ASSESS-DOCD LE1/YR: CPT | Mod: CPTII,S$GLB,, | Performed by: UROLOGY

## 2023-11-07 PROCEDURE — 1101F PR PT FALLS ASSESS DOC 0-1 FALLS W/OUT INJ PAST YR: ICD-10-PCS | Mod: CPTII,S$GLB,, | Performed by: UROLOGY

## 2023-11-07 PROCEDURE — 51798 POCT BLADDER SCAN: ICD-10-PCS | Mod: S$GLB,,, | Performed by: UROLOGY

## 2023-11-07 PROCEDURE — 1159F MED LIST DOCD IN RCRD: CPT | Mod: CPTII,S$GLB,, | Performed by: UROLOGY

## 2023-11-07 PROCEDURE — 1126F AMNT PAIN NOTED NONE PRSNT: CPT | Mod: CPTII,S$GLB,, | Performed by: UROLOGY

## 2023-11-07 PROCEDURE — 3078F PR MOST RECENT DIASTOLIC BLOOD PRESSURE < 80 MM HG: ICD-10-PCS | Mod: CPTII,S$GLB,, | Performed by: UROLOGY

## 2023-11-07 PROCEDURE — 3074F SYST BP LT 130 MM HG: CPT | Mod: CPTII,S$GLB,, | Performed by: UROLOGY

## 2023-11-07 PROCEDURE — 99215 OFFICE O/P EST HI 40 MIN: CPT | Mod: S$GLB,,, | Performed by: UROLOGY

## 2023-11-07 PROCEDURE — 1160F RVW MEDS BY RX/DR IN RCRD: CPT | Mod: CPTII,S$GLB,, | Performed by: UROLOGY

## 2023-11-07 PROCEDURE — 81003 URINALYSIS AUTO W/O SCOPE: CPT | Mod: QW,S$GLB,, | Performed by: UROLOGY

## 2023-11-07 PROCEDURE — 3078F DIAST BP <80 MM HG: CPT | Mod: CPTII,S$GLB,, | Performed by: UROLOGY

## 2023-11-07 RX ORDER — TAMSULOSIN HYDROCHLORIDE 0.4 MG/1
0.8 CAPSULE ORAL DAILY
Qty: 90 CAPSULE | Refills: 3
Start: 2023-11-07 | End: 2024-03-04 | Stop reason: SDUPTHER

## 2023-11-07 NOTE — PATIENT INSTRUCTIONS
He has unaware urinary incontinence, likely overflow,  that I think was related to his subdural hematoma and worsened by pain medications and constipation in addition to being on Detrol.  For now we need to at least discontinue the Detrol.  He can continue the Flomax and finasteride and we will have to teach him how to empty his bladder on his own with in and out catheterization.  Ultimately will need urodynamics to further evaluate the bladder and also some imaging to monitor his kidney stones.     Plan:     DISCONTINUE DETROL/TOTERODINE FOR NOW AND VESICARE  (may need in future once has urodynamics if found to be neurogenic bladder)  INCREASE TAMSULOSIN/FLOMAX TO 2 A NIGHT.  CONTINUE FINASTERIDE 5MG DAILY TO PREVENT PROSTATE FROM ENLARGING.   RETURN TO LEARN CLEAN INTERMITTENT CATHETERIZATION.   His goal will be to 1st if he can on his own but if he can not then just catheterize every 4-6 hours.  If he starts to have the urge to urinate and he is able to urinate on his own need to measure how much urinates and then put a catheter in and measure what is left.  Eventually if what is left is less than 200 once he increases the Flomax then we can discontinue catheterizing but I doubt this will happen.  I suspect he will need in and out catheterization every few hours.  Eventually we can do urodynamics, a bladder test to see if his bladder generates any pressure at all.  The goal will be to catheterize 4 to 6 times a day with residuals less than 400-600.    To schedule Urodynamics at next available which is in the next few months  Follow-up in 1 month with Urology nurse practitioner to see if he is catheterizing and if incontinence has improved with catheterization.    If it has not may need to restart the Detrol.    If he is not urinating at all on his own or voiding at all on his own then can discontinue the Flomax  Follow up with me in 3 months

## 2023-11-07 NOTE — PROGRESS NOTES
[facility] Consult Note - [place] [facility]  Staff: MD Jeremy  Group:Jeremy/Ilya/Norris/Lorena/Oksana  Referring provider and please cc:  Joe  PCP: Gautam Castanon III, MD  Date of Service: 11/07/2023        Subjective:        Hiro Rodríguez is a 78 y.o. male patient of Dr. Hughes.  Nurses note from 11/16/22 Bladder scanned pt due to no urination on my shift with highest reading of 527ml, per Dr. Shepherd not to straight cath pt until readings of 600ml.  (FAILED VOIDING TRIAL #1) Will continue to monitor.  Patient initially saw him as a consult the hospital on January 6 for urinary retentionand UTI (FAILED VOIDING TRIAL #2) after hip fracture. Sotelo placed and made f/u with   When he came to the ER for hypotension and tachycardia on 01/22 he was found to be in urinary retention again  (FAILED VOIDING TRIAL #3) with over a L drained when a Sotelo was placed.  A CT also showed a proximal left ureteral stone with mild hydro and bilateral renal stones.  Culture ended up growing Pseudomonas.  He was treated for sepsis and did not any flank pain was discharge back [facility].      Interval consult by  in HOSPITAL on 1/23/23 for urinary retention and stone:   77-year-old male I have seen previously for urinary retention  Patient recently failed another voiding trial  Admitted for hypotension, and tachycardia  Patient required Sotelo catheterization here in the ER   (FAILED VOIDING TRIAL #3)  Now with a Sotelo catheter in place draining clear urine  Excellent renal function creatinine 0.8  Once patient is discharged can resume workup for urinary retention and bladder outlet obstruction    Interval consult by  in HOSPITAL on 3/2/23 for left flank pain and retained stent:   77-year-old male with obstructing left upper ureteral calculi  Patient continues to be in discomfort  He returned on 3/1/23 dizziness and tachycardia.  Abnormal urine suspicious for UTI.  A CT  renal stone study done 3/1/23 showed the stone was now significantly obstructing causing moderate left hydro and there was another left ureteral stone seen as well.  Dr. Hughes place a left ureteral stent with plans to have him return 2 weeks with an x-ray to schedule outpatient stone extraction.      Initial consult by me in hospital on 3/18/23:  Presented to the ER again yesterday on 03/16 2 weeks after his procedure with hypotension.  Urine again suspicious for UTI however he also has a stent in place.  Culture growing presumptive Pseudomonas.  No Sotelo in place.  CT renal stone done again.  Independent review of the CT showed that the left stent was in good position, no obstructing stone on the right and has distended bladder.  He has no hydronephrosis of either kidney.  Urology consulted for further recommendations because of his UTI recurrence.    Ctrss 3/17/23        Check bladder scan.  If residual greater than 300 place Sotelo and leave in.  Offer patient clean intermittent catheterization or leaving Sotelo in place  Discontinue Detrol  Continue Flomax 0.8  Can follow-up Dr. Hughes  Also he is on Topamax.  This will significantly increase his risk of stones.    Cysto left ureteroscopy and stone extraction by  on 4/20/23    US bladder 9/25/23 ordered by : [id number] in bladder and could not void.   US kidney: R renal stone, no hydro    Interval history by ME in CLINIC on 11/7/23 for elevated urine residuals:  Extensive review of his history dating back to at least November 2022 showed that he is failed at least for voiding trials.  He had never seen a urologist prior to seeing Dr. Hughes as a consult for urinary retention  Today his bladder scan shows 420 postvoid but he has no urge to void.  Review of his medications show that for some reason he is on Detrol although I had discontinued it when I saw him in March.  He is also on Flomax 0.4 mg and finasteride.  He had a subdural  hematoma a year ago.  Prior to that he was urinating 2 times during the day 4 times a night.  Denied any urinary hesitancy or enlarged prostate symptoms or incontinence.   He says that he has been experiencing unaware incontinence over the last 3 months. He wears 3 depends a day. 4 incontinence episodes during the day and never knows when he has to urinate.   Prior to this states he was urinating with urge 2 times a day. Denies any other neurologic changes in the last 3 months. However significant constipation in the last 3 months  Risk factors for urinary retention and elevated urine residual  Biggest risk factor could be the fact that he had a subdural hematoma after falling a year ago November 2022.  Sounds like most of his symptoms started then.  In addition review of his CT scan from that time shows that his bladder is mostly empty but a bladder scan from that time shows that he had 600 in his bladder  Constipation: Only has a bowel movement every 3-4 days  Overactive bladder medication: For some reason he is on Detrol.  Prediabetic: Less likely the cause since he is only on metformin  Enlarged prostate:  Less likely since he is on Flomax and finasteride and he does not feel when he has a full bladder      Urine history: Anticoagulation/Aspirin:  Eliquis for afib. No smoking history. +personal hx of stones.   11/7/23 Large blood/mod leukocytes -             PSA history: no family hx of prostate cancer  7/10/23 0.77  10/26/21  0.85  8/20/20 0.91      Current REVIEW OF SYSTEMS:Negative for the remaining 12 points of ROS except for as stated above    Past Medical History:   Diagnosis Date    CHF (congestive heart failure)     Hypertension     Mixed hyperlipidemia     Paroxysmal atrial fibrillation 2013       Past Surgical History:   Procedure Laterality Date    CYSTOSCOPY W/ URETERAL STENT PLACEMENT N/A 03/02/2023    Procedure: CYSTOSCOPY, WITH URETERAL STENT INSERTION;  Surgeon: Yoshi Lange MD;  Location:  [facility] OR;  Service: Urology;  Laterality: N/A;    CYSTOURETEROSCOPY, WITH HOLMIUM LASER LITHOTRIPSY OF URETERAL CALCULUS AND STENT INSERTION Left 4/20/2023    Procedure: CYSTOURETEROSCOPY, WITH HOLMIUM LASER LITHOTRIPSY OF URETERAL CALCULUS AND STENT INSERTION;  Surgeon: Yoshi Lange MD;  Location: [facility] OR;  Service: Urology;  Laterality: Left;    FRACTURE SURGERY      INTRAMEDULLARY RODDING OF TROCHANTER OF FEMUR Left 11/10/2022    Procedure: INSERTION, ITRAMEDULLARY SANTI, FEMUR, TROCHANTER;  Surgeon: Richard Phoenix MD;  Location: [facility] OR;  Service: Orthopedics;  Laterality: Left;    LUMBAR DISCECTOMY  1980    L4-5    REMOVAL OF URETERAL CALCULUS Left 4/20/2023    Procedure: REMOVAL, CALCULUS, URETER;  Surgeon: Yoshi Lange MD;  Location: [facility];  Service: Urology;  Laterality: Left;    REMOVAL, CALCULUS, BLADDER  03/02/2023    Procedure: REMOVAL, CALCULUS, BLADDER;  Surgeon: Yoshi Lange MD;  Location: [facility] OR;  Service: Urology;;    RETROGRADE PYELOGRAPHY  4/20/2023    Procedure: PYELOGRAM, RETROGRADE;  Surgeon: Yoshi Lange MD;  Location: [facility];  Service: Urology;;    TOTAL KNEE ARTHROPLASTY Right 2017    TOTAL KNEE ARTHROPLASTY Left 2018           Objective:     Vitals:    11/07/23 1156   BP: 101/64   Pulse: (!) 47     Recent Labs   Lab 04/28/23  1031 04/28/23  1354 05/19/23  1206   WBC 12.56 11.66 10.41   Hemoglobin 10.5 L 10.0 L 11.7 L   Hematocrit 34.5 L 32.3 L 38.7 L   Platelets 336 300 311   ]  Recent Labs   Lab 03/18/23  0443 03/19/23  0446 03/20/23  0412 04/19/23  1521 04/28/23  1031 04/28/23  1354 04/29/23  0844 04/30/23  0323 05/19/23  1206 06/14/23  1805   Sodium 141 140 140   < > 141 140 139 142 139 140   Potassium 4.8 3.8 3.9   < > 4.4 4.1 4.2 4.2 4.3 4.5   Chloride 105 105 106   < > 105 106 102 101 100 103   CO2 29 27 27   < > 29 26 31 H 33 H 31 H 31 H   BUN 17 18 17   < > 14 14 12 18 15 18   Creatinine 0.5 0.5 0.4 L   < > 0.7 0.8 0.6 0.8 0.7 0.8   Glucose 136 H  131 H 126 H   < > 164 H 230 H 118 H 102 134 H 97   Calcium 8.5 L 8.3 L 8.1 L   < > 8.6 L 8.0 L 8.1 L 7.9 L 9.1 9.2   Magnesium 1.5 L 1.6 1.8  --   --  1.6 1.8 1.8 1.8  --    Phosphorus 4.3 4.2 3.8  --   --   --   --   --   --   --    Alkaline Phosphatase 55 53 L 55   < > 83 76  --   --  71  --    Total Protein 5.7 L 5.4 L 5.6 L   < > 6.6 6.3  --   --  6.8  --    Albumin 2.7 L 2.6 L 2.6 L   < > 3.1 L 2.8 L  --   --  3.4 L  --    Total Bilirubin 0.5 0.3 0.2   < > 0.7 0.5  --   --  0.8  --    AST 13 14 13   < > 19 20  --   --  15  --    ALT 12 12 13   < > 17 17  --   --  16  --     < > = values in this interval not displayed.   ]    Lab Results   Component Value Date    HGBA1C 5.8 03/02/2023       Pertinent urologic PATHOLOGY AND RADIOLOGY REVIEW:  See hpi for recent      Assessment:     Hiro Rodríguez is a 78 y.o. male with   1. Urinary incontinence, unspecified type    2. Nephrolithiasis        He has unaware urinary incontinence, likely overflow,  that I think was related to his subdural hematoma and worsened by pain medications and constipation in addition to being on Detrol.  For now we need to at least discontinue the Detrol.  He can continue the Flomax and finasteride and we will have to teach him how to empty his bladder on his own with in and out catheterization.  Ultimately will need urodynamics to further evaluate the bladder and also some imaging to monitor his kidney stones.     Plan:     DISCONTINUE DETROL/TOTERODINE FOR NOW AND VESICARE  (may need in future once has urodynamics if found to be neurogenic bladder)  INCREASE TAMSULOSIN/FLOMAX TO 2 A NIGHT.  CONTINUE FINASTERIDE 5MG DAILY TO PREVENT PROSTATE FROM ENLARGING.   RETURN TO LEARN CLEAN INTERMITTENT CATHETERIZATION with 16fr straight (coude if straight does not advance). .   His goal will be to 1st if he can on his own but if he can not then just catheterize every 4-6 hours.  If he starts to have the urge to urinate and he is able to urinate  on his own need to measure how much urinates and then put a catheter in and measure what is left.  Eventually if what is left is less than 200 once he increases the Flomax then we can discontinue catheterizing but I doubt this will happen.  I suspect he will need in and out catheterization every few hours.  Eventually we can do urodynamics, a bladder test to see if his bladder generates any pressure at all.  The goal will be to catheterize 4 to 6 times a day with residuals less than 400-600.  Needs rx for 16french straight, 6 a day. Lubricated. Multiple positive cultres documented.     To schedule Urodynamics at next available which is in the next few months  Follow-up in 1 month with Urology nurse practitioner to see if he is catheterizing and if incontinence has improved with catheterization.    If it has not may need to restart the Detrol.    If he is not urinating at all on his own or voiding at all on his own then can discontinue the Flomax  Follow up with me in 3 months  Rbus and kub in 6 months to monitor bilateral renal stones. Order sat fu  (march 2024)      Patient is unable to pass a straight tip catheter, coude catheter is needed   Ijeoma Luna MD

## 2023-11-07 NOTE — PROGRESS NOTES
"[facility]   Physical Therapy Treatment Note      Name: Hiro Rodríguez  Clinic Number: [id number]    Therapy Diagnosis:   Encounter Diagnoses   Name Primary?    Decreased strength Yes    Unsteady gait        Physician: Gautam Castanon III, MD    Visit Date: 11/7/2023    Physician Orders: PT Eval and Treat   Medical Diagnosis from Referral:   R26.81 (ICD-10-CM) - Unsteady gait   R53.1 (ICD-10-CM) - Decreased strength      Evaluation Date: 7/12/2023  Authorization Period Expiration: 12/31/23  Plan of Care Expiration: 11/29/23  Visit # / Visits authorized: 22/40 (23)     Time In: 1516  Time Out: 1600  Total Billable Time: 44 minutes    Precautions: Hard of hearing left ear, Standard and Fall    Subjective     Pt reports: Doing okay, no complaints  He  was  "some" compliant with home exercise program, "I did them yesterday".  Response to previous treatment: No problems stated  Functional change: ongoing    Pain:  0/10  Location:  Not Applicable     Objective      Objective Measures updated at progress report unless specified.     Treatment     Gregorio received the treatments listed below:        therapeutic exercises to develop strength, endurance, and range of motion  for 25 minutes including:    NuStep Level 4 10 minutes with Bilateral Upper Extremities     Seated:  Hamstring stretch with foot on stool performing dorsiflexion/plantarflexion 30 times Right Left     Shuttle:  56# Bilateral leg press 5 minutes  56# Unilateral leg press 1 minute 3 times Right Left     therapeutic activities to improve functional performance for 19 minutes, including:    Gait with standard cane standby assistance with wide base of support     Bilateral Heel Raises 20 times  March in place 15 times Right Left alternating  Hip abduction 15 times Right Left alternating  Hip extension 15 times Right Left alternating  Hamstring curls 15 times Right Left alternating    (Activity time includes therapeutic " rest)    Patient Education and Home Exercises       Education provided:   - Patient provided with verbal and demonstrative instruction for all activities performed in today's session.  - instructed in daily home exercise program, stated understanding    Written Home Exercises Provided: Yes and Patient instructed to cont prior HEP. Exercises were reviewed and Gregorio was able to demonstrate them prior to the end of the session and demonstrated good understanding of the education provided.     See EMR under Patient Instructions for exercises provided during therapy sessions    Assessment     Gregorio provided good participation and effort during today's session with treatment focused on lower extremity strengthening/endurance and functional mobility to improve weight shift.  Gregorio tolerated standing activities with seated rest breaks, instructed in decreased Bilateral upper extremity weight bear with poor/fair follow through.    Gregorio Is progressing towards his goals.   Pt prognosis is Fair.     Pt will continue to benefit from skilled outpatient physical therapy to address the deficits listed in the problem list box on initial evaluation, provide pt/family education and to maximize pt's level of independence in the home and community environment.     Pt's spiritual, cultural and educational needs considered and pt agreeable to plan of care and goals.     Anticipated barriers to physical therapy: none     Long Term Goals: modified:    Patient will improve TUG time to 15 sec or better.  (Ongoing)  Patient will improve Zheng balance score 5 points or more 40/56     (ongoing)  Patient will improve gait speed and duration from 163 ft in 2 minutes to >600 in 6 minutes.  (Ongoing)  Patient will improve FOTO scores > 47/100   (ongoing)    Plan     Updated Certification Period: 9/13/23 to 11/29/23   Recommended Treatment Plan: 2 times per week for 10 weeks:  Gait Training, Manual Therapy, Neuromuscular Re-ed, Therapeutic  Activities, and Therapeutic Exercise    Korin Tobias, PTA

## 2023-11-09 ENCOUNTER — CLINICAL SUPPORT (OUTPATIENT)
Dept: REHABILITATION | Facility: HOSPITAL | Age: 78
End: 2023-11-09
Payer: MEDICARE

## 2023-11-09 DIAGNOSIS — R53.1 DECREASED STRENGTH: Primary | ICD-10-CM

## 2023-11-09 DIAGNOSIS — R26.81 UNSTEADY GAIT: ICD-10-CM

## 2023-11-09 PROCEDURE — 97530 THERAPEUTIC ACTIVITIES: CPT | Mod: PO

## 2023-11-09 PROCEDURE — 97110 THERAPEUTIC EXERCISES: CPT | Mod: PO

## 2023-11-09 NOTE — PROGRESS NOTES
"[facility]   Physical Therapy Treatment Note      Name: Hiro Rodríguez  Clinic Number: [id number]    Therapy Diagnosis:   Encounter Diagnoses   Name Primary?    Decreased strength Yes    Unsteady gait        Physician: Gautam Castanon III, MD    Visit Date: 11/9/2023    Physician Orders: PT Eval and Treat   Medical Diagnosis from Referral:   R26.81 (ICD-10-CM) - Unsteady gait   R53.1 (ICD-10-CM) - Decreased strength      Evaluation Date: 7/12/2023  Authorization Period Expiration: 12/31/23  Plan of Care Expiration: 9/13/23  Visit # / Visits authorized: 23/40 (24)     Time In: 1515  Time Out: 1600  Total Billable Time: 45 minutes    Precautions: Hard of hearing left ear, Standard and Fall    Subjective     Pt reports: no concerns.  He  was  compliant with home exercise program.  Response to previous treatment: tolerated Rx..  Functional change: none reported.    Pain:  0/10  Location:  none indicated    Objective      +tachypnea after walk.    Treatment     Gregorio received the treatments listed below:        therapeutic exercises to develop strength, endurance, range of motion, flexibility for 25 minutes including:  Nustep all 4's L5  10'  Standing Gastroc stretches 3'  Ankle dorsiflexion gray theraband   Hip abduction/adduction bull theraband 20/20    Therapeutic activities 20  Step ups 7" box 10/10 R/L  Side stepping 40'/40' R/L  Gait/balance without AD 50'  Airdisc standing activity/balance.    Patient Education and Home Exercises       Education provided:   Postural awareness    Written Home Exercises Provided: Yes and instructed to continue prior home exercise program . Exercises were reviewed and Gregorio was able to demonstrate them prior to the end of the session and demonstrated good understanding of the education provided.     See Electronic Medical Record  under Patient Instructions for exercises provided during therapy sessions      Assessment     Gregorio has tendency to lean " forward and to the right..  Tolerated Rx fair.  Gregorio Is progressing towards his goals.   Pt prognosis is Fair.     Pt will continue to benefit from skilled outpatient physical therapy to address the deficits listed in the problem list box on initial evaluation, provide pt/family education and to maximize pt's level of independence in the home and community environment.     Pt's spiritual, cultural and educational needs considered and pt agreeable to plan of care and goals.     Anticipated barriers to physical therapy: none     Long Term Goals: modified:    Patient will improve TUG time to 15 sec or better.  (Ongoing)  Patient will improve Zheng balance score 5 points or more 40/56     (ongoing)  Patient will improve gait speed and duration from 163 ft in 2 minutes to >600 in 6 minutes.  (Ongoing)  Patient will improve FOTO scores > 47/100   (ongoing)    Plan     Strength/endurance training  Balance  Continue Plan of care.    Valdo Salomon, PT

## 2023-11-10 ENCOUNTER — PATIENT MESSAGE (OUTPATIENT)
Dept: FAMILY MEDICINE | Facility: CLINIC | Age: 78
End: 2023-11-10
Payer: MEDICARE

## 2023-11-10 ENCOUNTER — CLINICAL SUPPORT (OUTPATIENT)
Dept: UROLOGY | Facility: CLINIC | Age: 78
End: 2023-11-10
Payer: MEDICARE

## 2023-11-10 DIAGNOSIS — R39.198 INCREASING RESIDUAL URINE: Primary | ICD-10-CM

## 2023-11-10 PROCEDURE — 51701 PR INSERTION OF NON-INDWELLING BLADDER CATHETERIZATION FOR RESIDUAL UR: ICD-10-PCS | Mod: S$GLB,,, | Performed by: UROLOGY

## 2023-11-10 PROCEDURE — 99999 PR PBB SHADOW E&M-EST. PATIENT-LVL I: CPT | Mod: PBBFAC,,,

## 2023-11-10 PROCEDURE — 99999 PR PBB SHADOW E&M-EST. PATIENT-LVL I: ICD-10-PCS | Mod: PBBFAC,,,

## 2023-11-10 PROCEDURE — 51701 INSERT BLADDER CATHETER: CPT | Mod: S$GLB,,, | Performed by: UROLOGY

## 2023-11-10 PROCEDURE — 99499 UNLISTED E&M SERVICE: CPT | Mod: S$GLB,,, | Performed by: UROLOGY

## 2023-11-10 PROCEDURE — 99499 NO LOS: ICD-10-PCS | Mod: S$GLB,,, | Performed by: UROLOGY

## 2023-11-10 RX ORDER — FUROSEMIDE 20 MG/1
20 TABLET ORAL DAILY
Qty: 90 TABLET | Refills: 1 | Status: SHIPPED | OUTPATIENT
Start: 2023-11-10

## 2023-11-10 NOTE — PATIENT INSTRUCTIONS
His goal will be to 1st if he can on his own but if he can not then just catheterize every 4-6 hours.  If he starts to have the urge to urinate and he is able to urinate on his own need to measure how much urinates and then put a catheter in and measure what is left.  Eventually if what is left is less than 200 once he increases the Flomax then we can discontinue catheterizing I suspect he will need in and out catheterization every few hours.  Eventually we can do urodynamics, a bladder test to see if his bladder generates any pressure at all.  The goal will be to catheterize 4 to 6 times a day with residuals less than 400-600.  Needs rx for 16french straight, 6 a day. Lubricated. Multiple positive cultres documented.

## 2023-11-10 NOTE — PROGRESS NOTES
Patient unable to pass straight catheter, coude catheter needed    Ijeoma Luna, 11/16/2023      Pt here to learn CIC    Taught by: Renee Wetzel MD    Reason:urinary retention  Catheter used: 16 fr straight  Catheter went: easily  Drained amount: 450cc  Urine sent for: no urine sent  Follow up date: 12/8/2023 Chen  prescription sent yet: Yes - [facility]  Box of catheters provided: 1 Coloplast    Patient was able to demonstrate CIC themselves     Discharge instructions for CIC provided: yes

## 2023-11-13 ENCOUNTER — PATIENT MESSAGE (OUTPATIENT)
Dept: UROLOGY | Facility: CLINIC | Age: 78
End: 2023-11-13
Payer: MEDICARE

## 2023-11-13 ENCOUNTER — PATIENT MESSAGE (OUTPATIENT)
Dept: FAMILY MEDICINE | Facility: CLINIC | Age: 78
End: 2023-11-13
Payer: MEDICARE

## 2023-11-14 ENCOUNTER — CLINICAL SUPPORT (OUTPATIENT)
Dept: REHABILITATION | Facility: HOSPITAL | Age: 78
End: 2023-11-14
Payer: MEDICARE

## 2023-11-14 ENCOUNTER — PATIENT MESSAGE (OUTPATIENT)
Dept: ADMINISTRATIVE | Facility: HOSPITAL | Age: 78
End: 2023-11-14
Payer: MEDICARE

## 2023-11-14 DIAGNOSIS — R53.1 DECREASED STRENGTH: Primary | ICD-10-CM

## 2023-11-14 DIAGNOSIS — R26.81 UNSTEADY GAIT: ICD-10-CM

## 2023-11-14 PROCEDURE — 97110 THERAPEUTIC EXERCISES: CPT | Mod: PO,CQ

## 2023-11-14 PROCEDURE — 97530 THERAPEUTIC ACTIVITIES: CPT | Mod: PO,CQ

## 2023-11-14 NOTE — PROGRESS NOTES
"[facility]   Physical Therapy Treatment Note      Name: Hiro Rodríguez  Clinic Number: [id number]    Therapy Diagnosis:   Encounter Diagnoses   Name Primary?    Decreased strength Yes    Unsteady gait        Physician: Gautam Castanon III, MD    Visit Date: 11/14/2023    Physician Orders: PT Eval and Treat   Medical Diagnosis from Referral:   R26.81 (ICD-10-CM) - Unsteady gait   R53.1 (ICD-10-CM) - Decreased strength      Evaluation Date: 7/12/2023  Authorization Period Expiration: 12/31/23  Plan of Care Expiration: 11/29/23  Visit # / Visits authorized: 24/40 (25)     Time In: 1516  Time Out: 1600  Total Billable Time: 44 minutes    Precautions: Hard of hearing left ear, Standard and Fall    Subjective     Pt reports: Doing okay, no complaints  He reports  was  "some" compliant with home exercise program.  Response to previous treatment: No problems stated  Functional change: ongoing    Pain:  0/10  Location:  Not Applicable     Objective      Objective Measures updated at progress report unless specified.     Treatment     Gregorio received the treatments listed below:        therapeutic exercises to develop strength, endurance, and range of motion  for 10 minutes including:    NuStep Level 5 10 minutes with Bilateral Upper Extremities     therapeutic activities to improve functional performance for 34 minutes, including:    Gait with standard cane standby assistance with wide base of support 2x130 feet    Parallel bars verbal cues to decrease Bilateral upper extremity weight bear:  Bilateral Heel Raises 20 times  March in place 15 times Right Left alternating  Hip abduction 15 times Right Left alternating  Hip extension 15 times Right Left alternating  Hamstring curls 15 times Right Left alternating    Timed Up and Go: 21 seconds, 18 seconds, 20 seconds (average 19.6 seconds)    2 minute walk test 156 feet with own cane    (Activity time includes therapeutic rest)    Patient " "Education and Home Exercises       Education provided:   - Patient provided with verbal and demonstrative instruction for all activities performed in today's session.  - Instructed in doing home exercise program daily to improve lower extremity weight shifting to improve gait and balance, stated understanding    Written Home Exercises Provided: Patient instructed to cont prior home exercise program.     See EMR under Patient Instructions for exercises provided during therapy sessions    Assessment     Gregorio provided fair participation and effort during today's session with treatment focused on lower extremity strengthening/endurance and functional mobility to improve weight shift.  Gregorio tolerated activities with rest breaks and continues to do home exercise program "some", instructed in daily standing balance activities and walking to improve balance and overall gait, stated understanding. Gregorio continues to need multiple verbal cues to decrease upper extremity weight bear during stand exercises with poor follow through and had a decrease in distance with 2 minute walk test, stated did not think he could do the 6 minute walk test.    Gregorio Is progressing towards his goals.   Pt prognosis is Fair.     Pt will continue to benefit from skilled outpatient physical therapy to address the deficits listed in the problem list box on initial evaluation, provide pt/family education and to maximize pt's level of independence in the home and community environment.     Pt's spiritual, cultural and educational needs considered and pt agreeable to plan of care and goals.     Anticipated barriers to physical therapy: none     Long Term Goals: modified:    Patient will improve TUG time to 15 sec or better.  (Ongoing)(19.6 seconds 11/14/23)  Patient will improve Zheng balance score 5 points or more 40/56(ongoing)  Patient will improve gait speed and duration from 163 ft in 2 minutes to >600 in 6 minutes.(Ongoing)(156 feet in 2 " minutes 11/14/23)  Patient will improve FOTO scores > 47/100   (ongoing)    Plan     Updated Certification Period: 9/13/23 to 11/29/23   Recommended Treatment Plan: 2 times per week for 10 weeks:  Gait Training, Manual Therapy, Neuromuscular Re-ed, Therapeutic Activities, and Therapeutic Exercise    Korin Tobias, PTA

## 2023-11-15 ENCOUNTER — CLINICAL SUPPORT (OUTPATIENT)
Dept: UROLOGY | Facility: CLINIC | Age: 78
End: 2023-11-15
Payer: MEDICARE

## 2023-11-15 DIAGNOSIS — R39.198 INCREASING RESIDUAL URINE: Primary | ICD-10-CM

## 2023-11-15 PROCEDURE — 51701 PR INSERTION OF NON-INDWELLING BLADDER CATHETERIZATION FOR RESIDUAL UR: ICD-10-PCS | Mod: S$GLB,,, | Performed by: UROLOGY

## 2023-11-15 PROCEDURE — 99499 NO LOS: ICD-10-PCS | Mod: S$GLB,,, | Performed by: UROLOGY

## 2023-11-15 PROCEDURE — 51701 INSERT BLADDER CATHETER: CPT | Mod: S$GLB,,, | Performed by: UROLOGY

## 2023-11-15 PROCEDURE — 99499 UNLISTED E&M SERVICE: CPT | Mod: S$GLB,,, | Performed by: UROLOGY

## 2023-11-15 NOTE — PROGRESS NOTES
Pt here to learn CIC    Taught by: Renee Wetzel MD  Wife taught    Reason: urinary retention  Catheter used: 16 fr coude, straight  Catheter went: easily  Drained amount: 300cc  Urine sent for: no urine sent  Follow up date: 12/8/2023  prescription sent yet: Yes -   Box of catheters provided: 1 box of coloplast coude catheters    wife was able to demonstrate CIC themselves     Discharge instructions for CIC provided: yes

## 2023-11-16 ENCOUNTER — CLINICAL SUPPORT (OUTPATIENT)
Dept: REHABILITATION | Facility: HOSPITAL | Age: 78
End: 2023-11-16
Payer: MEDICARE

## 2023-11-16 DIAGNOSIS — R26.81 UNSTEADY GAIT: ICD-10-CM

## 2023-11-16 DIAGNOSIS — R53.1 DECREASED STRENGTH: Primary | ICD-10-CM

## 2023-11-16 PROCEDURE — 97110 THERAPEUTIC EXERCISES: CPT | Mod: PO,CQ

## 2023-11-16 PROCEDURE — 97530 THERAPEUTIC ACTIVITIES: CPT | Mod: PO,CQ

## 2023-11-16 NOTE — PROGRESS NOTES
[facility]   Physical Therapy Treatment Note      Name: Hiro Rodríguez  [facility] Number: [id number]    Therapy Diagnosis:   Encounter Diagnoses   Name Primary?    Decreased strength Yes    Unsteady gait        Physician: Gautam Castanon III, MD    Visit Date: 11/16/2023    Physician Orders: PT Eval and Treat   Medical Diagnosis from Referral:   R26.81 (ICD-10-CM) - Unsteady gait   R53.1 (ICD-10-CM) - Decreased strength      Evaluation Date: 7/12/2023  Authorization Period Expiration: 12/31/23  Plan of Care Expiration: 11/29/23  Visit # / Visits authorized: 25/40 (26)     Time In: 1508 (early in)  Time Out: 1550  Total Billable Time: 42 minutes    Precautions: Hard of hearing left ear, Standard and Fall    Subjective     Pt reports: Doing okay, no complaints, reports doing better with cane.  He reports  was compliant with home exercise program.  Response to previous treatment: No problems stated  Functional change: ongoing    Pain:  0/10  Location:  Not Applicable     Objective      Objective Measures updated at progress report unless specified.     Treatment     Gregorio received the treatments listed below:        therapeutic exercises to develop strength, endurance, and range of motion  for 15 minutes including:    NuStep Level 5 10 minutes with Bilateral Upper Extremities     Seated:  Hamstring stretch with foot on stool performing dorsiflexion/plantarflexion 1 minute Right Left, verbal cues to decrease speed    therapeutic activities to improve functional performance  for 27 minutes, including:    Gait with standard cane standby assistance with wide base of support x120 feet and 150 feet    Parallel bars verbal cues to decrease Bilateral upper extremity weight bear:  Weight shift on foam cushion 2 minutes: anterior/posterior, lateral Right Left   Bilateral Heel Raises 20 times  March in place 20 times Right Left alternating  Hip abduction 20 times Right Left alternating  Hip extension  20 times Right Left alternating  Hamstring curls 20 times Right Left alternating  Tandem weight shift 3x 1 minute Right Left leading (verbal cues to increase weight shift to back foot with occasional assistance)    (Activity time includes therapeutic rest)    Patient Education and Home Exercises       Education provided:   - Patient provided with verbal and demonstrative instruction for all activities performed in today's session.  - Instructed in doing home exercise program daily to improve lower extremity weight shifting to improve gait and balance, stated understanding    Written Home Exercises Provided: Patient instructed to cont prior home exercise program.     See Electronic Medical Record  under Patient Instructions for exercises provided during therapy sessions    Assessment     Gregorio provided good participation and effort during today's session with treatment focused on  lower extremity strengthening/endurance and functional mobility.  Gregorio tolerated standing activities with decreased sit rest breaks and fair follow through with cues for decreased upper extremity weight bear.    Gregorio Is progressing towards his goals.   Pt prognosis is Fair.     Pt will continue to benefit from skilled outpatient physical therapy to address the deficits listed in the problem list box on initial evaluation, provide pt/family education and to maximize pt's level of independence in the home and community environment.     Pt's spiritual, cultural and educational needs considered and pt agreeable to plan of care and goals.     Anticipated barriers to physical therapy: none     Long Term Goals: modified:    Patient will improve TUG time to 15 sec or better.  (Ongoing)(19.6 seconds 11/14/23)  Patient will improve Zheng balance score 5 points or more 40/56(ongoing)  Patient will improve gait speed and duration from 163 ft in 2 minutes to >600 in 6 minutes.(Ongoing)(156 feet in 2 minutes 11/14/23)  Patient will improve FOTO scores >  47/100   (ongoing)    Plan     Updated Certification Period: 9/13/23 to 11/29/23   Recommended Treatment Plan: 2 times per week for 10 weeks:  Gait Training, Manual Therapy, Neuromuscular Re-ed, Therapeutic Activities, and Therapeutic Exercise    Korin Tobias, PTA

## 2023-11-21 ENCOUNTER — PATIENT OUTREACH (OUTPATIENT)
Dept: ADMINISTRATIVE | Facility: HOSPITAL | Age: 78
End: 2023-11-21
Payer: MEDICARE

## 2023-11-21 NOTE — PROGRESS NOTES
Population Health Chart Review & Patient Outreach Details    Outreach Performed: YES Portal    Additional Pop Health Notes:           Updates Requested / Reviewed:      Updated Care Coordination Note         Health Maintenance Topics Overdue:    Health Maintenance Due   Topic Date Due    RSV Vaccine (Age 60+ and Pregnant patients) (1 - 1-dose 60+ series) Never done    Eye Exam  01/08/2022    Lipid Panel  07/07/2023    Influenza Vaccine (1) 09/01/2023    COVID-19 Vaccine (7 - 2023-24 season) 09/01/2023    Hemoglobin A1c  09/02/2023         Health Maintenance Topic(s) Outreach Outcomes & Actions Taken:      Eye Exam - Outreach Outcomes & Actions Taken  : Portal message sent out regarding pt's overdue Diabetic eye exam  Lab(s) - Outreach Outcomes & Actions Taken  : Portal message sent out regarding pt's overdue labs

## 2023-11-22 ENCOUNTER — PATIENT OUTREACH (OUTPATIENT)
Dept: ADMINISTRATIVE | Facility: HOSPITAL | Age: 78
End: 2023-11-22
Payer: MEDICARE

## 2023-11-22 NOTE — PROGRESS NOTES
Population Health Chart Review & Patient Outreach Details    Outreach Performed: NO    Additional Pop Health Notes:           Updates Requested / Reviewed:      Updated Care Coordination Note, External Sources: [facility] and [facility], and Care Team Updated         Health Maintenance Topics Overdue:    Health Maintenance Due   Topic Date Due    RSV Vaccine (Age 60+ and Pregnant patients) (1 - 1-dose 60+ series) Never done    Eye Exam  01/08/2022    Lipid Panel  07/07/2023    Influenza Vaccine (1) 09/01/2023    COVID-19 Vaccine (7 - 2023-24 season) 09/01/2023    Hemoglobin A1c  09/02/2023         Health Maintenance Topic(s) Outreach Outcomes & Actions Taken:    Eye Exam - Outreach Outcomes & Actions Taken  : Diabetic Eye External Records Uploaded, Care Team & History Updated if Applicable

## 2023-11-28 ENCOUNTER — CLINICAL SUPPORT (OUTPATIENT)
Dept: REHABILITATION | Facility: HOSPITAL | Age: 78
End: 2023-11-28
Payer: MEDICARE

## 2023-11-28 DIAGNOSIS — R26.81 UNSTEADY GAIT: ICD-10-CM

## 2023-11-28 DIAGNOSIS — R53.1 DECREASED STRENGTH: Primary | ICD-10-CM

## 2023-11-28 PROCEDURE — 97530 THERAPEUTIC ACTIVITIES: CPT | Mod: PO

## 2023-11-28 PROCEDURE — 97110 THERAPEUTIC EXERCISES: CPT | Mod: PO

## 2023-11-28 NOTE — PROGRESS NOTES
[facility]  Physical Therapy Discharge Note    Name: Hiro Rodríguez  [facility] Number: [id number]    Therapy Diagnosis:   Encounter Diagnoses   Name Primary?    Decreased strength Yes    Unsteady gait      Physician: Gautam Castanon III, MD    Physician Orders: PT Eval and treat  Medical Diagnosis:   R26.81 (ICD-10-CM) - Unsteady gait   R53.1 (ICD-10-CM) - Decreased strength     Evaluation Date: 7/12/23      Date of Last visit: 11/28/23  Total Visits Received: 25    Today's Visit:  Time IN:  1430  Time Out: 1515  Total time in minutes: 45    Treatment: Thera Ex 15, Thera Act 30  Includes:   Nustep L4 5'  Sit to stand 8  Timed Up and Go x 3  GT 4 minutes emphasis on speed and safety  Gonzalez Balancing Activities  Reviewed Home Exercises.  Heel raises  Mini squats    ASSESSMENT      Patient seen x 4 months of therapy. Had made good improvement with strength and function. Endurance and balance persists to limit continuous activity.  Partially met goals.  Timed Up and Go;  1. 14.80 sec     2. 13.22 sec     3. 13.55 sec  30 sec chair stand     8  4 min self paced ambulation 354.02ft  Unable to do 6 minutes secondary to need to sit.  GONZALEZ Assessment  1. Sitting to Standing   3 - able to stand independely using hands  2. Standing Unsupported   4 - able to stand safely 2 minutes without hold  3. Sitting Unsupported   4 - able to sit safely and securely 2 minutes  4. Standing to Sitting   3 - controls descent by using hands  5. Pivot Transfer   3 - able to transfer safely with definite use of hands  6. Standing with Eyes Closed   4 - albe to stand 10 seconds safely  7. Standing with Feet Together   4 - able to place feet together independently and stand 1 minute safely  8. Reaching Forward with Outstretched Arm   3 - can reach forward 12 cm/5 inches safely  9. Retrieving Object from Floor   3 - able to pick slipper but needs supervision  10. Turning to Look Behind   4 - looks behind from both sides  and weights shifts well  11. Turning 360 Degrees   4 - able to turn 360 in seconds or less  12. Placing Alternate Foot on Step   0 - needs assist to keep from falling/unable to try  13. Standing with One Foot in Front   0 - Looses balance while stepping or standing  14. Standing on One Foot   0 - unable to try or needs assistance to prevent fall  Discharge reason: Patient has reached the maximum rehab potential for the present time    Discharge FOTO Score: 53/100    Goals: Patient will improve TUG time to 15 sec or better.   (met < 15 sec)  Patient will improve Zheng balance score 5 points or more 40/56    (met  40/56)  Patient will improve gait speed and duration from 163 ft in 2 minutes to >600 in 6 minutes.(Unmet)   Patient will improve FOTO scores > 47/100   (met)    PLAN   This patient is discharged from Physical Therapy  Continue Home exercises.      Valdo Salomon, PT

## 2023-12-01 ENCOUNTER — PATIENT MESSAGE (OUTPATIENT)
Dept: ADMINISTRATIVE | Facility: HOSPITAL | Age: 78
End: 2023-12-01
Payer: MEDICARE

## 2023-12-08 ENCOUNTER — OFFICE VISIT (OUTPATIENT)
Dept: UROLOGY | Facility: CLINIC | Age: 78
End: 2023-12-08
Payer: MEDICARE

## 2023-12-08 VITALS — WEIGHT: 240.06 LBS | RESPIRATION RATE: 16 BRPM | BODY MASS INDEX: 34.37 KG/M2 | HEIGHT: 70 IN

## 2023-12-08 DIAGNOSIS — R32 URINARY INCONTINENCE, UNSPECIFIED TYPE: Primary | Chronic | ICD-10-CM

## 2023-12-08 PROCEDURE — 1160F PR REVIEW ALL MEDS BY PRESCRIBER/CLIN PHARMACIST DOCUMENTED: ICD-10-PCS | Mod: CPTII,S$GLB,, | Performed by: NURSE PRACTITIONER

## 2023-12-08 PROCEDURE — 3288F PR FALLS RISK ASSESSMENT DOCUMENTED: ICD-10-PCS | Mod: CPTII,S$GLB,, | Performed by: NURSE PRACTITIONER

## 2023-12-08 PROCEDURE — 99999 PR PBB SHADOW E&M-EST. PATIENT-LVL V: CPT | Mod: PBBFAC,,, | Performed by: NURSE PRACTITIONER

## 2023-12-08 PROCEDURE — 1159F PR MEDICATION LIST DOCUMENTED IN MEDICAL RECORD: ICD-10-PCS | Mod: CPTII,S$GLB,, | Performed by: NURSE PRACTITIONER

## 2023-12-08 PROCEDURE — 1125F AMNT PAIN NOTED PAIN PRSNT: CPT | Mod: CPTII,S$GLB,, | Performed by: NURSE PRACTITIONER

## 2023-12-08 PROCEDURE — 99214 PR OFFICE/OUTPT VISIT, EST, LEVL IV, 30-39 MIN: ICD-10-PCS | Mod: S$GLB,,, | Performed by: NURSE PRACTITIONER

## 2023-12-08 PROCEDURE — 99214 OFFICE O/P EST MOD 30 MIN: CPT | Mod: S$GLB,,, | Performed by: NURSE PRACTITIONER

## 2023-12-08 PROCEDURE — 1101F PR PT FALLS ASSESS DOC 0-1 FALLS W/OUT INJ PAST YR: ICD-10-PCS | Mod: CPTII,S$GLB,, | Performed by: NURSE PRACTITIONER

## 2023-12-08 PROCEDURE — 1125F PR PAIN SEVERITY QUANTIFIED, PAIN PRESENT: ICD-10-PCS | Mod: CPTII,S$GLB,, | Performed by: NURSE PRACTITIONER

## 2023-12-08 PROCEDURE — 99999 PR PBB SHADOW E&M-EST. PATIENT-LVL V: ICD-10-PCS | Mod: PBBFAC,,, | Performed by: NURSE PRACTITIONER

## 2023-12-08 PROCEDURE — 1160F RVW MEDS BY RX/DR IN RCRD: CPT | Mod: CPTII,S$GLB,, | Performed by: NURSE PRACTITIONER

## 2023-12-08 PROCEDURE — 1101F PT FALLS ASSESS-DOCD LE1/YR: CPT | Mod: CPTII,S$GLB,, | Performed by: NURSE PRACTITIONER

## 2023-12-08 PROCEDURE — 1159F MED LIST DOCD IN RCRD: CPT | Mod: CPTII,S$GLB,, | Performed by: NURSE PRACTITIONER

## 2023-12-08 PROCEDURE — 3288F FALL RISK ASSESSMENT DOCD: CPT | Mod: CPTII,S$GLB,, | Performed by: NURSE PRACTITIONER

## 2023-12-08 NOTE — PROGRESS NOTES
CHIEF COMPLAINT:    Mr. Rodríguez is a 78 y.o. male presenting for f/u urinary incontinence  PRESENTING ILLNESS:    Hiro Rodríguez is a 78 y.o. male who presents for f/u urinary incontinence. Consult was 11/7/23 with Dr Luna    Hiro Rodríguez is a 78 y.o. male patient of Dr. Hughes.  Nurses note from 11/16/22 Bladder scanned pt due to no urination on my shift with highest reading of 527ml, per Dr. Shepherd not to straight cath pt until readings of 600ml.  (FAILED VOIDING TRIAL #1) Will continue to monitor.  Patient initially saw him as a consult the hospital on January 6 for urinary retentionand UTI (FAILED VOIDING TRIAL #2) after hip fracture. Sotelo placed and made f/u with   When he came to the ER for hypotension and tachycardia on 01/22 he was found to be in urinary retention again  (FAILED VOIDING TRIAL #3) with over a L drained when a Sotelo was placed.  A CT also showed a proximal left ureteral stone with mild hydro and bilateral renal stones.  Culture ended up growing Pseudomonas.  He was treated for sepsis and did not any flank pain was discharge back [facility].       Interval consult by  in HOSPITAL on 1/23/23 for urinary retention and stone:   77-year-old male I have seen previously for urinary retention  Patient recently failed another voiding trial  Admitted for hypotension, and tachycardia  Patient required Sotelo catheterization here in the ER   (FAILED VOIDING TRIAL #3)  Now with a Sotelo catheter in place draining clear urine  Excellent renal function creatinine 0.8  Once patient is discharged can resume workup for urinary retention and bladder outlet obstruction     Interval consult by  in HOSPITAL on 3/2/23 for left flank pain and retained stent:   77-year-old male with obstructing left upper ureteral calculi  Patient continues to be in discomfort  He returned on 3/1/23 dizziness and tachycardia.  Abnormal urine suspicious for UTI.  A CT renal stone study done  3/1/23 showed the stone was now significantly obstructing causing moderate left hydro and there was another left ureteral stone seen as well.  Dr. Hughes place a left ureteral stent with plans to have him return 2 weeks with an x-ray to schedule outpatient stone extraction.       Initial consult by me in hospital on 3/18/23:  Presented to the ER again yesterday on 03/16 2 weeks after his procedure with hypotension.  Urine again suspicious for UTI however he also has a stent in place.  Culture growing presumptive Pseudomonas.  No Sotelo in place.  CT renal stone done again.  Independent review of the CT showed that the left stent was in good position, no obstructing stone on the right and has distended bladder.  He has no hydronephrosis of either kidney.  Urology consulted for further recommendations because of his UTI recurrence.     Ctrss 3/17/23  untitled imageuntitled image  untitled imageuntitled image     Check bladder scan.  If residual greater than 300 place Sotelo and leave in.  Offer patient clean intermittent catheterization or leaving Sotelo in place  Discontinue Detrol  Continue Flomax 0.8  Can follow-up Dr. Hughes  Also he is on Topamax.  This will significantly increase his risk of stones.     Cysto left ureteroscopy and stone extraction by  on 4/20/23     US bladder 9/25/23 ordered by : [id number] in bladder and could not void.   US kidney: R renal stone, no hydro     Interval history by ME in CLINIC on 11/7/23 for elevated urine residuals:  Extensive review of his history dating back to at least November 2022 showed that he is failed at least for voiding trials.  He had never seen a urologist prior to seeing Dr. Hughes as a consult for urinary retention  Today his bladder scan shows 420 postvoid but he has no urge to void.  Review of his medications show that for some reason he is on Detrol although I had discontinued it when I saw him in March.  He is also on Flomax 0.4 mg  and finasteride.  He had a subdural hematoma a year ago.  Prior to that he was urinating 2 times during the day 4 times a night.  Denied any urinary hesitancy or enlarged prostate symptoms or incontinence.   He says that he has been experiencing unaware incontinence over the last 3 months. He wears 3 depends a day. 4 incontinence episodes during the day and never knows when he has to urinate.   Prior to this states he was urinating with urge 2 times a day. Denies any other neurologic changes in the last 3 months. However significant constipation in the last 3 months  Risk factors for urinary retention and elevated urine residual  Biggest risk factor could be the fact that he had a subdural hematoma after falling a year ago November 2022.  Sounds like most of his symptoms started then.  In addition review of his CT scan from that time shows that his bladder is mostly empty but a bladder scan from that time shows that he had 600 in his bladder  Constipation: Only has a bowel movement every 3-4 days  Overactive bladder medication: For some reason he is on Detrol.  Prediabetic: Less likely the cause since he is only on metformin  Enlarged prostate:  Less likely since he is on Flomax and finasteride and he does not feel when he has a full bladder     Interval history 12/8/23  Pt presents to clinic for f/u urinary incontinence  Pt is doing CIC once a day, ~250 ml drained with CIC  No difficulty advancing catheter  Pt is taking flomax 0.8 mg daily. Stopped detrol  If he feels urge to void, he usually is able to get to the bathroom to void before having incontinence. If he does not feel the urge, then he will have urinary incontinence.  He is only voiding 3 x per day.  Wearing depends, 3-4 per day. He always has a wet depends in the morning. He wakes during the night to drink liquids but does not get up to void. He had a few dry depends during the day over the past 2 weeks.  Denies dysuria, gross hematuria, flank pain,  fever, chills, nausea or vomiting    Since stopping detrol, constipation improved. He is also taking miralax every other day. Having daily bowel movements.       Urine history:   11/7/23            Large blood/mod leukocytes -      PSA history: no family hx of prostate cancer  7/10/23            0.77  10/26/21           0.85  8/20/20            0.91      REVIEW OF SYSTEMS:    Review of Systems    Constitutional: Negative for fever and chills.   Gastrointestinal: Negative for nausea, vomiting  Genitourinary:  See above  Neurological: Negative for dizziness.   Psychiatric/Behavioral: Negative for confusion.   PHYSICAL EXAMINATION:    Constitutional: He is oriented to person, place, and time. He appears well-developed and well-nourished.  He is in no apparent distress.    Psych: Cooperative with normal affect.      Physical Exam    LABS:    Lab Results   Component Value Date    PSA 0.77 07/10/2023    PSA 0.85 10/26/2021    PSA 0.91 08/20/2020    PSADIAG 0.55 07/19/2023    PSATOTAL 19.3 (H) 02/07/2023    PSAFREE 14.77 (H) 02/07/2023    PSAFREEPCT 76.53 02/07/2023     Lab Results   Component Value Date    CREATININE 0.8 11/07/2023       IMPRESSION:    Encounter Diagnoses   Name Primary?    Urinary incontinence, unspecified type Yes       PLAN:  -Continue CIC as ordered  -Continue flomax as ordered. No refill needed  -Encouraged pt to start timed voiding at least every 3 hours regardless of urge. He is only voiding 3 x per day which can worsen urinary incontinence.  -Continue to avoid/treat constipation which can also worsen LUTS  -Conservative measures given on AVS  -RTC as scheduled     I encouraged him or any of his family members to call or email me with questions and/or concerns.      30 minutes of total time spent on the encounter, which includes face to face time and non-face to face time preparing to see the patient (eg, review of tests), Obtaining and/or reviewing separately obtained history, Documenting clinical  information in the electronic or other health record, Independently interpreting results (not separately reported) and communicating results to the patient/family/caregiver, or Care coordination (not separately reported).

## 2023-12-12 ENCOUNTER — PATIENT MESSAGE (OUTPATIENT)
Dept: UROLOGY | Facility: CLINIC | Age: 78
End: 2023-12-12
Payer: MEDICARE

## 2023-12-12 RX ORDER — OMEGA-3-ACID ETHYL ESTERS 1 G/1
CAPSULE, LIQUID FILLED ORAL
Qty: 360 CAPSULE | Refills: 0 | Status: SHIPPED | OUTPATIENT
Start: 2023-12-12 | End: 2024-03-24

## 2023-12-12 RX ORDER — METFORMIN HYDROCHLORIDE 500 MG/1
500 TABLET, EXTENDED RELEASE ORAL
Qty: 90 TABLET | Refills: 0 | Status: SHIPPED | OUTPATIENT
Start: 2023-12-12

## 2023-12-12 NOTE — TELEPHONE ENCOUNTER
Care Due:                  Date            Visit Type   Department     Provider  --------------------------------------------------------------------------------                                EP [facility]  Last Visit: 09-      CARE ([facility])   [facility]  Next Visit: None Scheduled  None         None Found                                                            Last  Test          Frequency    Reason                     Performed    Due Date  --------------------------------------------------------------------------------    HBA1C.......  6 months...  metFORMIN, semaglutide...  03- 08-    Lipid Panel.  12 months..  omega-3, rosuvastatin....  07- 07-    Health [facility] Embedded Care Due Messages. Reference number: [id number].   12/12/2023 4:12:04 PM CST

## 2023-12-13 RX ORDER — TADALAFIL 20 MG/1
20 TABLET ORAL
Qty: 30 TABLET | Refills: 1 | Status: SHIPPED | OUTPATIENT
Start: 2023-12-13 | End: 2024-12-12

## 2023-12-13 NOTE — TELEPHONE ENCOUNTER
Refill Routing Note   Medication(s) are not appropriate for processing by [facility] for the following reason(s):        Required labs outdated    [facility] action(s):  Defer     Requires labs : Yes             Appointments  past 12m or future 3m with PCP    Date Provider   Last Visit   9/11/2023 Gautam Castanon III, MD   Next Visit   Visit date not found Gautam Castanon III, MD   ED visits in past 90 days: 0        Note composed:8:58 PM 12/12/2023           30-Oct-2022 01:16

## 2024-01-03 ENCOUNTER — TELEPHONE (OUTPATIENT)
Dept: UROLOGY | Facility: CLINIC | Age: 79
End: 2024-01-03
Payer: MEDICARE

## 2024-01-03 NOTE — TELEPHONE ENCOUNTER
----- Message from Ijeoma Luna MD sent at 1/2/2024  5:28 PM CST -----  Please fax my note from 11/7 to rita gaspar  [phone] and let her know addendum for coude at the bottom

## 2024-02-09 ENCOUNTER — TELEPHONE (OUTPATIENT)
Dept: UROLOGY | Facility: CLINIC | Age: 79
End: 2024-02-09
Payer: MEDICARE

## 2024-02-09 ENCOUNTER — HOSPITAL ENCOUNTER (INPATIENT)
Facility: HOSPITAL | Age: 79
LOS: 3 days | Discharge: HOME HEALTH | DRG: 988 | End: 2024-02-12
Attending: EMERGENCY MEDICINE | Admitting: INTERNAL MEDICINE
Payer: MEDICARE

## 2024-02-09 DIAGNOSIS — S09.90XA HEAD TRAUMA, INITIAL ENCOUNTER: ICD-10-CM

## 2024-02-09 DIAGNOSIS — S06.5X0S: ICD-10-CM
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
## 2024-02-09 DIAGNOSIS — S22.42XA CLOSED FRACTURE OF MULTIPLE RIBS OF LEFT SIDE, INITIAL ENCOUNTER: ICD-10-CM
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
## 2024-02-09 DIAGNOSIS — S06.5XAA SDH (SUBDURAL HEMATOMA): Primary | ICD-10-CM

## 2024-02-09 DIAGNOSIS — R07.9 CHEST PAIN: ICD-10-CM

## 2024-02-09 DIAGNOSIS — S06.5X0A SUBDURAL HEMATOMA DUE TO CONCUSSION, WITHOUT LOSS OF CONSCIOUSNESS, INITIAL ENCOUNTER: ICD-10-CM

## 2024-02-09 DIAGNOSIS — R07.81 RIB PAIN ON LEFT SIDE: ICD-10-CM

## 2024-02-09 PROBLEM — S01.81XA LACERATION OF FOREHEAD: Status: ACTIVE | Noted: 2024-02-09

## 2024-02-09 LAB
ABO + RH BLD: NORMAL
ALBUMIN SERPL BCP-MCNC: 3 G/DL (ref 3.5–5.2)
ALP SERPL-CCNC: 65 U/L (ref 55–135)
ALT SERPL W/O P-5'-P-CCNC: 24 U/L (ref 10–44)
ANION GAP SERPL CALC-SCNC: 11 MMOL/L (ref 8–16)
APTT PPP: 29.3 SEC (ref 21–32)
AST SERPL-CCNC: 26 U/L (ref 10–40)
BASOPHILS # BLD AUTO: 0.02 K/UL (ref 0–0.2)
BASOPHILS NFR BLD: 0.2 % (ref 0–1.9)
BILIRUB SERPL-MCNC: 0.5 MG/DL (ref 0.1–1)
BLD GP AB SCN CELLS X3 SERPL QL: NORMAL
BUN SERPL-MCNC: 15 MG/DL (ref 8–23)
CALCIUM SERPL-MCNC: 8.8 MG/DL (ref 8.7–10.5)
CHLORIDE SERPL-SCNC: 105 MMOL/L (ref 95–110)
CO2 SERPL-SCNC: 25 MMOL/L (ref 23–29)
CREAT SERPL-MCNC: 0.8 MG/DL (ref 0.5–1.4)
DIFFERENTIAL METHOD BLD: ABNORMAL
EOSINOPHIL # BLD AUTO: 0 K/UL (ref 0–0.5)
EOSINOPHIL NFR BLD: 0.3 % (ref 0–8)
ERYTHROCYTE [DISTWIDTH] IN BLOOD BY AUTOMATED COUNT: 14.1 % (ref 11.5–14.5)
EST. GFR  (NO RACE VARIABLE): >60 ML/MIN/1.73 M^2
GLUCOSE SERPL-MCNC: 110 MG/DL (ref 70–110)
GLUCOSE SERPL-MCNC: 118 MG/DL (ref 70–110)
HCT VFR BLD AUTO: 35.7 % (ref 40–54)
HGB BLD-MCNC: 11.4 G/DL (ref 14–18)
IMM GRANULOCYTES # BLD AUTO: 0.11 K/UL (ref 0–0.04)
IMM GRANULOCYTES NFR BLD AUTO: 0.9 % (ref 0–0.5)
INR PPP: 1.1 (ref 0.8–1.2)
LYMPHOCYTES # BLD AUTO: 1.5 K/UL (ref 1–4.8)
LYMPHOCYTES NFR BLD: 12.5 % (ref 18–48)
MCH RBC QN AUTO: 29.6 PG (ref 27–31)
MCHC RBC AUTO-ENTMCNC: 31.9 G/DL (ref 32–36)
MCV RBC AUTO: 93 FL (ref 82–98)
MONOCYTES # BLD AUTO: 0.8 K/UL (ref 0.3–1)
MONOCYTES NFR BLD: 6.7 % (ref 4–15)
NEUTROPHILS # BLD AUTO: 9.5 K/UL (ref 1.8–7.7)
NEUTROPHILS NFR BLD: 79.4 % (ref 38–73)
NRBC BLD-RTO: 0 /100 WBC
PLATELET # BLD AUTO: 254 K/UL (ref 150–450)
PMV BLD AUTO: 9.8 FL (ref 9.2–12.9)
POTASSIUM SERPL-SCNC: 4.1 MMOL/L (ref 3.5–5.1)
PROT SERPL-MCNC: 6.5 G/DL (ref 6–8.4)
PROTHROMBIN TIME: 11.6 SEC (ref 9–12.5)
RBC # BLD AUTO: 3.85 M/UL (ref 4.6–6.2)
SODIUM SERPL-SCNC: 141 MMOL/L (ref 136–145)
SPECIMEN OUTDATE: NORMAL
WBC # BLD AUTO: 12 K/UL (ref 3.9–12.7)

## 2024-02-09 PROCEDURE — 12053 INTMD RPR FACE/MM 5.1-7.5 CM: CPT

## 2024-02-09 PROCEDURE — 25000003 PHARM REV CODE 250: Performed by: INTERNAL MEDICINE

## 2024-02-09 PROCEDURE — 36415 COLL VENOUS BLD VENIPUNCTURE: CPT | Performed by: EMERGENCY MEDICINE

## 2024-02-09 PROCEDURE — 20000000 HC ICU ROOM

## 2024-02-09 PROCEDURE — 85730 THROMBOPLASTIN TIME PARTIAL: CPT | Performed by: EMERGENCY MEDICINE

## 2024-02-09 PROCEDURE — 63600175 PHARM REV CODE 636 W HCPCS: Performed by: INTERNAL MEDICINE

## 2024-02-09 PROCEDURE — 96374 THER/PROPH/DIAG INJ IV PUSH: CPT | Mod: 59

## 2024-02-09 PROCEDURE — 85610 PROTHROMBIN TIME: CPT | Performed by: EMERGENCY MEDICINE

## 2024-02-09 PROCEDURE — 93010 ELECTROCARDIOGRAM REPORT: CPT | Mod: ,,, | Performed by: GENERAL PRACTICE

## 2024-02-09 PROCEDURE — 63600531 PHARM REV CODE 636 NO ALT 250 W HCPCS: Mod: JZ,JG | Performed by: EMERGENCY MEDICINE

## 2024-02-09 PROCEDURE — 99900035 HC TECH TIME PER 15 MIN (STAT)

## 2024-02-09 PROCEDURE — 94799 UNLISTED PULMONARY SVC/PX: CPT | Mod: XB

## 2024-02-09 PROCEDURE — 25000003 PHARM REV CODE 250: Performed by: NEUROLOGICAL SURGERY

## 2024-02-09 PROCEDURE — 80053 COMPREHEN METABOLIC PANEL: CPT | Performed by: EMERGENCY MEDICINE

## 2024-02-09 PROCEDURE — 25000003 PHARM REV CODE 250: Performed by: EMERGENCY MEDICINE

## 2024-02-09 PROCEDURE — 99285 EMERGENCY DEPT VISIT HI MDM: CPT | Mod: 25

## 2024-02-09 PROCEDURE — 0JQ10ZZ REPAIR FACE SUBCUTANEOUS TISSUE AND FASCIA, OPEN APPROACH: ICD-10-PCS | Performed by: EMERGENCY MEDICINE

## 2024-02-09 PROCEDURE — 63600175 PHARM REV CODE 636 W HCPCS: Performed by: NEUROLOGICAL SURGERY

## 2024-02-09 PROCEDURE — 93005 ELECTROCARDIOGRAM TRACING: CPT | Performed by: GENERAL PRACTICE

## 2024-02-09 PROCEDURE — 86901 BLOOD TYPING SEROLOGIC RH(D): CPT | Performed by: INTERNAL MEDICINE

## 2024-02-09 PROCEDURE — 30283B1 TRANSFUSION OF NONAUTOLOGOUS 4-FACTOR PROTHROMBIN COMPLEX CONCENTRATE INTO VEIN, PERCUTANEOUS APPROACH: ICD-10-PCS | Performed by: EMERGENCY MEDICINE

## 2024-02-09 PROCEDURE — 63600175 PHARM REV CODE 636 W HCPCS: Performed by: EMERGENCY MEDICINE

## 2024-02-09 PROCEDURE — A4216 STERILE WATER/SALINE, 10 ML: HCPCS | Performed by: EMERGENCY MEDICINE

## 2024-02-09 PROCEDURE — 94761 N-INVAS EAR/PLS OXIMETRY MLT: CPT

## 2024-02-09 PROCEDURE — 85025 COMPLETE CBC W/AUTO DIFF WBC: CPT | Performed by: EMERGENCY MEDICINE

## 2024-02-09 PROCEDURE — 99223 1ST HOSP IP/OBS HIGH 75: CPT | Mod: ,,, | Performed by: NEUROLOGICAL SURGERY

## 2024-02-09 RX ORDER — MUPIROCIN 20 MG/G
OINTMENT TOPICAL 2 TIMES DAILY
Status: DISCONTINUED | OUTPATIENT
Start: 2024-02-09 | End: 2024-02-12 | Stop reason: HOSPADM

## 2024-02-09 RX ORDER — AMIODARONE HYDROCHLORIDE 100 MG/1
100 TABLET ORAL EVERY MORNING
Status: DISCONTINUED | OUTPATIENT
Start: 2024-02-10 | End: 2024-02-12 | Stop reason: HOSPADM

## 2024-02-09 RX ORDER — DULOXETIN HYDROCHLORIDE 30 MG/1
30 CAPSULE, DELAYED RELEASE ORAL 2 TIMES DAILY
Status: DISCONTINUED | OUTPATIENT
Start: 2024-02-09 | End: 2024-02-12 | Stop reason: HOSPADM

## 2024-02-09 RX ORDER — POLYETHYLENE GLYCOL 3350 17 G/17G
17 POWDER, FOR SOLUTION ORAL 2 TIMES DAILY PRN
Status: DISCONTINUED | OUTPATIENT
Start: 2024-02-09 | End: 2024-02-12 | Stop reason: HOSPADM

## 2024-02-09 RX ORDER — AMITRIPTYLINE HYDROCHLORIDE 10 MG/1
10 TABLET, FILM COATED ORAL NIGHTLY
Status: ON HOLD | COMMUNITY
Start: 2024-02-01 | End: 2024-02-12 | Stop reason: HOSPADM

## 2024-02-09 RX ORDER — FINASTERIDE 5 MG/1
5 TABLET, FILM COATED ORAL DAILY
Status: DISCONTINUED | OUTPATIENT
Start: 2024-02-10 | End: 2024-02-12 | Stop reason: HOSPADM

## 2024-02-09 RX ORDER — SODIUM CHLORIDE 0.9 % (FLUSH) 0.9 %
10 SYRINGE (ML) INJECTION EVERY 6 HOURS PRN
Status: DISCONTINUED | OUTPATIENT
Start: 2024-02-09 | End: 2024-02-12 | Stop reason: HOSPADM

## 2024-02-09 RX ORDER — HYDROCODONE BITARTRATE AND ACETAMINOPHEN 5; 325 MG/1; MG/1
1 TABLET ORAL EVERY 6 HOURS PRN
Status: DISCONTINUED | OUTPATIENT
Start: 2024-02-09 | End: 2024-02-10

## 2024-02-09 RX ORDER — SODIUM CHLORIDE 0.9 % (FLUSH) 0.9 %
10 SYRINGE (ML) INJECTION
Status: DISCONTINUED | OUTPATIENT
Start: 2024-02-09 | End: 2024-02-12 | Stop reason: HOSPADM

## 2024-02-09 RX ORDER — IBUPROFEN 200 MG
24 TABLET ORAL
Status: DISCONTINUED | OUTPATIENT
Start: 2024-02-09 | End: 2024-02-12 | Stop reason: HOSPADM

## 2024-02-09 RX ORDER — LIDOCAINE HYDROCHLORIDE 10 MG/ML
10 INJECTION INFILTRATION; PERINEURAL
Status: COMPLETED | OUTPATIENT
Start: 2024-02-09 | End: 2024-02-09

## 2024-02-09 RX ORDER — INSULIN ASPART 100 [IU]/ML
0-10 INJECTION, SOLUTION INTRAVENOUS; SUBCUTANEOUS
Status: DISCONTINUED | OUTPATIENT
Start: 2024-02-09 | End: 2024-02-12 | Stop reason: HOSPADM

## 2024-02-09 RX ORDER — PROPRANOLOL HYDROCHLORIDE 20 MG/1
40 TABLET ORAL 2 TIMES DAILY
Status: DISCONTINUED | OUTPATIENT
Start: 2024-02-09 | End: 2024-02-09

## 2024-02-09 RX ORDER — ACETAMINOPHEN 325 MG/1
650 TABLET ORAL EVERY 6 HOURS PRN
Status: DISCONTINUED | OUTPATIENT
Start: 2024-02-09 | End: 2024-02-10

## 2024-02-09 RX ORDER — AMOXICILLIN 250 MG
2 CAPSULE ORAL 2 TIMES DAILY
Status: DISCONTINUED | OUTPATIENT
Start: 2024-02-09 | End: 2024-02-12 | Stop reason: HOSPADM

## 2024-02-09 RX ORDER — LISINOPRIL 2.5 MG/1
2.5 TABLET ORAL 2 TIMES DAILY
Status: DISCONTINUED | OUTPATIENT
Start: 2024-02-09 | End: 2024-02-11

## 2024-02-09 RX ORDER — TAMSULOSIN HYDROCHLORIDE 0.4 MG/1
0.8 CAPSULE ORAL DAILY
Status: DISCONTINUED | OUTPATIENT
Start: 2024-02-10 | End: 2024-02-12 | Stop reason: HOSPADM

## 2024-02-09 RX ORDER — ONDANSETRON HYDROCHLORIDE 2 MG/ML
4 INJECTION, SOLUTION INTRAVENOUS EVERY 8 HOURS PRN
Status: DISCONTINUED | OUTPATIENT
Start: 2024-02-09 | End: 2024-02-12 | Stop reason: HOSPADM

## 2024-02-09 RX ORDER — NICARDIPINE HYDROCHLORIDE 0.2 MG/ML
0-15 INJECTION INTRAVENOUS CONTINUOUS
Status: DISCONTINUED | OUTPATIENT
Start: 2024-02-09 | End: 2024-02-11

## 2024-02-09 RX ORDER — SOLIFENACIN SUCCINATE 10 MG/1
10 TABLET, FILM COATED ORAL DAILY
Status: ON HOLD | COMMUNITY
Start: 2024-02-06 | End: 2024-02-12 | Stop reason: HOSPADM

## 2024-02-09 RX ORDER — GLUCAGON 1 MG
1 KIT INJECTION
Status: DISCONTINUED | OUTPATIENT
Start: 2024-02-09 | End: 2024-02-12 | Stop reason: HOSPADM

## 2024-02-09 RX ORDER — METOPROLOL TARTRATE 25 MG/1
25 TABLET, FILM COATED ORAL 2 TIMES DAILY
Status: DISCONTINUED | OUTPATIENT
Start: 2024-02-09 | End: 2024-02-12 | Stop reason: HOSPADM

## 2024-02-09 RX ORDER — LEVETIRACETAM 500 MG/5ML
1000 INJECTION, SOLUTION, CONCENTRATE INTRAVENOUS
Status: COMPLETED | OUTPATIENT
Start: 2024-02-09 | End: 2024-02-09

## 2024-02-09 RX ORDER — NALOXONE HCL 0.4 MG/ML
0.02 VIAL (ML) INJECTION
Status: DISCONTINUED | OUTPATIENT
Start: 2024-02-09 | End: 2024-02-12 | Stop reason: HOSPADM

## 2024-02-09 RX ORDER — BISACODYL 10 MG/1
10 SUPPOSITORY RECTAL DAILY PRN
Status: DISCONTINUED | OUTPATIENT
Start: 2024-02-09 | End: 2024-02-12 | Stop reason: HOSPADM

## 2024-02-09 RX ORDER — FENTANYL CITRATE 50 UG/ML
50 INJECTION, SOLUTION INTRAMUSCULAR; INTRAVENOUS
Status: COMPLETED | OUTPATIENT
Start: 2024-02-09 | End: 2024-02-09

## 2024-02-09 RX ORDER — HYDRALAZINE HYDROCHLORIDE 20 MG/ML
10 INJECTION INTRAMUSCULAR; INTRAVENOUS EVERY 6 HOURS PRN
Status: DISCONTINUED | OUTPATIENT
Start: 2024-02-09 | End: 2024-02-12 | Stop reason: HOSPADM

## 2024-02-09 RX ORDER — LIDOCAINE 50 MG/G
1 PATCH TOPICAL
Status: DISCONTINUED | OUTPATIENT
Start: 2024-02-09 | End: 2024-02-12 | Stop reason: HOSPADM

## 2024-02-09 RX ORDER — FUROSEMIDE 20 MG/1
20 TABLET ORAL DAILY
Status: DISCONTINUED | OUTPATIENT
Start: 2024-02-10 | End: 2024-02-12 | Stop reason: HOSPADM

## 2024-02-09 RX ORDER — ATORVASTATIN CALCIUM 40 MG/1
40 TABLET, FILM COATED ORAL NIGHTLY
Status: DISCONTINUED | OUTPATIENT
Start: 2024-02-09 | End: 2024-02-12 | Stop reason: HOSPADM

## 2024-02-09 RX ORDER — HYDROCODONE BITARTRATE AND ACETAMINOPHEN 500; 5 MG/1; MG/1
TABLET ORAL
Status: DISCONTINUED | OUTPATIENT
Start: 2024-02-09 | End: 2024-02-12 | Stop reason: HOSPADM

## 2024-02-09 RX ORDER — MORPHINE SULFATE 2 MG/ML
2 INJECTION, SOLUTION INTRAMUSCULAR; INTRAVENOUS EVERY 4 HOURS PRN
Status: DISCONTINUED | OUTPATIENT
Start: 2024-02-09 | End: 2024-02-11

## 2024-02-09 RX ORDER — IBUPROFEN 200 MG
16 TABLET ORAL
Status: DISCONTINUED | OUTPATIENT
Start: 2024-02-09 | End: 2024-02-12 | Stop reason: HOSPADM

## 2024-02-09 RX ORDER — PANTOPRAZOLE SODIUM 40 MG/1
40 TABLET, DELAYED RELEASE ORAL
Status: DISCONTINUED | OUTPATIENT
Start: 2024-02-10 | End: 2024-02-12 | Stop reason: HOSPADM

## 2024-02-09 RX ADMIN — MUPIROCIN 1 G: 20 OINTMENT TOPICAL at 09:02

## 2024-02-09 RX ADMIN — LIDOCAINE HYDROCHLORIDE 10 ML: 10 INJECTION, SOLUTION INFILTRATION; PERINEURAL at 12:02

## 2024-02-09 RX ADMIN — DULOXETINE HYDROCHLORIDE 30 MG: 30 CAPSULE, DELAYED RELEASE ORAL at 09:02

## 2024-02-09 RX ADMIN — ATORVASTATIN CALCIUM 40 MG: 40 TABLET, FILM COATED ORAL at 09:02

## 2024-02-09 RX ADMIN — SENNOSIDES AND DOCUSATE SODIUM 2 TABLET: 8.6; 5 TABLET ORAL at 09:02

## 2024-02-09 RX ADMIN — FENTANYL CITRATE 50 MCG: 50 INJECTION, SOLUTION INTRAMUSCULAR; INTRAVENOUS at 01:02

## 2024-02-09 RX ADMIN — METOPROLOL TARTRATE 25 MG: 25 TABLET, FILM COATED ORAL at 09:02

## 2024-02-09 RX ADMIN — HYDROCODONE BITARTRATE AND ACETAMINOPHEN 1 TABLET: 5; 325 TABLET ORAL at 04:02

## 2024-02-09 RX ADMIN — NICARDIPINE HYDROCHLORIDE 2.5 MG/HR: 0.2 INJECTION, SOLUTION INTRAVENOUS at 06:02

## 2024-02-09 RX ADMIN — LIDOCAINE 1 PATCH: 50 PATCH CUTANEOUS at 05:02

## 2024-02-09 RX ADMIN — LEVETIRACETAM 1000 MG: 100 INJECTION, SOLUTION INTRAVENOUS at 01:02

## 2024-02-09 RX ADMIN — Medication 5000 UNITS: at 02:02

## 2024-02-09 RX ADMIN — MORPHINE SULFATE 2 MG: 2 INJECTION, SOLUTION INTRAMUSCULAR; INTRAVENOUS at 09:02

## 2024-02-09 RX ADMIN — LISINOPRIL 2.5 MG: 2.5 TABLET ORAL at 09:02

## 2024-02-09 RX ADMIN — PHYTONADIONE 10 MG: 10 INJECTION, EMULSION INTRAMUSCULAR; INTRAVENOUS; SUBCUTANEOUS at 09:02

## 2024-02-09 RX ADMIN — HYDROCODONE BITARTRATE AND ACETAMINOPHEN 1 TABLET: 5; 325 TABLET ORAL at 10:02

## 2024-02-09 RX ADMIN — HYDRALAZINE HYDROCHLORIDE 10 MG: 20 INJECTION INTRAMUSCULAR; INTRAVENOUS at 05:02

## 2024-02-09 RX ADMIN — ONDANSETRON 4 MG: 2 INJECTION INTRAMUSCULAR; INTRAVENOUS at 06:02

## 2024-02-09 NOTE — H&P
[facility]  History & Physical    Patient Name: Hiro Rodríguez  MRN: [id number]  Patient Class: IP- Inpatient  Admission Date: 2/9/2024  Attending Physician: Georgia Molina MD   Primary Care Provider: Gautam Castanon III, MD         Patient information was obtained from patient, past medical records, and ER records.     Subjective:     Principal Problem:Subdural hematoma, acute    Chief Complaint:   Chief Complaint   Patient presents with    Fall     Pt comes in via ems with c/o a fall. Pt has laceration to forehead and hand. Pt denies LOC pt is on blood thinner         HPI: Mr. Rodríguez is a 78-years-old male who presented as a direct admission from [facility] for ICU level of care.  Patient reports he was in his usual state of health, was ambulating in his driveway with cane, subsequently fell, believes mechanical, landed on his left side with trauma to left forehead, chest and lower extremity.  Denies any associated loss of consciousness, states he was not able to get off the ground until first responders arrived.  Denies any preceding lightheadedness, dizziness, chest pain, shortness on breath, palpitations.  States last fall was around 8 months ago.  Patient with history of atrial fibrillation, on Eliquis, last dose taken this morning (2/9/24).  Patient currently records shortness of breath and left sided chest pain.  Denies any nausea, vomiting, fever, chills, admits to constipation with last bowel movement 3 days ago.  At outside hospital BP up to 164/102, afebrile with T-max 98.6°.  Labs with hemoglobin 11.4, platelets 254, INR 1.1, BUN/creatinine 15/0.8.  CT head with moderate left supraorbital swelling, left frontotemporal convexity subdural hematoma, 8 mm with slight left-to-right midline shift about 2 mm.  Chest imaging with acute fracture of left lateral 6th and 7th rib with no pneumothorax.  Noted to have left supraorbital laceration which was sutured  at outside hospital.  He received fentanyl, Kcentra and 1 g Keppra in the ED.  Case discussed with ED provider, states discussed with on-call neurosurgeon Dr. Goodrich, recommends conservative management with ICU admission and patient will be seen in consultation.  Plan of care discussed with patient, no visitors at bedside.  Code status addressed and currently full code.  Discussed with nursing.    Past Medical History:   Diagnosis Date    CHF (congestive heart failure)     Hypertension     Mixed hyperlipidemia     Paroxysmal atrial fibrillation 2013       Past Surgical History:   Procedure Laterality Date    CYSTOSCOPY W/ URETERAL STENT PLACEMENT N/A 03/02/2023    Procedure: CYSTOSCOPY, WITH URETERAL STENT INSERTION;  Surgeon: Yoshi Lange MD;  Location: [facility] OR;  Service: Urology;  Laterality: N/A;    CYSTOURETEROSCOPY, WITH HOLMIUM LASER LITHOTRIPSY OF URETERAL CALCULUS AND STENT INSERTION Left 4/20/2023    Procedure: CYSTOURETEROSCOPY, WITH HOLMIUM LASER LITHOTRIPSY OF URETERAL CALCULUS AND STENT INSERTION;  Surgeon: Yoshi Lange MD;  Location: [facility] OR;  Service: Urology;  Laterality: Left;    FRACTURE SURGERY      INTRAMEDULLARY RODDING OF TROCHANTER OF FEMUR Left 11/10/2022    Procedure: INSERTION, ITRAMEDULLARY SANTI, FEMUR, TROCHANTER;  Surgeon: Richard Phoenix MD;  Location: [facility] OR;  Service: Orthopedics;  Laterality: Left;    LUMBAR DISCECTOMY  1980    L4-5    REMOVAL OF URETERAL CALCULUS Left 4/20/2023    Procedure: REMOVAL, CALCULUS, URETER;  Surgeon: Yoshi Lange MD;  Location: [facility] OR;  Service: Urology;  Laterality: Left;    REMOVAL, CALCULUS, BLADDER  03/02/2023    Procedure: REMOVAL, CALCULUS, BLADDER;  Surgeon: Yoshi Lange MD;  Location: [facility] OR;  Service: Urology;;    RETROGRADE PYELOGRAPHY  4/20/2023    Procedure: PYELOGRAM, RETROGRADE;  Surgeon: Yoshi Lange MD;  Location: [facility] OR;  Service: Urology;;    TOTAL KNEE ARTHROPLASTY Right 2017    TOTAL KNEE  ARTHROPLASTY Left 2018       Review of patient's allergies indicates:  No Known Allergies    No current facility-administered medications on file prior to encounter.     Current Outpatient Medications on File Prior to Encounter   Medication Sig    acetaminophen 325 mg Cap Take 650 mg by mouth every 6 (six) hours as needed.    amiodarone (PACERONE) 100 MG Tab Take 1 tablet (100 mg total) by mouth every morning.    apixaban (ELIQUIS) 2.5 mg Tab Take 1 tablet (2.5 mg total) by mouth 2 (two) times daily.    calcium polycarbophil (FIBER-CAPS, CA POLYCARBOPHIL, ORAL) Take 1 tablet by mouth 2 (two) times a day.    cholecalciferol, vitamin D3, (VITAMIN D3) 50 mcg (2,000 unit) Tab Take 2 tablets by mouth once daily.    cyanocobalamin, vitamin B-12, 1,000 mcg Subl Place 1,000 mcg under the tongue 2 (two) times a day.    docusate sodium (COLACE) 100 MG capsule Take 100 mg by mouth 2 (two) times daily as needed for Constipation.    DULoxetine (CYMBALTA) 30 MG capsule Take 1 capsule (30 mg total) by mouth 2 (two) times daily.    finasteride (PROSCAR) 5 mg tablet Take 1 tablet (5 mg total) by mouth once daily.    furosemide (LASIX) 20 MG tablet Take 1 tablet (20 mg total) by mouth once daily.    HYDROcodone-acetaminophen (NORCO)  mg per tablet Take 1 tablet by mouth every 6 (six) hours as needed for Pain.    lisinopriL (PRINIVIL,ZESTRIL) 2.5 MG tablet Take 1 tablet (2.5 mg total) by mouth 2 (two) times daily.    metFORMIN (GLUCOPHAGE-XR) 500 MG ER 24hr tablet TAKE 1 TABLET BY MOUTH EVERY DAY (Patient taking differently: Take 500 mg by mouth 2 (two) times daily with meals.)    metoprolol tartrate (LOPRESSOR) 25 MG tablet Take 1 tablet (25 mg total) by mouth 2 (two) times daily.    omega-3 acid ethyl esters (LOVAZA) 1 gram capsule TAKE 2 CAPSULES BY MOUTH TWICE A DAY (Patient taking differently: Take 2 g by mouth 2 (two) times daily.)    pantoprazole (PROTONIX) 40 MG tablet Take 40 mg by mouth once daily.    polyethylene  glycol (GLYCOLAX) 17 gram/dose powder Take 17 g by mouth daily as needed for Constipation.    potassium chloride (KLOR-CON) 10 MEQ TbSR Take 2 tablets (20 mEq total) by mouth once daily.    propranoloL (INDERAL) 40 MG tablet Take 1 tablet (40 mg total) by mouth 2 (two) times daily.    rosuvastatin (CRESTOR) 20 MG tablet Take 1 tablet (20 mg total) by mouth once daily.    tadalafiL (CIALIS) 20 MG Tab Take 1 tablet (20 mg total) by mouth as needed (ed).    tamsulosin (FLOMAX) 0.4 mg Cap Take 2 capsules (0.8 mg total) by mouth once daily.    amitriptyline (ELAVIL) 10 MG tablet Take 10 mg by mouth every evening.    semaglutide (OZEMPIC) 2 mg/dose (8 mg/3 mL) PnIj Inject 2 mg into the skin every 7 days. (Patient taking differently: Inject 2 mg into the skin every 7 days. MONDAYS)    solifenacin (VESICARE) 10 MG tablet Take 10 mg by mouth once daily.    tolterodine (DETROL LA) 4 MG 24 hr capsule Take 4 mg by mouth once daily.    [DISCONTINUED] metaxalone (SKELAXIN) 800 MG tablet     [DISCONTINUED] oxyCODONE-acetaminophen (PERCOCET)  mg per tablet      Family History    None       Tobacco Use    Smoking status: Never    Smokeless tobacco: Never   Substance and Sexual Activity    Alcohol use: Yes     Alcohol/week: 1.0 standard drink of alcohol     Types: 1 Shots of liquor per week     Comment: rarely    Drug use: Not Currently    Sexual activity: Yes     Partners: Female     Review of Systems   Constitutional:  Negative for fever.   Respiratory:  Positive for shortness of breath.    Cardiovascular:  Positive for chest pain.   Gastrointestinal:  Positive for constipation (last BM 3 days ago).   Skin:  Positive for wound.   Psychiatric/Behavioral:  Negative for confusion.      Objective:     Vital Signs (Most Recent):  Temp: 98.7 °F (37.1 °C) (02/09/24 1608)  Pulse: 67 (02/09/24 1600)  Resp: 17 (02/09/24 1600)  BP: (!) 149/89 (02/09/24 1600)  SpO2: (!) 94 % (02/09/24 1600) Vital Signs (24h Range):  Temp:  [98.6 °F (37  "°C)-98.7 °F (37.1 °C)] 98.7 °F (37.1 °C)  Pulse:  [67-76] 67  Resp:  [17-20] 17  SpO2:  [92 %-95 %] 94 %  BP: (143-176)/() 149/89     Weight: 111.9 kg (246 lb 11.1 oz)  Body mass index is 35.4 kg/m².     Physical Exam  Vitals and nursing note reviewed.   Constitutional:       Appearance: He is obese.      Comments: Elderly male lying in bed, cooperative   HENT:      Head:      Comments: Dressing left forehead, sutures underlying   Eyes:      Comments: Left periorbital edema   Cardiovascular:      Rate and Rhythm: Normal rate. Rhythm irregular.      Comments: Trace LE edema  Pulmonary:      Comments: No wheezing or accessory muscle use  Abdominal:      General: Bowel sounds are normal. There is no distension.      Palpations: Abdomen is soft.      Tenderness: There is no abdominal tenderness. There is no guarding.   Genitourinary:     Comments: No messina  Skin:     Comments: Superficial abrasion left knee, left periorbital edema with laceration- dressing overlying    Neurological:      Mental Status: He is alert.      Comments: Alert and oriented to person, place, year, situation, following commands all extremities, able to lift LLE about 45 degrees against gravity, power 5/5, sensation intact   Psychiatric:         Mood and Affect: Mood normal.                          Significant Labs: BMP:   Recent Labs   Lab 02/09/24  1338   *      K 4.1      CO2 25   BUN 15   CREATININE 0.8   CALCIUM 8.8     CBC:   Recent Labs   Lab 02/09/24  1338   WBC 12.00   HGB 11.4*   HCT 35.7*        CMP:   Recent Labs   Lab 02/09/24  1338      K 4.1      CO2 25   *   BUN 15   CREATININE 0.8   CALCIUM 8.8   PROT 6.5   ALBUMIN 3.0*   BILITOT 0.5   ALKPHOS 65   AST 26   ALT 24   ANIONGAP 11     Cardiac Markers: No results for input(s): "CKMB", "MYOGLOBIN", "BNP", "TROPISTAT" in the last 48 hours.  Magnesium: No results for input(s): "MG" in the last 48 hours.  POCT Glucose: No results for " "input(s): "POCTGLUCOSE" in the last 48 hours.  Troponin: No results for input(s): "TROPONINI", "TROPONINIHS" in the last 48 hours.  TSH: No results for input(s): "TSH" in the last 4320 hours.  Urine Culture: No results for input(s): "LABURIN" in the last 48 hours.  Urine Studies: No results for input(s): "COLORU", "APPEARANCEUA", "PHUR", "SPECGRAV", "PROTEINUA", "GLUCUA", "KETONESU", "BILIRUBINUA", "OCCULTUA", "NITRITE", "UROBILINOGEN", "LEUKOCYTESUR", "RBCUA", "WBCUA", "BACTERIA", "SQUAMEPITHEL", "HYALINECASTS" in the last 48 hours.    Invalid input(s): "WRIGHTSUR"    Significant Imaging: I have reviewed all pertinent imaging results/findings within the past 24 hours.    X-Ray Hand 3 view Left    Result Date: 2/9/2024  EXAMINATION: XR HAND COMPLETE 3 VIEW LEFT CLINICAL HISTORY: hand pain;. TECHNIQUE: PA, lateral, and oblique views of the left hand were performed. COMPARISON: None FINDINGS: There is no fracture or dislocation.  There is advanced degenerative change of the 1st carpometacarpal joint.  Degenerative changes of the interphalangeal joints are present, severe at the index finger distal interphalangeal joint.     No acute osseous abnormality. Osteoarthritis. Electronically signed by: Jadon Vieyra MD Date:    02/09/2024 Time:    13:44    X-Ray Ribs 2 View Left    Result Date: 2/9/2024  EXAMINATION: XR RIBS 2 VIEW LEFT CLINICAL HISTORY: Pleurodynia TECHNIQUE: Two views of the left ribs were performed. COMPARISON: None. FINDINGS: There are acute fractures of the left 6th and 7th ribs laterally, without significant displacement.  There is no pneumothorax.  A small left pleural effusion is present, similar to previous chest x-rays.     Nondisplaced acute left 6th and 7th rib fractures.  No pneumothorax. Small left pleural effusion similar to numerous previous chest x-rays. Electronically signed by: Jadon Vieyra MD Date:    02/09/2024 Time:    13:43    X-Ray Chest 1 View    Result Date: " 2/9/2024  EXAMINATION: XR CHEST 1 VIEW CLINICAL HISTORY: r/o bleeding or hemorrhage; TECHNIQUE: Single frontal view of the chest was performed. COMPARISON: 05/19/2023 FINDINGS: The heart is enlarged.  There is blunting of left costophrenic angle, which is similar to numerous previous examinations, however previous CT examinations 04/28/2023 and 03/01/2023 demonstrates small left pleural effusions.  There is no pneumothorax.     Small left pleural effusion similar to previous examinations. Electronically signed by: Jadon Vieyra MD Date:    02/09/2024 Time:    13:41    CT Head Without Contrast    Result Date: 2/9/2024  EXAMINATION: CT HEAD WITHOUT CONTRAST CLINICAL HISTORY: Head trauma, moderate-severe;Trauma; TECHNIQUE: Low dose axial images were obtained through the head.  Coronal and sagittal reformations were also performed. Contrast was not administered. COMPARISON: 01/22/2023 FINDINGS: There is moderate left supraorbital soft tissue.  The calvarium is intact. There is a an acute left frontotemporal convexity subdural hematoma demonstrating a maximum thickness of 8 mm.  There is slight crowding of subjacent sulci and slight left right midline shift of 2 mm.  The basilar cisterns are patent Gray-white demonstration is well maintained.  There is no intraparenchymal hemorrhage.  No mass is demonstrated.  The ventricles are nondilated.  There are mild chronic white matter changes. There is mild bubbly fluid within the left maxillary sinus.  The mastoid air cells are clear.     Moderate left subdural hematoma with minimal mass effect. Moderate left supraorbital soft tissue swelling. Mild bubbly fluid left maxillary sinus.  This may represent paranasal sinus disease however blood products may appear similar.  No fracture of the imaged maxillofacial bones is demonstrated. Electronically signed by: Jadon Vieyra MD Date:    02/09/2024 Time:    13:22     Assessment/Plan:     Active Hospital Problems    Diagnosis   POA    *Acute traumatic left frontal subdural hematoma [S06.5XAA]  Yes    Closed fracture of multiple ribs of left side- 6th and 7th [S22.42XA]  Yes     Priority: 2     Fall at home [W19.XXXA, Y92.009]  Not Applicable    Laceration of forehead [S01.81XA]  Yes    Benign prostatic hyperplasia without lower urinary tract symptoms [N40.0]  Yes     Chronic    Hypertension, essential [I10]  Yes     Chronic    Pulmonary hypertension, PASP 61 [I27.20]  Yes     Chronic    GERD (gastroesophageal reflux disease) [K21.9]  Yes    Use of cane as ambulatory aid [Z99.89]  Not Applicable    BMI 33.0-33.9,adult [Z68.33]  Not Applicable    Status post knee replacement [Z96.659]  Not Applicable    Hearing loss [H91.90]  Yes    Heart failure with preserved ejection fraction [I50.30]  Yes    Hyperlipidemia [E78.5]  Yes     Chronic    Type 2 diabetes mellitus with HbA1c 5.8% [E11.42]  Yes     Chronic    Benign essential tremor [G25.0]  Yes    Paroxysmal atrial fibrillation [I48.0]  Yes     Chronic    Anticoagulated [Z79.01]  Not Applicable      Resolved Hospital Problems   No resolved problems to display.     Plan:  Admit inpatient, ICU, continuous cardiac telemetry monitoring  Neurochecks Q hour   On Eliquis for atrial fibrillation, status post Kcentra in ED, hold antiplatelet/anticoagulation, SCDs for DVT prophylaxis   Repeat CT head noncontrast at around 5:00 p.m. for re-evaluation   Resume home antihypertensive and add p.r.n. IV hydralazine with parameters, adjust as needed   P.r.n. pain control as ordered including acetaminophen, lidocaine, Norco, low-dose morphine  Pulmonary toileting with incentive spirometer with rib fractures   Fall and delirium precautions  Seizure precautions  P.r.n. bladder scan with history of BPH, continue home Flomax   Previous echo with EF 70 patient PASP 61   Hold oral hypoglycemic, insulin sliding scale with Accu-Cheks, as needed hypoglycemic measures   Wound care consulted   PT/OT evaluation once  cleared by Neurosurgery   A.m. labs ordered  Neurosurgery consulted   Neurology consulted   Further plan as per hospital course      VTE Risk Mitigation (From admission, onward)           Ordered     IP VTE HIGH RISK PATIENT  Once         02/09/24 1617     Place sequential compression device  Until discontinued         02/09/24 1617                                 Georgia Molina MD  Department of Hospital Medicine  [facility]

## 2024-02-09 NOTE — NURSING
Nurses Note -- 4 Eyes      2/9/2024   4:38 PM      Skin assessed during: Admit      [] No Altered Skin Integrity Present    []Prevention Measures Documented      [x] Yes- Altered Skin Integrity Present or Discovered   [x] LDA Added if Not in Epic (Describe Wound)   [x] New Altered Skin Integrity was Present on Admit and Documented in LDA   [x] Wound Image Taken    Wound Care Consulted? Yes    Attending Nurse:  Radha gonzales RN     Second RN/Staff Member:  GORDO Dickson RN

## 2024-02-09 NOTE — ASSESSMENT & PLAN NOTE
78-year-old male with history of atrial fibrillation treated with Eliquis.  Eliquis was reversed with Kcentra while in emergency room.    Patient is neurologically intact.    Recommend stabilization head CT at approximately 5:00 p.m.    Discussed with Dr. Goodrich.

## 2024-02-09 NOTE — SUBJECTIVE & OBJECTIVE
Past Medical History:   Diagnosis Date    CHF (congestive heart failure)     Hypertension     Mixed hyperlipidemia     Paroxysmal atrial fibrillation 2013       Past Surgical History:   Procedure Laterality Date    CYSTOSCOPY W/ URETERAL STENT PLACEMENT N/A 03/02/2023    Procedure: CYSTOSCOPY, WITH URETERAL STENT INSERTION;  Surgeon: Yoshi Lange MD;  Location: [facility];  Service: Urology;  Laterality: N/A;    CYSTOURETEROSCOPY, WITH HOLMIUM LASER LITHOTRIPSY OF URETERAL CALCULUS AND STENT INSERTION Left 4/20/2023    Procedure: CYSTOURETEROSCOPY, WITH HOLMIUM LASER LITHOTRIPSY OF URETERAL CALCULUS AND STENT INSERTION;  Surgeon: Yoshi Lange MD;  Location: [facility];  Service: Urology;  Laterality: Left;    FRACTURE SURGERY      INTRAMEDULLARY RODDING OF TROCHANTER OF FEMUR Left 11/10/2022    Procedure: INSERTION, ITRAMEDULLARY SANTI, FEMUR, TROCHANTER;  Surgeon: Richard Phoenix MD;  Location: [facility];  Service: Orthopedics;  Laterality: Left;    LUMBAR DISCECTOMY  1980    L4-5    REMOVAL OF URETERAL CALCULUS Left 4/20/2023    Procedure: REMOVAL, CALCULUS, URETER;  Surgeon: Yoshi Lange MD;  Location: [facility];  Service: Urology;  Laterality: Left;    REMOVAL, CALCULUS, BLADDER  03/02/2023    Procedure: REMOVAL, CALCULUS, BLADDER;  Surgeon: Yoshi Lange MD;  Location: [facility];  Service: Urology;;    RETROGRADE PYELOGRAPHY  4/20/2023    Procedure: PYELOGRAM, RETROGRADE;  Surgeon: Yoshi Lange MD;  Location: [facility];  Service: Urology;;    TOTAL KNEE ARTHROPLASTY Right 2017    TOTAL KNEE ARTHROPLASTY Left 2018       Review of patient's allergies indicates:  No Known Allergies    No current facility-administered medications on file prior to encounter.     Current Outpatient Medications on File Prior to Encounter   Medication Sig    acetaminophen 325 mg Cap Take 650 mg by mouth every 6 (six) hours as needed.    amiodarone (PACERONE) 100 MG Tab Take 1 tablet (100 mg total) by mouth every  morning.    apixaban (ELIQUIS) 2.5 mg Tab Take 1 tablet (2.5 mg total) by mouth 2 (two) times daily.    calcium polycarbophil (FIBER-CAPS, CA POLYCARBOPHIL, ORAL) Take 1 tablet by mouth 2 (two) times a day.    cholecalciferol, vitamin D3, (VITAMIN D3) 50 mcg (2,000 unit) Tab Take 2 tablets by mouth once daily.    cyanocobalamin, vitamin B-12, 1,000 mcg Subl Place 1,000 mcg under the tongue 2 (two) times a day.    docusate sodium (COLACE) 100 MG capsule Take 100 mg by mouth 2 (two) times daily as needed for Constipation.    DULoxetine (CYMBALTA) 30 MG capsule Take 1 capsule (30 mg total) by mouth 2 (two) times daily.    finasteride (PROSCAR) 5 mg tablet Take 1 tablet (5 mg total) by mouth once daily.    furosemide (LASIX) 20 MG tablet Take 1 tablet (20 mg total) by mouth once daily.    HYDROcodone-acetaminophen (NORCO)  mg per tablet Take 1 tablet by mouth every 6 (six) hours as needed for Pain.    lisinopriL (PRINIVIL,ZESTRIL) 2.5 MG tablet Take 1 tablet (2.5 mg total) by mouth 2 (two) times daily.    metFORMIN (GLUCOPHAGE-XR) 500 MG ER 24hr tablet TAKE 1 TABLET BY MOUTH EVERY DAY (Patient taking differently: Take 500 mg by mouth 2 (two) times daily with meals.)    metoprolol tartrate (LOPRESSOR) 25 MG tablet Take 1 tablet (25 mg total) by mouth 2 (two) times daily.    omega-3 acid ethyl esters (LOVAZA) 1 gram capsule TAKE 2 CAPSULES BY MOUTH TWICE A DAY (Patient taking differently: Take 2 g by mouth 2 (two) times daily.)    pantoprazole (PROTONIX) 40 MG tablet Take 40 mg by mouth once daily.    polyethylene glycol (GLYCOLAX) 17 gram/dose powder Take 17 g by mouth daily as needed for Constipation.    potassium chloride (KLOR-CON) 10 MEQ TbSR Take 2 tablets (20 mEq total) by mouth once daily.    propranoloL (INDERAL) 40 MG tablet Take 1 tablet (40 mg total) by mouth 2 (two) times daily.    rosuvastatin (CRESTOR) 20 MG tablet Take 1 tablet (20 mg total) by mouth once daily.    tadalafiL (CIALIS) 20 MG Tab Take 1  tablet (20 mg total) by mouth as needed (ed).    tamsulosin (FLOMAX) 0.4 mg Cap Take 2 capsules (0.8 mg total) by mouth once daily.    amitriptyline (ELAVIL) 10 MG tablet Take 10 mg by mouth every evening.    semaglutide (OZEMPIC) 2 mg/dose (8 mg/3 mL) PnIj Inject 2 mg into the skin every 7 days. (Patient taking differently: Inject 2 mg into the skin every 7 days. MONDAYS)    solifenacin (VESICARE) 10 MG tablet Take 10 mg by mouth once daily.    tolterodine (DETROL LA) 4 MG 24 hr capsule Take 4 mg by mouth once daily.    [DISCONTINUED] metaxalone (SKELAXIN) 800 MG tablet     [DISCONTINUED] oxyCODONE-acetaminophen (PERCOCET)  mg per tablet      Family History    None       Tobacco Use    Smoking status: Never    Smokeless tobacco: Never   Substance and Sexual Activity    Alcohol use: Yes     Alcohol/week: 1.0 standard drink of alcohol     Types: 1 Shots of liquor per week     Comment: rarely    Drug use: Not Currently    Sexual activity: Yes     Partners: Female     Review of Systems   Constitutional:  Negative for fever.   Respiratory:  Positive for shortness of breath.    Cardiovascular:  Positive for chest pain.   Gastrointestinal:  Positive for constipation (last BM 3 days ago).   Skin:  Positive for wound.   Psychiatric/Behavioral:  Negative for confusion.      Objective:     Vital Signs (Most Recent):  Temp: 98.7 °F (37.1 °C) (02/09/24 1608)  Pulse: 67 (02/09/24 1600)  Resp: 17 (02/09/24 1600)  BP: (!) 149/89 (02/09/24 1600)  SpO2: (!) 94 % (02/09/24 1600) Vital Signs (24h Range):  Temp:  [98.6 °F (37 °C)-98.7 °F (37.1 °C)] 98.7 °F (37.1 °C)  Pulse:  [67-76] 67  Resp:  [17-20] 17  SpO2:  [92 %-95 %] 94 %  BP: (143-176)/() 149/89     Weight: 111.9 kg (246 lb 11.1 oz)  Body mass index is 35.4 kg/m².     Physical Exam  Vitals and nursing note reviewed.   Constitutional:       Appearance: He is obese.      Comments: Elderly male lying in bed, cooperative   HENT:      Head:      Comments: Dressing left  "forehead, sutures underlying   Eyes:      Comments: Left periorbital edema   Cardiovascular:      Rate and Rhythm: Normal rate. Rhythm irregular.      Comments: Trace LE edema  Pulmonary:      Comments: No wheezing or accessory muscle use  Abdominal:      General: Bowel sounds are normal. There is no distension.      Palpations: Abdomen is soft.      Tenderness: There is no abdominal tenderness. There is no guarding.   Genitourinary:     Comments: No messina  Skin:     Comments: Superficial abrasion left knee, left periorbital edema with laceration- dressing overlying    Neurological:      Mental Status: He is alert.      Comments: Alert and oriented to person, place, year, situation, following commands all extremities, able to lift LLE about 45 degrees against gravity, power 5/5, sensation intact   Psychiatric:         Mood and Affect: Mood normal.                          Significant Labs: BMP:   Recent Labs   Lab 02/09/24  1338   *      K 4.1      CO2 25   BUN 15   CREATININE 0.8   CALCIUM 8.8     CBC:   Recent Labs   Lab 02/09/24  1338   WBC 12.00   HGB 11.4*   HCT 35.7*        CMP:   Recent Labs   Lab 02/09/24  1338      K 4.1      CO2 25   *   BUN 15   CREATININE 0.8   CALCIUM 8.8   PROT 6.5   ALBUMIN 3.0*   BILITOT 0.5   ALKPHOS 65   AST 26   ALT 24   ANIONGAP 11     Cardiac Markers: No results for input(s): "CKMB", "MYOGLOBIN", "BNP", "TROPISTAT" in the last 48 hours.  Magnesium: No results for input(s): "MG" in the last 48 hours.  POCT Glucose: No results for input(s): "POCTGLUCOSE" in the last 48 hours.  Troponin: No results for input(s): "TROPONINI", "TROPONINIHS" in the last 48 hours.  TSH: No results for input(s): "TSH" in the last 4320 hours.  Urine Culture: No results for input(s): "LABURIN" in the last 48 hours.  Urine Studies: No results for input(s): "COLORU", "APPEARANCEUA", "PHUR", "SPECGRAV", "PROTEINUA", "GLUCUA", "KETONESU", "BILIRUBINUA", "OCCULTUA", " ""NITRITE", "UROBILINOGEN", "LEUKOCYTESUR", "RBCUA", "WBCUA", "BACTERIA", "SQUAMEPITHEL", "HYALINECASTS" in the last 48 hours.    Invalid input(s): "WRIGHTSUR"    Significant Imaging: I have reviewed all pertinent imaging results/findings within the past 24 hours.    X-Ray Hand 3 view Left    Result Date: 2/9/2024  EXAMINATION: XR HAND COMPLETE 3 VIEW LEFT CLINICAL HISTORY: hand pain;. TECHNIQUE: PA, lateral, and oblique views of the left hand were performed. COMPARISON: None FINDINGS: There is no fracture or dislocation.  There is advanced degenerative change of the 1st carpometacarpal joint.  Degenerative changes of the interphalangeal joints are present, severe at the index finger distal interphalangeal joint.     No acute osseous abnormality. Osteoarthritis. Electronically signed by: Jadon Vieyra MD Date:    02/09/2024 Time:    13:44    X-Ray Ribs 2 View Left    Result Date: 2/9/2024  EXAMINATION: XR RIBS 2 VIEW LEFT CLINICAL HISTORY: Pleurodynia TECHNIQUE: Two views of the left ribs were performed. COMPARISON: None. FINDINGS: There are acute fractures of the left 6th and 7th ribs laterally, without significant displacement.  There is no pneumothorax.  A small left pleural effusion is present, similar to previous chest x-rays.     Nondisplaced acute left 6th and 7th rib fractures.  No pneumothorax. Small left pleural effusion similar to numerous previous chest x-rays. Electronically signed by: Jadon Vieyra MD Date:    02/09/2024 Time:    13:43    X-Ray Chest 1 View    Result Date: 2/9/2024  EXAMINATION: XR CHEST 1 VIEW CLINICAL HISTORY: r/o bleeding or hemorrhage; TECHNIQUE: Single frontal view of the chest was performed. COMPARISON: 05/19/2023 FINDINGS: The heart is enlarged.  There is blunting of left costophrenic angle, which is similar to numerous previous examinations, however previous CT examinations 04/28/2023 and 03/01/2023 demonstrates small left pleural effusions.  There is no pneumothorax. "     Small left pleural effusion similar to previous examinations. Electronically signed by: Jadon Vieyra MD Date:    02/09/2024 Time:    13:41    CT Head Without Contrast    Result Date: 2/9/2024  EXAMINATION: CT HEAD WITHOUT CONTRAST CLINICAL HISTORY: Head trauma, moderate-severe;Trauma; TECHNIQUE: Low dose axial images were obtained through the head.  Coronal and sagittal reformations were also performed. Contrast was not administered. COMPARISON: 01/22/2023 FINDINGS: There is moderate left supraorbital soft tissue.  The calvarium is intact. There is a an acute left frontotemporal convexity subdural hematoma demonstrating a maximum thickness of 8 mm.  There is slight crowding of subjacent sulci and slight left right midline shift of 2 mm.  The basilar cisterns are patent Gray-white demonstration is well maintained.  There is no intraparenchymal hemorrhage.  No mass is demonstrated.  The ventricles are nondilated.  There are mild chronic white matter changes. There is mild bubbly fluid within the left maxillary sinus.  The mastoid air cells are clear.     Moderate left subdural hematoma with minimal mass effect. Moderate left supraorbital soft tissue swelling. Mild bubbly fluid left maxillary sinus.  This may represent paranasal sinus disease however blood products may appear similar.  No fracture of the imaged maxillofacial bones is demonstrated. Electronically signed by: Jadon Vieyra MD Date:    02/09/2024 Time:    13:22

## 2024-02-09 NOTE — CONSULTS
[facility]  Neurosurgery  Consult Note    Consults  Subjective:     Chief Complaint/Reason for Admission:  Subdural hematoma    History of Present Illness: Mr. Hiro Rodríguez is a 78-year-old male with past medical history paroxysmal atrial fibrillation treated with Eliquis, hyperlipidemia, hypertension, type 2 diabetes, heart failure, hearing loss, gait instability presented to the emergency department after a mechanical fall on his driveway this morning.  Wife was present at time of emergency room encounter.  Patient reports when he fell he initially went to his knees and then hit his head on the ground.  He reports left forehead pain and rib cage pain.  He also reports he had a similar fall approximately 1 year ago which resulted in a subdural hematoma which was previously treated with nonoperative management. He denies headache, loss of consciousness, vision disturbances or dizziness.  CT head obtained today revealed left subdural hematoma with mild mass effect.  Eliquis was reversed with Kcentra.  Neurosurgery consulted.    Medications Prior to Admission   Medication Sig Dispense Refill Last Dose    acetaminophen 325 mg Cap Take 650 mg by mouth every 6 (six) hours as needed.   Past Month    amiodarone (PACERONE) 100 MG Tab Take 1 tablet (100 mg total) by mouth every morning. 90 tablet 3 2/9/2024    apixaban (ELIQUIS) 2.5 mg Tab Take 1 tablet (2.5 mg total) by mouth 2 (two) times daily. 180 tablet 3 2/9/2024    calcium polycarbophil (FIBER-CAPS, CA POLYCARBOPHIL, ORAL) Take 1 tablet by mouth 2 (two) times a day.   2/9/2024    cholecalciferol, vitamin D3, (VITAMIN D3) 50 mcg (2,000 unit) Tab Take 2 tablets by mouth once daily.   2/9/2024    cyanocobalamin, vitamin B-12, 1,000 mcg Subl Place 1,000 mcg under the tongue 2 (two) times a day.   2/9/2024    docusate sodium (COLACE) 100 MG capsule Take 100 mg by mouth 2 (two) times daily as needed for Constipation.   Past Week    DULoxetine (CYMBALTA)  30 MG capsule Take 1 capsule (30 mg total) by mouth 2 (two) times daily. 90 capsule 1 2/9/2024    finasteride (PROSCAR) 5 mg tablet Take 1 tablet (5 mg total) by mouth once daily. 90 tablet 1 2/9/2024    furosemide (LASIX) 20 MG tablet Take 1 tablet (20 mg total) by mouth once daily. 90 tablet 1 2/9/2024    HYDROcodone-acetaminophen (NORCO)  mg per tablet Take 1 tablet by mouth every 6 (six) hours as needed for Pain.   Past Week    lisinopriL (PRINIVIL,ZESTRIL) 2.5 MG tablet Take 1 tablet (2.5 mg total) by mouth 2 (two) times daily. 180 tablet 3 2/9/2024    metFORMIN (GLUCOPHAGE-XR) 500 MG ER 24hr tablet TAKE 1 TABLET BY MOUTH EVERY DAY (Patient taking differently: Take 500 mg by mouth 2 (two) times daily with meals.) 90 tablet 0 2/9/2024    metoprolol tartrate (LOPRESSOR) 25 MG tablet Take 1 tablet (25 mg total) by mouth 2 (two) times daily. 180 tablet 1 2/9/2024    omega-3 acid ethyl esters (LOVAZA) 1 gram capsule TAKE 2 CAPSULES BY MOUTH TWICE A DAY (Patient taking differently: Take 2 g by mouth 2 (two) times daily.) 360 capsule 0 2/9/2024    pantoprazole (PROTONIX) 40 MG tablet Take 40 mg by mouth once daily.   Past Week    polyethylene glycol (GLYCOLAX) 17 gram/dose powder Take 17 g by mouth daily as needed for Constipation.   Past Week    potassium chloride (KLOR-CON) 10 MEQ TbSR Take 2 tablets (20 mEq total) by mouth once daily. 180 tablet 3 2/9/2024    propranoloL (INDERAL) 40 MG tablet Take 1 tablet (40 mg total) by mouth 2 (two) times daily. 180 tablet 1 2/9/2024    rosuvastatin (CRESTOR) 20 MG tablet Take 1 tablet (20 mg total) by mouth once daily. 90 tablet 1 2/9/2024    tadalafiL (CIALIS) 20 MG Tab Take 1 tablet (20 mg total) by mouth as needed (ed). 30 tablet 1 Past Month    tamsulosin (FLOMAX) 0.4 mg Cap Take 2 capsules (0.8 mg total) by mouth once daily. 90 capsule 3 2/8/2024    amitriptyline (ELAVIL) 10 MG tablet Take 10 mg by mouth every evening.       semaglutide (OZEMPIC) 2 mg/dose (8 mg/3  mL) PnIj Inject 2 mg into the skin every 7 days. (Patient taking differently: Inject 2 mg into the skin every 7 days. MONDAYS) 4 mL 1 2/5/2024    solifenacin (VESICARE) 10 MG tablet Take 10 mg by mouth once daily.       tolterodine (DETROL LA) 4 MG 24 hr capsule Take 4 mg by mouth once daily.          Review of patient's allergies indicates:  No Known Allergies    Past Medical History:   Diagnosis Date    CHF (congestive heart failure)     Hypertension     Mixed hyperlipidemia     Paroxysmal atrial fibrillation 2013     Past Surgical History:   Procedure Laterality Date    CYSTOSCOPY W/ URETERAL STENT PLACEMENT N/A 03/02/2023    Procedure: CYSTOSCOPY, WITH URETERAL STENT INSERTION;  Surgeon: Yoshi Lange MD;  Location: [facility];  Service: Urology;  Laterality: N/A;    CYSTOURETEROSCOPY, WITH HOLMIUM LASER LITHOTRIPSY OF URETERAL CALCULUS AND STENT INSERTION Left 4/20/2023    Procedure: CYSTOURETEROSCOPY, WITH HOLMIUM LASER LITHOTRIPSY OF URETERAL CALCULUS AND STENT INSERTION;  Surgeon: Yoshi Lange MD;  Location: [facility];  Service: Urology;  Laterality: Left;    FRACTURE SURGERY      INTRAMEDULLARY RODDING OF TROCHANTER OF FEMUR Left 11/10/2022    Procedure: INSERTION, ITRAMEDULLARY SANTI, FEMUR, TROCHANTER;  Surgeon: Richard Phoenix MD;  Location: [facility];  Service: Orthopedics;  Laterality: Left;    LUMBAR DISCECTOMY  1980    L4-5    REMOVAL OF URETERAL CALCULUS Left 4/20/2023    Procedure: REMOVAL, CALCULUS, URETER;  Surgeon: Yoshi Lange MD;  Location: [facility];  Service: Urology;  Laterality: Left;    REMOVAL, CALCULUS, BLADDER  03/02/2023    Procedure: REMOVAL, CALCULUS, BLADDER;  Surgeon: Yoshi Lange MD;  Location: [facility];  Service: Urology;;    RETROGRADE PYELOGRAPHY  4/20/2023    Procedure: PYELOGRAM, RETROGRADE;  Surgeon: Ysohi Lange MD;  Location: [facility];  Service: Urology;;    TOTAL KNEE ARTHROPLASTY Right 2017    TOTAL KNEE ARTHROPLASTY Left 2018     Family  History    None       Tobacco Use    Smoking status: Never    Smokeless tobacco: Never   Substance and Sexual Activity    Alcohol use: Yes     Alcohol/week: 1.0 standard drink of alcohol     Types: 1 Shots of liquor per week     Comment: rarely    Drug use: Not Currently    Sexual activity: Yes     Partners: Female     Objective:     Weight: 108.9 kg (240 lb)  Body mass index is 34.44 kg/m².  Vital Signs (Most Recent):  Temp: 98.6 °F (37 °C) (02/09/24 1139)  Pulse: 72 (02/09/24 1430)  Resp: 18 (02/09/24 1430)  BP: (!) 164/102 (02/09/24 1430)  SpO2: (!) 92 % (02/09/24 1430) Vital Signs (24h Range):  Temp:  [98.6 °F (37 °C)] 98.6 °F (37 °C)  Pulse:  [70-76] 72  Resp:  [18-20] 18  SpO2:  [92 %-95 %] 92 %  BP: (143-176)/() 164/102                              Ureteral Drain/Stent 04/20/23 1515 Left ureter 4.8 Fr. (Active)         Neurosurgery Physical Exam  General: well developed, well nourished, no distress.   Head: normocephalic, atraumatic.  Left frontal hematoma noted sutures Neck: No tracheal deviation.   Neurologic: Alert and oriented. Thought content appropriate.  GCS: E4 V5 M6; Total: 15  Mental Status: Awake, Alert, Oriented to self, month, year and [place] president.  Language: No aphasia  Speech: No dysarthria  Cranial nerves: face symmetric, tongue midline, CN II-XII grossly intact.   Eyes: pupils equal, round, reactive to light, EOMI.   Pulmonary: normal respirations, no signs of respiratory distress  Skin: Skin is warm, dry and intact.    Sensory: intact to light touch throughout  Motor Strength: Moves all extremities spontaneously with good tone.  No abnormal movements seen.   Full strength bilateral upper and lower extremities.     Finger-to-nose: intact bilaterally   Pronator drift: absent bilaterally       Significant Labs:  Recent Labs   Lab 02/09/24  1338   *      K 4.1      CO2 25   BUN 15   CREATININE 0.8   CALCIUM 8.8     Recent Labs   Lab 02/09/24  1338   WBC 12.00   HGB  11.4*   HCT 35.7*        Recent Labs   Lab 02/09/24  1338   INR 1.1   APTT 29.3     Microbiology Results (last 7 days)       ** No results found for the last 168 hours. **            Assessment/Plan:     Subdural hematoma, acute  78-year-old male with history of atrial fibrillation treated with Eliquis.  Eliquis was reversed with Kcentra while in emergency room.    Patient is neurologically intact.    Recommend stabilization head CT at approximately 5:00 p.m.    Discussed with Dr. Goodrich.        Thank you for your consult. I will follow-up with patient. Please contact us if you have any additional questions.    JOYCE DUTTA PA-C  Neurosurgery  [facility]

## 2024-02-09 NOTE — PLAN OF CARE
02/09/24 1713   Patient Assessment/Suction   Level of Consciousness (AVPU) alert   Respiratory Effort Unlabored;Normal   All Lung Fields Breath Sounds diminished   PRE-TX-O2   Device (Oxygen Therapy) room air   Pulse Oximetry Type Continuous   $ Pulse Oximetry - Multiple Charge Pulse Oximetry - Multiple   Incentive Spirometer   $ Incentive Spirometer Charges done with encouragement;proper technique demonstrated   Incentive Spirometer Predicted Level (mL) 2250   Administration (IS) instruction provided, initial;mouthpiece utilized   Number of Repetitions (IS) 12   Level Incentive Spirometer (mL) 1250   Patient Tolerance (IS) fair  (PT WAS A LITTLE CONFUSED. GOT OF SYNC WITH IS)

## 2024-02-09 NOTE — HPI
Mr. Hiro Rodríguez is a 78-year-old male with past medical history paroxysmal atrial fibrillation treated with Eliquis, hyperlipidemia, hypertension, type 2 diabetes, heart failure, hearing loss, gait instability presented to the emergency department after a mechanical fall on his driveway this morning.  Wife was present at time of emergency room encounter.  Patient reports when he fell he initially went to his knees and then hit his head on the ground.  He reports left forehead pain and rib cage pain.  He also reports he had a similar fall approximately 1 year ago which resulted in a subdural hematoma which was previously treated with nonoperative management. He denies headache, loss of consciousness, vision disturbances or dizziness.  CT head obtained today revealed left subdural hematoma with mild mass effect.  Eliquis was reversed with Kcentra.  Neurosurgery consulted.

## 2024-02-09 NOTE — SUBJECTIVE & OBJECTIVE
Medications Prior to Admission   Medication Sig Dispense Refill Last Dose    acetaminophen 325 mg Cap Take 650 mg by mouth every 6 (six) hours as needed.   Past Month    amiodarone (PACERONE) 100 MG Tab Take 1 tablet (100 mg total) by mouth every morning. 90 tablet 3 2/9/2024    apixaban (ELIQUIS) 2.5 mg Tab Take 1 tablet (2.5 mg total) by mouth 2 (two) times daily. 180 tablet 3 2/9/2024    calcium polycarbophil (FIBER-CAPS, CA POLYCARBOPHIL, ORAL) Take 1 tablet by mouth 2 (two) times a day.   2/9/2024    cholecalciferol, vitamin D3, (VITAMIN D3) 50 mcg (2,000 unit) Tab Take 2 tablets by mouth once daily.   2/9/2024    cyanocobalamin, vitamin B-12, 1,000 mcg Subl Place 1,000 mcg under the tongue 2 (two) times a day.   2/9/2024    docusate sodium (COLACE) 100 MG capsule Take 100 mg by mouth 2 (two) times daily as needed for Constipation.   Past Week    DULoxetine (CYMBALTA) 30 MG capsule Take 1 capsule (30 mg total) by mouth 2 (two) times daily. 90 capsule 1 2/9/2024    finasteride (PROSCAR) 5 mg tablet Take 1 tablet (5 mg total) by mouth once daily. 90 tablet 1 2/9/2024    furosemide (LASIX) 20 MG tablet Take 1 tablet (20 mg total) by mouth once daily. 90 tablet 1 2/9/2024    HYDROcodone-acetaminophen (NORCO)  mg per tablet Take 1 tablet by mouth every 6 (six) hours as needed for Pain.   Past Week    lisinopriL (PRINIVIL,ZESTRIL) 2.5 MG tablet Take 1 tablet (2.5 mg total) by mouth 2 (two) times daily. 180 tablet 3 2/9/2024    metFORMIN (GLUCOPHAGE-XR) 500 MG ER 24hr tablet TAKE 1 TABLET BY MOUTH EVERY DAY (Patient taking differently: Take 500 mg by mouth 2 (two) times daily with meals.) 90 tablet 0 2/9/2024    metoprolol tartrate (LOPRESSOR) 25 MG tablet Take 1 tablet (25 mg total) by mouth 2 (two) times daily. 180 tablet 1 2/9/2024    omega-3 acid ethyl esters (LOVAZA) 1 gram capsule TAKE 2 CAPSULES BY MOUTH TWICE A DAY (Patient taking differently: Take 2 g by mouth 2 (two) times daily.) 360 capsule 0  2/9/2024    pantoprazole (PROTONIX) 40 MG tablet Take 40 mg by mouth once daily.   Past Week    polyethylene glycol (GLYCOLAX) 17 gram/dose powder Take 17 g by mouth daily as needed for Constipation.   Past Week    potassium chloride (KLOR-CON) 10 MEQ TbSR Take 2 tablets (20 mEq total) by mouth once daily. 180 tablet 3 2/9/2024    propranoloL (INDERAL) 40 MG tablet Take 1 tablet (40 mg total) by mouth 2 (two) times daily. 180 tablet 1 2/9/2024    rosuvastatin (CRESTOR) 20 MG tablet Take 1 tablet (20 mg total) by mouth once daily. 90 tablet 1 2/9/2024    tadalafiL (CIALIS) 20 MG Tab Take 1 tablet (20 mg total) by mouth as needed (ed). 30 tablet 1 Past Month    tamsulosin (FLOMAX) 0.4 mg Cap Take 2 capsules (0.8 mg total) by mouth once daily. 90 capsule 3 2/8/2024    amitriptyline (ELAVIL) 10 MG tablet Take 10 mg by mouth every evening.       semaglutide (OZEMPIC) 2 mg/dose (8 mg/3 mL) PnIj Inject 2 mg into the skin every 7 days. (Patient taking differently: Inject 2 mg into the skin every 7 days. MONDAYS) 4 mL 1 2/5/2024    solifenacin (VESICARE) 10 MG tablet Take 10 mg by mouth once daily.       tolterodine (DETROL LA) 4 MG 24 hr capsule Take 4 mg by mouth once daily.          Review of patient's allergies indicates:  No Known Allergies    Past Medical History:   Diagnosis Date    CHF (congestive heart failure)     Hypertension     Mixed hyperlipidemia     Paroxysmal atrial fibrillation 2013     Past Surgical History:   Procedure Laterality Date    CYSTOSCOPY W/ URETERAL STENT PLACEMENT N/A 03/02/2023    Procedure: CYSTOSCOPY, WITH URETERAL STENT INSERTION;  Surgeon: Yoshi Lange MD;  Location: [facility] OR;  Service: Urology;  Laterality: N/A;    CYSTOURETEROSCOPY, WITH HOLMIUM LASER LITHOTRIPSY OF URETERAL CALCULUS AND STENT INSERTION Left 4/20/2023    Procedure: CYSTOURETEROSCOPY, WITH HOLMIUM LASER LITHOTRIPSY OF URETERAL CALCULUS AND STENT INSERTION;  Surgeon: Yoshi Lange MD;  Location: [facility] OR;   Service: Urology;  Laterality: Left;    FRACTURE SURGERY      INTRAMEDULLARY RODDING OF TROCHANTER OF FEMUR Left 11/10/2022    Procedure: INSERTION, ITRAMEDULLARY SANTI, FEMUR, TROCHANTER;  Surgeon: Richard Phoenix MD;  Location: [facility] OR;  Service: Orthopedics;  Laterality: Left;    LUMBAR DISCECTOMY  1980    L4-5    REMOVAL OF URETERAL CALCULUS Left 4/20/2023    Procedure: REMOVAL, CALCULUS, URETER;  Surgeon: Yoshi Lange MD;  Location: [facility] OR;  Service: Urology;  Laterality: Left;    REMOVAL, CALCULUS, BLADDER  03/02/2023    Procedure: REMOVAL, CALCULUS, BLADDER;  Surgeon: Yoshi Lange MD;  Location: [facility] OR;  Service: Urology;;    RETROGRADE PYELOGRAPHY  4/20/2023    Procedure: PYELOGRAM, RETROGRADE;  Surgeon: Yoshi Lange MD;  Location: [facility];  Service: Urology;;    TOTAL KNEE ARTHROPLASTY Right 2017    TOTAL KNEE ARTHROPLASTY Left 2018     Family History    None       Tobacco Use    Smoking status: Never    Smokeless tobacco: Never   Substance and Sexual Activity    Alcohol use: Yes     Alcohol/week: 1.0 standard drink of alcohol     Types: 1 Shots of liquor per week     Comment: rarely    Drug use: Not Currently    Sexual activity: Yes     Partners: Female     Objective:     Weight: 108.9 kg (240 lb)  Body mass index is 34.44 kg/m².  Vital Signs (Most Recent):  Temp: 98.6 °F (37 °C) (02/09/24 1139)  Pulse: 72 (02/09/24 1430)  Resp: 18 (02/09/24 1430)  BP: (!) 164/102 (02/09/24 1430)  SpO2: (!) 92 % (02/09/24 1430) Vital Signs (24h Range):  Temp:  [98.6 °F (37 °C)] 98.6 °F (37 °C)  Pulse:  [70-76] 72  Resp:  [18-20] 18  SpO2:  [92 %-95 %] 92 %  BP: (143-176)/() 164/102                              Ureteral Drain/Stent 04/20/23 1515 Left ureter 4.8 Fr. (Active)         Neurosurgery Physical Exam  General: well developed, well nourished, no distress.   Head: normocephalic, atraumatic.  Left frontal hematoma noted sutures Neck: No tracheal deviation.   Neurologic: Alert and  oriented. Thought content appropriate.  GCS: E4 V5 M6; Total: 15  Mental Status: Awake, Alert, Oriented to self, month, year and [place] president.  Language: No aphasia  Speech: No dysarthria  Cranial nerves: face symmetric, tongue midline, CN II-XII grossly intact.   Eyes: pupils equal, round, reactive to light, EOMI.   Pulmonary: normal respirations, no signs of respiratory distress  Skin: Skin is warm, dry and intact.    Sensory: intact to light touch throughout  Motor Strength: Moves all extremities spontaneously with good tone.  No abnormal movements seen.   Full strength bilateral upper and lower extremities.     Finger-to-nose: intact bilaterally   Pronator drift: absent bilaterally       Significant Labs:  Recent Labs   Lab 02/09/24  1338   *      K 4.1      CO2 25   BUN 15   CREATININE 0.8   CALCIUM 8.8     Recent Labs   Lab 02/09/24  1338   WBC 12.00   HGB 11.4*   HCT 35.7*        Recent Labs   Lab 02/09/24  1338   INR 1.1   APTT 29.3     Microbiology Results (last 7 days)       ** No results found for the last 168 hours. **

## 2024-02-09 NOTE — TELEPHONE ENCOUNTER
----- Message from Nereida Roblero sent at 2/9/2024 11:59 AM CST -----  Regarding: advise  Contact: wife - amelia  Type: Needs Medical Advice  Who Called wife Sally cisneros   Symptoms (please be specific):    How long has patient had these symptoms:    Pharmacy name and phone #:    Best Call Back Number:[phone]    Additional Information: had an accident in the ER canceling apt on Monday call to r/s

## 2024-02-09 NOTE — TELEPHONE ENCOUNTER
Pts wife called with FYI   Pt in ER with fall/head injury will need reschedule as had to cancel

## 2024-02-09 NOTE — PHARMACY MED REC
"Admission Medication History     The home medication history was taken by Viji Guerrero.    You may go to "Admission" then "Reconcile Home Medications" tabs to review and/or act upon these items.     The home medication list has been updated by the Pharmacy department.   Please read ALL comments highlighted in yellow.   Please address this information as you see fit.    Feel free to contact us if you have any questions or require assistance.      Medications listed below were obtained from: Patient/family and Analytic [employer]- [employer]  No current facility-administered medications on file prior to encounter.     Current Outpatient Medications on File Prior to Encounter   Medication Sig Dispense Refill    acetaminophen 325 mg Cap Take 650 mg by mouth every 6 (six) hours as needed.      amiodarone (PACERONE) 100 MG Tab Take 1 tablet (100 mg total) by mouth every morning. 90 tablet 3    apixaban (ELIQUIS) 2.5 mg Tab Take 1 tablet (2.5 mg total) by mouth 2 (two) times daily. 180 tablet 3    calcium polycarbophil (FIBER-CAPS, CA POLYCARBOPHIL, ORAL) Take 1 tablet by mouth 2 (two) times a day.      cholecalciferol, vitamin D3, (VITAMIN D3) 50 mcg (2,000 unit) Tab Take 2 tablets by mouth once daily.      cyanocobalamin, vitamin B-12, 1,000 mcg Subl Place 1,000 mcg under the tongue 2 (two) times a day.      docusate sodium (COLACE) 100 MG capsule Take 100 mg by mouth 2 (two) times daily as needed for Constipation.      DULoxetine (CYMBALTA) 30 MG capsule Take 1 capsule (30 mg total) by mouth 2 (two) times daily. 90 capsule 1    finasteride (PROSCAR) 5 mg tablet Take 1 tablet (5 mg total) by mouth once daily. 90 tablet 1    furosemide (LASIX) 20 MG tablet Take 1 tablet (20 mg total) by mouth once daily. 90 tablet 1    HYDROcodone-acetaminophen (NORCO)  mg per tablet Take 1 tablet by mouth every 6 (six) hours as needed for Pain.      lisinopriL (PRINIVIL,ZESTRIL) 2.5 MG tablet Take 1 tablet (2.5 mg total) by mouth 2 " (two) times daily. 180 tablet 3    metFORMIN (GLUCOPHAGE-XR) 500 MG ER 24hr tablet TAKE 1 TABLET BY MOUTH EVERY DAY (Patient taking differently: Take 500 mg by mouth 2 (two) times daily with meals.) 90 tablet 0    metoprolol tartrate (LOPRESSOR) 25 MG tablet Take 1 tablet (25 mg total) by mouth 2 (two) times daily. 180 tablet 1    omega-3 acid ethyl esters (LOVAZA) 1 gram capsule TAKE 2 CAPSULES BY MOUTH TWICE A DAY (Patient taking differently: Take 2 g by mouth 2 (two) times daily.) 360 capsule 0    pantoprazole (PROTONIX) 40 MG tablet Take 40 mg by mouth once daily.      polyethylene glycol (GLYCOLAX) 17 gram/dose powder Take 17 g by mouth daily as needed for Constipation.      potassium chloride (KLOR-CON) 10 MEQ TbSR Take 2 tablets (20 mEq total) by mouth once daily. 180 tablet 3    propranoloL (INDERAL) 40 MG tablet Take 1 tablet (40 mg total) by mouth 2 (two) times daily. 180 tablet 1    rosuvastatin (CRESTOR) 20 MG tablet Take 1 tablet (20 mg total) by mouth once daily. 90 tablet 1    tadalafiL (CIALIS) 20 MG Tab Take 1 tablet (20 mg total) by mouth as needed (ed). 30 tablet 1    tamsulosin (FLOMAX) 0.4 mg Cap Take 2 capsules (0.8 mg total) by mouth once daily. 90 capsule 3    amitriptyline (ELAVIL) 10 MG tablet Take 10 mg by mouth every evening.      semaglutide (OZEMPIC) 2 mg/dose (8 mg/3 mL) PnIj Inject 2 mg into the skin every 7 days. (Patient taking differently: Inject 2 mg into the skin every 7 days. MONDAYS) 4 mL 1    solifenacin (VESICARE) 10 MG tablet Take 10 mg by mouth once daily.      tolterodine (DETROL LA) 4 MG 24 hr capsule Take 4 mg by mouth once daily.      [DISCONTINUED] metaxalone (SKELAXIN) 800 MG tablet       [DISCONTINUED] oxyCODONE-acetaminophen (PERCOCET)  mg per tablet          Potential issues to be addressed PRIOR TO DISCHARGE  Patient reported not taking the following medications: (Amitriptyline, Vesicare & Detrol). These medications remain on the home medication list. Please  address accordingly.     Viji Guerrero  EXT 1924                  .

## 2024-02-09 NOTE — HPI
Mr. Rodríguez is a 78-years-old male who presented as a direct admission from [facility] for ICU level of care.  Patient reports he was in his usual state of health, was ambulating in his driveway with cane, subsequently fell, believes mechanical, landed on his left side with trauma to left forehead, chest and lower extremity.  Denies any associated loss of consciousness, states he was not able to get off the ground until first responders arrived and assisted.  Denies any preceding lightheadedness, dizziness, chest pain, shortness of breath, palpitations.  States last fall was around 8 months ago.  Patient with history of atrial fibrillation, on Eliquis, last dose taken this morning (2/9/24).  Patient currently reports shortness of breath and left sided chest pain.  Denies any nausea, vomiting, fever, chills, admits to constipation with last bowel movement 3 days ago.  At outside hospital BP up to 164/102, afebrile with T-max 98.6°.  Labs with hemoglobin 11.4, platelets 254, INR 1.1, BUN/creatinine 15/0.8.  CT head with moderate left supraorbital swelling, left frontotemporal convexity subdural hematoma, 8 mm with slight left-to-right midline shift about 2 mm.  Chest imaging with acute fracture of left lateral 6th and 7th rib with no pneumothorax.  Noted to have left supraorbital laceration which was sutured at outside hospital.  He received fentanyl, Kcentra and 1 g Keppra in the ED.  Case discussed with ED provider, states discussed with on-call neurosurgeon Dr. Goodrich, recommends conservative management with ICU admission and patient will be seen in consultation.  Plan of care discussed with patient, no visitors at bedside.  Code status addressed and currently full code.  Discussed with nursing.

## 2024-02-09 NOTE — ED PROVIDER NOTES
Encounter Date: 2/9/2024       History     Chief Complaint   Patient presents with    Fall     Pt comes in via ems with c/o a fall. Pt has laceration to forehead and hand. Pt denies LOC pt is on blood thinner      78-year-old male with a history of CHF, hypertension, AFib on blood thinners presents to the emergency room with a mechanical fall in his driveway.  Comes in by EMS.  EMS states laceration above left eyebrow.  Patient is complaining of left rib pain, left hand pain and an abrasion to the knee.  No neck pain no shortness of breath no other complaints.  No confusion no loss of consciousness.        Review of patient's allergies indicates:  No Known Allergies  Past Medical History:   Diagnosis Date    CHF (congestive heart failure)     Hypertension     Mixed hyperlipidemia     Paroxysmal atrial fibrillation 2013     Past Surgical History:   Procedure Laterality Date    CYSTOSCOPY W/ URETERAL STENT PLACEMENT N/A 03/02/2023    Procedure: CYSTOSCOPY, WITH URETERAL STENT INSERTION;  Surgeon: Yoshi Lange MD;  Location: [facility];  Service: Urology;  Laterality: N/A;    CYSTOURETEROSCOPY, WITH HOLMIUM LASER LITHOTRIPSY OF URETERAL CALCULUS AND STENT INSERTION Left 4/20/2023    Procedure: CYSTOURETEROSCOPY, WITH HOLMIUM LASER LITHOTRIPSY OF URETERAL CALCULUS AND STENT INSERTION;  Surgeon: Yoshi Lange MD;  Location: [facility] OR;  Service: Urology;  Laterality: Left;    FRACTURE SURGERY      INTRAMEDULLARY RODDING OF TROCHANTER OF FEMUR Left 11/10/2022    Procedure: INSERTION, ITRAMEDULLARY SANTI, FEMUR, TROCHANTER;  Surgeon: Richard Phoenix MD;  Location: [facility] OR;  Service: Orthopedics;  Laterality: Left;    LUMBAR DISCECTOMY  1980    L4-5    REMOVAL OF URETERAL CALCULUS Left 4/20/2023    Procedure: REMOVAL, CALCULUS, URETER;  Surgeon: Yoshi Lange MD;  Location: [facility] OR;  Service: Urology;  Laterality: Left;    REMOVAL, CALCULUS, BLADDER  03/02/2023    Procedure: REMOVAL, CALCULUS, BLADDER;   Surgeon: Yoshi Lange MD;  Location: [facility] OR;  Service: Urology;;    RETROGRADE PYELOGRAPHY  4/20/2023    Procedure: PYELOGRAM, RETROGRADE;  Surgeon: Yoshi Lange MD;  Location: [facility] OR;  Service: Urology;;    TOTAL KNEE ARTHROPLASTY Right 2017    TOTAL KNEE ARTHROPLASTY Left 2018     Family History   Problem Relation Age of Onset    Diabetes Mother     Heart disease Father      Social History     Tobacco Use    Smoking status: Never    Smokeless tobacco: Never   Substance Use Topics    Alcohol use: Yes     Alcohol/week: 1.0 standard drink of alcohol     Types: 1 Shots of liquor per week     Comment: rarely    Drug use: Not Currently     Review of Systems   Musculoskeletal:  Positive for arthralgias.   Skin:  Positive for wound.   Neurological:  Positive for headaches.   Hematological:  Bruises/bleeds easily.       Physical Exam     Initial Vitals [02/09/24 1139]   BP Pulse Resp Temp SpO2   (!) 143/81 70 20 98.6 °F (37 °C) 95 %      MAP       --         Physical Exam    Nursing note and vitals reviewed.  Constitutional: He appears well-developed and well-nourished. He is not diaphoretic.  Non-toxic appearance. He does not have a sickly appearance. He does not appear ill. No distress.   HENT:   Head: Head is with laceration. Head is without raccoon's eyes and without Redd's sign.       Right Ear: External ear normal.   Left Ear: External ear normal.   Very jagged irregular laceration above the left eye about 6 cm.     Eyes: Pupils are equal, round, and reactive to light.   Neck: Neck supple.   Normal range of motion.   Full passive range of motion without pain.     Cardiovascular:  Normal rate, regular rhythm and normal heart sounds.           No murmur heard.  Pulmonary/Chest: Breath sounds normal. No respiratory distress. He has no wheezes. He has no rhonchi. He has no rales. He exhibits bony tenderness. He exhibits no crepitus, no edema and no swelling.     Tenderness to the left lateral chest wall  but no crepitus   Abdominal: Abdomen is soft. He exhibits no distension. There is no abdominal tenderness. There is no rebound and no guarding.   Musculoskeletal:         General: No edema. Normal range of motion.      Right shoulder: Normal.      Left shoulder: Normal.      Right elbow: Normal.      Left elbow: Normal.      Right wrist: Normal.      Left wrist: Normal.      Left hand: Tenderness and bony tenderness present.        Hands:       Cervical back: Full passive range of motion without pain, normal range of motion and neck supple. No rigidity. No spinous process tenderness or muscular tenderness. Normal range of motion.      Right hip: Normal.      Left hip: Normal.      Right knee: Normal.      Left knee: Normal. Normal range of motion.      Right ankle: Normal.      Left ankle: Normal.        Legs:       Comments: Tenderness and bruising left metacarpal shaft    Abrasion to the left knee but full range of motion, nontender       Neurological: He is alert and oriented to person, place, and time. He has normal strength. No sensory deficit.   Psychiatric: He has a normal mood and affect. His behavior is normal. Judgment and thought content normal.         ED Course   Lac Repair    Date/Time: 2/9/2024 11:31 AM    Performed by: Brandyn Pablo MD  Authorized by: Brandyn Pablo MD    Consent:     Consent obtained:  Verbal    Consent given by:  Patient    Risks discussed:  Infection, need for additional repair, poor cosmetic result, pain, retained foreign body and poor wound healing  Universal protocol:     Procedure explained and questions answered to patient or proxy's satisfaction: yes      Patient identity confirmed:  Verbally with patient  Anesthesia:     Anesthesia method:  Local infiltration    Local anesthetic:  Lidocaine 1% w/o epi  Laceration details:     Location:  Face    Face location:  Forehead    Length (cm):  6  Pre-procedure details:     Preparation:  Patient was prepped and draped in  usual sterile fashion  Exploration:     Limited defect created (wound extended): no      Hemostasis achieved with:  Direct pressure    Imaging outcome: foreign body not noted      Wound exploration: wound explored through full range of motion and entire depth of wound visualized      Wound extent: areolar tissue violated, fascia violated and muscle damage      Wound extent: no foreign bodies/material noted    Treatment:     Area cleansed with:  Saline    Amount of cleaning:  Standard    Irrigation solution:  Sterile saline    Irrigation volume:  250ml    Irrigation method:  Pressure wash    Visualized foreign bodies/material removed: no      Debridement:  None    Undermining:  None    Scar revision: no      Layers/structures repaired:  Deep subcutaneous  Deep subcutaneous:     Suture size:  4-0    Suture material:  Vicryl    Suture technique:  Simple interrupted    Number of sutures:  1  Skin repair:     Repair method:  Sutures    Suture size:  4-0    Suture material:  Prolene    Suture technique:  Simple interrupted    Number of sutures:  7  Approximation:     Approximation:  Close  Repair type:     Repair type:  Complex  Post-procedure details:     Procedure completion:  Tolerated well, no immediate complications  Critical Care    Date/Time: 2/9/2024 11:31 AM    Performed by: Brandyn Pablo MD  Authorized by: Georgia Molina MD  Direct patient critical care time: 95 minutes  Additional history critical care time: 5 minutes  Ordering / reviewing critical care time: 15 minutes  Documentation critical care time: 10 minutes  Consulting other physicians critical care time: 10 minutes  Consult with family critical care time: 5 minutes  Total critical care time (exclusive of procedural time) : 140 minutes  Critical care was necessary to treat or prevent imminent or life-threatening deterioration of the following conditions: trauma (SDH, kcentra).  Critical care was time spent personally by me on the following  activities: development of treatment plan with patient or surrogate, discussions with consultants, evaluation of patient's response to treatment, examination of patient, obtaining history from patient or surrogate, ordering and performing treatments and interventions, ordering and review of laboratory studies, ordering and review of radiographic studies, pulse oximetry, re-evaluation of patient's condition and review of old charts.        Labs Reviewed   CBC W/ AUTO DIFFERENTIAL - Abnormal; Notable for the following components:       Result Value    RBC 3.85 (*)     Hemoglobin 11.4 (*)     Hematocrit 35.7 (*)     MCHC 31.9 (*)     Immature Granulocytes 0.9 (*)     Gran # (ANC) 9.5 (*)     Immature Grans (Abs) 0.11 (*)     Gran % 79.4 (*)     Lymph % 12.5 (*)     All other components within normal limits   COMPREHENSIVE METABOLIC PANEL - Abnormal; Notable for the following components:    Glucose 118 (*)     Albumin 3.0 (*)     All other components within normal limits   PROTIME-INR   APTT          Imaging Results              X-Ray Hand 3 view Left (Final result)  Result time 02/09/24 13:44:53      Final result by Jadon Vieyra MD (02/09/24 13:44:53)                   Impression:      No acute osseous abnormality. Osteoarthritis.      Electronically signed by: Jadon Vieyra MD  Date:    02/09/2024  Time:    13:44               Narrative:    EXAMINATION:  XR HAND COMPLETE 3 VIEW LEFT    CLINICAL HISTORY:  hand pain;.    TECHNIQUE:  PA, lateral, and oblique views of the left hand were performed.    COMPARISON:  None    FINDINGS:  There is no fracture or dislocation.  There is advanced degenerative change of the 1st carpometacarpal joint.  Degenerative changes of the interphalangeal joints are present, severe at the index finger distal interphalangeal joint.                                       X-Ray Ribs 2 View Left (Final result)  Result time 02/09/24 13:43:25      Final result by Jadon Vieyra MD  (02/09/24 13:43:25)                   Impression:      Nondisplaced acute left 6th and 7th rib fractures.  No pneumothorax.    Small left pleural effusion similar to numerous previous chest x-rays.      Electronically signed by: Jadon Vieyra MD  Date:    02/09/2024  Time:    13:43               Narrative:    EXAMINATION:  XR RIBS 2 VIEW LEFT    CLINICAL HISTORY:  Pleurodynia    TECHNIQUE:  Two views of the left ribs were performed.    COMPARISON:  None.    FINDINGS:  There are acute fractures of the left 6th and 7th ribs laterally, without significant displacement.  There is no pneumothorax.  A small left pleural effusion is present, similar to previous chest x-rays.                                       X-Ray Chest 1 View (Final result)  Result time 02/09/24 13:41:58      Final result by Jadon Vieyra MD (02/09/24 13:41:58)                   Impression:      Small left pleural effusion similar to previous examinations.      Electronically signed by: Jadon Vieyra MD  Date:    02/09/2024  Time:    13:41               Narrative:    EXAMINATION:  XR CHEST 1 VIEW    CLINICAL HISTORY:  r/o bleeding or hemorrhage;    TECHNIQUE:  Single frontal view of the chest was performed.    COMPARISON:  05/19/2023    FINDINGS:  The heart is enlarged.  There is blunting of left costophrenic angle, which is similar to numerous previous examinations, however previous CT examinations 04/28/2023 and 03/01/2023 demonstrates small left pleural effusions.  There is no pneumothorax.                                       CT Head Without Contrast (Final result)  Result time 02/09/24 13:22:12      Final result by Jadon Vieyra MD (02/09/24 13:22:12)                   Impression:      Moderate left subdural hematoma with minimal mass effect.    Moderate left supraorbital soft tissue swelling.    Mild bubbly fluid left maxillary sinus.  This may represent paranasal sinus disease however blood products may appear similar.   No fracture of the imaged maxillofacial bones is demonstrated.      Electronically signed by: Jadon Vieyra MD  Date:    02/09/2024  Time:    13:22               Narrative:    EXAMINATION:  CT HEAD WITHOUT CONTRAST    CLINICAL HISTORY:  Head trauma, moderate-severe;Trauma;    TECHNIQUE:  Low dose axial images were obtained through the head.  Coronal and sagittal reformations were also performed. Contrast was not administered.    COMPARISON:  01/22/2023    FINDINGS:  There is moderate left supraorbital soft tissue.  The calvarium is intact.    There is a an acute left frontotemporal convexity subdural hematoma demonstrating a maximum thickness of 8 mm.  There is slight crowding of subjacent sulci and slight left right midline shift of 2 mm.  The basilar cisterns are patent    Gray-white demonstration is well maintained.  There is no intraparenchymal hemorrhage.  No mass is demonstrated.  The ventricles are nondilated.  There are mild chronic white matter changes.    There is mild bubbly fluid within the left maxillary sinus.  The mastoid air cells are clear.                                       Medications   sodium chloride 0.9% flush 10 mL (has no administration in time range)   bisacodyL suppository 10 mg (has no administration in time range)   mupirocin 2 % ointment (has no administration in time range)   acetaminophen tablet 650 mg (has no administration in time range)   LIDOcaine 5 % patch 1 patch (has no administration in time range)   amiodarone tablet 100 mg (has no administration in time range)   DULoxetine DR capsule 30 mg (has no administration in time range)   finasteride tablet 5 mg (has no administration in time range)   furosemide tablet 20 mg (has no administration in time range)   lisinopriL tablet 2.5 mg (has no administration in time range)   metoprolol tartrate (LOPRESSOR) tablet 25 mg (has no administration in time range)   pantoprazole EC tablet 40 mg (has no administration in time range)    atorvastatin tablet 40 mg (has no administration in time range)   tamsulosin 24 hr capsule 0.8 mg (has no administration in time range)   sodium chloride 0.9% flush 10 mL (has no administration in time range)   naloxone 0.4 mg/mL injection 0.02 mg (has no administration in time range)   glucose chewable tablet 24 g (has no administration in time range)   dextrose 50% injection 12.5 g (has no administration in time range)   dextrose 50% injection 25 g (has no administration in time range)   glucagon (human recombinant) injection 1 mg (has no administration in time range)   HYDROcodone-acetaminophen 5-325 mg per tablet 1 tablet (1 tablet Oral Given 2/9/24 1625)   morphine injection 2 mg (has no administration in time range)   senna-docusate 8.6-50 mg per tablet 2 tablet (has no administration in time range)   polyethylene glycol packet 17 g (has no administration in time range)   ondansetron injection 4 mg (has no administration in time range)   glucose chewable tablet 16 g (has no administration in time range)   insulin aspart U-100 pen 0-10 Units (has no administration in time range)   hydrALAZINE injection 10 mg (has no administration in time range)   LIDOcaine HCL 10 mg/ml (1%) injection 10 mL (10 mLs Infiltration Given 2/9/24 1200)   human prothrombin complex (PCC) (KCENTRA) 5,000 Units in sterile water for injection IVPB (5,000 Units Intravenous New Bag 2/9/24 1452)   fentaNYL 50 mcg/mL injection 50 mcg (50 mcg Intravenous Given 2/9/24 1337)   levETIRAcetam injection 1,000 mg (1,000 mg Intravenous Given 2/9/24 1355)     Medical Decision Making  78-year-old male on Eliquis secondary to AFib presents after mechanical fall at home.  He has a large left forehead laceration that was repaired after copious irrigation.  No foreign body was found.  He has some left rib pain, x-rays demonstrate to rib fractures but no pneumothorax.  Head CT demonstrates a subdural hemorrhage with a very small amount of midline shift.   He is awake and alert with no apparent neurologic deficit.  No indication for intubation.  Case discussed with Dr Goodrich neurosurgery who recommends Kcentra, Keppra and transferred to ICU at [facility].  Patient accepted and transferred without complication.    Amount and/or Complexity of Data Reviewed  Labs: ordered.  Radiology: ordered. Decision-making details documented in ED Course.  Discussion of management or test interpretation with external provider(s): 1328 Case d/w dr goodrich he wants the patient to have Kcentra for the Eliquis, 1 g of Keppra, transfer to the ICU at [facility], blood pressure less than 160 and a repeat head CT in 6 hours. [EF]    Accepted at [facility] by Dr Molina    Risk  Prescription drug management.  Decision regarding hospitalization.               ED Course as of 02/09/24 1649   Fri Feb 09, 2024   1142 BP(!): 143/81 [EF]   1142 Temp: 98.6 °F (37 °C) [EF]   1142 Temp Source: Oral [EF]   1142 Pulse: 70 [EF]   1142 Resp: 20 [EF]   1142 SpO2: 95 % [EF]   1317 Subdural hemorrhage on CT my read, I called Radiology to expedite [EF]   1324 CT Head Without Contrast [EF]   1328 Case d/w dr goodrich he wants the patient to have Kcentra for the Eliquis, 1 g of Keppra, transfer to the ICU at [facility], blood pressure less than 160 and a repeat head CT in 6 hours. [EF]   1335 Case d/w galo pompa, she will call [facility] house sup [EF]   1344 X-Ray Chest 1 View [EF]   1346 Case d/w dr molina who accepts patient [EF]   1408 X-Ray Hand 3 view Left [EF]   1408 X-Ray Ribs 2 View Left [EF]      ED Course User Index  [EF] Brandyn Pablo MD                           Clinical Impression:  Final diagnoses:  [R07.81] Rib pain on left side  [S06.5XAA] SDH (subdural hematoma) (Primary)          ED Disposition Condition    Admit                 Brandyn Pablo MD  02/09/24 [id number]

## 2024-02-10 PROBLEM — E66.811 CLASS 1 OBESITY: Status: ACTIVE | Noted: 2022-07-16

## 2024-02-10 PROBLEM — E66.9 CLASS 1 OBESITY: Status: ACTIVE | Noted: 2022-07-16

## 2024-02-10 PROBLEM — I48.20 CHRONIC ATRIAL FIBRILLATION: Status: ACTIVE | Noted: 2020-08-31

## 2024-02-10 LAB
ALBUMIN SERPL BCP-MCNC: 2.6 G/DL (ref 3.5–5.2)
ALP SERPL-CCNC: 42 U/L (ref 55–135)
ALT SERPL W/O P-5'-P-CCNC: 15 U/L (ref 10–44)
ANION GAP SERPL CALC-SCNC: 3 MMOL/L (ref 8–16)
ANION GAP SERPL CALC-SCNC: 7 MMOL/L (ref 8–16)
AST SERPL-CCNC: 14 U/L (ref 10–40)
BASOPHILS # BLD AUTO: 0.02 K/UL (ref 0–0.2)
BASOPHILS # BLD AUTO: 0.02 K/UL (ref 0–0.2)
BASOPHILS NFR BLD: 0.2 % (ref 0–1.9)
BASOPHILS NFR BLD: 0.2 % (ref 0–1.9)
BILIRUB SERPL-MCNC: 0.4 MG/DL (ref 0.1–1)
BLD PROD TYP BPU: NORMAL
BLD PROD TYP BPU: NORMAL
BLOOD UNIT EXPIRATION DATE: NORMAL
BLOOD UNIT EXPIRATION DATE: NORMAL
BLOOD UNIT TYPE CODE: 6200
BLOOD UNIT TYPE CODE: 6200
BLOOD UNIT TYPE: NORMAL
BLOOD UNIT TYPE: NORMAL
BUN SERPL-MCNC: 10 MG/DL (ref 8–23)
BUN SERPL-MCNC: 13 MG/DL (ref 8–23)
CALCIUM SERPL-MCNC: 6.2 MG/DL (ref 8.7–10.5)
CALCIUM SERPL-MCNC: 8.3 MG/DL (ref 8.7–10.5)
CHLORIDE SERPL-SCNC: 102 MMOL/L (ref 95–110)
CHLORIDE SERPL-SCNC: 112 MMOL/L (ref 95–110)
CO2 SERPL-SCNC: 26 MMOL/L (ref 23–29)
CO2 SERPL-SCNC: 29 MMOL/L (ref 23–29)
CODING SYSTEM: NORMAL
CODING SYSTEM: NORMAL
CREAT SERPL-MCNC: 0.4 MG/DL (ref 0.5–1.4)
CREAT SERPL-MCNC: 0.6 MG/DL (ref 0.5–1.4)
CROSSMATCH INTERPRETATION: NORMAL
CROSSMATCH INTERPRETATION: NORMAL
DIFFERENTIAL METHOD BLD: ABNORMAL
DIFFERENTIAL METHOD BLD: ABNORMAL
DISPENSE STATUS: NORMAL
DISPENSE STATUS: NORMAL
EOSINOPHIL # BLD AUTO: 0 K/UL (ref 0–0.5)
EOSINOPHIL # BLD AUTO: 0.1 K/UL (ref 0–0.5)
EOSINOPHIL NFR BLD: 0.2 % (ref 0–8)
EOSINOPHIL NFR BLD: 0.5 % (ref 0–8)
ERYTHROCYTE [DISTWIDTH] IN BLOOD BY AUTOMATED COUNT: 14.3 % (ref 11.5–14.5)
ERYTHROCYTE [DISTWIDTH] IN BLOOD BY AUTOMATED COUNT: 14.4 % (ref 11.5–14.5)
EST. GFR  (NO RACE VARIABLE): >60 ML/MIN/1.73 M^2
EST. GFR  (NO RACE VARIABLE): >60 ML/MIN/1.73 M^2
GLUCOSE SERPL-MCNC: 114 MG/DL (ref 70–110)
GLUCOSE SERPL-MCNC: 86 MG/DL (ref 70–110)
GLUCOSE SERPL-MCNC: 88 MG/DL (ref 70–110)
GLUCOSE SERPL-MCNC: 88 MG/DL (ref 70–110)
GLUCOSE SERPL-MCNC: 95 MG/DL (ref 70–110)
HCT VFR BLD AUTO: 27.3 % (ref 40–54)
HCT VFR BLD AUTO: 34.9 % (ref 40–54)
HGB BLD-MCNC: 11.1 G/DL (ref 14–18)
HGB BLD-MCNC: 8.5 G/DL (ref 14–18)
IMM GRANULOCYTES # BLD AUTO: 0.04 K/UL (ref 0–0.04)
IMM GRANULOCYTES # BLD AUTO: 0.07 K/UL (ref 0–0.04)
IMM GRANULOCYTES NFR BLD AUTO: 0.4 % (ref 0–0.5)
IMM GRANULOCYTES NFR BLD AUTO: 0.5 % (ref 0–0.5)
INR PPP: 1 (ref 0.8–1.2)
LYMPHOCYTES # BLD AUTO: 1.3 K/UL (ref 1–4.8)
LYMPHOCYTES # BLD AUTO: 1.6 K/UL (ref 1–4.8)
LYMPHOCYTES NFR BLD: 12.2 % (ref 18–48)
LYMPHOCYTES NFR BLD: 13.1 % (ref 18–48)
MAGNESIUM SERPL-MCNC: 1.2 MG/DL (ref 1.6–2.6)
MAGNESIUM SERPL-MCNC: 1.6 MG/DL (ref 1.6–2.6)
MCH RBC QN AUTO: 29.6 PG (ref 27–31)
MCH RBC QN AUTO: 30.3 PG (ref 27–31)
MCHC RBC AUTO-ENTMCNC: 31.1 G/DL (ref 32–36)
MCHC RBC AUTO-ENTMCNC: 31.8 G/DL (ref 32–36)
MCV RBC AUTO: 95 FL (ref 82–98)
MCV RBC AUTO: 95 FL (ref 82–98)
MONOCYTES # BLD AUTO: 0.7 K/UL (ref 0.3–1)
MONOCYTES # BLD AUTO: 1 K/UL (ref 0.3–1)
MONOCYTES NFR BLD: 6.9 % (ref 4–15)
MONOCYTES NFR BLD: 7.6 % (ref 4–15)
NEUTROPHILS # BLD AUTO: 10.3 K/UL (ref 1.8–7.7)
NEUTROPHILS # BLD AUTO: 7.6 K/UL (ref 1.8–7.7)
NEUTROPHILS NFR BLD: 79 % (ref 38–73)
NEUTROPHILS NFR BLD: 79.2 % (ref 38–73)
NRBC BLD-RTO: 0 /100 WBC
NRBC BLD-RTO: 0 /100 WBC
NUM UNITS TRANS FFP: NORMAL
NUM UNITS TRANS FFP: NORMAL
PHOSPHATE SERPL-MCNC: 4.2 MG/DL (ref 2.7–4.5)
PLATELET # BLD AUTO: 174 K/UL (ref 150–450)
PLATELET # BLD AUTO: 176 K/UL (ref 150–450)
PLATELET BLD QL SMEAR: ABNORMAL
PMV BLD AUTO: 10.4 FL (ref 9.2–12.9)
PMV BLD AUTO: 9.5 FL (ref 9.2–12.9)
POTASSIUM SERPL-SCNC: 3.3 MMOL/L (ref 3.5–5.1)
POTASSIUM SERPL-SCNC: 4 MMOL/L (ref 3.5–5.1)
PROT SERPL-MCNC: 4.8 G/DL (ref 6–8.4)
PROTHROMBIN TIME: 11.2 SEC (ref 9–12.5)
RBC # BLD AUTO: 2.87 M/UL (ref 4.6–6.2)
RBC # BLD AUTO: 3.66 M/UL (ref 4.6–6.2)
SODIUM SERPL-SCNC: 138 MMOL/L (ref 136–145)
SODIUM SERPL-SCNC: 141 MMOL/L (ref 136–145)
WBC # BLD AUTO: 13.08 K/UL (ref 3.9–12.7)
WBC # BLD AUTO: 9.66 K/UL (ref 3.9–12.7)

## 2024-02-10 PROCEDURE — 25000003 PHARM REV CODE 250: Performed by: INTERNAL MEDICINE

## 2024-02-10 PROCEDURE — 36415 COLL VENOUS BLD VENIPUNCTURE: CPT | Performed by: INTERNAL MEDICINE

## 2024-02-10 PROCEDURE — 85610 PROTHROMBIN TIME: CPT | Performed by: INTERNAL MEDICINE

## 2024-02-10 PROCEDURE — 63600175 PHARM REV CODE 636 W HCPCS: Performed by: HOSPITALIST

## 2024-02-10 PROCEDURE — 84100 ASSAY OF PHOSPHORUS: CPT | Performed by: INTERNAL MEDICINE

## 2024-02-10 PROCEDURE — 63600175 PHARM REV CODE 636 W HCPCS: Performed by: INTERNAL MEDICINE

## 2024-02-10 PROCEDURE — 94799 UNLISTED PULMONARY SVC/PX: CPT | Mod: XB

## 2024-02-10 PROCEDURE — 83735 ASSAY OF MAGNESIUM: CPT | Performed by: INTERNAL MEDICINE

## 2024-02-10 PROCEDURE — 36430 TRANSFUSION BLD/BLD COMPNT: CPT

## 2024-02-10 PROCEDURE — 80048 BASIC METABOLIC PNL TOTAL CA: CPT | Performed by: INTERNAL MEDICINE

## 2024-02-10 PROCEDURE — 99233 SBSQ HOSP IP/OBS HIGH 50: CPT | Mod: ,,, | Performed by: NEUROLOGICAL SURGERY

## 2024-02-10 PROCEDURE — 20000000 HC ICU ROOM

## 2024-02-10 PROCEDURE — 80053 COMPREHEN METABOLIC PANEL: CPT | Performed by: INTERNAL MEDICINE

## 2024-02-10 PROCEDURE — P9017 PLASMA 1 DONOR FRZ W/IN 8 HR: HCPCS | Performed by: INTERNAL MEDICINE

## 2024-02-10 PROCEDURE — 94761 N-INVAS EAR/PLS OXIMETRY MLT: CPT

## 2024-02-10 PROCEDURE — 85025 COMPLETE CBC W/AUTO DIFF WBC: CPT | Performed by: INTERNAL MEDICINE

## 2024-02-10 PROCEDURE — 27000221 HC OXYGEN, UP TO 24 HOURS

## 2024-02-10 PROCEDURE — 30233R1 TRANSFUSION OF NONAUTOLOGOUS PLATELETS INTO PERIPHERAL VEIN, PERCUTANEOUS APPROACH: ICD-10-PCS | Performed by: HOSPITALIST

## 2024-02-10 RX ORDER — MAGNESIUM SULFATE HEPTAHYDRATE 40 MG/ML
2 INJECTION, SOLUTION INTRAVENOUS ONCE
Status: COMPLETED | OUTPATIENT
Start: 2024-02-10 | End: 2024-02-10

## 2024-02-10 RX ORDER — HYDROCODONE BITARTRATE AND ACETAMINOPHEN 5; 325 MG/1; MG/1
1 TABLET ORAL EVERY 4 HOURS PRN
Status: DISCONTINUED | OUTPATIENT
Start: 2024-02-10 | End: 2024-02-11

## 2024-02-10 RX ORDER — ACETAMINOPHEN 325 MG/1
650 TABLET ORAL EVERY 6 HOURS PRN
Status: DISCONTINUED | OUTPATIENT
Start: 2024-02-10 | End: 2024-02-11

## 2024-02-10 RX ADMIN — HYDROCODONE BITARTRATE AND ACETAMINOPHEN 1 TABLET: 5; 325 TABLET ORAL at 03:02

## 2024-02-10 RX ADMIN — MUPIROCIN 1 G: 20 OINTMENT TOPICAL at 09:02

## 2024-02-10 RX ADMIN — METOPROLOL TARTRATE 25 MG: 25 TABLET, FILM COATED ORAL at 09:02

## 2024-02-10 RX ADMIN — FINASTERIDE 5 MG: 5 TABLET, FILM COATED ORAL at 11:02

## 2024-02-10 RX ADMIN — PANTOPRAZOLE SODIUM 40 MG: 40 TABLET, DELAYED RELEASE ORAL at 06:02

## 2024-02-10 RX ADMIN — DULOXETINE HYDROCHLORIDE 30 MG: 30 CAPSULE, DELAYED RELEASE ORAL at 09:02

## 2024-02-10 RX ADMIN — DULOXETINE HYDROCHLORIDE 30 MG: 30 CAPSULE, DELAYED RELEASE ORAL at 11:02

## 2024-02-10 RX ADMIN — MORPHINE SULFATE 2 MG: 2 INJECTION, SOLUTION INTRAMUSCULAR; INTRAVENOUS at 01:02

## 2024-02-10 RX ADMIN — MAGNESIUM SULFATE 2 G: 2 INJECTION INTRAVENOUS at 11:02

## 2024-02-10 RX ADMIN — AMIODARONE HYDROCHLORIDE 100 MG: 100 TABLET ORAL at 06:02

## 2024-02-10 RX ADMIN — FUROSEMIDE 20 MG: 20 TABLET ORAL at 11:02

## 2024-02-10 RX ADMIN — LIDOCAINE 1 PATCH: 50 PATCH CUTANEOUS at 05:02

## 2024-02-10 RX ADMIN — MORPHINE SULFATE 2 MG: 2 INJECTION, SOLUTION INTRAMUSCULAR; INTRAVENOUS at 03:02

## 2024-02-10 RX ADMIN — ATORVASTATIN CALCIUM 40 MG: 40 TABLET, FILM COATED ORAL at 09:02

## 2024-02-10 RX ADMIN — MUPIROCIN 1 G: 20 OINTMENT TOPICAL at 11:02

## 2024-02-10 RX ADMIN — HYDROCODONE BITARTRATE AND ACETAMINOPHEN 1 TABLET: 5; 325 TABLET ORAL at 01:02

## 2024-02-10 RX ADMIN — LISINOPRIL 2.5 MG: 2.5 TABLET ORAL at 11:02

## 2024-02-10 RX ADMIN — HYDROCODONE BITARTRATE AND ACETAMINOPHEN 1 TABLET: 5; 325 TABLET ORAL at 08:02

## 2024-02-10 RX ADMIN — HYDROCODONE BITARTRATE AND ACETAMINOPHEN 1 TABLET: 5; 325 TABLET ORAL at 09:02

## 2024-02-10 RX ADMIN — LISINOPRIL 2.5 MG: 2.5 TABLET ORAL at 09:02

## 2024-02-10 RX ADMIN — TAMSULOSIN HYDROCHLORIDE 0.8 MG: 0.4 CAPSULE ORAL at 11:02

## 2024-02-10 RX ADMIN — SENNOSIDES AND DOCUSATE SODIUM 2 TABLET: 8.6; 5 TABLET ORAL at 11:02

## 2024-02-10 RX ADMIN — MORPHINE SULFATE 2 MG: 2 INJECTION, SOLUTION INTRAMUSCULAR; INTRAVENOUS at 07:02

## 2024-02-10 RX ADMIN — METOPROLOL TARTRATE 25 MG: 25 TABLET, FILM COATED ORAL at 11:02

## 2024-02-10 RX ADMIN — SENNOSIDES AND DOCUSATE SODIUM 2 TABLET: 8.6; 5 TABLET ORAL at 09:02

## 2024-02-10 RX ADMIN — HYDROCODONE BITARTRATE AND ACETAMINOPHEN 1 TABLET: 5; 325 TABLET ORAL at 05:02

## 2024-02-10 RX ADMIN — POLYETHYLENE GLYCOL 3350 17 G: 17 POWDER, FOR SOLUTION ORAL at 06:02

## 2024-02-10 NOTE — PROGRESS NOTES
[facility]  Neurosurgery  Progress Note    Subjective:     Interval History:  Since admission patient was had to repeat CT scans.  CT scan last night shows slight interval worsening so we have given more FFP and vitamin K. CT scan this morning looks stable without any change in mass effect or size.  Patient was in a GCS of 15 with headaches.    History of Present Illness:  A 70-year-old male we will Eliquis for atrial fib with a mechanical fall resulting in a closed head injury in a small non operative subdural hematoma.  Eliquis has been reversed patient was been observed in the ICU with with pressure control, seizure prophylaxis in radiographic and clinical monitoring.    Post-Op Info:  * No surgery found *          Medications:  Continuous Infusions:   nicardipine 2.5 mg/hr (02/10/24 0501)     Scheduled Meds:   amiodarone  100 mg Oral QAM    atorvastatin  40 mg Oral QHS    DULoxetine  30 mg Oral BID    finasteride  5 mg Oral Daily    furosemide  20 mg Oral Daily    LIDOcaine  1 patch Transdermal Q24H    lisinopriL  2.5 mg Oral BID    metoprolol tartrate  25 mg Oral BID    mupirocin   Nasal BID    pantoprazole  40 mg Oral Before breakfast    senna-docusate 8.6-50 mg  2 tablet Oral BID    tamsulosin  0.8 mg Oral Daily     PRN Meds:0.9%  NaCl infusion (for blood administration), acetaminophen, bisacodyL, dextrose 50% in water (D50W), dextrose 50% in water (D50W), glucagon (human recombinant), glucose, glucose, hydrALAZINE, HYDROcodone-acetaminophen, insulin aspart U-100, morphine, naloxone, ondansetron, polyethylene glycol, sodium chloride 0.9%, sodium chloride 0.9%     Review of Systems  Objective:     Weight: 111.9 kg (246 lb 11.1 oz)  Body mass index is 35.4 kg/m².  Vital Signs (Most Recent):  Temp: 97.5 °F (36.4 °C) (02/10/24 0700)  Pulse: 64 (02/10/24 0930)  Resp: 13 (02/10/24 0930)  BP: (!) 103/56 (02/10/24 0930)  SpO2: 96 % (02/10/24 0930) Vital Signs (24h Range):  Temp:  [97.5 °F (36.4  "°C)-98.9 °F (37.2 °C)] 97.5 °F (36.4 °C)  Pulse:  [] 64  Resp:  [12-32] 13  SpO2:  [86 %-98 %] 96 %  BP: (103-176)/() 103/56                              Ureteral Drain/Stent 04/20/23 1515 Left ureter 4.8 Fr. (Active)       Male External Urinary Catheter 02/09/24 1830 (Active)   Collection Container Standard drainage bag 02/10/24 0501   Securement Method secured to top of thigh w/ adhesive device 02/10/24 0501   Skin no redness;no breakdown 02/10/24 0501   Tolerance no signs/symptoms of discomfort 02/10/24 0501   Output (mL) 300 mL 02/09/24 2301       Neurosurgery Physical Exam    Significant Labs:  Recent Labs   Lab 02/09/24  1338 02/10/24  0315 02/10/24  0733   * 88 88    141 138   K 4.1 3.3* 4.0    112* 102   CO2 25 26 29   BUN 15 10 13   CREATININE 0.8 0.4* 0.6   CALCIUM 8.8 6.2* 8.3*   MG  --  1.2* 1.6     Recent Labs   Lab 02/09/24  1338 02/10/24  0315 02/10/24  0733   WBC 12.00 9.66 13.08*   HGB 11.4* 8.5* 11.1*   HCT 35.7* 27.3* 34.9*    174 176     Recent Labs   Lab 02/09/24  1338 02/10/24  0315   INR 1.1 1.0   APTT 29.3  --      Microbiology Results (last 7 days)       ** No results found for the last 168 hours. **          Procalcitonin: No results for input(s): "PROCAL" in the last 48 hours.  Recent Lab Results  (Last 5 results in the past 24 hours)        02/10/24  0733   02/10/24  0315   02/09/24  2220   02/09/24  2119   02/09/24  1338        Unit Blood Type Code     6200  [P]                6200  [P]           Unit Expiration     [id number]  [P]                [id number]  [P]           Unit Blood Type     A POS  [P]                A POS  [P]           Albumin   2.6       3.0       ALP   42       65       ALT   15       24       Anion Gap 7   3       11       PTT         29.3  Comment: Refer to local heparin nomogram for intensity/dose specific   therapeutic   range.         AST   14       26       Baso # 0.02   0.02       0.02       Basophil % 0.2   0.2       " 0.2       BILIRUBIN TOTAL   0.4  Comment: For infants and newborns, interpretation of results should be based  on gestational age, weight and in agreement with clinical  observations.    Premature Infant recommended reference ranges:  Up to 24 hours.............<8.0 mg/dL  Up to 48 hours............<12.0 mg/dL  3-5 days..................<15.0 mg/dL  6-29 days.................<15.0 mg/dL         0.5  Comment: For infants and newborns, interpretation of results should be based  on gestational age, weight and in agreement with clinical  observations.    Premature Infant recommended reference ranges:  Up to 24 hours.............<8.0 mg/dL  Up to 48 hours............<12.0 mg/dL  3-5 days..................<15.0 mg/dL  6-29 days.................<15.0 mg/dL         BUN 13   10       15       Calcium 8.3   6.2  Comment: Critical result CA 6.2 mg/dL called to and read back by Kimber Robison RN M3 at 10-Feb-2024 04:47 [facility].  Result Rechecked         8.8       Chloride 102   112       105       CO2 29   26       25       CODING SYSTEM     [id number]  [P]                [id number]  [P]           Creatinine 0.6   0.4       0.8       Crossmatch Interpretation     Not Required  [P]                Not Required  [P]           Differential Method Automated   Automated       Automated       DISPENSE STATUS     ISSUED  [P]                ISSUED  [P]           eGFR >60.0   >60.0       >60       Eos # 0.1   0.0       0.0       Eos % 0.5   0.2       0.3       Glucose 88   88       118       Gran # (ANC) 10.3   7.6       9.5       Gran % 79.0   79.2       79.4       Group & Rh     A POS           Hematocrit 34.9   27.3       35.7       Hemoglobin 11.1   8.5  Comment: Results confirmed, test repeated       11.4       Immature Grans (Abs) 0.07  Comment: Mild elevation in immature granulocytes is non specific and   can be seen in a variety of conditions including stress response,   acute inflammation, trauma and pregnancy.  Correlation with other   laboratory and clinical findings is essential.     0.04  Comment: Mild elevation in immature granulocytes is non specific and   can be seen in a variety of conditions including stress response,   acute inflammation, trauma and pregnancy. Correlation with other   laboratory and clinical findings is essential.         0.11  Comment: Mild elevation in immature granulocytes is non specific and   can be seen in a variety of conditions including stress response,   acute inflammation, trauma and pregnancy. Correlation with other   laboratory and clinical findings is essential.         Immature Granulocytes 0.5   0.4       0.9       INDIRECT KOBE     NEG           INR   1.0  Comment: Coumadin Therapy:  2.0 - 3.0 for INR for all indicators except mechanical heart valves  and antiphospholipid syndromes which should use 2.5 - 3.5.         1.1  Comment: Coumadin Therapy:  2.0 - 3.0 for INR for all indicators except mechanical heart valves  and antiphospholipid syndromes which should use 2.5 - 3.5.         Lymph # 1.6   1.3       1.5       Lymph % 12.2   13.1       12.5       Magnesium  1.6   1.2             MCH 30.3   29.6       29.6       MCHC 31.8   31.1       31.9       MCV 95   95       93       Mono # 1.0   0.7       0.8       Mono % 7.6   6.9       6.7       MPV 10.4   9.5       9.8       nRBC 0   0       0       Phosphorus Level 4.2               Platelet Estimate Appears normal               Platelet Count 176   174       254       POC Glucose       110         Potassium 4.0   3.3       4.1       Product Code     [id number]  [P]                [id number]  [P]           PROTEIN TOTAL   4.8       6.5       PT   11.2       11.6       RBC 3.66   2.87       3.85       RDW 14.4   14.3       14.1       Sodium 138   141       141       Specimen Outdate     02/12/2024 23:59           UNIT NUMBER     [id number]  [P]                [id number]  [P]           WBC 13.08   9.66       12.00                                [P] - Preliminary Result             All pertinent labs from the last 24 hours have been reviewed.  Significant Diagnostics:  I have reviewed all pertinent imaging results/findings within the past 24 hours.  I have reviewed and interpreted all pertinent imaging results/findings within the past 24 hours.    I reviewed the CT scan this morning.  We will overall no significant change from the CT scan from yesterday evening.  Comparing all 3 scans since admission that has been slight interval worsening but no meaningful increasing midline shift or mass effect    Assessment/Plan:     Active Diagnoses:    Diagnosis Date Noted POA    PRINCIPAL PROBLEM:  Acute traumatic left frontal subdural hematoma [S06.5XAA] 06/05/2023 Yes    Fall at home [W19.XXXA, Y92.009] 02/09/2024 Not Applicable    Laceration of forehead [S01.81XA] 02/09/2024 Yes    Closed fracture of multiple ribs of left side- 6th and 7th [S22.42XA] 02/09/2024 Yes    Benign prostatic hyperplasia without lower urinary tract symptoms [N40.0] 04/29/2023 Yes     Chronic    Hypertension, essential [I10] 04/28/2023 Yes     Chronic    Pulmonary hypertension, PASP 61 [I27.20] 03/17/2023 Yes     Chronic    GERD (gastroesophageal reflux disease) [K21.9] 03/16/2023 Yes    Use of cane as ambulatory aid [Z99.89] 12/15/2022 Not Applicable    BMI 33.0-33.9,adult [Z68.33] 07/16/2022 Not Applicable    Status post knee replacement [Z96.659] 07/12/2022 Not Applicable    Hearing loss [H91.90] 07/18/2021 Yes    Heart failure with preserved ejection fraction [I50.30] 04/21/2021 Yes    Hyperlipidemia [E78.5] 01/06/2021 Yes     Chronic    Type 2 diabetes mellitus with HbA1c 5.8% [E11.42] 01/06/2021 Yes     Chronic    Benign essential tremor [G25.0] 01/06/2021 Yes    Paroxysmal atrial fibrillation [I48.0] 08/31/2020 Yes     Chronic    Anticoagulated [Z79.01] 08/31/2020 Not Applicable      Problems Resolved During this Admission:     78-year-old male with traumatic subdural  hematoma that has remained relatively stable since admission.  Subdural hematoma his just expanded slightly but has remained stable unless CT scan.  No significant midline shift and no neurologic deficits.  I am hopeful we can continue without operative intervention.  We will continue ICU observation we would blood pressure control today.  No further reversal agent the patient would likely be anticoagulation we used 2 weeks.      We would okay to liberalize blood pressure parameter to 180.     Cane resume diet, get out of bed to chair    Will repeat CT in AM    JAYESH Goodrich  [phone]        Skip Goodrich MD  Neurosurgery  [facility]

## 2024-02-10 NOTE — CONSULTS
[facility]  Department of Neurology  Neurology Consultation Note        PATIENT NAME: Hiro Rodríguez  MRN: [id number]  CSN: [id number]      TODAY'S DATE: 02/10/2024  ADMIT DATE: 2/9/2024                            CONSULTING PROVIDER: Ilir Gunderson MD  CONSULT REQUESTED BY: Bebo Goodson MD      Reason for consult:  Subdural hematoma       History obtained from chart review and the patient.    HPI per EMR: Hiro Rodríguez is a 78 y.o. male presented as a direct admission from [facility] for ICU level of care. Patient reports he was in his usual state of health, was ambulating in his driveway with cane, subsequently fell, believes mechanical, landed on his left side with trauma to left forehead, chest and lower extremity. Denies any associated loss of consciousness, states he was not able to get off the ground until first responders arrived. Denies any preceding lightheadedness, dizziness, chest pain, shortness on breath, palpitations. States last fall was around 8 months ago. Patient with history of atrial fibrillation, on Eliquis, last dose taken this morning (2/9/24). Patient currently records shortness of breath and left sided chest pain. Denies any nausea, vomiting, fever, chills, admits to constipation with last bowel movement 3 days ago. At outside hospital BP up to 164/102, afebrile with T-max 98.6°. Labs with hemoglobin 11.4, platelets 254, INR 1.1, BUN/creatinine 15/0.8. CT head with moderate left supraorbital swelling, left frontotemporal convexity subdural hematoma, 8 mm with slight left-to-right midline shift about 2 mm. Chest imaging with acute fracture of left lateral 6th and 7th rib with no pneumothorax. Noted to have left supraorbital laceration which was sutured at outside hospital. He received fentanyl, Kcentra and 1 g Keppra in the ED. Case discussed with ED provider, states discussed with on-call neurosurgeon Dr. Goodrich, recommends conservative management with ICU  admission and patient will be seen in consultation. Plan of care discussed with patient, no visitors at bedside. Code status addressed and currently full code. Discussed with nursing.     Neurology consult:  Patient was seen and examined by me.  He states that yesterday while he was walking between the cars, he suddenly tripped and he fell hitting his head and left side of the body.  He did not lose consciousness.  He was not able to get off the ground until 1st responders arrived.  He was brought to the hospital and in the ER, he had a CT of the head which showed left frontotemporal convexity subdural hematoma with mild left-to-right midline shift.  He has a history of AFib for which he is on Eliquis this was reversed with Kcentra and received a 1 time dose of Keppra 1 g as well in the ER.  He also had a chest x-ray which showed acute fractures in the left 6th and 7th rib with no pneumothorax.  Patient was admitted to the ICU for further workup and management.    Patient states that he had a headache yesterday however it is improving now.  Denies any vision trouble, weakness or sensory changes in the extremities.  Denies any other new complaints.    PREVIOUS MEDICAL HISTORY:  Past Medical History:   Diagnosis Date    CHF (congestive heart failure)     Hypertension     Mixed hyperlipidemia     Paroxysmal atrial fibrillation 2013     PREVIOUS SURGICAL HISTORY:  Past Surgical History:   Procedure Laterality Date    CYSTOSCOPY W/ URETERAL STENT PLACEMENT N/A 03/02/2023    Procedure: CYSTOSCOPY, WITH URETERAL STENT INSERTION;  Surgeon: Yoshi Lange MD;  [facility];  Service: Urology;  Laterality: N/A;    CYSTOURETEROSCOPY, WITH HOLMIUM LASER LITHOTRIPSY OF URETERAL CALCULUS AND STENT INSERTION Left 4/20/2023    Procedure: CYSTOURETEROSCOPY, WITH HOLMIUM LASER LITHOTRIPSY OF URETERAL CALCULUS AND STENT INSERTION;  Surgeon: Yoshi Lange MD;  Location: [facility] OR;  Service: Urology;  Laterality: Left;     FRACTURE SURGERY      INTRAMEDULLARY RODDING OF TROCHANTER OF FEMUR Left 11/10/2022    Procedure: INSERTION, ITRAMEDULLARY SANTI, FEMUR, TROCHANTER;  Surgeon: Richard Phoenix MD;  Location: [facility];  Service: Orthopedics;  Laterality: Left;    LUMBAR DISCECTOMY  1980    L4-5    REMOVAL OF URETERAL CALCULUS Left 4/20/2023    Procedure: REMOVAL, CALCULUS, URETER;  Surgeon: Yoshi Lange MD;  Location: [facility] OR;  Service: Urology;  Laterality: Left;    REMOVAL, CALCULUS, BLADDER  03/02/2023    Procedure: REMOVAL, CALCULUS, BLADDER;  Surgeon: Yoshi Lange MD;  Location: [facility] OR;  Service: Urology;;    RETROGRADE PYELOGRAPHY  4/20/2023    Procedure: PYELOGRAM, RETROGRADE;  Surgeon: Yoshi Lange MD;  Location: [facility];  Service: Urology;;    TOTAL KNEE ARTHROPLASTY Right 2017    TOTAL KNEE ARTHROPLASTY Left 2018     FAMILY MEDICAL HISTORY:  Family History   Problem Relation Age of Onset    Diabetes Mother     Heart disease Father      SOCIAL HISTORY:  Social History     Tobacco Use    Smoking status: Never    Smokeless tobacco: Never   Substance Use Topics    Alcohol use: Yes     Alcohol/week: 1.0 standard drink of alcohol     Types: 1 Shots of liquor per week     Comment: rarely    Drug use: Not Currently     ALLERGIES:  Review of patient's allergies indicates:  No Known Allergies  HOME MEDICATIONS:  Prior to Admission medications    Medication Sig Start Date End Date Taking? Authorizing Provider   acetaminophen 325 mg Cap Take 650 mg by mouth every 6 (six) hours as needed. 12/8/22  Yes Provider, Historical   amiodarone (PACERONE) 100 MG Tab Take 1 tablet (100 mg total) by mouth every morning. 5/25/23  Yes Jaja Farrell NP   apixaban (ELIQUIS) 2.5 mg Tab Take 1 tablet (2.5 mg total) by mouth 2 (two) times daily. 5/25/23  Yes Jaja Farrell NP   calcium polycarbophil (FIBER-CAPS, CA POLYCARBOPHIL, ORAL) Take 1 tablet by mouth 2 (two) times a day.   Yes Provider, Historical   cholecalciferol,  vitamin D3, (VITAMIN D3) 50 mcg (2,000 unit) Tab Take 2 tablets by mouth once daily.   Yes Provider, Historical   cyanocobalamin, vitamin B-12, 1,000 mcg Subl Place 1,000 mcg under the tongue 2 (two) times a day.   Yes Provider, Historical   docusate sodium (COLACE) 100 MG capsule Take 100 mg by mouth 2 (two) times daily as needed for Constipation.   Yes Provider, Historical   DULoxetine (CYMBALTA) 30 MG capsule Take 1 capsule (30 mg total) by mouth 2 (two) times daily. 7/10/23  Yes Gautam Castanon III, MD   finasteride (PROSCAR) 5 mg tablet Take 1 tablet (5 mg total) by mouth once daily. 9/11/23 3/9/24 Yes Gautam Castanon III, MD   furosemide (LASIX) 20 MG tablet Take 1 tablet (20 mg total) by mouth once daily. 11/10/23  Yes Gautam Castanon III, MD   HYDROcodone-acetaminophen (NORCO)  mg per tablet Take 1 tablet by mouth every 6 (six) hours as needed for Pain. 4/29/23  Yes Provider, Historical   lisinopriL (PRINIVIL,ZESTRIL) 2.5 MG tablet Take 1 tablet (2.5 mg total) by mouth 2 (two) times daily. 5/25/23  Yes Jaja Farrell NP   metFORMIN (GLUCOPHAGE-XR) 500 MG ER 24hr tablet TAKE 1 TABLET BY MOUTH EVERY DAY  Patient taking differently: Take 500 mg by mouth 2 (two) times daily with meals. 12/12/23  Yes Gautam Castanon III, MD   metoprolol tartrate (LOPRESSOR) 25 MG tablet Take 1 tablet (25 mg total) by mouth 2 (two) times daily. 6/2/23  Yes Gautam Castanon III, MD   omega-3 acid ethyl esters (LOVAZA) 1 gram capsule TAKE 2 CAPSULES BY MOUTH TWICE A DAY  Patient taking differently: Take 2 g by mouth 2 (two) times daily. 12/12/23  Yes Gautam Castanon III, MD   pantoprazole (PROTONIX) 40 MG tablet Take 40 mg by mouth once daily.   Yes Provider, Historical   polyethylene glycol (GLYCOLAX) 17 gram/dose powder Take 17 g by mouth daily as needed for Constipation. 12/7/22  Yes Provider, Historical   potassium chloride (KLOR-CON) 10 MEQ TbSR Take 2 tablets (20 mEq total) by mouth once daily. 6/28/23  Yes  Jaja Farrell, NP   propranoloL (INDERAL) 40 MG tablet Take 1 tablet (40 mg total) by mouth 2 (two) times daily. 9/11/23 9/10/24 Yes Gautam Castanon III, MD   rosuvastatin (CRESTOR) 20 MG tablet Take 1 tablet (20 mg total) by mouth once daily. 6/2/23  Yes Gautam Castanon III, MD   tadalafiL (CIALIS) 20 MG Tab Take 1 tablet (20 mg total) by mouth as needed (ed). 12/13/23 12/12/24 Yes Ijeoma Luna MD   tamsulosin (FLOMAX) 0.4 mg Cap Take 2 capsules (0.8 mg total) by mouth once daily. 11/7/23 11/6/24 Yes Ijeoma Luna MD   amitriptyline (ELAVIL) 10 MG tablet Take 10 mg by mouth every evening. 2/1/24   Provider, Historical   semaglutide (OZEMPIC) 2 mg/dose (8 mg/3 mL) PnIj Inject 2 mg into the skin every 7 days.  Patient taking differently: Inject 2 mg into the skin every 7 days. MONDAYS 6/2/23 6/1/24  Gautam Castanon III, MD   solifenacin (VESICARE) 10 MG tablet Take 10 mg by mouth once daily. 2/6/24   Provider, Historical   tolterodine (DETROL LA) 4 MG 24 hr capsule Take 4 mg by mouth once daily. 7/25/23   Provider, Historical     CURRENT SCHEDULED MEDICATIONS:   amiodarone  100 mg Oral QAM    atorvastatin  40 mg Oral QHS    DULoxetine  30 mg Oral BID    finasteride  5 mg Oral Daily    furosemide  20 mg Oral Daily    LIDOcaine  1 patch Transdermal Q24H    lisinopriL  2.5 mg Oral BID    magnesium sulfate IVPB  2 g Intravenous Once    metoprolol tartrate  25 mg Oral BID    mupirocin   Nasal BID    pantoprazole  40 mg Oral Before breakfast    senna-docusate 8.6-50 mg  2 tablet Oral BID    tamsulosin  0.8 mg Oral Daily     CURRENT INFUSIONS:   nicardipine 2.5 mg/hr (02/10/24 0501)     CURRENT PRN MEDICATIONS:  0.9%  NaCl infusion (for blood administration), acetaminophen, bisacodyL, dextrose 50% in water (D50W), dextrose 50% in water (D50W), glucagon (human recombinant), glucose, glucose, hydrALAZINE, HYDROcodone-acetaminophen, insulin aspart U-100, morphine, naloxone, ondansetron,  polyethylene glycol, sodium chloride 0.9%, sodium chloride 0.9%    REVIEW OF SYSTEMS:  Please refer to the HPI for all pertinent positive and negative findings. A comprehensive review of all other systems was negative.       PHYSICAL EXAM:  Patient Vitals for the past 24 hrs:   BP Temp Temp src Pulse Resp SpO2 Height Weight   02/10/24 1130 123/68 -- -- 67 (!) 21 96 % -- --   02/10/24 1115 120/68 -- -- 67 (!) 21 96 % -- --   02/10/24 1100 138/67 -- -- 70 17 96 % -- --   02/10/24 1045 139/70 -- -- 68 (!) 26 96 % -- --   02/10/24 1030 134/65 -- -- 64 16 97 % -- --   02/10/24 1015 116/65 -- -- 67 14 96 % -- --   02/10/24 1000 121/66 -- -- 64 13 97 % -- --   02/10/24 0945 (!) 108/57 -- -- 64 12 96 % -- --   02/10/24 0930 (!) 103/56 -- -- 64 13 96 % -- --   02/10/24 0915 (!) 108/56 -- -- 65 13 96 % -- --   02/10/24 0900 132/60 -- -- 66 17 96 % -- --   02/10/24 0845 (!) 110/55 -- -- 65 12 96 % -- --   02/10/24 0830 123/60 -- -- 68 13 96 % -- --   02/10/24 0815 139/66 -- -- 68 18 96 % -- --   02/10/24 0811 139/66 -- -- 70 20 97 % -- --   02/10/24 0801 -- -- -- -- 20 -- -- --   02/10/24 0800 127/78 -- -- 68 15 96 % -- --   02/10/24 0745 (!) 150/67 -- -- 69 14 97 % -- --   02/10/24 0730 128/65 -- -- 74 20 96 % -- --   02/10/24 0715 (!) 107/59 -- -- 66 12 97 % -- --   02/10/24 0700 (!) 109/58 97.5 °F (36.4 °C) Oral 68 14 97 % -- --   02/10/24 0645 (!) 104/56 -- -- 70 13 97 % -- --   02/10/24 0630 (!) 108/55 -- -- 70 14 97 % -- --   02/10/24 0615 137/66 -- -- 72 16 97 % -- --   02/10/24 0600 119/65 -- -- 72 16 98 % -- --   02/10/24 0545 (!) 120/57 -- -- 70 14 98 % -- --   02/10/24 0530 117/78 -- -- 71 16 98 % -- --   02/10/24 0515 127/74 -- -- 72 17 98 % -- --   02/10/24 0500 131/63 -- -- 81 (!) 24 (!) 89 % -- --   02/10/24 0430 137/72 -- -- 107 (!) 32 (!) 86 % -- --   02/10/24 0415 124/69 -- -- 77 16 (!) 90 % -- --   02/10/24 0400 134/71 -- -- 79 (!) 21 (!) 92 % -- --   02/10/24 0347 -- -- -- -- (!) 21 -- -- --   02/10/24 0345  123/69 -- -- 77 16 (!) 87 % -- --   02/10/24 0330 130/60 -- -- 73 15 (!) 91 % -- --   02/10/24 0315 133/72 -- -- 73 16 (!) 90 % -- --   02/10/24 0301 137/74 98.4 °F (36.9 °C) Oral 77 18 (!) 91 % -- --   02/10/24 0245 125/63 -- -- 72 15 (!) 90 % -- --   02/10/24 0230 130/63 -- -- 75 16 (!) 91 % -- --   02/10/24 0215 135/70 -- -- 74 15 (!) 90 % -- --   02/10/24 0203 (!) 150/73 98 °F (36.7 °C) Oral 75 17 (!) 93 % -- --   02/10/24 0200 137/69 -- Oral 71 16 (!) 91 % -- --   02/10/24 0150 136/79 98.2 °F (36.8 °C) Oral 69 16 (!) 92 % -- --   02/10/24 0146 136/79 98.4 °F (36.9 °C) Oral 71 16 (!) 92 % -- --   02/10/24 0145 130/68 98.4 °F (36.9 °C) Oral 78 16 (!) 93 % -- --   02/10/24 0138 -- -- -- -- 17 -- -- --   02/10/24 0130 (!) 141/70 98.8 °F (37.1 °C) Oral 77 (!) 23 (!) 94 % -- --   02/10/24 0115 132/76 98.8 °F (37.1 °C) Oral 76 16 (!) 91 % -- --   02/10/24 0101 -- -- -- 75 16 (!) 92 % -- --   02/10/24 0100 134/66 -- -- 74 17 (!) 92 % -- --   02/10/24 0045 138/65 -- -- 77 17 (!) 93 % -- --   02/10/24 0030 (!) 140/70 -- -- 74 20 (!) 91 % -- --   02/10/24 0015 124/62 -- -- 80 (!) 24 (!) 94 % -- --   02/10/24 0000 (!) 151/68 -- -- 80 (!) 21 (!) 94 % -- --   02/09/24 2345 133/66 -- -- 74 16 (!) 93 % -- --   02/09/24 2330 (!) 140/70 -- -- 73 15 (!) 92 % -- --   02/09/24 2315 135/68 -- -- 72 15 (!) 92 % -- --   02/09/24 2301 (!) 143/81 98.9 °F (37.2 °C) Oral 74 16 (!) 93 % -- --   02/09/24 2245 (!) 142/75 -- -- 73 19 (!) 91 % -- --   02/09/24 2239 -- -- -- -- 20 -- -- --   02/09/24 2230 139/78 -- -- 73 18 (!) 92 % -- --   02/09/24 2215 (!) 143/72 -- -- 73 18 (!) 92 % -- --   02/09/24 2200 126/71 -- -- 72 14 (!) 92 % -- --   02/09/24 2145 130/61 -- -- 71 15 (!) 92 % -- --   02/09/24 2130 (!) 140/67 -- -- 75 16 (!) 93 % -- --   02/09/24 2115 129/65 -- -- 81 (!) 23 (!) 93 % -- --   02/09/24 2104 135/66 -- -- -- 15 -- -- --   02/09/24 2100 135/66 -- -- 77 16 (!) 94 % -- --   02/09/24 2045 139/65 -- -- 79 17 (!) 94 % -- --  "  02/09/24 2030 (!) 140/73 -- -- 79 19 95 % -- --   02/09/24 2015 123/60 -- -- 80 16 (!) 94 % -- --   02/09/24 2000 119/66 -- -- 78 19 (!) 93 % -- --   02/1945 117/62 -- -- 75 17 (!) 93 % -- --   02/09/24 1930 (!) 121/58 -- -- 78 18 (!) 92 % -- --   02/09/24 1915 122/62 -- -- 78 18 (!) 92 % -- --   02/09/24 1901 (!) 124/59 98.8 °F (37.1 °C) Oral 78 18 (!) 93 % -- --   02/09/24 1845 130/78 -- -- 81 19 (!) 92 % -- --   02/09/24 1824 (!) 169/83 -- -- -- -- -- -- --   02/09/24 1701 (!) 156/81 -- -- 70 16 96 % -- --   02/09/24 1625 -- -- -- -- (!) 22 -- -- --   02/09/24 1608 -- 98.7 °F (37.1 °C) -- -- -- -- 5' 10" (1.778 m) 111.9 kg (246 lb 11.1 oz)   02/09/24 1600 (!) 149/89 -- -- 67 17 (!) 94 % -- --   02/09/24 1430 (!) 164/102 -- -- 72 18 (!) 92 % -- --   02/09/24 1400 (!) 176/88 -- -- 74 18 (!) 92 % -- --   02/09/24 1337 (!) 169/86 -- -- 75 18 (!) 93 % -- --   02/09/24 1330 (!) 169/86 -- -- 71 18 (!) 94 % -- --       GENERAL APPEARANCE: Alert, well-developed, well-nourished male in no acute distress.  HEENT: Normocephalic.  Sutures on the left side of forehead with dressing. PERRL. Oropharynx unremarkable.  PULM: Normal respiratory effort. No accessory muscle use.  CV: RRR.  ABDOMEN: Soft, nontender.  EXTREMITIES: No obvious signs of vascular compromise. Pulses present. No cyanosis, clubbing or edema.  SKIN: Clear; no rashes, lesions or skin breaks in exposed areas.    NEURO:  MENTAL STATUS:  Alert and oriented x3.  Able to follow commands appropriately and provide appropriate history.    CRANIAL NERVES:  CN I: Not tested.  CN II: Fundoscopic exam deferred.  CN III, IV, VI: Pupils equal, round and reactive to light.  Extraocular movements full and intact.  CN V: Facial sensation normal.  CN VII: Facial asymmetry absent.  CN VIII: Hearing grossly normal and equal bilaterally.  No skew deviation or pathologic nystagmus.  CN IX, X: Palate elevates symmetrically. Speech/articulation is clear without dysarthria.  CN " XI: Shoulder shrug and chin rotation equal with good strength.  CN XII: Tongue protrusion midline.    MOTOR:  Bulk normal. Tone normal and symmetric throughout.  Abnormal movements absent.  Tremor: none present.  Strength 5/5 throughout.    REFLEXES:  DTRs 2+ throughout.  Plantar response equivocal bilaterally.  SENSATION: grossly intact throughout.  COORDINATION: normal finger-to-nose.  STATION: not tested.  GAIT: not tested.      NIHSS:  1a      Level of Consciousness (alert, drowsy, etc.):   0=alert; keenly responsive  1b.     Level of Consciousness Questions (month, age): 0= able to answer both questions  1c.     Level of Consciousness Commands (open, close eyes, make fist, let go):  0=Answers both tasks correctly  2.      Best Gaze (eyes open - patient follows examiner's finger or face):      0=normal  3.      Visual (introduce visual stimulus/threat to patient's visual field quadrants):  0=No visual loss  4.      Facial Palsy (show teeth, raise eyebrows and squeeze eyes shut):        0=Normal symmetric movement  5a.     Motor Arm - Left (elevate extremity 90 degrees and score drift/movement):       0=No drift, limb holds 90 (or 45) degrees for full 10 seconds  5b.     Motor Arm - Right (elevate extremity 90 degrees and score drift/movement):      0=No drift, limb holds 90 (or 45) degrees for full 10 seconds  6a.     Motor Leg - Left (elevate extremity 30 degrees and score drift/movement):       0=No drift, limb holds 90 (or 45) degrees for full 10 seconds  6b      Motor Leg - Right (elevate extremity 30 degrees and score drift/movement):      0=No drift, limb holds 90 (or 45) degrees for full 10 seconds  7.      Limb Ataxia (finger-nose, heel down shin):      0=Absent  8.      Sensory (pin prick to face, arm, trunk, and leg - compare side to side):        0=Normal; no sensory loss  9.      Best Language (name items, describe a picture and read sentences):      0=No aphasia, normal  10.     Dysarthria (evaluate  "speech clarity by patient repeating listed words): 0=Normal  11.     Extinction and Inattention (use prior testing to identify neglect or double simultaneous stimuli testing):      0=No abnormality          NIH Stroke Scale Total:         0      Labs:  Recent Labs   Lab 02/09/24  1338 02/10/24  0315 02/10/24  0733    141 138   K 4.1 3.3* 4.0    112* 102   CO2 25 26 29   BUN 15 10 13   CREATININE 0.8 0.4* 0.6   * 88 88   CALCIUM 8.8 6.2* 8.3*   PHOS  --   --  4.2   MG  --  1.2* 1.6     Recent Labs   Lab 02/09/24  1338 02/10/24  0315 02/10/24  0733   WBC 12.00 9.66 13.08*   HGB 11.4* 8.5* 11.1*   HCT 35.7* 27.3* 34.9*    174 176     Recent Labs   Lab 02/09/24  1338 02/10/24  0315   ALBUMIN 3.0* 2.6*   PROT 6.5 4.8*   BILITOT 0.5 0.4   ALKPHOS 65 42*   ALT 24 15   AST 26 14     Lab Results   Component Value Date    INR 1.0 02/10/2024     Lab Results   Component Value Date    TRIG 200 (H) 07/07/2022    HDL 36 (L) 07/07/2022    CHOLHDL 20.5 07/07/2022     Lab Results   Component Value Date    HGBA1C 5.8 03/02/2023     No results found for: "PROTEINCSF", "GLUCCSF", "WBCCSF"    Imaging:  I have reviewed and interpreted the pertinent imaging and lab results.      CT Head Without Contrast   ADDENDUM #1     The findings of this study were relayed by telephone to Dr. Nagy at the time of interpretation    Electronically signed by:  Harini Gamboa MD  02/10/2024 11:22 AM CST Workstation: [id number]     ORIGINAL REPORT     CT of the head without contrast. Study was done with multiple axial images from base to vertex. Contrast material was not used. Reconstructed sagittal and coronal images were acquired.    Indication: follow up subdural hematoma    Findings: The ventricles, basal cisterns and sulci are normal for the patient's age. There is mild accentuation of the deep white matter low-attenuation consistent with age-appropriate chronic microangiopathy. No intra-axial hemorrhage can be seen. There is " an acute subdural hematoma over the left frontal temporal and parietal convexities. The thickest segment of the hematoma measures about 10 mm in thickness. There is an approximate 4 mm left to right subfalcine shift.    The visualized cranial bones are intact.    Visualized orbital contents are unremarkable.    There is mild mucosal thickening in the posterior aspect of the left maxillary.    Impression:  1.  Acute subdural hematoma over the left frontal temporal and parietal convexities with an approximate 4 mm left-to-right subfalcine shift.  2.  Age-appropriate chronic microangiopathy.  3.  No intra-axial hemorrhage can be seen.  4.  Mild left maxillary sinus disease.    This exam was performed according to our departmental dose optimization program, which includes automated exposure control, adjustment of the mA and/or kV according to patient size and/or use of iterative reconstruction technique.    Dose Range:  Up-to-date CT equipment and radiation dose reduction techniques were employed. CTDIvol:  908.30 mGy, DLP:  52.30 mGycm    Electronically signed by:  Harini Gamboa MD  02/09/2024 08:20 PM [facility] Workstation: [id number]  CT Head Without Contrast  CT head without contrast    CLINICAL DATA: Subdural hemorrhage    CMS MANDATED QUALITY DATA - CT RADIATION  436    All CT scans at this facility utilize dose modulation, iterative reconstruction, and/or weight based dosing when appropriate to reduce radiation dose to as low as reasonably achievable.    Findings: Thin section axial images were obtained, with comparison to February 9. Again noted are findings of acute left frontotemporal subdural hemorrhage, unchanged compared to February 9. Maximal thickness is 10 mm just above the sylvian fissure, stable. Slight left to right midline shift is unchanged. No new areas of intracranial hemorrhage are identified.    No mass lesions are demonstrated. There is no evidence of acute ischemic change. Changes of mild chronic  microvascular white matter disease are stable. The cerebellum and brainstem are unremarkable.    Left frontal/periorbital soft tissue swelling is similar to the prior study. Partial left maxillary and ethmoid opacification is unchanged.    IMPRESSION:  1. Stable left frontotemporal subdural hemorrhage, with stable mild left to right mediastinal shift.  2. Additional stable observations as above.    Electronically signed by:  Jeramy Gardner MD  02/10/2024 10:10 AM [facility] Workstation: [id number]         ASSESSMENT & PLAN:    Subdural hematoma  Paroxysmal atrial fibrillation   Hypertension    Plan:   Patient presented to the hospital with traumatic left frontotemporal subdural hematoma.  Eliquis was reversed with Kcentra.  Hold anticoagulation and antiplatelet therapy at this time in the setting of acute subdural hematoma.   Keep blood pressure less than 140 systolic.  Avoid hyponatremia  Neurosurgery consulted.  Recommended nonoperative management at this time.  Follow Neurosurgery recommendations for timing to restart anticoagulation.  Will need repeat CT head at that time.  Repeat CT head this morning showed stable subdural hematoma.  Continue monitoring in the ICU with frequent neuro checks.  Will follow peripherally.         Thank you kindly for including us in the care of this patient. Please do not hesitate to contact us with any questions.             Ilir Gunderson MD  Neurology/vascular Neurology  Date of Service: 02/10/2024  12:38 PM    --------------------------------------------------------------------------------------------------------------------------------------------------------------------------------------------------------------------------------------------------------------  Please note: This note was transcribed using voice recognition software. Because of this technology there are often uinintended grammatical, spelling, and other transcription errors. Please disregard these errors.

## 2024-02-10 NOTE — PLAN OF CARE
MICU DAILY GOALS     Family/Goals of care/Code Status   Code Status: Full Code    24H Vital Sign Range  Temp:  [98 °F (36.7 °C)-98.9 °F (37.2 °C)]   Pulse:  [67-81]   Resp:  [14-24]   BP: (117-176)/()   SpO2:  [87 %-96 %]      Shift Events (include procedures and significant events)   Notified by Dr Tang that pt's CT worsened. Called Dr Goodrich, 2 units of FFP transfused, 10mg Vit K IV administered, and obtaining repeat CT this am. Pt remains neuro intact, with complaints of headache. Nicardipine infusing for strict BP parameters.     AWAKE RASS: Goal -    Actual -      Restraint necessity: Not necessary   BREATHE SBT: Not intubated    Coordinate A & B, analgesics/sedatives Pain: managed   SAT: Not intubated   Delirium CAM-ICU: Overall CAM-ICU: Negative   Early(intubated/ Progressive (non-intubated) Mobility MOVE Screen (INTUBATED ONLY): Not intubated    Activity: Activity Management: Rolling - L1   Feeding/Nutrition Diet order: Diet/Nutrition Received: NPO,     Thrombus DVT prophylaxis: VTE Required Core Measure: (SCDs) Sequential compression device initiated/maintained   HOB Elevation Head of Bed (HOB) Positioning: HOB elevated   Ulcer Prophylaxis GI: yes   Glucose control managed Glycemic Management: blood glucose monitored   Skin Skin assessed during: Q Shift Change    Sacrum intact/not altered? Yes  Heels intact/not altered? Yes  Surgical wound? No    CHECK ONE!   (no altered skin or altered skin) and sub boxes:  [] No Altered Skin Integrity Present    []Prevention Measures Documented    [] Altered Skin Integrity Present or Discovered   [] LDA present in EPIC              [] LDA added in EPIC   [] Wound Image Taken (required on admit,                   transfer/discharge and every Tuesday)    Wound Care Consulted? Yes    Attending Nurse:     Second RN/Staff Member:    Bowel Function constipation    Indwelling Catheter Necessity     N/A        De-escalation Antibiotics  NA       VS and assessment per flow  sheet, patient progressing towards goals as tolerated, plan of care reviewed with  Anne, all concerns addressed, will continue to monitor.    Problem: Adult Inpatient Plan of Care  Goal: Plan of Care Review  Outcome: Ongoing, Progressing  Goal: Optimal Comfort and Wellbeing  Outcome: Ongoing, Progressing     Problem: Pain (Stroke, Hemorrhagic)  Goal: Acceptable Pain Control  Outcome: Ongoing, Progressing

## 2024-02-11 LAB
ANION GAP SERPL CALC-SCNC: 6 MMOL/L (ref 8–16)
BASOPHILS # BLD AUTO: 0.02 K/UL (ref 0–0.2)
BASOPHILS NFR BLD: 0.2 % (ref 0–1.9)
BUN SERPL-MCNC: 17 MG/DL (ref 8–23)
CALCIUM SERPL-MCNC: 8.2 MG/DL (ref 8.7–10.5)
CHLORIDE SERPL-SCNC: 100 MMOL/L (ref 95–110)
CO2 SERPL-SCNC: 31 MMOL/L (ref 23–29)
CREAT SERPL-MCNC: 0.7 MG/DL (ref 0.5–1.4)
DIFFERENTIAL METHOD BLD: ABNORMAL
EOSINOPHIL # BLD AUTO: 0.1 K/UL (ref 0–0.5)
EOSINOPHIL NFR BLD: 0.5 % (ref 0–8)
ERYTHROCYTE [DISTWIDTH] IN BLOOD BY AUTOMATED COUNT: 14.3 % (ref 11.5–14.5)
EST. GFR  (NO RACE VARIABLE): >60 ML/MIN/1.73 M^2
GLUCOSE SERPL-MCNC: 105 MG/DL (ref 70–110)
GLUCOSE SERPL-MCNC: 105 MG/DL (ref 70–110)
GLUCOSE SERPL-MCNC: 117 MG/DL (ref 70–110)
GLUCOSE SERPL-MCNC: 121 MG/DL (ref 70–110)
HCT VFR BLD AUTO: 34.2 % (ref 40–54)
HGB BLD-MCNC: 10.7 G/DL (ref 14–18)
IMM GRANULOCYTES # BLD AUTO: 0.05 K/UL (ref 0–0.04)
IMM GRANULOCYTES NFR BLD AUTO: 0.5 % (ref 0–0.5)
LYMPHOCYTES # BLD AUTO: 1.6 K/UL (ref 1–4.8)
LYMPHOCYTES NFR BLD: 15.2 % (ref 18–48)
MAGNESIUM SERPL-MCNC: 1.9 MG/DL (ref 1.6–2.6)
MCH RBC QN AUTO: 30 PG (ref 27–31)
MCHC RBC AUTO-ENTMCNC: 31.3 G/DL (ref 32–36)
MCV RBC AUTO: 96 FL (ref 82–98)
MONOCYTES # BLD AUTO: 0.7 K/UL (ref 0.3–1)
MONOCYTES NFR BLD: 7.1 % (ref 4–15)
NEUTROPHILS # BLD AUTO: 8 K/UL (ref 1.8–7.7)
NEUTROPHILS NFR BLD: 76.5 % (ref 38–73)
NRBC BLD-RTO: 0 /100 WBC
PLATELET # BLD AUTO: 198 K/UL (ref 150–450)
PMV BLD AUTO: 9.8 FL (ref 9.2–12.9)
POTASSIUM SERPL-SCNC: 4.1 MMOL/L (ref 3.5–5.1)
RBC # BLD AUTO: 3.57 M/UL (ref 4.6–6.2)
SODIUM SERPL-SCNC: 137 MMOL/L (ref 136–145)
WBC # BLD AUTO: 10.46 K/UL (ref 3.9–12.7)

## 2024-02-11 PROCEDURE — 63600175 PHARM REV CODE 636 W HCPCS: Mod: JZ,JG | Performed by: HOSPITALIST

## 2024-02-11 PROCEDURE — 63600175 PHARM REV CODE 636 W HCPCS: Performed by: HOSPITALIST

## 2024-02-11 PROCEDURE — 25000003 PHARM REV CODE 250: Performed by: INTERNAL MEDICINE

## 2024-02-11 PROCEDURE — 94761 N-INVAS EAR/PLS OXIMETRY MLT: CPT

## 2024-02-11 PROCEDURE — 25000003 PHARM REV CODE 250: Performed by: HOSPITALIST

## 2024-02-11 PROCEDURE — 12000002 HC ACUTE/MED SURGE SEMI-PRIVATE ROOM

## 2024-02-11 PROCEDURE — 63600175 PHARM REV CODE 636 W HCPCS: Performed by: INTERNAL MEDICINE

## 2024-02-11 PROCEDURE — 83735 ASSAY OF MAGNESIUM: CPT | Performed by: STUDENT IN AN ORGANIZED HEALTH CARE EDUCATION/TRAINING PROGRAM

## 2024-02-11 PROCEDURE — 36415 COLL VENOUS BLD VENIPUNCTURE: CPT | Performed by: STUDENT IN AN ORGANIZED HEALTH CARE EDUCATION/TRAINING PROGRAM

## 2024-02-11 PROCEDURE — 25000003 PHARM REV CODE 250: Performed by: STUDENT IN AN ORGANIZED HEALTH CARE EDUCATION/TRAINING PROGRAM

## 2024-02-11 PROCEDURE — 27000221 HC OXYGEN, UP TO 24 HOURS

## 2024-02-11 PROCEDURE — 85025 COMPLETE CBC W/AUTO DIFF WBC: CPT | Performed by: STUDENT IN AN ORGANIZED HEALTH CARE EDUCATION/TRAINING PROGRAM

## 2024-02-11 PROCEDURE — 99900035 HC TECH TIME PER 15 MIN (STAT)

## 2024-02-11 PROCEDURE — 99900031 HC PATIENT EDUCATION (STAT)

## 2024-02-11 PROCEDURE — 80048 BASIC METABOLIC PNL TOTAL CA: CPT | Performed by: STUDENT IN AN ORGANIZED HEALTH CARE EDUCATION/TRAINING PROGRAM

## 2024-02-11 RX ORDER — TALC
6 POWDER (GRAM) TOPICAL NIGHTLY PRN
Status: DISCONTINUED | OUTPATIENT
Start: 2024-02-11 | End: 2024-02-12 | Stop reason: HOSPADM

## 2024-02-11 RX ORDER — ACETAMINOPHEN 325 MG/1
650 TABLET ORAL EVERY 6 HOURS PRN
Status: DISCONTINUED | OUTPATIENT
Start: 2024-02-11 | End: 2024-02-12 | Stop reason: HOSPADM

## 2024-02-11 RX ORDER — LISINOPRIL 5 MG/1
5 TABLET ORAL 2 TIMES DAILY
Status: DISCONTINUED | OUTPATIENT
Start: 2024-02-11 | End: 2024-02-12 | Stop reason: HOSPADM

## 2024-02-11 RX ORDER — HYDROCODONE BITARTRATE AND ACETAMINOPHEN 10; 325 MG/1; MG/1
2 TABLET ORAL EVERY 6 HOURS PRN
Status: DISCONTINUED | OUTPATIENT
Start: 2024-02-11 | End: 2024-02-12

## 2024-02-11 RX ORDER — LISINOPRIL 2.5 MG/1
2.5 TABLET ORAL ONCE
Status: COMPLETED | OUTPATIENT
Start: 2024-02-11 | End: 2024-02-11

## 2024-02-11 RX ORDER — NICARDIPINE HYDROCHLORIDE 0.2 MG/ML
0-15 INJECTION INTRAVENOUS CONTINUOUS
Status: DISCONTINUED | OUTPATIENT
Start: 2024-02-11 | End: 2024-02-11

## 2024-02-11 RX ORDER — MORPHINE SULFATE 2 MG/ML
6 INJECTION, SOLUTION INTRAMUSCULAR; INTRAVENOUS EVERY 4 HOURS PRN
Status: DISCONTINUED | OUTPATIENT
Start: 2024-02-11 | End: 2024-02-12 | Stop reason: HOSPADM

## 2024-02-11 RX ADMIN — METOPROLOL TARTRATE 25 MG: 25 TABLET, FILM COATED ORAL at 11:02

## 2024-02-11 RX ADMIN — METOPROLOL TARTRATE 25 MG: 25 TABLET, FILM COATED ORAL at 09:02

## 2024-02-11 RX ADMIN — DULOXETINE HYDROCHLORIDE 30 MG: 30 CAPSULE, DELAYED RELEASE ORAL at 09:02

## 2024-02-11 RX ADMIN — FINASTERIDE 5 MG: 5 TABLET, FILM COATED ORAL at 09:02

## 2024-02-11 RX ADMIN — HYDROCODONE BITARTRATE AND ACETAMINOPHEN 2 TABLET: 10; 325 TABLET ORAL at 10:02

## 2024-02-11 RX ADMIN — DULOXETINE HYDROCHLORIDE 30 MG: 30 CAPSULE, DELAYED RELEASE ORAL at 11:02

## 2024-02-11 RX ADMIN — MUPIROCIN 1 G: 20 OINTMENT TOPICAL at 11:02

## 2024-02-11 RX ADMIN — MORPHINE SULFATE 2 MG: 2 INJECTION, SOLUTION INTRAMUSCULAR; INTRAVENOUS at 04:02

## 2024-02-11 RX ADMIN — LIDOCAINE 1 PATCH: 50 PATCH CUTANEOUS at 05:02

## 2024-02-11 RX ADMIN — MORPHINE SULFATE 2 MG: 2 INJECTION, SOLUTION INTRAMUSCULAR; INTRAVENOUS at 08:02

## 2024-02-11 RX ADMIN — LISINOPRIL 2.5 MG: 2.5 TABLET ORAL at 09:02

## 2024-02-11 RX ADMIN — MORPHINE SULFATE 2 MG: 2 INJECTION, SOLUTION INTRAMUSCULAR; INTRAVENOUS at 01:02

## 2024-02-11 RX ADMIN — HYDROCODONE BITARTRATE AND ACETAMINOPHEN 1 TABLET: 5; 325 TABLET ORAL at 07:02

## 2024-02-11 RX ADMIN — AMIODARONE HYDROCHLORIDE 100 MG: 100 TABLET ORAL at 10:02

## 2024-02-11 RX ADMIN — Medication 6 MG: at 10:02

## 2024-02-11 RX ADMIN — Medication 6 MG: at 01:02

## 2024-02-11 RX ADMIN — MORPHINE SULFATE 6 MG: 2 INJECTION, SOLUTION INTRAMUSCULAR; INTRAVENOUS at 08:02

## 2024-02-11 RX ADMIN — HYDROCODONE BITARTRATE AND ACETAMINOPHEN 1 TABLET: 5; 325 TABLET ORAL at 11:02

## 2024-02-11 RX ADMIN — HYDROCODONE BITARTRATE AND ACETAMINOPHEN 1 TABLET: 10; 325 TABLET ORAL at 04:02

## 2024-02-11 RX ADMIN — LISINOPRIL 5 MG: 5 TABLET ORAL at 11:02

## 2024-02-11 RX ADMIN — SENNOSIDES AND DOCUSATE SODIUM 2 TABLET: 8.6; 5 TABLET ORAL at 09:02

## 2024-02-11 RX ADMIN — ATORVASTATIN CALCIUM 40 MG: 40 TABLET, FILM COATED ORAL at 11:02

## 2024-02-11 RX ADMIN — MORPHINE SULFATE 6 MG: 2 INJECTION, SOLUTION INTRAMUSCULAR; INTRAVENOUS at 06:02

## 2024-02-11 RX ADMIN — LISINOPRIL 2.5 MG: 2.5 TABLET ORAL at 10:02

## 2024-02-11 RX ADMIN — MUPIROCIN 1 G: 20 OINTMENT TOPICAL at 09:02

## 2024-02-11 RX ADMIN — SENNOSIDES AND DOCUSATE SODIUM 2 TABLET: 8.6; 5 TABLET ORAL at 11:02

## 2024-02-11 RX ADMIN — FUROSEMIDE 20 MG: 20 TABLET ORAL at 09:02

## 2024-02-11 RX ADMIN — HYDROCODONE BITARTRATE AND ACETAMINOPHEN 1 TABLET: 5; 325 TABLET ORAL at 02:02

## 2024-02-11 RX ADMIN — MORPHINE SULFATE 6 MG: 2 INJECTION, SOLUTION INTRAMUSCULAR; INTRAVENOUS at 02:02

## 2024-02-11 RX ADMIN — POLYETHYLENE GLYCOL 3350 17 G: 17 POWDER, FOR SOLUTION ORAL at 09:02

## 2024-02-11 RX ADMIN — PANTOPRAZOLE SODIUM 40 MG: 40 TABLET, DELAYED RELEASE ORAL at 05:02

## 2024-02-11 RX ADMIN — TAMSULOSIN HYDROCHLORIDE 0.8 MG: 0.4 CAPSULE ORAL at 09:02

## 2024-02-11 NOTE — SUBJECTIVE & OBJECTIVE
Interval History:  has no new complaints.  Third head CT shows stable volume of hematoma.  No longer needing nicardipine.  Stable for transfer out of ICU and to [facility].    Review of Systems   Constitutional:  Negative for fever.   Respiratory:  Negative for shortness of breath.    Cardiovascular:  Negative for chest pain.     Objective:     Vital Signs (Most Recent):  Temp: 97.8 °F (36.6 °C) (02/11/24 1500)  Pulse: 69 (02/11/24 1615)  Resp: (!) 21 (02/11/24 1615)  BP: (!) 144/72 (02/11/24 1500)  SpO2: 100 % (02/11/24 0830) Vital Signs (24h Range):  Temp:  [97.2 °F (36.2 °C)-98.1 °F (36.7 °C)] 97.8 °F (36.6 °C)  Pulse:  [] 69  Resp:  [11-28] 21  SpO2:  [94 %-100 %] 100 %  BP: (107-176)/(57-85) 144/72     Weight: 111.9 kg (246 lb 11.1 oz)  Body mass index is 35.4 kg/m².    Intake/Output Summary (Last 24 hours) at 2/11/2024 1712  Last data filed at 2/11/2024 1701  Gross per 24 hour   Intake 1062 ml   Output 1450 ml   Net -388 ml           Physical Exam  Vitals reviewed.   Constitutional:       General: He is not in acute distress.     Appearance: He is not diaphoretic.   HENT:      Head: Laceration (forehead) present.      Mouth/Throat:      Mouth: Mucous membranes are moist.   Eyes:      General: No scleral icterus.        Right eye: No discharge.         Left eye: No discharge.   Neck:      Vascular: No JVD.   Cardiovascular:      Rate and Rhythm: Normal rate. Rhythm irregular.   Pulmonary:      Effort: Pulmonary effort is normal.      Breath sounds: Normal breath sounds.   Abdominal:      General: Bowel sounds are normal. There is no distension.      Palpations: Abdomen is soft.      Tenderness: There is no abdominal tenderness.   Skin:     General: Skin is warm.      Findings: No rash.   Neurological:      Mental Status: He is alert.             Significant Labs: All pertinent labs within the past 24 hours have been reviewed.    Significant Imaging: I have reviewed all pertinent imaging results/findings  within the past 24 hours.

## 2024-02-11 NOTE — PROGRESS NOTES
[facility]  Progress Note    Patient Name: Hiro Rodríguez  MRN: [id number]  Patient Class: IP- Inpatient   Admission Date: 2/9/2024  Length of Stay: 2 days  Attending Physician: Bebo Goodson MD  Primary Care Provider: Gautam Castanon III, MD        Subjective:     Principal Problem:Subdural hematoma, acute        HPI:  Mr. Rodríguez is a 78-years-old male who presented as a direct admission from [facility] for ICU level of care.  Patient reports he was in his usual state of health, was ambulating in his driveway with cane, subsequently fell, believes mechanical, landed on his left side with trauma to left forehead, chest and lower extremity.  Denies any associated loss of consciousness, states he was not able to get off the ground until first responders arrived and assisted.  Denies any preceding lightheadedness, dizziness, chest pain, shortness of breath, palpitations.  States last fall was around 8 months ago.  Patient with history of atrial fibrillation, on Eliquis, last dose taken this morning (2/9/24).  Patient currently reports shortness of breath and left sided chest pain.  Denies any nausea, vomiting, fever, chills, admits to constipation with last bowel movement 3 days ago.  At outside hospital BP up to 164/102, afebrile with T-max 98.6°.  Labs with hemoglobin 11.4, platelets 254, INR 1.1, BUN/creatinine 15/0.8.  CT head with moderate left supraorbital swelling, left frontotemporal convexity subdural hematoma, 8 mm with slight left-to-right midline shift about 2 mm.  Chest imaging with acute fracture of left lateral 6th and 7th rib with no pneumothorax.  Noted to have left supraorbital laceration which was sutured at outside hospital.  He received fentanyl, Kcentra and 1 g Keppra in the ED.  Case discussed with ED provider, states discussed with on-call neurosurgeon Dr. Goodrich, recommends conservative management with ICU admission and patient will be seen in  consultation.  Plan of care discussed with patient, no visitors at bedside.  Code status addressed and currently full code.  Discussed with nursing.    Overview/Hospital Course:  No notes on file    Interval History:  has no new complaints.  Third head CT shows stable volume of hematoma.  No longer needing nicardipine.  Stable for transfer out of ICU and to [facility].    Review of Systems   Constitutional:  Negative for fever.   Respiratory:  Negative for shortness of breath.    Cardiovascular:  Negative for chest pain.     Objective:     Vital Signs (Most Recent):  Temp: 97.8 °F (36.6 °C) (02/11/24 1500)  Pulse: 69 (02/11/24 1615)  Resp: (!) 21 (02/11/24 1615)  BP: (!) 144/72 (02/11/24 1500)  SpO2: 100 % (02/11/24 0830) Vital Signs (24h Range):  Temp:  [97.2 °F (36.2 °C)-98.1 °F (36.7 °C)] 97.8 °F (36.6 °C)  Pulse:  [] 69  Resp:  [11-28] 21  SpO2:  [94 %-100 %] 100 %  BP: (107-176)/(57-85) 144/72     Weight: 111.9 kg (246 lb 11.1 oz)  Body mass index is 35.4 kg/m².    Intake/Output Summary (Last 24 hours) at 2/11/2024 1712  Last data filed at 2/11/2024 1701  Gross per 24 hour   Intake 1062 ml   Output 1450 ml   Net -388 ml           Physical Exam  Vitals reviewed.   Constitutional:       General: He is not in acute distress.     Appearance: He is not diaphoretic.   HENT:      Head: Laceration (forehead) present.      Mouth/Throat:      Mouth: Mucous membranes are moist.   Eyes:      General: No scleral icterus.        Right eye: No discharge.         Left eye: No discharge.   Neck:      Vascular: No JVD.   Cardiovascular:      Rate and Rhythm: Normal rate. Rhythm irregular.   Pulmonary:      Effort: Pulmonary effort is normal.      Breath sounds: Normal breath sounds.   Abdominal:      General: Bowel sounds are normal. There is no distension.      Palpations: Abdomen is soft.      Tenderness: There is no abdominal tenderness.   Skin:     General: Skin is warm.      Findings: No rash.   Neurological:       Mental Status: He is alert.             Significant Labs: All pertinent labs within the past 24 hours have been reviewed.    Significant Imaging: I have reviewed all pertinent imaging results/findings within the past 24 hours.    Assessment/Plan:      * Acute traumatic left frontal subdural hematoma  Keep anticoagulant on hold.  Three CT scans done in series show stable volume of blood.  Neurosurgeon and neurologist consulting.  Monitor patient for any neuro deficits.      Closed fracture of multiple ribs of left side- 6th and 7th  Due to fall.  Provide analgesics.      Laceration of forehead  Sutures placed.  Monitor for signs of infection.      Fall at home  Occurred when he was walking down driveway.  He uses a cane when ambulating, and I recommended to him that he use a walker.  Will consult with PT to assess his ability to safely ambulate and to see what equipment he will require.      Benign prostatic hyperplasia without lower urinary tract symptoms  Continue bladder medications      Hypertension, essential  Chronic, controlled. Latest blood pressure and vitals reviewed-     Temp:  [97.2 °F (36.2 °C)-98.1 °F (36.7 °C)]   Pulse:  []   Resp:  [11-28]   BP: (107-176)/(57-85)   SpO2:  [94 %-100 %] .   Home meds for hypertension were reviewed and noted below.   Hypertension Medications               furosemide (LASIX) 20 MG tablet Take 1 tablet (20 mg total) by mouth once daily.    lisinopriL (PRINIVIL,ZESTRIL) 2.5 MG tablet Take 1 tablet (2.5 mg total) by mouth 2 (two) times daily.    metoprolol tartrate (LOPRESSOR) 25 MG tablet Take 1 tablet (25 mg total) by mouth 2 (two) times daily.            While in the hospital, will manage blood pressure as follows; Continue home antihypertensive regimen.    Pulmonary hypertension, PASP 61  Noted      GERD (gastroesophageal reflux disease)  Continue PPI      Class 1 obesity  Body mass index is 35.4 kg/m². Morbid obesity complicates all aspects of disease management  from diagnostic modalities to treatment. Weight loss encouraged and health benefits explained to patient.        Heart failure with preserved ejection fraction  Continue beta blocker and ACE inhibitor.  Volume status is stable.  Continue daily maintenance dose of Lasix.      Type 2 diabetes mellitus with HbA1c 5.8%  Patient's FSGs are controlled on current medication regimen.  Last A1c reviewed-   Lab Results   Component Value Date    HGBA1C 5.8 03/02/2023     Most recent fingerstick glucose reviewed-   POC Glucose   Date Value Ref Range Status   02/11/2024 117 (H) 70 - 110 Final   02/11/2024 121 (H) 70 - 110 Final   02/10/2024 95 70 - 110 Final       Current correctional scale  Medium  Maintain anti-hyperglycemic dose as follows-   Antihyperglycemics (From admission, onward)      Start     Stop Route Frequency Ordered    02/09/24 1717  insulin aspart U-100 pen 0-10 Units         -- SubQ Before meals & nightly PRN 02/09/24 1617          Hold Oral hypoglycemics while patient is in the hospital.        Hyperlipidemia  Continue Lipitor 40 mg daily.      Chronic atrial fibrillation  Patient with chronic atrial fibrillation which is controlled currently with Beta Blocker. Patient is currently in atrial fibrillation.[id number] Score: 3. Holding anticoagulant while pt has a subdural hematoma.    Anticoagulated  Keeping it on hold while patient has intracranial bleeding.  Will resume when appropriate to do so, hopefully with neurosurgeon's assistance.        VTE Risk Mitigation (From admission, onward)           Ordered     IP VTE HIGH RISK PATIENT  Once         02/09/24 1617     Place sequential compression device  Until discontinued         02/09/24 1617                    Discharge Planning   BRENNAN:      Code Status: Full Code   Is the patient medically ready for discharge?:     Reason for patient still in hospital (select all that apply): Patient trending condition and PT / OT recommendations  Discharge Plan A: Home with  family            Bebo Goodson MD  Department of Hospital Medicine   [facility]

## 2024-02-11 NOTE — SUBJECTIVE & OBJECTIVE
Interval History:  has no new complaints.  Second CT head shows stable volume of hematoma.  He has no neuro deficits.  On Cardene drip to control blood pressure.  Off of anticoagulants.    Review of Systems   Constitutional:  Negative for fever.   Respiratory:  Negative for shortness of breath.    Cardiovascular:  Negative for chest pain.     Objective:     Vital Signs (Most Recent):  Temp: 98.1 °F (36.7 °C) (02/10/24 1901)  Pulse: (!) 57 (02/10/24 2001)  Resp: 20 (02/10/24 2001)  BP: 129/60 (02/10/24 1815)  SpO2: 100 % (02/10/24 2001) Vital Signs (24h Range):  Temp:  [96.2 °F (35.7 °C)-98.9 °F (37.2 °C)] 98.1 °F (36.7 °C)  Pulse:  [] 57  Resp:  [12-32] 20  SpO2:  [85 %-100 %] 100 %  BP: ()/(44-81) 129/60     Weight: 111.9 kg (246 lb 11.1 oz)  Body mass index is 35.4 kg/m².    Intake/Output Summary (Last 24 hours) at 2/10/2024 2116  Last data filed at 2/10/2024 1824  Gross per 24 hour   Intake 2072.21 ml   Output 950 ml   Net 1122.21 ml         Physical Exam  Vitals reviewed.   Constitutional:       General: He is not in acute distress.     Appearance: He is not diaphoretic.   HENT:      Head: Laceration (forehead) present.      Mouth/Throat:      Mouth: Mucous membranes are moist.   Eyes:      General: No scleral icterus.        Right eye: No discharge.         Left eye: No discharge.   Neck:      Vascular: No JVD.   Cardiovascular:      Rate and Rhythm: Normal rate. Rhythm irregular.   Pulmonary:      Effort: Pulmonary effort is normal.      Breath sounds: Normal breath sounds.   Abdominal:      General: Bowel sounds are normal. There is no distension.      Palpations: Abdomen is soft.      Tenderness: There is no abdominal tenderness.   Skin:     General: Skin is warm.      Findings: No rash.   Neurological:      Mental Status: He is alert.             Significant Labs: All pertinent labs within the past 24 hours have been reviewed.    Significant Imaging: I have reviewed all pertinent imaging  results/findings within the past 24 hours.

## 2024-02-11 NOTE — NURSING
Transferred to room 1222 on one west. Greeted by a nurse.all belongings with pt. Including cell phone and hearing device.

## 2024-02-11 NOTE — ASSESSMENT & PLAN NOTE
Chronic, controlled. Latest blood pressure and vitals reviewed-     Temp:  [96.2 °F (35.7 °C)-98.9 °F (37.2 °C)]   Pulse:  []   Resp:  [12-32]   BP: ()/(44-81)   SpO2:  [85 %-100 %] .   Home meds for hypertension were reviewed and noted below.   Hypertension Medications               furosemide (LASIX) 20 MG tablet Take 1 tablet (20 mg total) by mouth once daily.    lisinopriL (PRINIVIL,ZESTRIL) 2.5 MG tablet Take 1 tablet (2.5 mg total) by mouth 2 (two) times daily.    metoprolol tartrate (LOPRESSOR) 25 MG tablet Take 1 tablet (25 mg total) by mouth 2 (two) times daily.            While in the hospital, will manage blood pressure as follows; Continue home antihypertensive regimen.  Also using nicardipine infusions to manage the BP.

## 2024-02-11 NOTE — PLAN OF CARE
02/11/24 0830   Patient Assessment/Suction   Level of Consciousness (AVPU) alert   Respiratory Effort Unlabored   Expansion/Accessory Muscles/Retractions no use of accessory muscles   All Lung Fields Breath Sounds clear;equal bilaterally   Rhythm/Pattern, Respiratory unlabored;pattern regular;depth regular   Cough Frequency infrequent   PRE-TX-O2   Device (Oxygen Therapy) nasal cannula   $ Is the patient on Low Flow Oxygen? Yes   Flow (L/min) 2   SpO2 100 %   Pulse Oximetry Type Continuous   $ Pulse Oximetry - Multiple Charge Pulse Oximetry - Multiple   Pulse 84   Resp (!) 26   Incentive Spirometer   $ Incentive Spirometer Charges ready for self-administration   Incentive Spirometer Predicted Level (mL) 2500   Administration (IS) self-administered   Number of Repetitions (IS) 10   Level Incentive Spirometer (mL) 1250   Patient Tolerance (IS) good   Education   $ Education 15 min

## 2024-02-11 NOTE — CARE UPDATE
CT scan reviewed - no interval change or increased mass effect.    Ok from my perspective to step patient down to floor if and when primary team feels patient able.

## 2024-02-11 NOTE — ASSESSMENT & PLAN NOTE
Body mass index is 35.4 kg/m². Morbid obesity complicates all aspects of disease management from diagnostic modalities to treatment. Weight loss encouraged and health benefits explained to patient.

## 2024-02-11 NOTE — ASSESSMENT & PLAN NOTE
Keeping it on hold while patient has intracranial bleeding.  Will resume when appropriate to do so, hopefully with neurosurgeon's assistance.

## 2024-02-11 NOTE — ASSESSMENT & PLAN NOTE
Chronic, controlled. Latest blood pressure and vitals reviewed-     Temp:  [97.2 °F (36.2 °C)-98.1 °F (36.7 °C)]   Pulse:  []   Resp:  [11-28]   BP: (107-176)/(57-85)   SpO2:  [94 %-100 %] .   Home meds for hypertension were reviewed and noted below.   Hypertension Medications               furosemide (LASIX) 20 MG tablet Take 1 tablet (20 mg total) by mouth once daily.    lisinopriL (PRINIVIL,ZESTRIL) 2.5 MG tablet Take 1 tablet (2.5 mg total) by mouth 2 (two) times daily.    metoprolol tartrate (LOPRESSOR) 25 MG tablet Take 1 tablet (25 mg total) by mouth 2 (two) times daily.            While in the hospital, will manage blood pressure as follows; Continue home antihypertensive regimen.

## 2024-02-11 NOTE — ASSESSMENT & PLAN NOTE
Patient with chronic atrial fibrillation which is controlled currently with Beta Blocker. Patient is currently in atrial fibrillation.[id number] Score: 3. Holding anticoagulant while pt has a subdural hematoma.

## 2024-02-11 NOTE — ASSESSMENT & PLAN NOTE
Occurred when he was walking down driveway.  He uses a cane when ambulating, and I recommended to him that he use a walker.  Will consult with PT to assess his ability to safely ambulate.

## 2024-02-11 NOTE — NURSING
Back to room, after CAT Scan complete. Tolerated well, asked pt if he would sit up in chair for breakfast, he decided he would get back in bed due to ribs hurting at that time. Put back in bed. Feeding self.

## 2024-02-11 NOTE — PLAN OF CARE
[facility]  Initial Discharge Assessment    CM met with pt and spouse to discuss discharge planning. Pt reports that he lives in a SSH with 1/2 step going into home. Lives with spouse who assists him in all personal care. Pt states that he recently finished outpt therapy.  No HH. DME is a rolling walker, straight cane, wc, hospital bed, glucometer. Pt is interested in exploring if he can use a rollator as he feels he needs to sit when walking too far.  Discussed that PT will be working with him while inpatient and will give recommendations. Pt states that he has no other discharge needs.  May benefit from therapy at discharge. Will continue to follow.     Primary Care Provider: Gautam Castanon III, MD    Admission Diagnosis: SDH (subdural hematoma) [S06.5XAA]    Admission Date: 2/9/2024  Expected Discharge Date:     Transition of Care Barriers: None    Payor: [employer] [facility] MCARE / Plan: [employer] MEDICARE ADVANTAGE / Product Type: Medicare Advantage /     Extended Emergency Contact Information  Primary Emergency Contact: Galina Rodríguez  Address: [address] [place] [address] [id number] [place] [address]  Home Phone: [phone]  Mobile Phone: [phone]  Relation: Spouse  Preferred language: English   needed? No  Secondary Emergency Contact: Bubba Rodríguez  Mobile Phone: [phone]  Relation: Son    Discharge Plan A: Home with family  Discharge Plan B: Home Health      [id number] [place] - [address]  [address]  [facility] [address]  Phone: [phone] Fax: [phone]    [facility] - [place] [address] [facility]  [address]  [address]  [facility] [address]  Phone: [phone] Fax: [phone]    [facility]  [address]  [facility] [address]  Phone: [phone] Fax: [phone]      Initial Assessment (most recent)       Adult  Discharge Assessment - 02/10/24 1801          Discharge Assessment    Assessment Type Discharge Planning Assessment     Confirmed/corrected address, phone number and insurance Yes     Confirmed Demographics Correct on Facesheet     Source of Information patient;family     When was your last doctors appointment? --   2 months ago    People in Home spouse     Do you expect to return to your current living situation? Yes     Do you have help at home or someone to help you manage your care at home? Yes     Who are your caregiver(s) and their phone number(s)? Spouse Galina Rodríguez [phone]     Current cognitive status: Alert/Oriented     Walking or Climbing Stairs Difficulty yes     Walking or Climbing Stairs ambulation difficulty, requires equipment     Mobility Management Rolling walker, straight cane, wc     Dressing/Bathing Difficulty yes     Dressing/Bathing bathing difficulty, assistance 1 person     Dressing/Bathing Management SC, grab bars, spouse assists     Home Accessibility wheelchair accessible     Home Layout Able to live on 1st floor   [facility] with 1/2 step going into home    Equipment Currently Used at Home cane, straight;wheelchair;walker, rolling;hospital bed;glucometer;grab bar     Readmission within 30 days? No     Patient currently being followed by outpatient case management? No     Do you currently have service(s) that help you manage your care at home? No     Do you take prescription medications? Yes     Do you have prescription coverage? Yes     Do you have any problems affording any of your prescribed medications? No     Is the patient taking medications as prescribed? yes     Who is going to help you get home at discharge? Spouse     How do you get to doctors appointments? car, drives self;family or friend will provide     Are you on dialysis? No     Do you take coumadin? No     Discharge Plan A Home with family     Discharge Plan B Home Health     DME Needed Upon Discharge  none   Wants to  explore rollator    Discharge Plan discussed with: Spouse/sig other;Patient     Transition of Care Barriers None

## 2024-02-11 NOTE — NURSING
Nurses Note -- 4 Eyes      2/11/2024   5:59 PM      Skin assessed during: Transfer      [] No Altered Skin Integrity Present    []Prevention Measures Documented      [x] Yes- Altered Skin Integrity Present or Discovered   [] LDA Added if Not in Epic (Describe Wound)   [x] New Altered Skin Integrity was Present on Admit and Documented in LDA   [x] Wound Image Taken    Wound Care Consulted? No   Wound care already consulted for pt.  Attending Nurse:  Carolina Ley RN/Staff Member:  Katharine

## 2024-02-11 NOTE — PROGRESS NOTES
[facility] Medicine  Progress Note    Patient Name: Hiro Rodríguez  MRN: [id number]  Patient Class: IP- Inpatient   Admission Date: 2/9/2024  Length of Stay: 1 days  Attending Physician: Bebo Goodson MD  Primary Care Provider: Gautam Castanon III, MD        Subjective:     Principal Problem:Subdural hematoma, acute        HPI:  Mr. Rodríguez is a 78-years-old male who presented as a direct admission from [facility] for ICU level of care.  Patient reports he was in his usual state of health, was ambulating in his driveway with cane, subsequently fell, believes mechanical, landed on his left side with trauma to left forehead, chest and lower extremity.  Denies any associated loss of consciousness, states he was not able to get off the ground until first responders arrived and assisted.  Denies any preceding lightheadedness, dizziness, chest pain, shortness of breath, palpitations.  States last fall was around 8 months ago.  Patient with history of atrial fibrillation, on Eliquis, last dose taken this morning (2/9/24).  Patient currently reports shortness of breath and left sided chest pain.  Denies any nausea, vomiting, fever, chills, admits to constipation with last bowel movement 3 days ago.  At outside hospital BP up to 164/102, afebrile with T-max 98.6°.  Labs with hemoglobin 11.4, platelets 254, INR 1.1, BUN/creatinine 15/0.8.  CT head with moderate left supraorbital swelling, left frontotemporal convexity subdural hematoma, 8 mm with slight left-to-right midline shift about 2 mm.  Chest imaging with acute fracture of left lateral 6th and 7th rib with no pneumothorax.  Noted to have left supraorbital laceration which was sutured at outside hospital.  He received fentanyl, Kcentra and 1 g Keppra in the ED.  Case discussed with ED provider, states discussed with on-call neurosurgeon Dr. Goodrich, recommends conservative management with ICU admission and patient will be seen in  consultation.  Plan of care discussed with patient, no visitors at bedside.  Code status addressed and currently full code.  Discussed with nursing.    Overview/Hospital Course:  No notes on file    Interval History:  has no new complaints.  Second CT head shows stable volume of hematoma.  He has no neuro deficits.  On Cardene drip to control blood pressure.  Off of anticoagulants.    Review of Systems   Constitutional:  Negative for fever.   Respiratory:  Negative for shortness of breath.    Cardiovascular:  Negative for chest pain.     Objective:     Vital Signs (Most Recent):  Temp: 98.1 °F (36.7 °C) (02/10/24 1901)  Pulse: (!) 57 (02/10/24 2001)  Resp: 20 (02/10/24 2001)  BP: 129/60 (02/10/24 1815)  SpO2: 100 % (02/10/24 2001) Vital Signs (24h Range):  Temp:  [96.2 °F (35.7 °C)-98.9 °F (37.2 °C)] 98.1 °F (36.7 °C)  Pulse:  [] 57  Resp:  [12-32] 20  SpO2:  [85 %-100 %] 100 %  BP: ()/(44-81) 129/60     Weight: 111.9 kg (246 lb 11.1 oz)  Body mass index is 35.4 kg/m².    Intake/Output Summary (Last 24 hours) at 2/10/2024 2116  Last data filed at 2/10/2024 1824  Gross per 24 hour   Intake 2072.21 ml   Output 950 ml   Net 1122.21 ml         Physical Exam  Vitals reviewed.   Constitutional:       General: He is not in acute distress.     Appearance: He is not diaphoretic.   HENT:      Head: Laceration (forehead) present.      Mouth/Throat:      Mouth: Mucous membranes are moist.   Eyes:      General: No scleral icterus.        Right eye: No discharge.         Left eye: No discharge.   Neck:      Vascular: No JVD.   Cardiovascular:      Rate and Rhythm: Normal rate. Rhythm irregular.   Pulmonary:      Effort: Pulmonary effort is normal.      Breath sounds: Normal breath sounds.   Abdominal:      General: Bowel sounds are normal. There is no distension.      Palpations: Abdomen is soft.      Tenderness: There is no abdominal tenderness.   Skin:     General: Skin is warm.      Findings: No rash.    Neurological:      Mental Status: He is alert.             Significant Labs: All pertinent labs within the past 24 hours have been reviewed.    Significant Imaging: I have reviewed all pertinent imaging results/findings within the past 24 hours.    Assessment/Plan:      * Acute traumatic left frontal subdural hematoma  Keep anticoagulant on hold.  Two CT scans done in series show stable volume of blood.  Neurosurgeon and neurologist consulting.  Monitor patient for any neuro deficits.      Closed fracture of multiple ribs of left side- 6th and 7th  Due to fall.  Provide analgesics.      Laceration of forehead  Sutures placed.  Monitor for signs of infection.      Fall at home  Occurred when he was walking down driveway.  He uses a cane when ambulating, and I recommended to him that he use a walker.  Will consult with PT to assess his ability to safely ambulate.      Benign prostatic hyperplasia without lower urinary tract symptoms  Continue bladder medications      Hypertension, essential  Chronic, controlled. Latest blood pressure and vitals reviewed-     Temp:  [96.2 °F (35.7 °C)-98.9 °F (37.2 °C)]   Pulse:  []   Resp:  [12-32]   BP: ()/(44-81)   SpO2:  [85 %-100 %] .   Home meds for hypertension were reviewed and noted below.   Hypertension Medications               furosemide (LASIX) 20 MG tablet Take 1 tablet (20 mg total) by mouth once daily.    lisinopriL (PRINIVIL,ZESTRIL) 2.5 MG tablet Take 1 tablet (2.5 mg total) by mouth 2 (two) times daily.    metoprolol tartrate (LOPRESSOR) 25 MG tablet Take 1 tablet (25 mg total) by mouth 2 (two) times daily.            While in the hospital, will manage blood pressure as follows; Continue home antihypertensive regimen.  Also using nicardipine infusions to manage the BP.    Pulmonary hypertension, PASP 61  Noted      GERD (gastroesophageal reflux disease)  Continue PPI      Class 1 obesity  Body mass index is 35.4 kg/m². Morbid obesity complicates all  aspects of disease management from diagnostic modalities to treatment. Weight loss encouraged and health benefits explained to patient.        Heart failure with preserved ejection fraction  Continue beta blocker and ACE inhibitor.  Volume status is stable.  Continue daily maintenance dose of Lasix.      Type 2 diabetes mellitus with HbA1c 5.8%  Patient's FSGs are controlled on current medication regimen.  Last A1c reviewed-   Lab Results   Component Value Date    HGBA1C 5.8 03/02/2023     Most recent fingerstick glucose reviewed-   POC Glucose   Date Value Ref Range Status   02/10/2024 114 (H) 70 - 110 Final   02/10/2024 86 70 - 110 Final   02/09/2024 110 70 - 110 Final       Current correctional scale  Medium  Maintain anti-hyperglycemic dose as follows-   Antihyperglycemics (From admission, onward)      Start     Stop Route Frequency Ordered    02/09/24 1717  insulin aspart U-100 pen 0-10 Units         -- SubQ Before meals & nightly PRN 02/09/24 1617          Hold Oral hypoglycemics while patient is in the hospital.        Hyperlipidemia  Continue Lipitor 40 mg daily.      Chronic atrial fibrillation  Patient with chronic atrial fibrillation which is controlled currently with Beta Blocker. Patient is currently in atrial fibrillation.[id number] Score: 3. Holding anticoagulant while pt has a subdural hematoma.    Anticoagulated  Keeping it on hold while patient has intracranial bleeding.  Will resume when appropriate to do so, hopefully with neurosurgeon's assistance.        VTE Risk Mitigation (From admission, onward)           Ordered     IP VTE HIGH RISK PATIENT  Once         02/09/24 1617     Place sequential compression device  Until discontinued         02/09/24 1617                    Discharge Planning   BRENNAN:      Code Status: Full Code   Is the patient medically ready for discharge?:     Reason for patient still in hospital (select all that apply): Patient trending condition and Consult  recommendations  Discharge Plan A: Home with family            Bebo Goodson MD  Department of Hospital Medicine   [facility]

## 2024-02-11 NOTE — ASSESSMENT & PLAN NOTE
Keep anticoagulant on hold.  Three CT scans done in series show stable volume of blood.  Neurosurgeon and neurologist consulting.  Monitor patient for any neuro deficits.

## 2024-02-11 NOTE — ASSESSMENT & PLAN NOTE
Patient's FSGs are controlled on current medication regimen.  Last A1c reviewed-   Lab Results   Component Value Date    HGBA1C 5.8 03/02/2023     Most recent fingerstick glucose reviewed-   POC Glucose   Date Value Ref Range Status   02/10/2024 114 (H) 70 - 110 Final   02/10/2024 86 70 - 110 Final   02/09/2024 110 70 - 110 Final       Current correctional scale  Medium  Maintain anti-hyperglycemic dose as follows-   Antihyperglycemics (From admission, onward)      Start     Stop Route Frequency Ordered    02/09/24 1717  insulin aspart U-100 pen 0-10 Units         -- SubQ Before meals & nightly PRN 02/09/24 1617          Hold Oral hypoglycemics while patient is in the hospital.

## 2024-02-11 NOTE — ASSESSMENT & PLAN NOTE
Continue beta blocker and ACE inhibitor.  Volume status is stable.  Continue daily maintenance dose of Lasix.

## 2024-02-11 NOTE — ASSESSMENT & PLAN NOTE
Keep anticoagulant on hold.  Two CT scans done in series show stable volume of blood.  Neurosurgeon and neurologist consulting.  Monitor patient for any neuro deficits.

## 2024-02-11 NOTE — ASSESSMENT & PLAN NOTE
Occurred when he was walking down driveway.  He uses a cane when ambulating, and I recommended to him that he use a walker.  Will consult with PT to assess his ability to safely ambulate and to see what equipment he will require.

## 2024-02-11 NOTE — ASSESSMENT & PLAN NOTE
Patient's FSGs are controlled on current medication regimen.  Last A1c reviewed-   Lab Results   Component Value Date    HGBA1C 5.8 03/02/2023     Most recent fingerstick glucose reviewed-   POC Glucose   Date Value Ref Range Status   02/11/2024 117 (H) 70 - 110 Final   02/11/2024 121 (H) 70 - 110 Final   02/10/2024 95 70 - 110 Final       Current correctional scale  Medium  Maintain anti-hyperglycemic dose as follows-   Antihyperglycemics (From admission, onward)    Start     Stop Route Frequency Ordered    02/09/24 1717  insulin aspart U-100 pen 0-10 Units         -- SubQ Before meals & nightly PRN 02/09/24 1617        Hold Oral hypoglycemics while patient is in the hospital.

## 2024-02-12 VITALS
DIASTOLIC BLOOD PRESSURE: 74 MMHG | OXYGEN SATURATION: 94 % | TEMPERATURE: 100 F | HEIGHT: 70 IN | HEART RATE: 67 BPM | SYSTOLIC BLOOD PRESSURE: 149 MMHG | RESPIRATION RATE: 17 BRPM | BODY MASS INDEX: 35.32 KG/M2 | WEIGHT: 246.69 LBS

## 2024-02-12 LAB
ANION GAP SERPL CALC-SCNC: 4 MMOL/L (ref 8–16)
BASOPHILS # BLD AUTO: 0.02 K/UL (ref 0–0.2)
BASOPHILS NFR BLD: 0.2 % (ref 0–1.9)
BUN SERPL-MCNC: 15 MG/DL (ref 8–23)
CALCIUM SERPL-MCNC: 7.9 MG/DL (ref 8.7–10.5)
CHLORIDE SERPL-SCNC: 101 MMOL/L (ref 95–110)
CO2 SERPL-SCNC: 32 MMOL/L (ref 23–29)
CREAT SERPL-MCNC: 0.7 MG/DL (ref 0.5–1.4)
DIFFERENTIAL METHOD BLD: ABNORMAL
EOSINOPHIL # BLD AUTO: 0 K/UL (ref 0–0.5)
EOSINOPHIL NFR BLD: 0.4 % (ref 0–8)
ERYTHROCYTE [DISTWIDTH] IN BLOOD BY AUTOMATED COUNT: 14 % (ref 11.5–14.5)
EST. GFR  (NO RACE VARIABLE): >60 ML/MIN/1.73 M^2
GLUCOSE SERPL-MCNC: 122 MG/DL (ref 70–110)
GLUCOSE SERPL-MCNC: 125 MG/DL (ref 70–110)
HCT VFR BLD AUTO: 34.1 % (ref 40–54)
HGB BLD-MCNC: 10.8 G/DL (ref 14–18)
IMM GRANULOCYTES # BLD AUTO: 0.05 K/UL (ref 0–0.04)
IMM GRANULOCYTES NFR BLD AUTO: 0.5 % (ref 0–0.5)
LYMPHOCYTES # BLD AUTO: 1.3 K/UL (ref 1–4.8)
LYMPHOCYTES NFR BLD: 13.5 % (ref 18–48)
MAGNESIUM SERPL-MCNC: 1.8 MG/DL (ref 1.6–2.6)
MCH RBC QN AUTO: 30.1 PG (ref 27–31)
MCHC RBC AUTO-ENTMCNC: 31.7 G/DL (ref 32–36)
MCV RBC AUTO: 95 FL (ref 82–98)
MONOCYTES # BLD AUTO: 0.7 K/UL (ref 0.3–1)
MONOCYTES NFR BLD: 7.2 % (ref 4–15)
NEUTROPHILS # BLD AUTO: 7.7 K/UL (ref 1.8–7.7)
NEUTROPHILS NFR BLD: 78.2 % (ref 38–73)
NRBC BLD-RTO: 0 /100 WBC
PLATELET # BLD AUTO: 201 K/UL (ref 150–450)
PMV BLD AUTO: 9.6 FL (ref 9.2–12.9)
POTASSIUM SERPL-SCNC: 4.4 MMOL/L (ref 3.5–5.1)
RBC # BLD AUTO: 3.59 M/UL (ref 4.6–6.2)
SODIUM SERPL-SCNC: 137 MMOL/L (ref 136–145)
WBC # BLD AUTO: 9.89 K/UL (ref 3.9–12.7)

## 2024-02-12 PROCEDURE — 80048 BASIC METABOLIC PNL TOTAL CA: CPT | Performed by: HOSPITALIST

## 2024-02-12 PROCEDURE — 25000003 PHARM REV CODE 250: Performed by: INTERNAL MEDICINE

## 2024-02-12 PROCEDURE — 36415 COLL VENOUS BLD VENIPUNCTURE: CPT | Performed by: HOSPITALIST

## 2024-02-12 PROCEDURE — 85025 COMPLETE CBC W/AUTO DIFF WBC: CPT | Performed by: HOSPITALIST

## 2024-02-12 PROCEDURE — 97165 OT EVAL LOW COMPLEX 30 MIN: CPT

## 2024-02-12 PROCEDURE — 83735 ASSAY OF MAGNESIUM: CPT | Performed by: HOSPITALIST

## 2024-02-12 PROCEDURE — 97116 GAIT TRAINING THERAPY: CPT

## 2024-02-12 PROCEDURE — 97162 PT EVAL MOD COMPLEX 30 MIN: CPT

## 2024-02-12 PROCEDURE — 99900031 HC PATIENT EDUCATION (STAT)

## 2024-02-12 PROCEDURE — 25000003 PHARM REV CODE 250: Performed by: HOSPITALIST

## 2024-02-12 PROCEDURE — 63600175 PHARM REV CODE 636 W HCPCS: Performed by: HOSPITALIST

## 2024-02-12 PROCEDURE — 94761 N-INVAS EAR/PLS OXIMETRY MLT: CPT

## 2024-02-12 PROCEDURE — 99222 1ST HOSP IP/OBS MODERATE 55: CPT | Mod: ,,, | Performed by: NEUROLOGICAL SURGERY

## 2024-02-12 RX ORDER — HYDROCODONE BITARTRATE AND ACETAMINOPHEN 10; 325 MG/1; MG/1
1 TABLET ORAL EVERY 6 HOURS PRN
Qty: 28 TABLET | Refills: 0 | Status: SHIPPED | OUTPATIENT
Start: 2024-02-12 | End: 2024-02-12

## 2024-02-12 RX ORDER — HYDROCODONE BITARTRATE AND ACETAMINOPHEN 10; 325 MG/1; MG/1
1 TABLET ORAL EVERY 6 HOURS PRN
Qty: 28 TABLET | Refills: 0 | Status: SHIPPED | OUTPATIENT
Start: 2024-02-12

## 2024-02-12 RX ORDER — HYDROCODONE BITARTRATE AND ACETAMINOPHEN 10; 325 MG/1; MG/1
1 TABLET ORAL EVERY 4 HOURS PRN
Status: DISCONTINUED | OUTPATIENT
Start: 2024-02-12 | End: 2024-02-12 | Stop reason: HOSPADM

## 2024-02-12 RX ADMIN — MORPHINE SULFATE 6 MG: 2 INJECTION, SOLUTION INTRAMUSCULAR; INTRAVENOUS at 02:02

## 2024-02-12 RX ADMIN — PANTOPRAZOLE SODIUM 40 MG: 40 TABLET, DELAYED RELEASE ORAL at 05:02

## 2024-02-12 RX ADMIN — HYDROCODONE BITARTRATE AND ACETAMINOPHEN 2 TABLET: 10; 325 TABLET ORAL at 10:02

## 2024-02-12 RX ADMIN — METOPROLOL TARTRATE 25 MG: 25 TABLET, FILM COATED ORAL at 09:02

## 2024-02-12 RX ADMIN — FINASTERIDE 5 MG: 5 TABLET, FILM COATED ORAL at 09:02

## 2024-02-12 RX ADMIN — MORPHINE SULFATE 6 MG: 2 INJECTION, SOLUTION INTRAMUSCULAR; INTRAVENOUS at 12:02

## 2024-02-12 RX ADMIN — ACETAMINOPHEN 650 MG: 325 TABLET ORAL at 03:02

## 2024-02-12 RX ADMIN — HYDROCODONE BITARTRATE AND ACETAMINOPHEN 1 TABLET: 10; 325 TABLET ORAL at 03:02

## 2024-02-12 RX ADMIN — AMIODARONE HYDROCHLORIDE 100 MG: 100 TABLET ORAL at 05:02

## 2024-02-12 RX ADMIN — MUPIROCIN 1 G: 20 OINTMENT TOPICAL at 09:02

## 2024-02-12 RX ADMIN — TAMSULOSIN HYDROCHLORIDE 0.8 MG: 0.4 CAPSULE ORAL at 09:02

## 2024-02-12 RX ADMIN — LIDOCAINE 1 PATCH: 50 PATCH CUTANEOUS at 05:02

## 2024-02-12 RX ADMIN — AMIODARONE HYDROCHLORIDE 100 MG: 100 TABLET ORAL at 07:02

## 2024-02-12 RX ADMIN — SENNOSIDES AND DOCUSATE SODIUM 2 TABLET: 8.6; 5 TABLET ORAL at 09:02

## 2024-02-12 RX ADMIN — LISINOPRIL 5 MG: 5 TABLET ORAL at 09:02

## 2024-02-12 RX ADMIN — HYDROCODONE BITARTRATE AND ACETAMINOPHEN 2 TABLET: 10; 325 TABLET ORAL at 04:02

## 2024-02-12 RX ADMIN — DULOXETINE HYDROCHLORIDE 30 MG: 30 CAPSULE, DELAYED RELEASE ORAL at 09:02

## 2024-02-12 RX ADMIN — FUROSEMIDE 20 MG: 20 TABLET ORAL at 09:02

## 2024-02-12 RX ADMIN — MORPHINE SULFATE 6 MG: 2 INJECTION, SOLUTION INTRAMUSCULAR; INTRAVENOUS at 06:02

## 2024-02-12 RX ADMIN — MORPHINE SULFATE 6 MG: 2 INJECTION, SOLUTION INTRAMUSCULAR; INTRAVENOUS at 09:02

## 2024-02-12 NOTE — CARE UPDATE
02/12/24 1329   Patient Assessment/Suction   Level of Consciousness (AVPU) alert   Respiratory Effort Normal;Unlabored   Expansion/Accessory Muscles/Retractions no retractions;expansion symmetric   All Lung Fields Breath Sounds clear   Rhythm/Pattern, Respiratory unlabored;depth regular;pattern regular   Cough Frequency no cough   PRE-TX-O2   Device (Oxygen Therapy) room air   SpO2 (!) 94 %   Pulse Oximetry Type Intermittent   $ Pulse Oximetry - Multiple Charge Pulse Oximetry - Multiple   Pulse 67   Resp 17   Education   $ Education 15 min;Other (see comment)  (sats)

## 2024-02-12 NOTE — PT/OT/SLP EVAL
Occupational Therapy   Evaluation and Discharge Note    Name: Hiro Rodríguez  MRN: [id number]  Admitting Diagnosis: Subdural hematoma, acute  Recent Surgery: * No surgery found *      Recommendations:     Discharge Recommendations: No Therapy Indicated  Discharge Equipment Recommendations: none  Barriers to discharge:  None    Assessment:     Hiro Rodríguez is a 78 y.o. male with a medical diagnosis of Subdural hematoma, acute. At this time, patient is functioning at their prior level of function and does not require further acute OT services.     Plan:     During this hospitalization, patient does not require further acute OT services.  Please re-consult if situation changes.    Plan of Care Reviewed with: patient    Subjective     Chief Complaint: None  Patient/Family Comments/goals: To go home    Occupational Profile:  Living Environment: lives with spouse in a 1 story home, no steps to enter.  Previous level of function: supervision using a rolling walker with ambulation and ADLs.  Roles and Routines: Limited homemaker  Equipment Used at home: cane, straight, bedside commode, walker, rolling, shower chair, grab bar  Assistance upon Discharge: Spouse, limited physical assistance.    Pain/Comfort:  Pain Rating 1: 0/10  Pain Rating Post-Intervention 1: 0/10    Patients cultural, spiritual, [employer] conflicts given the current situation: no    Objective:     Communicated with: nurse prior to session.  Patient found HOB elevated with telemetry, peripheral IV upon OT entry to room.    General Precautions: Standard,  (None)  Orthopedic Precautions: N/A  Braces: N/A  Respiratory Status: Room air     Occupational Performance:    Bed Mobility:    Patient completed Scooting/Bridging with supervision  Patient completed Supine to Sit with supervision  Patient completed Sit to Supine with supervision    Functional Mobility/Transfers:  Patient completed Sit <> Stand Transfer with supervision  with  no assistive device      Activities of Daily Living:  Grooming: supervision to wash face sitting EOB.    Cognitive/Visual Perceptual:  Cognitive/Psychosocial Skills:     -       Oriented to: Person, Place, Time, and Situation   -       Follows Commands/attention:Follows multistep  commands  -       Communication: clear/fluent  -       Memory: No Deficits noted  -       Safety awareness/insight to disability: intact   -       Mood/Affect/Coping skills/emotional control: Cooperative and Pleasant  Visual/Perceptual:      -Intact Acuity    Physical Exam:  Balance:    -       Sitting/Standing: Supervision  Upper Extremity Range of Motion:     -       Right Upper Extremity: WFL  -       Left Upper Extremity: WFL  Upper Extremity Strength:    -       Right Upper Extremity: WFL  -       Left Upper Extremity: WFL   Strength:    -       Right Upper Extremity: WFL  -       Left Upper Extremity: WFL  Fine Motor Coordination:    -       Intact    AMPAC 6 Click ADL:  AMPAC Total Score: 24    Treatment & Education:  Patient currently supervision for sitting/standing ADL activity.    Patient left HOB elevated with all lines intact and call button in reach    GOALS:   Multidisciplinary Problems       Occupational Therapy Goals       Not on file                    History:     Past Medical History:   Diagnosis Date    CHF (congestive heart failure)     Hypertension     Mixed hyperlipidemia     Paroxysmal atrial fibrillation 2013         Past Surgical History:   Procedure Laterality Date    CYSTOSCOPY W/ URETERAL STENT PLACEMENT N/A 03/02/2023    Procedure: CYSTOSCOPY, WITH URETERAL STENT INSERTION;  Surgeon: Yoshi Lange MD;  Location: [facility] OR;  Service: Urology;  Laterality: N/A;    CYSTOURETEROSCOPY, WITH HOLMIUM LASER LITHOTRIPSY OF URETERAL CALCULUS AND STENT INSERTION Left 4/20/2023    Procedure: CYSTOURETEROSCOPY, WITH HOLMIUM LASER LITHOTRIPSY OF URETERAL CALCULUS AND STENT INSERTION;  Surgeon: Yoshi Lange MD;  Location: [facility]  OR;  Service: Urology;  Laterality: Left;    FRACTURE SURGERY      INTRAMEDULLARY RODDING OF TROCHANTER OF FEMUR Left 11/10/2022    Procedure: INSERTION, ITRAMEDULLARY SANTI, FEMUR, TROCHANTER;  Surgeon: Richard Phoenix MD;  Location: [facility] OR;  Service: Orthopedics;  Laterality: Left;    LUMBAR DISCECTOMY  1980    L4-5    REMOVAL OF URETERAL CALCULUS Left 4/20/2023    Procedure: REMOVAL, CALCULUS, URETER;  Surgeon: Yoshi Lange MD;  Location: [facility] OR;  Service: Urology;  Laterality: Left;    REMOVAL, CALCULUS, BLADDER  03/02/2023    Procedure: REMOVAL, CALCULUS, BLADDER;  Surgeon: Yoshi Lange MD;  Location: [facility] OR;  Service: Urology;;    RETROGRADE PYELOGRAPHY  4/20/2023    Procedure: PYELOGRAM, RETROGRADE;  Surgeon: Yoshi Lange MD;  Location: [facility] OR;  Service: Urology;;    TOTAL KNEE ARTHROPLASTY Right 2017    TOTAL KNEE ARTHROPLASTY Left 2018       Time Tracking:     OT Date of Treatment: 02/12/24  OT Start Time: 1125  OT Stop Time: 1137  OT Total Time (min): 12 min    Billable Minutes:Evaluation 12 2/12/2024

## 2024-02-12 NOTE — PROGRESS NOTES
Made aware of patient today 02/12/2024. Patient seen and examined.  Record reviewed.  Recent imaging reviewed.      He complains of left forehead pain and severe left chest wall pain due to rib fractures.  Denies visual disturbance, speech difficulty, extremity weakness, headache, nausea, vomiting.  Denies shortness of breath.    He required FFP and vitamin K for interval enlargement of the subdural hematoma.  Eliquis held, last dose 02/09/2024.    Loaded with Keppra in the ED. neuro stable in ICU.      Hypertensive requiring Cardene in ICU.  Cardene off, blood pressure 140s over 70s heart rate 60s, O2 sat 94%.  T-max 99.7°.  Labs reviewed.    Exam:  No acute distress, pleasant  Awake, alert, oriented to person place and time.    Cranial nerves grossly intact.  Bandage left forehead  Strength is graded 5/5 in all muscle groups.    Sensation is grossly intact throughout.    Gait not tested    Head CT 02/10/2024: When allowing for differences in positioning and slice orientation, left frontotemporal subdural hemorrhage is unchanged, measuring 9-10 mm in maximal thickness. Minimal left to right midline shift is stable. No new areas of hemorrhage are identified.     Impression: Acute traumatic left frontal temporal subdural hematoma in the setting of direct oral anticoagulation.  Neuro intact.  Serial imaging now stable.    Recommendations:  He may be discharged home when medically stable and pain adequately controlled.  Continue hold anticoagulants.  Continue Keppra.  Continue normotensive.  Follow-up in 2 weeks with repeat head CT.  Discussed with Dr. Lugo.

## 2024-02-12 NOTE — TELEPHONE ENCOUNTER
Please advise if Urodynamic should be canceled next Monday. Patient had fall and unable to make appointment today for 3 month f/u

## 2024-02-12 NOTE — PT/OT/SLP EVAL
Physical Therapy Evaluation and Discharge Note    Patient Name:  Hiro Rodríguez   MRN:  [id number]    Recommendations:     Discharge Recommendations: No Therapy Indicated  Discharge Equipment Recommendations:   rollator  Barriers to discharge: None    Assessment:     Hiro Rodríguez is a 78 y.o. male admitted with a medical diagnosis of Subdural hematoma, acute. .  At this time, patient is functioning at this  prior level of function and does not require further acute PT services. Pt would however benefit from use of a rollator at home for increased safety. Presently he uses a single point cane ,and pt reported needing something more. Pt is a high fall risk and reports decreased endurance for gait . Pt would be  more functional with use of rollator as its use would encourage greater mobility for pt as pt can sit and rest as needed.    Recent Surgery: * No surgery found *      Plan:     During this hospitalization, patient does not require further acute PT services.  Please re-consult if situation changes.      Subjective     Chief Complaint: fear of falling with gait with continued use of cane  Patient/Family Comments/goals: Return home  with family  Pain/Comfort:       Patients cultural, spiritual, [employer] conflicts given the current situation:      Living Environment:  Pt lives at home with his wife in a [facility] with threshold entrance  Prior to admission, patients level of function was ambulatory with SPC and Mod I /Supervision , required supervision with showering and min A for dressing  Equipment used at home: single point care .  DME owned (not currently used): none.  Upon discharge, patient will have assistance from his wife.    Objective:     Communicated with nurse prior to session.  Patient found supine with telemetry, peripheral IV upon PT entry to room.    General Precautions: Standard,      Orthopedic Precautions:    Braces:    Respiratory Status: Room air    Exams:  Cognitive Exam:  Patient is  oriented to Person, Place, Time, and Situation  RLE ROM: WFL  RLE Strength: WFL  LLE ROM: WFL  LLE Strength: WFL    Functional Mobility:  Bed Mobility:     Supine to Sit: minimum assistance  Sit to Supine: contact guard assistance  Transfers:     Sit to Stand:  stand by assistance with rolling walker  Gait: 125 ft RW and SBA cueing for  pace    AM-PAC 6 CLICK MOBILITY  Total Score:        Treatment and Education:  PT eval  and DC with recommendation for rollator    AM-PAC 6 CLICK MOBILITY  Total Score:      Patient left supine with all lines intact, call button in reach, and bed alarm on.    GOALS:   Multidisciplinary Problems       Physical Therapy Goals       Not on file                    History:     Past Medical History:   Diagnosis Date    CHF (congestive heart failure)     Hypertension     Mixed hyperlipidemia     Paroxysmal atrial fibrillation        Past Surgical History:   Procedure Laterality Date    CYSTOSCOPY W/ URETERAL STENT PLACEMENT N/A 2023    Procedure: CYSTOSCOPY, WITH URETERAL STENT INSERTION;  Surgeon: Yoshi Lange MD;  Location: [facility];  Service: Urology;  Laterality: N/A;    CYSTOURETEROSCOPY, WITH HOLMIUM LASER LITHOTRIPSY OF URETERAL CALCULUS AND STENT INSERTION Left 2023    Procedure: CYSTOURETEROSCOPY, WITH HOLMIUM LASER LITHOTRIPSY OF URETERAL CALCULUS AND STENT INSERTION;  Surgeon: Yoshi Lange MD;  Location: [facility] OR;  Service: Urology;  Laterality: Left;    FRACTURE SURGERY      INTRAMEDULLARY RODDING OF TROCHANTER OF FEMUR Left 11/10/2022    Procedure: INSERTION, ITRAMEDULLARY SANTI, FEMUR, TROCHANTER;  Surgeon: Richard Phoenix MD;  Location: [facility] OR;  Service: Orthopedics;  Laterality: Left;    LUMBAR DISCECTOMY      L4-5    REMOVAL OF URETERAL CALCULUS Left 2023    Procedure: REMOVAL, CALCULUS, URETER;  Surgeon: Yoshi Lange MD;  Location: [facility];  Service: Urology;  Laterality: Left;    REMOVAL, CALCULUS, BLADDER  2023     Procedure: REMOVAL, CALCULUS, BLADDER;  Surgeon: Yoshi Lange MD;  Location: [facility];  Service: Urology;;    RETROGRADE PYELOGRAPHY  4/20/2023    Procedure: PYELOGRAM, RETROGRADE;  Surgeon: Yoshi Lange MD;  Location: [facility];  Service: Urology;;    TOTAL KNEE ARTHROPLASTY Right 2017    TOTAL KNEE ARTHROPLASTY Left 2018       Time Tracking:     PT Received On: 02/12/24  PT Start Time: 1107     PT Stop Time: 1125  PT Total Time (min): 18 min     Billable Minutes: Evaluation 9 minutes  and Gait Training 9 minutes      02/12/2024

## 2024-02-12 NOTE — PLAN OF CARE
DC orders and chart reviewed. No discharge needs noted.  Patient cleared for discharge from .    Patient is discharging home with [facility] home health (skilled nursing only). SOC 2/15.  Patient approved for rollator through [employer].  Follow up information on AVS.       02/12/24 1615   Final Note   Assessment Type Final Discharge Note   Anticipated Discharge Disposition Home-Health   What phone number can be called within the next 1-3 days to see how you are doing after discharge? [phone]   Hospital Resources/Appts/Education Provided Post-Acute resouces added to AVS;Appointment suggestion unavailable   Post-Acute Status   Post-Acute Authorization Home Health   HME Status Set-up Complete/Auth obtained   Home Health Status Set-up Complete/Auth obtained   Discharge Delays None known at this time

## 2024-02-12 NOTE — PLAN OF CARE
Patient accepted with [facility] for skilled nursing services only, as requested.  CM noted order for rollator.  Order and PT note sent to [facility] via [employer].  Rollator approved and will be delivered to patient.         02/12/24 1248   Post-Acute Status   Post-Acute Authorization Home Health;HME   HME Status Referrals Sent   Home Health Status Set-up Complete/Auth obtained   Discharge Plan   Discharge Plan A Home Health   Discharge Plan B Home Health

## 2024-02-13 NOTE — HOSPITAL COURSE
Patient presented to the ED s/p fall on Eliquis and required transfer here from outside ED. CT head with moderate left supraorbital swelling, left frontotemporal convexity subdural hematoma, 8 mm with slight left-to-right midline shift about 2 mm. Chest imaging with acute fracture of left lateral 6th and 7th rib with no pneumothorax. Noted to have left supraorbital laceration which was sutured at outside hospital. He received fentanyl, Kcentra and 1 g Keppra in the ED. Case discussed with ED provider, states discussed with on-call neurosurgeon Dr. Goodrich, recommends conservative management with ICU admission and patient will be seen in consultation. Repeat head CT with interval worsening and thus he was given more FFP and Vitamin K.  Repeat Head CTs since have been no change.  Headache persists but managed with pain medication. Weaned to room air.  Rib pain managed with pain medication.  Seen by PT.  Rollator recommended and ordered. Declined home health PT/OT but did wish for resumption of HH RN- ordered. Rx for pain medication sent to pharmacy. He will follow up with Dr. Dickson in two weeks.  I've placed an outpatient order for CT head prior to apt.  He will hold his Eliquis indefinitely until receives further instruction from Neurosurgery. Examined on day of discharge and alert, NAD, comfortable respirations on room air. Return precautions given.

## 2024-02-13 NOTE — PLAN OF CARE
Problem: Adult Inpatient Plan of Care  Goal: Plan of Care Review  2/12/2024 1845 by Carolina Perkins RN  Outcome: Met  2/12/2024 1634 by Carolina Perkins RN  Outcome: Ongoing, Progressing  Goal: Patient-Specific Goal (Individualized)  Outcome: Met  Goal: Absence of Hospital-Acquired Illness or Injury  Outcome: Met  Goal: Optimal Comfort and Wellbeing  Outcome: Met  Goal: Readiness for Transition of Care  Outcome: Met     Problem: Diabetes Comorbidity  Goal: Blood Glucose Level Within Targeted Range  Outcome: Met     Problem: Adjustment to Illness (Stroke, Hemorrhagic)  Goal: Optimal Coping  Outcome: Met     Problem: Bowel Elimination Impaired (Stroke, Hemorrhagic)  Goal: Effective Bowel Elimination  Outcome: Met     Problem: Cerebral Tissue Perfusion (Stroke, Hemorrhagic)  Goal: Optimal Cerebral Tissue Perfusion  Outcome: Met     Problem: Cognitive Impairment (Stroke, Hemorrhagic)  Goal: Optimal Cognitive Function  Outcome: Met     Problem: Communication Impairment (Stroke, Hemorrhagic)  Goal: Effective Communication Skills  Outcome: Met     Problem: Functional Ability Impaired (Stroke, Hemorrhagic)  Goal: Optimal Functional Ability  Outcome: Met     Problem: Pain (Stroke, Hemorrhagic)  Goal: Acceptable Pain Control  Outcome: Met     Problem: Respiratory Compromise (Stroke, Hemorrhagic)  Goal: Effective Oxygenation and Ventilation  Outcome: Met     Problem: Sensorimotor Impairment (Stroke, Hemorrhagic)  Goal: Improved Sensorimotor Function  Outcome: Met     Problem: Swallowing Impairment (Stroke, Hemorrhagic)  Goal: Optimal Eating and Swallowing Without Aspiration  Outcome: Met     Problem: Urinary Elimination Impaired (Stroke, Hemorrhagic)  Goal: Effective Urinary Elimination  Outcome: Met     Problem: Impaired Wound Healing  Goal: Optimal Wound Healing  2/12/2024 1845 by Carolina Perkins, RN  Outcome: Met  2/12/2024 1634 by Carolina Perkins RN  Outcome: Ongoing, Progressing     Problem: Skin Injury  Risk Increased  Goal: Skin Health and Integrity  Outcome: Met

## 2024-02-13 NOTE — DISCHARGE SUMMARY
[facility]  Discharge Summary      Patient Name: Hiro Rodríguez  MRN: [id number]  [facility]: [id number]  Patient Class: IP- Inpatient  Admission Date: 2/9/2024  Hospital Length of Stay: 3 days  Discharge Date and Time: 2/12/2024  6:41 PM  Attending Physician: No att. providers found   Discharging Provider: Danielle Lugo MD  Primary Care Provider: Gautam Castanon III, MD    Primary Care Team: Networked reference to record PCT     HPI:   Mr. Rodríguez is a 78-years-old male who presented as a direct admission from [facility] for ICU level of care.  Patient reports he was in his usual state of health, was ambulating in his driveway with cane, subsequently fell, believes mechanical, landed on his left side with trauma to left forehead, chest and lower extremity.  Denies any associated loss of consciousness, states he was not able to get off the ground until first responders arrived and assisted.  Denies any preceding lightheadedness, dizziness, chest pain, shortness of breath, palpitations.  States last fall was around 8 months ago.  Patient with history of atrial fibrillation, on Eliquis, last dose taken this morning (2/9/24).  Patient currently reports shortness of breath and left sided chest pain.  Denies any nausea, vomiting, fever, chills, admits to constipation with last bowel movement 3 days ago.  At outside hospital BP up to 164/102, afebrile with T-max 98.6°.  Labs with hemoglobin 11.4, platelets 254, INR 1.1, BUN/creatinine 15/0.8.  CT head with moderate left supraorbital swelling, left frontotemporal convexity subdural hematoma, 8 mm with slight left-to-right midline shift about 2 mm.  Chest imaging with acute fracture of left lateral 6th and 7th rib with no pneumothorax.  Noted to have left supraorbital laceration which was sutured at outside hospital.  He received fentanyl, Kcentra and 1 g Keppra in the ED.  Case discussed with ED provider, states discussed with  on-call neurosurgeon Dr. Goodrich, recommends conservative management with ICU admission and patient will be seen in consultation.  Plan of care discussed with patient, no visitors at bedside.  Code status addressed and currently full code.  Discussed with nursing.    * No surgery found *      Hospital Course:   Patient presented to the ED s/p fall on Eliquis and required transfer here from outside ED. CT head with moderate left supraorbital swelling, left frontotemporal convexity subdural hematoma, 8 mm with slight left-to-right midline shift about 2 mm. Chest imaging with acute fracture of left lateral 6th and 7th rib with no pneumothorax. Noted to have left supraorbital laceration which was sutured at outside hospital. He received fentanyl, Kcentra and 1 g Keppra in the ED. Case discussed with ED provider, states discussed with on-call neurosurgeon Dr. Goodrich, recommends conservative management with ICU admission and patient will be seen in consultation. Repeat head CT with interval worsening and thus he was given more FFP and Vitamin K.  Repeat Head CTs since have been no change.  Headache persists but managed with pain medication. Weaned to room air.  Rib pain managed with pain medication.  Seen by PT.  Rollator recommended and ordered. Declined home health PT/OT but did wish for resumption of HH RN- ordered. Rx for pain medication sent to pharmacy. He will follow up with Dr. Dickson in two weeks.  I've placed an outpatient order for CT head prior to apt.  He will hold his Eliquis indefinitely until receives further instruction from Neurosurgery. Examined on day of discharge and alert, NAD, comfortable respirations on room air. Return precautions given.      Goals of Care Treatment Preferences:  Code Status: Full Code      Consults:   Consults (From admission, onward)          Status Ordering Provider     Inpatient consult to Neurology  Once        Provider:  Ilir Gunderson MD    Completed ANGY MARQUEZ      Inpatient consult to Neurosurgery  Once        Provider:  Skip Goodrich MD    Acknowledged DEBBIE SALAZAR            No new Assessment & Plan notes have been filed under this hospital service since the last note was generated.  Service: Hospital Medicine    Final Active Diagnoses:    Diagnosis Date Noted POA    PRINCIPAL PROBLEM:  Acute traumatic left frontal subdural hematoma [S06.5XAA] 06/05/2023 Yes    Fall at home [W19.XXXA, Y92.009] 02/09/2024 Not Applicable    Laceration of forehead [S01.81XA] 02/09/2024 Yes    Closed fracture of multiple ribs of left side- 6th and 7th [S22.42XA] 02/09/2024 Yes    Benign prostatic hyperplasia without lower urinary tract symptoms [N40.0] 04/29/2023 Yes     Chronic    Hypertension, essential [I10] 04/28/2023 Yes     Chronic    Pulmonary hypertension, PASP 61 [I27.20] 03/17/2023 Yes     Chronic    GERD (gastroesophageal reflux disease) [K21.9] 03/16/2023 Yes    Class 1 obesity [E66.9] 07/16/2022 Yes    Heart failure with preserved ejection fraction [I50.30] 04/21/2021 Yes    Hyperlipidemia [E78.5] 01/06/2021 Yes     Chronic    Type 2 diabetes mellitus with HbA1c 5.8% [E11.42] 01/06/2021 Yes     Chronic    Chronic atrial fibrillation [I48.20] 08/31/2020 Yes    Anticoagulated [Z79.01] 08/31/2020 Not Applicable      Problems Resolved During this Admission:       Discharged Condition: good    Disposition: Home-Health Care [facility]    Follow Up:   Follow-up Information       Darren Dickson, DO. Schedule an appointment as soon as possible for a visit in 2 week(s).    Specialty: Neurosurgery  Why: post hospital discharge follow up  Contact information:  [address] [place] [address]  [address]  [place] [address]  [phone]               [facility] Follow up.    Specialty: Home Health Services  Contact information:  [address] [place] [address].  [address]  [place] [address]  [phone]                           Patient Instructions:      WALKER FOR HOME USE     Order  "Specific Question Answer Comments   Type of Walker: Rollator with brakes and/or seat    With wheels? Yes    Height: 5' 10" (1.778 m)    Weight: 111.9 kg (246 lb 11.1 oz)    Length of need (1-99 months): 99    Does patient have medical equipment at home? cane, straight    Does patient have medical equipment at home? wheelchair    Does patient have medical equipment at home? walker, rolling    Does patient have medical equipment at home? hospital bed    Does patient have medical equipment at home? glucometer    Does patient have medical equipment at home? grab bar    Please check all that apply: Patient's condition impairs ambulation.    Please check all that apply: Patient is unable to safely ambulate without equipment.    Please check all that apply: Patient needs help to get in and out of chair.    Please check all that apply: Walker will be used for gait training.      CT Head Without Contrast   Standing Status: Future Standing Exp. Date: 02/12/25     Order Specific Question Answer Comments   May the Radiologist modify the order per protocol to meet the clinical needs of the patient? Yes      Ambulatory referral/consult to Home Health   Standing Status: Future   Referral Priority: Routine Referral Type: Home Health   Referral Reason: Specialty Services Required   Requested Specialty: Home Health Services   Number of Visits Requested: 1     Diet Cardiac     Diet diabetic     Notify your health care provider if you experience any of the following:  severe uncontrolled pain     Notify your health care provider if you experience any of the following:  increased confusion or weakness     Activity as tolerated       Significant Diagnostic Studies: Labs: CMP   Recent Labs   Lab 02/11/24  0649 02/12/24  0715    137   K 4.1 4.4    101   CO2 31* 32*    125*   BUN 17 15   CREATININE 0.7 0.7   CALCIUM 8.2* 7.9*   ANIONGAP 6* 4*    and CBC   Recent Labs   Lab 02/11/24  0650 02/12/24  0715   WBC 10.46 9.89 "   HGB 10.7* 10.8*   HCT 34.2* 34.1*    201       Pending Diagnostic Studies:       None           Medications:  Reconciled Home Medications:      Medication List        CONTINUE taking these medications      acetaminophen 325 mg Cap  Take 650 mg by mouth every 6 (six) hours as needed.     amiodarone 100 MG Tab  Commonly known as: PACERONE  Take 1 tablet (100 mg total) by mouth every morning.     cholecalciferol (vitamin D3) 50 mcg (2,000 unit) Tab  Commonly known as: VITAMIN D3  Take 2 tablets by mouth once daily.     cyanocobalamin (vitamin B-12) 1,000 mcg Subl  Place 1,000 mcg under the tongue 2 (two) times a day.     docusate sodium 100 MG capsule  Commonly known as: COLACE  Take 100 mg by mouth 2 (two) times daily as needed for Constipation.     DULoxetine 30 MG capsule  Commonly known as: CYMBALTA  Take 1 capsule (30 mg total) by mouth 2 (two) times daily.     FIBER-CAPS (CA POLYCARBOPHIL) ORAL  Take 1 tablet by mouth 2 (two) times a day.     finasteride 5 mg tablet  Commonly known as: PROSCAR  Take 1 tablet (5 mg total) by mouth once daily.     furosemide 20 MG tablet  Commonly known as: LASIX  Take 1 tablet (20 mg total) by mouth once daily.     HYDROcodone-acetaminophen  mg per tablet  Commonly known as: NORCO  Take 1 tablet by mouth every 6 (six) hours as needed for Pain.     lisinopriL 2.5 MG tablet  Commonly known as: PRINIVIL,ZESTRIL  Take 1 tablet (2.5 mg total) by mouth 2 (two) times daily.     metFORMIN 500 MG ER 24hr tablet  Commonly known as: GLUCOPHAGE-XR  TAKE 1 TABLET BY MOUTH EVERY DAY     metoprolol tartrate 25 MG tablet  Commonly known as: LOPRESSOR  Take 1 tablet (25 mg total) by mouth 2 (two) times daily.     omega-3 acid ethyl esters 1 gram capsule  Commonly known as: LOVAZA  TAKE 2 CAPSULES BY MOUTH TWICE A DAY     OZEMPIC 2 mg/dose (8 mg/3 mL) Pnij  Generic drug: semaglutide  Inject 2 mg into the skin every 7 days.     pantoprazole 40 MG tablet  Commonly known as:  PROTONIX  Take 40 mg by mouth once daily.     polyethylene glycol 17 gram/dose powder  Commonly known as: GLYCOLAX  Take 17 g by mouth daily as needed for Constipation.     potassium chloride 10 MEQ Tbsr  Commonly known as: KLOR-CON  Take 2 tablets (20 mEq total) by mouth once daily.     propranoloL 40 MG tablet  Commonly known as: INDERAL  Take 1 tablet (40 mg total) by mouth 2 (two) times daily.     rosuvastatin 20 MG tablet  Commonly known as: CRESTOR  Take 1 tablet (20 mg total) by mouth once daily.     tadalafiL 20 MG Tab  Commonly known as: CIALIS  Take 1 tablet (20 mg total) by mouth as needed (ed).     tamsulosin 0.4 mg Cap  Commonly known as: FLOMAX  Take 2 capsules (0.8 mg total) by mouth once daily.            STOP taking these medications      amitriptyline 10 MG tablet  Commonly known as: ELAVIL     apixaban 2.5 mg Tab  Commonly known as: ELIQUIS     solifenacin 10 MG tablet  Commonly known as: VESICARE     tolterodine 4 MG 24 hr capsule  Commonly known as: DETROL LA              Indwelling Lines/Drains at time of discharge:   Lines/Drains/Airways       Drain  Duration                  Ureteral Drain/Stent 04/20/23 1515 Left ureter 4.8 Fr. 298 days                    Time spent on the discharge of patient: 34 minutes         Danielle Lugo MD  Department of Hospital Medicine  [facility]

## 2024-02-13 NOTE — NURSING
Pt. seen for Wound Consult post fall at home.  Lg. Abrasion to Lt. Forehead with small open area, dried blood remaining to edges.  No S/Sx of infection noted. Cleansed with NS and Gauze.  Patted dry with gauze. Applied Double Layered Xeroform gauze and applied Lg. Adhesive bandage. Angeline. well. Pt/spouse instructed on how to perform and supplies given for home. Both report they understand instructions. Very small abrasion to Lt. Knee, Lt. Index finger. No wound care required.

## 2024-02-13 NOTE — NURSING
Upon discharge pt and wife received information regarding hospital stay, when to contact hcp, and future follow-up appointments. Medication(s) to be picked up tomorrow from pharmacy Pt verbalized an understanding of all the information discussed, questions/concerns were addressed accordingly. Hunter delivered to bedside Pt's telemetry discontinued per order. Pt's IV was discontinued per order, catheter tip intact no signs of bleeding/distress noted. Pt transported off unit via wheelchair wife at side. Belongings and discharge information in hand. ANGELA

## 2024-02-15 LAB
OHS QRS DURATION: 96 MS
OHS QTC CALCULATION: 486 MS

## 2024-02-15 PROCEDURE — G0180 MD CERTIFICATION HHA PATIENT: HCPCS | Mod: ,,, | Performed by: FAMILY MEDICINE

## 2024-02-19 ENCOUNTER — HOSPITAL ENCOUNTER (OUTPATIENT)
Dept: RADIOLOGY | Facility: HOSPITAL | Age: 79
Discharge: HOME | End: 2024-02-19
Attending: INTERNAL MEDICINE
Payer: MEDICARE

## 2024-02-19 ENCOUNTER — TELEPHONE (OUTPATIENT)
Dept: NEUROSURGERY | Facility: CLINIC | Age: 79
End: 2024-02-19
Payer: MEDICARE

## 2024-02-19 ENCOUNTER — PATIENT OUTREACH (OUTPATIENT)
Dept: ADMINISTRATIVE | Facility: CLINIC | Age: 79
End: 2024-02-19
Payer: MEDICARE

## 2024-02-19 DIAGNOSIS — S06.5XAA SDH (SUBDURAL HEMATOMA): ICD-10-CM

## 2024-02-19 PROCEDURE — 70450 CT HEAD/BRAIN W/O DYE: CPT | Mod: TC,PO

## 2024-02-19 NOTE — PROGRESS NOTES
C3 nurse spoke with Hiro Carbonemekenyon, patient's spouse, Galina, for a TCC post hospital discharge follow up call. The patient has a scheduled HOSFU appointment with Sonja Jeffries NP on 2/26/2024 @ 9 AM.

## 2024-02-19 NOTE — TELEPHONE ENCOUNTER
----- Message from Zoraida Guerra sent at 2/19/2024 11:59 AM CST -----  .Type: Appointment Request     Caller is requesting appointment    Was A Appointment Solution Found When Scheduling? NONE     Best Call Back Number: [phone]    Additional Information: PT WAS IN THE HOSPITAL AND WILL BE GETTING THE CT HE NEEDS TO SCHEDULE PER HOME HEALTH NURSE

## 2024-02-22 ENCOUNTER — OFFICE VISIT (OUTPATIENT)
Dept: FAMILY MEDICINE | Facility: CLINIC | Age: 79
End: 2024-02-22
Payer: MEDICARE

## 2024-02-22 ENCOUNTER — TELEPHONE (OUTPATIENT)
Dept: NEUROSURGERY | Facility: CLINIC | Age: 79
End: 2024-02-22
Payer: MEDICARE

## 2024-02-22 VITALS
TEMPERATURE: 98 F | HEART RATE: 57 BPM | HEIGHT: 70 IN | WEIGHT: 241.5 LBS | OXYGEN SATURATION: 94 % | SYSTOLIC BLOOD PRESSURE: 118 MMHG | BODY MASS INDEX: 34.57 KG/M2 | DIASTOLIC BLOOD PRESSURE: 68 MMHG | RESPIRATION RATE: 18 BRPM

## 2024-02-22 DIAGNOSIS — I95.1 ORTHOSTATIC HYPOTENSION: ICD-10-CM

## 2024-02-22 DIAGNOSIS — I62.00 NONTRAUMATIC SUBDURAL HEMORRHAGE, UNSPECIFIED: Primary | ICD-10-CM

## 2024-02-22 DIAGNOSIS — S06.5X0D TRAUMATIC SUBDURAL HEMORRHAGE WITHOUT OPEN INTRACRANIAL WOUND AND WITHOUT LOSS OF CONSCIOUSNESS, SUBSEQUENT ENCOUNTER: ICD-10-CM

## 2024-02-22 DIAGNOSIS — I50.30 HEART FAILURE WITH PRESERVED EJECTION FRACTION, UNSPECIFIED HF CHRONICITY: ICD-10-CM

## 2024-02-22 DIAGNOSIS — I10 HYPERTENSION, ESSENTIAL: Chronic | ICD-10-CM

## 2024-02-22 DIAGNOSIS — S22.42XD CLOSED FRACTURE OF MULTIPLE RIBS OF LEFT SIDE WITH ROUTINE HEALING, SUBSEQUENT ENCOUNTER: ICD-10-CM

## 2024-02-22 DIAGNOSIS — S06.5XAA SUBDURAL HEMATOMA, ACUTE: ICD-10-CM

## 2024-02-22 DIAGNOSIS — E78.5 HYPERLIPIDEMIA, UNSPECIFIED HYPERLIPIDEMIA TYPE: ICD-10-CM

## 2024-02-22 DIAGNOSIS — E11.42 TYPE 2 DIABETES MELLITUS WITH DIABETIC POLYNEUROPATHY, WITHOUT LONG-TERM CURRENT USE OF INSULIN: ICD-10-CM
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
## 2024-02-22 DIAGNOSIS — I50.33 ACUTE ON CHRONIC HEART FAILURE WITH PRESERVED EJECTION FRACTION: ICD-10-CM

## 2024-02-22 DIAGNOSIS — E66.01 MORBID OBESITY: ICD-10-CM

## 2024-02-22 DIAGNOSIS — S06.5X0S: Primary | ICD-10-CM

## 2024-02-22 DIAGNOSIS — S01.81XD LACERATION OF FOREHEAD, SUBSEQUENT ENCOUNTER: ICD-10-CM

## 2024-02-22 DIAGNOSIS — S22.41XD CLOSED FRACTURE OF MULTIPLE RIBS OF RIGHT SIDE WITH ROUTINE HEALING, SUBSEQUENT ENCOUNTER: ICD-10-CM

## 2024-02-22 DIAGNOSIS — I48.20 CHRONIC ATRIAL FIBRILLATION: ICD-10-CM

## 2024-02-22 DIAGNOSIS — E11.21 TYPE 2 DIABETES MELLITUS WITH DIABETIC NEPHROPATHY, WITHOUT LONG-TERM CURRENT USE OF INSULIN: ICD-10-CM

## 2024-02-22 PROCEDURE — 99999 PR PBB SHADOW E&M-EST. PATIENT-LVL V: CPT | Mod: PBBFAC,,,

## 2024-02-22 PROCEDURE — 3074F SYST BP LT 130 MM HG: CPT | Mod: CPTII,S$GLB,,

## 2024-02-22 PROCEDURE — 99214 OFFICE O/P EST MOD 30 MIN: CPT | Mod: S$GLB,,,

## 2024-02-22 PROCEDURE — 1125F AMNT PAIN NOTED PAIN PRSNT: CPT | Mod: CPTII,S$GLB,,

## 2024-02-22 PROCEDURE — 1159F MED LIST DOCD IN RCRD: CPT | Mod: CPTII,S$GLB,,

## 2024-02-22 PROCEDURE — 1100F PTFALLS ASSESS-DOCD GE2>/YR: CPT | Mod: CPTII,S$GLB,,

## 2024-02-22 PROCEDURE — 3078F DIAST BP <80 MM HG: CPT | Mod: CPTII,S$GLB,,

## 2024-02-22 PROCEDURE — 3288F FALL RISK ASSESSMENT DOCD: CPT | Mod: CPTII,S$GLB,,

## 2024-02-22 PROCEDURE — 1111F DSCHRG MED/CURRENT MED MERGE: CPT | Mod: CPTII,S$GLB,,

## 2024-02-22 RX ORDER — OXYCODONE AND ACETAMINOPHEN 10; 325 MG/1; MG/1
1 TABLET ORAL 4 TIMES DAILY PRN
COMMUNITY
Start: 2024-02-20 | End: 2024-03-24

## 2024-02-22 NOTE — PATIENT INSTRUCTIONS
Darren Dickson, DO. Schedule an appointment as soon as possible for a visit in 2 week(s).    Specialty: Neurosurgery  Why: post hospital discharge follow up  Contact information:  [address] [place] [address] [address]  [facility] [address] [phone]

## 2024-02-22 NOTE — TELEPHONE ENCOUNTER
----- Message from Lauryn Fiore sent at 2/22/2024  9:37 AM CST -----  Regarding: appt  Type:  Sooner Apoointment Request      Name of Caller:pt    When is the first available appointment?dept booked     Symptoms:bad fall hosp fu       Best Call Back Number:[phone]      Additional Information: pt is looking to get schedule. please call to discuss.

## 2024-02-22 NOTE — TELEPHONE ENCOUNTER
Returned call to pt's spouse and provided CT and appt with Dr. Dickson details. She voiced understanding.

## 2024-02-22 NOTE — PROGRESS NOTES
"Subjective:       Patient ID: Hiro Rodríguez is a 78 y.o. male.    Chief Complaint: Follow-up (hospital)    Gregorio Rodríguez is a 78-year-old male patient with a past medical history of hyperlipidemia, hypertension, acute on chronic heart failure with preserved ejection fraction, chronic AFib (Eliquis on hold), type 2 diabetes, and GERD who presents to clinic today for hospital follow-up.  Patient presented to ER after taking a fall in his driveway and hitting his head.  CT head with moderate left supraorbital swelling, left frontotemporal convexity subdural hematoma, 8 mm with slight left-to-right midline shift about 2 mm.  Chest imaging with acute fracture of left lateral 6th and 7th rib with no pneumothorax.  He also had a left supraorbital laceration that was sutured.  He was admitted to the ICU and Neurosurgery was consulted.   Repeat head CT in hospital showed interval worsening and and was given FFP and Vitamin K. Repeat CTs after this showed no changes.  He was felt okay for discharge with close follow-up with Dr. Dickson with Neurosurgery.  He was to have repeat CT scan prior to that appointment.  He was also advised to follow-up with his PCP.  He does have home health with [facility].  He continues to have " minor headaches".  Patient states his worse pain is in his ribs on left side.  He does have some dizziness when bending over or getting up from a sitting position but this has not worsened since hospitalization.  He has been careful to stand slowly and to sit back down if feeling dizzy.  No additional falls.  Patient has not scheduled appointment with Dr. Dickson has he states he was unaware of this.  He did have repeat CT scan done which shows Left frontal temporal subdural hematoma shows no significant change in size compared to prior, with expected interval evolution of blood products. Persistent 4 mm rightward midline shift.           Hospital notes:  HPI:   Mr. Rodríguez is a 78-years-old " male who presented as a direct admission from [facility] for ICU level of care.  Patient reports he was in his usual state of health, was ambulating in his driveway with cane, subsequently fell, believes mechanical, landed on his left side with trauma to left forehead, chest and lower extremity.  Denies any associated loss of consciousness, states he was not able to get off the ground until first responders arrived and assisted.  Denies any preceding lightheadedness, dizziness, chest pain, shortness of breath, palpitations.  States last fall was around 8 months ago.  Patient with history of atrial fibrillation, on Eliquis, last dose taken this morning (2/9/24).  Patient currently reports shortness of breath and left sided chest pain.  Denies any nausea, vomiting, fever, chills, admits to constipation with last bowel movement 3 days ago.  At outside hospital BP up to 164/102, afebrile with T-max 98.6°.  Labs with hemoglobin 11.4, platelets 254, INR 1.1, BUN/creatinine 15/0.8.  CT head with moderate left supraorbital swelling, left frontotemporal convexity subdural hematoma, 8 mm with slight left-to-right midline shift about 2 mm.  Chest imaging with acute fracture of left lateral 6th and 7th rib with no pneumothorax.  Noted to have left supraorbital laceration which was sutured at outside hospital.  He received fentanyl, Kcentra and 1 g Keppra in the ED.  Case discussed with ED provider, states discussed with on-call neurosurgeon Dr. Goodrich, recommends conservative management with ICU admission and patient will be seen in consultation.  Plan of care discussed with patient, no visitors at bedside.  Code status addressed and currently full code.  Discussed with nursing.     * No surgery found *       Hospital Course:   Patient presented to the ED s/p fall on Eliquis and required transfer here from outside ED. CT head with moderate left supraorbital swelling, left frontotemporal convexity subdural hematoma, 8 mm with  slight left-to-right midline shift about 2 mm. Chest imaging with acute fracture of left lateral 6th and 7th rib with no pneumothorax. Noted to have left supraorbital laceration which was sutured at outside hospital. He received fentanyl, Kcentra and 1 g Keppra in the ED. Case discussed with ED provider, states discussed with on-call neurosurgeon Dr. Goodrich, recommends conservative management with ICU admission and patient will be seen in consultation. Repeat head CT with interval worsening and thus he was given more FFP and Vitamin K.  Repeat Head CTs since have been no change.  Headache persists but managed with pain medication. Weaned to room air.  Rib pain managed with pain medication.  Seen by PT.  Rollator recommended and ordered. Declined home health PT/OT but did wish for resumption of HH RN- ordered. Rx for pain medication sent to pharmacy. He will follow up with Dr. Dickson in two weeks.  I've placed an outpatient order for CT head prior to apt.  He will hold his Eliquis indefinitely until receives further instruction from Neurosurgery. Examined on day of discharge and alert, NAD, comfortable respirations on room air. Return precautions given.          CT Head Without Contrast  Order: [id number]  Status: Final result       Visible to patient: Yes (seen)       Next appt: 02/26/2024 at 09:00 AM in Urology (Ijeoma Luna MD)       Dx: SDH (subdural hematoma)    0 Result Notes  Details      Reading Physician Reading Date Result Priority  Shay Manuel MD  [phone] 2/19/2024     Narrative & Impression  CMS MANDATED QUALITY DATA - CT RADIATION  436     All CT scans at this facility utilize dose modulation, iterative reconstruction, and/or weight based dosing when appropriate to reduce radiation dose to as low as reasonably achievable.     CT HEAD WITHOUT IV CONTRAST     CLINICAL HISTORY:  78 years Male Subdural hemorrhage     COMPARISON: CT head February 11, 2024     FINDINGS: Subacute left  frontotemporal extra-axial hemorrhage/subdural hematoma is redemonstrated. There is now mixed attenuation internally, consistent with expected interval evolution of blood products. The hematoma measures 9 to 10 mm in maximum thickness which is similar to prior. No new or worsening intracranial hemorrhage. 4 mm rightward midline shift is similar to prior. Ventricles are normal in size. Mild periventricular white matter hypoattenuation compatible with microangiopathic change. Cerebellar hemispheres and brainstem are unremarkable. Atherosclerotic calcification of intracranial carotid arteries.     No calvarial lesion or fracture. Mastoid air cells are clear. Mucosal thickening in the left maxillary sinus, partially visualized.     IMPRESSION:     Left frontal temporal subdural hematoma shows no significant change in size compared to prior, with expected interval evolution of blood products. Persistent 4 mm rightward midline shift.     Electronically signed by:  Shay Manuel MD  02/19/2024 04:46 PM CST Workstation: [id number]      Review of patient's allergies indicates:  No Known Allergies  Social Determinants of Health     Tobacco Use: Low Risk  (2/22/2024)    Patient History     Smoking Tobacco Use: Never     Smokeless Tobacco Use: Never     Passive Exposure: Not on file   Alcohol Use: Not At Risk (6/6/2023)    AUDIT-C     Frequency of Alcohol Consumption: 2-4 times a month     Average Number of Drinks: 1 or 2     Frequency of Binge Drinking: Never   Financial Resource Strain: Medium Risk (6/6/2023)    Overall Financial Resource Strain (CARDIA)     Difficulty of Paying Living Expenses: Somewhat hard   Food Insecurity: No Food Insecurity (6/6/2023)    Hunger Vital Sign     Worried About Running Out of Food in the Last Year: Never true     Ran Out of Food in the Last Year: Never true   Transportation Needs: No Transportation Needs (6/6/2023)    PRAPARE - Transportation     Lack of Transportation (Medical): No      Lack of Transportation (Non-Medical): No   Physical Activity: Insufficiently Active (6/6/2023)    Exercise Vital Sign     Days of Exercise per Week: 3 days     Minutes of Exercise per Session: 30 min   Stress: No Stress Concern Present (6/6/2023)    [employer] [place] [employer] - Occupational Stress Questionnaire     Feeling of Stress : Only a little   Social Connections: Unknown (6/6/2023)    Social Connection and Isolation Panel [NHANES]     Frequency of Communication with Friends and Family: Once a week     Frequency of Social Gatherings with Friends and Family: Once a week     Attends Denominational Services: Patient declined     Active Member of Clubs or Organizations: Patient declined     Attends Club or Organization Meetings: Patient declined     Marital Status:    Housing Stability: Low Risk  (6/6/2023)    Housing Stability Vital Sign     Unable to Pay for Housing in the Last Year: No     Number of Places Lived in the Last Year: 1     Unstable Housing in the Last Year: No   Depression: Low Risk  (2/22/2024)    Depression     Last PHQ-4: Flowsheet Data: 0      Past Medical History:   Diagnosis Date    CHF (congestive heart failure)     Hypertension     Mixed hyperlipidemia     Paroxysmal atrial fibrillation 2013      Past Surgical History:   Procedure Laterality Date    CYSTOSCOPY W/ URETERAL STENT PLACEMENT N/A 03/02/2023    Procedure: CYSTOSCOPY, WITH URETERAL STENT INSERTION;  Surgeon: Yoshi Lange MD;  Location: [facility];  Service: Urology;  Laterality: N/A;    CYSTOURETEROSCOPY, WITH HOLMIUM LASER LITHOTRIPSY OF URETERAL CALCULUS AND STENT INSERTION Left 4/20/2023    Procedure: CYSTOURETEROSCOPY, WITH HOLMIUM LASER LITHOTRIPSY OF URETERAL CALCULUS AND STENT INSERTION;  Surgeon: Yoshi Lange MD;  Location: [facility];  Service: Urology;  Laterality: Left;    FRACTURE SURGERY      INTRAMEDULLARY RODDING OF TROCHANTER OF FEMUR Left 11/10/2022    Procedure: INSERTION, ITRAMEDULLARY  SANTI, FEMUR, TROCHANTER;  Surgeon: Richard Phoenix MD;  Location: [facility] OR;  Service: Orthopedics;  Laterality: Left;    LUMBAR DISCECTOMY  1980    L4-5    REMOVAL OF URETERAL CALCULUS Left 4/20/2023    Procedure: REMOVAL, CALCULUS, URETER;  Surgeon: Yoshi Lange MD;  Location: [facility] OR;  Service: Urology;  Laterality: Left;    REMOVAL, CALCULUS, BLADDER  03/02/2023    Procedure: REMOVAL, CALCULUS, BLADDER;  Surgeon: Yoshi Lange MD;  Location: [facility] OR;  Service: Urology;;    RETROGRADE PYELOGRAPHY  4/20/2023    Procedure: PYELOGRAM, RETROGRADE;  Surgeon: Yoshi Lange MD;  Location: [facility] OR;  Service: Urology;;    TOTAL KNEE ARTHROPLASTY Right 2017    TOTAL KNEE ARTHROPLASTY Left 2018      Social History     Socioeconomic History    Marital status:          Current Outpatient Medications:     acetaminophen 325 mg Cap, Take 650 mg by mouth every 6 (six) hours as needed., Disp: , Rfl:     amiodarone (PACERONE) 100 MG Tab, Take 1 tablet (100 mg total) by mouth every morning., Disp: 90 tablet, Rfl: 3    calcium polycarbophil (FIBER-CAPS, CA POLYCARBOPHIL, ORAL), Take 1 tablet by mouth 2 (two) times a day., Disp: , Rfl:     cholecalciferol, vitamin D3, (VITAMIN D3) 50 mcg (2,000 unit) Tab, Take 2 tablets by mouth once daily., Disp: , Rfl:     cyanocobalamin, vitamin B-12, 1,000 mcg Subl, Place 1,000 mcg under the tongue 2 (two) times a day., Disp: , Rfl:     docusate sodium (COLACE) 100 MG capsule, Take 100 mg by mouth 2 (two) times daily as needed for Constipation., Disp: , Rfl:     DULoxetine (CYMBALTA) 30 MG capsule, Take 1 capsule (30 mg total) by mouth 2 (two) times daily., Disp: 90 capsule, Rfl: 1    finasteride (PROSCAR) 5 mg tablet, Take 1 tablet (5 mg total) by mouth once daily., Disp: 90 tablet, Rfl: 1    furosemide (LASIX) 20 MG tablet, Take 1 tablet (20 mg total) by mouth once daily., Disp: 90 tablet, Rfl: 1    lisinopriL (PRINIVIL,ZESTRIL) 2.5 MG tablet, Take 1 tablet (2.5 mg  total) by mouth 2 (two) times daily., Disp: 180 tablet, Rfl: 3    metFORMIN (GLUCOPHAGE-XR) 500 MG ER 24hr tablet, TAKE 1 TABLET BY MOUTH EVERY DAY (Patient taking differently: Take 500 mg by mouth 2 (two) times daily with meals.), Disp: 90 tablet, Rfl: 0    metoprolol tartrate (LOPRESSOR) 25 MG tablet, Take 1 tablet (25 mg total) by mouth 2 (two) times daily., Disp: 180 tablet, Rfl: 1    omega-3 acid ethyl esters (LOVAZA) 1 gram capsule, TAKE 2 CAPSULES BY MOUTH TWICE A DAY (Patient taking differently: Take 2 g by mouth 2 (two) times daily.), Disp: 360 capsule, Rfl: 0    oxyCODONE-acetaminophen (PERCOCET)  mg per tablet, Take 1 tablet by mouth 4 (four) times daily as needed for Pain., Disp: , Rfl:     pantoprazole (PROTONIX) 40 MG tablet, Take 40 mg by mouth once daily., Disp: , Rfl:     polyethylene glycol (GLYCOLAX) 17 gram/dose powder, Take 17 g by mouth daily as needed for Constipation., Disp: , Rfl:     potassium chloride (KLOR-CON) 10 MEQ TbSR, Take 2 tablets (20 mEq total) by mouth once daily., Disp: 180 tablet, Rfl: 3    propranoloL (INDERAL) 40 MG tablet, Take 1 tablet (40 mg total) by mouth 2 (two) times daily., Disp: 180 tablet, Rfl: 1    rosuvastatin (CRESTOR) 20 MG tablet, Take 1 tablet (20 mg total) by mouth once daily., Disp: 90 tablet, Rfl: 1    semaglutide (OZEMPIC) 2 mg/dose (8 mg/3 mL) PnIj, Inject 2 mg into the skin every 7 days., Disp: 4 mL, Rfl: 1    tadalafiL (CIALIS) 20 MG Tab, Take 1 tablet (20 mg total) by mouth as needed (ed)., Disp: 30 tablet, Rfl: 1    tamsulosin (FLOMAX) 0.4 mg Cap, Take 2 capsules (0.8 mg total) by mouth once daily., Disp: 90 capsule, Rfl: 3    HYDROcodone-acetaminophen (NORCO)  mg per tablet, Take 1 tablet by mouth every 6 (six) hours as needed for Pain. (Patient not taking: Reported on 2/22/2024), Disp: 28 tablet, Rfl: 0    Lab Results   Component Value Date    WBC 9.89 02/12/2024    HGB 10.8 (L) 02/12/2024    HCT 34.1 (L) 02/12/2024      02/12/2024    CHOL 176 07/07/2022    TRIG 200 (H) 07/07/2022    HDL 36 (L) 07/07/2022    ALT 15 02/10/2024    AST 14 02/10/2024     02/12/2024    K 4.4 02/12/2024     02/12/2024    CREATININE 0.7 02/12/2024    BUN 15 02/12/2024    CO2 32 (H) 02/12/2024    TSH 2.550 03/18/2023    PSA 0.77 07/10/2023    INR 1.0 02/10/2024    HGBA1C 5.8 03/02/2023    MICROALBUR 3.2 02/13/2020       Review of Systems   Constitutional:  Negative for chills and fever.   Respiratory:  Negative for chest tightness and shortness of breath.    Cardiovascular:  Negative for chest pain and palpitations.   Neurological:  Positive for light-headedness and headaches. Negative for seizures, syncope, facial asymmetry and weakness.       Objective:      Physical Exam  Vitals reviewed.   Constitutional:       Appearance: Normal appearance.   HENT:      Head:        Right Ear: Tympanic membrane normal.      Left Ear: Tympanic membrane normal.      Nose: Nose normal.      Mouth/Throat:      Pharynx: Oropharynx is clear.   Eyes:      Conjunctiva/sclera: Conjunctivae normal.   Cardiovascular:      Rate and Rhythm: Normal rate. Rhythm irregular.      Pulses: Normal pulses.      Heart sounds: Normal heart sounds.   Pulmonary:      Effort: Pulmonary effort is normal.      Breath sounds: Normal breath sounds.   Musculoskeletal:         General: Normal range of motion.   Skin:     General: Skin is warm and dry.      Capillary Refill: Capillary refill takes less than 2 seconds.   Neurological:      Mental Status: He is alert and oriented to person, place, and time.      GCS: GCS eye subscore is 4. GCS verbal subscore is 5. GCS motor subscore is 6.      Cranial Nerves: Cranial nerves 2-12 are intact.      Motor: Motor function is intact.      Coordination: Coordination is intact.      Gait: Gait is intact.   Psychiatric:         Mood and Affect: Mood normal.         Behavior: Behavior normal.         Thought Content: Thought content normal.          Judgment: Judgment normal.       Assessment:       1. Subdural hematoma due to concussion, without loss of consciousness, sequela    2. Closed fracture of multiple ribs of right side with routine healing, subsequent encounter    3. Fall in home, subsequent encounter    4. Type 2 diabetes mellitus with diabetic polyneuropathy, without long-term current use of insulin    5. Heart failure with preserved ejection fraction, unspecified HF chronicity    6. Hyperlipidemia, unspecified hyperlipidemia type    7. Acute traumatic left frontal subdural hematoma    8. Traumatic subdural hemorrhage without open intracranial wound and without loss of consciousness, subsequent encounter    9. Hypertension, essential    10. Chronic atrial fibrillation    11. Orthostatic hypotension    12. Type 2 diabetes mellitus with diabetic nephropathy, without long-term current use of insulin    13. Closed fracture of multiple ribs of left side with routine healing, subsequent encounter    14. Laceration of forehead, subsequent encounter        Plan:       Hiro was seen today for follow-up.    Diagnoses and all orders for this visit:    Subdural hematoma due to concussion, without loss of consciousness, sequela    Closed fracture of multiple ribs of right side with routine healing, subsequent encounter  -     X-Ray Ribs 2 View Left; Future    Fall in home, subsequent encounter    Type 2 diabetes mellitus with diabetic polyneuropathy, without long-term current use of insulin    Heart failure with preserved ejection fraction, unspecified HF chronicity    Hyperlipidemia, unspecified hyperlipidemia type    Acute traumatic left frontal subdural hematoma    Traumatic subdural hemorrhage without open intracranial wound and without loss of consciousness, subsequent encounter    Hypertension, essential    Chronic atrial fibrillation    Orthostatic hypotension    Type 2 diabetes mellitus with diabetic nephropathy, without long-term current use of  insulin    Closed fracture of multiple ribs of left side with routine healing, subsequent encounter    Laceration of forehead, subsequent encounter    Laceration above left eyebrow with stitches.  Home health nurse will remove these.  Assisted patient with scheduling follow-up to see Dr. Darren Dickson with Neurosurgery on February 26th.  Continue to hold Eliquis.  Continue home health  Repeat x-rays of left ribs next week  Follow-up in 1 month.

## 2024-02-26 ENCOUNTER — HOSPITAL ENCOUNTER (OUTPATIENT)
Dept: RADIOLOGY | Facility: HOSPITAL | Age: 79
Discharge: HOME | End: 2024-02-26
Attending: NEUROLOGICAL SURGERY
Payer: MEDICARE

## 2024-02-26 ENCOUNTER — TELEPHONE (OUTPATIENT)
Dept: NEUROLOGY | Facility: CLINIC | Age: 79
End: 2024-02-26

## 2024-02-26 ENCOUNTER — OFFICE VISIT (OUTPATIENT)
Dept: NEUROSURGERY | Facility: CLINIC | Age: 79
End: 2024-02-26
Payer: MEDICARE

## 2024-02-26 VITALS
SYSTOLIC BLOOD PRESSURE: 126 MMHG | HEART RATE: 67 BPM | DIASTOLIC BLOOD PRESSURE: 71 MMHG | BODY MASS INDEX: 34.5 KG/M2 | HEIGHT: 70 IN | WEIGHT: 241 LBS

## 2024-02-26 DIAGNOSIS — I62.00 NONTRAUMATIC SUBDURAL HEMORRHAGE, UNSPECIFIED: ICD-10-CM

## 2024-02-26 DIAGNOSIS — R26.81 GAIT INSTABILITY: Primary | ICD-10-CM

## 2024-02-26 PROCEDURE — 99214 OFFICE O/P EST MOD 30 MIN: CPT | Mod: S$GLB,,, | Performed by: NEUROLOGICAL SURGERY

## 2024-02-26 PROCEDURE — 70450 CT HEAD/BRAIN W/O DYE: CPT | Mod: TC

## 2024-02-26 PROCEDURE — 3288F FALL RISK ASSESSMENT DOCD: CPT | Mod: CPTII,S$GLB,, | Performed by: NEUROLOGICAL SURGERY

## 2024-02-26 PROCEDURE — 1111F DSCHRG MED/CURRENT MED MERGE: CPT | Mod: CPTII,S$GLB,, | Performed by: NEUROLOGICAL SURGERY

## 2024-02-26 PROCEDURE — 1125F AMNT PAIN NOTED PAIN PRSNT: CPT | Mod: CPTII,S$GLB,, | Performed by: NEUROLOGICAL SURGERY

## 2024-02-26 PROCEDURE — 1159F MED LIST DOCD IN RCRD: CPT | Mod: CPTII,S$GLB,, | Performed by: NEUROLOGICAL SURGERY

## 2024-02-26 PROCEDURE — 1101F PT FALLS ASSESS-DOCD LE1/YR: CPT | Mod: CPTII,S$GLB,, | Performed by: NEUROLOGICAL SURGERY

## 2024-02-26 PROCEDURE — 3078F DIAST BP <80 MM HG: CPT | Mod: CPTII,S$GLB,, | Performed by: NEUROLOGICAL SURGERY

## 2024-02-26 PROCEDURE — 3074F SYST BP LT 130 MM HG: CPT | Mod: CPTII,S$GLB,, | Performed by: NEUROLOGICAL SURGERY

## 2024-02-26 PROCEDURE — 99999 PR PBB SHADOW E&M-EST. PATIENT-LVL V: CPT | Mod: PBBFAC,,, | Performed by: NEUROLOGICAL SURGERY

## 2024-02-26 PROCEDURE — 70450 CT HEAD/BRAIN W/O DYE: CPT | Mod: 26,,, | Performed by: RADIOLOGY

## 2024-02-26 NOTE — PROGRESS NOTES
Neurosurgery  History & Physical    SUBJECTIVE:     Chief Complaint: Subdural hematoma    History of Present Illness:  Hiro Rodríguez is a 78 y.o. male with PMHx of essential tremor, hearing loss, HTN, HLD, atrial fibrillation, BPH, and Type 2 diabetes. On 2/9/2024, patient fell in his driveway which prompted ED visit. During ED encounter, CT head revealed frontotemporal subdural hematoma. Pt was subsequently admitted and anticoagulation was reversed.  Serial head CTs were stable and patient was discharged on 02/12/2024.  Today, patient reports mild left frontal headache.  He denies visual disturbances, dizziness or new extremity weakness.  Patient reports he has been off of his Eliquis since the fall.  This is patient's 2nd fall in the last 12 months.  His previous fall resulted in a subdural hematoma which were managed with serial head CT surveillance.  He is concerned regarding the cause of his falls.  Denies cervical pain, upper extremity radiculopathy, or hand weakness/numbness.    AP/AC: Eliquis  Smoking status-: Non-smoker      Review of patient's allergies indicates:  No Known Allergies    Current Outpatient Medications   Medication Sig Dispense Refill    acetaminophen 325 mg Cap Take 650 mg by mouth every 6 (six) hours as needed.      amiodarone (PACERONE) 100 MG Tab Take 1 tablet (100 mg total) by mouth every morning. 90 tablet 3    calcium polycarbophil (FIBER-CAPS, CA POLYCARBOPHIL, ORAL) Take 1 tablet by mouth 2 (two) times a day.      cholecalciferol, vitamin D3, (VITAMIN D3) 50 mcg (2,000 unit) Tab Take 2 tablets by mouth once daily.      cyanocobalamin, vitamin B-12, 1,000 mcg Subl Place 1,000 mcg under the tongue 2 (two) times a day.      docusate sodium (COLACE) 100 MG capsule Take 100 mg by mouth 2 (two) times daily as needed for Constipation.      DULoxetine (CYMBALTA) 30 MG capsule Take 1 capsule (30 mg total) by mouth 2 (two) times daily. 90 capsule 1    finasteride (PROSCAR) 5 mg tablet  Take 1 tablet (5 mg total) by mouth once daily. 90 tablet 1    furosemide (LASIX) 20 MG tablet Take 1 tablet (20 mg total) by mouth once daily. 90 tablet 1    HYDROcodone-acetaminophen (NORCO)  mg per tablet Take 1 tablet by mouth every 6 (six) hours as needed for Pain. 28 tablet 0    lisinopriL (PRINIVIL,ZESTRIL) 2.5 MG tablet Take 1 tablet (2.5 mg total) by mouth 2 (two) times daily. 180 tablet 3    metFORMIN (GLUCOPHAGE-XR) 500 MG ER 24hr tablet TAKE 1 TABLET BY MOUTH EVERY DAY (Patient taking differently: Take 500 mg by mouth 2 (two) times daily with meals.) 90 tablet 0    metoprolol tartrate (LOPRESSOR) 25 MG tablet Take 1 tablet (25 mg total) by mouth 2 (two) times daily. 180 tablet 1    omega-3 acid ethyl esters (LOVAZA) 1 gram capsule TAKE 2 CAPSULES BY MOUTH TWICE A DAY (Patient taking differently: Take 2 g by mouth 2 (two) times daily.) 360 capsule 0    oxyCODONE-acetaminophen (PERCOCET)  mg per tablet Take 1 tablet by mouth 4 (four) times daily as needed for Pain.      pantoprazole (PROTONIX) 40 MG tablet Take 40 mg by mouth once daily.      polyethylene glycol (GLYCOLAX) 17 gram/dose powder Take 17 g by mouth daily as needed for Constipation.      potassium chloride (KLOR-CON) 10 MEQ TbSR Take 2 tablets (20 mEq total) by mouth once daily. 180 tablet 3    propranoloL (INDERAL) 40 MG tablet Take 1 tablet (40 mg total) by mouth 2 (two) times daily. 180 tablet 1    rosuvastatin (CRESTOR) 20 MG tablet Take 1 tablet (20 mg total) by mouth once daily. 90 tablet 1    semaglutide (OZEMPIC) 2 mg/dose (8 mg/3 mL) PnIj Inject 2 mg into the skin every 7 days. 4 mL 1    tadalafiL (CIALIS) 20 MG Tab Take 1 tablet (20 mg total) by mouth as needed (ed). 30 tablet 1    tamsulosin (FLOMAX) 0.4 mg Cap Take 2 capsules (0.8 mg total) by mouth once daily. 90 capsule 3     No current facility-administered medications for this visit.       Family History       Problem Relation (Age of Onset)    Diabetes Mother     Heart disease Father          Social History     Socioeconomic History    Marital status:    Occupational History    Occupation: RETIRED   Tobacco Use    Smoking status: Never    Smokeless tobacco: Never   Substance and Sexual Activity    Alcohol use: Yes     Alcohol/week: 1.0 standard drink of alcohol     Types: 1 Shots of liquor per week     Comment: rarely    Drug use: Not Currently    Sexual activity: Yes     Partners: Female     Social Determinants of Health     Financial Resource Strain: Medium Risk (6/6/2023)    Overall Financial Resource Strain (CARDIA)     Difficulty of Paying Living Expenses: Somewhat hard   Food Insecurity: No Food Insecurity (6/6/2023)    Hunger Vital Sign     Worried About Running Out of Food in the Last Year: Never true     Ran Out of Food in the Last Year: Never true   Transportation Needs: No Transportation Needs (6/6/2023)    PRAPARE - Transportation     Lack of Transportation (Medical): No     Lack of Transportation (Non-Medical): No   Physical Activity: Insufficiently Active (6/6/2023)    Exercise Vital Sign     Days of Exercise per Week: 3 days     Minutes of Exercise per Session: 30 min   Stress: No Stress Concern Present (6/6/2023)    [employer] [place] [employer] - Occupational Stress Questionnaire     Feeling of Stress : Only a little   Social Connections: Unknown (6/6/2023)    Social Connection and Isolation Panel [NHANES]     Frequency of Communication with Friends and Family: Once a week     Frequency of Social Gatherings with Friends and Family: Once a week     Attends Jain Services: Patient declined     Active Member of Clubs or Organizations: Patient declined     Attends Club or Organization Meetings: Patient declined     Marital Status:    Housing Stability: Low Risk  (6/6/2023)    Housing Stability Vital Sign     Unable to Pay for Housing in the Last Year: No     Number of Places Lived in the Last Year: 1     Unstable Housing in the Last  "Year: No       OBJECTIVE:     Vital Signs  Pulse: 67  BP: 126/71  Pain Score:   6  Height: 5' 10" (177.8 cm)  Weight: 109.3 kg (241 lb)  Body mass index is 34.58 kg/m².      Neurosurgery Physical Exam  General: well developed, well nourished, no distress.   Head: normocephalic, atraumatic  Neck: No tracheal deviation.   Neurologic: Alert and oriented. Thought content appropriate.  GCS: E4 V5 M6; Total: 15  Mental Status: Awake, Alert, Oriented to self, month, year and [place] president.  Language: No aphasia  Speech: No dysarthria  Cranial nerves: face symmetric, tongue midline, CN II-XII grossly intact.   Eyes: pupils equal, round, reactive to light, EOMI.   Pulmonary: normal respirations, no signs of respiratory distress  Skin: Skin is warm, dry and intact.    Finger-to-nose: intact bilaterally   Pronator drift: absent bilaterally    Motor Strength: Moves all extremities spontaneously with good tone.  Bilateral hand tremor noted on exam     Gomez's negative bilaterally  Right upper extremity/right lower extremity reflexes 3+, all other reflexes 2+     Diagnostic Imaging:  Dr. Dickson and I have reviewed CT head images obtained on today with patient and wife which demonstrates stable frontal temporal subacute subdural hematoma.    ASSESSMENT/PLAN:     1. Gait instability    2. Nontraumatic subdural hemorrhage, unspecified        Patient was seen by myself and Dr. Dickson.    Neurologically intact.    At this time, patient should continue to hold Eliquis.  He will follow-up in 2 weeks with a head CT.  Recommend MMA embolization with Dr. Beasley at [facility].    Also, we will obtain a cervical MRI to assess for myelopathy which may be a contributing factor to his fall.    Patient will follow-up in 2 weeks with Dr. Dickson after head CT and cervical MRI.          Gait instability  -     MRI Cervical Spine Without Contrast; Future; Expected date: 02/26/2024    Nontraumatic subdural hemorrhage, unspecified  -     CT Head Without " Contrast; Future; Expected date: 02/26/2024  -     Ambulatory referral/consult to Neurology; Future; Expected date: 03/04/2024      I spent a total of 32 minutes of non-face and face to face time preparing to see the patient (eg, review of tests), obtain and/or review separately obtained history, document clinical information in the electronic or other health record, interpret results and communicate results to the patient/family/caregiver, or care coordinator.      Malathi Amos PA-C  Neurosurgery  [facility]

## 2024-02-26 NOTE — TELEPHONE ENCOUNTER
Pt has a referral for Nontraumatic subdural hemorrhage.  Pt has headaches.  Please call to schedule.

## 2024-03-04 ENCOUNTER — PATIENT MESSAGE (OUTPATIENT)
Dept: UROLOGY | Facility: CLINIC | Age: 79
End: 2024-03-04
Payer: MEDICARE

## 2024-03-04 RX ORDER — TAMSULOSIN HYDROCHLORIDE 0.4 MG/1
0.8 CAPSULE ORAL DAILY
Qty: 90 CAPSULE | Refills: 3
Start: 2024-03-04 | End: 2025-03-04

## 2024-03-07 RX ORDER — PROPRANOLOL HYDROCHLORIDE 40 MG/1
40 TABLET ORAL 2 TIMES DAILY
Qty: 180 TABLET | Refills: 1 | Status: SHIPPED | OUTPATIENT
Start: 2024-03-07

## 2024-03-07 NOTE — TELEPHONE ENCOUNTER
Care Due:                  Date            Visit Type   Department     Provider  --------------------------------------------------------------------------------                                [facility]  [facility] Visit: 09-      CARE ([facility])   [facility]  [place]ina                              EP -                              PRIMARY      [facility]  Next Visit: 03-      CARE ([facility])   [facility]  Kina                                                            Last  Test          Frequency    Reason                     Performed    Due Date  --------------------------------------------------------------------------------    HBA1C.......  6 months...  metFORMIN, semaglutide...  03- 08-    Lipid Panel.  12 months..  omega-3, rosuvastatin....  07- 07-    [facility] Embedded Care Due Messages. Reference number: [id number].   3/07/2024 12:17:24 AM CST

## 2024-03-11 ENCOUNTER — HOSPITAL ENCOUNTER (OUTPATIENT)
Dept: RADIOLOGY | Facility: HOSPITAL | Age: 79
Discharge: HOME | End: 2024-03-11
Attending: NEUROLOGICAL SURGERY
Payer: MEDICARE

## 2024-03-11 ENCOUNTER — OFFICE VISIT (OUTPATIENT)
Dept: NEUROSURGERY | Facility: CLINIC | Age: 79
End: 2024-03-11
Payer: MEDICARE

## 2024-03-11 VITALS — DIASTOLIC BLOOD PRESSURE: 71 MMHG | HEART RATE: 77 BPM | SYSTOLIC BLOOD PRESSURE: 122 MMHG

## 2024-03-11 DIAGNOSIS — S06.5X9D SUBDURAL HEMATOMA DUE TO CONCUSSION, WITH LOSS OF CONSCIOUSNESS, SUBSEQUENT ENCOUNTER: Primary | ICD-10-CM

## 2024-03-11 DIAGNOSIS — I62.00 NONTRAUMATIC SUBDURAL HEMORRHAGE, UNSPECIFIED: ICD-10-CM

## 2024-03-11 PROCEDURE — 1101F PT FALLS ASSESS-DOCD LE1/YR: CPT | Mod: CPTII,S$GLB,, | Performed by: NEUROLOGICAL SURGERY

## 2024-03-11 PROCEDURE — 3288F FALL RISK ASSESSMENT DOCD: CPT | Mod: CPTII,S$GLB,, | Performed by: NEUROLOGICAL SURGERY

## 2024-03-11 PROCEDURE — 3078F DIAST BP <80 MM HG: CPT | Mod: CPTII,S$GLB,, | Performed by: NEUROLOGICAL SURGERY

## 2024-03-11 PROCEDURE — 3074F SYST BP LT 130 MM HG: CPT | Mod: CPTII,S$GLB,, | Performed by: NEUROLOGICAL SURGERY

## 2024-03-11 PROCEDURE — 70450 CT HEAD/BRAIN W/O DYE: CPT | Mod: TC

## 2024-03-11 PROCEDURE — 99213 OFFICE O/P EST LOW 20 MIN: CPT | Mod: S$GLB,,, | Performed by: NEUROLOGICAL SURGERY

## 2024-03-11 PROCEDURE — 70450 CT HEAD/BRAIN W/O DYE: CPT | Mod: 26,,, | Performed by: RADIOLOGY

## 2024-03-11 PROCEDURE — 1126F AMNT PAIN NOTED NONE PRSNT: CPT | Mod: CPTII,S$GLB,, | Performed by: NEUROLOGICAL SURGERY

## 2024-03-11 PROCEDURE — 1159F MED LIST DOCD IN RCRD: CPT | Mod: CPTII,S$GLB,, | Performed by: NEUROLOGICAL SURGERY

## 2024-03-11 NOTE — PROGRESS NOTES
History of Present Illness 02/26/2024:  Hiro Rodríguez is a 78 y.o. male with PMHx of essential tremor, hearing loss, HTN, HLD, atrial fibrillation, BPH, and Type 2 diabetes. On 2/9/2024, patient fell in his driveway which prompted ED visit. During ED encounter, CT head revealed frontotemporal subdural hematoma. Pt was subsequently admitted and anticoagulation was reversed.  Serial head CTs were stable and patient was discharged on 02/12/2024.  Today, patient reports mild left frontal headache.  He denies visual disturbances, dizziness or new extremity weakness.  Patient reports he has been off of his Eliquis since the fall.  This is patient's 2nd fall in the last 12 months.  His previous fall resulted in a subdural hematoma which were managed with serial head CT surveillance.  He is concerned regarding the cause of his falls.  Denies cervical pain, upper extremity radiculopathy, or hand weakness/numbness.     AP/AC: Eliquis    Interval history 03/11/2024: Follows up with surveillance head CT for review.  He reports he is doing.  He denies headache visual disturbance weakness paresthesias or recurrent falls.  An MRI of the cervical spine is pending; ordered for falls without clear etiology.  Left middle meningeal artery embolization pending.  He remains off Eliquis    Head CT obtained today was reviewed.  Interval near resolution of the left frontal extra-axial fluid collection.  No mass.      Exam:  No distress  Awake, alert, oriented to person place and time.    Cranial nerves 2-12 grossly intact.    Strength is graded 5/5 in all muscle groups.    Sensation is grossly intact throughout.    Gait stable with rolling walker    Analysis:  He is doing well with near resolution of the traumatic subdural hematoma. He may resume Eliquis; patient advised to resume today.  He prefers to proceed with the MMA embolization on the basis of mitigating risk for recurrent ICH given his history of multiple falls; referral in  place with Dr. Beasley.  I advised patient and wife to proceed with MRI cervical.  He will follow up with the imaging for review.  Repeat head CT on clinical basis.    Diagnosis:  Traumatic subdural hematoma,  multiple falls  Atrial fibrillation on long-term anticoagulation    I spent a total of 20 minutes on the day of the visit.  This includes face to face time and non-face to face time preparing to see the patient (eg, review of tests), obtaining and/or reviewing separately obtained history, documenting clinical information in the electronic or other health record, independently interpreting results and communicating results to the patient/family/caregiver, or care coordinator.

## 2024-03-21 RX ORDER — ROSUVASTATIN CALCIUM 20 MG/1
20 TABLET, COATED ORAL
Qty: 30 TABLET | Refills: 0 | Status: SHIPPED | OUTPATIENT
Start: 2024-03-21 | End: 2024-04-14

## 2024-03-21 NOTE — TELEPHONE ENCOUNTER
No care due was identified.  [facility] Embedded Care Due Messages. Reference number: [id number].   3/21/2024 1:54:17 AM CDT

## 2024-03-22 ENCOUNTER — OFFICE VISIT (OUTPATIENT)
Dept: FAMILY MEDICINE | Facility: CLINIC | Age: 79
End: 2024-03-22
Payer: MEDICARE

## 2024-03-22 VITALS
HEART RATE: 80 BPM | TEMPERATURE: 99 F | BODY MASS INDEX: 36.35 KG/M2 | OXYGEN SATURATION: 99 % | SYSTOLIC BLOOD PRESSURE: 120 MMHG | WEIGHT: 253.88 LBS | DIASTOLIC BLOOD PRESSURE: 60 MMHG | HEIGHT: 70 IN

## 2024-03-22 DIAGNOSIS — S06.5X0D TRAUMATIC SUBDURAL HEMORRHAGE WITHOUT OPEN INTRACRANIAL WOUND AND WITHOUT LOSS OF CONSCIOUSNESS, SUBSEQUENT ENCOUNTER: Primary | ICD-10-CM

## 2024-03-22 DIAGNOSIS — E11.29 TYPE 2 DIABETES MELLITUS WITH MICROALBUMINURIA, WITHOUT LONG-TERM CURRENT USE OF INSULIN: ICD-10-CM

## 2024-03-22 DIAGNOSIS — Z79.899 LONG TERM CURRENT USE OF AMIODARONE: ICD-10-CM

## 2024-03-22 DIAGNOSIS — Z99.89 USES ROLLER WALKER: ICD-10-CM

## 2024-03-22 DIAGNOSIS — N40.0 BENIGN PROSTATIC HYPERPLASIA WITHOUT LOWER URINARY TRACT SYMPTOMS: Chronic | ICD-10-CM

## 2024-03-22 DIAGNOSIS — Z79.01 ANTICOAGULATED: ICD-10-CM

## 2024-03-22 DIAGNOSIS — R80.9 TYPE 2 DIABETES MELLITUS WITH MICROALBUMINURIA, WITHOUT LONG-TERM CURRENT USE OF INSULIN: ICD-10-CM

## 2024-03-22 DIAGNOSIS — E53.8 VITAMIN B12 DEFICIENCY: ICD-10-CM

## 2024-03-22 DIAGNOSIS — I48.20 CHRONIC ATRIAL FIBRILLATION: ICD-10-CM
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
## 2024-03-22 DIAGNOSIS — G25.0 BENIGN ESSENTIAL TREMOR: ICD-10-CM

## 2024-03-22 DIAGNOSIS — S06.5X0S: ICD-10-CM
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
## 2024-03-22 DIAGNOSIS — I50.30 HEART FAILURE WITH PRESERVED EJECTION FRACTION, UNSPECIFIED HF CHRONICITY: ICD-10-CM

## 2024-03-22 PROCEDURE — 3074F SYST BP LT 130 MM HG: CPT | Mod: CPTII,S$GLB,, | Performed by: FAMILY MEDICINE

## 2024-03-22 PROCEDURE — 3078F DIAST BP <80 MM HG: CPT | Mod: CPTII,S$GLB,, | Performed by: FAMILY MEDICINE

## 2024-03-22 PROCEDURE — 1160F RVW MEDS BY RX/DR IN RCRD: CPT | Mod: CPTII,S$GLB,, | Performed by: FAMILY MEDICINE

## 2024-03-22 PROCEDURE — 1100F PTFALLS ASSESS-DOCD GE2>/YR: CPT | Mod: CPTII,S$GLB,, | Performed by: FAMILY MEDICINE

## 2024-03-22 PROCEDURE — 1125F AMNT PAIN NOTED PAIN PRSNT: CPT | Mod: CPTII,S$GLB,, | Performed by: FAMILY MEDICINE

## 2024-03-22 PROCEDURE — G2211 COMPLEX E/M VISIT ADD ON: HCPCS | Mod: S$GLB,,, | Performed by: FAMILY MEDICINE

## 2024-03-22 PROCEDURE — 99214 OFFICE O/P EST MOD 30 MIN: CPT | Mod: S$GLB,,, | Performed by: FAMILY MEDICINE

## 2024-03-22 PROCEDURE — 3288F FALL RISK ASSESSMENT DOCD: CPT | Mod: CPTII,S$GLB,, | Performed by: FAMILY MEDICINE

## 2024-03-22 PROCEDURE — 99999 PR PBB SHADOW E&M-EST. PATIENT-LVL V: CPT | Mod: PBBFAC,,, | Performed by: FAMILY MEDICINE

## 2024-03-22 PROCEDURE — 1159F MED LIST DOCD IN RCRD: CPT | Mod: CPTII,S$GLB,, | Performed by: FAMILY MEDICINE

## 2024-03-22 RX ORDER — SEMAGLUTIDE 2.68 MG/ML
2 INJECTION, SOLUTION SUBCUTANEOUS
Qty: 3 ML | Refills: 1 | Status: SHIPPED | OUTPATIENT
Start: 2024-03-22

## 2024-03-23 ENCOUNTER — PATIENT MESSAGE (OUTPATIENT)
Dept: ADMINISTRATIVE | Facility: HOSPITAL | Age: 79
End: 2024-03-23
Payer: MEDICARE

## 2024-03-23 NOTE — TELEPHONE ENCOUNTER
No care due was identified.  [facility] Embedded Care Due Messages. Reference number: [id number].   3/23/2024 12:29:28 AM CDT

## 2024-03-24 PROBLEM — R80.9 TYPE 2 DIABETES MELLITUS WITH MICROALBUMINURIA, WITHOUT LONG-TERM CURRENT USE OF INSULIN: Status: ACTIVE | Noted: 2024-03-24

## 2024-03-24 PROBLEM — E11.29 TYPE 2 DIABETES MELLITUS WITH MICROALBUMINURIA, WITHOUT LONG-TERM CURRENT USE OF INSULIN: Status: ACTIVE | Noted: 2024-03-24

## 2024-03-24 RX ORDER — OMEGA-3-ACID ETHYL ESTERS 1 G/1
CAPSULE, LIQUID FILLED ORAL
Qty: 360 CAPSULE | Refills: 0 | Status: SHIPPED | OUTPATIENT
Start: 2024-03-24

## 2024-03-25 ENCOUNTER — LAB VISIT (OUTPATIENT)
Dept: LAB | Facility: HOSPITAL | Age: 79
End: 2024-03-25
Attending: FAMILY MEDICINE
Payer: MEDICARE

## 2024-03-25 DIAGNOSIS — Z79.01 ANTICOAGULATED: ICD-10-CM

## 2024-03-25 DIAGNOSIS — G25.0 BENIGN ESSENTIAL TREMOR: ICD-10-CM

## 2024-03-25 DIAGNOSIS — E11.29 TYPE 2 DIABETES MELLITUS WITH MICROALBUMINURIA, WITHOUT LONG-TERM CURRENT USE OF INSULIN: ICD-10-CM

## 2024-03-25 DIAGNOSIS — R80.9 TYPE 2 DIABETES MELLITUS WITH MICROALBUMINURIA, WITHOUT LONG-TERM CURRENT USE OF INSULIN: ICD-10-CM

## 2024-03-25 DIAGNOSIS — E53.8 VITAMIN B12 DEFICIENCY: ICD-10-CM

## 2024-03-25 LAB
ANION GAP SERPL CALC-SCNC: 11 MMOL/L (ref 8–16)
BASOPHILS # BLD AUTO: 0.03 K/UL (ref 0–0.2)
BASOPHILS NFR BLD: 0.3 % (ref 0–1.9)
BUN SERPL-MCNC: 19 MG/DL (ref 8–23)
CALCIUM SERPL-MCNC: 9.1 MG/DL (ref 8.7–10.5)
CHLORIDE SERPL-SCNC: 104 MMOL/L (ref 95–110)
CHOLEST SERPL-MCNC: 149 MG/DL (ref 120–199)
CHOLEST/HDLC SERPL: 3 {RATIO} (ref 2–5)
CO2 SERPL-SCNC: 26 MMOL/L (ref 23–29)
CREAT SERPL-MCNC: 0.8 MG/DL (ref 0.5–1.4)
DIFFERENTIAL METHOD BLD: ABNORMAL
EOSINOPHIL # BLD AUTO: 0.1 K/UL (ref 0–0.5)
EOSINOPHIL NFR BLD: 1.1 % (ref 0–8)
ERYTHROCYTE [DISTWIDTH] IN BLOOD BY AUTOMATED COUNT: 14.3 % (ref 11.5–14.5)
EST. GFR  (NO RACE VARIABLE): >60 ML/MIN/1.73 M^2
GLUCOSE SERPL-MCNC: 151 MG/DL (ref 70–110)
HCT VFR BLD AUTO: 38.8 % (ref 40–54)
HDLC SERPL-MCNC: 50 MG/DL (ref 40–75)
HDLC SERPL: 33.6 % (ref 20–50)
HGB BLD-MCNC: 12.2 G/DL (ref 14–18)
IMM GRANULOCYTES # BLD AUTO: 0.03 K/UL (ref 0–0.04)
IMM GRANULOCYTES NFR BLD AUTO: 0.3 % (ref 0–0.5)
LDLC SERPL CALC-MCNC: 74.4 MG/DL (ref 63–159)
LYMPHOCYTES # BLD AUTO: 1.8 K/UL (ref 1–4.8)
LYMPHOCYTES NFR BLD: 16.4 % (ref 18–48)
MCH RBC QN AUTO: 29.5 PG (ref 27–31)
MCHC RBC AUTO-ENTMCNC: 31.4 G/DL (ref 32–36)
MCV RBC AUTO: 94 FL (ref 82–98)
MONOCYTES # BLD AUTO: 0.8 K/UL (ref 0.3–1)
MONOCYTES NFR BLD: 7.1 % (ref 4–15)
NEUTROPHILS # BLD AUTO: 8.4 K/UL (ref 1.8–7.7)
NEUTROPHILS NFR BLD: 74.8 % (ref 38–73)
NONHDLC SERPL-MCNC: 99 MG/DL
NRBC BLD-RTO: 0 /100 WBC
PLATELET # BLD AUTO: 290 K/UL (ref 150–450)
PMV BLD AUTO: 9.5 FL (ref 9.2–12.9)
POTASSIUM SERPL-SCNC: 5 MMOL/L (ref 3.5–5.1)
RBC # BLD AUTO: 4.14 M/UL (ref 4.6–6.2)
SODIUM SERPL-SCNC: 141 MMOL/L (ref 136–145)
TRIGL SERPL-MCNC: 123 MG/DL (ref 30–150)
TSH SERPL DL<=0.005 MIU/L-ACNC: 2.45 UIU/ML (ref 0.34–5.6)
VIT B12 SERPL-MCNC: >1500 PG/ML (ref 210–950)
WBC # BLD AUTO: 11.15 K/UL (ref 3.9–12.7)

## 2024-03-25 PROCEDURE — 83036 HEMOGLOBIN GLYCOSYLATED A1C: CPT | Performed by: FAMILY MEDICINE

## 2024-03-25 PROCEDURE — 82607 VITAMIN B-12: CPT | Performed by: FAMILY MEDICINE

## 2024-03-25 PROCEDURE — 84443 ASSAY THYROID STIM HORMONE: CPT | Performed by: FAMILY MEDICINE

## 2024-03-25 PROCEDURE — 36415 COLL VENOUS BLD VENIPUNCTURE: CPT | Performed by: FAMILY MEDICINE

## 2024-03-25 PROCEDURE — 80061 LIPID PANEL: CPT | Performed by: FAMILY MEDICINE

## 2024-03-25 PROCEDURE — 85025 COMPLETE CBC W/AUTO DIFF WBC: CPT | Performed by: FAMILY MEDICINE

## 2024-03-25 PROCEDURE — 80048 BASIC METABOLIC PNL TOTAL CA: CPT | Performed by: FAMILY MEDICINE

## 2024-03-25 NOTE — TELEPHONE ENCOUNTER
Refill Routing Note   Medication(s) are not appropriate for processing by [facility] for the following reason(s):        Required labs outdated    [facility] action(s):  Defer             Appointments  past 12m or future 3m with PCP    Date Provider   Last Visit   3/22/2024 Gautam Castanon III, MD   Next Visit   6/24/2024 Gautam Castanon III, MD   ED visits in past 90 days: 0        Note composed:8:27 PM 03/24/2024

## 2024-03-25 NOTE — PROGRESS NOTES
Subjective:       Patient ID: Hiro Rodríguez is a 79 y.o. male.    Chief Complaint: Follow-up (1m)    Fall in February.  Fractured right ribs and had a cerebral hematoma.  Subdural.  Seen by Dr. Dickson.  Was in his driveway lost balance fell and hit head.  Use cane then.  Now using walker.  AFib propanolol for tremor also and on metoprolol once again.  Was only supposed to be on the propanolol.  He is anticoagulated.  Up until the fall is been off of that now was on Eliquis.  He has just been restarted on it by Neurosurgery.  Heart failure preserved ejection fraction.  No PND orthopnea.  Diabetes mellitus type 2 with positive microalbumin.  BPH on 2 Flomax HS.  He is on amiodarone.  Does have the benign tremor.      Physical examination.  Vital signs noted.  Alert oriented.  Neck is supple.  Chest clear.  Heart regular rate and rhythm.  Romberg negative.  Deep tender reflexes are 1+ throughout.  Gait is slow somewhat unsteady.        Objective:        Assessment:       1. Traumatic subdural hemorrhage without open intracranial wound and without loss of consciousness, subsequent encounter    2. Type 2 diabetes mellitus with microalbuminuria, without long-term current use of insulin    3. Fall in home, subsequent encounter    4. Subdural hematoma due to concussion, without loss of consciousness, sequela    5. Uses roller walker    6. Heart failure with preserved ejection fraction, unspecified HF chronicity    7. Chronic atrial fibrillation    8. Anticoagulated    9. Benign prostatic hyperplasia without lower urinary tract symptoms    10. Long term current use of amiodarone    11. Benign essential tremor    12. BMI 36.0-36.9,adult    13. Vitamin B12 deficiency        Plan:       Traumatic subdural hemorrhage without open intracranial wound and without loss of consciousness, subsequent encounter    Type 2 diabetes mellitus with microalbuminuria, without long-term current use of insulin  -     semaglutide (OZEMPIC) 2  mg/dose (8 mg/3 mL) PnIj; Inject 2 mg into the skin every 7 days.  Dispense: 3 mL; Refill: 1  -     Basic Metabolic Panel; Future; Expected date: 03/22/2024  -     Lipid Panel; Future; Expected date: 03/22/2024  -     Hemoglobin A1C; Future; Expected date: 03/22/2024    Fall in home, subsequent encounter    Subdural hematoma due to concussion, without loss of consciousness, sequela    Uses roller walker    Heart failure with preserved ejection fraction, unspecified HF chronicity    Chronic atrial fibrillation    Anticoagulated  -     CBC Auto Differential; Future; Expected date: 03/22/2024    Benign prostatic hyperplasia without lower urinary tract symptoms    Long term current use of amiodarone    Benign essential tremor  -     TSH; Future; Expected date: 03/22/2024    BMI 36.0-36.9,adult    Vitamin B12 deficiency  -     Vitamin B12; Future; Expected date: 03/22/2024    Mag-Ox 250 per day.  BNP CBC lipids A1c TSH B12 level.  Discontinue the metoprolol continue the propanolol.  MRI cervical spine as ordered by Neurosurgery.  Continue his Ozempic.  Use walker at all times.  May have some component up neuropathy contributing to the balance issues.

## 2024-03-26 ENCOUNTER — EXTERNAL HOME HEALTH (OUTPATIENT)
Dept: HOME HEALTH SERVICES | Facility: HOSPITAL | Age: 79
End: 2024-03-26
Payer: MEDICARE

## 2024-03-26 ENCOUNTER — OFFICE VISIT (OUTPATIENT)
Dept: PHYSICAL MEDICINE AND REHAB | Facility: CLINIC | Age: 79
End: 2024-03-26
Payer: MEDICARE

## 2024-03-26 VITALS
HEIGHT: 70 IN | WEIGHT: 249 LBS | BODY MASS INDEX: 35.65 KG/M2 | HEART RATE: 91 BPM | DIASTOLIC BLOOD PRESSURE: 106 MMHG | SYSTOLIC BLOOD PRESSURE: 185 MMHG

## 2024-03-26 DIAGNOSIS — S09.90XS BALANCE DISTURBANCE DUE TO OLD HEAD TRAUMA: ICD-10-CM

## 2024-03-26 DIAGNOSIS — R26.89 BALANCE DISTURBANCE DUE TO OLD HEAD TRAUMA: ICD-10-CM

## 2024-03-26 DIAGNOSIS — R25.1 TREMOR DUE TO DISORDER OF CENTRAL NERVOUS SYSTEM: ICD-10-CM

## 2024-03-26 DIAGNOSIS — S06.5X0D TRAUMATIC SUBDURAL HEMORRHAGE WITHOUT OPEN INTRACRANIAL WOUND AND WITHOUT LOSS OF CONSCIOUSNESS, SUBSEQUENT ENCOUNTER: Primary | ICD-10-CM

## 2024-03-26 DIAGNOSIS — G96.9 TREMOR DUE TO DISORDER OF CENTRAL NERVOUS SYSTEM: ICD-10-CM

## 2024-03-26 LAB
ESTIMATED AVG GLUCOSE: 140 MG/DL (ref 68–131)
HBA1C MFR BLD: 6.5 % (ref 4.5–6.2)

## 2024-03-26 PROCEDURE — 3288F FALL RISK ASSESSMENT DOCD: CPT | Mod: CPTII,S$GLB,, | Performed by: PHYSICAL MEDICINE & REHABILITATION

## 2024-03-26 PROCEDURE — 1125F AMNT PAIN NOTED PAIN PRSNT: CPT | Mod: CPTII,S$GLB,, | Performed by: PHYSICAL MEDICINE & REHABILITATION

## 2024-03-26 PROCEDURE — 3077F SYST BP >= 140 MM HG: CPT | Mod: CPTII,S$GLB,, | Performed by: PHYSICAL MEDICINE & REHABILITATION

## 2024-03-26 PROCEDURE — 99215 OFFICE O/P EST HI 40 MIN: CPT | Mod: S$GLB,,, | Performed by: PHYSICAL MEDICINE & REHABILITATION

## 2024-03-26 PROCEDURE — 1101F PT FALLS ASSESS-DOCD LE1/YR: CPT | Mod: CPTII,S$GLB,, | Performed by: PHYSICAL MEDICINE & REHABILITATION

## 2024-03-26 PROCEDURE — 99999 PR PBB SHADOW E&M-EST. PATIENT-LVL II: CPT | Mod: PBBFAC,,, | Performed by: PHYSICAL MEDICINE & REHABILITATION

## 2024-03-26 PROCEDURE — 3080F DIAST BP >= 90 MM HG: CPT | Mod: CPTII,S$GLB,, | Performed by: PHYSICAL MEDICINE & REHABILITATION

## 2024-03-26 NOTE — PROGRESS NOTES
GENERAL NEUROREHAB CONSULT  [facility]    No chief complaint on file.    Referring Provider: Gautam Castanon III, *  Encounter Date: 06/22/2024      HPI: Hiro Rodríguez is a 79 y.o. male who presents today for follow-up for physiatric evaluation for deficits  to  [facility].      The patient is a 79-year-old gentleman who recently has a history atrial fibrillation who suffered a mechanical fall in February 09/20/2024 and landed on his left side.  He was previously taking Eliquis for fibrillation.    He was having some problems with confusion and difficulty getting off the ground.  EMS was called and he was taken to side of the hospital.  He is found to be mildly hypertensive and found to have left frontal temporal subdural hematoma with 8 mm left to right midline shift.  He was transitioned to neuro critical care.  He had problems with restless worsening headaches and repeat imaging that showed slight worsening of his midline shift and knee.  Otherwise, he is determined not to require neurosurgical intervention and was discharged home with outpatient therapies.      However, patient reports having problems with tremors in his upper extremities as well as lower extremities he is here with his wife.  He reports tremors were worse after he was transitioned to a different beta-blocker per his primary care.  This was leading to his atrial fibrillation.    He is concerned because he is having more problems with balance due to these tremors.  Otherwise, he will follow up with neurosurgery for further of his intracranial bleed.    outpatient PT and outpatient OT.      Past Medical History:   Diagnosis Date    CHF (congestive heart failure)     Hypertension     Mixed hyperlipidemia     Paroxysmal atrial fibrillation 2013     Past Surgical History:   Procedure Laterality Date    CYSTOSCOPY W/ URETERAL STENT PLACEMENT N/A 03/02/2023    Procedure: CYSTOSCOPY, WITH URETERAL STENT INSERTION;  Surgeon: Yoshi Lange MD;   Location: [facility] OR;  Service: Urology;  Laterality: N/A;    CYSTOURETEROSCOPY, WITH HOLMIUM LASER LITHOTRIPSY OF URETERAL CALCULUS AND STENT INSERTION Left 4/20/2023    Procedure: CYSTOURETEROSCOPY, WITH HOLMIUM LASER LITHOTRIPSY OF URETERAL CALCULUS AND STENT INSERTION;  Surgeon: Yoshi Lange MD;  Location: [facility] OR;  Service: Urology;  Laterality: Left;    FRACTURE SURGERY      INTRAMEDULLARY RODDING OF TROCHANTER OF FEMUR Left 11/10/2022    Procedure: INSERTION, ITRAMEDULLARY SANTI, FEMUR, TROCHANTER;  Surgeon: Richard Phoenix MD;  Location: [facility] OR;  Service: Orthopedics;  Laterality: Left;    LUMBAR DISCECTOMY  1980    L4-5    REMOVAL OF URETERAL CALCULUS Left 4/20/2023    Procedure: REMOVAL, CALCULUS, URETER;  Surgeon: Yoshi Lange MD;  Location: [facility];  Service: Urology;  Laterality: Left;    REMOVAL, CALCULUS, BLADDER  03/02/2023    Procedure: REMOVAL, CALCULUS, BLADDER;  Surgeon: Yoshi Lange MD;  Location: [facility] OR;  Service: Urology;;    RETROGRADE PYELOGRAPHY  4/20/2023    Procedure: PYELOGRAM, RETROGRADE;  Surgeon: Yoshi Lange MD;  Location: [facility];  Service: Urology;;    TOTAL KNEE ARTHROPLASTY Right 2017    TOTAL KNEE ARTHROPLASTY Left 2018     Current Outpatient Medications on File Prior to Visit   Medication Sig Dispense Refill    acetaminophen 325 mg Cap Take 650 mg by mouth every 6 (six) hours as needed.      calcium polycarbophil (FIBER-CAPS, CA POLYCARBOPHIL, ORAL) Take 1 tablet by mouth 2 (two) times a day.      cholecalciferol, vitamin D3, (VITAMIN D3) 50 mcg (2,000 unit) Tab Take 2 tablets by mouth once daily.      cyanocobalamin, vitamin B-12, 1,000 mcg Subl Place 1,000 mcg under the tongue 2 (two) times a day.      docusate sodium (COLACE) 100 MG capsule Take 100 mg by mouth 2 (two) times daily as needed for Constipation.      DULoxetine (CYMBALTA) 30 MG capsule Take 1 capsule (30 mg total) by mouth 2 (two) times daily. 90 capsule 1     "HYDROcodone-acetaminophen (NORCO)  mg per tablet Take 1 tablet by mouth every 6 (six) hours as needed for Pain. 28 tablet 0    omega-3 acid ethyl esters (LOVAZA) 1 gram capsule TAKE 2 CAPSULES BY MOUTH TWICE A  capsule 0    polyethylene glycol (GLYCOLAX) 17 gram/dose powder Take 17 g by mouth daily as needed for Constipation.      potassium chloride (KLOR-CON) 10 MEQ TbSR Take 2 tablets (20 mEq total) by mouth once daily. 180 tablet 3    propranoloL (INDERAL) 40 MG tablet TAKE 1 TABLET BY MOUTH TWICE A  tablet 1    semaglutide (OZEMPIC) 2 mg/dose (8 mg/3 mL) PnIj Inject 2 mg into the skin every 7 days. 3 mL 1    tadalafiL (CIALIS) 20 MG Tab Take 1 tablet (20 mg total) by mouth as needed (ed). 30 tablet 1    tamsulosin (FLOMAX) 0.4 mg Cap Take 2 capsules (0.8 mg total) by mouth once daily. 90 capsule 3     No current facility-administered medications on file prior to visit.     Review of patient's allergies indicates:  No Known Allergies    Family History:   Family History   Problem Relation Name Age of Onset    Diabetes Mother      Heart disease Father          Social History:  He lives with his wife in a two-story home.     Barthel Index:     Feeding: Independent    Bathing Independent (5)  Grooming Independent (5)  Dressing Independent (10)  Bowel  Independent  (10)  Bladder Independent  (10)  Toilet Use Independent  (10)  Mobility: Independent +/- AD, g> 150 feet (15)   Transfers Independent (15)   Stairs  Independent (10)         Tobacco:  denies   ETOH:  denies   Other drug use: denies        Review of Systems   All other systems reviewed and are negative.       Pertinent Prior Work Up (Imaging/EMGs):  Study Date Pertinent findings                        Physical Exam  Blood Pressure (Abnormal) 185/106   Pulse 91   Height 5' 10" (1.778 m)   Weight 112.9 kg (249 lb)   Body Mass Index 35.73 kg/m²   Physical Exam  Constitutional:       Appearance: Normal appearance. He is obese.   HENT:     "  Head: Normocephalic.      Mouth/Throat:      Mouth: Mucous membranes are moist.      Pharynx: Oropharynx is clear.   Eyes:      Extraocular Movements: Extraocular movements intact.      Conjunctiva/sclera: Conjunctivae normal.      Pupils: Pupils are equal, round, and reactive to light.   Pulmonary:      Effort: Pulmonary effort is normal.      Breath sounds: Normal breath sounds.   Abdominal:      General: Abdomen is flat. Bowel sounds are normal.      Palpations: Abdomen is soft.   Skin:     Capillary Refill: Capillary refill takes less than 2 seconds.   Neurological:      Mental Status: He is alert and oriented to person, place, and time.      Motor: Tremor present.      Coordination: Coordination abnormal.      Gait: Gait abnormal.           Impression: 79 y.o. 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  He was on beta-blockers for his heart failure atrial fibrillation.  However, since transitioning to a different beta-blocker tremors have gotten worsened.    Given the fact that he may have had according to his history of essential tremor worsened by this bleed.  We discussed the option of him going to a movement disorder specialist to further evaluate other medications for his treatment.  He expressed interest in going to physical therapies 1st to see if he continues to improve and then reassess for possible movement disorder specialist for his tremors.    He can follow up p.r.n.    Matias Berrios MD

## 2024-04-10 ENCOUNTER — TELEPHONE (OUTPATIENT)
Dept: NEUROLOGY | Facility: CLINIC | Age: 79
End: 2024-04-10
Payer: MEDICARE

## 2024-04-10 NOTE — TELEPHONE ENCOUNTER
----- Message from Daria Powers RN sent at 3/11/2024 10:48 AM CDT -----  Pt was referred Dr. Beasley for an embolization for a subdural. Pt is at our clinic today for a follow up. Can his appt please be scheduled and I will provide pt with appt details while he is here?    Thanks,  Daria

## 2024-04-25 RX ORDER — METFORMIN HYDROCHLORIDE 500 MG/1
500 TABLET, EXTENDED RELEASE ORAL DAILY
Qty: 90 TABLET | Refills: 1 | Status: SHIPPED | OUTPATIENT
Start: 2024-04-25

## 2024-04-25 NOTE — TELEPHONE ENCOUNTER
No care due was identified.  [facility] Embedded Care Due Messages. Reference number: [id number].   4/25/2024 12:16:00 AM CDT

## 2024-04-25 NOTE — TELEPHONE ENCOUNTER
Refill Routing Note   Medication(s) are not appropriate for processing by [facility] for the following reason(s):        Drug-disease interaction:     [facility] action(s):  Defer      Medication Therapy Plan:       Pharmacist review requested: Yes     Appointments  past 12m or future 3m with PCP    Date Provider   Last Visit   3/22/2024 Gautam Castanon III, MD   Next Visit   6/24/2024 Gautam Castanon III, MD   ED visits in past 90 days: 0        Note composed:1:59 PM 04/25/2024

## 2024-04-25 NOTE — TELEPHONE ENCOUNTER
Refill Routing Note   Medication(s) are not appropriate for processing by [facility] for the following reason(s):        Clarification of medication (Rx) details    [facility] action(s):  Defer      Medication Therapy Plan: Patient taking differently: Take 500 mg by mouth 2 (two) times daily with meals.    Pharmacist review requested: Yes     Appointments  past 12m or future 3m with PCP    Date Provider   Last Visit   3/22/2024 Gautam Castanon III, MD   Next Visit   6/24/2024 Gautam Castanon III, MD   ED visits in past 90 days: 0        Note composed:4:35 PM 04/25/2024

## 2024-05-05 RX ORDER — FUROSEMIDE 20 MG/1
20 TABLET ORAL
Qty: 90 TABLET | Refills: 1 | Status: SHIPPED | OUTPATIENT
Start: 2024-05-05

## 2024-05-05 NOTE — TELEPHONE ENCOUNTER
Refill Routing Note   Medication(s) are not appropriate for processing by [facility] for the following reason(s):        Required vitals abnormal    [facility] action(s):  Defer               Appointments  past 12m or future 3m with PCP    Date Provider   Last Visit   3/22/2024 Gautam Castanon III, MD   Next Visit   6/24/2024 Gautam Castanon III, MD   ED visits in past 90 days: 0        Note composed:3:55 AM 05/05/2024

## 2024-05-05 NOTE — TELEPHONE ENCOUNTER
No care due was identified.  [facility] Embedded Care Due Messages. Reference number: [id number].   5/05/2024 12:50:20 AM CDT

## 2024-05-15 ENCOUNTER — PATIENT MESSAGE (OUTPATIENT)
Dept: FAMILY MEDICINE | Facility: CLINIC | Age: 79
End: 2024-05-15
Payer: MEDICARE

## 2024-05-17 ENCOUNTER — TELEPHONE (OUTPATIENT)
Dept: FAMILY MEDICINE | Facility: CLINIC | Age: 79
End: 2024-05-17
Payer: MEDICARE

## 2024-05-17 ENCOUNTER — LAB VISIT (OUTPATIENT)
Dept: LAB | Facility: HOSPITAL | Age: 79
End: 2024-05-17
Payer: MEDICARE

## 2024-05-17 ENCOUNTER — OFFICE VISIT (OUTPATIENT)
Dept: FAMILY MEDICINE | Facility: CLINIC | Age: 79
End: 2024-05-17
Payer: MEDICARE

## 2024-05-17 VITALS
WEIGHT: 240.31 LBS | OXYGEN SATURATION: 97 % | HEART RATE: 66 BPM | HEIGHT: 70 IN | TEMPERATURE: 98 F | BODY MASS INDEX: 34.4 KG/M2

## 2024-05-17 DIAGNOSIS — I10 HYPERTENSION, ESSENTIAL: Chronic | ICD-10-CM

## 2024-05-17 DIAGNOSIS — Z79.01 ANTICOAGULATED: ICD-10-CM

## 2024-05-17 DIAGNOSIS — R80.9 TYPE 2 DIABETES MELLITUS WITH MICROALBUMINURIA, WITHOUT LONG-TERM CURRENT USE OF INSULIN: ICD-10-CM

## 2024-05-17 DIAGNOSIS — E11.29 TYPE 2 DIABETES MELLITUS WITH MICROALBUMINURIA, WITHOUT LONG-TERM CURRENT USE OF INSULIN: ICD-10-CM

## 2024-05-17 DIAGNOSIS — I48.20 CHRONIC ATRIAL FIBRILLATION: ICD-10-CM

## 2024-05-17 DIAGNOSIS — I27.20 PULMONARY HYPERTENSION: Chronic | ICD-10-CM

## 2024-05-17 DIAGNOSIS — K21.9 GASTROESOPHAGEAL REFLUX DISEASE, UNSPECIFIED WHETHER ESOPHAGITIS PRESENT: ICD-10-CM

## 2024-05-17 DIAGNOSIS — E78.5 HYPERLIPIDEMIA, UNSPECIFIED HYPERLIPIDEMIA TYPE: Chronic | ICD-10-CM

## 2024-05-17 DIAGNOSIS — N40.0 BENIGN PROSTATIC HYPERPLASIA WITHOUT LOWER URINARY TRACT SYMPTOMS: Primary | Chronic | ICD-10-CM

## 2024-05-17 DIAGNOSIS — I50.30 HEART FAILURE WITH PRESERVED EJECTION FRACTION, UNSPECIFIED HF CHRONICITY: ICD-10-CM

## 2024-05-17 LAB
ALBUMIN SERPL BCP-MCNC: 4.1 G/DL (ref 3.5–5.2)
ALP SERPL-CCNC: 55 U/L (ref 55–135)
ALT SERPL W/O P-5'-P-CCNC: 10 U/L (ref 10–44)
ANION GAP SERPL CALC-SCNC: 4 MMOL/L (ref 8–16)
AST SERPL-CCNC: 12 U/L (ref 10–40)
BASOPHILS # BLD AUTO: 0.06 K/UL (ref 0–0.2)
BASOPHILS NFR BLD: 0.6 % (ref 0–1.9)
BILIRUB SERPL-MCNC: 0.4 MG/DL (ref 0.1–1)
BUN SERPL-MCNC: 22 MG/DL (ref 8–23)
CALCIUM SERPL-MCNC: 9 MG/DL (ref 8.7–10.5)
CHLORIDE SERPL-SCNC: 104 MMOL/L (ref 95–110)
CO2 SERPL-SCNC: 36 MMOL/L (ref 23–29)
CREAT SERPL-MCNC: 0.8 MG/DL (ref 0.5–1.4)
DIFFERENTIAL METHOD BLD: ABNORMAL
EOSINOPHIL # BLD AUTO: 0.2 K/UL (ref 0–0.5)
EOSINOPHIL NFR BLD: 1.7 % (ref 0–8)
ERYTHROCYTE [DISTWIDTH] IN BLOOD BY AUTOMATED COUNT: 14.3 % (ref 11.5–14.5)
EST. GFR  (NO RACE VARIABLE): >60 ML/MIN/1.73 M^2
GLUCOSE SERPL-MCNC: 86 MG/DL (ref 70–110)
HCT VFR BLD AUTO: 46.1 % (ref 40–54)
HGB BLD-MCNC: 14.3 G/DL (ref 14–18)
IMM GRANULOCYTES # BLD AUTO: 0.02 K/UL (ref 0–0.04)
IMM GRANULOCYTES NFR BLD AUTO: 0.2 % (ref 0–0.5)
LYMPHOCYTES # BLD AUTO: 1.9 K/UL (ref 1–4.8)
LYMPHOCYTES NFR BLD: 19.3 % (ref 18–48)
MCH RBC QN AUTO: 29.3 PG (ref 27–31)
MCHC RBC AUTO-ENTMCNC: 31 G/DL (ref 32–36)
MCV RBC AUTO: 95 FL (ref 82–98)
MONOCYTES # BLD AUTO: 0.8 K/UL (ref 0.3–1)
MONOCYTES NFR BLD: 7.9 % (ref 4–15)
NEUTROPHILS # BLD AUTO: 7 K/UL (ref 1.8–7.7)
NEUTROPHILS NFR BLD: 70.3 % (ref 38–73)
NRBC BLD-RTO: 0 /100 WBC
PLATELET # BLD AUTO: 305 K/UL (ref 150–450)
PMV BLD AUTO: 9.5 FL (ref 9.2–12.9)
POTASSIUM SERPL-SCNC: 4.4 MMOL/L (ref 3.5–5.1)
PROT SERPL-MCNC: 7.2 G/DL (ref 6–8.4)
RBC # BLD AUTO: 4.88 M/UL (ref 4.6–6.2)
SODIUM SERPL-SCNC: 144 MMOL/L (ref 136–145)
WBC # BLD AUTO: 9.95 K/UL (ref 3.9–12.7)

## 2024-05-17 PROCEDURE — 85025 COMPLETE CBC W/AUTO DIFF WBC: CPT

## 2024-05-17 PROCEDURE — 80053 COMPREHEN METABOLIC PANEL: CPT

## 2024-05-17 PROCEDURE — 36415 COLL VENOUS BLD VENIPUNCTURE: CPT

## 2024-05-17 PROCEDURE — 99999 PR PBB SHADOW E&M-EST. PATIENT-LVL IV: CPT | Mod: PBBFAC,,,

## 2024-05-17 PROCEDURE — 3288F FALL RISK ASSESSMENT DOCD: CPT | Mod: CPTII,S$GLB,,

## 2024-05-17 PROCEDURE — 1101F PT FALLS ASSESS-DOCD LE1/YR: CPT | Mod: CPTII,S$GLB,,

## 2024-05-17 PROCEDURE — 1126F AMNT PAIN NOTED NONE PRSNT: CPT | Mod: CPTII,S$GLB,,

## 2024-05-17 PROCEDURE — 1159F MED LIST DOCD IN RCRD: CPT | Mod: CPTII,S$GLB,,

## 2024-05-17 PROCEDURE — 99214 OFFICE O/P EST MOD 30 MIN: CPT | Mod: S$GLB,,,

## 2024-05-17 PROCEDURE — 1160F RVW MEDS BY RX/DR IN RCRD: CPT | Mod: CPTII,S$GLB,,

## 2024-05-17 RX ORDER — TOLTERODINE TARTRATE 4 MG/1
CAPSULE, EXTENDED RELEASE ORAL
COMMUNITY
End: 2024-05-17

## 2024-05-17 RX ORDER — FINASTERIDE 5 MG/1
5 TABLET, FILM COATED ORAL DAILY
Qty: 90 TABLET | Refills: 1 | Status: SHIPPED | OUTPATIENT
Start: 2024-05-17 | End: 2024-11-13

## 2024-05-17 RX ORDER — METOPROLOL TARTRATE 25 MG/1
TABLET, FILM COATED ORAL
COMMUNITY
End: 2024-05-17 | Stop reason: CLARIF

## 2024-05-17 RX ORDER — LISINOPRIL AND HYDROCHLOROTHIAZIDE 12.5; 2 MG/1; MG/1
1 TABLET ORAL DAILY
COMMUNITY
End: 2024-05-17

## 2024-05-17 NOTE — TELEPHONE ENCOUNTER
3pm today marnie     ----- Message from Catherine Garcia sent at 5/16/2024  2:23 PM CDT -----  Regarding: call back  Contact: [phone]  Pt wife calling in returning call please call to discuss Further

## 2024-05-17 NOTE — PROGRESS NOTES
Patient ID: Hiro Rodríguez is a 79 y.o. male.    Chief Complaint: clearance      Hiro Rodríguez is in the office for preoperative clearance for cataract surgery on 2024.    Gregorio Rodríguez is a 79-year-old male patient who presents to clinic today for preoperative clearance for cataract surgery.  History of Hyperlipidemia with good control: last lipids: TC:  149, Tri HDL:  50, LDL:  74.4 he is currently taking Lovaza and rosuvastatin.  Hypertension controlled well.  Patient denies chest pain or shortness a breath.  Pulmonary hypertension, doing well.  Chronic heart failure with preserved ejection fraction.  Chronic AFib, anticoagulated with Eliquis.  Type 2 diabetes with positive microalbumin, last H A1c of 6.5.  GERD doing well.  Patient denies personal or family history of anesthesia complications.  He denies drug use.  He does drink alcohol a couple of times a week.  He denies sleep apnea.            Past Medical History:   Diagnosis Date    CHF (congestive heart failure)     Hypertension     Mixed hyperlipidemia     Paroxysmal atrial fibrillation                 Current Outpatient Medications:     acetaminophen 325 mg Cap, Take 650 mg by mouth every 6 (six) hours as needed., Disp: , Rfl:     amiodarone (PACERONE) 100 MG Tab, Take 1 tablet (100 mg total) by mouth every morning., Disp: 90 tablet, Rfl: 3    apixaban (ELIQUIS) 5 mg Tab, Take 1 tablet twice a day by oral route., Disp: , Rfl:     calcium polycarbophil (FIBER-CAPS, CA POLYCARBOPHIL, ORAL), Take 1 tablet by mouth 2 (two) times a day., Disp: , Rfl:     cholecalciferol, vitamin D3, (VITAMIN D3) 50 mcg (2,000 unit) Tab, Take 2 tablets by mouth once daily., Disp: , Rfl:     cyanocobalamin, vitamin B-12, 1,000 mcg Subl, Place 1,000 mcg under the tongue 2 (two) times a day., Disp: , Rfl:     docusate sodium (COLACE) 100 MG capsule, Take 100 mg by mouth 2 (two) times daily as needed for Constipation., Disp: , Rfl:     DULoxetine  (CYMBALTA) 30 MG capsule, Take 1 capsule (30 mg total) by mouth 2 (two) times daily., Disp: 90 capsule, Rfl: 1    furosemide (LASIX) 20 MG tablet, TAKE 1 TABLET BY MOUTH EVERY DAY, Disp: 90 tablet, Rfl: 1    HYDROcodone-acetaminophen (NORCO)  mg per tablet, Take 1 tablet by mouth every 6 (six) hours as needed for Pain., Disp: 28 tablet, Rfl: 0    lisinopriL (PRINIVIL,ZESTRIL) 2.5 MG tablet, Take 1 tablet (2.5 mg total) by mouth 2 (two) times daily., Disp: 180 tablet, Rfl: 3    metFORMIN (GLUCOPHAGE-XR) 500 MG ER 24hr tablet, Take 1 tablet (500 mg total) by mouth once daily., Disp: 90 tablet, Rfl: 1    omega-3 acid ethyl esters (LOVAZA) 1 gram capsule, TAKE 2 CAPSULES BY MOUTH TWICE A DAY, Disp: 360 capsule, Rfl: 0    polyethylene glycol (GLYCOLAX) 17 gram/dose powder, Take 17 g by mouth daily as needed for Constipation., Disp: , Rfl:     potassium chloride (KLOR-CON) 10 MEQ TbSR, Take 2 tablets (20 mEq total) by mouth once daily., Disp: 180 tablet, Rfl: 3    propranoloL (INDERAL) 40 MG tablet, TAKE 1 TABLET BY MOUTH TWICE A DAY, Disp: 180 tablet, Rfl: 1    rosuvastatin (CRESTOR) 20 MG tablet, TAKE 1 TABLET BY MOUTH EVERY DAY, Disp: 90 tablet, Rfl: 3    semaglutide (OZEMPIC) 2 mg/dose (8 mg/3 mL) PnIj, Inject 2 mg into the skin every 7 days., Disp: 3 mL, Rfl: 1    tadalafiL (CIALIS) 20 MG Tab, Take 1 tablet (20 mg total) by mouth as needed (ed)., Disp: 30 tablet, Rfl: 1    tamsulosin (FLOMAX) 0.4 mg Cap, Take 2 capsules (0.8 mg total) by mouth once daily., Disp: 90 capsule, Rfl: 3    apixaban (ELIQUIS) 2.5 mg Tab, Take 1 tablet (2.5 mg total) by mouth 2 (two) times daily., Disp: 180 tablet, Rfl: 0    finasteride (PROSCAR) 5 mg tablet, Take 1 tablet (5 mg total) by mouth once daily., Disp: 90 tablet, Rfl: 1    The 10-year ASCVD risk score (Sue CAR, et al., 2019) is: 51.6%    Values used to calculate the score:      Age: 79 years      Sex: Male      Is Non- : No      Diabetic: Yes       Tobacco smoker: No      Systolic Blood Pressure: 122 mmHg      Is BP treated: Yes      HDL Cholesterol: 50 mg/dL      Total Cholesterol: 149 mg/dL     Wt Readings from Last 3 Encounters:   05/17/24 109 kg (240 lb 4.8 oz)   03/26/24 112.9 kg (249 lb)   03/22/24 115.2 kg (253 lb 13.8 oz)     Temp Readings from Last 3 Encounters:   05/17/24 98.4 °F (36.9 °C)   03/22/24 98.8 °F (37.1 °C)   02/22/24 98.2 °F (36.8 °C)     BP Readings from Last 3 Encounters:   05/17/24 (P) 122/78   03/26/24 (!) 185/106   03/22/24 120/60     Pulse Readings from Last 3 Encounters:   05/17/24 66   03/26/24 91   03/22/24 80     Resp Readings from Last 3 Encounters:   02/22/24 18   02/12/24 17   12/08/23 16     PF Readings from Last 3 Encounters:   No data found for PF     SpO2 Readings from Last 3 Encounters:   05/17/24 97%   03/22/24 99%   02/22/24 (!) 94%        Lab Results   Component Value Date    HGBA1C 6.5 (H) 03/25/2024    HGBA1C 5.8 03/02/2023    HGBA1C 5.8 12/31/2022     Lab Results   Component Value Date    MICROALBUR 3.2 02/13/2020    LDLCALC 74.4 03/25/2024    CREATININE 0.8 05/17/2024       Review of Systems   Constitutional: Negative.    HENT: Negative.     Eyes: Negative.    Respiratory:  Negative for chest tightness and shortness of breath.    Cardiovascular:  Negative for chest pain, palpitations and leg swelling.   Gastrointestinal: Negative.    Genitourinary: Negative.    Musculoskeletal:  Positive for arthralgias.   Neurological: Negative.    Psychiatric/Behavioral: Negative.             Objective:      Physical Exam  Vitals reviewed.   Constitutional:       General: He is not in acute distress.     Appearance: He is obese. He is not ill-appearing.   HENT:      Head: Normocephalic and atraumatic.      Right Ear: Tympanic membrane normal.      Left Ear: Tympanic membrane normal.      Nose: Nose normal.      Mouth/Throat:      Mouth: Mucous membranes are moist.      Pharynx: Oropharynx is clear.   Cardiovascular:      Rate  and Rhythm: Normal rate. Rhythm irregular.      Pulses: Normal pulses.      Heart sounds: Normal heart sounds.   Pulmonary:      Effort: Pulmonary effort is normal.      Breath sounds: Normal breath sounds.   Chest:      Chest wall: No deformity, swelling or edema.   Abdominal:      General: Abdomen is flat. Bowel sounds are normal.      Palpations: Abdomen is soft.   Musculoskeletal:         General: Normal range of motion.      Cervical back: Normal range of motion.   Skin:     General: Skin is warm and dry.   Neurological:      General: No focal deficit present.      Mental Status: He is alert.      Motor: No weakness.   Psychiatric:         Mood and Affect: Mood normal.         Behavior: Behavior normal.         Thought Content: Thought content normal.         Judgment: Judgment normal.             Screening recommendations appropriate to age and health status were reviewed.    Benign prostatic hyperplasia without lower urinary tract symptoms  -     finasteride (PROSCAR) 5 mg tablet; Take 1 tablet (5 mg total) by mouth once daily.  Dispense: 90 tablet; Refill: 1    Hypertension, essential  -     CBC Auto Differential; Future; Expected date: 05/17/2024  -     Comprehensive Metabolic Panel; Future; Expected date: 05/17/2024    Chronic atrial fibrillation  -     apixaban (ELIQUIS) 2.5 mg Tab; Take 1 tablet (2.5 mg total) by mouth 2 (two) times daily.  Dispense: 180 tablet; Refill: 0    Hyperlipidemia, unspecified hyperlipidemia type    Pulmonary hypertension, PASP 61    Heart failure with preserved ejection fraction, unspecified HF chronicity    Anticoagulated    Type 2 diabetes mellitus with microalbuminuria, without long-term current use of insulin    Gastroesophageal reflux disease, unspecified whether esophagitis present        RCRI risk factors include: history of heart failure. As such, per RCRI the risk of cardiac death, nonfatal myocardial infarction, or nonfatal cardiac arrest is 0.4% and the risk of  myocardial infarction, pulmonary edema, ventricular fibrillation, primary cardiac arrest, or complete heart block is 0.5%.  Overall this patient can be considered low risk for this low risk procedure. No further cardiac testing is recommended at this time.     Patient denies any symptoms (as per HPI) concerning for undiagnosed lung disease including KEDAR. Would not recommend obtaining chest X-ray, sleep study, or PFTs at this time. Patient is a non-smoker. We discussed the benefits of early mobilization and deep breathing after surgery.      Screened patient for alcohol misuse, use of illicit drugs, and personal or family history of anesthetic complications or bleeding diathesis and no substantial concerns were identified.     All current medications were reviewed and at this time no changes to medications are recommended prior to surgery.     I recommend use of standard pre-op and post-op precautions for this patient.  Patient instructed to hold Eliquis 2 days prior to surgery.  In my opinion, he is medically optimized for this procedure, and can proceed without further evaluation.

## 2024-05-28 ENCOUNTER — PATIENT MESSAGE (OUTPATIENT)
Dept: FAMILY MEDICINE | Facility: CLINIC | Age: 79
End: 2024-05-28
Payer: MEDICARE

## 2024-05-28 RX ORDER — LISINOPRIL 2.5 MG/1
2.5 TABLET ORAL 2 TIMES DAILY
Qty: 180 TABLET | Refills: 0 | Status: SHIPPED | OUTPATIENT
Start: 2024-05-28

## 2024-05-28 RX ORDER — AMIODARONE HYDROCHLORIDE 100 MG/1
100 TABLET ORAL EVERY MORNING
Qty: 90 TABLET | Refills: 0 | Status: SHIPPED | OUTPATIENT
Start: 2024-05-28

## 2024-06-10 LAB
LEFT EYE DM RETINOPATHY: NEGATIVE
RIGHT EYE DM RETINOPATHY: NEGATIVE

## 2024-06-24 ENCOUNTER — OFFICE VISIT (OUTPATIENT)
Dept: FAMILY MEDICINE | Facility: CLINIC | Age: 79
End: 2024-06-24
Payer: MEDICARE

## 2024-06-24 VITALS
HEIGHT: 70 IN | OXYGEN SATURATION: 98 % | HEART RATE: 81 BPM | TEMPERATURE: 98 F | WEIGHT: 241.63 LBS | SYSTOLIC BLOOD PRESSURE: 106 MMHG | DIASTOLIC BLOOD PRESSURE: 62 MMHG | BODY MASS INDEX: 34.59 KG/M2

## 2024-06-24 DIAGNOSIS — R25.1 TREMOR: ICD-10-CM

## 2024-06-24 DIAGNOSIS — R39.14 BENIGN PROSTATIC HYPERPLASIA WITH INCOMPLETE BLADDER EMPTYING: ICD-10-CM

## 2024-06-24 DIAGNOSIS — E11.29 TYPE 2 DIABETES MELLITUS WITH MICROALBUMINURIA, WITHOUT LONG-TERM CURRENT USE OF INSULIN: ICD-10-CM

## 2024-06-24 DIAGNOSIS — E78.5 HYPERLIPIDEMIA, UNSPECIFIED HYPERLIPIDEMIA TYPE: ICD-10-CM

## 2024-06-24 DIAGNOSIS — N40.1 BENIGN PROSTATIC HYPERPLASIA WITH INCOMPLETE BLADDER EMPTYING: ICD-10-CM

## 2024-06-24 DIAGNOSIS — E53.8 VITAMIN B12 DEFICIENCY: ICD-10-CM

## 2024-06-24 DIAGNOSIS — N39.490 URINARY INCONTINENCE, OVERFLOW: ICD-10-CM

## 2024-06-24 DIAGNOSIS — S06.5X0S: ICD-10-CM
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
## 2024-06-24 DIAGNOSIS — I48.91 ATRIAL FIBRILLATION, UNSPECIFIED TYPE: Primary | ICD-10-CM

## 2024-06-24 DIAGNOSIS — R35.0 URINARY FREQUENCY: ICD-10-CM

## 2024-06-24 DIAGNOSIS — I50.30 HEART FAILURE WITH PRESERVED EJECTION FRACTION, UNSPECIFIED HF CHRONICITY: ICD-10-CM

## 2024-06-24 DIAGNOSIS — Z99.89 USES WALKER: ICD-10-CM

## 2024-06-24 DIAGNOSIS — R80.9 TYPE 2 DIABETES MELLITUS WITH MICROALBUMINURIA, WITHOUT LONG-TERM CURRENT USE OF INSULIN: ICD-10-CM

## 2024-06-24 DIAGNOSIS — Z12.5 SCREENING PSA (PROSTATE SPECIFIC ANTIGEN): ICD-10-CM

## 2024-06-24 DIAGNOSIS — Z79.899 LONG TERM CURRENT USE OF AMIODARONE: ICD-10-CM

## 2024-06-24 DIAGNOSIS — Z79.01 ANTICOAGULATED: ICD-10-CM

## 2024-06-24 DIAGNOSIS — Z98.49 HISTORY OF CATARACT EXTRACTION, UNSPECIFIED LATERALITY: ICD-10-CM

## 2024-06-24 DIAGNOSIS — I10 HYPERTENSION, ESSENTIAL: ICD-10-CM

## 2024-06-24 PROCEDURE — 1100F PTFALLS ASSESS-DOCD GE2>/YR: CPT | Mod: CPTII,S$GLB,, | Performed by: FAMILY MEDICINE

## 2024-06-24 PROCEDURE — 99999 PR PBB SHADOW E&M-EST. PATIENT-LVL V: CPT | Mod: PBBFAC,,, | Performed by: FAMILY MEDICINE

## 2024-06-24 PROCEDURE — 99214 OFFICE O/P EST MOD 30 MIN: CPT | Mod: S$GLB,,, | Performed by: FAMILY MEDICINE

## 2024-06-24 PROCEDURE — 3074F SYST BP LT 130 MM HG: CPT | Mod: CPTII,S$GLB,, | Performed by: FAMILY MEDICINE

## 2024-06-24 PROCEDURE — 1160F RVW MEDS BY RX/DR IN RCRD: CPT | Mod: CPTII,S$GLB,, | Performed by: FAMILY MEDICINE

## 2024-06-24 PROCEDURE — 1159F MED LIST DOCD IN RCRD: CPT | Mod: CPTII,S$GLB,, | Performed by: FAMILY MEDICINE

## 2024-06-24 PROCEDURE — 3078F DIAST BP <80 MM HG: CPT | Mod: CPTII,S$GLB,, | Performed by: FAMILY MEDICINE

## 2024-06-24 PROCEDURE — G2211 COMPLEX E/M VISIT ADD ON: HCPCS | Mod: S$GLB,,, | Performed by: FAMILY MEDICINE

## 2024-06-24 PROCEDURE — 3288F FALL RISK ASSESSMENT DOCD: CPT | Mod: CPTII,S$GLB,, | Performed by: FAMILY MEDICINE

## 2024-06-24 NOTE — PROGRESS NOTES
SCRIBE #1 NOTE: I, Ada Dante, am scribing for, and in the presence of, Gautam Castanon III, MD. I have scribed the entire note.     Subjective:       Patient ID: Hiro Rodríguez is a 79 y.o. male.    Chief Complaint: Follow-up (3 month)    Hiro Rodríguez is a 79 y.o. male who presents for a 3 month follow-up. States he had 2 recents fall in 2/24 and 3/24. For the most recent fall he states he was outside in the yard when he fell, but did not hit his head. History of subdural hematoma. Still needs to do MRI Cervical Spine. Chronic heart failure with preserved ejection fraction. Chronic Afib. Anticoagulated with Eliquis. Using Aspirin. S/p cataract surgery, about 2 weeks ago on both eyes, doing well. Hypertension controlled well. Cardiovascular good. No chest pain. No palpitations. On Amiodarone. Hyperlipidemia, compliant with medications. Breathing okay, no shortness of breath. Benign essential tremor, doing okay and compliant with medications. Type 2 diabetes mellitus, on Ozempic. CBC is normal. CMP is normal. Urinary incontinence. And has urinary frequency. No dysuria. BPH with urinary retention. Using walker to ambulate. Hearing loss, wearing hearing aids. Vitamin B12 deficiency. S/p cataract surgery of both eyes.           Review of Systems   Constitutional: Negative.    HENT: Negative.     Eyes: Negative.    Respiratory: Negative.  Negative for shortness of breath.    Cardiovascular: Negative.  Negative for chest pain and palpitations.   Gastrointestinal: Negative.    Endocrine: Negative.    Genitourinary:  Positive for frequency. Negative for dysuria.        Positive for urinary incontinence.   Musculoskeletal: Negative.    Skin: Negative.    Neurological: Negative.    Hematological: Negative.    Psychiatric/Behavioral: Negative.     All other systems reviewed and are negative.      Objective:      Physical examination: Vital signs noted. No acute distress. No carotid bruit. Regular heart rate and  rhythm. Lungs clear to auscultation bilaterally. Abdomen bowel sounds are positive soft and nontender. Extremities without edema. 2+ pedal pulses.      Assessment:       1. Atrial fibrillation, unspecified type    2. Benign prostatic hyperplasia, unspecified whether lower urinary tract symptoms present    3. Urine frequency    4. Urinary frequency    5. Urinary incontinence, unspecified type    6. Uses walker    7. Fall, initial encounter    8. Subdural hematoma due to concussion, without loss of consciousness, sequela    9. Anticoagulated    10. Heart failure with preserved ejection fraction, unspecified HF chronicity    11. Long term current use of amiodarone    12. Vitamin B12 deficiency    13. Tremor    14. Type 2 diabetes mellitus with microalbuminuria, without long-term current use of insulin    15. Hypertension, essential    16. Hyperlipidemia, unspecified hyperlipidemia type    17. BMI 34.0-34.9,adult    18. Screening PSA (prostate specific antigen)        Plan:       Atrial fibrillation, unspecified type    Benign prostatic hyperplasia, unspecified whether lower urinary tract symptoms present    Urine frequency    Urinary frequency    Urinary incontinence, unspecified type    Uses walker    Fall, initial encounter    Subdural hematoma due to concussion, without loss of consciousness, sequela    Anticoagulated    Heart failure with preserved ejection fraction, unspecified HF chronicity    Long term current use of amiodarone    Vitamin B12 deficiency    Tremor    Type 2 diabetes mellitus with microalbuminuria, without long-term current use of insulin  -     Hemoglobin A1C; Future; Expected date: 06/24/2024  -     Comprehensive Metabolic Panel; Future; Expected date: 06/24/2024  -     Lipid Panel; Future; Expected date: 06/24/2024    Hypertension, essential  -     Hemoglobin A1C; Future; Expected date: 06/24/2024  -     Comprehensive Metabolic Panel; Future; Expected date: 06/24/2024  -     Lipid Panel;  Future; Expected date: 06/24/2024    Hyperlipidemia, unspecified hyperlipidemia type  -     Hemoglobin A1C; Future; Expected date: 06/24/2024  -     Comprehensive Metabolic Panel; Future; Expected date: 06/24/2024  -     Lipid Panel; Future; Expected date: 06/24/2024    BMI 34.0-34.9,adult    Screening PSA (prostate specific antigen)  -     PSA, Screening; Future; Expected date: 07/24/2024    Discussed his prostate situation in great detail.  Needs to follow-up with urology make a determination whether this is all obstruction from the prostate or whether he has neurogenic bladder.  Discuss the fact that he may well need in and out catheterizations which she had declined doing when he saw Urology.  He never did the MRI of his cervical spine that have been ordered by Neurosurgery.  He will have this done.  Will get a copy of his eye exam from Dr. Tate.  PSA A1c CMP lipids after July 19th.  Follow-up here in 3 months.    I, Dr Gautam Castanon, personally performed the services described in this documentation. All medical record entries made by the scribe were at my direction and in my presence. I have reviewed the chart and agree that the record reflects my personal performance and is accurate and complete.

## 2024-06-26 ENCOUNTER — PATIENT OUTREACH (OUTPATIENT)
Dept: ADMINISTRATIVE | Facility: HOSPITAL | Age: 79
End: 2024-06-26
Payer: MEDICARE

## 2024-06-26 RX ORDER — OMEGA-3-ACID ETHYL ESTERS 1 G/1
CAPSULE, LIQUID FILLED ORAL
Qty: 360 CAPSULE | Refills: 2 | Status: SHIPPED | OUTPATIENT
Start: 2024-06-26

## 2024-06-26 NOTE — TELEPHONE ENCOUNTER
Care Due:                  Date            Visit Type   Department     Provider  --------------------------------------------------------------------------------                                EP                               PRIMARY      [facility]  Last Visit: 06-      [facility])   [facility]  Next Visit: None Scheduled  None         None Found                                                            Last  Test          Frequency    Reason                     Performed    Due Date  --------------------------------------------------------------------------------    HBA1C.......  6 months...  metFORMIN, semaglutide...  03- 09-    [facility] Embedded Care Due Messages. Reference number: [id number].   6/26/2024 12:24:47 AM CDT

## 2024-06-26 NOTE — TELEPHONE ENCOUNTER
Provider Staff:  Action required for this patient     Please see care gap opportunities below in Care Due Message.    Thanks!  [facility]     Appointments      Date Provider   Last Visit   6/24/2024 Gautam Castanon III, MD   Next Visit   9/24/2024 aGutam Castanon III, MD      Refill Decision Note   Hiro Rodríguez  is requesting a refill authorization.  Brief Assessment and Rationale for Refill:  Approve     Medication Therapy Plan:         Comments:     Note composed:2:57 AM 06/26/2024

## 2024-06-26 NOTE — LETTER
AUTHORIZATION FOR RELEASE OF   CONFIDENTIAL INFORMATION    Ambrosio Tate OD    We are seeing Hiro Rodríguez, date of birth 1945, in the clinic at [facility]. Gautam Castanon III, MD is the patient's PCP. Hiro Rodríguez has an outstanding lab/procedure at the time we reviewed his chart. In order to help keep his health information updated, he has authorized us to request the following medical record(s):         EYE EXAM              Please fax records to [facility], Clinton H. III, [place], [phone]     If you have any questions, please contact Erica Burnett, Care Coordinator   at [phone].              Patient Name: Hiro Rodríguez  : 1945  Patient Phone #: [phone]

## 2024-06-26 NOTE — PROGRESS NOTES
Population Health Chart Review & Patient Outreach Details      Additional [facility] Notes:               Updates Requested / Reviewed:      Updated Care Coordination Note, Care Everywhere, , and Care Team Updated         Health Maintenance Topics Overdue:      VBHM Score: 0     Patient is not due for any topics at this time.    RSV Vaccine                  Health Maintenance Topic(s) Outreach Outcomes & Actions Taken:    Eye Exam - Outreach Outcomes & Actions Taken  : External Records Requested & Care Team Updated if Applicable

## 2024-08-19 RX ORDER — LISINOPRIL 2.5 MG/1
2.5 TABLET ORAL 2 TIMES DAILY
Qty: 180 TABLET | Refills: 0 | Status: SHIPPED | OUTPATIENT
Start: 2024-08-19

## 2024-08-21 ENCOUNTER — PATIENT OUTREACH (OUTPATIENT)
Dept: ADMINISTRATIVE | Facility: HOSPITAL | Age: 79
End: 2024-08-21
Payer: MEDICARE

## 2024-08-21 NOTE — PROGRESS NOTES
Population Health Chart Review & Patient Outreach Details      Additional [facility] Notes:    RECORDS REQUESTED                   HAVE YET TO RECEIVE EYE EXAM REPORT,  ASKED PATIENT TO ASSIST.           Updates Requested / Reviewed:           Health Maintenance Topics Overdue:      [facility] Score: 1     Urine Screening    RSV Vaccine                  Health Maintenance Topic(s) Outreach Outcomes & Actions Taken:    Eye Exam - Outreach Outcomes & Actions Taken  : External Records Requested & Care Team Updated if Applicable

## 2024-08-21 NOTE — LETTER
August 21, 2024    Hiro Rodríguez  [address]  [place] [address]             [facility]  [address]  [facility] [address]  Phone: [phone] Dear Kina Bosch Clinton H. III, MD had me request your Eye Exam records from [facility], but have yet to receive anything from them.  Sometimes these facilities require a signed release or verbal consent from the patient before they will share records. Please contact their office and request that the records be faxed to  (fax # [phone]).    Thank you for letting us care for you,    Sincerely,      Your [facility]  Erica Burnett, Care Coordinator  Phone: [phone]  FAX: [phone]

## 2024-08-23 DIAGNOSIS — I48.20 CHRONIC ATRIAL FIBRILLATION: ICD-10-CM

## 2024-08-23 RX ORDER — APIXABAN 2.5 MG/1
2.5 TABLET, FILM COATED ORAL 2 TIMES DAILY
Qty: 180 TABLET | Refills: 0 | Status: SHIPPED | OUTPATIENT
Start: 2024-08-23

## 2024-08-23 RX ORDER — AMIODARONE HYDROCHLORIDE 100 MG/1
100 TABLET ORAL
Qty: 90 TABLET | Refills: 0 | Status: SHIPPED | OUTPATIENT
Start: 2024-08-23

## 2024-09-04 RX ORDER — PROPRANOLOL HYDROCHLORIDE 40 MG/1
40 TABLET ORAL 2 TIMES DAILY
Qty: 180 TABLET | Refills: 3 | Status: SHIPPED | OUTPATIENT
Start: 2024-09-04

## 2024-09-04 NOTE — TELEPHONE ENCOUNTER
Provider Staff:  Action required for this patient    Requires labs      Please see care gap opportunities below in Care Due Message.    Thanks!  [facility]     Appointments      Date Provider   Last Visit   6/24/2024 Gautam Castanon III, MD   Next Visit   9/24/2024 Gautam Castanon III, MD     Refill Decision Note   Hiro Rodríguez  is requesting a refill authorization.  Brief Assessment and Rationale for Refill:  Approve     Medication Therapy Plan:        Comments:     Note composed:1:57 PM 09/04/2024

## 2024-09-04 NOTE — TELEPHONE ENCOUNTER
Care Due:                  Date            Visit Type   Department     Provider  --------------------------------------------------------------------------------                                [facility]                              [facility]  [facility]      [facility]   [facility]   Kina                              EP -                              PRIMARY      [facility]  Next Visit: 09-      CARE ([facility])   [facility]  Kina                                                            Last  Test          Frequency    Reason                     Performed    Due Date  --------------------------------------------------------------------------------    HBA1C.......  6 months...  metFORMIN, semaglutide...  03- 09-    [facility] Embedded Care Due Messages. Reference number: [id number].   9/04/2024 12:24:44 AM CDT

## 2024-09-17 ENCOUNTER — PATIENT OUTREACH (OUTPATIENT)
Dept: ADMINISTRATIVE | Facility: HOSPITAL | Age: 79
End: 2024-09-17
Payer: MEDICARE

## 2024-09-17 NOTE — LETTER
AUTHORIZATION FOR RELEASE OF   CONFIDENTIAL INFORMATION    [facility]    We are seeing Hiro Rodríguez, date of birth 1945, in the clinic at [facility]. Gautam Castanon III, MD is the patient's PCP. Hiro Rodríguez has an outstanding lab/procedure at the time we reviewed his chart. In order to help keep his health information updated, he has authorized us to request the following medical record(s):       EYE EXAM        2nd request     Please fax to [phone].  Thank you so much!           Please fax records to [facility], Clinton H. III, [place], [phone]     If you have any questions, please contact Erica Burnett, Care Coordinator   at [phone].          Patient Name: Hiro Rodríguez  : 1945  Patient Phone #: [phone]

## 2024-09-17 NOTE — PROGRESS NOTES
Population Health Chart Review & Patient Outreach Details      Additional [facility] Notes:     2nd request [place]           Updates Requested / Reviewed:               Health Maintenance Topics Overdue:      [facility] Score: 1     Urine Screening    Influenza Vaccine  RSV Vaccine                  Health Maintenance Topic(s) Outreach Outcomes & Actions Taken:    Eye Exam - Outreach Outcomes & Actions Taken  : External Records Requested & Care Team Updated if Applicable

## 2024-09-24 ENCOUNTER — PATIENT OUTREACH (OUTPATIENT)
Dept: ADMINISTRATIVE | Facility: HOSPITAL | Age: 79
End: 2024-09-24
Payer: MEDICARE

## 2024-09-24 NOTE — PROGRESS NOTES
Population Health Chart Review & Patient Outreach Details      Additional Pop Health Notes:               Updates Requested / Reviewed:      Updated Care Coordination Note         Health Maintenance Topics Overdue:      VB Score: 2     Urine Screening  Eye Exam    Influenza Vaccine  RSV Vaccine                  Health Maintenance Topic(s) Outreach Outcomes & Actions Taken:    Eye Exam - Outreach Outcomes & Actions Taken  : Diabetic Eye External Records Uploaded, Care Team & History Updated if Applicable

## 2024-10-01 RX ORDER — LISINOPRIL 2.5 MG/1
2.5 TABLET ORAL 2 TIMES DAILY
Qty: 180 TABLET | Refills: 2 | Status: SHIPPED | OUTPATIENT
Start: 2024-10-01

## 2024-10-01 RX ORDER — FUROSEMIDE 20 MG/1
20 TABLET ORAL
Qty: 90 TABLET | Refills: 2 | Status: SHIPPED | OUTPATIENT
Start: 2024-10-01

## 2024-10-01 RX ORDER — METFORMIN HYDROCHLORIDE 500 MG/1
500 TABLET, EXTENDED RELEASE ORAL
Qty: 90 TABLET | Refills: 1 | Status: SHIPPED | OUTPATIENT
Start: 2024-10-01

## 2024-10-01 RX ORDER — TADALAFIL 20 MG/1
TABLET ORAL
Qty: 30 TABLET | Refills: 1 | Status: SHIPPED | OUTPATIENT
Start: 2024-10-01

## 2024-10-01 RX ORDER — AMIODARONE HYDROCHLORIDE 100 MG/1
100 TABLET ORAL
Qty: 90 TABLET | Refills: 0 | Status: SHIPPED | OUTPATIENT
Start: 2024-10-01

## 2024-10-01 NOTE — TELEPHONE ENCOUNTER
No care due was identified.  [facility] Embedded Care Due Messages. Reference number: [id number].   10/01/2024 10:28:27 AM CDT

## 2024-10-01 NOTE — TELEPHONE ENCOUNTER
Refill Routing Note   Medication(s) are not appropriate for processing by [facility] for the following reason(s):        Outside of protocol  Required labs outdated    [facility] action(s):  Approve  Defer  Route               Appointments  past 12m or future 3m with PCP    Date Provider   Last Visit   6/24/2024 Gautam Castanon III, MD   Next Visit   Visit date not found Gautam Castanon III, MD   ED visits in past 90 days: 0        Note composed:6:27 PM 10/01/2024

## 2024-10-02 DIAGNOSIS — N40.0 BENIGN PROSTATIC HYPERPLASIA WITHOUT LOWER URINARY TRACT SYMPTOMS: Chronic | ICD-10-CM

## 2024-10-02 DIAGNOSIS — I48.20 CHRONIC ATRIAL FIBRILLATION: ICD-10-CM

## 2024-10-02 DIAGNOSIS — E11.29 TYPE 2 DIABETES MELLITUS WITH MICROALBUMINURIA, WITHOUT LONG-TERM CURRENT USE OF INSULIN: ICD-10-CM

## 2024-10-02 DIAGNOSIS — R80.9 TYPE 2 DIABETES MELLITUS WITH MICROALBUMINURIA, WITHOUT LONG-TERM CURRENT USE OF INSULIN: ICD-10-CM

## 2024-10-02 RX ORDER — ROSUVASTATIN CALCIUM 20 MG/1
20 TABLET, COATED ORAL DAILY
Qty: 90 TABLET | Refills: 0 | Status: SHIPPED | OUTPATIENT
Start: 2024-10-02

## 2024-10-02 RX ORDER — SEMAGLUTIDE 2.68 MG/ML
2 INJECTION, SOLUTION SUBCUTANEOUS
Qty: 3 ML | Refills: 0 | Status: SHIPPED | OUTPATIENT
Start: 2024-10-02

## 2024-10-02 RX ORDER — POTASSIUM CHLORIDE 750 MG/1
20 TABLET, EXTENDED RELEASE ORAL DAILY
Qty: 180 TABLET | Refills: 0 | Status: SHIPPED | OUTPATIENT
Start: 2024-10-02

## 2024-10-02 RX ORDER — DULOXETIN HYDROCHLORIDE 30 MG/1
30 CAPSULE, DELAYED RELEASE ORAL 2 TIMES DAILY
Qty: 90 CAPSULE | Refills: 0 | Status: SHIPPED | OUTPATIENT
Start: 2024-10-02

## 2024-10-02 RX ORDER — FINASTERIDE 5 MG/1
5 TABLET, FILM COATED ORAL DAILY
Qty: 90 TABLET | Refills: 0 | Status: SHIPPED | OUTPATIENT
Start: 2024-10-02 | End: 2025-03-31

## 2024-10-02 NOTE — TELEPHONE ENCOUNTER
No care due was identified.  Health [facility] Embedded Care Due Messages. Reference number: [id number].   10/02/2024 10:43:48 AM CDT

## 2024-11-07 ENCOUNTER — LAB VISIT (OUTPATIENT)
Dept: LAB | Facility: HOSPITAL | Age: 79
End: 2024-11-07
Attending: FAMILY MEDICINE
Payer: MEDICARE

## 2024-11-07 DIAGNOSIS — Z12.5 SCREENING PSA (PROSTATE SPECIFIC ANTIGEN): ICD-10-CM

## 2024-11-07 DIAGNOSIS — R80.9 TYPE 2 DIABETES MELLITUS WITH MICROALBUMINURIA, WITHOUT LONG-TERM CURRENT USE OF INSULIN: ICD-10-CM

## 2024-11-07 DIAGNOSIS — E11.29 TYPE 2 DIABETES MELLITUS WITH MICROALBUMINURIA, WITHOUT LONG-TERM CURRENT USE OF INSULIN: ICD-10-CM

## 2024-11-07 DIAGNOSIS — I10 HYPERTENSION, ESSENTIAL: ICD-10-CM

## 2024-11-07 DIAGNOSIS — E78.5 HYPERLIPIDEMIA, UNSPECIFIED HYPERLIPIDEMIA TYPE: ICD-10-CM

## 2024-11-07 LAB
ALBUMIN SERPL BCP-MCNC: 4.4 G/DL (ref 3.5–5.2)
ALP SERPL-CCNC: 61 U/L (ref 55–135)
ALT SERPL W/O P-5'-P-CCNC: 15 U/L (ref 10–44)
ANION GAP SERPL CALC-SCNC: 9 MMOL/L (ref 8–16)
AST SERPL-CCNC: 14 U/L (ref 10–40)
BILIRUB SERPL-MCNC: 0.4 MG/DL (ref 0.1–1)
BUN SERPL-MCNC: 22 MG/DL (ref 8–23)
CALCIUM SERPL-MCNC: 9.3 MG/DL (ref 8.7–10.5)
CHLORIDE SERPL-SCNC: 101 MMOL/L (ref 95–110)
CHOLEST SERPL-MCNC: 109 MG/DL (ref 120–199)
CHOLEST/HDLC SERPL: 2.2 {RATIO} (ref 2–5)
CO2 SERPL-SCNC: 31 MMOL/L (ref 23–29)
COMPLEXED PSA SERPL-MCNC: 0.42 NG/ML (ref 0–4)
CREAT SERPL-MCNC: 0.9 MG/DL (ref 0.5–1.4)
EST. GFR  (NO RACE VARIABLE): >60 ML/MIN/1.73 M^2
ESTIMATED AVG GLUCOSE: 180 MG/DL (ref 68–131)
GLUCOSE SERPL-MCNC: 118 MG/DL (ref 70–110)
HBA1C MFR BLD: 7.9 % (ref 4.5–6.2)
HDLC SERPL-MCNC: 49 MG/DL (ref 40–75)
HDLC SERPL: 45 % (ref 20–50)
LDLC SERPL CALC-MCNC: 34.6 MG/DL (ref 63–159)
NONHDLC SERPL-MCNC: 60 MG/DL
POTASSIUM SERPL-SCNC: 4.6 MMOL/L (ref 3.5–5.1)
PROT SERPL-MCNC: 7.6 G/DL (ref 6–8.4)
SODIUM SERPL-SCNC: 141 MMOL/L (ref 136–145)
TRIGL SERPL-MCNC: 127 MG/DL (ref 30–150)

## 2024-11-07 PROCEDURE — 84153 ASSAY OF PSA TOTAL: CPT | Performed by: FAMILY MEDICINE

## 2024-11-07 PROCEDURE — 80061 LIPID PANEL: CPT | Performed by: FAMILY MEDICINE

## 2024-11-07 PROCEDURE — 80053 COMPREHEN METABOLIC PANEL: CPT | Performed by: FAMILY MEDICINE

## 2024-11-07 PROCEDURE — 83036 HEMOGLOBIN GLYCOSYLATED A1C: CPT | Performed by: FAMILY MEDICINE

## 2024-11-07 PROCEDURE — 36415 COLL VENOUS BLD VENIPUNCTURE: CPT | Performed by: FAMILY MEDICINE

## 2024-11-19 RX ORDER — DULOXETIN HYDROCHLORIDE 30 MG/1
30 CAPSULE, DELAYED RELEASE ORAL 2 TIMES DAILY
Qty: 180 CAPSULE | Refills: 2 | Status: SHIPPED | OUTPATIENT
Start: 2024-11-19

## 2024-11-19 NOTE — TELEPHONE ENCOUNTER
Refill Decision Note   Hiro Rodríguez  is requesting a refill authorization.    Brief Assessment and Rationale for Refill:   Approve       Medication Therapy Plan:          Comments:     Note composed:12:22 PM 11/19/2024

## 2024-11-19 NOTE — TELEPHONE ENCOUNTER
No care due was identified.  [facility] Embedded Care Due Messages. Reference number: [id number].   11/19/2024 9:33:32 AM CST

## 2025-01-07 RX ORDER — ROSUVASTATIN CALCIUM 20 MG/1
20 TABLET, COATED ORAL
Qty: 90 TABLET | Refills: 1 | Status: SHIPPED | OUTPATIENT
Start: 2025-01-07

## 2025-01-07 NOTE — TELEPHONE ENCOUNTER
Refill Decision Note   Hiro Rodríguez  is requesting a refill authorization.  Brief Assessment and Rationale for Refill:  Approve     Medication Therapy Plan:         Comments:     Note composed:12:03 PM 01/07/2025

## 2025-01-07 NOTE — TELEPHONE ENCOUNTER
No care due was identified.  [facility] Embedded Care Due Messages. Reference number: [id number].   1/07/2025 12:29:58 AM CST

## 2025-01-08 RX ORDER — POTASSIUM CHLORIDE 750 MG/1
20 TABLET, EXTENDED RELEASE ORAL DAILY
Qty: 180 TABLET | Refills: 1 | Status: SHIPPED | OUTPATIENT
Start: 2025-01-08

## 2025-01-08 NOTE — TELEPHONE ENCOUNTER
Refill Routing Note   Medication(s) are not appropriate for processing by [facility] for the following reason(s):        New or recently adjusted medication    [facility] action(s):  Defer        Medication Therapy Plan: initiated by provider on 10/02/2024      Appointments  past 12m or future 3m with PCP    Date Provider   Last Visit   6/24/2024 Gautam Castanon III, MD   Next Visit   Visit date not found Gautam Castanon III, MD   ED visits in past 90 days: 0        Note composed:7:31 AM 01/08/2025

## 2025-01-08 NOTE — TELEPHONE ENCOUNTER
No care due was identified.  [facility] Embedded Care Due Messages. Reference number: [id number].   1/08/2025 12:21:20 AM CST

## 2025-01-22 RX ORDER — OMEGA-3-ACID ETHYL ESTERS 1 G/1
CAPSULE, LIQUID FILLED ORAL
Qty: 360 CAPSULE | Refills: 1 | Status: SHIPPED | OUTPATIENT
Start: 2025-01-22

## 2025-01-22 NOTE — TELEPHONE ENCOUNTER
No care due was identified.  [facility] Embedded Care Due Messages. Reference number: [id number].   1/22/2025 12:15:55 AM CST

## 2025-01-22 NOTE — TELEPHONE ENCOUNTER
Refill Decision Note   Hiro Rodríguez  is requesting a refill authorization.  Brief Assessment and Rationale for Refill:  Approve     Medication Therapy Plan:         Comments:     Note composed:3:04 AM 01/22/2025

## 2025-02-03 NOTE — PT/OT/SLP PROGRESS
Physical Therapy Treatment    Patient Name:  Hiro Rodríguez   MRN:  [id number]    Recommendations:     Discharge Recommendations: rehabilitation facility  Discharge Equipment Recommendations: none  Barriers to discharge:  pt required min assist x 2 persons for safe mobility, pt with decreased caregiver support    Assessment:     Hiro Rodríguez is a 78 y.o. male admitted with a medical diagnosis of Bacteremia due to Escherichia coli.  He presents with the following impairments/functional limitations: weakness, impaired endurance, impaired functional mobility, gait instability, impaired balance, decreased upper extremity function, decreased lower extremity function, decreased safety awareness, pain.    Pt agreeable to visit. Pt required min assist x 2 and RW for sit to stand transfer with verbal cuing for sequencing. Pt ambulated 40' with RW and min assist x 2. Pt with decreased step length and foot clearance. Pt required verbal cuing for RW management. Pt with flexed knees. Pt left up in chair with all needs in reach.    RN called for assist getting pt back to bed as pt's BP was low. Pt required mod assist and RW for stand pivot transfer to bed and min assist for sit to supine with most assist for LLE. Pt left in care of RN.      Rehab Prognosis: Fair; patient would benefit from acute skilled PT services to address these deficits and reach maximum level of function.    Recent Surgery: Procedure(s) (LRB):  CYSTOSCOPY, WITH URETERAL STENT INSERTION (N/A)  REMOVAL, CALCULUS, BLADDER 4 Days Post-Op    Plan:     During this hospitalization, patient to be seen 6 x/week to address the identified rehab impairments via gait training, therapeutic activities, therapeutic exercises and progress toward the following goals:    Plan of Care Expires:  04/03/23    Subjective     Chief Complaint: pt reports legs feeling weak post ambulation  Patient/Family Comments/goals: to get better  Pain/Comfort:  Pain Rating 1:  0/10      Objective:     Communicated with RN prior to session.  Patient found up in chair with telemetry upon PT entry to room.     General Precautions: Standard, fall  Orthopedic Precautions: N/A  Braces: N/A  Respiratory Status: Room air     Functional Mobility:  Bed Mobility:     Sit to Supine: minimum assistance  Transfers:     Sit to Stand:  minimum assistance and of 2 persons with rolling walker  Bed to Chair: moderate assistance with  rolling walker  using  Stand Pivot  Gait: x 40' with RW and min assist x 2 for safety.      AM-PAC 6 CLICK MOBILITY          Treatment & Education:  Pt educated on importance of time OOB, importance of intermittent mobility, safe techniques for transfers/ambulation, discharge recommendations/options, and use of call light for assistance and fall prevention.      Patient left HOB elevated with all lines intact, call button in reach, bed alarm on, and RN present..    GOALS:   Multidisciplinary Problems       Physical Therapy Goals          Problem: Physical Therapy    Goal Priority Disciplines Outcome Goal Variances Interventions   Physical Therapy Goal     PT, PT/OT Ongoing, Progressing     Description: Goals to be met by: 4/3/2023     Patient will increase functional independence with mobility by performin. Supine to sit with MInimal Assistance  2. Sit to stand transfer with Minimal Assistance  3. Bed to chair transfer with Contact Guard Assistance using Rolling Walker  4. Gait  x 100  feet with Contact Guard Assistance using Rolling Walker.                          Time Tracking:     PT Received On: 23  PT Start Time: 1110     PT Stop Time: 1120  PT Total Time (min): 10 min     Billable Minutes: Gait Training 10    Treatment Type: Treatment  PT/PTA: PTA     PTA Visit Number: 1     2023   Male

## 2025-02-05 DIAGNOSIS — I48.20 CHRONIC ATRIAL FIBRILLATION: ICD-10-CM

## 2025-02-06 RX ORDER — APIXABAN 2.5 MG/1
2.5 TABLET, FILM COATED ORAL 2 TIMES DAILY
Qty: 180 TABLET | Refills: 0 | Status: SHIPPED | OUTPATIENT
Start: 2025-02-06

## 2025-02-06 RX ORDER — AMIODARONE HYDROCHLORIDE 100 MG/1
100 TABLET ORAL
Qty: 90 TABLET | Refills: 0 | Status: SHIPPED | OUTPATIENT
Start: 2025-02-06

## 2025-05-15 RX ORDER — AMIODARONE HYDROCHLORIDE 100 MG/1
100 TABLET ORAL
Qty: 90 TABLET | Refills: 0 | Status: SHIPPED | OUTPATIENT
Start: 2025-05-15

## 2025-05-22 ENCOUNTER — OFFICE VISIT (OUTPATIENT)
Dept: FAMILY MEDICINE | Facility: CLINIC | Age: 80
End: 2025-05-22
Payer: MEDICARE

## 2025-05-22 VITALS
TEMPERATURE: 98 F | OXYGEN SATURATION: 94 % | DIASTOLIC BLOOD PRESSURE: 60 MMHG | WEIGHT: 272.94 LBS | BODY MASS INDEX: 39.16 KG/M2 | HEART RATE: 57 BPM | SYSTOLIC BLOOD PRESSURE: 118 MMHG

## 2025-05-22 DIAGNOSIS — Z79.82 ASPIRIN LONG-TERM USE: ICD-10-CM

## 2025-05-22 DIAGNOSIS — E78.5 HYPERLIPIDEMIA, UNSPECIFIED HYPERLIPIDEMIA TYPE: ICD-10-CM

## 2025-05-22 DIAGNOSIS — Z00.00 PHYSICAL EXAM: ICD-10-CM

## 2025-05-22 DIAGNOSIS — I10 ESSENTIAL HYPERTENSION: ICD-10-CM

## 2025-05-22 DIAGNOSIS — E53.8 VITAMIN B12 DEFICIENCY: ICD-10-CM

## 2025-05-22 DIAGNOSIS — L28.2 PRURITIC RASH: ICD-10-CM

## 2025-05-22 DIAGNOSIS — Z99.89 USES WALKER: ICD-10-CM

## 2025-05-22 DIAGNOSIS — Z79.01 ANTICOAGULATED: ICD-10-CM

## 2025-05-22 DIAGNOSIS — R40.0 DROWSINESS: ICD-10-CM

## 2025-05-22 DIAGNOSIS — Z97.4 HEARING AID WORN: ICD-10-CM

## 2025-05-22 DIAGNOSIS — S06.5X0S: ICD-10-CM

## 2025-05-22 DIAGNOSIS — R80.9 TYPE 2 DIABETES MELLITUS WITH MICROALBUMINURIA, WITHOUT LONG-TERM CURRENT USE OF INSULIN: ICD-10-CM

## 2025-05-22 DIAGNOSIS — R41.89 COGNITIVE IMPAIRMENT: ICD-10-CM

## 2025-05-22 DIAGNOSIS — I48.20 CHRONIC ATRIAL FIBRILLATION: Primary | ICD-10-CM

## 2025-05-22 DIAGNOSIS — Z79.899 LONG TERM CURRENT USE OF AMIODARONE: ICD-10-CM

## 2025-05-22 DIAGNOSIS — H91.90 HEARING LOSS, UNSPECIFIED HEARING LOSS TYPE, UNSPECIFIED LATERALITY: ICD-10-CM

## 2025-05-22 DIAGNOSIS — E11.29 TYPE 2 DIABETES MELLITUS WITH MICROALBUMINURIA, WITHOUT LONG-TERM CURRENT USE OF INSULIN: ICD-10-CM

## 2025-05-22 DIAGNOSIS — R53.83 FATIGUE, UNSPECIFIED TYPE: ICD-10-CM

## 2025-05-22 DIAGNOSIS — I50.32 CHRONIC HEART FAILURE WITH PRESERVED EJECTION FRACTION: ICD-10-CM

## 2025-05-22 PROCEDURE — 3288F FALL RISK ASSESSMENT DOCD: CPT | Mod: CPTII,S$GLB,, | Performed by: FAMILY MEDICINE

## 2025-05-22 PROCEDURE — 99213 OFFICE O/P EST LOW 20 MIN: CPT | Mod: 25,S$GLB,, | Performed by: FAMILY MEDICINE

## 2025-05-22 PROCEDURE — 3074F SYST BP LT 130 MM HG: CPT | Mod: CPTII,S$GLB,, | Performed by: FAMILY MEDICINE

## 2025-05-22 PROCEDURE — 1160F RVW MEDS BY RX/DR IN RCRD: CPT | Mod: CPTII,S$GLB,, | Performed by: FAMILY MEDICINE

## 2025-05-22 PROCEDURE — 1101F PT FALLS ASSESS-DOCD LE1/YR: CPT | Mod: CPTII,S$GLB,, | Performed by: FAMILY MEDICINE

## 2025-05-22 PROCEDURE — 1125F AMNT PAIN NOTED PAIN PRSNT: CPT | Mod: CPTII,S$GLB,, | Performed by: FAMILY MEDICINE

## 2025-05-22 PROCEDURE — 3078F DIAST BP <80 MM HG: CPT | Mod: CPTII,S$GLB,, | Performed by: FAMILY MEDICINE

## 2025-05-22 PROCEDURE — 99397 PER PM REEVAL EST PAT 65+ YR: CPT | Mod: S$GLB,,, | Performed by: FAMILY MEDICINE

## 2025-05-22 PROCEDURE — 99999 PR PBB SHADOW E&M-EST. PATIENT-LVL V: CPT | Mod: PBBFAC,,, | Performed by: FAMILY MEDICINE

## 2025-05-22 PROCEDURE — 1159F MED LIST DOCD IN RCRD: CPT | Mod: CPTII,S$GLB,, | Performed by: FAMILY MEDICINE

## 2025-05-22 RX ORDER — SEMAGLUTIDE 2.68 MG/ML
2 INJECTION, SOLUTION SUBCUTANEOUS
Qty: 3 ML | Refills: 0 | Status: CANCELLED | OUTPATIENT
Start: 2025-05-22

## 2025-05-22 RX ORDER — TIRZEPATIDE 5 MG/.5ML
5 INJECTION, SOLUTION SUBCUTANEOUS
Qty: 4 PEN | Refills: 0 | Status: SHIPPED | OUTPATIENT
Start: 2025-05-22

## 2025-05-22 RX ORDER — FUROSEMIDE 20 MG/1
20 TABLET ORAL DAILY
Qty: 90 TABLET | Refills: 2 | Status: SHIPPED | OUTPATIENT
Start: 2025-05-22

## 2025-05-22 RX ORDER — BETAMETHASONE DIPROPIONATE 0.5 MG/G
CREAM TOPICAL DAILY
Qty: 45 G | Refills: 2 | Status: SHIPPED | OUTPATIENT
Start: 2025-05-22

## 2025-05-22 NOTE — PATIENT INSTRUCTIONS
Urine and Blood test fasting 8 hr due now/soon  - call [facility] to schedule        All medications will be sent to pharm after 5:30 pm today   - mounjaro 5 mg   - lasix 20 mg       Call derm for appointment

## 2025-05-22 NOTE — PROGRESS NOTES
SCRIBE #1 NOTE: I, Negro Stone, am scribing for, and in the presence of,  Gautam Castanon III, MD. I have scribed the entire note.     Subjective:       Patient ID: Hiro Rodríguez is a 80 y.o. male.    Chief Complaint: Annual Exam    Mr. Rodríguez is here for an annual exam with his last appointment being here on June 24, 2024. Accompanied by wife.     Social history: Nonsmoker. No alcohol intake of significance.     Past medical history: Uses walker. Needs okay for ablation. 3 months since his last fall. Hearing loss but not wearing hearing aids today. History of subdural hematoma. BMI of 29.16. Cardiovascular good. No chest pain. No palpitations. Blood pressure is 118/60. Hypertension controlled. Hyperlipidemia. Type 2 diabetes mellitus with microalbuminuria. He is out of Ozempic and wants to see if he can get a reasonable price. He has been off this for a while. He is not checking his sugar. CHFpEF. Needs Lasix. Chronic atrial fibrillation. On Amiodarone. Anticoagulated. Using aspirin. PSA is up to date.      Review of Systems   Constitutional:  Positive for fatigue. Negative for chills and fever.   HENT:  Positive for hearing loss. Negative for congestion and sore throat.    Eyes:  Negative for pain and visual disturbance.   Respiratory:  Negative for chest tightness and shortness of breath.    Cardiovascular:  Negative for chest pain.   Gastrointestinal:  Negative for nausea.   Endocrine: Negative for polydipsia and polyuria.   Genitourinary:  Negative for dysuria and flank pain.   Musculoskeletal:  Negative for back pain, neck pain and neck stiffness.   Skin:  Positive for wound (sore on left upper arm). Negative for rash.        Itching on back   Allergic/Immunologic: Negative for immunocompromised state.   Neurological:  Negative for dizziness and weakness.   Hematological:  Does not bruise/bleed easily.   Psychiatric/Behavioral:  Positive for confusion. Negative for agitation and behavioral  problems.    All other systems reviewed and are negative.      Objective:      Physical Exam  Vitals reviewed.   Constitutional:       Appearance: Normal appearance. He is well-developed.   HENT:      Head: Normocephalic and atraumatic.      Right Ear: Tympanic membrane normal.      Left Ear: Tympanic membrane normal.      Nose: Nose normal.      Mouth/Throat:      Mouth: Mucous membranes are moist.      Pharynx: No oropharyngeal exudate.   Eyes:      Conjunctiva/sclera: Conjunctivae normal.      Pupils: Pupils are equal, round, and reactive to light.   Neck:      Vascular: No carotid bruit.   Cardiovascular:      Rate and Rhythm: Irregularly irregular.     Pulses: Normal pulses.      Heart sounds: Normal heart sounds. No murmur heard. No gallop.   Pulmonary:      Effort: Pulmonary effort is normal.      Breath sounds: Normal breath sounds.   Abdominal:      General: Bowel sounds are normal.      Palpations: Abdomen is soft. There is no mass.   Musculoskeletal:         General: Normal range of motion.      Cervical back: Normal range of motion.   Skin:     General: Skin is warm and dry.   Neurological:      General: No focal deficit present.      Mental Status: He is alert and oriented to person, place, and time.   Psychiatric:         Mood and Affect: Mood normal.         Behavior: Behavior normal.       Assessment:       1. Chronic atrial fibrillation    2. Type 2 diabetes mellitus with microalbuminuria, without long-term current use of insulin    3. Cognitive impairment    4. Pruritic rash    5. Fatigue, unspecified type    6. Drowsiness    7. Uses walker    8. Anticoagulated    9. Long term current use of amiodarone    10. Physical exam    11. Hyperlipidemia, unspecified hyperlipidemia type    12. Hearing loss, unspecified hearing loss type, unspecified laterality    13. Hearing aid worn    14. Chronic heart failure with preserved ejection fraction    15. Aspirin long-term use    16. BMI 39.0-39.9,adult    17.  Subdural hematoma due to concussion, without loss of consciousness, sequela    18. Vitamin B12 deficiency    19. Essential hypertension        Plan:       Chronic atrial fibrillation    Type 2 diabetes mellitus with microalbuminuria, without long-term current use of insulin  -     Hemoglobin A1C; Future; Expected date: 05/22/2025  -     Comprehensive Metabolic Panel; Future; Expected date: 05/22/2025  -     Lipid Panel; Future; Expected date: 05/22/2025  -     TSH; Future; Expected date: 05/22/2025  -     BNP; Future; Expected date: 05/22/2025  -     Vitamin B12; Future; Expected date: 05/22/2025  -     Microalbumin/Creatinine Ratio, Urine; Future; Expected date: 05/22/2025    Cognitive impairment    Pruritic rash  -     Ambulatory referral/consult to Dermatology; Future; Expected date: 05/29/2025    Fatigue, unspecified type  -     Hemoglobin A1C; Future; Expected date: 05/22/2025  -     Comprehensive Metabolic Panel; Future; Expected date: 05/22/2025  -     Lipid Panel; Future; Expected date: 05/22/2025  -     TSH; Future; Expected date: 05/22/2025  -     BNP; Future; Expected date: 05/22/2025  -     Vitamin B12; Future; Expected date: 05/22/2025  -     Microalbumin/Creatinine Ratio, Urine; Future; Expected date: 05/22/2025    Drowsiness    Uses walker    Anticoagulated    Long term current use of amiodarone    Physical exam  -     Hemoglobin A1C; Future; Expected date: 05/22/2025  -     Comprehensive Metabolic Panel; Future; Expected date: 05/22/2025  -     Lipid Panel; Future; Expected date: 05/22/2025  -     TSH; Future; Expected date: 05/22/2025  -     BNP; Future; Expected date: 05/22/2025  -     Vitamin B12; Future; Expected date: 05/22/2025  -     Microalbumin/Creatinine Ratio, Urine; Future; Expected date: 05/22/2025    Hyperlipidemia, unspecified hyperlipidemia type  -     Hemoglobin A1C; Future; Expected date: 05/22/2025  -     Comprehensive Metabolic Panel; Future; Expected date: 05/22/2025  -     Lipid  Panel; Future; Expected date: 05/22/2025  -     TSH; Future; Expected date: 05/22/2025  -     BNP; Future; Expected date: 05/22/2025  -     Vitamin B12; Future; Expected date: 05/22/2025  -     Microalbumin/Creatinine Ratio, Urine; Future; Expected date: 05/22/2025    Hearing loss, unspecified hearing loss type, unspecified laterality    Hearing aid worn    Chronic heart failure with preserved ejection fraction    Aspirin long-term use    BMI 39.0-39.9,adult  -     Hemoglobin A1C; Future; Expected date: 05/22/2025  -     Comprehensive Metabolic Panel; Future; Expected date: 05/22/2025  -     Lipid Panel; Future; Expected date: 05/22/2025  -     TSH; Future; Expected date: 05/22/2025  -     BNP; Future; Expected date: 05/22/2025  -     Vitamin B12; Future; Expected date: 05/22/2025  -     Microalbumin/Creatinine Ratio, Urine; Future; Expected date: 05/22/2025    Subdural hematoma due to concussion, without loss of consciousness, sequela    Vitamin B12 deficiency  -     Vitamin B12; Future; Expected date: 05/22/2025    Essential hypertension    Okay for his lumbar injection.  Can hold his Eliquis for this.  Refill his medication.  A1c CMP lipids TSH BNP.  Eye exam in June.  Microalbumin ordered.      In addition to the psychical, he is having multiple other ailments. He has been having an itching sensation over his entire back that has been going on for a month now. Reports this is an intense sensation. No rash. He can go 2 days without itching, and then it can come back. The itching will last for 4-5 hours. He has a sore on his left upper arm that has been there for 3-4 days. Wife states he is having issues hearing. He has been sleeping for many hours a day. No snoring. He can fall asleep in the chair. Wife states he gets disoriented with day, time, and where he is. He will also think there are other people with them.     Physical examination: Vital signs noted. No acute distress. Ears are okay. No carotid bruit.  Heart is irregularly irregular. Lungs clear to auscultation bilaterally. Abdomen bowel sounds positive soft and nontender. Extremities without edema. 2+ pedal pulses. Back is okay. 1cm raised pink plaque on anterior left arm.  No rash on the back.    Impression: Cognitive impairment. Pruritus. Drowsiness. Fatigue. Neoplasm of skin.     See Dermatology regarding the neoplasm on the arm.  This is come up rapidly.  Change him to Mounjaro 5 mg weekly to aid in weight loss.  Betamethasone cream to the back to the area where he has itching.  Can use this up to twice a day.  Check a B12 level due to the cognitive issues.  As well as a TSH.  Drowsiness and fatigue will get a TSH as well as routine labs.    All care gaps addressed or discussed.     I, Gautam Castanon III, MD, personally performed the services described in this documentation. All medical record entries made by the scribe were at my direction and in my presence. I have reviewed the chart and agree that the record reflects my personal performance and is accurate and complete.

## 2025-05-24 PROBLEM — R41.89 COGNITIVE IMPAIRMENT: Status: ACTIVE | Noted: 2025-05-24

## 2025-05-24 PROBLEM — Z99.89 USES WALKER: Status: ACTIVE | Noted: 2025-05-24

## 2025-05-24 PROBLEM — E66.01 MORBID OBESITY: Status: RESOLVED | Noted: 2020-08-31 | Resolved: 2025-05-24

## 2025-05-24 PROBLEM — Z97.4 HEARING AID WORN: Status: ACTIVE | Noted: 2025-05-24

## 2025-05-28 DIAGNOSIS — N40.0 BENIGN PROSTATIC HYPERPLASIA WITHOUT LOWER URINARY TRACT SYMPTOMS: Chronic | ICD-10-CM

## 2025-05-28 RX ORDER — FINASTERIDE 5 MG/1
5 TABLET, FILM COATED ORAL
Qty: 90 TABLET | Refills: 3 | Status: SHIPPED | OUTPATIENT
Start: 2025-05-28

## 2025-05-28 NOTE — TELEPHONE ENCOUNTER
Refill Routing Note   Medication(s) are not appropriate for processing by [facility] for the following reason(s):        No active prescription written by provider    [facility] action(s):  Defer      Medication Therapy Plan:  ON THE MED LIST 25.      Appointments  past 12m or future 3m with PCP    Date Provider   Last Visit   2025 Gautam Castanon III, MD   Next Visit   2025 Gautam Castanon III, MD   ED visits in past 90 days: 0        Note composed:5:38 PM 2025

## 2025-05-28 NOTE — TELEPHONE ENCOUNTER
No care due was identified.  [facility] Embedded Care Due Messages. Reference number: [id number].   5/28/2025 3:28:53 PM CDT

## 2025-06-16 ENCOUNTER — LAB VISIT (OUTPATIENT)
Dept: LAB | Facility: HOSPITAL | Age: 80
End: 2025-06-16
Attending: FAMILY MEDICINE
Payer: MEDICARE

## 2025-06-16 DIAGNOSIS — E53.8 VITAMIN B12 DEFICIENCY: ICD-10-CM

## 2025-06-16 DIAGNOSIS — E78.5 HYPERLIPIDEMIA, UNSPECIFIED HYPERLIPIDEMIA TYPE: ICD-10-CM

## 2025-06-16 DIAGNOSIS — R53.83 FATIGUE, UNSPECIFIED TYPE: ICD-10-CM

## 2025-06-16 DIAGNOSIS — Z79.01 ANTICOAGULATED: ICD-10-CM

## 2025-06-16 DIAGNOSIS — Z00.00 PHYSICAL EXAM: ICD-10-CM

## 2025-06-16 DIAGNOSIS — R80.9 TYPE 2 DIABETES MELLITUS WITH MICROALBUMINURIA, WITHOUT LONG-TERM CURRENT USE OF INSULIN: ICD-10-CM

## 2025-06-16 DIAGNOSIS — E11.29 TYPE 2 DIABETES MELLITUS WITH MICROALBUMINURIA, WITHOUT LONG-TERM CURRENT USE OF INSULIN: ICD-10-CM

## 2025-06-16 LAB
ABSOLUTE EOSINOPHIL (SMH): 0.1 K/UL
ABSOLUTE MONOCYTE (SMH): 0.85 K/UL (ref 0.3–1)
ABSOLUTE NEUTROPHIL COUNT (SMH): 7.4 K/UL (ref 1.8–7.7)
ALBUMIN SERPL-MCNC: 4 G/DL (ref 3.5–5.2)
ALP SERPL-CCNC: 57 UNIT/L (ref 55–135)
ALT SERPL-CCNC: 11 UNIT/L (ref 10–44)
ANION GAP (SMH): 5 MMOL/L (ref 8–16)
AST SERPL-CCNC: 11 UNIT/L (ref 10–40)
BASOPHILS # BLD AUTO: 0.05 K/UL
BASOPHILS NFR BLD AUTO: 0.4 %
BILIRUB SERPL-MCNC: 0.6 MG/DL (ref 0.1–1)
BNP SERPL-MCNC: 58 PG/ML
BUN SERPL-MCNC: 25 MG/DL (ref 8–23)
CALCIUM SERPL-MCNC: 9.3 MG/DL (ref 8.7–10.5)
CHLORIDE SERPL-SCNC: 100 MMOL/L (ref 95–110)
CHOLEST SERPL-MCNC: 136 MG/DL (ref 120–199)
CHOLEST/HDLC SERPL: 3.4 {RATIO} (ref 2–5)
CO2 SERPL-SCNC: 32 MMOL/L (ref 23–29)
CREAT SERPL-MCNC: 0.8 MG/DL (ref 0.5–1.4)
EAG (SMH): 174 MG/DL (ref 68–131)
ERYTHROCYTE [DISTWIDTH] IN BLOOD BY AUTOMATED COUNT: 13.4 % (ref 11.5–14.5)
GFR SERPLBLD CREATININE-BSD FMLA CKD-EPI: >60 ML/MIN/1.73/M2
GLUCOSE SERPL-MCNC: 127 MG/DL (ref 70–110)
HBA1C MFR BLD: 7.7 % (ref 4.5–6.2)
HCT VFR BLD AUTO: 47.4 % (ref 40–54)
HDLC SERPL-MCNC: 40 MG/DL (ref 40–75)
HDLC SERPL: 29.4 % (ref 20–50)
HGB BLD-MCNC: 14.8 GM/DL (ref 14–18)
IMM GRANULOCYTES # BLD AUTO: 0.06 K/UL (ref 0–0.04)
IMM GRANULOCYTES NFR BLD AUTO: 0.5 % (ref 0–0.5)
LDLC SERPL CALC-MCNC: 68 MG/DL (ref 63–159)
LYMPHOCYTES # BLD AUTO: 2.89 K/UL (ref 1–4.8)
MCH RBC QN AUTO: 29.7 PG (ref 27–31)
MCHC RBC AUTO-ENTMCNC: 31.2 G/DL (ref 32–36)
MCV RBC AUTO: 95 FL (ref 82–98)
NONHDLC SERPL-MCNC: 96 MG/DL
NUCLEATED RBC (/100WBC) (SMH): 0 /100 WBC
PLATELET # BLD AUTO: 308 K/UL (ref 150–450)
PMV BLD AUTO: 9.5 FL (ref 9.2–12.9)
POTASSIUM SERPL-SCNC: 4.2 MMOL/L (ref 3.5–5.1)
PROT SERPL-MCNC: 7.1 GM/DL (ref 6–8.4)
RBC # BLD AUTO: 4.99 M/UL (ref 4.6–6.2)
RELATIVE EOSINOPHIL (SMH): 0.9 % (ref 0–8)
RELATIVE LYMPHOCYTE (SMH): 25.4 % (ref 18–48)
RELATIVE MONOCYTE (SMH): 7.5 % (ref 4–15)
RELATIVE NEUTROPHIL (SMH): 65.3 % (ref 38–73)
SODIUM SERPL-SCNC: 137 MMOL/L (ref 136–145)
TRIGL SERPL-MCNC: 140 MG/DL (ref 30–150)
TSH SERPL-ACNC: 3.5 UIU/ML (ref 0.34–5.6)
VIT B12 SERPL-MCNC: 1284 PG/ML (ref 210–950)
WBC # BLD AUTO: 11.38 K/UL (ref 3.9–12.7)

## 2025-06-16 PROCEDURE — 82247 BILIRUBIN TOTAL: CPT

## 2025-06-16 PROCEDURE — 82465 ASSAY BLD/SERUM CHOLESTEROL: CPT

## 2025-06-16 PROCEDURE — 83880 ASSAY OF NATRIURETIC PEPTIDE: CPT

## 2025-06-16 PROCEDURE — 83036 HEMOGLOBIN GLYCOSYLATED A1C: CPT

## 2025-06-16 PROCEDURE — 82607 VITAMIN B-12: CPT

## 2025-06-16 PROCEDURE — 85025 COMPLETE CBC W/AUTO DIFF WBC: CPT

## 2025-06-16 PROCEDURE — 36415 COLL VENOUS BLD VENIPUNCTURE: CPT

## 2025-06-16 PROCEDURE — 84443 ASSAY THYROID STIM HORMONE: CPT

## 2025-06-17 ENCOUNTER — RESULTS FOLLOW-UP (OUTPATIENT)
Dept: FAMILY MEDICINE | Facility: CLINIC | Age: 80
End: 2025-06-17

## 2025-06-22 RX ORDER — ROSUVASTATIN CALCIUM 20 MG/1
20 TABLET, COATED ORAL
Qty: 90 TABLET | Refills: 3 | Status: SHIPPED | OUTPATIENT
Start: 2025-06-22

## 2025-06-22 RX ORDER — POTASSIUM CHLORIDE 750 MG/1
20 TABLET, EXTENDED RELEASE ORAL DAILY
Qty: 180 TABLET | Refills: 3 | Status: SHIPPED | OUTPATIENT
Start: 2025-06-22

## 2025-06-22 NOTE — TELEPHONE ENCOUNTER
No care due was identified.  Health [facility] Embedded Care Due Messages. Reference number: [id number].   6/22/2025 12:22:16 AM CDT

## 2025-06-22 NOTE — TELEPHONE ENCOUNTER
Refill Decision Note   Hiro Anne  is requesting a refill authorization.  Brief Assessment and Rationale for Refill:  Approve     Medication Therapy Plan:       Medication Reconciliation Completed: No   Comments:     No Care Gaps recommended.     Note composed:12:45 PM 06/22/2025

## 2025-06-27 ENCOUNTER — LAB VISIT (OUTPATIENT)
Dept: LAB | Facility: HOSPITAL | Age: 80
End: 2025-06-27
Attending: FAMILY MEDICINE
Payer: MEDICARE

## 2025-06-27 DIAGNOSIS — R80.9 TYPE 2 DIABETES MELLITUS WITH MICROALBUMINURIA, WITHOUT LONG-TERM CURRENT USE OF INSULIN: ICD-10-CM

## 2025-06-27 DIAGNOSIS — Z79.01 ANTICOAGULATED: ICD-10-CM

## 2025-06-27 DIAGNOSIS — E53.8 VITAMIN B12 DEFICIENCY: ICD-10-CM

## 2025-06-27 DIAGNOSIS — Z00.00 PHYSICAL EXAM: ICD-10-CM

## 2025-06-27 DIAGNOSIS — R53.83 FATIGUE, UNSPECIFIED TYPE: ICD-10-CM

## 2025-06-27 DIAGNOSIS — E78.5 HYPERLIPIDEMIA, UNSPECIFIED HYPERLIPIDEMIA TYPE: ICD-10-CM

## 2025-06-27 DIAGNOSIS — E11.29 TYPE 2 DIABETES MELLITUS WITH MICROALBUMINURIA, WITHOUT LONG-TERM CURRENT USE OF INSULIN: ICD-10-CM

## 2025-06-27 LAB
ALBUMIN/CREAT UR: 64.9 UG/MG
CREAT UR-MCNC: 70.7 MG/DL (ref 23–375)
MICROALBUMIN UR-MCNC: 45.9 UG/ML

## 2025-06-27 PROCEDURE — 82043 UR ALBUMIN QUANTITATIVE: CPT

## 2025-06-30 ENCOUNTER — TELEPHONE (OUTPATIENT)
Dept: FAMILY MEDICINE | Facility: CLINIC | Age: 80
End: 2025-06-30
Payer: MEDICARE

## 2025-06-30 ENCOUNTER — OFFICE VISIT (OUTPATIENT)
Dept: FAMILY MEDICINE | Facility: CLINIC | Age: 80
End: 2025-06-30
Payer: MEDICARE

## 2025-06-30 VITALS
SYSTOLIC BLOOD PRESSURE: 112 MMHG | OXYGEN SATURATION: 98 % | HEART RATE: 79 BPM | WEIGHT: 267.75 LBS | TEMPERATURE: 98 F | HEIGHT: 70 IN | BODY MASS INDEX: 38.33 KG/M2 | DIASTOLIC BLOOD PRESSURE: 66 MMHG

## 2025-06-30 DIAGNOSIS — S06.5X0S: ICD-10-CM

## 2025-06-30 DIAGNOSIS — R80.9 TYPE 2 DIABETES MELLITUS WITH MICROALBUMINURIA, WITHOUT LONG-TERM CURRENT USE OF INSULIN: ICD-10-CM

## 2025-06-30 DIAGNOSIS — Z79.01 ANTICOAGULATED: ICD-10-CM

## 2025-06-30 DIAGNOSIS — E11.29 TYPE 2 DIABETES MELLITUS WITH MICROALBUMINURIA, WITHOUT LONG-TERM CURRENT USE OF INSULIN: ICD-10-CM

## 2025-06-30 DIAGNOSIS — I10 HYPERTENSION, ESSENTIAL: Primary | ICD-10-CM

## 2025-06-30 DIAGNOSIS — I48.91 ATRIAL FIBRILLATION, UNSPECIFIED TYPE: ICD-10-CM

## 2025-06-30 DIAGNOSIS — Z79.82 ASPIRIN LONG-TERM USE: ICD-10-CM

## 2025-06-30 DIAGNOSIS — I50.30 HEART FAILURE WITH PRESERVED EJECTION FRACTION, UNSPECIFIED HF CHRONICITY: ICD-10-CM

## 2025-06-30 PROCEDURE — 1126F AMNT PAIN NOTED NONE PRSNT: CPT | Mod: CPTII,S$GLB,, | Performed by: FAMILY MEDICINE

## 2025-06-30 PROCEDURE — 99999 PR PBB SHADOW E&M-EST. PATIENT-LVL IV: CPT | Mod: PBBFAC,,, | Performed by: FAMILY MEDICINE

## 2025-06-30 PROCEDURE — 1159F MED LIST DOCD IN RCRD: CPT | Mod: CPTII,S$GLB,, | Performed by: FAMILY MEDICINE

## 2025-06-30 PROCEDURE — 99214 OFFICE O/P EST MOD 30 MIN: CPT | Mod: S$GLB,,, | Performed by: FAMILY MEDICINE

## 2025-06-30 PROCEDURE — G2211 COMPLEX E/M VISIT ADD ON: HCPCS | Mod: S$GLB,,, | Performed by: FAMILY MEDICINE

## 2025-06-30 PROCEDURE — 3288F FALL RISK ASSESSMENT DOCD: CPT | Mod: CPTII,S$GLB,, | Performed by: FAMILY MEDICINE

## 2025-06-30 PROCEDURE — 1101F PT FALLS ASSESS-DOCD LE1/YR: CPT | Mod: CPTII,S$GLB,, | Performed by: FAMILY MEDICINE

## 2025-06-30 PROCEDURE — 1160F RVW MEDS BY RX/DR IN RCRD: CPT | Mod: CPTII,S$GLB,, | Performed by: FAMILY MEDICINE

## 2025-06-30 PROCEDURE — 3074F SYST BP LT 130 MM HG: CPT | Mod: CPTII,S$GLB,, | Performed by: FAMILY MEDICINE

## 2025-06-30 PROCEDURE — 3078F DIAST BP <80 MM HG: CPT | Mod: CPTII,S$GLB,, | Performed by: FAMILY MEDICINE

## 2025-06-30 RX ORDER — TIRZEPATIDE 7.5 MG/.5ML
7.5 INJECTION, SOLUTION SUBCUTANEOUS
Qty: 4 PEN | Refills: 0 | Status: SHIPPED | OUTPATIENT
Start: 2025-06-30

## 2025-06-30 RX ORDER — DULOXETIN HYDROCHLORIDE 30 MG/1
30 CAPSULE, DELAYED RELEASE ORAL 2 TIMES DAILY
Qty: 180 CAPSULE | Refills: 2 | Status: SHIPPED | OUTPATIENT
Start: 2025-06-30 | End: 2025-06-30 | Stop reason: SDUPTHER

## 2025-06-30 RX ORDER — LANOLIN ALCOHOL/MO/W.PET/CERES
250 CREAM (GRAM) TOPICAL DAILY
COMMUNITY

## 2025-06-30 RX ORDER — DULOXETIN HYDROCHLORIDE 30 MG/1
30 CAPSULE, DELAYED RELEASE ORAL 2 TIMES DAILY
Qty: 180 CAPSULE | Refills: 2 | Status: SHIPPED | OUTPATIENT
Start: 2025-06-30

## 2025-06-30 RX ORDER — TIRZEPATIDE 7.5 MG/.5ML
7.5 INJECTION, SOLUTION SUBCUTANEOUS
COMMUNITY
End: 2025-06-30 | Stop reason: SDUPTHER

## 2025-06-30 NOTE — PROGRESS NOTES
SCRIBE #1 NOTE: I, Negro Stone, am scribing for, and in the presence of,  Gautam Castanon III, MD. I have scribed the entire note.     Subjective:       Patient ID: Hiro Rodríguez is a 80 y.o. male.    Chief Complaint: Follow-up (mounjaro)    Mr. Rodríguez is here for a follow up with his last appointment being here on May 22, 2025. Accompanied by wife. Uses walker. History of subdural hematoma. BMI of 38.42, down 6 pounds. Cardiovascular good. No chest pain. No palpitations. Blood pressure is 112/66. Hypertension controlled. Hyperlipidemia. Type 2 diabetes mellitus. On Mounjaro 5mg weekly and has no side effects. He wants to increase the dose. A1C is 7.7. Blood sugar is 127. HFpEF. No shortness of breath. BNP is 58. Atrial fibrillation. On Amiodarone. Anticoagulated with Eliquis. Using aspirin. He has cardiac tests ordered by Dr. Oznua in 2 weeks. Sees dermatology today. TSH is 3.5. B12 is 1,284. Microalbumin is slightly positive with ratio of 64.9. CBC is okay. RSV vaccine was discussed. Eye exam was done with Dr. Tate, but it is not in the system.     Review of Systems   Constitutional:  Negative for chills and fever.   HENT:  Negative for congestion and sore throat.    Eyes:  Negative for pain and visual disturbance.   Respiratory:  Negative for chest tightness and shortness of breath.    Cardiovascular:  Negative for chest pain.   Gastrointestinal:  Negative for nausea.   Endocrine: Negative for polydipsia and polyuria.   Genitourinary:  Negative for dysuria and flank pain.   Musculoskeletal:  Negative for back pain, neck pain and neck stiffness.   Skin:  Negative for rash.   Allergic/Immunologic: Negative for immunocompromised state.   Neurological:  Negative for dizziness and weakness.   Hematological:  Does not bruise/bleed easily.   Psychiatric/Behavioral:  Negative for agitation and behavioral problems.    All other systems reviewed and are negative.      Objective:      Physical examination:  Vital signs noted. No acute distress. No carotid bruit. Regular heart rate and rhythm. Lungs clear to auscultation bilaterally. Abdomen bowel sounds positive soft and nontender. Extremities without edema. 2+ pedal pulses.      Assessment:       1. Hypertension, essential    2. Aspirin long-term use    3. Anticoagulated    4. Type 2 diabetes mellitus with microalbuminuria, without long-term current use of insulin    5. BMI 38.0-38.9,adult    6. Heart failure with preserved ejection fraction, unspecified HF chronicity    7. Atrial fibrillation, unspecified type    8. Subdural hematoma due to concussion, without loss of consciousness, sequela        Plan:       Hypertension, essential    Aspirin long-term use    Anticoagulated    Type 2 diabetes mellitus with microalbuminuria, without long-term current use of insulin  -     tirzepatide (MOUNJARO) 7.5 mg/0.5 mL PnIj; Inject 7.5 mg into the skin every 7 days.  Dispense: 4 Pen; Refill: 0    BMI 38.0-38.9,adult    Heart failure with preserved ejection fraction, unspecified HF chronicity    Atrial fibrillation, unspecified type    Subdural hematoma due to concussion, without loss of consciousness, sequela    Other orders  -     Discontinue: DULoxetine (CYMBALTA) 30 MG capsule; Take 1 capsule (30 mg total) by mouth 2 (two) times daily.  Dispense: 180 capsule; Refill: 2  -     DULoxetine (CYMBALTA) 30 MG capsule; Take 1 capsule (30 mg total) by mouth 2 (two) times daily.  Dispense: 180 capsule; Refill: 2    Increase Mounjaro to 7.5 mg weekly 4 pens.  Follow-up in a month.  RSV vaccine recommended.  Eye exam current done by Dr. Donnie de santiago.  Refill Cymbalta.  Continue aspirin.  Hypertension is controlled.  Continue anticoagulation.  Daily weights.  All care gaps addressed or discussed.     I, Gautam Castanon III, MD, personally performed the services described in this documentation. All medical record entries made by the scribe were at my direction and in my presence. I have  reviewed the chart and agree that the record reflects my personal performance and is accurate and complete.

## 2025-06-30 NOTE — TELEPHONE ENCOUNTER
----- Message from Gautam Castanon MD sent at 6/27/2025  9:36 PM CDT -----  Stable FOLLOW-UP AS RECOMMENDED  ----- Message -----  From: Lab, Background User  Sent: 6/27/2025   5:09 PM CDT  To: Gautam Castanon III, MD

## 2025-07-02 DIAGNOSIS — I48.20 CHRONIC ATRIAL FIBRILLATION: ICD-10-CM

## 2025-07-02 RX ORDER — APIXABAN 2.5 MG/1
2.5 TABLET, FILM COATED ORAL 2 TIMES DAILY
Qty: 180 TABLET | Refills: 0 | Status: SHIPPED | OUTPATIENT
Start: 2025-07-02

## 2025-07-02 NOTE — TELEPHONE ENCOUNTER
No care due was identified.  Health [facility] Embedded Care Due Messages. Reference number: [id number].   7/02/2025 11:28:11 AM CDT

## 2025-07-03 ENCOUNTER — PATIENT OUTREACH (OUTPATIENT)
Dept: ADMINISTRATIVE | Facility: HOSPITAL | Age: 80
End: 2025-07-03
Payer: MEDICARE

## 2025-07-03 NOTE — PROGRESS NOTES
Population Health Chart Review & Patient Outreach Details      Additional Pop Health Notes:      CAMPAIGN- Preventative Care Screening         Updates Requested / Reviewed:      Care Everywhere and          Health Maintenance Topics Overdue:      [facility] Score: 1     Eye Exam    RSV Vaccine                  Health Maintenance Topic(s) Outreach Outcomes & Actions Taken:    Eye Exam - Outreach Outcomes & Actions Taken  : External Records Requested & Care Team Updated if Applicable

## 2025-07-03 NOTE — LETTER
AUTHORIZATION FOR RELEASE OF   CONFIDENTIAL INFORMATION    Ambrosio Tate MD    We are seeing Hiro Rodríguez, date of birth 1945, in the clinic at [facility]. Gautam Castanon III, MD is the patient's PCP. Hiro Rodríguez has an outstanding lab/procedure at the time we reviewed his chart. In order to help keep his health information updated, he has authorized us to request the following medical record(s):          EYE EXAM           Please fax records to [phone] or email to [email or web address].    Thank you So much!               If you have any questions, please contact Erica Burnett, Care Coordinator   at [phone].            Patient Name: Hiro Rodríguez  : 1945  Patient Phone #: [phone]

## 2025-07-18 RX ORDER — LISINOPRIL 2.5 MG/1
2.5 TABLET ORAL 2 TIMES DAILY
Qty: 180 TABLET | Refills: 3 | Status: SHIPPED | OUTPATIENT
Start: 2025-07-18

## 2025-07-18 RX ORDER — OMEGA-3-ACID ETHYL ESTERS 1 G/1
2 CAPSULE, LIQUID FILLED ORAL 2 TIMES DAILY
Qty: 360 CAPSULE | Refills: 3 | Status: SHIPPED | OUTPATIENT
Start: 2025-07-18

## 2025-07-18 NOTE — TELEPHONE ENCOUNTER
No care due was identified.  [facility] Embedded Care Due Messages. Reference number: [id number].   7/18/2025 12:45:21 AM CDT

## 2025-07-19 RX ORDER — METFORMIN HYDROCHLORIDE 500 MG/1
500 TABLET, EXTENDED RELEASE ORAL
Qty: 90 TABLET | Refills: 1 | Status: SHIPPED | OUTPATIENT
Start: 2025-07-19

## 2025-07-19 NOTE — TELEPHONE ENCOUNTER
Refill Routing Note   Medication(s) are not appropriate for processing by [facility] for the following reason(s):        Drug-disease interaction    [facility] action(s):  Defer        Medication Therapy Plan: Drug-Disease: metFORMIN and Hydronephrosis of left kidney; Type 2 diabetes mellitus with diabetic nephropathy, without long-term current use of insulin; DEFER      Appointments  past 12m or future 3m with PCP    Date Provider   Last Visit   6/30/2025 Gautam Castanon III, MD   Next Visit   7/31/2025 Gautam Castanon III, MD   ED visits in past 90 days: 0        Note composed:7:54 AM 07/19/2025

## 2025-07-19 NOTE — TELEPHONE ENCOUNTER
No care due was identified.  [facility] Embedded Care Due Messages. Reference number: [id number].   7/19/2025 12:26:14 AM CDT

## 2025-07-31 ENCOUNTER — OFFICE VISIT (OUTPATIENT)
Dept: FAMILY MEDICINE | Facility: CLINIC | Age: 80
End: 2025-07-31
Payer: MEDICARE

## 2025-07-31 VITALS
BODY MASS INDEX: 39.39 KG/M2 | DIASTOLIC BLOOD PRESSURE: 70 MMHG | WEIGHT: 275.13 LBS | OXYGEN SATURATION: 93 % | SYSTOLIC BLOOD PRESSURE: 116 MMHG | TEMPERATURE: 99 F | HEIGHT: 70 IN | HEART RATE: 88 BPM

## 2025-07-31 DIAGNOSIS — E11.29 TYPE 2 DIABETES MELLITUS WITH MICROALBUMINURIA, WITHOUT LONG-TERM CURRENT USE OF INSULIN: ICD-10-CM

## 2025-07-31 DIAGNOSIS — R80.9 TYPE 2 DIABETES MELLITUS WITH MICROALBUMINURIA, WITHOUT LONG-TERM CURRENT USE OF INSULIN: ICD-10-CM

## 2025-07-31 DIAGNOSIS — I50.30 HEART FAILURE WITH PRESERVED EJECTION FRACTION, UNSPECIFIED HF CHRONICITY: Primary | ICD-10-CM

## 2025-07-31 DIAGNOSIS — Z79.01 ANTICOAGULATED: ICD-10-CM

## 2025-07-31 PROCEDURE — G2211 COMPLEX E/M VISIT ADD ON: HCPCS | Mod: S$GLB,,, | Performed by: FAMILY MEDICINE

## 2025-07-31 PROCEDURE — 3078F DIAST BP <80 MM HG: CPT | Mod: CPTII,S$GLB,, | Performed by: FAMILY MEDICINE

## 2025-07-31 PROCEDURE — 1125F AMNT PAIN NOTED PAIN PRSNT: CPT | Mod: CPTII,S$GLB,, | Performed by: FAMILY MEDICINE

## 2025-07-31 PROCEDURE — 1159F MED LIST DOCD IN RCRD: CPT | Mod: CPTII,S$GLB,, | Performed by: FAMILY MEDICINE

## 2025-07-31 PROCEDURE — 1160F RVW MEDS BY RX/DR IN RCRD: CPT | Mod: CPTII,S$GLB,, | Performed by: FAMILY MEDICINE

## 2025-07-31 PROCEDURE — 3288F FALL RISK ASSESSMENT DOCD: CPT | Mod: CPTII,S$GLB,, | Performed by: FAMILY MEDICINE

## 2025-07-31 PROCEDURE — 99999 PR PBB SHADOW E&M-EST. PATIENT-LVL IV: CPT | Mod: PBBFAC,,, | Performed by: FAMILY MEDICINE

## 2025-07-31 PROCEDURE — 1101F PT FALLS ASSESS-DOCD LE1/YR: CPT | Mod: CPTII,S$GLB,, | Performed by: FAMILY MEDICINE

## 2025-07-31 PROCEDURE — 99213 OFFICE O/P EST LOW 20 MIN: CPT | Mod: S$GLB,,, | Performed by: FAMILY MEDICINE

## 2025-07-31 PROCEDURE — 3074F SYST BP LT 130 MM HG: CPT | Mod: CPTII,S$GLB,, | Performed by: FAMILY MEDICINE

## 2025-07-31 PROCEDURE — 2023F DILAT RTA XM W/O RTNOPTHY: CPT | Mod: CPTII,S$GLB,, | Performed by: FAMILY MEDICINE

## 2025-07-31 RX ORDER — SEMAGLUTIDE 1.34 MG/ML
1 INJECTION, SOLUTION SUBCUTANEOUS
COMMUNITY
End: 2025-07-31 | Stop reason: SDUPTHER

## 2025-07-31 RX ORDER — SEMAGLUTIDE 1.34 MG/ML
1 INJECTION, SOLUTION SUBCUTANEOUS
Qty: 4 EACH | Refills: 0 | Status: SHIPPED | OUTPATIENT
Start: 2025-07-31

## 2025-07-31 NOTE — PROGRESS NOTES
SCRIBE #1 NOTE: I, Negro Stone, am scribing for, and in the presence of,  Gautam Castanon III, MD. I have scribed the entire note.     Subjective:       Patient ID: Hiro Rodríguez is a 80 y.o. male.    Chief Complaint: Follow-up (Mounjaro)    Mr. Rodríguez is here for a follow up with his last appointment being here on June 30, 2025. Accompanied by wife. Use of walker. History of subdural hematoma. BMI of 39.48, weight is up. Wife states he likes sweets. His maximum weight was 321 pounds and his lowest was 222 pounds in May 2023. Cardiovascular good. No chest pain. No palpitations. Blood pressure is 116/70. Hypertension controlled. Type 2 diabetes mellitus with microalbuminuria. On Mounjaro 7.5mg. A1C was 7.7. His sugars have been in the 120's. HFpEF. Anticoagulated. Sees Dr. Ozuna every 6 months.     Review of Systems   Constitutional:  Negative for chills and fever.   HENT:  Negative for congestion and sore throat.    Eyes:  Negative for pain and visual disturbance.   Respiratory:  Negative for chest tightness and shortness of breath.    Cardiovascular:  Negative for chest pain.   Gastrointestinal:  Negative for nausea.   Endocrine: Negative for polydipsia and polyuria.   Genitourinary:  Negative for dysuria and flank pain.   Musculoskeletal:  Negative for back pain, neck pain and neck stiffness.   Skin:  Negative for rash.   Allergic/Immunologic: Negative for immunocompromised state.   Neurological:  Negative for dizziness and weakness.   Hematological:  Does not bruise/bleed easily.   Psychiatric/Behavioral:  Negative for agitation and behavioral problems.    All other systems reviewed and are negative.      Objective:      Physical examination: Vital signs noted. No acute distress. No carotid bruit. Regular heart rate and rhythm. Lungs clear to auscultation bilaterally. Abdomen bowel sounds positive soft and nontender. Extremities without edema. 2+ pedal pulses.      Assessment:       1. Heart failure  with preserved ejection fraction, unspecified HF chronicity    2. Anticoagulated    3. Type 2 diabetes mellitus with microalbuminuria, without long-term current use of insulin    4. BMI 39.0-39.9,adult        Plan:       Heart failure with preserved ejection fraction, unspecified HF chronicity    Anticoagulated    Type 2 diabetes mellitus with microalbuminuria, without long-term current use of insulin    BMI 39.0-39.9,adult    Does not feel the Mounjaro is working for him and he is having to pay cash for it he [facility].  Will give him 4 mg pen.  He will use 0.5 weekly.  Follow-up on this.  Continue low-fat low sugar diet.  All care gaps addressed or discussed.     I, Gautam Castanon III, MD, personally performed the services described in this documentation. All medical record entries made by the scribe were at my direction and in my presence. I have reviewed the chart and agree that the record reflects my personal performance and is accurate and complete.

## 2025-08-25 ENCOUNTER — HOSPITAL ENCOUNTER (INPATIENT)
Facility: HOSPITAL | Age: 80
LOS: 1 days | Discharge: HOME | DRG: 291 | End: 2025-08-27
Attending: STUDENT IN AN ORGANIZED HEALTH CARE EDUCATION/TRAINING PROGRAM | Admitting: INTERNAL MEDICINE
Payer: MEDICARE

## 2025-08-25 DIAGNOSIS — R79.89 ELEVATED BRAIN NATRIURETIC PEPTIDE (BNP) LEVEL: ICD-10-CM

## 2025-08-25 DIAGNOSIS — R06.02 SOB (SHORTNESS OF BREATH): ICD-10-CM

## 2025-08-25 DIAGNOSIS — J96.01 ACUTE HYPOXIC RESPIRATORY FAILURE: Primary | ICD-10-CM

## 2025-08-25 DIAGNOSIS — I50.9 ACUTE CONGESTIVE HEART FAILURE, UNSPECIFIED HEART FAILURE TYPE: ICD-10-CM

## 2025-08-25 DIAGNOSIS — J96.01 ACUTE HYPOXEMIC RESPIRATORY FAILURE: ICD-10-CM

## 2025-08-25 DIAGNOSIS — R06.02 SHORTNESS OF BREATH: ICD-10-CM

## 2025-08-25 LAB
ABSOLUTE EOSINOPHIL (SMH): 0.12 K/UL
ABSOLUTE MONOCYTE (SMH): 0.79 K/UL (ref 0.3–1)
ABSOLUTE NEUTROPHIL COUNT (SMH): 9.9 K/UL (ref 1.8–7.7)
ALBUMIN SERPL-MCNC: 4.1 G/DL (ref 3.5–5.2)
ALP SERPL-CCNC: 52 UNIT/L (ref 55–135)
ALT SERPL-CCNC: 16 UNIT/L (ref 10–44)
ANION GAP (SMH): 6 MMOL/L (ref 8–16)
AST SERPL-CCNC: 17 UNIT/L (ref 10–40)
BASOPHILS # BLD AUTO: 0.05 K/UL
BASOPHILS NFR BLD AUTO: 0.4 %
BILIRUB SERPL-MCNC: 0.5 MG/DL (ref 0.1–1)
BNP SERPL-MCNC: 186 PG/ML
BUN SERPL-MCNC: 30 MG/DL (ref 8–23)
CALCIUM SERPL-MCNC: 9.1 MG/DL (ref 8.7–10.5)
CHLORIDE SERPL-SCNC: 104 MMOL/L (ref 95–110)
CO2 SERPL-SCNC: 29 MMOL/L (ref 23–29)
CREAT SERPL-MCNC: 1.1 MG/DL (ref 0.5–1.4)
ERYTHROCYTE [DISTWIDTH] IN BLOOD BY AUTOMATED COUNT: 13.2 % (ref 11.5–14.5)
GFR SERPLBLD CREATININE-BSD FMLA CKD-EPI: >60 ML/MIN/1.73/M2
GLUCOSE SERPL-MCNC: 211 MG/DL (ref 70–110)
HCT VFR BLD AUTO: 45.2 % (ref 40–54)
HGB BLD-MCNC: 14.1 GM/DL (ref 14–18)
IMM GRANULOCYTES # BLD AUTO: 0.05 K/UL (ref 0–0.04)
IMM GRANULOCYTES NFR BLD AUTO: 0.4 % (ref 0–0.5)
LYMPHOCYTES # BLD AUTO: 1.78 K/UL (ref 1–4.8)
MCH RBC QN AUTO: 30 PG (ref 27–31)
MCHC RBC AUTO-ENTMCNC: 31.2 G/DL (ref 32–36)
MCV RBC AUTO: 96 FL (ref 82–98)
NUCLEATED RBC (/100WBC) (SMH): 0 /100 WBC
PLATELET # BLD AUTO: 218 K/UL (ref 150–450)
PMV BLD AUTO: 10.1 FL (ref 9.2–12.9)
POTASSIUM SERPL-SCNC: 4.5 MMOL/L (ref 3.5–5.1)
PROT SERPL-MCNC: 7.1 GM/DL (ref 6–8.4)
RBC # BLD AUTO: 4.7 M/UL (ref 4.6–6.2)
RELATIVE EOSINOPHIL (SMH): 0.9 % (ref 0–8)
RELATIVE LYMPHOCYTE (SMH): 14 % (ref 18–48)
RELATIVE MONOCYTE (SMH): 6.2 % (ref 4–15)
RELATIVE NEUTROPHIL (SMH): 78.1 % (ref 38–73)
SARS-COV-2 RDRP RESP QL NAA+PROBE: NEGATIVE
SODIUM SERPL-SCNC: 139 MMOL/L (ref 136–145)
TROPONIN HIGH SENSITIVE (SMH): 7.8 PG/ML
WBC # BLD AUTO: 12.73 K/UL (ref 3.9–12.7)

## 2025-08-25 PROCEDURE — 93005 ELECTROCARDIOGRAM TRACING: CPT | Performed by: GENERAL PRACTICE

## 2025-08-25 PROCEDURE — 93010 ELECTROCARDIOGRAM REPORT: CPT | Mod: ,,, | Performed by: GENERAL PRACTICE

## 2025-08-25 PROCEDURE — 85025 COMPLETE CBC W/AUTO DIFF WBC: CPT

## 2025-08-25 PROCEDURE — 84484 ASSAY OF TROPONIN QUANT: CPT

## 2025-08-25 PROCEDURE — U0002 COVID-19 LAB TEST NON-CDC: HCPCS

## 2025-08-25 PROCEDURE — 83880 ASSAY OF NATRIURETIC PEPTIDE: CPT

## 2025-08-25 PROCEDURE — 99285 EMERGENCY DEPT VISIT HI MDM: CPT | Mod: 25

## 2025-08-25 PROCEDURE — 80053 COMPREHEN METABOLIC PANEL: CPT

## 2025-08-25 RX ORDER — FUROSEMIDE 10 MG/ML
40 INJECTION INTRAMUSCULAR; INTRAVENOUS
Status: COMPLETED | OUTPATIENT
Start: 2025-08-25 | End: 2025-08-26

## 2025-08-26 ENCOUNTER — CLINICAL SUPPORT (OUTPATIENT)
Dept: CARDIOLOGY | Facility: HOSPITAL | Age: 80
End: 2025-08-26
Attending: FAMILY MEDICINE
Payer: MEDICARE

## 2025-08-26 PROBLEM — J96.01 ACUTE HYPOXIC RESPIRATORY FAILURE: Status: ACTIVE | Noted: 2025-08-26

## 2025-08-26 PROBLEM — J96.01 ACUTE HYPOXEMIC RESPIRATORY FAILURE: Status: ACTIVE | Noted: 2025-08-26

## 2025-08-26 LAB
ADENOVIRUS (SMH): NOT DETECTED
ALLENS TEST: ABNORMAL
ANION GAP (SMH): 5 MMOL/L (ref 8–16)
BORDETELLA PARAPERTUSSIS (IS1001) (SMH): NOT DETECTED
BORDETELLA PERTUSSIS (PTXP) (SMH): NOT DETECTED
BUN SERPL-MCNC: 28 MG/DL (ref 8–23)
CALCIUM SERPL-MCNC: 9.2 MG/DL (ref 8.7–10.5)
CHLAMYDIA PNEUMONIAE (SMH): NOT DETECTED
CHLORIDE SERPL-SCNC: 103 MMOL/L (ref 95–110)
CO2 SERPL-SCNC: 31 MMOL/L (ref 23–29)
CORONAVIRUS 229E, COMMON COLD VIRUS (SMH): NOT DETECTED
CORONAVIRUS HKU1, COMMON COLD VIRUS (SMH): NOT DETECTED
CORONAVIRUS NL63, COMMON COLD VIRUS (SMH): NOT DETECTED
CORONAVIRUS OC43, COMMON COLD VIRUS (SMH): NOT DETECTED
CREAT SERPL-MCNC: 1 MG/DL (ref 0.5–1.4)
D DIMER PPP IA.FEU-MCNC: 0.39 MG/L FEU
DELSYS: ABNORMAL
ERYTHROCYTE [DISTWIDTH] IN BLOOD BY AUTOMATED COUNT: 13.2 % (ref 11.5–14.5)
FLOW: 2
FLUAV AG UPPER RESP QL IA.RAPID: NEGATIVE
FLUBV AG UPPER RESP QL IA.RAPID: NEGATIVE
FLUBV RNA NPH QL NAA+NON-PROBE: NOT DETECTED
GFR SERPLBLD CREATININE-BSD FMLA CKD-EPI: >60 ML/MIN/1.73/M2
GLUCOSE SERPL-MCNC: 149 MG/DL (ref 70–110)
GLUCOSE SERPL-MCNC: 155 MG/DL (ref 70–110)
HCO3 UR-SCNC: 31.9 MMOL/L (ref 24–28)
HCT VFR BLD AUTO: 45 % (ref 40–54)
HCT VFR BLD CALC: 45 %PCV (ref 36–54)
HGB BLD-MCNC: 14.1 GM/DL (ref 14–18)
HPIV1 RNA NPH QL NAA+NON-PROBE: NOT DETECTED
HPIV2 RNA NPH QL NAA+NON-PROBE: NOT DETECTED
HPIV3 RNA NPH QL NAA+NON-PROBE: NOT DETECTED
HPIV4 RNA NPH QL NAA+NON-PROBE: NOT DETECTED
HUMAN METAPNEUMOVIRUS (SMH): NOT DETECTED
INFLUENZA A (SUBTYPES H1,H1-2009,H3) (SMH): NOT DETECTED
MAGNESIUM SERPL-MCNC: 2 MG/DL (ref 1.6–2.6)
MCH RBC QN AUTO: 30.3 PG (ref 27–31)
MCHC RBC AUTO-ENTMCNC: 31.3 G/DL (ref 32–36)
MCV RBC AUTO: 97 FL (ref 82–98)
MODE: ABNORMAL
MYCOPLASMA PNEUMONIAE (SMH): NOT DETECTED
PCO2 BLDA: 76.6 MMHG (ref 35–45)
PH SMN: 7.23 [PH] (ref 7.35–7.45)
PLATELET # BLD AUTO: 236 K/UL (ref 150–450)
PMV BLD AUTO: 9.7 FL (ref 9.2–12.9)
PO2 BLDA: 23 MMHG (ref 40–60)
POC BE: 4 MMOL/L (ref -2–2)
POC IONIZED CALCIUM: 1.26 MMOL/L (ref 1.06–1.42)
POC SATURATED O2: 28 % (ref 95–100)
POC TCO2: 34 MMOL/L (ref 24–29)
POCT GLUCOSE: 205 MG/DL (ref 70–110)
POCT GLUCOSE: 231 MG/DL (ref 70–110)
POCT GLUCOSE: 253 MG/DL (ref 70–110)
POCT GLUCOSE: 301 MG/DL (ref 70–110)
POTASSIUM BLD-SCNC: 4.6 MMOL/L (ref 3.5–5.1)
POTASSIUM SERPL-SCNC: 4.6 MMOL/L (ref 3.5–5.1)
PROCALCITONIN SERPL-MCNC: 0.05 NG/ML
RBC # BLD AUTO: 4.65 M/UL (ref 4.6–6.2)
RESPIRATORY INFECTION PANEL SOURCE (SMH): NORMAL
RSV RNA NPH QL NAA+NON-PROBE: NOT DETECTED
RV+EV RNA NPH QL NAA+NON-PROBE: NOT DETECTED
SAMPLE: ABNORMAL
SARS-COV-2 RNA RESP QL NAA+PROBE: NOT DETECTED
SITE: ABNORMAL
SODIUM BLD-SCNC: 140 MMOL/L (ref 136–145)
SODIUM SERPL-SCNC: 139 MMOL/L (ref 136–145)
WBC # BLD AUTO: 10.31 K/UL (ref 3.9–12.7)

## 2025-08-26 PROCEDURE — 82803 BLOOD GASES ANY COMBINATION: CPT

## 2025-08-26 PROCEDURE — 82330 ASSAY OF CALCIUM: CPT

## 2025-08-26 PROCEDURE — 25000242 PHARM REV CODE 250 ALT 637 W/ HCPCS: Performed by: INTERNAL MEDICINE

## 2025-08-26 PROCEDURE — 94799 UNLISTED PULMONARY SVC/PX: CPT

## 2025-08-26 PROCEDURE — 25000003 PHARM REV CODE 250: Performed by: INTERNAL MEDICINE

## 2025-08-26 PROCEDURE — 93306 TTE W/DOPPLER COMPLETE: CPT | Mod: 26,,, | Performed by: INTERNAL MEDICINE

## 2025-08-26 PROCEDURE — 99900031 HC PATIENT EDUCATION (STAT)

## 2025-08-26 PROCEDURE — 25000242 PHARM REV CODE 250 ALT 637 W/ HCPCS: Performed by: FAMILY MEDICINE

## 2025-08-26 PROCEDURE — 94640 AIRWAY INHALATION TREATMENT: CPT

## 2025-08-26 PROCEDURE — 63600175 PHARM REV CODE 636 W HCPCS: Mod: JZ,TB | Performed by: INTERNAL MEDICINE

## 2025-08-26 PROCEDURE — 82962 GLUCOSE BLOOD TEST: CPT

## 2025-08-26 PROCEDURE — 27000221 HC OXYGEN, UP TO 24 HOURS

## 2025-08-26 PROCEDURE — 25500020 PHARM REV CODE 255: Performed by: FAMILY MEDICINE

## 2025-08-26 PROCEDURE — 0202U NFCT DS 22 TRGT SARS-COV-2: CPT | Performed by: FAMILY MEDICINE

## 2025-08-26 PROCEDURE — 84295 ASSAY OF SERUM SODIUM: CPT

## 2025-08-26 PROCEDURE — 82435 ASSAY OF BLOOD CHLORIDE: CPT | Performed by: INTERNAL MEDICINE

## 2025-08-26 PROCEDURE — 99900035 HC TECH TIME PER 15 MIN (STAT)

## 2025-08-26 PROCEDURE — 85027 COMPLETE CBC AUTOMATED: CPT | Performed by: INTERNAL MEDICINE

## 2025-08-26 PROCEDURE — 85379 FIBRIN DEGRADATION QUANT: CPT | Performed by: INTERNAL MEDICINE

## 2025-08-26 PROCEDURE — 63600175 PHARM REV CODE 636 W HCPCS: Performed by: FAMILY MEDICINE

## 2025-08-26 PROCEDURE — 85014 HEMATOCRIT: CPT

## 2025-08-26 PROCEDURE — 87502 INFLUENZA DNA AMP PROBE: CPT | Performed by: INTERNAL MEDICINE

## 2025-08-26 PROCEDURE — 11000001 HC ACUTE MED/SURG PRIVATE ROOM

## 2025-08-26 PROCEDURE — 36415 COLL VENOUS BLD VENIPUNCTURE: CPT | Performed by: FAMILY MEDICINE

## 2025-08-26 PROCEDURE — 84132 ASSAY OF SERUM POTASSIUM: CPT

## 2025-08-26 PROCEDURE — 63600175 PHARM REV CODE 636 W HCPCS: Performed by: STUDENT IN AN ORGANIZED HEALTH CARE EDUCATION/TRAINING PROGRAM

## 2025-08-26 PROCEDURE — 84145 PROCALCITONIN (PCT): CPT | Performed by: FAMILY MEDICINE

## 2025-08-26 PROCEDURE — 94761 N-INVAS EAR/PLS OXIMETRY MLT: CPT

## 2025-08-26 PROCEDURE — 83735 ASSAY OF MAGNESIUM: CPT | Performed by: INTERNAL MEDICINE

## 2025-08-26 PROCEDURE — 96374 THER/PROPH/DIAG INJ IV PUSH: CPT

## 2025-08-26 RX ORDER — HYDROCODONE BITARTRATE AND ACETAMINOPHEN 10; 325 MG/1; MG/1
1 TABLET ORAL EVERY 6 HOURS PRN
Status: DISCONTINUED | OUTPATIENT
Start: 2025-08-26 | End: 2025-08-27 | Stop reason: HOSPADM

## 2025-08-26 RX ORDER — FUROSEMIDE 20 MG/1
20 TABLET ORAL DAILY
Status: DISCONTINUED | OUTPATIENT
Start: 2025-08-26 | End: 2025-08-26

## 2025-08-26 RX ORDER — IPRATROPIUM BROMIDE AND ALBUTEROL SULFATE 2.5; .5 MG/3ML; MG/3ML
3 SOLUTION RESPIRATORY (INHALATION) EVERY 8 HOURS
Status: DISCONTINUED | OUTPATIENT
Start: 2025-08-26 | End: 2025-08-27 | Stop reason: HOSPADM

## 2025-08-26 RX ORDER — DULOXETIN HYDROCHLORIDE 30 MG/1
30 CAPSULE, DELAYED RELEASE ORAL 2 TIMES DAILY
Status: DISCONTINUED | OUTPATIENT
Start: 2025-08-26 | End: 2025-08-27 | Stop reason: HOSPADM

## 2025-08-26 RX ORDER — AMITRIPTYLINE HYDROCHLORIDE 10 MG/1
10-30 TABLET, FILM COATED ORAL NIGHTLY
COMMUNITY

## 2025-08-26 RX ORDER — IBUPROFEN 200 MG
24 TABLET ORAL
Status: DISCONTINUED | OUTPATIENT
Start: 2025-08-26 | End: 2025-08-27 | Stop reason: HOSPADM

## 2025-08-26 RX ORDER — INSULIN ASPART 100 [IU]/ML
0-10 INJECTION, SOLUTION INTRAVENOUS; SUBCUTANEOUS
Status: DISCONTINUED | OUTPATIENT
Start: 2025-08-26 | End: 2025-08-27 | Stop reason: HOSPADM

## 2025-08-26 RX ORDER — SODIUM CHLORIDE 0.9 % (FLUSH) 0.9 %
10 SYRINGE (ML) INJECTION
Status: DISCONTINUED | OUTPATIENT
Start: 2025-08-26 | End: 2025-08-27 | Stop reason: HOSPADM

## 2025-08-26 RX ORDER — FINASTERIDE 5 MG/1
5 TABLET, FILM COATED ORAL DAILY
Status: DISCONTINUED | OUTPATIENT
Start: 2025-08-26 | End: 2025-08-27 | Stop reason: HOSPADM

## 2025-08-26 RX ORDER — ATORVASTATIN CALCIUM 40 MG/1
80 TABLET, FILM COATED ORAL DAILY
Status: DISCONTINUED | OUTPATIENT
Start: 2025-08-26 | End: 2025-08-27 | Stop reason: HOSPADM

## 2025-08-26 RX ORDER — TALC
6 POWDER (GRAM) TOPICAL NIGHTLY PRN
Status: DISCONTINUED | OUTPATIENT
Start: 2025-08-26 | End: 2025-08-27 | Stop reason: HOSPADM

## 2025-08-26 RX ORDER — FUROSEMIDE 10 MG/ML
20 INJECTION INTRAMUSCULAR; INTRAVENOUS DAILY
Status: DISCONTINUED | OUTPATIENT
Start: 2025-08-27 | End: 2025-08-27

## 2025-08-26 RX ORDER — FUROSEMIDE 10 MG/ML
40 INJECTION INTRAMUSCULAR; INTRAVENOUS ONCE
Status: COMPLETED | OUTPATIENT
Start: 2025-08-26 | End: 2025-08-26

## 2025-08-26 RX ORDER — CEPHALEXIN 500 MG/1
500 CAPSULE ORAL 2 TIMES DAILY
COMMUNITY
Start: 2025-08-04

## 2025-08-26 RX ORDER — POTASSIUM CHLORIDE 20 MEQ/1
20 TABLET, EXTENDED RELEASE ORAL DAILY
Status: DISCONTINUED | OUTPATIENT
Start: 2025-08-26 | End: 2025-08-27 | Stop reason: HOSPADM

## 2025-08-26 RX ORDER — POLYETHYLENE GLYCOL 3350 17 G/17G
17 POWDER, FOR SOLUTION ORAL DAILY PRN
Status: DISCONTINUED | OUTPATIENT
Start: 2025-08-26 | End: 2025-08-27 | Stop reason: HOSPADM

## 2025-08-26 RX ORDER — GLUCAGON 1 MG
1 KIT INJECTION
Status: DISCONTINUED | OUTPATIENT
Start: 2025-08-26 | End: 2025-08-27 | Stop reason: HOSPADM

## 2025-08-26 RX ORDER — AMIODARONE HYDROCHLORIDE 100 MG/1
100 TABLET ORAL DAILY
Status: DISCONTINUED | OUTPATIENT
Start: 2025-08-26 | End: 2025-08-27 | Stop reason: HOSPADM

## 2025-08-26 RX ORDER — LISINOPRIL 2.5 MG/1
2.5 TABLET ORAL 2 TIMES DAILY
Status: DISCONTINUED | OUTPATIENT
Start: 2025-08-26 | End: 2025-08-27 | Stop reason: HOSPADM

## 2025-08-26 RX ORDER — BUDESONIDE 0.5 MG/2ML
0.5 INHALANT ORAL EVERY 12 HOURS
Status: DISCONTINUED | OUTPATIENT
Start: 2025-08-26 | End: 2025-08-27 | Stop reason: HOSPADM

## 2025-08-26 RX ORDER — IPRATROPIUM BROMIDE AND ALBUTEROL SULFATE 2.5; .5 MG/3ML; MG/3ML
3 SOLUTION RESPIRATORY (INHALATION) EVERY 4 HOURS
Status: DISCONTINUED | OUTPATIENT
Start: 2025-08-26 | End: 2025-08-26

## 2025-08-26 RX ORDER — IBUPROFEN 200 MG
16 TABLET ORAL
Status: DISCONTINUED | OUTPATIENT
Start: 2025-08-26 | End: 2025-08-27 | Stop reason: HOSPADM

## 2025-08-26 RX ORDER — DOCUSATE SODIUM 100 MG/1
100 CAPSULE, LIQUID FILLED ORAL 2 TIMES DAILY PRN
Status: DISCONTINUED | OUTPATIENT
Start: 2025-08-26 | End: 2025-08-27 | Stop reason: HOSPADM

## 2025-08-26 RX ORDER — LANOLIN ALCOHOL/MO/W.PET/CERES
400 CREAM (GRAM) TOPICAL DAILY
Status: DISCONTINUED | OUTPATIENT
Start: 2025-08-26 | End: 2025-08-27 | Stop reason: HOSPADM

## 2025-08-26 RX ADMIN — POTASSIUM CHLORIDE 20 MEQ: 1500 TABLET, EXTENDED RELEASE ORAL at 08:08

## 2025-08-26 RX ADMIN — FINASTERIDE 5 MG: 5 TABLET, FILM COATED ORAL at 08:08

## 2025-08-26 RX ADMIN — LISINOPRIL 2.5 MG: 2.5 TABLET ORAL at 08:08

## 2025-08-26 RX ADMIN — DULOXETINE 30 MG: 30 CAPSULE, DELAYED RELEASE ORAL at 08:08

## 2025-08-26 RX ADMIN — IPRATROPIUM BROMIDE AND ALBUTEROL SULFATE 3 ML: 2.5; .5 SOLUTION RESPIRATORY (INHALATION) at 02:08

## 2025-08-26 RX ADMIN — AZITHROMYCIN MONOHYDRATE 500 MG: 500 INJECTION, POWDER, LYOPHILIZED, FOR SOLUTION INTRAVENOUS at 04:08

## 2025-08-26 RX ADMIN — HYDROCODONE BITARTRATE AND ACETAMINOPHEN 1 TABLET: 10; 325 TABLET ORAL at 08:08

## 2025-08-26 RX ADMIN — IPRATROPIUM BROMIDE AND ALBUTEROL SULFATE 3 ML: 2.5; .5 SOLUTION RESPIRATORY (INHALATION) at 03:08

## 2025-08-26 RX ADMIN — AMIODARONE HYDROCHLORIDE 100 MG: 100 TABLET ORAL at 08:08

## 2025-08-26 RX ADMIN — METHYLPREDNISOLONE SODIUM SUCCINATE 40 MG: 40 INJECTION, POWDER, FOR SOLUTION INTRAMUSCULAR; INTRAVENOUS at 04:08

## 2025-08-26 RX ADMIN — FUROSEMIDE 40 MG: 10 INJECTION, SOLUTION INTRAMUSCULAR; INTRAVENOUS at 11:08

## 2025-08-26 RX ADMIN — PERFLUTREN 1 ML: 6.52 INJECTION, SUSPENSION INTRAVENOUS at 01:08

## 2025-08-26 RX ADMIN — FUROSEMIDE 20 MG: 20 TABLET ORAL at 08:08

## 2025-08-26 RX ADMIN — BUDESONIDE 0.5 MG: 0.5 SUSPENSION RESPIRATORY (INHALATION) at 06:08

## 2025-08-26 RX ADMIN — BUDESONIDE 0.5 MG: 0.5 SUSPENSION RESPIRATORY (INHALATION) at 08:08

## 2025-08-26 RX ADMIN — IPRATROPIUM BROMIDE AND ALBUTEROL SULFATE 3 ML: 2.5; .5 SOLUTION RESPIRATORY (INHALATION) at 06:08

## 2025-08-26 RX ADMIN — FUROSEMIDE 40 MG: 10 INJECTION, SOLUTION INTRAMUSCULAR; INTRAVENOUS at 12:08

## 2025-08-26 RX ADMIN — Medication 6 MG: at 08:08

## 2025-08-26 RX ADMIN — ATORVASTATIN CALCIUM 80 MG: 40 TABLET, FILM COATED ORAL at 08:08

## 2025-08-26 RX ADMIN — INSULIN ASPART 8 UNITS: 100 INJECTION, SOLUTION INTRAVENOUS; SUBCUTANEOUS at 01:08

## 2025-08-26 RX ADMIN — Medication 400 MG: at 08:08

## 2025-08-27 ENCOUNTER — TELEPHONE (OUTPATIENT)
Dept: FAMILY MEDICINE | Facility: CLINIC | Age: 80
End: 2025-08-27
Payer: MEDICARE

## 2025-08-27 ENCOUNTER — TELEPHONE (OUTPATIENT)
Dept: CARDIOLOGY | Facility: CLINIC | Age: 80
End: 2025-08-27
Payer: MEDICARE

## 2025-08-27 VITALS
SYSTOLIC BLOOD PRESSURE: 150 MMHG | HEART RATE: 99 BPM | DIASTOLIC BLOOD PRESSURE: 84 MMHG | HEIGHT: 70 IN | TEMPERATURE: 98 F | OXYGEN SATURATION: 98 % | WEIGHT: 278.88 LBS | RESPIRATION RATE: 16 BRPM | BODY MASS INDEX: 39.93 KG/M2

## 2025-08-27 PROBLEM — J96.01 ACUTE HYPOXIC RESPIRATORY FAILURE: Status: RESOLVED | Noted: 2025-08-26 | Resolved: 2025-08-27

## 2025-08-27 PROBLEM — I50.33 ACUTE ON CHRONIC HEART FAILURE WITH PRESERVED EJECTION FRACTION: Status: RESOLVED | Noted: 2023-04-28 | Resolved: 2025-08-27

## 2025-08-27 PROBLEM — E66.811 OBESITY (BMI 30.0-34.9): Status: RESOLVED | Noted: 2023-03-16 | Resolved: 2025-08-27

## 2025-08-27 LAB
AORTIC SIZE INDEX: 1.7 CM/M2
APICAL FOUR CHAMBER EJECTION FRACTION: 64 %
APICAL TWO CHAMBER EJECTION FRACTION: 47 %
ASCENDING AORTA: 4 CM
AV INDEX (PROSTH): 0.71
AV MEAN GRADIENT: 3 MMHG
AV PEAK GRADIENT: 5 MMHG
AV VALVE AREA BY VELOCITY RATIO: 3.3 CM²
AV VALVE AREA: 3.2 CM²
AV VELOCITY RATIO: 0.73
BSA FOR ECHO PROCEDURE: 2.46 M2
CV ECHO LV RWT: 0.49 CM
DOP CALC AO PEAK VEL: 1.1 M/S
DOP CALC AO VTI: 20.7 CM
DOP CALC LVOT AREA: 4.5 CM2
DOP CALC LVOT DIAMETER: 2.4 CM
DOP CALC LVOT PEAK VEL: 0.8 M/S
DOP CALC MV VTI: 25 CM
DOP CALCLVOT PEAK VEL VTI: 14.6 CM
E WAVE DECELERATION TIME: 190 MSEC
E/E' RATIO: 12 M/S
ECHO LV POSTERIOR WALL: 1.1 CM (ref 0.6–1.1)
FRACTIONAL SHORTENING: 31.1 % (ref 28–44)
INTERVENTRICULAR SEPTUM: 1.2 CM (ref 0.6–1.1)
IVC DIAMETER: 0.87 CM
LEFT ATRIUM AREA SYSTOLIC (APICAL 4 CHAMBER): 28.2 CM2
LEFT ATRIUM SIZE: 4.4 CM
LEFT INTERNAL DIMENSION IN SYSTOLE: 3.1 CM (ref 2.1–4)
LEFT VENTRICLE DIASTOLIC VOLUME INDEX: 38.82 ML/M2
LEFT VENTRICLE DIASTOLIC VOLUME: 92 ML
LEFT VENTRICLE END DIASTOLIC VOLUME APICAL 2 CHAMBER: 37.2 ML
LEFT VENTRICLE END DIASTOLIC VOLUME APICAL 4 CHAMBER INDEX BSA: 47.26 ML/M2
LEFT VENTRICLE END DIASTOLIC VOLUME APICAL 4 CHAMBER: 112 ML
LEFT VENTRICLE END SYSTOLIC VOLUME APICAL 4 CHAMBER: 85.3 ML
LEFT VENTRICLE MASS INDEX: 78.6 G/M2
LEFT VENTRICLE SYSTOLIC VOLUME INDEX: 15.6 ML/M2
LEFT VENTRICLE SYSTOLIC VOLUME: 37 ML
LEFT VENTRICULAR INTERNAL DIMENSION IN DIASTOLE: 4.5 CM (ref 3.5–6)
LEFT VENTRICULAR MASS: 186.4 G
LV LATERAL E/E' RATIO: 11.1 M/S
LV SEPTAL E/E' RATIO: 12.2 M/S
LVED V (TEICH): 92.45 ML
LVES V (TEICH): 37.03 ML
LVOT MG: 1 MMHG
LVOT MV: 0.52 CM/S
Lab: 2.2 CM/M
MV MEAN GRADIENT: 2 MMHG
MV PEAK E VEL: 1.22 M/S
MV PEAK GRADIENT: 7 MMHG
MV STENOSIS PRESSURE HALF TIME: 41 MS
MV VALVE AREA BY CONTINUITY EQUATION: 2.64 CM2
MV VALVE AREA P 1/2 METHOD: 5.37 CM2
OHS CV CPX PATIENT HEIGHT IN: 70
OHS CV RV/LV RATIO: 0.69 CM
OHS LV EJECTION FRACTION SIMPSONS BIPLANE MOD: 57 %
PISA TR MAX VEL: 2.6 M/S
POCT GLUCOSE: 159 MG/DL (ref 70–110)
POCT GLUCOSE: 198 MG/DL (ref 70–110)
POCT GLUCOSE: 202 MG/DL (ref 70–110)
PV MV: 0.67 M/S
PV PEAK GRADIENT: 3 MMHG
PV PEAK VELOCITY: 0.93 M/S
RA PRESSURE ESTIMATED: 3 MMHG
RIGHT ATRIUM END SYSTOLIC VOLUME APICAL 4 CHAMBER INDEX BSA: 20.97 ML/M2
RIGHT ATRIUM VOLUME AREA LENGTH APICAL 4 CHAMBER: 49.7 ML
RIGHT VENTRICLE DIASTOLIC BASEL DIMENSION: 3.1 CM
RIGHT VENTRICULAR END-DIASTOLIC DIMENSION: 3.12 CM
RV TB RVSP: 6 MMHG
RV TISSUE DOPPLER FREE WALL SYSTOLIC VELOCITY 1 (APICAL 4 CHAMBER VIEW): 13.6 CM/S
TDI LATERAL: 0.11 M/S
TDI SEPTAL: 0.1 M/S
TDI: 0.11 M/S
TR MAX PG: 27 MMHG
TV REST PULMONARY ARTERY PRESSURE: 30 MMHG
Z-SCORE OF LEFT VENTRICULAR DIMENSION IN END DIASTOLE: -8.23
Z-SCORE OF LEFT VENTRICULAR DIMENSION IN END SYSTOLE: -5.41

## 2025-08-27 PROCEDURE — 94640 AIRWAY INHALATION TREATMENT: CPT

## 2025-08-27 PROCEDURE — 97116 GAIT TRAINING THERAPY: CPT

## 2025-08-27 PROCEDURE — 25000003 PHARM REV CODE 250: Performed by: INTERNAL MEDICINE

## 2025-08-27 PROCEDURE — 97165 OT EVAL LOW COMPLEX 30 MIN: CPT

## 2025-08-27 PROCEDURE — 97535 SELF CARE MNGMENT TRAINING: CPT

## 2025-08-27 PROCEDURE — 97161 PT EVAL LOW COMPLEX 20 MIN: CPT

## 2025-08-27 PROCEDURE — 25000242 PHARM REV CODE 250 ALT 637 W/ HCPCS: Performed by: FAMILY MEDICINE

## 2025-08-27 PROCEDURE — 94761 N-INVAS EAR/PLS OXIMETRY MLT: CPT

## 2025-08-27 PROCEDURE — 25000242 PHARM REV CODE 250 ALT 637 W/ HCPCS: Performed by: INTERNAL MEDICINE

## 2025-08-27 PROCEDURE — 63600175 PHARM REV CODE 636 W HCPCS: Performed by: FAMILY MEDICINE

## 2025-08-27 RX ORDER — FUROSEMIDE 20 MG/1
20 TABLET ORAL DAILY
Status: DISCONTINUED | OUTPATIENT
Start: 2025-08-28 | End: 2025-08-27 | Stop reason: HOSPADM

## 2025-08-27 RX ADMIN — POTASSIUM CHLORIDE 20 MEQ: 1500 TABLET, EXTENDED RELEASE ORAL at 08:08

## 2025-08-27 RX ADMIN — LISINOPRIL 2.5 MG: 2.5 TABLET ORAL at 08:08

## 2025-08-27 RX ADMIN — AMIODARONE HYDROCHLORIDE 100 MG: 100 TABLET ORAL at 08:08

## 2025-08-27 RX ADMIN — ATORVASTATIN CALCIUM 80 MG: 40 TABLET, FILM COATED ORAL at 08:08

## 2025-08-27 RX ADMIN — BUDESONIDE 0.5 MG: 0.5 SUSPENSION RESPIRATORY (INHALATION) at 07:08

## 2025-08-27 RX ADMIN — IPRATROPIUM BROMIDE AND ALBUTEROL SULFATE 3 ML: 2.5; .5 SOLUTION RESPIRATORY (INHALATION) at 07:08

## 2025-08-27 RX ADMIN — IPRATROPIUM BROMIDE AND ALBUTEROL SULFATE 3 ML: 2.5; .5 SOLUTION RESPIRATORY (INHALATION) at 03:08

## 2025-08-27 RX ADMIN — Medication 400 MG: at 08:08

## 2025-08-27 RX ADMIN — HYDROCODONE BITARTRATE AND ACETAMINOPHEN 1 TABLET: 10; 325 TABLET ORAL at 08:08

## 2025-08-27 RX ADMIN — FUROSEMIDE 20 MG: 10 INJECTION, SOLUTION INTRAMUSCULAR; INTRAVENOUS at 08:08

## 2025-08-27 RX ADMIN — DULOXETINE 30 MG: 30 CAPSULE, DELAYED RELEASE ORAL at 08:08

## 2025-08-27 RX ADMIN — FINASTERIDE 5 MG: 5 TABLET, FILM COATED ORAL at 08:08

## 2025-08-29 ENCOUNTER — PATIENT OUTREACH (OUTPATIENT)
Dept: FAMILY MEDICINE | Facility: CLINIC | Age: 80
End: 2025-08-29
Payer: MEDICARE

## 2025-08-29 ENCOUNTER — TELEPHONE (OUTPATIENT)
Dept: FAMILY MEDICINE | Facility: CLINIC | Age: 80
End: 2025-08-29
Payer: MEDICARE

## 2025-09-01 LAB
OHS QRS DURATION: 78 MS
OHS QTC CALCULATION: 438 MS

## 2025-09-04 ENCOUNTER — OFFICE VISIT (OUTPATIENT)
Dept: FAMILY MEDICINE | Facility: CLINIC | Age: 80
End: 2025-09-04
Payer: MEDICARE

## 2025-09-04 VITALS
HEIGHT: 70 IN | BODY MASS INDEX: 39.3 KG/M2 | SYSTOLIC BLOOD PRESSURE: 134 MMHG | TEMPERATURE: 97 F | DIASTOLIC BLOOD PRESSURE: 72 MMHG | WEIGHT: 274.5 LBS | HEART RATE: 80 BPM | OXYGEN SATURATION: 92 %

## 2025-09-04 DIAGNOSIS — J96.01 ACUTE HYPOXEMIC RESPIRATORY FAILURE: ICD-10-CM

## 2025-09-04 DIAGNOSIS — E11.29 TYPE 2 DIABETES MELLITUS WITH MICROALBUMINURIA, WITHOUT LONG-TERM CURRENT USE OF INSULIN: ICD-10-CM

## 2025-09-04 DIAGNOSIS — E66.01 SEVERE OBESITY (BMI 35.0-39.9) WITH COMORBIDITY: ICD-10-CM

## 2025-09-04 DIAGNOSIS — I10 HYPERTENSION, ESSENTIAL: Primary | ICD-10-CM

## 2025-09-04 DIAGNOSIS — I48.20 CHRONIC ATRIAL FIBRILLATION: ICD-10-CM

## 2025-09-04 DIAGNOSIS — R80.9 TYPE 2 DIABETES MELLITUS WITH MICROALBUMINURIA, WITHOUT LONG-TERM CURRENT USE OF INSULIN: ICD-10-CM

## 2025-09-04 PROCEDURE — 1111F DSCHRG MED/CURRENT MED MERGE: CPT | Mod: CPTII,S$GLB,, | Performed by: FAMILY MEDICINE

## 2025-09-04 PROCEDURE — 2023F DILAT RTA XM W/O RTNOPTHY: CPT | Mod: CPTII,S$GLB,, | Performed by: FAMILY MEDICINE

## 2025-09-04 PROCEDURE — 99214 OFFICE O/P EST MOD 30 MIN: CPT | Mod: S$GLB,,, | Performed by: FAMILY MEDICINE

## 2025-09-04 PROCEDURE — 1160F RVW MEDS BY RX/DR IN RCRD: CPT | Mod: CPTII,S$GLB,, | Performed by: FAMILY MEDICINE

## 2025-09-04 PROCEDURE — 1126F AMNT PAIN NOTED NONE PRSNT: CPT | Mod: CPTII,S$GLB,, | Performed by: FAMILY MEDICINE

## 2025-09-04 PROCEDURE — 3078F DIAST BP <80 MM HG: CPT | Mod: CPTII,S$GLB,, | Performed by: FAMILY MEDICINE

## 2025-09-04 PROCEDURE — 1159F MED LIST DOCD IN RCRD: CPT | Mod: CPTII,S$GLB,, | Performed by: FAMILY MEDICINE

## 2025-09-04 PROCEDURE — G2211 COMPLEX E/M VISIT ADD ON: HCPCS | Mod: S$GLB,,, | Performed by: FAMILY MEDICINE

## 2025-09-04 PROCEDURE — 99999 PR PBB SHADOW E&M-EST. PATIENT-LVL V: CPT | Mod: PBBFAC,,, | Performed by: FAMILY MEDICINE

## 2025-09-04 PROCEDURE — 1100F PTFALLS ASSESS-DOCD GE2>/YR: CPT | Mod: CPTII,S$GLB,, | Performed by: FAMILY MEDICINE

## 2025-09-04 PROCEDURE — 3075F SYST BP GE 130 - 139MM HG: CPT | Mod: CPTII,S$GLB,, | Performed by: FAMILY MEDICINE

## 2025-09-04 PROCEDURE — 3288F FALL RISK ASSESSMENT DOCD: CPT | Mod: CPTII,S$GLB,, | Performed by: FAMILY MEDICINE

## 2025-09-04 RX ORDER — SEMAGLUTIDE 2.68 MG/ML
2 INJECTION, SOLUTION SUBCUTANEOUS
Qty: 4 EACH | Refills: 0 | Status: SHIPPED | OUTPATIENT
Start: 2025-09-04

## 2025-09-04 RX ORDER — FUROSEMIDE 20 MG/1
20 TABLET ORAL DAILY
Qty: 90 TABLET | Refills: 3 | Status: SHIPPED | OUTPATIENT
Start: 2025-09-04

## 2025-09-04 RX ORDER — POTASSIUM CHLORIDE 750 MG/1
20 TABLET, EXTENDED RELEASE ORAL DAILY
Qty: 180 TABLET | Refills: 3 | Status: SHIPPED | OUTPATIENT
Start: 2025-09-04

## 2025-09-04 RX ORDER — SEMAGLUTIDE 2.68 MG/ML
2 INJECTION, SOLUTION SUBCUTANEOUS
COMMUNITY
End: 2025-09-04 | Stop reason: SDUPTHER

## (undated) DEVICE — TRAY SKIN PREP DRY

## (undated) DEVICE — SUTURE VICRYL 1 CT-1 27 J261H

## (undated) DEVICE — PACK CYSTOSCOPY III

## (undated) DEVICE — SYS. SKIN CLOSURE DERMABOND PRINEO

## (undated) DEVICE — DRILL BIT

## (undated) DEVICE — SOLUTION H2O IRRIGATION 3000ML R8006

## (undated) DEVICE — SUCTION YANKAUER BULB TIP W/O VENT

## (undated) DEVICE — BANDAGE ACE STERILE 6 REB3116

## (undated) DEVICE — PAD BOVIE ADULT

## (undated) DEVICE — DRILL BIT STEPPED

## (undated) DEVICE — GLOVE #7.5 BIOGEL 30475

## (undated) DEVICE — GEMINI BASK 3 WIRE 3FX120CMX11MM

## (undated) DEVICE — TUBING CYSTO DOUBLE 654301

## (undated) DEVICE — TUBING SUCTION 12' ST2003

## (undated) DEVICE — SOLUTION IRRI H2O BOTTLE 1000ML

## (undated) DEVICE — SOLUTION IRRI NS BOTTLE 1000ML R5200-01

## (undated) DEVICE — DRAPE IOBAN 19X9 3/4 6617

## (undated) DEVICE — UNDERGLOVE BIOGEL PI MICRO BLUE SZ 6.5

## (undated) DEVICE — STRAP TABLE 5X72 M5173

## (undated) DEVICE — TRAY GENERAL SURGERY

## (undated) DEVICE — FIBER LASER FLEXIVA ID 365 SINGLE

## (undated) DEVICE — GUIDEWIRE URO STRGHT 3CM TIP.035X150CM

## (undated) DEVICE — DRILL BIT FLEX

## (undated) DEVICE — SCRUB BACTOSHIELD 134439

## (undated) DEVICE — SUTURE VICRYL 2-0 CT-1 36 VCP945H

## (undated) DEVICE — TAPE SILK DURAPORE 3 1538-3

## (undated) DEVICE — DRILL BIT TAPERED

## (undated) DEVICE — GLOVE BIOGEL PI   GOLD SIZE 8

## (undated) DEVICE — CATHETER URETERAL OPEN TIP 5FX70

## (undated) DEVICE — PADDING CAST 6INX4YD SPECIALIST 9046